# Patient Record
Sex: FEMALE | Race: WHITE | NOT HISPANIC OR LATINO | Employment: OTHER | URBAN - METROPOLITAN AREA
[De-identification: names, ages, dates, MRNs, and addresses within clinical notes are randomized per-mention and may not be internally consistent; named-entity substitution may affect disease eponyms.]

---

## 2017-01-06 ENCOUNTER — HOSPITAL ENCOUNTER (OUTPATIENT)
Dept: INFUSION CENTER | Facility: HOSPITAL | Age: 72
Discharge: HOME | End: 2017-01-06
Payer: COMMERCIAL

## 2017-01-06 PROCEDURE — 96401 CHEMO ANTI-NEOPL SQ/IM: CPT

## 2017-01-06 RX ADMIN — DENOSUMAB 60 MG: 60 INJECTION SUBCUTANEOUS at 08:39

## 2017-02-16 ENCOUNTER — HOSPITAL ENCOUNTER (OUTPATIENT)
Dept: INFUSION CENTER | Facility: HOSPITAL | Age: 72
Discharge: HOME | End: 2017-02-16
Payer: COMMERCIAL

## 2017-02-16 ENCOUNTER — LAB REQUISITION (OUTPATIENT)
Dept: LAB | Facility: HOSPITAL | Age: 72
End: 2017-02-16
Payer: COMMERCIAL

## 2017-02-16 ENCOUNTER — TRANSCRIBE ORDERS (OUTPATIENT)
Dept: LAB | Facility: HOSPITAL | Age: 72
End: 2017-02-16

## 2017-02-16 DIAGNOSIS — E11.9 TYPE 2 DIABETES MELLITUS WITHOUT COMPLICATIONS (HCC): ICD-10-CM

## 2017-02-16 DIAGNOSIS — E03.9 HYPOTHYROIDISM: ICD-10-CM

## 2017-02-16 LAB
ALBUMIN SERPL BCP-MCNC: 4 G/DL (ref 3.5–5)
ALP SERPL-CCNC: 82 U/L (ref 46–116)
ALT SERPL W P-5'-P-CCNC: 23 U/L (ref 12–78)
ANION GAP SERPL CALCULATED.3IONS-SCNC: 9 MMOL/L (ref 4–13)
AST SERPL W P-5'-P-CCNC: 18 U/L (ref 5–45)
BASOPHILS # BLD AUTO: 0.02 THOUSANDS/ΜL (ref 0–0.1)
BASOPHILS NFR BLD AUTO: 0 % (ref 0–1)
BILIRUB SERPL-MCNC: 0.3 MG/DL (ref 0.2–1)
BUN SERPL-MCNC: 11 MG/DL (ref 5–25)
CALCIUM SERPL-MCNC: 9.6 MG/DL (ref 8.3–10.1)
CANCER AG27-29 SERPL-ACNC: 18.1 U/ML (ref 0–42.3)
CEA SERPL-MCNC: 3.3 NG/ML (ref 0–3)
CHLORIDE SERPL-SCNC: 102 MMOL/L (ref 100–108)
CO2 SERPL-SCNC: 28 MMOL/L (ref 21–32)
CREAT SERPL-MCNC: 0.62 MG/DL (ref 0.6–1.3)
DEPRECATED D DIMER PPP: 799 NG/ML (FEU) (ref 0–424)
EOSINOPHIL # BLD AUTO: 0.26 THOUSAND/ΜL (ref 0–0.61)
EOSINOPHIL NFR BLD AUTO: 4 % (ref 0–6)
ERYTHROCYTE [DISTWIDTH] IN BLOOD BY AUTOMATED COUNT: 13.5 % (ref 11.6–15.1)
EST. AVERAGE GLUCOSE BLD GHB EST-MCNC: 163 MG/DL
GFR SERPL CREATININE-BSD FRML MDRD: >60 ML/MIN/1.73SQ M
GLUCOSE SERPL-MCNC: 116 MG/DL (ref 65–140)
HBA1C MFR BLD: 7.3 % (ref 4.2–6.3)
HCT VFR BLD AUTO: 36.9 % (ref 34.8–46.1)
HGB BLD-MCNC: 12.4 G/DL (ref 11.5–15.4)
LYMPHOCYTES # BLD AUTO: 1.14 THOUSANDS/ΜL (ref 0.6–4.47)
LYMPHOCYTES NFR BLD AUTO: 18 % (ref 14–44)
MCH RBC QN AUTO: 33 PG (ref 26.8–34.3)
MCHC RBC AUTO-ENTMCNC: 33.6 G/DL (ref 31.4–37.4)
MCV RBC AUTO: 98 FL (ref 82–98)
MONOCYTES # BLD AUTO: 0.68 THOUSAND/ΜL (ref 0.17–1.22)
MONOCYTES NFR BLD AUTO: 11 % (ref 4–12)
NEUTROPHILS # BLD AUTO: 4.18 THOUSANDS/ΜL (ref 1.85–7.62)
NEUTS SEG NFR BLD AUTO: 67 % (ref 43–75)
NRBC BLD AUTO-RTO: 1 /100 WBCS
PLATELET # BLD AUTO: 212 THOUSANDS/UL (ref 149–390)
PMV BLD AUTO: 9.3 FL (ref 8.9–12.7)
POTASSIUM SERPL-SCNC: 4.1 MMOL/L (ref 3.5–5.3)
PROT SERPL-MCNC: 7.9 G/DL (ref 6.4–8.2)
RBC # BLD AUTO: 3.76 MILLION/UL (ref 3.81–5.12)
SODIUM SERPL-SCNC: 139 MMOL/L (ref 136–145)
T3 SERPL-MCNC: 1.1 NG/ML (ref 0.6–1.8)
T4 FREE SERPL-MCNC: 0.69 NG/DL (ref 0.76–1.46)
TSH SERPL DL<=0.05 MIU/L-ACNC: 4.74 UIU/ML (ref 0.36–3.74)
WBC # BLD AUTO: 6.31 THOUSAND/UL (ref 4.31–10.16)

## 2017-02-16 PROCEDURE — 84439 ASSAY OF FREE THYROXINE: CPT | Performed by: FAMILY MEDICINE

## 2017-02-16 PROCEDURE — 86300 IMMUNOASSAY TUMOR CA 15-3: CPT | Performed by: INTERNAL MEDICINE

## 2017-02-16 PROCEDURE — 83036 HEMOGLOBIN GLYCOSYLATED A1C: CPT | Performed by: FAMILY MEDICINE

## 2017-02-16 PROCEDURE — 84443 ASSAY THYROID STIM HORMONE: CPT | Performed by: FAMILY MEDICINE

## 2017-02-16 PROCEDURE — 84480 ASSAY TRIIODOTHYRONINE (T3): CPT | Performed by: FAMILY MEDICINE

## 2017-02-16 PROCEDURE — 80053 COMPREHEN METABOLIC PANEL: CPT | Performed by: INTERNAL MEDICINE

## 2017-02-16 PROCEDURE — 85379 FIBRIN DEGRADATION QUANT: CPT | Performed by: INTERNAL MEDICINE

## 2017-02-16 PROCEDURE — 82378 CARCINOEMBRYONIC ANTIGEN: CPT | Performed by: INTERNAL MEDICINE

## 2017-02-16 PROCEDURE — 85025 COMPLETE CBC W/AUTO DIFF WBC: CPT | Performed by: INTERNAL MEDICINE

## 2017-02-16 RX ADMIN — Medication 300 UNITS: at 10:49

## 2017-02-16 NOTE — PLAN OF CARE
Problem: Potential for Falls  Goal: Patient will remain free of falls  INTERVENTIONS:  - Assess patient frequently for physical needs  - Identify cognitive and physical deficits and behaviors that affect risk of falls    - [place] fall precautions as indicated by assessment   - Educate patient/family on patient safety including physical limitations  - Instruct patient to call for assistance with activity based on assessment  - Modify environment to reduce risk of injury  - Consider OT/PT consult to assist with strengthening/mobility   Outcome: Progressing

## 2017-02-17 ENCOUNTER — GENERIC CONVERSION - ENCOUNTER (OUTPATIENT)
Dept: OTHER | Facility: OTHER | Age: 72
End: 2017-02-17

## 2017-03-06 ENCOUNTER — ALLSCRIPTS OFFICE VISIT (OUTPATIENT)
Dept: OTHER | Facility: OTHER | Age: 72
End: 2017-03-06

## 2017-03-31 ENCOUNTER — HOSPITAL ENCOUNTER (OUTPATIENT)
Dept: INFUSION CENTER | Facility: HOSPITAL | Age: 72
Discharge: HOME | End: 2017-03-31
Payer: COMMERCIAL

## 2017-03-31 LAB
ALBUMIN SERPL BCP-MCNC: 3.9 G/DL (ref 3.5–5)
ALP SERPL-CCNC: 65 U/L (ref 46–116)
ALT SERPL W P-5'-P-CCNC: 25 U/L (ref 12–78)
ANION GAP SERPL CALCULATED.3IONS-SCNC: 8 MMOL/L (ref 4–13)
AST SERPL W P-5'-P-CCNC: 18 U/L (ref 5–45)
BASOPHILS # BLD AUTO: 0.02 THOUSANDS/ΜL (ref 0–0.1)
BASOPHILS NFR BLD AUTO: 0 % (ref 0–1)
BILIRUB SERPL-MCNC: 0.25 MG/DL (ref 0.2–1)
BUN SERPL-MCNC: 16 MG/DL (ref 5–25)
CALCIUM SERPL-MCNC: 9.4 MG/DL (ref 8.3–10.1)
CANCER AG27-29 SERPL-ACNC: 15.8 U/ML (ref 0–42.3)
CEA SERPL-MCNC: 3.6 NG/ML (ref 0–3)
CHLORIDE SERPL-SCNC: 97 MMOL/L (ref 100–108)
CO2 SERPL-SCNC: 29 MMOL/L (ref 21–32)
CREAT SERPL-MCNC: 0.78 MG/DL (ref 0.6–1.3)
DEPRECATED D DIMER PPP: 689 NG/ML (FEU) (ref 0–424)
EOSINOPHIL # BLD AUTO: 0.33 THOUSAND/ΜL (ref 0–0.61)
EOSINOPHIL NFR BLD AUTO: 5 % (ref 0–6)
ERYTHROCYTE [DISTWIDTH] IN BLOOD BY AUTOMATED COUNT: 13.3 % (ref 11.6–15.1)
GFR SERPL CREATININE-BSD FRML MDRD: >60 ML/MIN/1.73SQ M
GLUCOSE SERPL-MCNC: 127 MG/DL (ref 65–140)
HCT VFR BLD AUTO: 35.9 % (ref 34.8–46.1)
HGB BLD-MCNC: 12 G/DL (ref 11.5–15.4)
LYMPHOCYTES # BLD AUTO: 1.04 THOUSANDS/ΜL (ref 0.6–4.47)
LYMPHOCYTES NFR BLD AUTO: 16 % (ref 14–44)
MCH RBC QN AUTO: 32.3 PG (ref 26.8–34.3)
MCHC RBC AUTO-ENTMCNC: 33.4 G/DL (ref 31.4–37.4)
MCV RBC AUTO: 97 FL (ref 82–98)
MONOCYTES # BLD AUTO: 0.73 THOUSAND/ΜL (ref 0.17–1.22)
MONOCYTES NFR BLD AUTO: 12 % (ref 4–12)
NEUTROPHILS # BLD AUTO: 4.22 THOUSANDS/ΜL (ref 1.85–7.62)
NEUTS SEG NFR BLD AUTO: 67 % (ref 43–75)
NRBC BLD AUTO-RTO: 0 /100 WBCS
PLATELET # BLD AUTO: 205 THOUSANDS/UL (ref 149–390)
PMV BLD AUTO: 9.3 FL (ref 8.9–12.7)
POTASSIUM SERPL-SCNC: 4 MMOL/L (ref 3.5–5.3)
PROT SERPL-MCNC: 7.5 G/DL (ref 6.4–8.2)
RBC # BLD AUTO: 3.72 MILLION/UL (ref 3.81–5.12)
SODIUM SERPL-SCNC: 134 MMOL/L (ref 136–145)
WBC # BLD AUTO: 6.36 THOUSAND/UL (ref 4.31–10.16)

## 2017-03-31 PROCEDURE — 85025 COMPLETE CBC W/AUTO DIFF WBC: CPT | Performed by: INTERNAL MEDICINE

## 2017-03-31 PROCEDURE — 80053 COMPREHEN METABOLIC PANEL: CPT | Performed by: INTERNAL MEDICINE

## 2017-03-31 PROCEDURE — 86300 IMMUNOASSAY TUMOR CA 15-3: CPT | Performed by: INTERNAL MEDICINE

## 2017-03-31 PROCEDURE — 82378 CARCINOEMBRYONIC ANTIGEN: CPT | Performed by: INTERNAL MEDICINE

## 2017-03-31 PROCEDURE — 85379 FIBRIN DEGRADATION QUANT: CPT | Performed by: INTERNAL MEDICINE

## 2017-03-31 RX ADMIN — Medication 300 UNITS: at 09:07

## 2017-03-31 NOTE — PLAN OF CARE
Problem: Potential for Falls  Goal: Patient will remain free of falls  INTERVENTIONS:  - Assess patient frequently for physical needs  - Identify cognitive and physical deficits and behaviors that affect risk of falls    - [place] fall precautions as indicated by assessment   - Educate patient/family on patient safety including physical limitations  - Instruct patient to call for assistance with activity based on assessment  - Modify environment to reduce risk of injury  - Consider OT/PT consult to assist with strengthening/mobility   Outcome: Progressing

## 2017-04-07 ENCOUNTER — GENERIC CONVERSION - ENCOUNTER (OUTPATIENT)
Dept: OTHER | Facility: OTHER | Age: 72
End: 2017-04-07

## 2017-04-24 ENCOUNTER — HOSPITAL ENCOUNTER (OUTPATIENT)
Dept: INFUSION CENTER | Facility: HOSPITAL | Age: 72
Discharge: HOME | End: 2017-04-24
Payer: COMMERCIAL

## 2017-04-24 LAB
ALBUMIN SERPL BCP-MCNC: 3.7 G/DL (ref 3.5–5)
ALP SERPL-CCNC: 60 U/L (ref 46–116)
ALT SERPL W P-5'-P-CCNC: 23 U/L (ref 12–78)
ANION GAP SERPL CALCULATED.3IONS-SCNC: 9 MMOL/L (ref 4–13)
AST SERPL W P-5'-P-CCNC: 16 U/L (ref 5–45)
BASOPHILS # BLD AUTO: 0.03 THOUSANDS/ΜL (ref 0–0.1)
BASOPHILS NFR BLD AUTO: 1 % (ref 0–1)
BILIRUB SERPL-MCNC: 0.32 MG/DL (ref 0.2–1)
BUN SERPL-MCNC: 16 MG/DL (ref 5–25)
CALCIUM SERPL-MCNC: 9.4 MG/DL (ref 8.3–10.1)
CANCER AG27-29 SERPL-ACNC: 18 U/ML (ref 0–42.3)
CEA SERPL-MCNC: 3.2 NG/ML (ref 0–3)
CHLORIDE SERPL-SCNC: 101 MMOL/L (ref 100–108)
CO2 SERPL-SCNC: 27 MMOL/L (ref 21–32)
CREAT SERPL-MCNC: 0.68 MG/DL (ref 0.6–1.3)
DEPRECATED D DIMER PPP: 1414 NG/ML (FEU) (ref 0–424)
EOSINOPHIL # BLD AUTO: 0.32 THOUSAND/ΜL (ref 0–0.61)
EOSINOPHIL NFR BLD AUTO: 6 % (ref 0–6)
ERYTHROCYTE [DISTWIDTH] IN BLOOD BY AUTOMATED COUNT: 13.4 % (ref 11.6–15.1)
GFR SERPL CREATININE-BSD FRML MDRD: >60 ML/MIN/1.73SQ M
GLUCOSE SERPL-MCNC: 120 MG/DL (ref 65–140)
HCT VFR BLD AUTO: 35.6 % (ref 34.8–46.1)
HGB BLD-MCNC: 11.9 G/DL (ref 11.5–15.4)
LYMPHOCYTES # BLD AUTO: 1.1 THOUSANDS/ΜL (ref 0.6–4.47)
LYMPHOCYTES NFR BLD AUTO: 19 % (ref 14–44)
MCH RBC QN AUTO: 32.7 PG (ref 26.8–34.3)
MCHC RBC AUTO-ENTMCNC: 33.4 G/DL (ref 31.4–37.4)
MCV RBC AUTO: 98 FL (ref 82–98)
MONOCYTES # BLD AUTO: 0.53 THOUSAND/ΜL (ref 0.17–1.22)
MONOCYTES NFR BLD AUTO: 9 % (ref 4–12)
NEUTROPHILS # BLD AUTO: 3.67 THOUSANDS/ΜL (ref 1.85–7.62)
NEUTS SEG NFR BLD AUTO: 65 % (ref 43–75)
NRBC BLD AUTO-RTO: 0 /100 WBCS
PLATELET # BLD AUTO: 201 THOUSANDS/UL (ref 149–390)
PMV BLD AUTO: 9.5 FL (ref 8.9–12.7)
POTASSIUM SERPL-SCNC: 4 MMOL/L (ref 3.5–5.3)
PROT SERPL-MCNC: 7.3 G/DL (ref 6.4–8.2)
RBC # BLD AUTO: 3.64 MILLION/UL (ref 3.81–5.12)
SODIUM SERPL-SCNC: 137 MMOL/L (ref 136–145)
WBC # BLD AUTO: 5.66 THOUSAND/UL (ref 4.31–10.16)

## 2017-04-24 PROCEDURE — 86300 IMMUNOASSAY TUMOR CA 15-3: CPT | Performed by: INTERNAL MEDICINE

## 2017-04-24 PROCEDURE — 85379 FIBRIN DEGRADATION QUANT: CPT | Performed by: INTERNAL MEDICINE

## 2017-04-24 PROCEDURE — 85025 COMPLETE CBC W/AUTO DIFF WBC: CPT | Performed by: INTERNAL MEDICINE

## 2017-04-24 PROCEDURE — 82378 CARCINOEMBRYONIC ANTIGEN: CPT | Performed by: INTERNAL MEDICINE

## 2017-04-24 PROCEDURE — 80053 COMPREHEN METABOLIC PANEL: CPT | Performed by: INTERNAL MEDICINE

## 2017-04-24 RX ADMIN — Medication 300 UNITS: at 08:21

## 2017-04-24 NOTE — PLAN OF CARE
Problem: Potential for Falls  Goal: Patient will remain free of falls  INTERVENTIONS:  - Assess patient frequently for physical needs  - Identify cognitive and physical deficits and behaviors that affect risk of falls    - [place] fall precautions as indicated by assessment   - Educate patient/family on patient safety including physical limitations  - Instruct patient to call for assistance with activity based on assessment  - Modify environment to reduce risk of injury  - Consider OT/PT consult to assist with strengthening/mobility   Outcome: Progressing

## 2017-05-08 ENCOUNTER — ALLSCRIPTS OFFICE VISIT (OUTPATIENT)
Dept: OTHER | Facility: OTHER | Age: 72
End: 2017-05-08

## 2017-05-08 ENCOUNTER — TRANSCRIBE ORDERS (OUTPATIENT)
Dept: ADMINISTRATIVE | Facility: HOSPITAL | Age: 72
End: 2017-05-08

## 2017-05-08 DIAGNOSIS — E04.1 NONTOXIC UNINODULAR GOITER: Primary | ICD-10-CM

## 2017-05-08 DIAGNOSIS — M81.0 SENILE OSTEOPOROSIS: ICD-10-CM

## 2017-05-30 ENCOUNTER — HOSPITAL ENCOUNTER (OUTPATIENT)
Dept: RADIOLOGY | Facility: HOSPITAL | Age: 72
Discharge: HOME | End: 2017-05-30
Attending: INTERNAL MEDICINE
Payer: COMMERCIAL

## 2017-05-30 DIAGNOSIS — M81.0 AGE-RELATED OSTEOPOROSIS WITHOUT CURRENT PATHOLOGICAL FRACTURE: ICD-10-CM

## 2017-05-30 DIAGNOSIS — E04.1 NONTOXIC SINGLE THYROID NODULE: ICD-10-CM

## 2017-05-30 PROCEDURE — 76536 US EXAM OF HEAD AND NECK: CPT

## 2017-06-05 ENCOUNTER — HOSPITAL ENCOUNTER (OUTPATIENT)
Dept: INFUSION CENTER | Facility: HOSPITAL | Age: 72
Discharge: HOME | End: 2017-06-05
Payer: COMMERCIAL

## 2017-06-05 LAB
ALBUMIN SERPL BCP-MCNC: 4 G/DL (ref 3.5–5)
ALP SERPL-CCNC: 62 U/L (ref 46–116)
ALT SERPL W P-5'-P-CCNC: 17 U/L (ref 12–78)
ANION GAP SERPL CALCULATED.3IONS-SCNC: 7 MMOL/L (ref 4–13)
AST SERPL W P-5'-P-CCNC: 15 U/L (ref 5–45)
BASOPHILS # BLD AUTO: 0.02 THOUSANDS/ΜL (ref 0–0.1)
BASOPHILS NFR BLD AUTO: 0 % (ref 0–1)
BILIRUB SERPL-MCNC: 0.34 MG/DL (ref 0.2–1)
BUN SERPL-MCNC: 11 MG/DL (ref 5–25)
CALCIUM SERPL-MCNC: 9.2 MG/DL (ref 8.3–10.1)
CANCER AG27-29 SERPL-ACNC: 16.3 U/ML (ref 0–42.3)
CEA SERPL-MCNC: 3 NG/ML (ref 0–3)
CHLORIDE SERPL-SCNC: 100 MMOL/L (ref 100–108)
CO2 SERPL-SCNC: 30 MMOL/L (ref 21–32)
CREAT SERPL-MCNC: 0.62 MG/DL (ref 0.6–1.3)
DEPRECATED D DIMER PPP: 937 NG/ML (FEU) (ref 0–424)
EOSINOPHIL # BLD AUTO: 0.28 THOUSAND/ΜL (ref 0–0.61)
EOSINOPHIL NFR BLD AUTO: 4 % (ref 0–6)
ERYTHROCYTE [DISTWIDTH] IN BLOOD BY AUTOMATED COUNT: 13.5 % (ref 11.6–15.1)
GFR SERPL CREATININE-BSD FRML MDRD: >60 ML/MIN/1.73SQ M
GLUCOSE SERPL-MCNC: 102 MG/DL (ref 65–140)
HCT VFR BLD AUTO: 35.9 % (ref 34.8–46.1)
HGB BLD-MCNC: 11.9 G/DL (ref 11.5–15.4)
LYMPHOCYTES # BLD AUTO: 1.12 THOUSANDS/ΜL (ref 0.6–4.47)
LYMPHOCYTES NFR BLD AUTO: 16 % (ref 14–44)
MCH RBC QN AUTO: 32.2 PG (ref 26.8–34.3)
MCHC RBC AUTO-ENTMCNC: 33.1 G/DL (ref 31.4–37.4)
MCV RBC AUTO: 97 FL (ref 82–98)
MONOCYTES # BLD AUTO: 0.74 THOUSAND/ΜL (ref 0.17–1.22)
MONOCYTES NFR BLD AUTO: 11 % (ref 4–12)
NEUTROPHILS # BLD AUTO: 4.64 THOUSANDS/ΜL (ref 1.85–7.62)
NEUTS SEG NFR BLD AUTO: 69 % (ref 43–75)
NRBC BLD AUTO-RTO: 0 /100 WBCS
PLATELET # BLD AUTO: 209 THOUSANDS/UL (ref 149–390)
PMV BLD AUTO: 9.2 FL (ref 8.9–12.7)
POTASSIUM SERPL-SCNC: 4 MMOL/L (ref 3.5–5.3)
PROT SERPL-MCNC: 7.6 G/DL (ref 6.4–8.2)
RBC # BLD AUTO: 3.69 MILLION/UL (ref 3.81–5.12)
SODIUM SERPL-SCNC: 137 MMOL/L (ref 136–145)
WBC # BLD AUTO: 6.82 THOUSAND/UL (ref 4.31–10.16)

## 2017-06-05 PROCEDURE — 82378 CARCINOEMBRYONIC ANTIGEN: CPT | Performed by: INTERNAL MEDICINE

## 2017-06-05 PROCEDURE — 85025 COMPLETE CBC W/AUTO DIFF WBC: CPT | Performed by: INTERNAL MEDICINE

## 2017-06-05 PROCEDURE — 80053 COMPREHEN METABOLIC PANEL: CPT | Performed by: INTERNAL MEDICINE

## 2017-06-05 PROCEDURE — 86300 IMMUNOASSAY TUMOR CA 15-3: CPT | Performed by: INTERNAL MEDICINE

## 2017-06-05 PROCEDURE — 85379 FIBRIN DEGRADATION QUANT: CPT | Performed by: INTERNAL MEDICINE

## 2017-06-05 RX ADMIN — Medication 300 UNITS: at 08:19

## 2017-06-05 NOTE — PROGRESS NOTES
Port flushed per protocol  Labs drawn as ordered by Dr Francisco Jacobs  Will return next month for port flush and labs for family doc

## 2017-06-06 ENCOUNTER — HOSPITAL ENCOUNTER (OUTPATIENT)
Dept: MAMMOGRAPHY | Facility: CLINIC | Age: 72
Discharge: HOME | End: 2017-06-06
Payer: COMMERCIAL

## 2017-06-06 DIAGNOSIS — C50.411 MALIGNANT NEOPLASM OF UPPER-OUTER QUADRANT OF RIGHT FEMALE BREAST (HCC): ICD-10-CM

## 2017-06-06 PROCEDURE — G0206 DX MAMMO INCL CAD UNI: HCPCS

## 2017-06-07 ENCOUNTER — HOSPITAL ENCOUNTER (OUTPATIENT)
Dept: RADIOLOGY | Age: 72
Discharge: HOME | End: 2017-06-07
Payer: COMMERCIAL

## 2017-06-07 DIAGNOSIS — M81.0 SENILE OSTEOPOROSIS: ICD-10-CM

## 2017-06-07 PROCEDURE — 77080 DXA BONE DENSITY AXIAL: CPT

## 2017-06-13 ENCOUNTER — GENERIC CONVERSION - ENCOUNTER (OUTPATIENT)
Dept: OTHER | Facility: OTHER | Age: 72
End: 2017-06-13

## 2017-07-06 ENCOUNTER — LAB REQUISITION (OUTPATIENT)
Dept: LAB | Facility: HOSPITAL | Age: 72
End: 2017-07-06
Payer: COMMERCIAL

## 2017-07-06 ENCOUNTER — HOSPITAL ENCOUNTER (OUTPATIENT)
Dept: INFUSION CENTER | Facility: HOSPITAL | Age: 72
Discharge: HOME | End: 2017-07-06
Payer: COMMERCIAL

## 2017-07-06 DIAGNOSIS — E11.9 TYPE 2 DIABETES MELLITUS WITHOUT COMPLICATIONS (HCC): ICD-10-CM

## 2017-07-06 DIAGNOSIS — E78.5 HYPERLIPIDEMIA: ICD-10-CM

## 2017-07-06 DIAGNOSIS — E55.9 VITAMIN D DEFICIENCY: ICD-10-CM

## 2017-07-06 DIAGNOSIS — R73.9 HYPERGLYCEMIA: ICD-10-CM

## 2017-07-06 DIAGNOSIS — I10 ESSENTIAL (PRIMARY) HYPERTENSION: ICD-10-CM

## 2017-07-06 DIAGNOSIS — E03.9 HYPOTHYROIDISM: ICD-10-CM

## 2017-07-06 LAB
25(OH)D3 SERPL-MCNC: 33.4 NG/ML (ref 30–100)
CHOLEST SERPL-MCNC: 263 MG/DL (ref 50–200)
CREAT UR-MCNC: 19.1 MG/DL
EST. AVERAGE GLUCOSE BLD GHB EST-MCNC: 143 MG/DL
HBA1C MFR BLD: 6.6 % (ref 4.2–6.3)
HDLC SERPL-MCNC: 59 MG/DL (ref 40–60)
LDLC SERPL CALC-MCNC: 188 MG/DL (ref 0–100)
MICROALBUMIN UR-MCNC: 9.7 MG/L (ref 0–20)
MICROALBUMIN/CREAT 24H UR: 51 MG/G CREATININE (ref 0–30)
TRIGL SERPL-MCNC: 78 MG/DL

## 2017-07-06 PROCEDURE — 82043 UR ALBUMIN QUANTITATIVE: CPT | Performed by: FAMILY MEDICINE

## 2017-07-06 PROCEDURE — 82570 ASSAY OF URINE CREATININE: CPT | Performed by: FAMILY MEDICINE

## 2017-07-06 PROCEDURE — 82306 VITAMIN D 25 HYDROXY: CPT | Performed by: FAMILY MEDICINE

## 2017-07-06 PROCEDURE — 83036 HEMOGLOBIN GLYCOSYLATED A1C: CPT | Performed by: FAMILY MEDICINE

## 2017-07-06 PROCEDURE — 80061 LIPID PANEL: CPT | Performed by: FAMILY MEDICINE

## 2017-07-06 RX ADMIN — Medication 300 UNITS: at 07:50

## 2017-07-06 NOTE — PLAN OF CARE
Problem: Potential for Falls  Goal: Patient will remain free of falls  INTERVENTIONS:  - Assess patient frequently for physical needs  -  Identify cognitive and physical deficits and behaviors that affect risk of falls    -  [place] fall precautions as indicated by assessment   - Educate patient/family on patient safety including physical limitations  - Instruct patient to call for assistance with activity based on assessment  - Modify environment to reduce risk of injury  - Consider OT/PT consult to assist with strengthening/mobility   Outcome: Progressing

## 2017-07-10 ENCOUNTER — ALLSCRIPTS OFFICE VISIT (OUTPATIENT)
Dept: OTHER | Facility: OTHER | Age: 72
End: 2017-07-10

## 2017-07-17 ENCOUNTER — HOSPITAL ENCOUNTER (OUTPATIENT)
Dept: INFUSION CENTER | Facility: HOSPITAL | Age: 72
Discharge: HOME | End: 2017-07-17
Payer: COMMERCIAL

## 2017-07-17 LAB
ALBUMIN SERPL BCP-MCNC: 3.8 G/DL (ref 3.5–5)
ALP SERPL-CCNC: 67 U/L (ref 46–116)
ALT SERPL W P-5'-P-CCNC: 16 U/L (ref 12–78)
ANION GAP SERPL CALCULATED.3IONS-SCNC: 9 MMOL/L (ref 4–13)
AST SERPL W P-5'-P-CCNC: 14 U/L (ref 5–45)
BASOPHILS # BLD AUTO: 0.03 THOUSANDS/ΜL (ref 0–0.1)
BASOPHILS NFR BLD AUTO: 1 % (ref 0–1)
BILIRUB SERPL-MCNC: 0.34 MG/DL (ref 0.2–1)
BUN SERPL-MCNC: 11 MG/DL (ref 5–25)
CALCIUM SERPL-MCNC: 9.5 MG/DL (ref 8.3–10.1)
CANCER AG27-29 SERPL-ACNC: 11.8 U/ML (ref 0–42.3)
CEA SERPL-MCNC: 2.8 NG/ML (ref 0–3)
CHLORIDE SERPL-SCNC: 99 MMOL/L (ref 100–108)
CO2 SERPL-SCNC: 27 MMOL/L (ref 21–32)
CREAT SERPL-MCNC: 0.71 MG/DL (ref 0.6–1.3)
DEPRECATED D DIMER PPP: 1210 NG/ML (FEU) (ref 0–424)
EOSINOPHIL # BLD AUTO: 0.22 THOUSAND/ΜL (ref 0–0.61)
EOSINOPHIL NFR BLD AUTO: 4 % (ref 0–6)
ERYTHROCYTE [DISTWIDTH] IN BLOOD BY AUTOMATED COUNT: 13.5 % (ref 11.6–15.1)
GFR SERPL CREATININE-BSD FRML MDRD: >60 ML/MIN/1.73SQ M
GLUCOSE SERPL-MCNC: 134 MG/DL (ref 65–140)
HCT VFR BLD AUTO: 33.3 % (ref 34.8–46.1)
HGB BLD-MCNC: 11.3 G/DL (ref 11.5–15.4)
LYMPHOCYTES # BLD AUTO: 0.81 THOUSANDS/ΜL (ref 0.6–4.47)
LYMPHOCYTES NFR BLD AUTO: 15 % (ref 14–44)
MCH RBC QN AUTO: 32.7 PG (ref 26.8–34.3)
MCHC RBC AUTO-ENTMCNC: 33.9 G/DL (ref 31.4–37.4)
MCV RBC AUTO: 96 FL (ref 82–98)
MONOCYTES # BLD AUTO: 0.68 THOUSAND/ΜL (ref 0.17–1.22)
MONOCYTES NFR BLD AUTO: 13 % (ref 4–12)
NEUTROPHILS # BLD AUTO: 3.69 THOUSANDS/ΜL (ref 1.85–7.62)
NEUTS SEG NFR BLD AUTO: 67 % (ref 43–75)
NRBC BLD AUTO-RTO: 0 /100 WBCS
PLATELET # BLD AUTO: 210 THOUSANDS/UL (ref 149–390)
PMV BLD AUTO: 10 FL (ref 8.9–12.7)
POTASSIUM SERPL-SCNC: 3.7 MMOL/L (ref 3.5–5.3)
PROT SERPL-MCNC: 7.3 G/DL (ref 6.4–8.2)
RBC # BLD AUTO: 3.46 MILLION/UL (ref 3.81–5.12)
SODIUM SERPL-SCNC: 135 MMOL/L (ref 136–145)
WBC # BLD AUTO: 5.45 THOUSAND/UL (ref 4.31–10.16)

## 2017-07-17 PROCEDURE — 85379 FIBRIN DEGRADATION QUANT: CPT | Performed by: INTERNAL MEDICINE

## 2017-07-17 PROCEDURE — 86300 IMMUNOASSAY TUMOR CA 15-3: CPT | Performed by: INTERNAL MEDICINE

## 2017-07-17 PROCEDURE — 96402 CHEMO HORMON ANTINEOPL SQ/IM: CPT

## 2017-07-17 PROCEDURE — 82378 CARCINOEMBRYONIC ANTIGEN: CPT | Performed by: INTERNAL MEDICINE

## 2017-07-17 PROCEDURE — 85025 COMPLETE CBC W/AUTO DIFF WBC: CPT | Performed by: INTERNAL MEDICINE

## 2017-07-17 PROCEDURE — 80053 COMPREHEN METABOLIC PANEL: CPT | Performed by: INTERNAL MEDICINE

## 2017-07-17 RX ADMIN — Medication 300 UNITS: at 08:35

## 2017-07-17 RX ADMIN — DENOSUMAB 60 MG: 60 INJECTION SUBCUTANEOUS at 08:35

## 2017-07-18 ENCOUNTER — GENERIC CONVERSION - ENCOUNTER (OUTPATIENT)
Dept: OTHER | Facility: OTHER | Age: 72
End: 2017-07-18

## 2017-07-31 ENCOUNTER — GENERIC CONVERSION - ENCOUNTER (OUTPATIENT)
Dept: OTHER | Facility: OTHER | Age: 72
End: 2017-07-31

## 2017-08-10 ENCOUNTER — ALLSCRIPTS OFFICE VISIT (OUTPATIENT)
Dept: OTHER | Facility: OTHER | Age: 72
End: 2017-08-10

## 2017-08-16 ENCOUNTER — HOSPITAL ENCOUNTER (OUTPATIENT)
Dept: INFUSION CENTER | Facility: HOSPITAL | Age: 72
Discharge: HOME | End: 2017-08-16
Payer: COMMERCIAL

## 2017-08-16 LAB
BASOPHILS # BLD AUTO: 0.02 THOUSANDS/ΜL (ref 0–0.1)
BASOPHILS NFR BLD AUTO: 0 % (ref 0–1)
EOSINOPHIL # BLD AUTO: 0.41 THOUSAND/ΜL (ref 0–0.61)
EOSINOPHIL NFR BLD AUTO: 6 % (ref 0–6)
ERYTHROCYTE [DISTWIDTH] IN BLOOD BY AUTOMATED COUNT: 13.4 % (ref 11.6–15.1)
HCT VFR BLD AUTO: 34.8 % (ref 34.8–46.1)
HEMOCCULT STL QL IA: POSITIVE
HGB BLD-MCNC: 11.8 G/DL (ref 11.5–15.4)
LYMPHOCYTES # BLD AUTO: 1.23 THOUSANDS/ΜL (ref 0.6–4.47)
LYMPHOCYTES NFR BLD AUTO: 18 % (ref 14–44)
MCH RBC QN AUTO: 33.1 PG (ref 26.8–34.3)
MCHC RBC AUTO-ENTMCNC: 33.9 G/DL (ref 31.4–37.4)
MCV RBC AUTO: 98 FL (ref 82–98)
MONOCYTES # BLD AUTO: 0.75 THOUSAND/ΜL (ref 0.17–1.22)
MONOCYTES NFR BLD AUTO: 11 % (ref 4–12)
NEUTROPHILS # BLD AUTO: 4.56 THOUSANDS/ΜL (ref 1.85–7.62)
NEUTS SEG NFR BLD AUTO: 65 % (ref 43–75)
NRBC BLD AUTO-RTO: 0 /100 WBCS
PLATELET # BLD AUTO: 207 THOUSANDS/UL (ref 149–390)
PMV BLD AUTO: 9.6 FL (ref 8.9–12.7)
RBC # BLD AUTO: 3.56 MILLION/UL (ref 3.81–5.12)
WBC # BLD AUTO: 6.99 THOUSAND/UL (ref 4.31–10.16)

## 2017-08-16 PROCEDURE — 85025 COMPLETE CBC W/AUTO DIFF WBC: CPT | Performed by: INTERNAL MEDICINE

## 2017-08-16 PROCEDURE — G0328 FECAL BLOOD SCRN IMMUNOASSAY: HCPCS | Performed by: INTERNAL MEDICINE

## 2017-08-16 RX ADMIN — Medication 300 UNITS: at 09:15

## 2017-08-16 NOTE — PLAN OF CARE
Problem: Potential for Falls  Goal: Patient will remain free of falls  INTERVENTIONS:  - Assess patient frequently for physical needs  -  Identify cognitive and physical deficits and behaviors that affect risk of falls    -  [place] fall precautions as indicated by assessment   - Educate patient/family on patient safety including physical limitations  - Instruct patient to call for assistance with activity based on assessment  - Modify environment to reduce risk of injury  - Consider OT/PT consult to assist with strengthening/mobility   Outcome: Progressing

## 2017-08-18 ENCOUNTER — GENERIC CONVERSION - ENCOUNTER (OUTPATIENT)
Dept: OTHER | Facility: OTHER | Age: 72
End: 2017-08-18

## 2017-08-24 ENCOUNTER — GENERIC CONVERSION - ENCOUNTER (OUTPATIENT)
Dept: OTHER | Facility: OTHER | Age: 72
End: 2017-08-24

## 2017-08-28 ENCOUNTER — HOSPITAL ENCOUNTER (OUTPATIENT)
Dept: INFUSION CENTER | Facility: HOSPITAL | Age: 72
Discharge: HOME | End: 2017-08-28
Payer: COMMERCIAL

## 2017-08-28 LAB
ALBUMIN SERPL BCP-MCNC: 4 G/DL (ref 3.5–5)
ALP SERPL-CCNC: 75 U/L (ref 46–116)
ALT SERPL W P-5'-P-CCNC: 13 U/L (ref 12–78)
ANION GAP SERPL CALCULATED.3IONS-SCNC: 7 MMOL/L (ref 4–13)
AST SERPL W P-5'-P-CCNC: 15 U/L (ref 5–45)
BASOPHILS # BLD AUTO: 0.02 THOUSANDS/ΜL (ref 0–0.1)
BASOPHILS NFR BLD AUTO: 0 % (ref 0–1)
BILIRUB SERPL-MCNC: 0.3 MG/DL (ref 0.2–1)
BUN SERPL-MCNC: 9 MG/DL (ref 5–25)
CALCIUM SERPL-MCNC: 9.2 MG/DL (ref 8.3–10.1)
CANCER AG27-29 SERPL-ACNC: 12.4 U/ML (ref 0–42.3)
CEA SERPL-MCNC: 3.2 NG/ML (ref 0–3)
CHLORIDE SERPL-SCNC: 99 MMOL/L (ref 100–108)
CO2 SERPL-SCNC: 28 MMOL/L (ref 21–32)
CREAT SERPL-MCNC: 0.7 MG/DL (ref 0.6–1.3)
DEPRECATED D DIMER PPP: 2329 NG/ML (FEU) (ref 0–424)
EOSINOPHIL # BLD AUTO: 0.7 THOUSAND/ΜL (ref 0–0.61)
EOSINOPHIL NFR BLD AUTO: 10 % (ref 0–6)
ERYTHROCYTE [DISTWIDTH] IN BLOOD BY AUTOMATED COUNT: 13.5 % (ref 11.6–15.1)
GFR SERPL CREATININE-BSD FRML MDRD: 87 ML/MIN/1.73SQ M
GLUCOSE SERPL-MCNC: 128 MG/DL (ref 65–140)
HCT VFR BLD AUTO: 34.9 % (ref 34.8–46.1)
HGB BLD-MCNC: 11.8 G/DL (ref 11.5–15.4)
LYMPHOCYTES # BLD AUTO: 1.06 THOUSANDS/ΜL (ref 0.6–4.47)
LYMPHOCYTES NFR BLD AUTO: 15 % (ref 14–44)
MCH RBC QN AUTO: 32.9 PG (ref 26.8–34.3)
MCHC RBC AUTO-ENTMCNC: 33.8 G/DL (ref 31.4–37.4)
MCV RBC AUTO: 97 FL (ref 82–98)
MONOCYTES # BLD AUTO: 0.67 THOUSAND/ΜL (ref 0.17–1.22)
MONOCYTES NFR BLD AUTO: 10 % (ref 4–12)
NEUTROPHILS # BLD AUTO: 4.5 THOUSANDS/ΜL (ref 1.85–7.62)
NEUTS SEG NFR BLD AUTO: 65 % (ref 43–75)
NRBC BLD AUTO-RTO: 0 /100 WBCS
PLATELET # BLD AUTO: 213 THOUSANDS/UL (ref 149–390)
PMV BLD AUTO: 9.7 FL (ref 8.9–12.7)
POTASSIUM SERPL-SCNC: 3.9 MMOL/L (ref 3.5–5.3)
PROT SERPL-MCNC: 7.5 G/DL (ref 6.4–8.2)
RBC # BLD AUTO: 3.59 MILLION/UL (ref 3.81–5.12)
SODIUM SERPL-SCNC: 134 MMOL/L (ref 136–145)
WBC # BLD AUTO: 6.99 THOUSAND/UL (ref 4.31–10.16)

## 2017-08-28 PROCEDURE — 85025 COMPLETE CBC W/AUTO DIFF WBC: CPT | Performed by: INTERNAL MEDICINE

## 2017-08-28 PROCEDURE — 82378 CARCINOEMBRYONIC ANTIGEN: CPT | Performed by: INTERNAL MEDICINE

## 2017-08-28 PROCEDURE — 85379 FIBRIN DEGRADATION QUANT: CPT | Performed by: INTERNAL MEDICINE

## 2017-08-28 PROCEDURE — 80053 COMPREHEN METABOLIC PANEL: CPT | Performed by: INTERNAL MEDICINE

## 2017-08-28 PROCEDURE — 86300 IMMUNOASSAY TUMOR CA 15-3: CPT | Performed by: INTERNAL MEDICINE

## 2017-08-28 RX ADMIN — Medication 300 UNITS: at 09:21

## 2017-08-28 NOTE — PLAN OF CARE
Problem: Potential for Falls  Goal: Patient will remain free of falls  INTERVENTIONS:  - Assess patient frequently for physical needs  -  Identify cognitive and physical deficits and behaviors that affect risk of falls    -  [place] fall precautions as indicated by assessment   - Educate patient/family on patient safety including physical limitations  - Instruct patient to call for assistance with activity based on assessment  - Modify environment to reduce risk of injury  - Consider OT/PT consult to assist with strengthening/mobility   Outcome: Progressing

## 2017-09-05 ENCOUNTER — ANESTHESIA (OUTPATIENT)
Dept: GASTROENTEROLOGY | Facility: HOSPITAL | Age: 72
End: 2017-09-05
Payer: COMMERCIAL

## 2017-09-05 ENCOUNTER — GENERIC CONVERSION - ENCOUNTER (OUTPATIENT)
Dept: OTHER | Facility: OTHER | Age: 72
End: 2017-09-05

## 2017-09-05 ENCOUNTER — HOSPITAL ENCOUNTER (OUTPATIENT)
Facility: HOSPITAL | Age: 72
Setting detail: OUTPATIENT SURGERY
Discharge: HOME | End: 2017-09-05
Attending: COLON & RECTAL SURGERY | Admitting: COLON & RECTAL SURGERY
Payer: COMMERCIAL

## 2017-09-05 ENCOUNTER — ANESTHESIA EVENT (OUTPATIENT)
Dept: GASTROENTEROLOGY | Facility: HOSPITAL | Age: 72
End: 2017-09-05
Payer: COMMERCIAL

## 2017-09-05 VITALS
DIASTOLIC BLOOD PRESSURE: 66 MMHG | HEIGHT: 66 IN | BODY MASS INDEX: 24.43 KG/M2 | OXYGEN SATURATION: 98 % | HEART RATE: 87 BPM | RESPIRATION RATE: 17 BRPM | TEMPERATURE: 98.2 F | WEIGHT: 152 LBS | SYSTOLIC BLOOD PRESSURE: 129 MMHG

## 2017-09-05 DIAGNOSIS — R19.5 POSITIVE FECAL OCCULT BLOOD TEST: ICD-10-CM

## 2017-09-05 DIAGNOSIS — Z85.3 PERSONAL HISTORY OF MALIGNANT NEOPLASM OF BREAST: ICD-10-CM

## 2017-09-05 DIAGNOSIS — Z86.010 HISTORY OF COLONIC POLYPS: ICD-10-CM

## 2017-09-05 DIAGNOSIS — Z85.038 PERSONAL HISTORY OF OTHER MALIGNANT NEOPLASM OF LARGE INTESTINE: ICD-10-CM

## 2017-09-05 DIAGNOSIS — K57.90 DIVERTICULOSIS OF INTESTINE WITHOUT PERFORATION OR ABSCESS WITHOUT BLEEDING: ICD-10-CM

## 2017-09-05 PROCEDURE — 88305 TISSUE EXAM BY PATHOLOGIST: CPT | Performed by: INTERNAL MEDICINE

## 2017-09-05 RX ORDER — SODIUM CHLORIDE 9 MG/ML
125 INJECTION, SOLUTION INTRAVENOUS CONTINUOUS
Status: DISCONTINUED | OUTPATIENT
Start: 2017-09-05 | End: 2017-09-05 | Stop reason: HOSPADM

## 2017-09-05 RX ORDER — PROPOFOL 10 MG/ML
INJECTION, EMULSION INTRAVENOUS AS NEEDED
Status: DISCONTINUED | OUTPATIENT
Start: 2017-09-05 | End: 2017-09-05 | Stop reason: SURG

## 2017-09-05 RX ADMIN — PROPOFOL 30 MG: 10 INJECTION, EMULSION INTRAVENOUS at 09:57

## 2017-09-05 RX ADMIN — PROPOFOL 100 MG: 10 INJECTION, EMULSION INTRAVENOUS at 09:46

## 2017-09-05 RX ADMIN — PROPOFOL 20 MG: 10 INJECTION, EMULSION INTRAVENOUS at 09:56

## 2017-09-05 RX ADMIN — PROPOFOL 30 MG: 10 INJECTION, EMULSION INTRAVENOUS at 09:51

## 2017-09-05 RX ADMIN — PROPOFOL 100 MG: 10 INJECTION, EMULSION INTRAVENOUS at 10:10

## 2017-09-05 RX ADMIN — PROPOFOL 20 MG: 10 INJECTION, EMULSION INTRAVENOUS at 10:21

## 2017-09-05 RX ADMIN — SODIUM CHLORIDE 125 ML/HR: 0.9 INJECTION, SOLUTION INTRAVENOUS at 09:22

## 2017-09-05 RX ADMIN — SODIUM CHLORIDE: 0.9 INJECTION, SOLUTION INTRAVENOUS at 09:36

## 2017-09-05 RX ADMIN — LIDOCAINE HYDROCHLORIDE 20 MG: 20 INJECTION, SOLUTION INTRAVENOUS at 09:46

## 2017-09-05 RX ADMIN — PROPOFOL 20 MG: 10 INJECTION, EMULSION INTRAVENOUS at 10:25

## 2017-09-05 RX ADMIN — PROPOFOL 30 MG: 10 INJECTION, EMULSION INTRAVENOUS at 10:15

## 2017-09-05 RX ADMIN — PROPOFOL 50 MG: 10 INJECTION, EMULSION INTRAVENOUS at 09:47

## 2017-09-05 RX ADMIN — PROPOFOL 20 MG: 10 INJECTION, EMULSION INTRAVENOUS at 09:52

## 2017-09-05 RX ADMIN — LIDOCAINE HYDROCHLORIDE 80 MG: 20 INJECTION, SOLUTION INTRAVENOUS at 10:09

## 2017-09-13 ENCOUNTER — GENERIC CONVERSION - ENCOUNTER (OUTPATIENT)
Dept: OTHER | Facility: OTHER | Age: 72
End: 2017-09-13

## 2017-10-09 ENCOUNTER — HOSPITAL ENCOUNTER (OUTPATIENT)
Dept: INFUSION CENTER | Facility: HOSPITAL | Age: 72
Discharge: HOME | End: 2017-10-09
Payer: COMMERCIAL

## 2017-10-09 LAB
ALBUMIN SERPL BCP-MCNC: 3.7 G/DL (ref 3.5–5)
ALP SERPL-CCNC: 65 U/L (ref 46–116)
ALT SERPL W P-5'-P-CCNC: 17 U/L (ref 12–78)
ANION GAP SERPL CALCULATED.3IONS-SCNC: 9 MMOL/L (ref 4–13)
AST SERPL W P-5'-P-CCNC: 16 U/L (ref 5–45)
BASOPHILS # BLD AUTO: 0.03 THOUSANDS/ΜL (ref 0–0.1)
BASOPHILS NFR BLD AUTO: 1 % (ref 0–1)
BILIRUB SERPL-MCNC: 0.24 MG/DL (ref 0.2–1)
BUN SERPL-MCNC: 12 MG/DL (ref 5–25)
CALCIUM SERPL-MCNC: 9.3 MG/DL (ref 8.3–10.1)
CANCER AG27-29 SERPL-ACNC: 14.8 U/ML (ref 0–42.3)
CEA SERPL-MCNC: 3.2 NG/ML (ref 0–3)
CHLORIDE SERPL-SCNC: 101 MMOL/L (ref 100–108)
CO2 SERPL-SCNC: 26 MMOL/L (ref 21–32)
CREAT SERPL-MCNC: 0.65 MG/DL (ref 0.6–1.3)
DEPRECATED D DIMER PPP: 1134 NG/ML (FEU) (ref 0–424)
EOSINOPHIL # BLD AUTO: 0.33 THOUSAND/ΜL (ref 0–0.61)
EOSINOPHIL NFR BLD AUTO: 5 % (ref 0–6)
ERYTHROCYTE [DISTWIDTH] IN BLOOD BY AUTOMATED COUNT: 13.4 % (ref 11.6–15.1)
GFR SERPL CREATININE-BSD FRML MDRD: 90 ML/MIN/1.73SQ M
GLUCOSE SERPL-MCNC: 85 MG/DL (ref 65–140)
HCT VFR BLD AUTO: 33.8 % (ref 34.8–46.1)
HGB BLD-MCNC: 11.6 G/DL (ref 11.5–15.4)
LYMPHOCYTES # BLD AUTO: 1.31 THOUSANDS/ΜL (ref 0.6–4.47)
LYMPHOCYTES NFR BLD AUTO: 20 % (ref 14–44)
MCH RBC QN AUTO: 33 PG (ref 26.8–34.3)
MCHC RBC AUTO-ENTMCNC: 34.3 G/DL (ref 31.4–37.4)
MCV RBC AUTO: 96 FL (ref 82–98)
MONOCYTES # BLD AUTO: 0.69 THOUSAND/ΜL (ref 0.17–1.22)
MONOCYTES NFR BLD AUTO: 11 % (ref 4–12)
NEUTROPHILS # BLD AUTO: 4.11 THOUSANDS/ΜL (ref 1.85–7.62)
NEUTS SEG NFR BLD AUTO: 63 % (ref 43–75)
NRBC BLD AUTO-RTO: 0 /100 WBCS
PLATELET # BLD AUTO: 211 THOUSANDS/UL (ref 149–390)
PMV BLD AUTO: 9.4 FL (ref 8.9–12.7)
POTASSIUM SERPL-SCNC: 4.1 MMOL/L (ref 3.5–5.3)
PROT SERPL-MCNC: 7.6 G/DL (ref 6.4–8.2)
RBC # BLD AUTO: 3.51 MILLION/UL (ref 3.81–5.12)
SODIUM SERPL-SCNC: 136 MMOL/L (ref 136–145)
WBC # BLD AUTO: 6.49 THOUSAND/UL (ref 4.31–10.16)

## 2017-10-09 PROCEDURE — 82378 CARCINOEMBRYONIC ANTIGEN: CPT | Performed by: INTERNAL MEDICINE

## 2017-10-09 PROCEDURE — 85025 COMPLETE CBC W/AUTO DIFF WBC: CPT | Performed by: INTERNAL MEDICINE

## 2017-10-09 PROCEDURE — 86300 IMMUNOASSAY TUMOR CA 15-3: CPT | Performed by: INTERNAL MEDICINE

## 2017-10-09 PROCEDURE — 85379 FIBRIN DEGRADATION QUANT: CPT | Performed by: INTERNAL MEDICINE

## 2017-10-09 PROCEDURE — 80053 COMPREHEN METABOLIC PANEL: CPT | Performed by: INTERNAL MEDICINE

## 2017-10-09 RX ADMIN — Medication 300 UNITS: at 08:25

## 2017-11-13 ENCOUNTER — ALLSCRIPTS OFFICE VISIT (OUTPATIENT)
Dept: OTHER | Facility: OTHER | Age: 72
End: 2017-11-13

## 2017-11-14 NOTE — PROGRESS NOTES
Assessment    1  Benign essential hypertension (401 1) (I10)  2  Esophagitis, reflux (530 11) (K21 0)  3  Type 2 diabetes mellitus (250 00) (E11 9)  4  Vitamin D deficiency (268 9) (E55 9)    Plan  Benign essential hypertension    · Renew: Benazepril HCl - 20 MG Oral Tablet; TAKE 1 TABLET DAILY AS DIRECTED  Esophagitis, reflux    · Renew: Pantoprazole Sodium 40 MG Oral Tablet Delayed Release; take 1 tab daily  Type 2 diabetes mellitus    · Renew: Januvia 100 MG Oral Tablet; TAKE 1 TABLET DAILY   · (1) HEMOGLOBIN A1C; Status:Active; Requested for:13Nov2017;    · Follow-up visit in 5 months Evaluation and Treatment  Follow-up  Status: Hold For -Scheduling  Requested for: 24WWE3518    Discussion/Summary    Reviewed labs, A1c before next visit  The patient was counseled regarding diagnostic results  Educational resources provided:   Possible side effects of new medications were reviewed with the patient/guardian today  The treatment plan was reviewed with the patient/guardian  The patient/guardian understands and agrees with the treatment plan     Self Referrals: No      Chief Complaint  pt here for 4 month follow up for diabeteswould like flu vaccines      History of Present Illness  Follow up  No complaints  Feels great  Walks to most places  reflux symptoms, continues with Vitamins  Review of Systems   Constitutional: No fever, no chills, feels well, no tiredness, no recent weight gain or weight loss  Eyes: No complaints of eye pain, no red eyes, no eyesight problems, no discharge, no dry eyes, no itching of eyes  ENT: no complaints of earache, no loss of hearing, no nose bleeds, no nasal discharge, no sore throat, no hoarseness  Cardiovascular: No complaints of slow heart rate, no fast heart rate, no chest pain, no palpitations, no leg claudication, no lower extremity edema  Respiratory: No complaints of shortness of breath, no wheezing, no cough, no SOB on exertion, no orthopnea, no PND  Gastrointestinal: No complaints of abdominal pain, no constipation, no nausea or vomiting, no diarrhea, no bloody stools  Genitourinary: No complaints of dysuria, no incontinence, no pelvic pain, no dysmenorrhea, no vaginal discharge or bleeding  Musculoskeletal: No complaints of arthralgias, no myalgias, no joint swelling or stiffness, no limb pain or swelling  Integumentary: No complaints of skin rash or lesions, no itching, no skin wounds, no breast pain or lump  Neurological: No complaints of headache, no confusion, no convulsions, no numbness, no dizziness or fainting, no tingling, no limb weakness, no difficulty walking  Psychiatric: Not suicidal, no sleep disturbance, no anxiety or depression, no change in personality, no emotional problems  Endocrine: No complaints of proptosis, no hot flashes, no muscle weakness, no deepening of the voice, no feelings of weakness  Hematologic/Lymphatic: No complaints of swollen glands, no swollen glands in the neck, does not bleed easily, does not bruise easily  Active Problems  1  Adenocarcinoma of colon, Duke's A (153 9) (C18 9)  2  Anemia (285 9) (D64 9)  3  Anemia of chronic disease (285 29) (D63 8)  4  Benign essential hypertension (401 1) (I10)  5  Breast cancer (174 9) (C50 919)  6  Breast cancer screening, high risk patient (V76 11) (Z12 31)  7  Breast cancer, right breast (174 9) (C50 911)  8  Colon cancer screening (V76 51) (Z12 11)  9  D-dimer, elevated (790 92) (R79 89)  10  Depression screening (V79 0) (Z13 89)  11  Dizziness (780 4) (R42)  12  DVT of leg (deep venous thrombosis) (453 40) (I82 409)  13  Dyslipidemia (272 4) (E78 5)  14  Edema (782 3) (R60 9)  15  Encounter for screening colonoscopy (V76 51) (Z12 11)  16  Esophagitis, reflux (530 11) (K21 0)  17  Fecal occult blood test positive (792 1) (R19 5)  18  Flu vaccine need (V04 81) (Z23)  19  Glaucoma, normal tension (365 12,365 70) (H40 1290)  20  Hyperglycemia (790 29) (R73 9)  21  Hypokalemia (276 8) (E87 6)  22  Hypothyroidism (244 9) (E03 9)  23  Lightheadedness (780 4) (R42)  24  Lymphadenopathy (785 6) (R59 1)  25  Malignant neoplasm of breast (174 9) (C50 919)  26  Malignant neoplasm of upper-outer quadrant of right female breast (174 4) (C50 411)  27  Medicare annual wellness visit, subsequent (V70 0) (Z00 00)  28  Need for prophylactic vaccination and inoculation against influenza (V04 81) (Z23)  29  Nontoxic single thyroid nodule (241 0) (E04 1)  30  Osteopenia (733 90) (M85 80)  31  Osteoporosis (733 00) (M81 0)  32  Phlebitis of deep vein of right lower extremity (451 19) (I80 201)  33  Rectal ulcer (569 41) (K62 6)  34  Screening for genitourinary condition (V81 6) (Z13 89)  35  Screening for glaucoma (V80 1) (Z13 5)  36  Screening for neurological condition (V80 09) (Z13 89)  37  Seborrheic keratosis (702 19) (L82 1)  38  Shortness of breath (786 05) (R06 02)  39  Type 2 diabetes mellitus (250 00) (E11 9)  40  Vitamin D deficiency (268 9) (E55 9)    Past Medical History    1  History of Acute embolism and thrombosis of deep vein of lower extremity (453 40) (I82 409)  2  History of constipation (V12 79) (Z87 19)  3  History of deep venous thrombosis (V12 51) (Z86 718)  4  History of viral warts (V12 09) (Z86 19)  5  History of Need for vaccination with 13-polyvalent pneumococcal conjugate vaccine (V03 82) (Z23)  6  History of Pes planus (734) (M21 40)  7  History of Rectal bleeding (569 3) (K62 5)  8  History of SOB (shortness of breath) on exertion (786 05) (R06 02)    Surgical History  1  History of Breast Surgery Mastectomy    Family History  Mother   1  Family history of chronic bronchitis (V17 6) (Z83 6)  2  Denied: Family history of mental disorder  3  Denied: Family history of substance abuse  Father   4  Family history of Brain Cancer (V16 8)  5  Denied: Family history of mental disorder  6   Denied: Family history of substance abuse    Social History     · Never A Smoker · Never Drank Alcohol   · No advance directives (V49 89) (Z78 9)   · Retired from employment   · Single    Current Meds  1  B-12 50 MCG Oral Tablet; TAKE 1 TABLET DAILY; Therapy: 62Tdq8812 to Recorded  2  Zack Aspirin EC Low Dose 81 MG Oral Tablet Delayed Release; TAKE 1 TABLET DAILY; Therapy: 61Kia5160 to Recorded  3  Benazepril HCl - 20 MG Oral Tablet; TAKE 1 TABLET DAILY AS DIRECTED; Therapy: 89Hww4539 to (Evaluate:56Ntf2375)  Requested for: 41MRW4759; Last Rx:02Oct2017 Ordered  4  Ferrous Sulfate 325 (65 Fe) MG Oral Tablet; TAKE 1 TABLET TWICE DAILY; Therapy: 01Qjc5901 to Recorded  5  Januvia 100 MG Oral Tablet; TAKE 1 TABLET DAILY; Therapy: 31MWF0626 to (Evaluate:06Jan2018)  Requested for: 69HCV5732; Last Rx:84Syo1727 Ordered  6  Letrozole 2 5 MG Oral Tablet; TAKE 1 TABLET ONCE DAILY; Therapy: 15Apr2014 to (Last Rx:62Bre5029)  Requested for: 43TBC1498 Ordered  7  Lumigan 0 01 % Ophthalmic Solution; INSTILL 1 DROP INTO BOTH EYES ONCE DAILY IN THE EVENING; Therapy: 82Xah9699 to Recorded  8  MetFORMIN HCl - 1000 MG Oral Tablet; TAKE 1 TABLET TWICE DAILY AS DIRECTED; Therapy: 41GDS8667 to (Evaluate:06Jan2018)  Requested for: 36THK6169; Last Rx:41Hku0167 Ordered  9  Pantoprazole Sodium 40 MG Oral Tablet Delayed Release; TAKE 1 TABLET DAILY; Therapy: 06SOX0063 to (Evaluate:23Jan2018)  Requested for: 27Oct2017; Last EV:47XOL6067 Ordered  10  Vitamin A 09150 UNIT Oral Capsule; take 1 capsule daily; Therapy: 99AXF4172 to Recorded  11  Vitamin C 500 MG Oral Tablet; Take 1 tablet twice daily; Therapy: (Recorded:47Dae9936) to Recorded  12  Vitamin D3 400 UNIT Oral Capsule; take 3 capsule daily; Therapy: 35KXC8859 to Recorded  13  Vitamin E 100 UNIT Oral Capsule; TAKE 1 CAPSULE DAILY; Therapy: (Recorded:21Cff5397) to Recorded    The medication list was reviewed and updated today  Allergies  1  No Known Drug Allergies  2  No Known Environmental Allergies  3   No Known Food Allergies    Vitals  Vital Signs Recorded: 41DSE3860 08:04AM   Temperature 97 6 F, Oral   Heart Rate 71   Systolic 107, LUE   Diastolic 74, LUE   Height 5 ft 5 in   Weight 151 lb    BMI Calculated 25 13   BSA Calculated 1 76   O2 Saturation 98       Physical Exam   Constitutional  General appearance: No acute distress, well appearing and well nourished  Eyes  Conjunctiva and lids: No swelling, erythema or discharge  Pupils and irises: Equal, round and reactive to light  Ears, Nose, Mouth, and Throat  External inspection of ears and nose: Normal    Otoscopic examination: Tympanic membranes translucent with normal light reflex  Canals patent without erythema  Nasal mucosa, septum, and turbinates: Normal without edema or erythema  Oropharynx: Normal with no erythema, edema, exudate or lesions  Pulmonary  Respiratory effort: No increased work of breathing or signs of respiratory distress  Auscultation of lungs: Clear to auscultation  Cardiovascular  Auscultation of heart: Normal rate and rhythm, normal S1 and S2, without murmurs  Examination of extremities for edema and/or varicosities: Normal    Musculoskeletal  Gait and station: Normal    Digits and nails: Normal without clubbing or cyanosis  Neurologic  Sensation: No sensory loss  Psychiatric  Orientation to person, place, and time: Normal    Mood and affect: Normal          Health Management  Benign essential hypertension   (1) COMPREHENSIVE METABOLIC PANEL; every 1 year; Last 44GKU6690; Next Due: 05OLW5073; Active  (1) LIPID PANEL, FASTING; every 1 year; Last 16NJC6876; Next Due: 99QTJ8040; Active  EKG/ECG- POC; every 1 year; Last 90HFU6552; Next Due: 56YFT8680; Overdue  Colon cancer screening   COLONOSCOPY; every 1 year; Last 06QCX0257; Next Permanently Deferred; Permanent Deferral  Depression screening   *VB - Fall Risk Assessment  (Dx Z13 89 Screen for Neurologic Disorder); every 1 year; KYIV14FES9321; Next Due: 67ISJ2781;  Overdue  *VB-Depression Screening; every 1 year; Last 67Pzs1781; Next Due: Z5804705; Active  Diabetes mellitus   (1) HEMOGLOBIN A1C; every 3 months; Last 38FER0705; Next Due: 39WSM0771; Overdue  Hemoglobin A1c- POC; every 6 months; Last 82OWY7663; Next Due: 66Cuw5068; Overdue  History of constipation   COLONOSCOPY; every 1 year; Last 73XFK9772; Next Permanently Deferred; History of Rectal bleeding   COLONOSCOPY; every 1 year; Last 01ZOO3983; Next Permanently Deferred;   Malignant neoplasm of breast   * Dexa Scan Axial Skel (Hip, Pelvis, Spine); every 2 years; Last 02Jun2015; Next Due: 02Jun2017; Overdue  Osteoporosis   * Dexa Scan Axial Skel (Hip, Pelvis, Spine); every 2 years; Last 02Jun2015; Next Due: 02Jun2017; Overdue  Rectal ulcer   COLONOSCOPY; every 1 year; Last 44OCF0281; Next Permanently Deferred;   Screening for genitourinary condition   *VB - Fall Risk Assessment  (Dx Z13 89 Screen for Neurologic Disorder); every 1 year; IZWY00NZE3214; Next Due: 80FQT2304; Overdue  *VB - Urinary Incontinence Screen (Dx Z13 89 Screen for UI); every 1 year; Last 64WDU5996; NextDue: 15VKQ8230; Overdue  Screening for glaucoma   *VB - Glaucoma Screen; every 1 year; Last 62EYN0548; Next Due: 23YCQ5894; Overdue  Screening for neurological condition   *VB - Fall Risk Assessment  (Dx Z13 89 Screen for Neurologic Disorder); every 1 year; WVCO64VDP3351; Next Due: 01QQM9338; Overdue  *VB - Urinary Incontinence Screen (Dx Z13 89 Screen for UI); every 1 year; Last 43VYF0607; NextDue: 91NPY3370; Overdue  Health Maintenance   Medicare Annual Wellness Visit; every 1 year; Last 06WTT7197; Next Due: 67Yxr2052;  Overdue    Future Appointments    Date/Time Provider Specialty Site   11/17/2017 02:15 PM Courtney Jordan MD Hematology Oncology [facility] MEDICAL ONCOLOGY       Signatures   Electronically signed by : Lorie Cisneros DO; Nov 13 2017  8:28AM EST                       (Author)    Electronically signed by : Lorie Cisneros DO; Nov 13 2017 12:11PM EST (Author)

## 2017-11-17 ENCOUNTER — GENERIC CONVERSION - ENCOUNTER (OUTPATIENT)
Dept: OTHER | Facility: OTHER | Age: 72
End: 2017-11-17

## 2017-11-20 ENCOUNTER — HOSPITAL ENCOUNTER (OUTPATIENT)
Dept: INFUSION CENTER | Facility: HOSPITAL | Age: 72
Discharge: HOME | End: 2017-11-20
Payer: COMMERCIAL

## 2017-11-20 LAB
ALBUMIN SERPL BCP-MCNC: 4.3 G/DL (ref 3.5–5)
ALP SERPL-CCNC: 69 U/L (ref 46–116)
ALT SERPL W P-5'-P-CCNC: 21 U/L (ref 12–78)
ANION GAP SERPL CALCULATED.3IONS-SCNC: 11 MMOL/L (ref 4–13)
AST SERPL W P-5'-P-CCNC: 25 U/L (ref 5–45)
BASOPHILS # BLD AUTO: 0.02 THOUSANDS/ΜL (ref 0–0.1)
BASOPHILS NFR BLD AUTO: 0 % (ref 0–1)
BILIRUB SERPL-MCNC: 0.48 MG/DL (ref 0.2–1)
BUN SERPL-MCNC: 13 MG/DL (ref 5–25)
CALCIUM SERPL-MCNC: 9.7 MG/DL (ref 8.3–10.1)
CANCER AG27-29 SERPL-ACNC: 16.4 U/ML (ref 0–42.3)
CEA SERPL-MCNC: 3.4 NG/ML (ref 0–3)
CHLORIDE SERPL-SCNC: 101 MMOL/L (ref 100–108)
CO2 SERPL-SCNC: 20 MMOL/L (ref 21–32)
CREAT SERPL-MCNC: 0.66 MG/DL (ref 0.6–1.3)
EOSINOPHIL # BLD AUTO: 0.29 THOUSAND/ΜL (ref 0–0.61)
EOSINOPHIL NFR BLD AUTO: 4 % (ref 0–6)
ERYTHROCYTE [DISTWIDTH] IN BLOOD BY AUTOMATED COUNT: 13.5 % (ref 11.6–15.1)
GFR SERPL CREATININE-BSD FRML MDRD: 89 ML/MIN/1.73SQ M
GLUCOSE SERPL-MCNC: 88 MG/DL (ref 65–140)
HCT VFR BLD AUTO: 40.8 % (ref 34.8–46.1)
HGB BLD-MCNC: 13.7 G/DL (ref 11.5–15.4)
LYMPHOCYTES # BLD AUTO: 1.45 THOUSANDS/ΜL (ref 0.6–4.47)
LYMPHOCYTES NFR BLD AUTO: 19 % (ref 14–44)
MCH RBC QN AUTO: 32.9 PG (ref 26.8–34.3)
MCHC RBC AUTO-ENTMCNC: 33.6 G/DL (ref 31.4–37.4)
MCV RBC AUTO: 98 FL (ref 82–98)
MONOCYTES # BLD AUTO: 0.73 THOUSAND/ΜL (ref 0.17–1.22)
MONOCYTES NFR BLD AUTO: 10 % (ref 4–12)
NEUTROPHILS # BLD AUTO: 5.07 THOUSANDS/ΜL (ref 1.85–7.62)
NEUTS SEG NFR BLD AUTO: 67 % (ref 43–75)
NRBC BLD AUTO-RTO: 0 /100 WBCS
PLATELET # BLD AUTO: 248 THOUSANDS/UL (ref 149–390)
PMV BLD AUTO: 9.2 FL (ref 8.9–12.7)
POTASSIUM SERPL-SCNC: 3.9 MMOL/L (ref 3.5–5.3)
PROT SERPL-MCNC: 9 G/DL (ref 6.4–8.2)
RBC # BLD AUTO: 4.16 MILLION/UL (ref 3.81–5.12)
SODIUM SERPL-SCNC: 132 MMOL/L (ref 136–145)
WBC # BLD AUTO: 7.58 THOUSAND/UL (ref 4.31–10.16)

## 2017-11-20 PROCEDURE — 82378 CARCINOEMBRYONIC ANTIGEN: CPT | Performed by: INTERNAL MEDICINE

## 2017-11-20 PROCEDURE — 36593 DECLOT VASCULAR DEVICE: CPT

## 2017-11-20 PROCEDURE — 80053 COMPREHEN METABOLIC PANEL: CPT | Performed by: INTERNAL MEDICINE

## 2017-11-20 PROCEDURE — 85025 COMPLETE CBC W/AUTO DIFF WBC: CPT | Performed by: INTERNAL MEDICINE

## 2017-11-20 PROCEDURE — 86300 IMMUNOASSAY TUMOR CA 15-3: CPT | Performed by: INTERNAL MEDICINE

## 2017-11-20 RX ADMIN — Medication 300 UNITS: at 13:30

## 2017-11-20 RX ADMIN — ALTEPLASE 2 MG: 2.2 INJECTION, POWDER, LYOPHILIZED, FOR SOLUTION INTRAVENOUS at 09:01

## 2017-11-20 RX ADMIN — ALTEPLASE 2 MG: 2.2 INJECTION, POWDER, LYOPHILIZED, FOR SOLUTION INTRAVENOUS at 11:02

## 2017-11-20 NOTE — PLAN OF CARE
Problem: Potential for Falls  Goal: Patient will remain free of falls  INTERVENTIONS:  - Assess patient frequently for physical needs  -  Identify cognitive and physical deficits and behaviors that affect risk of falls    -  [place] fall precautions as indicated by assessment   - Educate patient/family on patient safety including physical limitations  - Instruct patient to call for assistance with activity based on assessment  - Modify environment to reduce risk of injury  - Consider OT/PT consult to assist with strengthening/mobility   Outcome: Progressing

## 2017-11-20 NOTE — PROGRESS NOTES
Unable to obtain blood from port after 2 doses of TPA  Unable to obtain blood from arm after 2 sticks pt has no more veins  Marty Da Silva in Dr Wharton office notified  She will schedule pt for a dye study    Pt informed of plan before discharged

## 2017-12-15 ENCOUNTER — GENERIC CONVERSION - ENCOUNTER (OUTPATIENT)
Dept: OTHER | Facility: OTHER | Age: 72
End: 2017-12-15

## 2017-12-15 ENCOUNTER — HOSPITAL ENCOUNTER (OUTPATIENT)
Dept: RADIOLOGY | Facility: HOSPITAL | Age: 72
Discharge: HOME | End: 2017-12-15
Attending: INTERNAL MEDICINE | Admitting: RADIOLOGY
Payer: COMMERCIAL

## 2017-12-15 DIAGNOSIS — C50.919 MALIGNANT NEOPLASM OF BREAST (FEMALE) (HCC): ICD-10-CM

## 2017-12-15 PROCEDURE — 36598 INJ W/FLUOR EVAL CV DEVICE: CPT

## 2017-12-15 RX ORDER — HEPARIN SODIUM (PORCINE) LOCK FLUSH IV SOLN 100 UNIT/ML 100 UNIT/ML
SOLUTION INTRAVENOUS CODE/TRAUMA/SEDATION MEDICATION
Status: COMPLETED | OUTPATIENT
Start: 2017-12-15 | End: 2017-12-15

## 2017-12-15 RX ADMIN — HEPARIN SODIUM (PORCINE) LOCK FLUSH IV SOLN 100 UNIT/ML 300 UNITS: 100 SOLUTION at 10:46

## 2017-12-15 RX ADMIN — IOHEXOL 6 ML: 300 INJECTION, SOLUTION INTRAVENOUS at 12:41

## 2017-12-21 ENCOUNTER — HOSPITAL ENCOUNTER (OUTPATIENT)
Dept: INFUSION CENTER | Facility: HOSPITAL | Age: 72
Discharge: HOME | End: 2017-12-23
Payer: COMMERCIAL

## 2018-01-02 ENCOUNTER — HOSPITAL ENCOUNTER (OUTPATIENT)
Dept: INFUSION CENTER | Facility: HOSPITAL | Age: 73
Discharge: HOME | End: 2018-01-04
Payer: COMMERCIAL

## 2018-01-02 LAB
ALBUMIN SERPL BCP-MCNC: 4.2 G/DL (ref 3.5–5)
ALP SERPL-CCNC: 56 U/L (ref 46–116)
ALT SERPL W P-5'-P-CCNC: 17 U/L (ref 12–78)
ANION GAP SERPL CALCULATED.3IONS-SCNC: 7 MMOL/L (ref 4–13)
AST SERPL W P-5'-P-CCNC: 14 U/L (ref 5–45)
BASOPHILS # BLD AUTO: 0.02 THOUSANDS/ΜL (ref 0–0.1)
BASOPHILS NFR BLD AUTO: 0 % (ref 0–1)
BILIRUB SERPL-MCNC: 0.39 MG/DL (ref 0.2–1)
BUN SERPL-MCNC: 13 MG/DL (ref 5–25)
CALCIUM SERPL-MCNC: 10.1 MG/DL (ref 8.3–10.1)
CANCER AG27-29 SERPL-ACNC: 18 U/ML (ref 0–42.3)
CEA SERPL-MCNC: 3.2 NG/ML (ref 0–3)
CHLORIDE SERPL-SCNC: 98 MMOL/L (ref 100–108)
CO2 SERPL-SCNC: 30 MMOL/L (ref 21–32)
CREAT SERPL-MCNC: 0.61 MG/DL (ref 0.6–1.3)
DEPRECATED D DIMER PPP: 1420 NG/ML (FEU) (ref 0–424)
EOSINOPHIL # BLD AUTO: 0.12 THOUSAND/ΜL (ref 0–0.61)
EOSINOPHIL NFR BLD AUTO: 2 % (ref 0–6)
ERYTHROCYTE [DISTWIDTH] IN BLOOD BY AUTOMATED COUNT: 13.1 % (ref 11.6–15.1)
GFR SERPL CREATININE-BSD FRML MDRD: 92 ML/MIN/1.73SQ M
GLUCOSE SERPL-MCNC: 90 MG/DL (ref 65–140)
HCT VFR BLD AUTO: 34.8 % (ref 34.8–46.1)
HGB BLD-MCNC: 11.7 G/DL (ref 11.5–15.4)
LYMPHOCYTES # BLD AUTO: 0.89 THOUSANDS/ΜL (ref 0.6–4.47)
LYMPHOCYTES NFR BLD AUTO: 15 % (ref 14–44)
MCH RBC QN AUTO: 32.2 PG (ref 26.8–34.3)
MCHC RBC AUTO-ENTMCNC: 33.6 G/DL (ref 31.4–37.4)
MCV RBC AUTO: 96 FL (ref 82–98)
MONOCYTES # BLD AUTO: 0.91 THOUSAND/ΜL (ref 0.17–1.22)
MONOCYTES NFR BLD AUTO: 15 % (ref 4–12)
NEUTROPHILS # BLD AUTO: 3.98 THOUSANDS/ΜL (ref 1.85–7.62)
NEUTS SEG NFR BLD AUTO: 68 % (ref 43–75)
NRBC BLD AUTO-RTO: 0 /100 WBCS
PLATELET # BLD AUTO: 219 THOUSANDS/UL (ref 149–390)
PMV BLD AUTO: 9 FL (ref 8.9–12.7)
POTASSIUM SERPL-SCNC: 3.9 MMOL/L (ref 3.5–5.3)
PROT SERPL-MCNC: 7.9 G/DL (ref 6.4–8.2)
RBC # BLD AUTO: 3.63 MILLION/UL (ref 3.81–5.12)
SODIUM SERPL-SCNC: 135 MMOL/L (ref 136–145)
WBC # BLD AUTO: 5.94 THOUSAND/UL (ref 4.31–10.16)

## 2018-01-02 PROCEDURE — 85379 FIBRIN DEGRADATION QUANT: CPT | Performed by: INTERNAL MEDICINE

## 2018-01-02 PROCEDURE — 85025 COMPLETE CBC W/AUTO DIFF WBC: CPT | Performed by: INTERNAL MEDICINE

## 2018-01-02 PROCEDURE — 86300 IMMUNOASSAY TUMOR CA 15-3: CPT | Performed by: INTERNAL MEDICINE

## 2018-01-02 PROCEDURE — 80053 COMPREHEN METABOLIC PANEL: CPT | Performed by: INTERNAL MEDICINE

## 2018-01-02 PROCEDURE — 82378 CARCINOEMBRYONIC ANTIGEN: CPT | Performed by: INTERNAL MEDICINE

## 2018-01-02 RX ADMIN — Medication 300 UNITS: at 09:02

## 2018-01-09 NOTE — MISCELLANEOUS
Message   Recorded as Task   Date: 02/09/2016 09:12 AM, Created By: Skyler Espitia   Task Name: Call Back   Assigned To: Jayne Barthel   Regarding Patient: Taylor Bush, Status: Active   Comment:    ArgentinaCaroline - 09 Feb 2016 9:12 AM     TASK CREATED  please call [facility] regarding pt's labs and Prolia   Spoke with Eyad ZUÑIGA to clarify lab draw and schedule and Prolia schedule  Active Problems    1  Adenocarcinoma of colon, Duke's A (153 9) (C18 9)   2  Anemia (285 9) (D64 9)   3  Anemia of chronic disease (285 29) (D63 8)   4  Benign essential hypertension (401 1) (I10)   5  Breast cancer (174 9) (C50 919)   6  Breast cancer screening, high risk patient (V76 11) (Z12 31)   7  Breast cancer, right breast (174 9) (C50 911)   8  Constipation (564 00) (K59 00)   9  D-dimer, elevated (790 92) (R79 1)   10  Depression screening (V79 0) (Z13 89)   11  Diabetes mellitus (250 00) (E11 9)   12  DVT (deep venous thrombosis) (453 40) (I82 409)   13  Dyslipidemia (272 4) (E78 5)   14  Edema (782 3) (R60 9)   15  Encounter for screening colonoscopy (V76 51) (Z12 11)   16  Glaucoma, normal tension (365 12,365 70) (H40 1290)   17  Hyperglycemia (790 29) (R73 9)   18  Hypokalemia (276 8) (E87 6)   19  Lightheadedness (780 4) (R42)   20  Lymphadenopathy (785 6) (R59 1)   21  Malignant neoplasm of breast (174 9) (C50 919)   22  Malignant neoplasm of upper-outer quadrant of right female breast (174 4) (C50 411)   23  Need for prophylactic vaccination and inoculation against influenza (V04 81) (Z23)   24  Nontoxic single thyroid nodule (241 0) (E04 1)   25  Osteopenia (733 90) (M85 80)   26  Osteoporosis (733 00) (M81 0)   27  Pes planus (734) (M21 40)   28  Phlebitis of deep vein of right lower extremity (451 19) (I80 201)   29  Rectal ulcer (569 41) (K62 6)   30  Screening for genitourinary condition (V81 6) (Z13 89)   31  Screening for glaucoma (V80 1) (Z13 5)   32   Screening for neurological condition (V80 09) (Z13 89)   33  Seborrheic keratosis (702 19) (L82 1)   34  Shortness of breath (786 05) (R06 02)   35  SOB (shortness of breath) on exertion (786 05) (R06 02)   36  Vitamin D deficiency (268 9) (E55 9)   37  Wart (078 10) (B07 9)    Current Meds   1  B-12 50 MCG Oral Tablet; TAKE 1 TABLET DAILY; Therapy: 45Ikw1814 to Recorded   2  Zack Aspirin EC Low Dose 81 MG Oral Tablet Delayed Release; TAKE 1 TABLET DAILY; Therapy: 04Aug2015 to Recorded   3  Benazepril HCl - 20 MG Oral Tablet; TAKE 1 TABLET DAILY AS DIRECTED; Therapy: 74Mup6201 to (Candida Angie)  Requested for: 98HUW8850; Last   Rx:02Kmx5075 Ordered   4  Ferrous Sulfate 325 (65 Fe) MG Oral Tablet; TAKE 1 TABLET TWICE DAILY; Therapy: 44Bgm2477 to Recorded   5  Januvia 100 MG Oral Tablet; Take 1 tablet daily; Therapy: 78KGN1823 to (Evaluate:18Gff4157)  Requested for: 34JUL9297; Last   Rx:20Nov2015 Ordered   6  Letrozole 2 5 MG Oral Tablet; TAKE 1 TABLET ONCE DAILY; Therapy: 46Kco2744 to (Last Rx:30Nov2015)  Requested for: 43PPZ1286 Ordered   7  Lumigan 0 01 % Ophthalmic Solution; INSTILL 1 DROP INTO BOTH EYES ONCE DAILY   IN THE EVENING; Therapy: 05Aug2014 to Recorded   8  MetFORMIN HCl - 500 MG Oral Tablet; TAKE 1 TABLET TWICE DAILY; Therapy: 91EHG6269 to (Evaluate:18May2016)  Requested for: 16XNA0680; Last   Rx:20Nov2015 Ordered   9  Pantoprazole Sodium 40 MG Oral Tablet Delayed Release; TAKE 1 TABLET DAILY; Therapy: 23FZN6959 to (Evaluate:18May2016)  Requested for: 09AQL5303; Last   Rx:20Nov2015 Ordered   10  Vitamin A 79227 UNIT Oral Capsule; take 1 capsule daily; Therapy: 58UUS9029 to Recorded   11  Vitamin C 500 MG Oral Tablet; TAKE 1 TABLET DAILY; Therapy: (Recorded:05Jan2015) to Recorded   12  Vitamin D3 400 UNIT Oral Capsule; take 3 capsule daily; Therapy: 99OCU2625 to Recorded    Allergies    1  No Known Drug Allergies    2  No Known Environmental Allergies   3   No Known Food Allergies    Signatures   Electronically signed by : Joel Hare RN; Feb 9 2016  9:20AM EST                       (Author)

## 2018-01-10 NOTE — MISCELLANEOUS
Message  I spoke with patient earlier this week about positive Hemoccult stool test  She said Dr Mortimer Burlington will do colonoscopy and he would have somebody to do the endoscopy  She has already seen Dr Mortimer Burlington        Signatures   Electronically signed by : Flaquito Carter MD; Aug 24 2017 10:11AM EST                       (Author)

## 2018-01-13 VITALS
WEIGHT: 159 LBS | BODY MASS INDEX: 26.49 KG/M2 | OXYGEN SATURATION: 96 % | DIASTOLIC BLOOD PRESSURE: 80 MMHG | TEMPERATURE: 97.6 F | HEART RATE: 78 BPM | RESPIRATION RATE: 14 BRPM | SYSTOLIC BLOOD PRESSURE: 138 MMHG | HEIGHT: 65 IN

## 2018-01-13 NOTE — RESULT NOTES
Discussion/Summary   polyp biopsies were fine, nothing worrisome      Verified Results  (1) TISSUE EXAM [id number] 10:17AM Lola Vidales     Test Name Result Flag Reference   LAB AP CASE REPORT (Report)     Surgical Pathology Report             Case: [id number]                   Authorizing Provider: Rogelia Prader, DO    Collected:      09/05/2017 1017        Ordering Location:   [address] [place] [address]   Received:      09/05/2017 [address] [place] [address] Endoscopy                               Pathologist:      Tad Tolbert MD                             Specimens:  A) - Stomach, hot snare, stomach body, polyp                             B) - Stomach, cold bx, stomach body, polyp   LAB AP FINAL DIAGNOSIS (Report)     A  Gastric polyp:    - Ulcerated and inflamed hyperplastic polyp     - Negative for dysplasia and malignancy  B  Gastric polyp:    - Focally ulcerated and inflamed polypoid gastric mucosa with foveolar   hyperplasia and mild reactive changes  - Negative for dysplasia and malignancy  Electronically signed by Tad Tolbert MD on 9/8/2017 at 10:55 AM   LAB AP NOTE      Interpretation performed at [facility]   LAB AP SURGICAL ADDITIONAL INFORMATION (Report)     All controls performed with the immunohistochemical stains reported above   reacted appropriately  These tests were developed and their performance   characteristics determined by [facility] or   [facility]  They may not be cleared or approved by the [place]  Food and Drug Administration  The FDA has determined that such clearance   or approval is not necessary  These tests are used for clinical purposes  They should not be regarded as investigational or for research  This   laboratory has been approved by CLIA 88, designated as a high-complexity   laboratory and is qualified to perform these tests  LAB AP GROSS DESCRIPTION (Report)     A  The specimen is received in formalin, labeled with the patient's name   and hospital number, and is designated stomach body polyp  The specimen   consists of one irregular shaped, pink-red to focally white tan, polypoid   soft tissue mass which measures 1 8 x 1 4 x 1 0 cm  The apparent margin of   resection is inked blue  The specimen is bisected revealing pink-red dense   cut surfaces  Entirely submitted  Two cassettes  Note: The estimated total formalin fixation time based upon information   provided by the submitting clinician and the standard processing schedule   is 34 25 hours  B  The specimen is received in formalin, labeled with the patient's name   and hospital number, and is designated Stomach body polyp  The specimen   consists of 2 tan-pink soft tissue fragments measuring 0 2 cm and 0 3 cm   in greatest dimension  Entirely submitted  One cassette  Note: The estimated total formalin fixation time based upon information   provided by the submitting clinician and the standard processing schedule   is 27 75 hours     MAS

## 2018-01-14 VITALS
DIASTOLIC BLOOD PRESSURE: 70 MMHG | SYSTOLIC BLOOD PRESSURE: 118 MMHG | HEIGHT: 65 IN | BODY MASS INDEX: 25.37 KG/M2 | OXYGEN SATURATION: 99 % | WEIGHT: 152.25 LBS | TEMPERATURE: 96.8 F | RESPIRATION RATE: 15 BRPM | HEART RATE: 92 BPM

## 2018-01-14 VITALS
HEIGHT: 65 IN | BODY MASS INDEX: 25.83 KG/M2 | OXYGEN SATURATION: 98 % | SYSTOLIC BLOOD PRESSURE: 112 MMHG | HEART RATE: 84 BPM | TEMPERATURE: 98.3 F | RESPIRATION RATE: 16 BRPM | DIASTOLIC BLOOD PRESSURE: 72 MMHG | WEIGHT: 155 LBS

## 2018-01-14 VITALS
BODY MASS INDEX: 25.89 KG/M2 | OXYGEN SATURATION: 96 % | HEART RATE: 89 BPM | HEIGHT: 65 IN | SYSTOLIC BLOOD PRESSURE: 120 MMHG | RESPIRATION RATE: 16 BRPM | WEIGHT: 155.38 LBS | TEMPERATURE: 97.1 F | DIASTOLIC BLOOD PRESSURE: 76 MMHG

## 2018-01-15 VITALS
OXYGEN SATURATION: 98 % | SYSTOLIC BLOOD PRESSURE: 132 MMHG | BODY MASS INDEX: 25.16 KG/M2 | HEIGHT: 65 IN | TEMPERATURE: 97.6 F | DIASTOLIC BLOOD PRESSURE: 74 MMHG | HEART RATE: 71 BPM | WEIGHT: 151 LBS

## 2018-01-16 NOTE — MISCELLANEOUS
Message  called patient trying to reschedule her appointment for 4/28/17 but phone doesn't have answering machine a letter was sent out to patient for her to call the office to reschedule her appointment  Active Problems    1  Adenocarcinoma of colon, [facility] (153 9) (C18 9)   2  Anemia (285 9) (D64 9)   3  Anemia of chronic disease (285 29) (D63 8)   4  Benign essential hypertension (401 1) (I10)   5  Breast cancer (174 9) (C50 919)   6  Breast cancer screening, high risk patient (V76 11) (Z12 39)   7  Breast cancer, right breast (174 9) (C50 911)   8  Colon cancer screening (V76 51) (Z12 11)   9  D-dimer, elevated (790 92) (R79 89)   10  Depression screening (V79 0) (Z13 89)   11  Dizziness (780 4) (R42)   12  DVT of leg (deep venous thrombosis) (453 40) (I82 409)   13  Dyslipidemia (272 4) (E78 5)   14  Edema (782 3) (R60 9)   15  Encounter for screening colonoscopy (V76 51) (Z12 11)   16  Esophagitis, reflux (530 11) (K21 0)   17  Flu vaccine need (V04 81) (Z23)   18  Glaucoma, normal tension (365 12,365 70) (H40 1290)   19  Hyperglycemia (790 29) (R73 9)   20  Hypokalemia (276 8) (E87 6)   21  Hypothyroidism (244 9) (E03 9)   22  Lightheadedness (780 4) (R42)   23  Lymphadenopathy (785 6) (R59 1)   24  Malignant neoplasm of breast (174 9) (C50 919)   25  Malignant neoplasm of upper-outer quadrant of right female breast (174 4) (C50 411)   26  Need for prophylactic vaccination and inoculation against influenza (V04 81) (Z23)   27  Nontoxic single thyroid nodule (241 0) (E04 1)   28  Osteopenia (733 90) (M85 80)   29  Osteoporosis (733 00) (M81 0)   30  Phlebitis of deep vein of right lower extremity (451 19) (I80 201)   31  Rectal ulcer (569 41) (K62 6)   32  Screening for genitourinary condition (V81 6) (Z13 89)   33  Screening for glaucoma (V80 1) (Z13 5)   34  Screening for neurological condition (V80 09) (Z13 89)   35  Seborrheic keratosis (702 19) (L82 1)   36   Shortness of breath (786 05) (R06 02)   37  Type 2 diabetes mellitus (250 00) (E11 9)   38  Vitamin D deficiency (268 9) (E55 9)    Current Meds   1  B-12 50 MCG Oral Tablet; TAKE 1 TABLET DAILY; Therapy: 90Rpk8785 to Recorded   2  Zack Aspirin EC Low Dose 81 MG Oral Tablet Delayed Release; TAKE 1 TABLET DAILY; Therapy: 62Oky9907 to Recorded   3  Benazepril HCl - 20 MG Oral Tablet; TAKE 1 TABLET DAILY AS DIRECTED; Therapy: 23Kui3587 to ([facility])  Requested for: 88CLQ5141; Last   Rx:06Mar2017 Ordered   4  Ferrous Sulfate 325 (65 Fe) MG Oral Tablet; TAKE 1 TABLET TWICE DAILY; Therapy: 43Ebz5421 to Recorded   5  Januvia 100 MG Oral Tablet; TAKE 1 TABLET DAILY; Therapy: 90CKT3942 to ([facility])  Requested for: 08CTQ3698; Last   Rx:06Mar2017 Ordered   6  Letrozole 2 5 MG Oral Tablet; TAKE 1 TABLET ONCE DAILY; Therapy: 15Apr2014 to (Last Rx:28Nov2016)  Requested for: 48QBF6976 Ordered   7  Lumigan 0 01 % Ophthalmic Solution; INSTILL 1 DROP INTO BOTH EYES ONCE DAILY   IN THE EVENING; Therapy: 01Wqc8878 to Recorded   8  MetFORMIN HCl - 1000 MG Oral Tablet; TAKE 1 TABLET TWICE DAILY AS DIRECTED; Therapy: 54FHP8363 to ([facility])  Requested for: 15VUP5162; Last   Rx:06Mar2017 Ordered   9  Pantoprazole Sodium 40 MG Oral Tablet Delayed Release; TAKE 1 TABLET DAILY; Therapy: 07FMQ6875 to ([facility])  Requested for: 15JDR6191; Last   Rx:06Mar2017 Ordered   10  Vitamin A 60574 UNIT Oral Capsule; take 1 capsule daily; Therapy: 63VZB2232 to Recorded   11  Vitamin C 500 MG Oral Tablet; Take 1 tablet twice daily; Therapy: (Recorded:88Mjg6394) to Recorded   12  Vitamin D3 400 UNIT Oral Capsule; take 3 capsule daily; Therapy: 02JDS4822 to Recorded   13  Vitamin E 100 UNIT Oral Capsule; TAKE 1 CAPSULE DAILY; Therapy: (Recorded:87Apz5707) to Recorded    Allergies    1  No Known Drug Allergies    2  No Known Environmental Allergies   3   No Known Food Allergies    Signatures   Electronically signed by Carlos Villarreal MA;  Apr 7 2017  3:43PM EST                       (Author)

## 2018-01-17 ENCOUNTER — HOSPITAL ENCOUNTER (OUTPATIENT)
Dept: INFUSION CENTER | Facility: HOSPITAL | Age: 73
Discharge: HOME | End: 2018-01-17

## 2018-01-17 ENCOUNTER — HOSPITAL ENCOUNTER (OUTPATIENT)
Dept: INFUSION CENTER | Facility: HOSPITAL | Age: 73
Discharge: HOME | End: 2018-01-17
Payer: COMMERCIAL

## 2018-01-17 VITALS
OXYGEN SATURATION: 98 % | HEART RATE: 85 BPM | SYSTOLIC BLOOD PRESSURE: 140 MMHG | DIASTOLIC BLOOD PRESSURE: 70 MMHG | TEMPERATURE: 97.4 F | RESPIRATION RATE: 20 BRPM

## 2018-01-17 PROCEDURE — 96401 CHEMO ANTI-NEOPL SQ/IM: CPT

## 2018-01-17 RX ADMIN — DENOSUMAB 60 MG: 60 INJECTION SUBCUTANEOUS at 08:52

## 2018-01-18 NOTE — MISCELLANEOUS
Message  High d-dimer and patient was sent for venous Doppler study last week        Signatures   Electronically signed by : Jaspreet Donovan MD; Jan 17 2016  3:00PM EST                       (Author)

## 2018-01-22 VITALS
OXYGEN SATURATION: 99 % | TEMPERATURE: 97.7 F | HEART RATE: 91 BPM | HEIGHT: 65 IN | WEIGHT: 151.38 LBS | DIASTOLIC BLOOD PRESSURE: 62 MMHG | BODY MASS INDEX: 25.22 KG/M2 | RESPIRATION RATE: 16 BRPM | SYSTOLIC BLOOD PRESSURE: 108 MMHG

## 2018-01-29 LAB
LEFT EYE DIABETIC RETINOPATHY: NORMAL
RIGHT EYE DIABETIC RETINOPATHY: NORMAL

## 2018-02-13 ENCOUNTER — TELEPHONE (OUTPATIENT)
Dept: HEMATOLOGY ONCOLOGY | Facility: CLINIC | Age: 73
End: 2018-02-13

## 2018-02-13 ENCOUNTER — HOSPITAL ENCOUNTER (OUTPATIENT)
Dept: INFUSION CENTER | Facility: HOSPITAL | Age: 73
Discharge: HOME | End: 2018-02-13
Payer: COMMERCIAL

## 2018-02-13 DIAGNOSIS — Z17.0 MALIGNANT NEOPLASM OF RIGHT BREAST IN FEMALE, ESTROGEN RECEPTOR POSITIVE, UNSPECIFIED SITE OF BREAST (HCC): Primary | ICD-10-CM

## 2018-02-13 DIAGNOSIS — C50.911 MALIGNANT NEOPLASM OF RIGHT BREAST IN FEMALE, ESTROGEN RECEPTOR POSITIVE, UNSPECIFIED SITE OF BREAST (HCC): Primary | ICD-10-CM

## 2018-02-13 LAB
ALBUMIN SERPL BCP-MCNC: 4.4 G/DL (ref 3.5–5)
ALP SERPL-CCNC: 77 U/L (ref 46–116)
ALT SERPL W P-5'-P-CCNC: 16 U/L (ref 12–78)
ANION GAP SERPL CALCULATED.3IONS-SCNC: 11 MMOL/L (ref 4–13)
AST SERPL W P-5'-P-CCNC: 15 U/L (ref 5–45)
BASOPHILS # BLD AUTO: 0.03 THOUSANDS/ΜL (ref 0–0.1)
BASOPHILS NFR BLD AUTO: 0 % (ref 0–1)
BILIRUB SERPL-MCNC: 0.27 MG/DL (ref 0.2–1)
BUN SERPL-MCNC: 12 MG/DL (ref 5–25)
CALCIUM SERPL-MCNC: 9.3 MG/DL (ref 8.3–10.1)
CEA SERPL-MCNC: 3 NG/ML (ref 0–3)
CHLORIDE SERPL-SCNC: 101 MMOL/L (ref 100–108)
CO2 SERPL-SCNC: 23 MMOL/L (ref 21–32)
CREAT SERPL-MCNC: 0.63 MG/DL (ref 0.6–1.3)
EOSINOPHIL # BLD AUTO: 0.22 THOUSAND/ΜL (ref 0–0.61)
EOSINOPHIL NFR BLD AUTO: 3 % (ref 0–6)
ERYTHROCYTE [DISTWIDTH] IN BLOOD BY AUTOMATED COUNT: 13.4 % (ref 11.6–15.1)
GFR SERPL CREATININE-BSD FRML MDRD: 90 ML/MIN/1.73SQ M
GLUCOSE SERPL-MCNC: 111 MG/DL (ref 65–140)
HCT VFR BLD AUTO: 40 % (ref 34.8–46.1)
HGB BLD-MCNC: 13.5 G/DL (ref 11.5–15.4)
LYMPHOCYTES # BLD AUTO: 1.33 THOUSANDS/ΜL (ref 0.6–4.47)
LYMPHOCYTES NFR BLD AUTO: 19 % (ref 14–44)
MCH RBC QN AUTO: 32.9 PG (ref 26.8–34.3)
MCHC RBC AUTO-ENTMCNC: 33.8 G/DL (ref 31.4–37.4)
MCV RBC AUTO: 98 FL (ref 82–98)
MONOCYTES # BLD AUTO: 0.87 THOUSAND/ΜL (ref 0.17–1.22)
MONOCYTES NFR BLD AUTO: 12 % (ref 4–12)
NEUTROPHILS # BLD AUTO: 4.64 THOUSANDS/ΜL (ref 1.85–7.62)
NEUTS SEG NFR BLD AUTO: 66 % (ref 43–75)
NRBC BLD AUTO-RTO: 0 /100 WBCS
PLATELET # BLD AUTO: 233 THOUSANDS/UL (ref 149–390)
PMV BLD AUTO: 9.3 FL (ref 8.9–12.7)
POTASSIUM SERPL-SCNC: 3.7 MMOL/L (ref 3.5–5.3)
PROT SERPL-MCNC: 8.8 G/DL (ref 6.4–8.2)
RBC # BLD AUTO: 4.1 MILLION/UL (ref 3.81–5.12)
SODIUM SERPL-SCNC: 135 MMOL/L (ref 136–145)
WBC # BLD AUTO: 7.11 THOUSAND/UL (ref 4.31–10.16)

## 2018-02-13 PROCEDURE — 80053 COMPREHEN METABOLIC PANEL: CPT | Performed by: INTERNAL MEDICINE

## 2018-02-13 PROCEDURE — 86300 IMMUNOASSAY TUMOR CA 15-3: CPT | Performed by: INTERNAL MEDICINE

## 2018-02-13 PROCEDURE — 85025 COMPLETE CBC W/AUTO DIFF WBC: CPT | Performed by: INTERNAL MEDICINE

## 2018-02-13 PROCEDURE — 82378 CARCINOEMBRYONIC ANTIGEN: CPT | Performed by: INTERNAL MEDICINE

## 2018-02-13 PROCEDURE — 36593 DECLOT VASCULAR DEVICE: CPT

## 2018-02-13 RX ADMIN — Medication 300 UNITS: at 14:45

## 2018-02-13 RX ADMIN — ALTEPLASE 2 MG: 2.2 INJECTION, POWDER, LYOPHILIZED, FOR SOLUTION INTRAVENOUS at 10:10

## 2018-02-13 RX ADMIN — ALTEPLASE 2 MG: 2.2 INJECTION, POWDER, LYOPHILIZED, FOR SOLUTION INTRAVENOUS at 12:10

## 2018-02-13 NOTE — PROGRESS NOTES
Noted accessed port without incident, unable to get a blood return  TPA instilled per protocol  After 2 hours, unable to get a blood return  Second dose of TPA instilled, however after another 2 hours I was unable to obtain blood from port  Attempted to peripherally stick patient, after 4 attempts we were only able to obtain a partial specimen  Javier Cox RN called and notified, per Javier Cox she will contact IR and contact patient with time and date for IR appointment

## 2018-02-13 NOTE — PLAN OF CARE
Problem: Potential for Falls  Goal: Patient will remain free of falls  INTERVENTIONS:  - Assess patient frequently for physical needs  -  Identify cognitive and physical deficits and behaviors that affect risk of falls    -  [place] fall precautions as indicated by assessment   - Educate patient/family on patient safety including physical limitations  - Instruct patient to call for assistance with activity based on assessment  - Modify environment to reduce risk of injury  - Consider OT/PT consult to assist with strengthening/mobility   Outcome: Progressing

## 2018-02-14 ENCOUNTER — TELEPHONE (OUTPATIENT)
Dept: HEMATOLOGY ONCOLOGY | Facility: CLINIC | Age: 73
End: 2018-02-14

## 2018-02-14 LAB — CANCER AG27-29 SERPL-ACNC: 17.2 U/ML (ref 0–42.3)

## 2018-02-14 NOTE — TELEPHONE ENCOUNTER
Pt is scheduled for 2 15 18 with IR  I've been calling the patient all day trying to give the appt info but the phone just rings    There is no vm

## 2018-02-15 ENCOUNTER — HOSPITAL ENCOUNTER (OUTPATIENT)
Dept: RADIOLOGY | Facility: HOSPITAL | Age: 73
Discharge: HOME | End: 2018-02-15
Payer: COMMERCIAL

## 2018-02-15 DIAGNOSIS — C50.911 MALIGNANT NEOPLASM OF RIGHT BREAST IN FEMALE, ESTROGEN RECEPTOR POSITIVE, UNSPECIFIED SITE OF BREAST (HCC): ICD-10-CM

## 2018-02-15 DIAGNOSIS — Z17.0 MALIGNANT NEOPLASM OF RIGHT BREAST IN FEMALE, ESTROGEN RECEPTOR POSITIVE, UNSPECIFIED SITE OF BREAST (HCC): ICD-10-CM

## 2018-02-15 PROCEDURE — 36598 INJ W/FLUOR EVAL CV DEVICE: CPT

## 2018-02-15 PROCEDURE — 36598 INJ W/FLUOR EVAL CV DEVICE: CPT | Performed by: RADIOLOGY

## 2018-02-15 RX ADMIN — IOHEXOL 5 ML: 300 INJECTION, SOLUTION INTRAVENOUS at 13:11

## 2018-02-15 NOTE — SEDATION DOCUMENTATION
Left portacathetergram completed in IR by Dr Royal Mederos  Patient to be scheduled for stripping with Dr Lydia Garvey next week  Patient also needs arrangements made for transport with [facility] and to call patient with appt time  Patient aware

## 2018-02-20 ENCOUNTER — TELEPHONE (OUTPATIENT)
Dept: RADIOLOGY | Facility: HOSPITAL | Age: 73
End: 2018-02-20

## 2018-02-20 RX ORDER — SODIUM CHLORIDE 9 MG/ML
75 INJECTION, SOLUTION INTRAVENOUS CONTINUOUS
Status: CANCELLED | OUTPATIENT
Start: 2018-02-20

## 2018-02-21 ENCOUNTER — TELEPHONE (OUTPATIENT)
Dept: ICU | Facility: HOSPITAL | Age: 73
End: 2018-02-21

## 2018-02-22 ENCOUNTER — HOSPITAL ENCOUNTER (OUTPATIENT)
Dept: RADIOLOGY | Facility: HOSPITAL | Age: 73
Discharge: HOME | End: 2018-02-22
Attending: RADIOLOGY | Admitting: RADIOLOGY
Payer: COMMERCIAL

## 2018-02-22 VITALS
WEIGHT: 148 LBS | RESPIRATION RATE: 18 BRPM | HEIGHT: 66 IN | BODY MASS INDEX: 23.78 KG/M2 | TEMPERATURE: 98.3 F | HEART RATE: 94 BPM | OXYGEN SATURATION: 98 % | DIASTOLIC BLOOD PRESSURE: 70 MMHG | SYSTOLIC BLOOD PRESSURE: 148 MMHG

## 2018-02-22 DIAGNOSIS — Z17.1 MALIGNANT NEOPLASM OF RIGHT BREAST IN FEMALE, ESTROGEN RECEPTOR NEGATIVE, UNSPECIFIED SITE OF BREAST (HCC): ICD-10-CM

## 2018-02-22 DIAGNOSIS — C50.911 MALIGNANT NEOPLASM OF RIGHT BREAST IN FEMALE, ESTROGEN RECEPTOR NEGATIVE, UNSPECIFIED SITE OF BREAST (HCC): ICD-10-CM

## 2018-02-22 LAB
EST. AVERAGE GLUCOSE BLD GHB EST-MCNC: 140 MG/DL
HBA1C MFR BLD: 6.5 % (ref 4.2–6.3)

## 2018-02-22 PROCEDURE — 83036 HEMOGLOBIN GLYCOSYLATED A1C: CPT | Performed by: FAMILY MEDICINE

## 2018-02-22 RX ORDER — HEPARIN SODIUM (PORCINE) LOCK FLUSH IV SOLN 100 UNIT/ML 100 UNIT/ML
SOLUTION INTRAVENOUS CODE/TRAUMA/SEDATION MEDICATION
Status: COMPLETED | OUTPATIENT
Start: 2018-02-22 | End: 2018-02-22

## 2018-02-22 RX ORDER — SODIUM CHLORIDE 9 MG/ML
75 INJECTION, SOLUTION INTRAVENOUS CONTINUOUS
Status: DISCONTINUED | OUTPATIENT
Start: 2018-02-22 | End: 2018-02-22 | Stop reason: HOSPADM

## 2018-02-22 RX ADMIN — ALTEPLASE 2 MG: 2.2 INJECTION, POWDER, LYOPHILIZED, FOR SOLUTION INTRAVENOUS at 12:38

## 2018-02-22 RX ADMIN — SODIUM CHLORIDE 75 ML/HR: 0.9 INJECTION, SOLUTION INTRAVENOUS at 10:20

## 2018-02-22 RX ADMIN — HEPARIN SODIUM (PORCINE) LOCK FLUSH IV SOLN 100 UNIT/ML 300 UNITS: 100 SOLUTION at 13:06

## 2018-02-22 NOTE — PROGRESS NOTES
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  Presents today for fibrin sheath stripping via a separate access  Past Medical History:   Diagnosis Date    Aden[facility] of [facility], [facility] ([facility] [address] [facility]     Breast cancer ([facility] [address] )     Right Breast     Cancer ([facility] [address] [facility]     [facility]     DVT (deep venous thrombosis) (HCC)     GERD (gastroesophageal reflux disease)     Hypertension      Past Surgical History:   Procedure Laterality Date    MASTECTOMY Right 2012    NC COLONOSCOPY FLX DX W/COLLJ SPEC WHEN PFRMD N/A 8/23/2016    Procedure: COLONOSCOPY;  Surgeon: Justin De León MD;  Location: [facility]; Service: Colorectal    NC COLONOSCOPY FLX DX W/COLLJ SPEC WHEN PFRMD N/A 9/5/2017    Procedure: COLONOSCOPY;  Surgeon: Justin De León MD;  Location: [facility]; Service: Colorectal    NC ESOPHAGOGASTRODUODENOSCOPY TRANSORAL DIAGNOSTIC N/A 9/5/2017    Procedure: ESOPHAGOGASTRODUODENOSCOPY (EGD); Surgeon: Gino Conrad DO;  Location: [facility]; Service: Gastroenterology         No current facility-administered medications on file prior to encounter  Current Outpatient Prescriptions on File Prior to Encounter   Medication Sig    ascorbic acid (VITAMIN C) 500 MG tablet Take 500 mg by mouth daily   aspirin 81 MG tablet Take 81 mg by mouth daily   benazepril (LOTENSIN) 20 mg tablet Take 20 mg by mouth daily   bimatoprost (LUMIGAN) 0 01 % ophthalmic drops 1 drop daily at bedtime   Cholecalciferol (VITAMIN D-3 PO) Take 1 capsule by mouth 3 (three) times a day   Cyanocobalamin (VITAMIN B 12 PO) Take 1 tablet by mouth daily   ferrous sulfate 325 (65 Fe) mg tablet Take 325 mg by mouth daily with breakfast     letrozole (FEMARA) 2 5 mg tablet Take 2 5 mg by mouth daily      metFORMIN (GLUCOPHAGE) 500 mg tablet Take 500 mg by mouth daily with breakfast     pantoprazole (PROTONIX) 40 mg tablet Take 40 mg by mouth daily   sitaGLIPtin (JANUVIA) 100 mg tablet Take 100 mg by mouth daily   vitamin A 10,000 units capsule Take 10,000 Units by mouth daily   vitamin E, tocopherol, 400 units capsule Take 400 Units by mouth daily  Vitals:    02/22/18 1000   BP: 149/67   BP Location: Left arm   Pulse: 94   Resp: 16   Temp: 97 7 °F (36 5 °C)   TempSrc: Oral   SpO2: 98%   Weight: 67 1 kg (148 lb)   Height: 5' 6" (1 676 m)       Heart:  Regular  Lungs:  Clear      Prior imaging reviewed    A/P:  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

## 2018-02-22 NOTE — PROCEDURES
Procedures     Left chest port was accessed using sterile technique prior to procedure and there was brisk blood return  No resistance with flushing or aspiration  Fibrin port stripping therefore is not indicated today  Since she did have a fibrin sheath on her prior cathetergram,  will infuse 2 mg tPA today the a port to dissolve any residual sheath that may be present      Kirstie Lawler MD

## 2018-02-22 NOTE — SEDATION DOCUMENTATION
Port accessed and blood return noted  Stripping not indicated per Dr Hammad Thurman  Cathlucrecia 2mg alteplase given via BARD

## 2018-03-09 DIAGNOSIS — E11.9 DIABETES TYPE 2, NO OCULAR INVOLVEMENT (HCC): Primary | ICD-10-CM

## 2018-03-09 RX ORDER — ASCORBIC ACID 500 MG
1 TABLET ORAL 2 TIMES DAILY
COMMUNITY
End: 2018-03-13 | Stop reason: ALTCHOICE

## 2018-03-13 ENCOUNTER — OFFICE VISIT (OUTPATIENT)
Dept: FAMILY MEDICINE CLINIC | Facility: CLINIC | Age: 73
End: 2018-03-13
Payer: COMMERCIAL

## 2018-03-13 VITALS
WEIGHT: 150 LBS | OXYGEN SATURATION: 97 % | HEART RATE: 90 BPM | BODY MASS INDEX: 24.11 KG/M2 | HEIGHT: 66 IN | SYSTOLIC BLOOD PRESSURE: 126 MMHG | TEMPERATURE: 97.6 F | DIASTOLIC BLOOD PRESSURE: 78 MMHG

## 2018-03-13 DIAGNOSIS — E11.9 DIABETES TYPE 2, NO OCULAR INVOLVEMENT (HCC): ICD-10-CM

## 2018-03-13 DIAGNOSIS — E55.9 VITAMIN D DEFICIENCY: ICD-10-CM

## 2018-03-13 DIAGNOSIS — C18.9 ADENOCARCINOMA OF COLON (HCC): ICD-10-CM

## 2018-03-13 DIAGNOSIS — M85.80 OSTEOPENIA, UNSPECIFIED LOCATION: ICD-10-CM

## 2018-03-13 DIAGNOSIS — I10 ESSENTIAL HYPERTENSION: Primary | ICD-10-CM

## 2018-03-13 DIAGNOSIS — R12 HEART BURN: ICD-10-CM

## 2018-03-13 PROCEDURE — 99214 OFFICE O/P EST MOD 30 MIN: CPT | Performed by: FAMILY MEDICINE

## 2018-03-13 NOTE — PROGRESS NOTES
Assessment/Plan: patient here today for 4 month follow up to review A1C  Patient c/o discoloration on ankles and is concerned with A1c level     No problem-specific Assessment & Plan notes found for this encounter  1  Essential hypertension     2  Heart burn     3  Diabetes type 2, no ocular involvement (HCC)  HEMOGLOBIN A1C W/ EAG ESTIMATION    Microalbumin / creatinine urine ratio   4  Adenocarcinoma of colon ([address] )     5  Vitamin D deficiency  Vitamin D 25 hydroxy   6  Osteopenia, unspecified location            There are no diagnoses linked to this encounter  Subjective:      Patient ID: Myra Ybarra is a 68 y o  female  Follow up  Review labs, A1c 6 5 %  Patient walks daily  Denies bloody stools, continues with Vitamin D  The following portions of the patient's history were reviewed and updated as appropriate: allergies, current medications, past family history, past medical history, past social history, past surgical history and problem list     Review of Systems   HENT: Negative  Respiratory: Negative  Cardiovascular: Negative  Gastrointestinal: Negative  Negative for blood in stool  Genitourinary: Negative  Musculoskeletal: Negative  Skin: Negative  Neurological: Negative  Psychiatric/Behavioral: Negative  Objective:      /78 (BP Location: Left arm, Patient Position: Sitting, Cuff Size: Standard)   Pulse 90   Temp 97 6 °F (36 4 °C) (Oral)   Ht 5' 6" (1 676 m)   Wt 68 kg (150 lb)   LMP  (LMP Unknown)   SpO2 97%   BMI 24 21 kg/m²          Physical Exam   Constitutional: She is oriented to person, place, and time  She appears well-developed and well-nourished  HENT:   Head: Normocephalic and atraumatic  Cardiovascular: Normal rate, regular rhythm and normal heart sounds  Pulmonary/Chest: Effort normal and breath sounds normal    Neurological: She is alert and oriented to person, place, and time  Skin: Skin is warm and dry  Psychiatric: She has a normal mood and affect   Her behavior is normal  Judgment and thought content normal

## 2018-03-19 ENCOUNTER — HOSPITAL ENCOUNTER (OUTPATIENT)
Dept: INFUSION CENTER | Facility: HOSPITAL | Age: 73
Discharge: HOME | End: 2018-03-19
Payer: COMMERCIAL

## 2018-03-19 ENCOUNTER — OFFICE VISIT (OUTPATIENT)
Dept: HEMATOLOGY ONCOLOGY | Facility: CLINIC | Age: 73
End: 2018-03-19
Payer: COMMERCIAL

## 2018-03-19 VITALS
TEMPERATURE: 99.5 F | BODY MASS INDEX: 24.26 KG/M2 | WEIGHT: 145.6 LBS | SYSTOLIC BLOOD PRESSURE: 124 MMHG | HEIGHT: 65 IN | DIASTOLIC BLOOD PRESSURE: 84 MMHG | OXYGEN SATURATION: 100 % | RESPIRATION RATE: 16 BRPM | HEART RATE: 80 BPM

## 2018-03-19 DIAGNOSIS — C50.919 MALIGNANT NEOPLASM OF FEMALE BREAST, UNSPECIFIED ESTROGEN RECEPTOR STATUS, UNSPECIFIED LATERALITY, UNSPECIFIED SITE OF BREAST (HCC): Primary | ICD-10-CM

## 2018-03-19 DIAGNOSIS — C18.2 MALIGNANT NEOPLASM OF ASCENDING COLON (HCC): ICD-10-CM

## 2018-03-19 DIAGNOSIS — E04.1 THYROID NODULE: ICD-10-CM

## 2018-03-19 DIAGNOSIS — Z17.0 MALIGNANT NEOPLASM OF UPPER-OUTER QUADRANT OF RIGHT BREAST IN FEMALE, ESTROGEN RECEPTOR POSITIVE (HCC): Primary | ICD-10-CM

## 2018-03-19 DIAGNOSIS — C50.411 MALIGNANT NEOPLASM OF UPPER-OUTER QUADRANT OF RIGHT BREAST IN FEMALE, ESTROGEN RECEPTOR POSITIVE (HCC): Primary | ICD-10-CM

## 2018-03-19 DIAGNOSIS — C50.411 MALIGNANT NEOPLASM OF UPPER-OUTER QUADRANT OF RIGHT BREAST IN FEMALE, ESTROGEN RECEPTOR POSITIVE (HCC): ICD-10-CM

## 2018-03-19 DIAGNOSIS — I82.5Z2 CHRONIC DEEP VEIN THROMBOSIS (DVT) OF DISTAL VEIN OF LEFT LOWER EXTREMITY (HCC): ICD-10-CM

## 2018-03-19 DIAGNOSIS — Z17.0 MALIGNANT NEOPLASM OF UPPER-OUTER QUADRANT OF RIGHT BREAST IN FEMALE, ESTROGEN RECEPTOR POSITIVE (HCC): ICD-10-CM

## 2018-03-19 LAB
BASOPHILS # BLD AUTO: 0.02 THOUSANDS/ΜL (ref 0–0.1)
BASOPHILS NFR BLD AUTO: 0 % (ref 0–1)
CANCER AG27-29 SERPL-ACNC: 14.5 U/ML (ref 0–42.3)
CEA SERPL-MCNC: 2.9 NG/ML (ref 0–3)
DEPRECATED D DIMER PPP: 2658 NG/ML (FEU) (ref 0–424)
EOSINOPHIL # BLD AUTO: 0.14 THOUSAND/ΜL (ref 0–0.61)
EOSINOPHIL NFR BLD AUTO: 2 % (ref 0–6)
ERYTHROCYTE [DISTWIDTH] IN BLOOD BY AUTOMATED COUNT: 13.4 % (ref 11.6–15.1)
HCT VFR BLD AUTO: 35.2 % (ref 34.8–46.1)
HGB BLD-MCNC: 11.7 G/DL (ref 11.5–15.4)
LYMPHOCYTES # BLD AUTO: 1.03 THOUSANDS/ΜL (ref 0.6–4.47)
LYMPHOCYTES NFR BLD AUTO: 15 % (ref 14–44)
MCH RBC QN AUTO: 32.1 PG (ref 26.8–34.3)
MCHC RBC AUTO-ENTMCNC: 33.2 G/DL (ref 31.4–37.4)
MCV RBC AUTO: 97 FL (ref 82–98)
MONOCYTES # BLD AUTO: 0.85 THOUSAND/ΜL (ref 0.17–1.22)
MONOCYTES NFR BLD AUTO: 12 % (ref 4–12)
NEUTROPHILS # BLD AUTO: 4.98 THOUSANDS/ΜL (ref 1.85–7.62)
NEUTS SEG NFR BLD AUTO: 71 % (ref 43–75)
NRBC BLD AUTO-RTO: 0 /100 WBCS
PLATELET # BLD AUTO: 219 THOUSANDS/UL (ref 149–390)
PMV BLD AUTO: 9.3 FL (ref 8.9–12.7)
RBC # BLD AUTO: 3.64 MILLION/UL (ref 3.81–5.12)
WBC # BLD AUTO: 7.04 THOUSAND/UL (ref 4.31–10.16)

## 2018-03-19 PROCEDURE — 99214 OFFICE O/P EST MOD 30 MIN: CPT | Performed by: INTERNAL MEDICINE

## 2018-03-19 PROCEDURE — 82378 CARCINOEMBRYONIC ANTIGEN: CPT

## 2018-03-19 PROCEDURE — 85379 FIBRIN DEGRADATION QUANT: CPT

## 2018-03-19 PROCEDURE — 86300 IMMUNOASSAY TUMOR CA 15-3: CPT

## 2018-03-19 PROCEDURE — 85025 COMPLETE CBC W/AUTO DIFF WBC: CPT

## 2018-03-19 RX ADMIN — Medication 300 UNITS: at 11:19

## 2018-03-19 NOTE — PROGRESS NOTES
HPI:   Maddie Carlos is a 68 y o  female with history of  breast cancer, colon cancer and DVT  Patient states she is having problem with Port-A-Cath and she has been making trips to IR to keep it open  She states IR was going to strip the sheath but then Port-A-Cath started to work and procedure was not done  She wants Port-A-Cath to be flushed every 2 weeks so that it stays open  She wants to keep Port-A-Cath because she has poor venous access  In 2012 patient was diagnosed to have Hormone receptor positive, HER-2 negative stage IIIB cancer in right breast  She had right mastectomy and lymph node dissection   9 lymph nodes showed metastatic disease  Patient was given Adriamycin + Cytoxan chemotherapy followed by Taxotere and after that radiation therapy  Since November/December 2012 she has been on Femara  She has tolerable hot flashes and minor musculoskeletal symptoms  She takes vitamin D and liquid calcium and is on prolia for osteopenia  In December 2012 patient developed DVT right leg and she was started on Xarelto  She had GI bleeding  Xarelto was stopped  On colonoscopy she was found to have right colon cancer  On 7/22/15 she underwent right hemicolectomy  Pathological diagnosis right colon cancer, stage I, T2 N0 MX, grade 2,no lymphovascular invasion and no perineural invasion  She did not require adjuvant therapy for colon cancer  She has been running slightly high CEA and that is being monitored  She has been on baby aspirin  She is not on anticoagulation anymore  Follow-up Venous Doppler study was negative for DVT  D-dimer has been staying high  Patient has been aware of that  tiny cysts in the liver and thyroid nodule  She had biopsy of thyroid nodule in June 2014 and she states that was negative for cancer  She gets thyroid ultrasound yearly for surveillance  2016  She  was found to have benign clustered calcifications in left breast and had a biopsy and that was benign   Dr Teodoro Plummer takes care of her mammography  Follow-up mammography was negative for malignancy  She has facial hair growth  She gets Port-A-Cath flushed   Current Outpatient Prescriptions:     ascorbic acid (VITAMIN C) 500 MG tablet, Take 500 mg by mouth daily  , Disp: , Rfl:     aspirin 81 MG tablet, Take 81 mg by mouth daily  , Disp: , Rfl:     benazepril (LOTENSIN) 20 mg tablet, Take 20 mg by mouth daily  , Disp: , Rfl:     bimatoprost (LUMIGAN) 0 01 % ophthalmic drops, 1 drop daily at bedtime  , Disp: , Rfl:     Cholecalciferol (VITAMIN D-3 PO), Take 1 capsule by mouth 3 (three) times a day , Disp: , Rfl:     Cyanocobalamin (VITAMIN B 12 PO), Take 1 tablet by mouth daily  , Disp: , Rfl:     ferrous sulfate 325 (65 Fe) mg tablet, Take 325 mg by mouth daily with breakfast , Disp: , Rfl:     letrozole (FEMARA) 2 5 mg tablet, Take 2 5 mg by mouth daily  , Disp: , Rfl:     metFORMIN (GLUCOPHAGE) 1000 MG tablet, , Disp: , Rfl: 0    pantoprazole (PROTONIX) 40 mg tablet, Take 40 mg by mouth daily  , Disp: , Rfl:     sitaGLIPtin (JANUVIA) 100 mg tablet, Take 1 tablet (100 mg total) by mouth daily for 30 days, Disp: 30 tablet, Rfl: 5    vitamin A 10,000 units capsule, Take 10,000 Units by mouth daily  , Disp: , Rfl:     vitamin E, tocopherol, 400 units capsule, Take 100 Units by mouth daily  , Disp: , Rfl:     No Known Allergies    ROS:  03/19/18 Reviewed 13 systems:  Presently no headaches, seizures, dizziness, diplopia, dysphagia, hoarseness, chest pain, palpitations, shortness of breath, cough, hemoptysis, abdominal pain, nausea, vomiting, change in bowel habits, melena, hematuria, fever, chills, bleeding, bone pains, skin rash, weight loss,  numbness,  weakness, claudication and gait problem  No frequent infections  No swelling of the ankles  No swollen glands  Patient is anxious     No GYN symptoms    Other symptoms are in HPI    /84 (BP Location: Left arm, Cuff Size: Adult)   Pulse 80   Temp 99 5 °F (37 5 °C) (Tympanic)   Resp 16   Ht 5' 5" (1 651 m)   Wt 66 kg (145 lb 9 6 oz)   LMP  (LMP Unknown)   SpO2 100%   BMI 24 23 kg/m²     Physical Exam:  Alert, oriented, not in distress, no icterus, no oral thrush, no palpable neck mass, clear lung fields, regular heart rate, abdomen  soft and non tender, no palpable abdominal mass, no ascites, no edema of ankles, no calf tenderness, no focal neurological deficit, no skin rash, no palpable lymphadenopathy, good arterial pulses, no clubbing  Patient is anxious     Right mastectomy  No lymphedema  Performance status 1  IMAGING:  No orders to display       LABS:  Results for orders placed or performed during the hospital encounter of 02/22/18   Hemoglobin A1c   Result Value Ref Range    Hemoglobin A1C 6 5 (H) 4 2 - 6 3 %     mg/dl     Results for orders placed or performed during the hospital encounter of 02/22/18   Hemoglobin A1c   Result Value Ref Range    Hemoglobin A1C 6 5 (H) 4 2 - 6 3 %     mg/dl     Normal blood counts and blood chemistry except total protein 8 8 with albumin 4 4  Normal CA 27-29 and CEA  D-dimer 1420    Reviewed and discussed with patient  Assessment and plan:  Patient states she is having problem with Port-A-Cath and she has been making trips to IR to keep it open  She states IR was going to strip the sheath but then Port-A-Cath started to work and procedure was not done  She wants Port-A-Cath to be flushed every 2 weeks so that it stays open  She wants to keep Port-A-Cath because she has poor venous access  In 2012 patient was diagnosed to have Hormone receptor positive, HER-2 negative stage IIIB cancer in right breast  She had right mastectomy and lymph node dissection   9 lymph nodes showed metastatic disease  Patient was given Adriamycin + Cytoxan chemotherapy followed by Taxotere and after that radiation therapy  Since November/December 2012 she has been on Femara   She has tolerable hot flashes and minor musculoskeletal symptoms  She takes vitamin D and liquid calcium and is on prolia for osteopenia  In December 2012 patient developed DVT right leg and she was started on Xarelto  She had GI bleeding  Xarelto was stopped  On colonoscopy she was found to have right colon cancer  On 7/22/15 she underwent right hemicolectomy  Pathological diagnosis right colon cancer, stage I, T2 N0 MX, grade 2,no lymphovascular invasion and no perineural invasion  She did not require adjuvant therapy for colon cancer  She has been running slightly high CEA and that is being monitored  She has been on baby aspirin  She is not on anticoagulation anymore  Follow-up Venous Doppler study was negative for DVT  D-dimer has been staying high  Patient has been aware of that  tiny cysts in the liver and thyroid nodule  She had biopsy of thyroid nodule in June 2014 and she states that was negative for cancer  She gets thyroid ultrasound yearly for surveillance  2016  She  was found to have benign clustered calcifications in left breast and had a biopsy and that was benign  Dr Yumiko Garcia takes care of her mammography  Follow-up mammography was negative for malignancy  She has facial hair growth  She gets Port-A-Cath [facility]   Monica Lopez Physical examination and test results are as recorded and discussed  Patient appears to be free of disease from breast cancer  She stays on letrozole, vitamin-D, calcium and Prolia  She appears to be free of disease from colon cancer  D-dimer is still high but no blood clot in the legs on last Doppler study  She is being continued on baby aspirin  Port-A-Cath problem and solution as mentioned above  I think eventually she will need stripping of fibrin sheath and I have discussed that with her  All discussed in detail  Questions answered  Also discussed the importance of self-breast examination, eating healthy foods and exercises as tolerated and health screening tests          Patient voiced understanding and agreement in the discussion  Counseling / Coordination of Care   Greater than 50% of total time was spent with the patient and / or family counseling and / or coordination of care

## 2018-03-19 NOTE — LETTER
March 19, 2018     Henrry Hopper [address] [place] [address]    Patient: Sandra Ayala   YOB: 1945   Date of Visit: 3/19/2018       Dear Dr Jose Dasilva: Thank you for referring Orlando Sanchez to me for evaluation  Below are my notes for this consultation  If you have questions, please do not hesitate to call me  I look forward to following your patient along with you  Sincerely,        Sheila Garcia MD        CC: MD Sheila Branham MD  3/19/2018  8:05 PM  Sign at close encounter    HPI:   Sandra Ayala is a 68 y o  female with history of  breast cancer, colon cancer and DVT  Patient states she is having problem with Port-A-Cath and she has been making trips to IR to keep it open  She states IR was going to strip the sheath but then Port-A-Cath started to work and procedure was not done  She wants Port-A-Cath to be flushed every 2 weeks so that it stays open  She wants to keep Port-A-Cath because she has poor venous access  In 2012 patient was diagnosed to have Hormone receptor positive, 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 negative stage IIIB cancer in right breast  She had right mastectomy and lymph node dissection   9 lymph nodes showed metastatic disease  Patient was given Adriamycin + Cytoxan chemotherapy followed by Taxotere and after that radiation therapy  Since November/December 2012 she has been on Femara  She has tolerable hot flashes and minor musculoskeletal symptoms  She takes vitamin D and liquid calcium and is on prolia for osteopenia  In December 2012 patient developed DVT right leg and she was started on Xarelto  She had GI bleeding  Xarelto was stopped  On colonoscopy she was found to have right colon cancer  On 7/22/15 she underwent right hemicolectomy  Pathological diagnosis right colon cancer, stage I, T2 N0 MX, grade 2,no lymphovascular invasion and no perineural invasion  She did not require adjuvant therapy for colon cancer   She has been running slightly high CEA and that is being monitored  She has been on baby aspirin  She is not on anticoagulation anymore  Follow-up Venous Doppler study was negative for DVT  D-dimer has been staying high  Patient has been aware of that  tiny cysts in the liver and thyroid nodule  She had biopsy of thyroid nodule in June 2014 and she states that was negative for cancer  She gets thyroid ultrasound yearly for surveillance  2016  She  was found to have benign clustered calcifications in left breast and had a biopsy and that was benign  Dr Shirley Mendoza takes care of her mammography  Follow-up mammography was negative for malignancy  She has facial hair growth  She gets Port-A-Cath flushed   Current Outpatient Prescriptions:     ascorbic acid (VITAMIN C) 500 MG tablet, Take 500 mg by mouth daily  , Disp: , Rfl:     aspirin 81 MG tablet, Take 81 mg by mouth daily  , Disp: , Rfl:     benazepril (LOTENSIN) 20 mg tablet, Take 20 mg by mouth daily  , Disp: , Rfl:     bimatoprost (LUMIGAN) 0 01 % ophthalmic drops, 1 drop daily at bedtime  , Disp: , Rfl:     Cholecalciferol (VITAMIN D-3 PO), Take 1 capsule by mouth 3 (three) times a day , Disp: , Rfl:     Cyanocobalamin (VITAMIN B 12 PO), Take 1 tablet by mouth daily  , Disp: , Rfl:     ferrous sulfate 325 (65 Fe) mg tablet, Take 325 mg by mouth daily with breakfast , Disp: , Rfl:     letrozole (FEMARA) 2 5 mg tablet, Take 2 5 mg by mouth daily  , Disp: , Rfl:     metFORMIN (GLUCOPHAGE) 1000 MG tablet, , Disp: , Rfl: 0    pantoprazole (PROTONIX) 40 mg tablet, Take 40 mg by mouth daily  , Disp: , Rfl:     sitaGLIPtin (JANUVIA) 100 mg tablet, Take 1 tablet (100 mg total) by mouth daily for 30 days, Disp: 30 tablet, Rfl: 5    vitamin A 10,000 units capsule, Take 10,000 Units by mouth daily  , Disp: , Rfl:     vitamin E, tocopherol, 400 units capsule, Take 100 Units by mouth daily  , Disp: , Rfl:     No Known Allergies    ROS:  03/19/18 Reviewed 13 systems:  Presently no headaches, seizures, dizziness, diplopia, dysphagia, hoarseness, chest pain, palpitations, shortness of breath, cough, hemoptysis, abdominal pain, nausea, vomiting, change in bowel habits, melena, hematuria, fever, chills, bleeding, bone pains, skin rash, weight loss,  numbness,  weakness, claudication and gait problem  No frequent infections  No swelling of the ankles  No swollen glands  Patient is anxious     No GYN symptoms  Other symptoms are in HPI    /84 (BP Location: Left arm, Cuff Size: Adult)   Pulse 80   Temp 99 5 °F (37 5 °C) (Tympanic)   Resp 16   Ht 5' 5" (1 651 m)   Wt 66 kg (145 lb 9 6 oz)   LMP  (LMP Unknown)   SpO2 100%   BMI 24 23 kg/m²      Physical Exam:  Alert, oriented, not in distress, no icterus, no oral thrush, no palpable neck mass, clear lung fields, regular heart rate, abdomen  soft and non tender, no palpable abdominal mass, no ascites, no edema of ankles, no calf tenderness, no focal neurological deficit, no skin rash, no palpable lymphadenopathy, good arterial pulses, no clubbing  Patient is anxious     Right mastectomy  No lymphedema  Performance status 1  IMAGING:  No orders to display       LABS:  Results for orders placed or performed during the hospital encounter of 02/22/18   Hemoglobin A1c   Result Value Ref Range    Hemoglobin A1C 6 5 (H) 4 2 - 6 3 %     mg/dl     Results for orders placed or performed during the hospital encounter of 02/22/18   Hemoglobin A1c   Result Value Ref Range    Hemoglobin A1C 6 5 (H) 4 2 - 6 3 %     mg/dl     Normal blood counts and blood chemistry except total protein 8 8 with albumin 4 4  Normal CA 27-29 and CEA  D-dimer 1420    Reviewed and discussed with patient  Assessment and plan:  Patient states she is having problem with Port-A-Cath and she has been making trips to IR to keep it open  She states IR was going to strip the sheath but then Port-A-Cath started to work and procedure was not done    She wants Port-A-Cath to be flushed every 2 weeks so that it stays open  She wants to keep Port-A-Cath because she has poor venous access  In 2012 patient was diagnosed to have Hormone receptor positive, HER-2 negative stage IIIB cancer in right breast  She had right mastectomy and lymph node dissection   9 lymph nodes showed metastatic disease  Patient was given Adriamycin + Cytoxan chemotherapy followed by Taxotere and after that radiation therapy  Since November/December 2012 she has been on Femara  She has tolerable hot flashes and minor musculoskeletal symptoms  She takes vitamin D and liquid calcium and is on prolia for osteopenia  In December 2012 patient developed DVT right leg and she was started on Xarelto  She had GI bleeding  Xarelto was stopped  On colonoscopy she was found to have right colon cancer  On 7/22/15 she underwent right hemicolectomy  Pathological diagnosis right colon cancer, stage I, T2 N0 MX, grade 2,no lymphovascular invasion and no perineural invasion  She did not require adjuvant therapy for colon cancer  She has been running slightly high CEA and that is being monitored  She has been on baby aspirin  She is not on anticoagulation anymore  Follow-up Venous Doppler study was negative for DVT  D-dimer has been staying high  Patient has been aware of that  tiny cysts in the liver and thyroid nodule  She had biopsy of thyroid nodule in June 2014 and she states that was negative for cancer  She gets thyroid ultrasound yearly for surveillance  2016  She  was found to have benign clustered calcifications in left breast and had a biopsy and that was benign  Dr Ced Terry takes care of her mammography  Follow-up mammography was negative for malignancy  She has facial hair growth  She gets Port-A-Cath flushed   Vitaliy Devries Physical examination and test results are as recorded and discussed  Patient appears to be free of disease from breast cancer    She stays on letrozole, vitamin-D, calcium and Prolia  She appears to be free of disease from colon cancer  D-dimer is still high but no blood clot in the legs on last Doppler study  She is being continued on baby aspirin  Port-A-Cath problem and solution as mentioned above  I think eventually she will need stripping of fibrin sheath and I have discussed that with her  All discussed in detail  Questions answered  Also discussed the importance of self-breast examination, eating healthy foods and exercises as tolerated and health screening tests  Patient voiced understanding and agreement in the discussion  Counseling / Coordination of Care   Greater than 50% of total time was spent with the patient and / or family counseling and / or coordination of care

## 2018-03-19 NOTE — PLAN OF CARE
Problem: Potential for Falls  Goal: Patient will remain free of falls  INTERVENTIONS:  - Assess patient frequently for physical needs  -  Identify cognitive and physical deficits and behaviors that affect risk of falls    -  [place] fall precautions as indicated by assessment   - Educate patient/family on patient safety including physical limitations  - Instruct patient to call for assistance with activity based on assessment  - Modify environment to reduce risk of injury  - Consider OT/PT consult to assist with strengthening/mobility   Outcome: Progressing

## 2018-03-19 NOTE — PATIENT INSTRUCTIONS
Blood tests every 6 weeks  Port-A-Cath every 2 weeks  Thyroid ultrasound in June  Visit in 3 months    Please continue with Femara  and baby aspirin

## 2018-03-20 RX ORDER — LETROZOLE 2.5 MG/1
2.5 TABLET, FILM COATED ORAL DAILY
Qty: 30 TABLET | Refills: 2 | Status: SHIPPED | OUTPATIENT
Start: 2018-03-20 | End: 2018-06-21 | Stop reason: SDUPTHER

## 2018-04-02 ENCOUNTER — HOSPITAL ENCOUNTER (OUTPATIENT)
Dept: INFUSION CENTER | Facility: HOSPITAL | Age: 73
Discharge: HOME | End: 2018-04-02
Payer: COMMERCIAL

## 2018-04-02 RX ADMIN — Medication 300 UNITS: at 09:00

## 2018-04-02 NOTE — PLAN OF CARE
Problem: Potential for Falls  Goal: Patient will remain free of falls  INTERVENTIONS:  - Assess patient frequently for physical needs  -  Identify cognitive and physical deficits and behaviors that affect risk of falls    -  [place] fall precautions as indicated by assessment   - Educate patient/family on patient safety including physical limitations  - Instruct patient to call for assistance with activity based on assessment  - Modify environment to reduce risk of injury  - Consider OT/PT consult to assist with strengthening/mobility   Outcome: Progressing

## 2018-04-16 ENCOUNTER — HOSPITAL ENCOUNTER (OUTPATIENT)
Dept: INFUSION CENTER | Facility: HOSPITAL | Age: 73
Discharge: HOME | End: 2018-04-16
Payer: COMMERCIAL

## 2018-04-16 PROCEDURE — 96523 IRRIG DRUG DELIVERY DEVICE: CPT

## 2018-04-16 RX ADMIN — Medication 300 UNITS: at 08:33

## 2018-04-27 DIAGNOSIS — R12 HEART BURN: Primary | ICD-10-CM

## 2018-04-27 NOTE — TELEPHONE ENCOUNTER
Patient called and stated that she needs a refill for pantoprazole  40 mg tablets she takes 1 tablet daily and would like a 30 day supply,she uses [facility] on [address]

## 2018-04-30 ENCOUNTER — HOSPITAL ENCOUNTER (OUTPATIENT)
Dept: INFUSION CENTER | Facility: HOSPITAL | Age: 73
Discharge: HOME | End: 2018-04-30
Payer: COMMERCIAL

## 2018-04-30 DIAGNOSIS — C18.2 MALIGNANT NEOPLASM OF ASCENDING COLON (HCC): ICD-10-CM

## 2018-04-30 DIAGNOSIS — Z17.0 MALIGNANT NEOPLASM OF UPPER-OUTER QUADRANT OF RIGHT BREAST IN FEMALE, ESTROGEN RECEPTOR POSITIVE (HCC): ICD-10-CM

## 2018-04-30 DIAGNOSIS — C50.411 MALIGNANT NEOPLASM OF UPPER-OUTER QUADRANT OF RIGHT BREAST IN FEMALE, ESTROGEN RECEPTOR POSITIVE (HCC): ICD-10-CM

## 2018-04-30 DIAGNOSIS — I82.5Z2 CHRONIC DEEP VEIN THROMBOSIS (DVT) OF DISTAL VEIN OF LEFT LOWER EXTREMITY (HCC): ICD-10-CM

## 2018-04-30 RX ORDER — PANTOPRAZOLE SODIUM 40 MG/1
40 TABLET, DELAYED RELEASE ORAL DAILY
Qty: 30 TABLET | Refills: 5 | Status: SHIPPED | OUTPATIENT
Start: 2018-04-30 | End: 2018-06-26 | Stop reason: SDUPTHER

## 2018-04-30 RX ADMIN — Medication 300 UNITS: at 09:19

## 2018-04-30 NOTE — PLAN OF CARE
Problem: Potential for Falls  Goal: Patient will remain free of falls  INTERVENTIONS:  - Assess patient frequently for physical needs  -  Identify cognitive and physical deficits and behaviors that affect risk of falls    -  [place] fall precautions as indicated by assessment   - Educate patient/family on patient safety including physical limitations  - Instruct patient to call for assistance with activity based on assessment  - Modify environment to reduce risk of injury  - Consider OT/PT consult to assist with strengthening/mobility   Outcome: Progressing

## 2018-05-03 DIAGNOSIS — E11.8 TYPE 2 DIABETES MELLITUS WITH COMPLICATION, WITHOUT LONG-TERM CURRENT USE OF INSULIN (HCC): Primary | ICD-10-CM

## 2018-05-08 ENCOUNTER — HOSPITAL ENCOUNTER (OUTPATIENT)
Dept: INFUSION CENTER | Facility: HOSPITAL | Age: 73
Discharge: HOME | End: 2018-05-08
Payer: COMMERCIAL

## 2018-05-08 DIAGNOSIS — Z17.0 MALIGNANT NEOPLASM OF UPPER-OUTER QUADRANT OF RIGHT BREAST IN FEMALE, ESTROGEN RECEPTOR POSITIVE (HCC): ICD-10-CM

## 2018-05-08 DIAGNOSIS — C50.411 MALIGNANT NEOPLASM OF UPPER-OUTER QUADRANT OF RIGHT BREAST IN FEMALE, ESTROGEN RECEPTOR POSITIVE (HCC): ICD-10-CM

## 2018-05-08 DIAGNOSIS — I82.5Z2 CHRONIC DEEP VEIN THROMBOSIS (DVT) OF DISTAL VEIN OF LEFT LOWER EXTREMITY (HCC): ICD-10-CM

## 2018-05-08 DIAGNOSIS — C18.2 MALIGNANT NEOPLASM OF ASCENDING COLON (HCC): ICD-10-CM

## 2018-05-08 LAB
ALBUMIN SERPL BCP-MCNC: 4 G/DL (ref 3.5–5)
ALP SERPL-CCNC: 58 U/L (ref 46–116)
ALT SERPL W P-5'-P-CCNC: 18 U/L (ref 12–78)
ANION GAP SERPL CALCULATED.3IONS-SCNC: 8 MMOL/L (ref 4–13)
AST SERPL W P-5'-P-CCNC: 13 U/L (ref 5–45)
BASOPHILS # BLD AUTO: 0.02 THOUSANDS/ΜL (ref 0–0.1)
BASOPHILS NFR BLD AUTO: 0 % (ref 0–1)
BILIRUB SERPL-MCNC: 0.26 MG/DL (ref 0.2–1)
BUN SERPL-MCNC: 13 MG/DL (ref 5–25)
CALCIUM SERPL-MCNC: 9.3 MG/DL (ref 8.3–10.1)
CANCER AG27-29 SERPL-ACNC: 15.8 U/ML (ref 0–42.3)
CEA SERPL-MCNC: 3.5 NG/ML (ref 0–3)
CHLORIDE SERPL-SCNC: 99 MMOL/L (ref 100–108)
CO2 SERPL-SCNC: 27 MMOL/L (ref 21–32)
CREAT SERPL-MCNC: 0.66 MG/DL (ref 0.6–1.3)
DEPRECATED D DIMER PPP: 919 NG/ML (FEU) (ref 0–424)
EOSINOPHIL # BLD AUTO: 0.24 THOUSAND/ΜL (ref 0–0.61)
EOSINOPHIL NFR BLD AUTO: 4 % (ref 0–6)
ERYTHROCYTE [DISTWIDTH] IN BLOOD BY AUTOMATED COUNT: 13.4 % (ref 11.6–15.1)
GFR SERPL CREATININE-BSD FRML MDRD: 89 ML/MIN/1.73SQ M
GLUCOSE SERPL-MCNC: 105 MG/DL (ref 65–140)
HCT VFR BLD AUTO: 36.4 % (ref 34.8–46.1)
HGB BLD-MCNC: 11.9 G/DL (ref 11.5–15.4)
LYMPHOCYTES # BLD AUTO: 0.89 THOUSANDS/ΜL (ref 0.6–4.47)
LYMPHOCYTES NFR BLD AUTO: 14 % (ref 14–44)
MCH RBC QN AUTO: 32.9 PG (ref 26.8–34.3)
MCHC RBC AUTO-ENTMCNC: 32.7 G/DL (ref 31.4–37.4)
MCV RBC AUTO: 101 FL (ref 82–98)
MONOCYTES # BLD AUTO: 0.81 THOUSAND/ΜL (ref 0.17–1.22)
MONOCYTES NFR BLD AUTO: 13 % (ref 4–12)
NEUTROPHILS # BLD AUTO: 4.28 THOUSANDS/ΜL (ref 1.85–7.62)
NEUTS SEG NFR BLD AUTO: 69 % (ref 43–75)
NRBC BLD AUTO-RTO: 0 /100 WBCS
PLATELET # BLD AUTO: 220 THOUSANDS/UL (ref 149–390)
PMV BLD AUTO: 9.5 FL (ref 8.9–12.7)
POTASSIUM SERPL-SCNC: 4.2 MMOL/L (ref 3.5–5.3)
PROT SERPL-MCNC: 7.4 G/DL (ref 6.4–8.2)
RBC # BLD AUTO: 3.62 MILLION/UL (ref 3.81–5.12)
SODIUM SERPL-SCNC: 134 MMOL/L (ref 136–145)
WBC # BLD AUTO: 6.27 THOUSAND/UL (ref 4.31–10.16)

## 2018-05-08 PROCEDURE — 85025 COMPLETE CBC W/AUTO DIFF WBC: CPT

## 2018-05-08 PROCEDURE — 82378 CARCINOEMBRYONIC ANTIGEN: CPT

## 2018-05-08 PROCEDURE — 85379 FIBRIN DEGRADATION QUANT: CPT

## 2018-05-08 PROCEDURE — 80053 COMPREHEN METABOLIC PANEL: CPT | Performed by: INTERNAL MEDICINE

## 2018-05-08 PROCEDURE — 86300 IMMUNOASSAY TUMOR CA 15-3: CPT

## 2018-05-08 RX ADMIN — HEPARIN SODIUM (PORCINE) LOCK FLUSH IV SOLN 100 UNIT/ML 300 UNITS: 100 SOLUTION at 09:06

## 2018-05-08 NOTE — PLAN OF CARE
Problem: Potential for Falls  Goal: Patient will remain free of falls  INTERVENTIONS:  - Assess patient frequently for physical needs  -  Identify cognitive and physical deficits and behaviors that affect risk of falls    -  [place] fall precautions as indicated by assessment   - Educate patient/family on patient safety including physical limitations  - Instruct patient to call for assistance with activity based on assessment  - Modify environment to reduce risk of injury  - Consider OT/PT consult to assist with strengthening/mobility   Outcome: Progressing

## 2018-05-21 ENCOUNTER — HOSPITAL ENCOUNTER (OUTPATIENT)
Dept: INFUSION CENTER | Facility: HOSPITAL | Age: 73
Discharge: HOME | End: 2018-05-21
Payer: COMMERCIAL

## 2018-05-21 RX ADMIN — HEPARIN SODIUM (PORCINE) LOCK FLUSH IV SOLN 100 UNIT/ML 300 UNITS: 100 SOLUTION at 08:51

## 2018-06-04 ENCOUNTER — HOSPITAL ENCOUNTER (OUTPATIENT)
Dept: RADIOLOGY | Facility: HOSPITAL | Age: 73
Discharge: HOME | End: 2018-06-04
Attending: INTERNAL MEDICINE
Payer: COMMERCIAL

## 2018-06-04 DIAGNOSIS — E04.1 THYROID NODULE: ICD-10-CM

## 2018-06-04 PROCEDURE — 76536 US EXAM OF HEAD AND NECK: CPT

## 2018-06-05 ENCOUNTER — HOSPITAL ENCOUNTER (OUTPATIENT)
Dept: INFUSION CENTER | Facility: HOSPITAL | Age: 73
Discharge: HOME | End: 2018-06-05
Payer: COMMERCIAL

## 2018-06-05 PROCEDURE — 96523 IRRIG DRUG DELIVERY DEVICE: CPT

## 2018-06-05 RX ADMIN — Medication 300 UNITS: at 08:08

## 2018-06-07 ENCOUNTER — HOSPITAL ENCOUNTER (OUTPATIENT)
Dept: RADIOLOGY | Facility: HOSPITAL | Age: 73
Discharge: HOME | End: 2018-06-07
Attending: SURGERY
Payer: COMMERCIAL

## 2018-06-07 DIAGNOSIS — Z12.39 BREAST CANCER SCREENING: ICD-10-CM

## 2018-06-07 PROCEDURE — 77067 SCR MAMMO BI INCL CAD: CPT

## 2018-06-19 ENCOUNTER — HOSPITAL ENCOUNTER (OUTPATIENT)
Dept: INFUSION CENTER | Facility: HOSPITAL | Age: 73
Discharge: HOME | End: 2018-06-19
Payer: COMMERCIAL

## 2018-06-19 ENCOUNTER — APPOINTMENT (OUTPATIENT)
Dept: LAB | Facility: HOSPITAL | Age: 73
End: 2018-06-19
Payer: COMMERCIAL

## 2018-06-19 DIAGNOSIS — E55.9 VITAMIN D DEFICIENCY: ICD-10-CM

## 2018-06-19 DIAGNOSIS — C50.411 MALIGNANT NEOPLASM OF UPPER-OUTER QUADRANT OF RIGHT BREAST IN FEMALE, ESTROGEN RECEPTOR POSITIVE (HCC): ICD-10-CM

## 2018-06-19 DIAGNOSIS — Z17.0 MALIGNANT NEOPLASM OF UPPER-OUTER QUADRANT OF RIGHT BREAST IN FEMALE, ESTROGEN RECEPTOR POSITIVE (HCC): ICD-10-CM

## 2018-06-19 DIAGNOSIS — E11.9 DIABETES TYPE 2, NO OCULAR INVOLVEMENT (HCC): ICD-10-CM

## 2018-06-19 DIAGNOSIS — I82.5Z2 CHRONIC DEEP VEIN THROMBOSIS (DVT) OF DISTAL VEIN OF LEFT LOWER EXTREMITY (HCC): ICD-10-CM

## 2018-06-19 DIAGNOSIS — C18.2 MALIGNANT NEOPLASM OF ASCENDING COLON (HCC): ICD-10-CM

## 2018-06-19 LAB
25(OH)D3 SERPL-MCNC: 33.6 NG/ML (ref 30–100)
ALBUMIN SERPL BCP-MCNC: 4.2 G/DL (ref 3.5–5)
ALP SERPL-CCNC: 54 U/L (ref 46–116)
ALT SERPL W P-5'-P-CCNC: 19 U/L (ref 12–78)
ANION GAP SERPL CALCULATED.3IONS-SCNC: 8 MMOL/L (ref 4–13)
AST SERPL W P-5'-P-CCNC: 16 U/L (ref 5–45)
BASOPHILS # BLD AUTO: 0.03 THOUSANDS/ΜL (ref 0–0.1)
BASOPHILS NFR BLD AUTO: 1 % (ref 0–1)
BILIRUB SERPL-MCNC: 0.36 MG/DL (ref 0.2–1)
BUN SERPL-MCNC: 14 MG/DL (ref 5–25)
CALCIUM SERPL-MCNC: 9.1 MG/DL (ref 8.3–10.1)
CANCER AG27-29 SERPL-ACNC: 12.9 U/ML (ref 0–42.3)
CEA SERPL-MCNC: 3.1 NG/ML (ref 0–3)
CHLORIDE SERPL-SCNC: 102 MMOL/L (ref 100–108)
CO2 SERPL-SCNC: 27 MMOL/L (ref 21–32)
CREAT SERPL-MCNC: 0.7 MG/DL (ref 0.6–1.3)
DEPRECATED D DIMER PPP: 830 NG/ML (FEU) (ref 0–424)
EOSINOPHIL # BLD AUTO: 0.19 THOUSAND/ΜL (ref 0–0.61)
EOSINOPHIL NFR BLD AUTO: 4 % (ref 0–6)
ERYTHROCYTE [DISTWIDTH] IN BLOOD BY AUTOMATED COUNT: 13.3 % (ref 11.6–15.1)
EST. AVERAGE GLUCOSE BLD GHB EST-MCNC: 143 MG/DL
GFR SERPL CREATININE-BSD FRML MDRD: 87 ML/MIN/1.73SQ M
GLUCOSE P FAST SERPL-MCNC: 83 MG/DL (ref 65–99)
GLUCOSE SERPL-MCNC: 83 MG/DL (ref 65–140)
HBA1C MFR BLD: 6.6 % (ref 4.2–6.3)
HCT VFR BLD AUTO: 35.7 % (ref 34.8–46.1)
HGB BLD-MCNC: 11.9 G/DL (ref 11.5–15.4)
IMM GRANULOCYTES # BLD AUTO: 0.01 THOUSAND/UL (ref 0–0.2)
IMM GRANULOCYTES NFR BLD AUTO: 0 % (ref 0–2)
LYMPHOCYTES # BLD AUTO: 0.91 THOUSANDS/ΜL (ref 0.6–4.47)
LYMPHOCYTES NFR BLD AUTO: 17 % (ref 14–44)
MCH RBC QN AUTO: 33.2 PG (ref 26.8–34.3)
MCHC RBC AUTO-ENTMCNC: 33.3 G/DL (ref 31.4–37.4)
MCV RBC AUTO: 100 FL (ref 82–98)
MONOCYTES # BLD AUTO: 0.72 THOUSAND/ΜL (ref 0.17–1.22)
MONOCYTES NFR BLD AUTO: 13 % (ref 4–12)
NEUTROPHILS # BLD AUTO: 3.51 THOUSANDS/ΜL (ref 1.85–7.62)
NEUTS SEG NFR BLD AUTO: 65 % (ref 43–75)
NRBC BLD AUTO-RTO: 0 /100 WBCS
PLATELET # BLD AUTO: 208 THOUSANDS/UL (ref 149–390)
PMV BLD AUTO: 9.3 FL (ref 8.9–12.7)
POTASSIUM SERPL-SCNC: 4 MMOL/L (ref 3.5–5.3)
PROT SERPL-MCNC: 7.6 G/DL (ref 6.4–8.2)
RBC # BLD AUTO: 3.58 MILLION/UL (ref 3.81–5.12)
SODIUM SERPL-SCNC: 137 MMOL/L (ref 136–145)
WBC # BLD AUTO: 5.37 THOUSAND/UL (ref 4.31–10.16)

## 2018-06-19 PROCEDURE — 86300 IMMUNOASSAY TUMOR CA 15-3: CPT

## 2018-06-19 PROCEDURE — 85025 COMPLETE CBC W/AUTO DIFF WBC: CPT

## 2018-06-19 PROCEDURE — 82378 CARCINOEMBRYONIC ANTIGEN: CPT

## 2018-06-19 PROCEDURE — 85379 FIBRIN DEGRADATION QUANT: CPT

## 2018-06-19 PROCEDURE — 82306 VITAMIN D 25 HYDROXY: CPT

## 2018-06-19 PROCEDURE — 36415 COLL VENOUS BLD VENIPUNCTURE: CPT

## 2018-06-19 PROCEDURE — 80053 COMPREHEN METABOLIC PANEL: CPT | Performed by: INTERNAL MEDICINE

## 2018-06-19 PROCEDURE — 83036 HEMOGLOBIN GLYCOSYLATED A1C: CPT

## 2018-06-19 RX ADMIN — Medication 300 UNITS: at 09:18

## 2018-06-19 NOTE — PROGRESS NOTES
Pt to clinic for lab draw and port flush, pt offers no complaints at this time, aware of next appointment, declines avs

## 2018-06-21 ENCOUNTER — OFFICE VISIT (OUTPATIENT)
Dept: HEMATOLOGY ONCOLOGY | Facility: CLINIC | Age: 73
End: 2018-06-21
Payer: COMMERCIAL

## 2018-06-21 VITALS
TEMPERATURE: 97.9 F | DIASTOLIC BLOOD PRESSURE: 74 MMHG | RESPIRATION RATE: 17 BRPM | OXYGEN SATURATION: 98 % | BODY MASS INDEX: 25.26 KG/M2 | WEIGHT: 151.6 LBS | HEIGHT: 65 IN | SYSTOLIC BLOOD PRESSURE: 136 MMHG | HEART RATE: 83 BPM

## 2018-06-21 DIAGNOSIS — C18.2 MALIGNANT NEOPLASM OF ASCENDING COLON (HCC): ICD-10-CM

## 2018-06-21 DIAGNOSIS — C50.919 MALIGNANT NEOPLASM OF FEMALE BREAST, UNSPECIFIED ESTROGEN RECEPTOR STATUS, UNSPECIFIED LATERALITY, UNSPECIFIED SITE OF BREAST (HCC): ICD-10-CM

## 2018-06-21 DIAGNOSIS — C50.911 MALIGNANT NEOPLASM OF RIGHT BREAST IN FEMALE, ESTROGEN RECEPTOR POSITIVE, UNSPECIFIED SITE OF BREAST (HCC): Primary | ICD-10-CM

## 2018-06-21 DIAGNOSIS — E04.1 THYROID NODULE: ICD-10-CM

## 2018-06-21 DIAGNOSIS — I82.5Z2 CHRONIC DEEP VEIN THROMBOSIS (DVT) OF DISTAL VEIN OF LEFT LOWER EXTREMITY (HCC): ICD-10-CM

## 2018-06-21 DIAGNOSIS — Z17.0 MALIGNANT NEOPLASM OF RIGHT BREAST IN FEMALE, ESTROGEN RECEPTOR POSITIVE, UNSPECIFIED SITE OF BREAST (HCC): Primary | ICD-10-CM

## 2018-06-21 PROCEDURE — 99214 OFFICE O/P EST MOD 30 MIN: CPT | Performed by: INTERNAL MEDICINE

## 2018-06-21 RX ORDER — LETROZOLE 2.5 MG/1
2.5 TABLET, FILM COATED ORAL DAILY
Qty: 30 TABLET | Refills: 11 | Status: SHIPPED | OUTPATIENT
Start: 2018-06-21 | End: 2019-06-17 | Stop reason: SDUPTHER

## 2018-06-21 NOTE — PROGRESS NOTES
HPI:      Follow-up visit for  breast cancer, colon cancer and DVT  In 2012 patient was diagnosed to have Hormone receptor positive, HER-2 negative stage IIIB cancer in right breast  She had right mastectomy and lymph node dissection   9 lymph nodes showed metastatic disease  Patient was given Adriamycin + Cytoxan chemotherapy followed by Taxotere and after that radiation therapy  Since November/December 2012 she has been on Femara     She will be on Femara for a total of 10 years  She has tolerable hot flashes and minor musculoskeletal symptoms  She takes vitamin D and liquid calcium and is on prolia for osteopenia  She has no problem with her teeth and jaw  In December 2012 patient developed DVT right leg and she was started on Xarelto  She had GI bleeding  Xarelto was stopped  On colonoscopy she was found to have right colon cancer  On 7/22/15 she underwent right hemicolectomy  Pathological diagnosis right colon cancer, stage I, T2 N0 MX, grade 2,no lymphovascular invasion and no perineural invasion  She did not require adjuvant therapy for colon cancer  She has been running slightly high CEA and that is being monitored  She has been on baby aspirin  She is not on anticoagulation anymore  Follow-up Venous Doppler study was negative for DVT  D-dimer has been staying high  Patient states she was having problem with Port-A-Cath but not anymore  by having Port-A-Cath flushed every 2 weeks   Patient has been aware of that  tiny cysts in the liver and thyroid nodule  She had biopsy of thyroid nodule in June 2014 and she states that was negative for cancer  She gets thyroid ultrasound yearly for surveillance  Patient states that Dr Demarco King has recommended no more ultrasound of the thyroid needed  Mya Jacobs She  was found to have benign clustered calcifications in left breast and had a biopsy and that was benign  Dr Demarco King takes care of her mammography  Follow-up mammography was negative for malignancy      She has facial hair growth          Current Outpatient Prescriptions:     ascorbic acid (VITAMIN C) 500 MG tablet, Take 500 mg by mouth daily  , Disp: , Rfl:     aspirin 81 MG tablet, Take 81 mg by mouth daily  , Disp: , Rfl:     benazepril (LOTENSIN) 20 mg tablet, Take 20 mg by mouth daily  , Disp: , Rfl:     bimatoprost (LUMIGAN) 0 01 % ophthalmic drops, 1 drop daily at bedtime  , Disp: , Rfl:     Cholecalciferol (VITAMIN D-3 PO), Take 1 capsule by mouth 3 (three) times a day , Disp: , Rfl:     Cyanocobalamin (VITAMIN B 12 PO), Take 1 tablet by mouth daily  , Disp: , Rfl:     ferrous sulfate 325 (65 Fe) mg tablet, Take 325 mg by mouth daily with breakfast , Disp: , Rfl:     letrozole (FEMARA) 2 5 mg tablet, Take 1 tablet (2 5 mg total) by mouth daily, Disp: 30 tablet, Rfl: 2    metFORMIN (GLUCOPHAGE) 1000 MG tablet, Take 1 tablet (1,000 mg total) by mouth 2 (two) times a day with meals for 180 days, Disp: 180 tablet, Rfl: 1    pantoprazole (PROTONIX) 40 mg tablet, Take 1 tablet (40 mg total) by mouth daily for 30 days, Disp: 30 tablet, Rfl: 5    sitaGLIPtin (JANUVIA) 100 mg tablet, Take 1 tablet (100 mg total) by mouth daily for 30 days, Disp: 30 tablet, Rfl: 5    vitamin A 10,000 units capsule, Take 10,000 Units by mouth daily  , Disp: , Rfl:     vitamin E, tocopherol, 400 units capsule, Take 100 Units by mouth daily  , Disp: , Rfl:   No current facility-administered medications for this visit  Facility-Administered Medications Ordered in Other Visits:     alteplase (CATHFLO) injection 2 mg, 2 mg, Intracatheter, PRN, Juani Suarez MD    heparin lock flush 100 units/mL injection 300 Units, 300 Units, Intracatheter, Q1H PRN, Juani Suarez MD, 300 Units at 06/19/18 0918    No Known Allergies     No history exists         ROS:  06/21/18 Reviewed 13 systems:  Presently no headaches, seizures, dizziness, diplopia, dysphagia, hoarseness, chest pain, palpitations, shortness of breath, cough, hemoptysis, abdominal pain, nausea, vomiting, change in bowel habits, melena, hematuria, fever, chills, bleeding, bone pains, skin rash, weight loss,  tiredness ,  weakness, numbness,  claudication and gait problem  No frequent infections  Not unusually sensitive to  cold  No swelling of the ankles  No swollen glands  Patient is anxious  No GYN symptoms Other symptoms are in HPI        /74 (BP Location: Left arm, Cuff Size: Adult)   Pulse 83   Temp 97 9 °F (36 6 °C) (Tympanic)   Resp 17   Ht 5' 5" (1 651 m)   Wt 68 8 kg (151 lb 9 6 oz)   LMP  (LMP Unknown)   SpO2 98%   BMI 25 23 kg/m²     Physical Exam:  Alert, oriented, not in distress, no icterus, no oral thrush, no palpable neck mass, clear lung fields, regular heart rate, abdomen  soft and non tender, no palpable abdominal mass, no ascites, no edema of ankles, no calf tenderness, no focal neurological deficit, no skin rash, no palpable lymphadenopathy, good arterial pulses, no clubbing  Patient is anxious  Performance status 1  Right mastectomy scar  No lymphedema    IMAGING:   Negative mammography for malignancy    Stable thyroid nodules    LABS:  Results for orders placed or performed in visit on 06/19/18   HEMOGLOBIN A1C W/ EAG ESTIMATION   Result Value Ref Range    Hemoglobin A1C 6 6 (H) 4 2 - 6 3 %     mg/dl   Vitamin D 25 hydroxy   Result Value Ref Range    Vit D, 25-Hydroxy 33 6 30 0 - 100 0 ng/mL     Labs, Imaging, & Other studies:   All pertinent labs and imaging studies were personally reviewed    Lab Results   Component Value Date     06/19/2018    K 4 0 06/19/2018     06/19/2018    CO2 27 06/19/2018    ANIONGAP 8 06/19/2018    BUN 14 06/19/2018    CREATININE 0 70 06/19/2018    GLUCOSE 83 06/19/2018    GLUF 83 06/19/2018    CALCIUM 9 1 06/19/2018    AST 16 06/19/2018    ALT 19 06/19/2018    ALKPHOS 54 06/19/2018    PROT 7 6 06/19/2018    BILITOT 0 36 06/19/2018    EGFR 87 06/19/2018     Lab Results   Component Value Date WBC 5 37 06/19/2018    HGB 11 9 06/19/2018    HCT 35 7 06/19/2018     (H) 06/19/2018     06/19/2018     Normal differential count  CEA 3 1  D-dimer 830    Reviewed and discussed with patient  Assessment and plan: Follow-up visit for  breast cancer, colon cancer and DVT  In 2012 patient was diagnosed to have Hormone receptor positive, HER-2 negative stage IIIB cancer in right breast  She had right mastectomy and lymph node dissection   9 lymph nodes showed metastatic disease  Patient was given Adriamycin + Cytoxan chemotherapy followed by Taxotere and after that radiation therapy  Since November/December 2012 she has been on Femara     She will be on Femara for a total of 10 years  She has tolerable hot flashes and minor musculoskeletal symptoms  She takes vitamin D and liquid calcium and is on prolia for osteopenia  She has no problem with her teeth and jaw  In December 2012 patient developed DVT right leg and she was started on Xarelto  She had GI bleeding  Xarelto was stopped  On colonoscopy she was found to have right colon cancer  On 7/22/15 she underwent right hemicolectomy  Pathological diagnosis right colon cancer, stage I, T2 N0 MX, grade 2,no lymphovascular invasion and no perineural invasion  She did not require adjuvant therapy for colon cancer  She has been running slightly high CEA and that is being monitored  She has been on baby aspirin  She is not on anticoagulation anymore  Follow-up Venous Doppler study was negative for DVT  D-dimer has been staying high  Patient states she was having problem with Port-A-Cath but not anymore  by having Port-A-Cath flushed every 2 weeks   Patient has been aware of that  tiny cysts in the liver and thyroid nodule  She had biopsy of thyroid nodule in June 2014 and she states that was negative for cancer  She gets thyroid ultrasound yearly for surveillance   Patient states that Dr Todd Wood has recommended no more ultrasound of the thyroid needed  Miah Glass She  was found to have benign clustered calcifications in left breast and had a biopsy and that was benign  Dr Leia Del Valle takes care of her mammography  Follow-up mammography was negative for malignancy  She has facial hair growth  Physical examination and test results are as recorded and discussed in detail  She appears to be in remission from breast cancer and colon cancer  Goal is cure from breast cancer and colon cancer  She is being continued on Femara, Prolia, vitamin-D and calcium and baby aspirin in addition to her other medications  Condition and plan discussed  Questions answered  Renewed Femara prescription  Also discussed the importance of self-breast examination, eating healthy foods, staying active and health screening tests  1  Malignant neoplasm of right breast in female, estrogen receptor positive, unspecified site of breast ([facility] [address] )      2  Malignant neoplasm of ascending colon ([address] )      3  Chronic deep vein thrombosis (DVT) of distal vein of left lower extremity ([facility]CC)      4  Thyroid nodule      5  Malignant neoplasm of female breast, unspecified estrogen receptor status, unspecified laterality, unspecified site of breast ([address] )    - letrozole (FEMARA) 2 5 mg tablet; Take 1 tablet (2 5 mg total) by mouth daily  Dispense: 30 tablet; Refill: 11          Patient voiced understanding and agreement in the discussion  Counseling / Coordination of Care   Greater than 50% of total time was spent with the patient and / or family counseling and / or coordination of care

## 2018-06-21 NOTE — LETTER
June 21, 2018     Henrry De La [address] [facility] [place] [address]    Patient: Chip Sheldon   YOB: 1945   Date of Visit: 6/21/2018       Dear Dr Chaim Manzano: Thank you for referring Freddie Lagos to me for evaluation  Below are my notes for this consultation  If you have questions, please do not hesitate to call me  I look forward to following your patient along with you  Sincerely,        Pop Dao MD        CC: MD Pop Richards MD  6/21/2018  9:57 AM  Sign at close encounter    HPI:     Follow-up visit for  breast cancer, colon cancer and DVT  In 2012 patient was diagnosed to have Hormone receptor positive, HER-2 negative stage IIIB cancer in right breast  She had right mastectomy and lymph node dissection   9 lymph nodes showed metastatic disease  Patient was given Adriamycin + Cytoxan chemotherapy followed by Taxotere and after that radiation therapy  Since November/December 2012 she has been on Femara     She will be on Femara for a total of 10 years  She has tolerable hot flashes and minor musculoskeletal symptoms  She takes vitamin D and liquid calcium and is on prolia for osteopenia  She has no problem with her teeth and jaw  In December 2012 patient developed DVT right leg and she was started on Xarelto  She had GI bleeding  Xarelto was stopped  On colonoscopy she was found to have right colon cancer  On 7/22/15 she underwent right hemicolectomy  Pathological diagnosis right colon cancer, stage I, T2 N0 MX, grade 2,no lymphovascular invasion and no perineural invasion  She did not require adjuvant therapy for colon cancer  She has been running slightly high CEA and that is being monitored  She has been on baby aspirin  She is not on anticoagulation anymore  Follow-up Venous Doppler study was negative for DVT  D-dimer has been staying high      Patient states she was having problem with Port-A-Cath but not anymore  by having Port-A-Cath flushed every 2 weeks   Patient has been aware of that  tiny cysts in the liver and thyroid nodule  She had biopsy of thyroid nodule in June 2014 and she states that was negative for cancer  She gets thyroid ultrasound yearly for surveillance  Patient states that Dr Indio Dove has recommended no more ultrasound of the thyroid needed  Erickson Tran She  was found to have benign clustered calcifications in left breast and had a biopsy and that was benign  Dr Indio Dove takes care of her mammography  Follow-up mammography was negative for malignancy  She has facial hair growth          Current Outpatient Prescriptions:     ascorbic acid (VITAMIN C) 500 MG tablet, Take 500 mg by mouth daily  , Disp: , Rfl:     aspirin 81 MG tablet, Take 81 mg by mouth daily  , Disp: , Rfl:     benazepril (LOTENSIN) 20 mg tablet, Take 20 mg by mouth daily  , Disp: , Rfl:     bimatoprost (LUMIGAN) 0 01 % ophthalmic drops, 1 drop daily at bedtime  , Disp: , Rfl:     Cholecalciferol (VITAMIN D-3 PO), Take 1 capsule by mouth 3 (three) times a day , Disp: , Rfl:     Cyanocobalamin (VITAMIN B 12 PO), Take 1 tablet by mouth daily  , Disp: , Rfl:     ferrous sulfate 325 (65 Fe) mg tablet, Take 325 mg by mouth daily with breakfast , Disp: , Rfl:     letrozole (FEMARA) 2 5 mg tablet, Take 1 tablet (2 5 mg total) by mouth daily, Disp: 30 tablet, Rfl: 2    metFORMIN (GLUCOPHAGE) 1000 MG tablet, Take 1 tablet (1,000 mg total) by mouth 2 (two) times a day with meals for 180 days, Disp: 180 tablet, Rfl: 1    pantoprazole (PROTONIX) 40 mg tablet, Take 1 tablet (40 mg total) by mouth daily for 30 days, Disp: 30 tablet, Rfl: 5    sitaGLIPtin (JANUVIA) 100 mg tablet, Take 1 tablet (100 mg total) by mouth daily for 30 days, Disp: 30 tablet, Rfl: 5    vitamin A 10,000 units capsule, Take 10,000 Units by mouth daily  , Disp: , Rfl:     vitamin E, tocopherol, 400 units capsule, Take 100 Units by mouth daily  , Disp: , Rfl:   No current facility-administered medications for this visit  Facility-Administered Medications Ordered in Other Visits:     alteplase (CATHFLO) injection 2 mg, 2 mg, Intracatheter, PRN, Ashutosh Hanley MD    heparin lock flush 100 units/mL injection 300 Units, 300 Units, Intracatheter, Q1H PRN, Ashutosh Hanley MD, 300 Units at 06/19/18 0918    No Known Allergies     No history exists  ROS:  06/21/18 Reviewed 13 systems:  Presently no headaches, seizures, dizziness, diplopia, dysphagia, hoarseness, chest pain, palpitations, shortness of breath, cough, hemoptysis, abdominal pain, nausea, vomiting, change in bowel habits, melena, hematuria, fever, chills, bleeding, bone pains, skin rash, weight loss,  tiredness ,  weakness, numbness,  claudication and gait problem  No frequent infections  Not unusually sensitive to  cold  No swelling of the ankles  No swollen glands  Patient is anxious  No GYN symptoms Other symptoms are in HPI        /74 (BP Location: Left arm, Cuff Size: Adult)   Pulse 83   Temp 97 9 °F (36 6 °C) (Tympanic)   Resp 17   Ht 5' 5" (1 651 m)   Wt 68 8 kg (151 lb 9 6 oz)   LMP  (LMP Unknown)   SpO2 98%   BMI 25 23 kg/m²      Physical Exam:  Alert, oriented, not in distress, no icterus, no oral thrush, no palpable neck mass, clear lung fields, regular heart rate, abdomen  soft and non tender, no palpable abdominal mass, no ascites, no edema of ankles, no calf tenderness, no focal neurological deficit, no skin rash, no palpable lymphadenopathy, good arterial pulses, no clubbing  Patient is anxious  Performance status 1  Right mastectomy scar  No lymphedema    IMAGING:   Negative mammography for malignancy    Stable thyroid nodules    LABS:  Results for orders placed or performed in visit on 06/19/18   HEMOGLOBIN A1C W/ EAG ESTIMATION   Result Value Ref Range    Hemoglobin A1C 6 6 (H) 4 2 - 6 3 %     mg/dl   Vitamin D 25 hydroxy   Result Value Ref Range    Vit D, 25-Hydroxy 33 6 30 0 - 100 0 ng/mL Labs, Imaging, & Other studies:   All pertinent labs and imaging studies were personally reviewed    Lab Results   Component Value Date     06/19/2018    K 4 0 06/19/2018     06/19/2018    CO2 27 06/19/2018    ANIONGAP 8 06/19/2018    BUN 14 06/19/2018    CREATININE 0 70 06/19/2018    GLUCOSE 83 06/19/2018    GLUF 83 06/19/2018    CALCIUM 9 1 06/19/2018    AST 16 06/19/2018    ALT 19 06/19/2018    ALKPHOS 54 06/19/2018    PROT 7 6 06/19/2018    BILITOT 0 36 06/19/2018    EGFR 87 06/19/2018     Lab Results   Component Value Date    WBC 5 37 06/19/2018    HGB 11 9 06/19/2018    HCT 35 7 06/19/2018     (H) 06/19/2018     06/19/2018     Normal differential count  CEA 3 1  D-dimer 830    Reviewed and discussed with patient  Assessment and plan: Follow-up visit for  breast cancer, colon cancer and DVT  In 2012 patient was diagnosed to have Hormone receptor positive, HER-2 negative stage IIIB cancer in right breast  She had right mastectomy and lymph node dissection   9 lymph nodes showed metastatic disease  Patient was given Adriamycin + Cytoxan chemotherapy followed by Taxotere and after that radiation therapy  Since November/December 2012 she has been on Femara     She will be on Femara for a total of 10 years  She has tolerable hot flashes and minor musculoskeletal symptoms  She takes vitamin D and liquid calcium and is on prolia for osteopenia  She has no problem with her teeth and jaw  In December 2012 patient developed DVT right leg and she was started on Xarelto  She had GI bleeding  Xarelto was stopped  On colonoscopy she was found to have right colon cancer  On 7/22/15 she underwent right hemicolectomy  Pathological diagnosis right colon cancer, stage I, T2 N0 MX, grade 2,no lymphovascular invasion and no perineural invasion  She did not require adjuvant therapy for colon cancer  She has been running slightly high CEA and that is being monitored    She has been on baby aspirin  She is not on anticoagulation anymore  Follow-up Venous Doppler study was negative for DVT  D-dimer has been staying high  Patient states she was having problem with Port-A-Cath but not anymore  by having Port-A-Cath flushed every 2 weeks   Patient has been aware of that  tiny cysts in the liver and thyroid nodule  She had biopsy of thyroid nodule in June 2014 and she states that was negative for cancer  She gets thyroid ultrasound yearly for surveillance  Patient states that Dr Jazz Dumont has recommended no more ultrasound of the thyroid needed  Josep Crystal She  was found to have benign clustered calcifications in left breast and had a biopsy and that was benign  Dr Jazz Dumont takes care of her mammography  Follow-up mammography was negative for malignancy  She has facial hair growth  Physical examination and test results are as recorded and discussed in detail  She appears to be in remission from breast cancer and colon cancer  Goal is cure from breast cancer and colon cancer  She is being continued on Femara, Prolia, vitamin-D and calcium and baby aspirin in addition to her other medications  Condition and plan discussed  Questions answered  Renewed Femara prescription  Also discussed the importance of self-breast examination, eating healthy foods, staying active and health screening tests  1  Malignant neoplasm of right breast in female, estrogen receptor positive, unspecified site of breast ([address] )      2  Malignant neoplasm of ascending colon ([address] )      3  Chronic deep vein thrombosis (DVT) of distal vein of left lower extremity (HCC)      4  Thyroid nodule      5  Malignant neoplasm of female breast, unspecified estrogen receptor status, unspecified laterality, unspecified site of breast ([address] )    - letrozole (FEMARA) 2 5 mg tablet; Take 1 tablet (2 5 mg total) by mouth daily  Dispense: 30 tablet; Refill: 11          Patient voiced understanding and agreement in the discussion         Counseling / Coordination of Care   Greater than 50% of total time was spent with the patient and / or family counseling and / or coordination of care

## 2018-06-26 ENCOUNTER — OFFICE VISIT (OUTPATIENT)
Dept: FAMILY MEDICINE CLINIC | Facility: CLINIC | Age: 73
End: 2018-06-26
Payer: COMMERCIAL

## 2018-06-26 VITALS
WEIGHT: 153.6 LBS | SYSTOLIC BLOOD PRESSURE: 128 MMHG | HEART RATE: 85 BPM | DIASTOLIC BLOOD PRESSURE: 76 MMHG | HEIGHT: 65 IN | OXYGEN SATURATION: 97 % | BODY MASS INDEX: 25.59 KG/M2 | TEMPERATURE: 98.1 F

## 2018-06-26 DIAGNOSIS — R12 HEART BURN: ICD-10-CM

## 2018-06-26 DIAGNOSIS — E11.9 DIABETES TYPE 2, NO OCULAR INVOLVEMENT (HCC): ICD-10-CM

## 2018-06-26 DIAGNOSIS — I10 ESSENTIAL HYPERTENSION: Primary | ICD-10-CM

## 2018-06-26 DIAGNOSIS — E11.8 TYPE 2 DIABETES MELLITUS WITH COMPLICATION, WITHOUT LONG-TERM CURRENT USE OF INSULIN (HCC): ICD-10-CM

## 2018-06-26 PROCEDURE — 99213 OFFICE O/P EST LOW 20 MIN: CPT | Performed by: FAMILY MEDICINE

## 2018-06-26 PROCEDURE — 3078F DIAST BP <80 MM HG: CPT | Performed by: FAMILY MEDICINE

## 2018-06-26 PROCEDURE — 1101F PT FALLS ASSESS-DOCD LE1/YR: CPT | Performed by: FAMILY MEDICINE

## 2018-06-26 PROCEDURE — 3725F SCREEN DEPRESSION PERFORMED: CPT | Performed by: FAMILY MEDICINE

## 2018-06-26 PROCEDURE — 3074F SYST BP LT 130 MM HG: CPT | Performed by: FAMILY MEDICINE

## 2018-06-26 RX ORDER — BENAZEPRIL HYDROCHLORIDE 20 MG/1
20 TABLET ORAL DAILY
Qty: 30 TABLET | Refills: 5 | Status: SHIPPED | OUTPATIENT
Start: 2018-06-26 | End: 2018-10-31 | Stop reason: SDUPTHER

## 2018-06-26 RX ORDER — PANTOPRAZOLE SODIUM 40 MG/1
40 TABLET, DELAYED RELEASE ORAL DAILY
Qty: 30 TABLET | Refills: 5 | Status: ON HOLD | OUTPATIENT
Start: 2018-06-26 | End: 2018-09-20 | Stop reason: CLARIF

## 2018-06-26 NOTE — PROGRESS NOTES
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)  -     sitaGLIPtin (JANUVIA) 100 mg tablet; Take 1 tablet (100 mg total) by mouth daily for 30 days    Type 2 diabetes mellitus with complication, without long-term current use of insulin (HCC)  -     metFORMIN (GLUCOPHAGE) 1000 MG tablet; Take 1 tablet (1,000 mg total) by mouth 2 (two) times a day with meals for 180 days    Heart burn  -     pantoprazole (PROTONIX) 40 mg tablet; Take 1 tablet (40 mg total) by mouth daily for 30 days          Subjective:      Patient ID: Aspen Sanchez is a 68 y o  female  Follow up, review labs  The following portions of the patient's history were reviewed and updated as appropriate: allergies, current medications, past family history, past medical history, past social history, past surgical history and problem list     Review of Systems   Constitutional: Negative  HENT: Negative  Eyes: Negative  Respiratory: Negative  Cardiovascular: Negative  Gastrointestinal: Negative  Genitourinary: Negative  Musculoskeletal: Negative  Skin: Negative  Neurological: Negative  Psychiatric/Behavioral: Negative  Objective:      /76 (BP Location: Left arm, Patient Position: Sitting, Cuff Size: Standard)   Pulse 85   Temp 98 1 °F (36 7 °C) (Oral)   Ht 5' 5" (1 651 m)   Wt 69 7 kg (153 lb 9 6 oz)   LMP  (LMP Unknown)   SpO2 97%   BMI 25 56 kg/m²          Physical Exam   Constitutional: She is oriented to person, place, and time  She appears well-developed and well-nourished  HENT:   Head: Normocephalic and atraumatic     Right Ear: External ear normal    Left Ear: External ear normal    Nose: Nose normal    Mouth/Throat: Oropharynx is clear and moist    Eyes: Conjunctivae and EOM are normal  Pupils are equal, round, and reactive to light  Cardiovascular: Normal rate, regular rhythm, normal heart sounds and intact distal pulses  Pulmonary/Chest: Effort normal and breath sounds normal    Neurological: She is alert and oriented to person, place, and time  Skin: Skin is warm and dry  Psychiatric: She has a normal mood and affect   Her behavior is normal  Judgment and thought content normal

## 2018-07-02 ENCOUNTER — HOSPITAL ENCOUNTER (OUTPATIENT)
Dept: INFUSION CENTER | Facility: HOSPITAL | Age: 73
Discharge: HOME | End: 2018-07-02
Payer: COMMERCIAL

## 2018-07-02 ENCOUNTER — TRANSCRIBE ORDERS (OUTPATIENT)
Dept: ADMINISTRATIVE | Facility: HOSPITAL | Age: 73
End: 2018-07-02

## 2018-07-02 DIAGNOSIS — Z12.39 SCREENING BREAST EXAMINATION: Primary | ICD-10-CM

## 2018-07-02 PROCEDURE — 96523 IRRIG DRUG DELIVERY DEVICE: CPT

## 2018-07-02 RX ADMIN — HEPARIN SODIUM (PORCINE) LOCK FLUSH IV SOLN 100 UNIT/ML 300 UNITS: 100 SOLUTION at 09:08

## 2018-07-02 NOTE — PROGRESS NOTES
Port accessed and flushed per protocol, brisk blood return noted  Pt tolerated well  Port hep locked   Pt aware of next appt, declined avs

## 2018-07-02 NOTE — PLAN OF CARE
Problem: Potential for Falls  Goal: Patient will remain free of falls  INTERVENTIONS:  - Assess patient frequently for physical needs  -  Identify cognitive and physical deficits and behaviors that affect risk of falls    -  [place] fall precautions as indicated by assessment   - Educate patient/family on patient safety including physical limitations  - Instruct patient to call for assistance with activity based on assessment  - Modify environment to reduce risk of injury  - Consider OT/PT consult to assist with strengthening/mobility   Outcome: Progressing

## 2018-07-16 ENCOUNTER — HOSPITAL ENCOUNTER (OUTPATIENT)
Dept: INFUSION CENTER | Facility: HOSPITAL | Age: 73
Discharge: HOME | End: 2018-07-16
Payer: COMMERCIAL

## 2018-07-16 RX ADMIN — Medication 300 UNITS: at 08:22

## 2018-07-16 NOTE — PROGRESS NOTES
Port accessed and flushed per protocol, brisk blood return noted  Pt tolerated well, port flushed and hep locked

## 2018-07-16 NOTE — PLAN OF CARE
Problem: Potential for Falls  Goal: Patient will remain free of falls  INTERVENTIONS:  - Assess patient frequently for physical needs  -  Identify cognitive and physical deficits and behaviors that affect risk of falls    -  [place] fall precautions as indicated by assessment   - Educate patient/family on patient safety including physical limitations  - Instruct patient to call for assistance with activity based on assessment  - Modify environment to reduce risk of injury  - Consider OT/PT consult to assist with strengthening/mobility   Outcome: Progressing

## 2018-07-30 RX ORDER — SODIUM CHLORIDE 9 MG/ML
20 INJECTION, SOLUTION INTRAVENOUS ONCE
Status: DISCONTINUED | OUTPATIENT
Start: 2018-07-31 | End: 2018-08-03 | Stop reason: HOSPADM

## 2018-07-31 ENCOUNTER — HOSPITAL ENCOUNTER (OUTPATIENT)
Dept: INFUSION CENTER | Facility: HOSPITAL | Age: 73
Discharge: HOME | End: 2018-07-31
Payer: COMMERCIAL

## 2018-07-31 VITALS
HEART RATE: 80 BPM | TEMPERATURE: 97.8 F | RESPIRATION RATE: 20 BRPM | DIASTOLIC BLOOD PRESSURE: 87 MMHG | SYSTOLIC BLOOD PRESSURE: 134 MMHG

## 2018-07-31 LAB
ALBUMIN SERPL BCP-MCNC: 3.9 G/DL (ref 3.5–5)
ALP SERPL-CCNC: 60 U/L (ref 46–116)
ALT SERPL W P-5'-P-CCNC: 17 U/L (ref 12–78)
ANION GAP SERPL CALCULATED.3IONS-SCNC: 6 MMOL/L (ref 4–13)
AST SERPL W P-5'-P-CCNC: 16 U/L (ref 5–45)
BASOPHILS # BLD AUTO: 0.02 THOUSANDS/ΜL (ref 0–0.1)
BASOPHILS NFR BLD AUTO: 0 % (ref 0–1)
BILIRUB SERPL-MCNC: 0.45 MG/DL (ref 0.2–1)
BUN SERPL-MCNC: 12 MG/DL (ref 5–25)
CALCIUM SERPL-MCNC: 9.9 MG/DL (ref 8.3–10.1)
CANCER AG27-29 SERPL-ACNC: 15 U/ML (ref 0–42.3)
CEA SERPL-MCNC: 3.5 NG/ML (ref 0–3)
CHLORIDE SERPL-SCNC: 100 MMOL/L (ref 100–108)
CO2 SERPL-SCNC: 29 MMOL/L (ref 21–32)
CREAT SERPL-MCNC: 0.68 MG/DL (ref 0.6–1.3)
DEPRECATED D DIMER PPP: 1135 NG/ML (FEU) (ref 0–424)
EOSINOPHIL # BLD AUTO: 0.21 THOUSAND/ΜL (ref 0–0.61)
EOSINOPHIL NFR BLD AUTO: 4 % (ref 0–6)
ERYTHROCYTE [DISTWIDTH] IN BLOOD BY AUTOMATED COUNT: 13.2 % (ref 11.6–15.1)
GFR SERPL CREATININE-BSD FRML MDRD: 88 ML/MIN/1.73SQ M
GLUCOSE SERPL-MCNC: 98 MG/DL (ref 65–140)
HCT VFR BLD AUTO: 34.3 % (ref 34.8–46.1)
HGB BLD-MCNC: 11.6 G/DL (ref 11.5–15.4)
IMM GRANULOCYTES # BLD AUTO: 0.03 THOUSAND/UL (ref 0–0.2)
IMM GRANULOCYTES NFR BLD AUTO: 1 % (ref 0–2)
LYMPHOCYTES # BLD AUTO: 0.98 THOUSANDS/ΜL (ref 0.6–4.47)
LYMPHOCYTES NFR BLD AUTO: 18 % (ref 14–44)
MCH RBC QN AUTO: 33.1 PG (ref 26.8–34.3)
MCHC RBC AUTO-ENTMCNC: 33.8 G/DL (ref 31.4–37.4)
MCV RBC AUTO: 98 FL (ref 82–98)
MONOCYTES # BLD AUTO: 0.8 THOUSAND/ΜL (ref 0.17–1.22)
MONOCYTES NFR BLD AUTO: 14 % (ref 4–12)
NEUTROPHILS # BLD AUTO: 3.54 THOUSANDS/ΜL (ref 1.85–7.62)
NEUTS SEG NFR BLD AUTO: 63 % (ref 43–75)
NRBC BLD AUTO-RTO: 0 /100 WBCS
PLATELET # BLD AUTO: 215 THOUSANDS/UL (ref 149–390)
PMV BLD AUTO: 9.4 FL (ref 8.9–12.7)
POTASSIUM SERPL-SCNC: 3.5 MMOL/L (ref 3.5–5.3)
PROT SERPL-MCNC: 7.4 G/DL (ref 6.4–8.2)
RBC # BLD AUTO: 3.5 MILLION/UL (ref 3.81–5.12)
SODIUM SERPL-SCNC: 135 MMOL/L (ref 136–145)
WBC # BLD AUTO: 5.58 THOUSAND/UL (ref 4.31–10.16)

## 2018-07-31 PROCEDURE — 82378 CARCINOEMBRYONIC ANTIGEN: CPT | Performed by: INTERNAL MEDICINE

## 2018-07-31 PROCEDURE — 80053 COMPREHEN METABOLIC PANEL: CPT | Performed by: INTERNAL MEDICINE

## 2018-07-31 PROCEDURE — 85025 COMPLETE CBC W/AUTO DIFF WBC: CPT | Performed by: INTERNAL MEDICINE

## 2018-07-31 PROCEDURE — 85379 FIBRIN DEGRADATION QUANT: CPT | Performed by: INTERNAL MEDICINE

## 2018-07-31 PROCEDURE — 86300 IMMUNOASSAY TUMOR CA 15-3: CPT | Performed by: INTERNAL MEDICINE

## 2018-07-31 PROCEDURE — 96401 CHEMO ANTI-NEOPL SQ/IM: CPT

## 2018-07-31 RX ADMIN — Medication 300 UNITS: at 09:49

## 2018-07-31 RX ADMIN — DENOSUMAB 60 MG: 60 INJECTION SUBCUTANEOUS at 09:41

## 2018-07-31 NOTE — PLAN OF CARE
Problem: Potential for Falls  Goal: Patient will remain free of falls  INTERVENTIONS:  - Assess patient frequently for physical needs  -  Identify cognitive and physical deficits and behaviors that affect risk of falls    -  [place] fall precautions as indicated by assessment   - Educate patient/family on patient safety including physical limitations  - Instruct patient to call for assistance with activity based on assessment  - Modify environment to reduce risk of injury  - Consider OT/PT consult to assist with strengthening/mobility   Outcome: Progressing

## 2018-08-13 ENCOUNTER — HOSPITAL ENCOUNTER (OUTPATIENT)
Dept: INFUSION CENTER | Facility: HOSPITAL | Age: 73
Discharge: HOME | End: 2018-08-13
Payer: COMMERCIAL

## 2018-08-13 PROCEDURE — 96523 IRRIG DRUG DELIVERY DEVICE: CPT

## 2018-08-13 RX ADMIN — HEPARIN SODIUM (PORCINE) LOCK FLUSH IV SOLN 100 UNIT/ML 300 UNITS: 100 SOLUTION at 09:59

## 2018-08-13 NOTE — PLAN OF CARE
Problem: Potential for Falls  Goal: Patient will remain free of falls  INTERVENTIONS:  - Assess patient frequently for physical needs  -  Identify cognitive and physical deficits and behaviors that affect risk of falls    -  [place] fall precautions as indicated by assessment   - Educate patient/family on patient safety including physical limitations  - Instruct patient to call for assistance with activity based on assessment  - Modify environment to reduce risk of injury  - Consider OT/PT consult to assist with strengthening/mobility   Outcome: Progressing

## 2018-08-27 ENCOUNTER — HOSPITAL ENCOUNTER (OUTPATIENT)
Dept: INFUSION CENTER | Facility: HOSPITAL | Age: 73
Discharge: HOME | End: 2018-08-27
Payer: COMMERCIAL

## 2018-08-27 ENCOUNTER — TELEPHONE (OUTPATIENT)
Dept: HEMATOLOGY ONCOLOGY | Facility: CLINIC | Age: 73
End: 2018-08-27

## 2018-08-27 DIAGNOSIS — C50.411 MALIGNANT NEOPLASM OF UPPER-OUTER QUADRANT OF RIGHT BREAST IN FEMALE, ESTROGEN RECEPTOR POSITIVE (HCC): ICD-10-CM

## 2018-08-27 DIAGNOSIS — C50.911 MALIGNANT NEOPLASM OF RIGHT BREAST IN FEMALE, ESTROGEN RECEPTOR POSITIVE, UNSPECIFIED SITE OF BREAST (HCC): Primary | ICD-10-CM

## 2018-08-27 DIAGNOSIS — Z17.0 MALIGNANT NEOPLASM OF RIGHT BREAST IN FEMALE, ESTROGEN RECEPTOR POSITIVE, UNSPECIFIED SITE OF BREAST (HCC): Primary | ICD-10-CM

## 2018-08-27 DIAGNOSIS — Z17.0 MALIGNANT NEOPLASM OF UPPER-OUTER QUADRANT OF RIGHT BREAST IN FEMALE, ESTROGEN RECEPTOR POSITIVE (HCC): ICD-10-CM

## 2018-08-27 PROCEDURE — 36593 DECLOT VASCULAR DEVICE: CPT

## 2018-08-27 RX ADMIN — ALTEPLASE 2 MG: 2.2 INJECTION, POWDER, LYOPHILIZED, FOR SOLUTION INTRAVENOUS at 09:21

## 2018-08-27 NOTE — PROGRESS NOTES
Spoke to Kamilla Burns, about not getting a blood return from port    Pt has gone to IR x 2 in the last 9 months and tpa  she will speak to Dr Sushma Jurado and call pt back by the end of the day with arrangements

## 2018-08-27 NOTE — TELEPHONE ENCOUNTER
Dr Stephanie Abrams made aware that patient's port a cath no longer flushes, even with TPA  Dr Stephanie Abrams is agreeable to having the patient's port removed  Patient made aware that a  will be calling her tomorrow with the date and time of the procedure

## 2018-08-27 NOTE — PLAN OF CARE
Problem: Potential for Falls  Goal: Patient will remain free of falls  INTERVENTIONS:  - Assess patient frequently for physical needs  -  Identify cognitive and physical deficits and behaviors that affect risk of falls    -  [place] fall precautions as indicated by assessment   - Educate patient/family on patient safety including physical limitations  - Instruct patient to call for assistance with activity based on assessment  - Modify environment to reduce risk of injury  - Consider OT/PT consult to assist with strengthening/mobility   Outcome: Progressing

## 2018-08-28 NOTE — TELEPHONE ENCOUNTER
Pt called back asking when will be notified of port removal appt  Informed  will call when have the information

## 2018-08-28 NOTE — TELEPHONE ENCOUNTER
Patient was scheduled for a port removal appointment  However, patient does not have any transportation to get to the procedure  In the process of seeing if our  can arrange for transportation for the patient

## 2018-08-31 DIAGNOSIS — E11.9 DIABETES TYPE 2, NO OCULAR INVOLVEMENT (HCC): ICD-10-CM

## 2018-09-04 ENCOUNTER — TELEPHONE (OUTPATIENT)
Dept: HEMATOLOGY ONCOLOGY | Facility: CLINIC | Age: 73
End: 2018-09-04

## 2018-09-04 ENCOUNTER — DOCUMENTATION (OUTPATIENT)
Dept: INFUSION CENTER | Facility: HOSPITAL | Age: 73
End: 2018-09-04

## 2018-09-04 NOTE — SOCIAL WORK
Pt is in need of transportation for her port removal on 9/12/18 and volunteers from [facility] will be able to provide this transportation  MSW spoke with pt's RN, Shivani Vazquez, and learned that pt needs to be at the hospital by 9:00 and the  should be available around noon  MSW called the , Briantheresa Fierro @ [phone], to verify that she can take the pt and pick her up post procedure  Michelle Fierro reports that she is able to do so and had spoken with the pt earlier this day to introduce herself and notify her of the plan  Pt is in agreement  MSW attempted to call pt as well bu t was unable to reach her  Will try again

## 2018-09-04 NOTE — TELEPHONE ENCOUNTER
Bassam Soares has arranged for transportation for the patient for her port removal on 9/12/18 at 9 am with [facility] Tish will call the patient to discuss details

## 2018-09-04 NOTE — TELEPHONE ENCOUNTER
Pt indicated that you were going to have an outside transportation company providing transport to her to get her port out on 9/12 and she's worried because she hasn't heard from anyone  Would like to speak with you

## 2018-09-05 ENCOUNTER — TELEPHONE (OUTPATIENT)
Dept: GASTROENTEROLOGY | Facility: CLINIC | Age: 73
End: 2018-09-05

## 2018-09-05 NOTE — TELEPHONE ENCOUNTER
chaput patient      Dr Ирина Hernandez office called to schedule her repeat egd  Last one done 9-5-17  There is no order in the system   Call patient at [phone]

## 2018-09-07 ENCOUNTER — PREP FOR PROCEDURE (OUTPATIENT)
Dept: GASTROENTEROLOGY | Facility: MEDICAL CENTER | Age: 73
End: 2018-09-07

## 2018-09-07 ENCOUNTER — TELEPHONE (OUTPATIENT)
Dept: HEMATOLOGY ONCOLOGY | Facility: CLINIC | Age: 73
End: 2018-09-07

## 2018-09-07 DIAGNOSIS — Z85.038 PERSONAL HISTORY OF OTHER MALIGNANT NEOPLASM OF LARGE INTESTINE: Primary | ICD-10-CM

## 2018-09-07 NOTE — PATIENT INSTRUCTIONS
Pt is scheduled for recall egd with dr Wang Marshall at [facility], pt is aware she will get a call the day before with exact time but is requesting 10 am procedure time because of transportation issues  Pt is a diabetic I mailed out instructions diabetic/procedure

## 2018-09-07 NOTE — TELEPHONE ENCOUNTER
Clarified with the patient that she will still need to have her standing blood work done every 6 weeks  Patient was made aware that she will now need to go to an outpatient lab to have her blood work done since she will no longer have a port  Explained to the patient that she can go to any lab that accepts her insurance for the blood work  Patient has a paper script of her blood orders

## 2018-09-07 NOTE — TELEPHONE ENCOUNTER
Pt called to report discrepancy in infusion schedule for labs draws  However pt is having port removed 9/12/18  Informed once port is removed will need to go to lab for draws  Infusion only will do from a port  Called [facility] and called appts  Pt would appreciated having her lab draw frequency clarified  States Dr Stephanie Abrams said Q 6 weeks but asking if should be more often   Please update

## 2018-09-07 NOTE — TELEPHONE ENCOUNTER
On 9/20/18 at [facility]

## 2018-09-07 NOTE — TELEPHONE ENCOUNTER
Pt is scheduled for recall egd with dr Harman Reyes at [facility], pt is aware she will get a call the day before with exact time but is requesting 10 am procedure time because of transportation issues  Pt is a diabetic I mailed out instructions diabetic/procedure

## 2018-09-10 ENCOUNTER — TELEPHONE (OUTPATIENT)
Dept: RADIOLOGY | Facility: HOSPITAL | Age: 73
End: 2018-09-10

## 2018-09-11 ENCOUNTER — TELEPHONE (OUTPATIENT)
Dept: RADIOLOGY | Facility: HOSPITAL | Age: 73
End: 2018-09-11

## 2018-09-11 RX ORDER — SODIUM CHLORIDE 9 MG/ML
75 INJECTION, SOLUTION INTRAVENOUS CONTINUOUS
Status: CANCELLED | OUTPATIENT
Start: 2018-09-11

## 2018-09-12 ENCOUNTER — HOSPITAL ENCOUNTER (OUTPATIENT)
Dept: RADIOLOGY | Facility: HOSPITAL | Age: 73
Discharge: HOME | End: 2018-09-12
Attending: INTERNAL MEDICINE | Admitting: RADIOLOGY
Payer: COMMERCIAL

## 2018-09-12 ENCOUNTER — TELEPHONE (OUTPATIENT)
Dept: HEMATOLOGY ONCOLOGY | Facility: CLINIC | Age: 73
End: 2018-09-12

## 2018-09-12 VITALS
RESPIRATION RATE: 16 BRPM | WEIGHT: 150 LBS | HEART RATE: 95 BPM | SYSTOLIC BLOOD PRESSURE: 154 MMHG | DIASTOLIC BLOOD PRESSURE: 74 MMHG | BODY MASS INDEX: 24.11 KG/M2 | HEIGHT: 66 IN | TEMPERATURE: 97.4 F | OXYGEN SATURATION: 93 %

## 2018-09-12 DIAGNOSIS — C50.911 MALIGNANT NEOPLASM OF RIGHT BREAST IN FEMALE, ESTROGEN RECEPTOR POSITIVE, UNSPECIFIED SITE OF BREAST (HCC): ICD-10-CM

## 2018-09-12 DIAGNOSIS — Z17.0 MALIGNANT NEOPLASM OF RIGHT BREAST IN FEMALE, ESTROGEN RECEPTOR POSITIVE, UNSPECIFIED SITE OF BREAST (HCC): ICD-10-CM

## 2018-09-12 LAB
ALBUMIN SERPL BCP-MCNC: 4.7 G/DL (ref 3.5–5)
ALP SERPL-CCNC: 87 U/L (ref 46–116)
ALT SERPL W P-5'-P-CCNC: 20 U/L (ref 12–78)
ANION GAP SERPL CALCULATED.3IONS-SCNC: 8 MMOL/L (ref 4–13)
AST SERPL W P-5'-P-CCNC: 16 U/L (ref 5–45)
BASOPHILS # BLD AUTO: 0.03 THOUSANDS/ΜL (ref 0–0.1)
BASOPHILS NFR BLD AUTO: 1 % (ref 0–1)
BILIRUB SERPL-MCNC: 0.44 MG/DL (ref 0.2–1)
BUN SERPL-MCNC: 12 MG/DL (ref 5–25)
CALCIUM SERPL-MCNC: 9.5 MG/DL (ref 8.3–10.1)
CANCER AG27-29 SERPL-ACNC: 22 U/ML (ref 0–42.3)
CEA SERPL-MCNC: 4 NG/ML (ref 0–3)
CHLORIDE SERPL-SCNC: 96 MMOL/L (ref 100–108)
CO2 SERPL-SCNC: 28 MMOL/L (ref 21–32)
CREAT SERPL-MCNC: 0.75 MG/DL (ref 0.6–1.3)
DEPRECATED D DIMER PPP: 2109 NG/ML (FEU) (ref 0–424)
EOSINOPHIL # BLD AUTO: 0.25 THOUSAND/ΜL (ref 0–0.61)
EOSINOPHIL NFR BLD AUTO: 4 % (ref 0–6)
ERYTHROCYTE [DISTWIDTH] IN BLOOD BY AUTOMATED COUNT: 13 % (ref 11.6–15.1)
GFR SERPL CREATININE-BSD FRML MDRD: 80 ML/MIN/1.73SQ M
GLUCOSE P FAST SERPL-MCNC: 115 MG/DL (ref 65–99)
GLUCOSE SERPL-MCNC: 115 MG/DL (ref 65–140)
GLUCOSE SERPL-MCNC: 171 MG/DL (ref 65–140)
HCT VFR BLD AUTO: 41.4 % (ref 34.8–46.1)
HGB BLD-MCNC: 13.6 G/DL (ref 11.5–15.4)
IMM GRANULOCYTES # BLD AUTO: 0.03 THOUSAND/UL (ref 0–0.2)
IMM GRANULOCYTES NFR BLD AUTO: 1 % (ref 0–2)
LYMPHOCYTES # BLD AUTO: 0.87 THOUSANDS/ΜL (ref 0.6–4.47)
LYMPHOCYTES NFR BLD AUTO: 13 % (ref 14–44)
MCH RBC QN AUTO: 32.5 PG (ref 26.8–34.3)
MCHC RBC AUTO-ENTMCNC: 32.9 G/DL (ref 31.4–37.4)
MCV RBC AUTO: 99 FL (ref 82–98)
MONOCYTES # BLD AUTO: 0.65 THOUSAND/ΜL (ref 0.17–1.22)
MONOCYTES NFR BLD AUTO: 10 % (ref 4–12)
NEUTROPHILS # BLD AUTO: 4.65 THOUSANDS/ΜL (ref 1.85–7.62)
NEUTS SEG NFR BLD AUTO: 71 % (ref 43–75)
NRBC BLD AUTO-RTO: 0 /100 WBCS
PLATELET # BLD AUTO: 251 THOUSANDS/UL (ref 149–390)
PMV BLD AUTO: 9.7 FL (ref 8.9–12.7)
POTASSIUM SERPL-SCNC: 3.6 MMOL/L (ref 3.5–5.3)
PROT SERPL-MCNC: 9.2 G/DL (ref 6.4–8.2)
RBC # BLD AUTO: 4.18 MILLION/UL (ref 3.81–5.12)
SODIUM SERPL-SCNC: 132 MMOL/L (ref 136–145)
WBC # BLD AUTO: 6.48 THOUSAND/UL (ref 4.31–10.16)

## 2018-09-12 PROCEDURE — 99153 MOD SED SAME PHYS/QHP EA: CPT

## 2018-09-12 PROCEDURE — 85025 COMPLETE CBC W/AUTO DIFF WBC: CPT | Performed by: INTERNAL MEDICINE

## 2018-09-12 PROCEDURE — 99152 MOD SED SAME PHYS/QHP 5/>YRS: CPT

## 2018-09-12 PROCEDURE — 85379 FIBRIN DEGRADATION QUANT: CPT | Performed by: INTERNAL MEDICINE

## 2018-09-12 PROCEDURE — 36590 REMOVAL TUNNELED CV CATH: CPT

## 2018-09-12 PROCEDURE — 86300 IMMUNOASSAY TUMOR CA 15-3: CPT | Performed by: INTERNAL MEDICINE

## 2018-09-12 PROCEDURE — 82378 CARCINOEMBRYONIC ANTIGEN: CPT | Performed by: INTERNAL MEDICINE

## 2018-09-12 PROCEDURE — 80053 COMPREHEN METABOLIC PANEL: CPT | Performed by: INTERNAL MEDICINE

## 2018-09-12 PROCEDURE — 82948 REAGENT STRIP/BLOOD GLUCOSE: CPT

## 2018-09-12 RX ORDER — FENTANYL CITRATE 50 UG/ML
INJECTION, SOLUTION INTRAMUSCULAR; INTRAVENOUS CODE/TRAUMA/SEDATION MEDICATION
Status: DISCONTINUED | OUTPATIENT
Start: 2018-09-12 | End: 2018-09-12 | Stop reason: HOSPADM

## 2018-09-12 RX ORDER — MIDAZOLAM HYDROCHLORIDE 1 MG/ML
INJECTION INTRAMUSCULAR; INTRAVENOUS CODE/TRAUMA/SEDATION MEDICATION
Status: DISCONTINUED | OUTPATIENT
Start: 2018-09-12 | End: 2018-09-12 | Stop reason: HOSPADM

## 2018-09-12 RX ORDER — SODIUM CHLORIDE 9 MG/ML
75 INJECTION, SOLUTION INTRAVENOUS CONTINUOUS
Status: DISCONTINUED | OUTPATIENT
Start: 2018-09-12 | End: 2018-09-12 | Stop reason: HOSPADM

## 2018-09-12 RX ADMIN — MIDAZOLAM 1 MG: 1 INJECTION INTRAMUSCULAR; INTRAVENOUS at 10:25

## 2018-09-12 RX ADMIN — FENTANYL CITRATE 50 MCG: 50 INJECTION, SOLUTION INTRAMUSCULAR; INTRAVENOUS at 10:25

## 2018-09-12 RX ADMIN — FENTANYL CITRATE 50 MCG: 50 INJECTION, SOLUTION INTRAMUSCULAR; INTRAVENOUS at 10:30

## 2018-09-12 RX ADMIN — MIDAZOLAM 1 MG: 1 INJECTION INTRAMUSCULAR; INTRAVENOUS at 10:30

## 2018-09-12 NOTE — SEDATION DOCUMENTATION
Pt had a port removal in IR with Dr Margareth Carroll without complication  Pt tolerated well, sutured closed and CDI dressing applied   Report called to RN in Short Stay

## 2018-09-12 NOTE — DISCHARGE INSTRUCTIONS
Implanted Venous Access Port Removal    WHAT YOU NEED TO KNOW:   An implanted venous access port is a device used to give treatments and take blood  It may also be called a central venous access device (CVAD)  The port is a small container that is placed under your skin, usually in your upper chest  A port can also be placed in your arm or abdomen  The port is attached to a catheter that enters a large vein  DISCHARGE INSTRUCTIONS:   Resume your normal diet  Small sips of flat soda will help with mild nausea  Prevent an infection:     Wash your hands often  Use soap and water  Clean your hands before and  after you care for your incision  Check your skin for infection every day  Look for redness, swelling, or fluid oozing from the incision site  Dressing may come off in 24 hours  Medical glue will peel off on its own in 5 to 10 days  You may shower 24 hours after procedure  Follow up with your healthcare provider as directed  Write down your questions so you remember to ask them during your visits  Activity:  You may return to your daily activities when the area heals  Contact Interventional Radiology at [phone] Shantal PATIENTS: Contact Interventional Radiology at [phone]) Abraham Moreno PATIENTS: Contact Interventional Radiology at [phone]) if:     You have a fever  You have persistent nausea  Your inciscion site is red, swollen, or draining pus  You have questions or concerns about your condition or care  Seek care immediately or call 911 if:  Blood soaks through your bandage  The skin over or around your incision breaks open  Your heart is jumping or fluttering  You have a headache, blurred vision, and feel confused  You have pain in your arm, neck, shoulder, or chest     You have trouble breathing that is getting worse over time

## 2018-09-12 NOTE — H&P
H&P Exam - Gonvick Query 68 y o  female MRN: [id number]    [place] [facility] [place] [facility] Encounter: [id number]    Assessment:  Colon and breast cancer with port no longer aspirating and used now for blood draws only    Plan:  Port removal    History of Present Illness   Port used for q6 week blood draws and no longer aspirating since it has been in since 2012  Here for port removal   The patient may need a new port next month depending on how difficult blood draws are  Review of Systems    Historical Information   Past Medical History:   Diagnosis Date    Acute embolism and thrombosis of deep vein of lower extremity ([facility] )     Adenocarcinoma of colon, Duke's A ([facility] )     Breast cancer ([facility] )     Right Breast     Cancer ([facility]     Diabetes mellitus ([facility]     DVT (deep venous thrombosis) (HCC)     GERD (gastroesophageal reflux disease)     Hypertension     Pes planus      Past Surgical History:   Procedure Laterality Date    MASTECTOMY Right 2012    NE COLONOSCOPY FLX DX W/COLLJ SPEC WHEN PFRMD N/A 8/23/2016    Procedure: COLONOSCOPY;  Surgeon: Buddy Isidro MD;  [facility]: [facility]; Service: Colorectal    NE COLONOSCOPY FLX DX W/COLLJ SPEC WHEN PFRMD N/A 9/5/2017    Procedure: COLONOSCOPY;  Surgeon: Buddy Isidro MD;  [facility]; Service: Colorectal    NE ESOPHAGOGASTRODUODENOSCOPY TRANSORAL DIAGNOSTIC N/A 9/5/2017    Procedure: ESOPHAGOGASTRODUODENOSCOPY (EGD); Surgeon: Trev Riley DO;  Location: [facility];   Service: Gastroenterology     Social History   History   Alcohol Use No     History   Drug Use No     History   Smoking Status    Never Smoker   Smokeless Tobacco    Never Used     Family History: non-contributory    Meds/Allergies   all medications and allergies reviewed  No Known Allergies    Objective   First Vitals:   Blood Pressure: 137/69 (09/12/18 0747)  Pulse: 95 (09/12/18 0747)  Temperature: (!) 97 3 °F (36 3 °C) (09/12/18 0747)  Temp Source: Oral (09/12/18 0747)  Respirations: 20 (09/12/18 0747)  Height: 5' 6" (167 6 cm) (09/12/18 0747)  Weight - Scale: 68 kg (150 lb) (09/12/18 0747)  SpO2: 100 % (09/12/18 0747)    Current Vitals:   Blood Pressure: 137/69 (09/12/18 0747)  Pulse: 95 (09/12/18 0747)  Temperature: (!) 97 3 °F (36 3 °C) (09/12/18 0747)  Temp Source: Oral (09/12/18 0747)  Respirations: 20 (09/12/18 0747)  Height: 5' 6" (167 6 cm) (09/12/18 0747)  Weight - Scale: 68 kg (150 lb) (09/12/18 0747)  SpO2: 100 % (09/12/18 0747)    No intake or output data in the 24 hours ending 09/12/18 0943    Invasive Devices     Peripheral Intravenous Line            Peripheral IV 02/22/18 Left Arm 201 days                Physical Exam    Lab Results: none recent  Imaging: none recent  EKG, Pathology, and Other Studies: none recent    Code Status: No Order  Advance Directive and Living Will:      Power of :    POLST:      Counseling / Coordination of Care: I spent approximately 20 minutes counseling the patient with >50% of the time face to face with the patient

## 2018-09-12 NOTE — TELEPHONE ENCOUNTER
Branden Longoria RN from [employer] called  Pt there for port removal  Pt requesting labs be drawn and Branden Longoria asking what is needed  Provided list  IR will attempt to get

## 2018-09-12 NOTE — BRIEF OP NOTE (RAD/CATH)
IR PORT REMOVAL  Procedure Note    PATIENT NAME: Doreen Sesay  : 1945  MRN: [id number]     Pre-op Diagnosis:   1  Malignant neoplasm of right breast in female, estrogen receptor positive, unspecified site of breast ([facility] [address] )      Post-op Diagnosis:   1   Malignant neoplasm of right breast in female, estrogen receptor positive, unspecified site of breast [facility])        Surgeon:   Marianne Pollock MD  Assistants:     No qualified resident was available, Resident is only observing    Estimated Blood Loss: minimal  Findings: Successful port removal    Specimens: none    Complications:  none    Anesthesia: Conscious sedation    Marianne Pollock MD     Date: 2018  Time: 10:46 AM

## 2018-09-19 ENCOUNTER — ANESTHESIA EVENT (OUTPATIENT)
Dept: GASTROENTEROLOGY | Facility: HOSPITAL | Age: 73
End: 2018-09-19

## 2018-09-20 ENCOUNTER — ANESTHESIA (OUTPATIENT)
Dept: GASTROENTEROLOGY | Facility: HOSPITAL | Age: 73
End: 2018-09-20

## 2018-09-20 NOTE — ANESTHESIA PREPROCEDURE EVALUATION
Review of Systems/Medical History  Patient summary reviewed  Chart reviewed      Cardiovascular  Hypertension controlled,    Pulmonary  Negative pulmonary ROS        GI/Hepatic    GERD ,        Negative  ROS        Endo/Other  Diabetes well controlled type 2 Oral agent,      GYN    Hysterectomy,        Hematology  Negative hematology ROS      Musculoskeletal  Negative musculoskeletal ROS        Neurology  Negative neurology ROS      Psychology   Negative psychology ROS              Physical Exam    Airway    Mallampati score: II  TM Distance: >3 FB  Neck ROM: limited     Dental   No notable dental hx     Cardiovascular  Rhythm: regular, Rate: normal, Cardiovascular exam normal    Pulmonary  Pulmonary exam normal Breath sounds clear to auscultation,     Other Findings        Anesthesia Plan  ASA Score- 2     Anesthesia Type- IV sedation with anesthesia and general with ASA Monitors  Additional Monitors:   Airway Plan:         Plan Factors- Patient instructed to abstain from smoking on day of procedure  Patient did not smoke on day of surgery  Induction- intravenous  Postoperative Plan-     Informed Consent- Anesthetic plan and risks discussed with patient

## 2018-09-24 ENCOUNTER — OFFICE VISIT (OUTPATIENT)
Dept: HEMATOLOGY ONCOLOGY | Facility: CLINIC | Age: 73
End: 2018-09-24
Payer: COMMERCIAL

## 2018-09-24 VITALS
HEIGHT: 63 IN | TEMPERATURE: 97.9 F | SYSTOLIC BLOOD PRESSURE: 138 MMHG | DIASTOLIC BLOOD PRESSURE: 82 MMHG | HEART RATE: 77 BPM | OXYGEN SATURATION: 97 % | BODY MASS INDEX: 27.18 KG/M2 | WEIGHT: 153.4 LBS | RESPIRATION RATE: 18 BRPM

## 2018-09-24 DIAGNOSIS — Z17.0 MALIGNANT NEOPLASM OF RIGHT BREAST IN FEMALE, ESTROGEN RECEPTOR POSITIVE, UNSPECIFIED SITE OF BREAST (HCC): ICD-10-CM

## 2018-09-24 DIAGNOSIS — E04.1 THYROID NODULE: ICD-10-CM

## 2018-09-24 DIAGNOSIS — M85.89 OSTEOPENIA OF MULTIPLE SITES: ICD-10-CM

## 2018-09-24 DIAGNOSIS — I82.5Z2 CHRONIC DEEP VEIN THROMBOSIS (DVT) OF DISTAL VEIN OF LEFT LOWER EXTREMITY (HCC): ICD-10-CM

## 2018-09-24 DIAGNOSIS — C50.911 MALIGNANT NEOPLASM OF RIGHT BREAST IN FEMALE, ESTROGEN RECEPTOR POSITIVE, UNSPECIFIED SITE OF BREAST (HCC): ICD-10-CM

## 2018-09-24 DIAGNOSIS — C18.2 MALIGNANT NEOPLASM OF ASCENDING COLON (HCC): Primary | ICD-10-CM

## 2018-09-24 PROCEDURE — 99214 OFFICE O/P EST MOD 30 MIN: CPT | Performed by: INTERNAL MEDICINE

## 2018-09-24 NOTE — LETTER
September 24, 2018     Chey Griffith DO  [address] [facility]  [place] [address] [address]    Patient: Markus Pillai   YOB: 1945   Date of Visit: 9/24/2018       Dear Dr Rock Lin: Thank you for referring Tevin Charles to me for evaluation  Below are my notes for this consultation  If you have questions, please do not hesitate to call me  I look forward to following your patient along with you  Sincerely,        Yogesh Castro MD        CC: MD Yogesh Ervin MD  9/24/2018 11:00 AM  Sign at close encounter    HPI:   Patient is being followed for 2 different cancers, breast cancer and colon cancer and also she has history of DVT with persistently high D-dimer  For breast cancer she is on adjuvant Femara  For osteopenia and she has been on Prolia with calcium and vitamin-D  For history of DVT and high D-dimer she is taking 1 baby aspirin daily with food  History of thyroid nodule and she gets ultrasound of thyroid every year  She follows with her breast surgeon for examination and mammography    In 2012 patient was diagnosed to have Hormone receptor positive, HER-2 negative stage IIIB cancer in right breast  She had right mastectomy and lymph node dissection   9 lymph nodes showed metastatic disease  Patient was given Adriamycin + Cytoxan chemotherapy followed by Taxotere and after that radiation therapy  Since November/December 2012 she has been on Femara     She will be on Femara for a total of 10 years  She has tolerable hot flashes and minor musculoskeletal symptoms   She takes vitamin D and liquid calcium and is on prolia for osteopenia  She has no problem with her teeth and jaw     In December 2012 patient developed DVT right leg and she was started on Xarelto  She had GI bleeding  Xarelto was stopped  On colonoscopy she was found to have right colon cancer  On 7/22/15 she underwent right hemicolectomy   Pathological diagnosis right colon cancer, stage I, T2 N0 MX, grade 2,no lymphovascular invasion and no perineural invasion  She did not require adjuvant therapy for colon cancer  She has been running slightly high CEA and that is being monitored  She has been on baby aspirin  She is not on anticoagulation anymore  Follow-up Venous Doppler study was negative for DVT  D-dimer has been staying high        She was having problem with Port-A-Cath and that has been removed   Patient has been aware of that  tiny cysts in the liver and thyroid nodule  She had biopsy of thyroid nodule in June 2014 and she states that was negative for cancer  She gets thyroid ultrasound yearly for surveillance      She  was found to have benign clustered calcifications in left breast and had a biopsy and that was benign  Dr Nasrin Cleveland takes care of her mammography  Follow-up mammography was negative for malignancy     She has facial hair growth  She has some tiredness and minor arthritic symptoms          Current Outpatient Prescriptions:     ascorbic acid (VITAMIN C) 500 MG tablet, Take 500 mg by mouth daily  , Disp: , Rfl:     aspirin 81 MG tablet, Take 81 mg by mouth daily  , Disp: , Rfl:     benazepril (LOTENSIN) 20 mg tablet, Take 1 tablet (20 mg total) by mouth daily, Disp: 30 tablet, Rfl: 5    bimatoprost (LUMIGAN) 0 01 % ophthalmic drops, 1 drop daily at bedtime  , Disp: , Rfl:     Cholecalciferol (VITAMIN D-3 PO), Take 1 capsule by mouth 3 (three) times a day , Disp: , Rfl:     Cyanocobalamin (VITAMIN B 12 PO), Take 1 tablet by mouth daily  , Disp: , Rfl:     ferrous sulfate 325 (65 Fe) mg tablet, Take 325 mg by mouth daily with breakfast , Disp: , Rfl:     letrozole (FEMARA) 2 5 mg tablet, Take 1 tablet (2 5 mg total) by mouth daily, Disp: 30 tablet, Rfl: 11    metFORMIN (GLUCOPHAGE) 1000 MG tablet, Take 1 tablet (1,000 mg total) by mouth 2 (two) times a day with meals for 180 days, Disp: 60 tablet, Rfl: 5    pantoprazole (PROTONIX) 40 mg tablet, Take 40 mg by mouth daily, Disp: , Rfl:    sitaGLIPtin (JANUVIA) 100 mg tablet, Take 1 tablet (100 mg total) by mouth daily for 30 days, Disp: 30 tablet, Rfl: 0    vitamin A 10,000 units capsule, Take 10,000 Units by mouth daily  , Disp: , Rfl:     vitamin E, tocopherol, 400 units capsule, Take 100 Units by mouth daily  , Disp: , Rfl:     No Known Allergies    Oncology History     In 2012 patient was diagnosed to have Hormone receptor positive, HER-2 negative stage IIIB cancer in right breast  She had right mastectomy and lymph node dissection   9 lymph nodes showed metastatic disease  Patient was given Adriamycin + Cytoxan chemotherapy followed by Taxotere and after that radiation therapy  Since November/December 2012 she has been on Femara     She will be on Femara for a total of 10 years  On 7/22/15 she underwent right hemicolectomy  Pathological diagnosis right colon cancer, stage I, T2 N0 MX, grade 2,no lymphovascular invasion and no perineural invasion  She did not require adjuvant therapy for colon cancer  She has been running slightly high CEA and that is being monitored  Malignant neoplasm of right breast in female, estrogen receptor positive ([facility] [address] )    6/21/2018 Initial Diagnosis     Malignant neoplasm of right breast in female, estrogen receptor positive [facility])        Surgery      2012- right mastectomy   2015 - right hemicolectomy          Radiation      [id number]: Radiation to right chest wall and lymph nodes          Chemotherapy      2012 -Adriamycin plus Cytoxan followed by Taxotere Followed by Femara  She will have Femara for a total of 10 years  ROS:  09/24/18 Reviewed 13 systems:  Presently no headaches, seizures, dizziness, diplopia, dysphagia, hoarseness, chest pain, palpitations, shortness of breath, cough, hemoptysis, abdominal pain, nausea, vomiting, change in bowel habits, melena, hematuria, fever, chills, bleeding, bone pains, skin rash, weight loss,  weakness, numbness,  claudication and gait problem   No frequent infections  Not unusually sensitive to heat or cold  No swelling of the ankles  No swollen glands  Patient is anxious  No GYN symptoms Other symptoms are in HPI        /82 (BP Location: Left arm, Patient Position: Sitting, Cuff Size: Adult)   Pulse 77   Temp 97 9 °F (36 6 °C)   Resp 18   Ht 5' 3" (1 6 m)   Wt 69 6 kg (153 lb 6 4 oz)   LMP  (LMP Unknown)   SpO2 97%   BMI 27 17 kg/m²      Physical Exam:  Alert, oriented, not in distress, no icterus, no oral thrush, no palpable neck mass, clear lung fields, regular heart rate, abdomen  soft and non tender, no palpable abdominal mass, no ascites, no edema of ankles, no calf tenderness, no focal neurological deficit, no skin rash, no palpable lymphadenopathy, good arterial pulses, no clubbing  Patient is anxious  Performance status 1  Right mastectomy scar  No lymphedema      IMAGING:      LABS:  Results for orders placed or performed during the hospital encounter of 09/12/18   CBC and differential   Result Value Ref Range    WBC 6 48 4 31 - 10 16 Thousand/uL    RBC 4 18 3 81 - 5 12 Million/uL    Hemoglobin 13 6 11 5 - 15 4 g/dL    Hematocrit 41 4 34 8 - 46 1 %    MCV 99 (H) 82 - 98 fL    MCH 32 5 26 8 - 34 3 pg    MCHC 32 9 31 4 - 37 4 g/dL    RDW 13 0 11 6 - 15 1 %    MPV 9 7 8 9 - 12 7 fL    Platelets 994 156 - 977 Thousands/uL    nRBC 0 /100 WBCs    Neutrophils Relative 71 43 - 75 %    Immat GRANS % 1 0 - 2 %    Lymphocytes Relative 13 (L) 14 - 44 %    Monocytes Relative 10 4 - 12 %    Eosinophils Relative 4 0 - 6 %    Basophils Relative 1 0 - 1 %    Neutrophils Absolute 4 65 1 85 - 7 62 Thousands/µL    Immature Grans Absolute 0 03 0 00 - 0 20 Thousand/uL    Lymphocytes Absolute 0 87 0 60 - 4 47 Thousands/µL    Monocytes Absolute 0 65 0 17 - 1 22 Thousand/µL    Eosinophils Absolute 0 25 0 00 - 0 61 Thousand/µL    Basophils Absolute 0 03 0 00 - 0 10 Thousands/µL   Comprehensive metabolic panel   Result Value Ref Range    Sodium 132 (L) 136 - 145 mmol/L    Potassium 3 6 3 5 - 5 3 mmol/L    Chloride 96 (L) 100 - 108 mmol/L    CO2 28 21 - 32 mmol/L    ANION GAP 8 4 - 13 mmol/L    BUN 12 5 - 25 mg/dL    Creatinine 0 75 0 60 - 1 30 mg/dL    Glucose 115 65 - 140 mg/dL    Glucose, Fasting 115 (H) 65 - 99 mg/dL    Calcium 9 5 8 3 - 10 1 mg/dL    AST 16 5 - 45 U/L    ALT 20 12 - 78 U/L    Alkaline Phosphatase 87 46 - 116 U/L    Total Protein 9 2 (H) 6 4 - 8 2 g/dL    Albumin 4 7 3 5 - 5 0 g/dL    Total Bilirubin 0 44 0 20 - 1 00 mg/dL    eGFR 80 ml/min/1 73sq m   CEA   Result Value Ref Range    CEA 4 0 (H) 0 0 - 3 0 ng/mL   Cancer antigen 27 29   Result Value Ref Range    CA 27 29 22 0 0 0 - 42 3 U/mL   D-dimer, quantitative   Result Value Ref Range    D-Dimer, Quant 2,109 (H) 0 - 424 ng/ml (FEU)   Fingerstick Glucose (POCT)   Result Value Ref Range    POC Glucose 171 (H) 65 - 140 mg/dl     Labs, Imaging, & Other studies:   All pertinent labs and imaging studies were personally reviewed    Lab Results   Component Value Date     (L) 09/12/2018    K 3 6 09/12/2018    CL 96 (L) 09/12/2018    CO2 28 09/12/2018    ANIONGAP 10 12/29/2015    BUN 12 09/12/2018    CREATININE 0 75 09/12/2018    GLUCOSE 121 12/29/2015    GLUF 115 (H) 09/12/2018    CALCIUM 9 5 09/12/2018    AST 16 09/12/2018    ALT 20 09/12/2018    ALKPHOS 87 09/12/2018    PROT 7 7 12/29/2015    BILITOT 0 35 12/29/2015    EGFR 80 09/12/2018     Lab Results   Component Value Date    WBC 6 48 09/12/2018    HGB 13 6 09/12/2018    HCT 41 4 09/12/2018    MCV 99 (H) 09/12/2018     09/12/2018   No results found for: SEDRATE    Reviewed and discussed with patient  Assessment and plan:    Patient is being followed for 2 different cancers, breast cancer and colon cancer and also she has history of DVT with persistently high D-dimer  For breast cancer she is on adjuvant Femara  For osteopenia and she has been on Prolia with calcium and vitamin-D    For history of DVT and high D-dimer she is taking 1 baby aspirin daily with food  History of thyroid nodule and she gets ultrasound of thyroid every year  She follows with her breast surgeon for examination and mammography    In 2012 patient was diagnosed to have Hormone receptor positive, HER-2 negative stage IIIB cancer in right breast  She had right mastectomy and lymph node dissection   9 lymph nodes showed metastatic disease  Patient was given Adriamycin + Cytoxan chemotherapy followed by Taxotere and after that radiation therapy  Since November/December 2012 she has been on Femara     She will be on Femara for a total of 10 years  She has tolerable hot flashes and minor musculoskeletal symptoms   She takes vitamin D and liquid calcium and is on prolia for osteopenia  She has no problem with her teeth and jaw     In December 2012 patient developed DVT right leg and she was started on Xarelto  She had GI bleeding  Xarelto was stopped  On colonoscopy she was found to have right colon cancer  On 7/22/15 she underwent right hemicolectomy  Pathological diagnosis right colon cancer, stage I, T2 N0 MX, grade 2,no lymphovascular invasion and no perineural invasion  She did not require adjuvant therapy for colon cancer  She has been running slightly high CEA and that is being monitored  She has been on baby aspirin  She is not on anticoagulation anymore  Follow-up Venous Doppler study was negative for DVT  D-dimer has been staying high        She was having problem with Port-A-Cath and that has been removed   Patient has been aware of that  tiny cysts in the liver and thyroid nodule  She had biopsy of thyroid nodule in June 2014 and she states that was negative for cancer  She gets thyroid ultrasound yearly for surveillance      She  was found to have benign clustered calcifications in left breast and had a biopsy and that was benign  Dr Gem Hernandes takes care of her mammography   Follow-up mammography was negative for malignancy     She has facial hair growth  She has some tiredness and minor arthritic symptoms    Physical examination and test results are as recorded and discussed in detail  She appears to be in remission from breast cancer and colon cancer  Goal is cure from breast cancer and colon cancer  She is being continued on Femara, Prolia, vitamin-D and calcium and baby aspirin in addition to her other medications  Condition and plan discussed  Questions answered  Also discussed the importance of self-breast examination, eating healthy foods, staying active and health screening tests  Patient wants to come back every 3 months  1  [facility]align[facility] ([facility] )      2  Malignant neoplasm of right breast in female, estrogen receptor positive, unspecified site of breast ([facility] )      3  Chronic deep vein thrombosis (DVT) of distal vein of left lower extremity (HCC)      4  Thyroid nodule      5  Osteopenia of multiple sites            Patient voiced understanding and agreement in the discussion  Counseling / Coordination of Care   Greater than 50% of total time was spent with the patient and / or family counseling and / or coordination of care

## 2018-09-24 NOTE — PATIENT INSTRUCTIONS
No change in therapy  Blood tests and visit every 3 months    Please continue with Femara and Prolia with calcium and vitamin D and baby aspirin with food

## 2018-09-24 NOTE — PROGRESS NOTES
HPI:   Patient is being followed for 2 different cancers, breast cancer and colon cancer and also she has history of DVT with persistently high D-dimer  For breast cancer she is on adjuvant Femara  For osteopenia and she has been on Prolia with calcium and vitamin-D  For history of DVT and high D-dimer she is taking 1 baby aspirin daily with food  History of thyroid nodule and she gets ultrasound of thyroid every year  She follows with her breast surgeon for examination and mammography    In 2012 patient was diagnosed to have Hormone receptor positive, HER-2 negative stage IIIB cancer in right breast  She had right mastectomy and lymph node dissection   9 lymph nodes showed metastatic disease  Patient was given Adriamycin + Cytoxan chemotherapy followed by Taxotere and after that radiation therapy  Since November/December 2012 she has been on Femara     She will be on Femara for a total of 10 years  She has tolerable hot flashes and minor musculoskeletal symptoms   She takes vitamin D and liquid calcium and is on prolia for osteopenia  She has no problem with her teeth and jaw     In December 2012 patient developed DVT right leg and she was started on Xarelto  She had GI bleeding  Xarelto was stopped  On colonoscopy she was found to have right colon cancer  On 7/22/15 she underwent right hemicolectomy  Pathological diagnosis right colon cancer, stage I, T2 N0 MX, grade 2,no lymphovascular invasion and no perineural invasion  She did not require adjuvant therapy for colon cancer  She has been running slightly high CEA and that is being monitored  She has been on baby aspirin  She is not on anticoagulation anymore  Follow-up Venous Doppler study was negative for DVT  D-dimer has been staying high        She was having problem with Port-A-Cath and that has been removed   Patient has been aware of that  tiny cysts in the liver and thyroid nodule   She had biopsy of thyroid nodule in June 2014 and she states that was negative for cancer  She gets thyroid ultrasound yearly for surveillance      She  was found to have benign clustered calcifications in left breast and had a biopsy and that was benign  Dr Lesa Bowen takes care of her mammography  Follow-up mammography was negative for malignancy     She has facial hair growth  She has some tiredness and minor arthritic symptoms          Current Outpatient Prescriptions:     ascorbic acid (VITAMIN C) 500 MG tablet, Take 500 mg by mouth daily  , Disp: , Rfl:     aspirin 81 MG tablet, Take 81 mg by mouth daily  , Disp: , Rfl:     benazepril (LOTENSIN) 20 mg tablet, Take 1 tablet (20 mg total) by mouth daily, Disp: 30 tablet, Rfl: 5    bimatoprost (LUMIGAN) 0 01 % ophthalmic drops, 1 drop daily at bedtime  , Disp: , Rfl:     Cholecalciferol (VITAMIN D-3 PO), Take 1 capsule by mouth 3 (three) times a day , Disp: , Rfl:     Cyanocobalamin (VITAMIN B 12 PO), Take 1 tablet by mouth daily  , Disp: , Rfl:     ferrous sulfate 325 (65 Fe) mg tablet, Take 325 mg by mouth daily with breakfast , Disp: , Rfl:     letrozole (FEMARA) 2 5 mg tablet, Take 1 tablet (2 5 mg total) by mouth daily, Disp: 30 tablet, Rfl: 11    metFORMIN (GLUCOPHAGE) 1000 MG tablet, Take 1 tablet (1,000 mg total) by mouth 2 (two) times a day with meals for 180 days, Disp: 60 tablet, Rfl: 5    pantoprazole (PROTONIX) 40 mg tablet, Take 40 mg by mouth daily, Disp: , Rfl:     sitaGLIPtin (JANUVIA) 100 mg tablet, Take 1 tablet (100 mg total) by mouth daily for 30 days, Disp: 30 tablet, Rfl: 0    vitamin A 10,000 units capsule, Take 10,000 Units by mouth daily  , Disp: , Rfl:     vitamin E, tocopherol, 400 units capsule, Take 100 Units by mouth daily  , Disp: , Rfl:     No Known Allergies    Oncology History     In 2012 patient was diagnosed to have Hormone receptor positive, HER-2 negative stage IIIB cancer in right breast  She had right mastectomy and lymph node dissection   9 lymph nodes showed metastatic disease  Patient was given Adriamycin + Cytoxan chemotherapy followed by Taxotere and after that radiation therapy  Since November/December 2012 she has been on Femara     She will be on Femara for a total of 10 years  On 7/22/15 she underwent right hemicolectomy  Pathological diagnosis right colon cancer, stage I, T2 N0 MX, grade 2,no lymphovascular invasion and no perineural invasion  She did not require adjuvant therapy for colon cancer  She has been running slightly high CEA and that is being monitored  Malignant neoplasm of right breast in female, estrogen receptor positive ([facility] [address] )    6/21/2018 Initial Diagnosis     Malignant neoplasm of right breast in female, estrogen receptor positive [facility])        Surgery      2012- right mastectomy   2015 - right hemicolectomy          Radiation      [id number]: Radiation to right chest wall and lymph nodes          Chemotherapy      2012 -Adriamycin plus Cytoxan followed by Taxotere Followed by Femara  She will have Femara for a total of 10 years  ROS:  09/24/18 Reviewed 13 systems:  Presently no headaches, seizures, dizziness, diplopia, dysphagia, hoarseness, chest pain, palpitations, shortness of breath, cough, hemoptysis, abdominal pain, nausea, vomiting, change in bowel habits, melena, hematuria, fever, chills, bleeding, bone pains, skin rash, weight loss,  weakness, numbness,  claudication and gait problem  No frequent infections  Not unusually sensitive to heat or cold  No swelling of the ankles  No swollen glands  Patient is anxious     No GYN symptoms Other symptoms are in HPI        /82 (BP Location: Left arm, Patient Position: Sitting, Cuff Size: Adult)   Pulse 77   Temp 97 9 °F (36 6 °C)   Resp 18   Ht 5' 3" (1 6 m)   Wt 69 6 kg (153 lb 6 4 oz)   LMP  (LMP Unknown)   SpO2 97%   BMI 27 17 kg/m²     Physical Exam:  Alert, oriented, not in distress, no icterus, no oral thrush, no palpable neck mass, clear lung fields, regular heart rate, abdomen  soft and non tender, no palpable abdominal mass, no ascites, no edema of ankles, no calf tenderness, no focal neurological deficit, no skin rash, no palpable lymphadenopathy, good arterial pulses, no clubbing  Patient is anxious  Performance status 1  Right mastectomy scar  No lymphedema      IMAGING:      LABS:  Results for orders placed or performed during the hospital encounter of 09/12/18   CBC and differential   Result Value Ref Range    WBC 6 48 4 31 - 10 16 Thousand/uL    RBC 4 18 3 81 - 5 12 Million/uL    Hemoglobin 13 6 11 5 - 15 4 g/dL    Hematocrit 41 4 34 8 - 46 1 %    MCV 99 (H) 82 - 98 fL    MCH 32 5 26 8 - 34 3 pg    MCHC 32 9 31 4 - 37 4 g/dL    RDW 13 0 11 6 - 15 1 %    MPV 9 7 8 9 - 12 7 fL    Platelets 129 270 - 211 Thousands/uL    nRBC 0 /100 WBCs    Neutrophils Relative 71 43 - 75 %    Immat GRANS % 1 0 - 2 %    Lymphocytes Relative 13 (L) 14 - 44 %    Monocytes Relative 10 4 - 12 %    Eosinophils Relative 4 0 - 6 %    Basophils Relative 1 0 - 1 %    Neutrophils Absolute 4 65 1 85 - 7 62 Thousands/µL    Immature Grans Absolute 0 03 0 00 - 0 20 Thousand/uL    Lymphocytes Absolute 0 87 0 60 - 4 47 Thousands/µL    Monocytes Absolute 0 65 0 17 - 1 22 Thousand/µL    Eosinophils Absolute 0 25 0 00 - 0 61 Thousand/µL    Basophils Absolute 0 03 0 00 - 0 10 Thousands/µL   Comprehensive metabolic panel   Result Value Ref Range    Sodium 132 (L) 136 - 145 mmol/L    Potassium 3 6 3 5 - 5 3 mmol/L    Chloride 96 (L) 100 - 108 mmol/L    CO2 28 21 - 32 mmol/L    ANION GAP 8 4 - 13 mmol/L    BUN 12 5 - 25 mg/dL    Creatinine 0 75 0 60 - 1 30 mg/dL    Glucose 115 65 - 140 mg/dL    Glucose, Fasting 115 (H) 65 - 99 mg/dL    Calcium 9 5 8 3 - 10 1 mg/dL    AST 16 5 - 45 U/L    ALT 20 12 - 78 U/L    Alkaline Phosphatase 87 46 - 116 U/L    Total Protein 9 2 (H) 6 4 - 8 2 g/dL    Albumin 4 7 3 5 - 5 0 g/dL    Total Bilirubin 0 44 0 20 - 1 00 mg/dL    eGFR 80 ml/min/1 73sq m   CEA   Result Value Ref Range CEA 4 0 (H) 0 0 - 3 0 ng/mL   Cancer antigen 27 29   Result Value Ref Range    CA 27 29 22 0 0 0 - 42 3 U/mL   D-dimer, quantitative   Result Value Ref Range    D-Dimer, Quant 2,109 (H) 0 - 424 ng/ml (FEU)   Fingerstick Glucose (POCT)   Result Value Ref Range    POC Glucose 171 (H) 65 - 140 mg/dl     Labs, Imaging, & Other studies:   All pertinent labs and imaging studies were personally reviewed    Lab Results   Component Value Date     (L) 09/12/2018    K 3 6 09/12/2018    CL 96 (L) 09/12/2018    CO2 28 09/12/2018    ANIONGAP 10 12/29/2015    BUN 12 09/12/2018    CREATININE 0 75 09/12/2018    GLUCOSE 121 12/29/2015    GLUF 115 (H) 09/12/2018    CALCIUM 9 5 09/12/2018    AST 16 09/12/2018    ALT 20 09/12/2018    ALKPHOS 87 09/12/2018    PROT 7 7 12/29/2015    BILITOT 0 35 12/29/2015    EGFR 80 09/12/2018     Lab Results   Component Value Date    WBC 6 48 09/12/2018    HGB 13 6 09/12/2018    HCT 41 4 09/12/2018    MCV 99 (H) 09/12/2018     09/12/2018   No results found for: [address] [place] [address] and discussed with patient  Assessment and plan:    Patient is being followed for 2 different cancers, breast cancer and colon cancer and also she has history of DVT with persistently high D-dimer  For breast cancer she is on adjuvant Femara  For osteopenia and she has been on Prolia with calcium and vitamin-D  For history of DVT and high D-dimer she is taking 1 baby aspirin daily with food  History of thyroid nodule and she gets ultrasound of thyroid every year  She follows with her breast surgeon for examination and mammography    In 2012 patient was diagnosed to have Hormone receptor positive, HER-2 negative stage IIIB cancer in right breast  She had right mastectomy and lymph node dissection   9 lymph nodes showed metastatic disease  Patient was given Adriamycin + Cytoxan chemotherapy followed by Taxotere and after that radiation therapy  Since November/December 2012 she has been on Femara     She will be on Femara for a total of 10 years  She has tolerable hot flashes and minor musculoskeletal symptoms   She takes vitamin D and liquid calcium and is on prolia for osteopenia  She has no problem with her teeth and jaw     In December 2012 patient developed DVT right leg and she was started on Xarelto  She had GI bleeding  Xarelto was stopped  On colonoscopy she was found to have right colon cancer  On 7/22/15 she underwent right hemicolectomy  Pathological diagnosis right colon cancer, stage I, T2 N0 MX, grade 2,no lymphovascular invasion and no perineural invasion  She did not require adjuvant therapy for colon cancer  She has been running slightly high CEA and that is being monitored  She has been on baby aspirin  She is not on anticoagulation anymore  Follow-up Venous Doppler study was negative for DVT  D-dimer has been staying high        She was having problem with Port-A-Cath and that has been removed   Patient has been aware of that  tiny cysts in the liver and thyroid nodule  She had biopsy of thyroid nodule in June 2014 and she states that was negative for cancer  She gets thyroid ultrasound yearly for surveillance      She  was found to have benign clustered calcifications in left breast and had a biopsy and that was benign  Dr Jessica Deleon takes care of her mammography  Follow-up mammography was negative for malignancy     She has facial hair growth  She has some tiredness and minor arthritic symptoms    Physical examination and test results are as recorded and discussed in detail  She appears to be in remission from breast cancer and colon cancer  Goal is cure from breast cancer and colon cancer  She is being continued on Femara, Prolia, vitamin-D and calcium and baby aspirin in addition to her other medications  Condition and plan discussed  Questions answered  Also discussed the importance of self-breast examination, eating healthy foods, staying active and health screening tests    Patient wants to come back every 3 months  1  Malignant neoplasm of ascending colon ([facility] [address] )      2  Malignant neoplasm of right breast in female, estrogen receptor positive, unspecified site of breast ([facility] )      3  Chronic deep vein thrombosis (DVT) of distal vein of left lower extremity (HCC)      4  Thyroid nodule      5  Osteopenia of multiple sites            Patient voiced understanding and agreement in the discussion  Counseling / Coordination of Care   Greater than 50% of total time was spent with the patient and / or family counseling and / or coordination of care

## 2018-10-04 DIAGNOSIS — E11.9 DIABETES TYPE 2, NO OCULAR INVOLVEMENT (HCC): ICD-10-CM

## 2018-10-24 DIAGNOSIS — R12 HEART BURN: Primary | ICD-10-CM

## 2018-10-24 RX ORDER — PANTOPRAZOLE SODIUM 40 MG/1
40 TABLET, DELAYED RELEASE ORAL DAILY
Qty: 30 TABLET | Refills: 5 | Status: SHIPPED | OUTPATIENT
Start: 2018-10-24 | End: 2019-03-13 | Stop reason: SDUPTHER

## 2018-10-31 ENCOUNTER — OFFICE VISIT (OUTPATIENT)
Dept: FAMILY MEDICINE CLINIC | Facility: CLINIC | Age: 73
End: 2018-10-31
Payer: COMMERCIAL

## 2018-10-31 VITALS
OXYGEN SATURATION: 96 % | WEIGHT: 155 LBS | SYSTOLIC BLOOD PRESSURE: 132 MMHG | BODY MASS INDEX: 27.46 KG/M2 | TEMPERATURE: 97.6 F | HEART RATE: 74 BPM | DIASTOLIC BLOOD PRESSURE: 68 MMHG | HEIGHT: 63 IN

## 2018-10-31 DIAGNOSIS — I10 ESSENTIAL HYPERTENSION: ICD-10-CM

## 2018-10-31 DIAGNOSIS — Z23 NEED FOR INFLUENZA VACCINATION: ICD-10-CM

## 2018-10-31 DIAGNOSIS — E11.9 DIABETES TYPE 2, NO OCULAR INVOLVEMENT (HCC): Primary | ICD-10-CM

## 2018-10-31 DIAGNOSIS — E11.8 TYPE 2 DIABETES MELLITUS WITH COMPLICATION, WITHOUT LONG-TERM CURRENT USE OF INSULIN (HCC): ICD-10-CM

## 2018-10-31 PROCEDURE — 90662 IIV NO PRSV INCREASED AG IM: CPT

## 2018-10-31 PROCEDURE — 3078F DIAST BP <80 MM HG: CPT | Performed by: FAMILY MEDICINE

## 2018-10-31 PROCEDURE — 3008F BODY MASS INDEX DOCD: CPT | Performed by: FAMILY MEDICINE

## 2018-10-31 PROCEDURE — 99214 OFFICE O/P EST MOD 30 MIN: CPT | Performed by: FAMILY MEDICINE

## 2018-10-31 PROCEDURE — 3075F SYST BP GE 130 - 139MM HG: CPT | Performed by: FAMILY MEDICINE

## 2018-10-31 PROCEDURE — 1036F TOBACCO NON-USER: CPT | Performed by: FAMILY MEDICINE

## 2018-10-31 PROCEDURE — G0008 ADMIN INFLUENZA VIRUS VAC: HCPCS

## 2018-10-31 RX ORDER — BENAZEPRIL HYDROCHLORIDE 20 MG/1
20 TABLET ORAL DAILY
Qty: 30 TABLET | Refills: 5 | Status: SHIPPED | OUTPATIENT
Start: 2018-10-31 | End: 2019-05-15 | Stop reason: SDUPTHER

## 2018-10-31 NOTE — PROGRESS NOTES
Assessment/Plan: patient here today for follow up for diabetes and flu vaccine and pneumonia vaccine   Pt will like to talk about protein and talk about oncologist reducing BW orders  No problem-specific Assessment & Plan notes found for this encounter  Diagnoses and all orders for this visit:    Diabetes type 2, no ocular involvement ([facility] [address] )  -     HEMOGLOBIN A1C W/ EAG ESTIMATION; Future  -     sitaGLIPtin (JANUVIA) 100 mg tablet; Take 1 tablet (100 mg total) by mouth daily for 90 days    Type 2 diabetes mellitus with complication, without long-term current use of insulin ([facility])  -     Microalbumin / creatinine urine ratio; Future  -     metFORMIN (GLUCOPHAGE) 1000 MG tablet; Take 1 tablet (1,000 mg total) by mouth 2 (two) times a day with meals for 180 days    Essential hypertension  -     benazepril (LOTENSIN) 20 mg tablet; Take 1 tablet (20 mg total) by mouth daily    Need for influenza vaccination  -     influenza vaccine, [id number], high-dose, PF 0 5 mL, for patients 65 yr+ (FLUZONE HIGH-DOSE)          Subjective:      Patient ID: Annamaria Shah is a 68 y o  female  Follow up  The following portions of the patient's history were reviewed and updated as appropriate: allergies, current medications, past family history, past medical history, past social history, past surgical history and problem list     Current Outpatient Prescriptions:     ascorbic acid (VITAMIN C) 500 MG tablet, Take 500 mg by mouth daily  , Disp: , Rfl:     aspirin 81 MG tablet, Take 81 mg by mouth daily  , Disp: , Rfl:     benazepril (LOTENSIN) 20 mg tablet, Take 1 tablet (20 mg total) by mouth daily, Disp: 30 tablet, Rfl: 5    bimatoprost (LUMIGAN) 0 01 % ophthalmic drops, 1 drop daily at bedtime  , Disp: , Rfl:     Cholecalciferol (VITAMIN D-3 PO), Take 1 capsule by mouth 3 (three) times a day , Disp: , Rfl:     Cyanocobalamin (VITAMIN B 12 PO), Take 1 tablet by mouth daily  , Disp: , Rfl:     ferrous sulfate 325 (65 Fe) mg tablet, Take 325 mg by mouth daily with breakfast , Disp: , Rfl:     letrozole (FEMARA) 2 5 mg tablet, Take 1 tablet (2 5 mg total) by mouth daily, Disp: 30 tablet, Rfl: 11    metFORMIN (GLUCOPHAGE) 1000 MG tablet, Take 1 tablet (1,000 mg total) by mouth 2 (two) times a day with meals for 180 days, Disp: 60 tablet, Rfl: 5    pantoprazole (PROTONIX) 40 mg tablet, Take 1 tablet (40 mg total) by mouth daily, Disp: 30 tablet, Rfl: 5    sitaGLIPtin (JANUVIA) 100 mg tablet, Take 1 tablet (100 mg total) by mouth daily for 90 days, Disp: 30 tablet, Rfl: 5    vitamin A 10,000 units capsule, Take 10,000 Units by mouth daily  , Disp: , Rfl:     vitamin E, tocopherol, 400 units capsule, Take 100 Units by mouth daily  , Disp: , Rfl:     Review of Systems   Constitutional: Negative  HENT: Negative  Eyes: Negative  Respiratory: Negative  Cardiovascular: Negative  Gastrointestinal: Negative  Genitourinary: Negative  Musculoskeletal: Negative  Skin: Negative  Neurological: Negative  Psychiatric/Behavioral: Negative  Objective:      /68 (BP Location: Left arm, Patient Position: Sitting, Cuff Size: Standard)   Pulse 74   Temp 97 6 °F (36 4 °C) (Oral)   Ht 5' 3" (1 6 m)   Wt 70 3 kg (155 lb)   LMP  (LMP Unknown)   SpO2 96%   BMI 27 46 kg/m²          Physical Exam   Constitutional: She is oriented to person, place, and time  She appears well-developed and well-nourished  HENT:   Head: Normocephalic and atraumatic  Right Ear: External ear normal    Left Ear: External ear normal    Nose: Nose normal    Mouth/Throat: Oropharynx is clear and moist    Eyes: Pupils are equal, round, and reactive to light  Conjunctivae and EOM are normal    Neck: Normal range of motion  Neck supple  Cardiovascular: Normal rate, regular rhythm, normal heart sounds and intact distal pulses      Pulmonary/Chest: Effort normal and breath sounds normal    Neurological: She is alert and oriented to person, place, and time  Skin: Skin is warm and dry  Psychiatric: She has a normal mood and affect   Her behavior is normal  Judgment and thought content normal

## 2018-11-21 ENCOUNTER — TELEPHONE (OUTPATIENT)
Dept: HEMATOLOGY ONCOLOGY | Facility: CLINIC | Age: 73
End: 2018-11-21

## 2018-11-21 NOTE — TELEPHONE ENCOUNTER
Tried to contact patient to let her know that her appt is going to be with SAJAN Reynolds on 12/31/18 at 8:30 am due to Dr Gary Wolf being out of office that day but there was no answer  If patient called please provide her with this information

## 2018-12-11 ENCOUNTER — OFFICE VISIT (OUTPATIENT)
Dept: FAMILY MEDICINE CLINIC | Facility: CLINIC | Age: 73
End: 2018-12-11
Payer: COMMERCIAL

## 2018-12-11 VITALS
DIASTOLIC BLOOD PRESSURE: 76 MMHG | HEART RATE: 96 BPM | SYSTOLIC BLOOD PRESSURE: 142 MMHG | WEIGHT: 150.4 LBS | BODY MASS INDEX: 26.65 KG/M2 | HEIGHT: 63 IN | TEMPERATURE: 97.8 F | OXYGEN SATURATION: 96 %

## 2018-12-11 DIAGNOSIS — M99.04 SACRAL REGION SOMATIC DYSFUNCTION: ICD-10-CM

## 2018-12-11 DIAGNOSIS — M54.50 ACUTE RIGHT-SIDED LOW BACK PAIN WITHOUT SCIATICA: Primary | ICD-10-CM

## 2018-12-11 DIAGNOSIS — S39.012A LUMBAR STRAIN, INITIAL ENCOUNTER: ICD-10-CM

## 2018-12-11 DIAGNOSIS — M99.05 PELVIC SOMATIC DYSFUNCTION: ICD-10-CM

## 2018-12-11 DIAGNOSIS — S39.013A STRAIN OF MUSCLE, FASCIA AND TENDON OF PELVIS, INITIAL ENCOUNTER: ICD-10-CM

## 2018-12-11 DIAGNOSIS — M99.03 SEGMENTAL AND SOMATIC DYSFUNCTION OF LUMBAR REGION: ICD-10-CM

## 2018-12-11 DIAGNOSIS — S39.012A SACROILIAC STRAIN, INITIAL ENCOUNTER: ICD-10-CM

## 2018-12-11 PROCEDURE — 3008F BODY MASS INDEX DOCD: CPT | Performed by: FAMILY MEDICINE

## 2018-12-11 PROCEDURE — 99214 OFFICE O/P EST MOD 30 MIN: CPT | Performed by: FAMILY MEDICINE

## 2018-12-11 PROCEDURE — 98926 OSTEOPATH MANJ 3-4 REGIONS: CPT | Performed by: FAMILY MEDICINE

## 2018-12-11 PROCEDURE — 4040F PNEUMOC VAC/ADMIN/RCVD: CPT | Performed by: FAMILY MEDICINE

## 2018-12-11 PROCEDURE — 1036F TOBACCO NON-USER: CPT | Performed by: FAMILY MEDICINE

## 2018-12-11 PROCEDURE — 1160F RVW MEDS BY RX/DR IN RCRD: CPT | Performed by: FAMILY MEDICINE

## 2018-12-11 NOTE — PROGRESS NOTES
Assessment/Plan: patient here today for right lower back pain     No problem-specific Assessment & Plan notes found for this encounter  Diagnoses and all orders for this visit:    Acute right-sided low back pain without sciatica    Pelvic somatic dysfunction    Strain of muscle, fascia and tendon of pelvis, initial encounter    Lumbar strain, initial encounter    Segmental and somatic dysfunction of lumbar region    Sacral region somatic dysfunction    Sacroiliac strain, initial encounter          Subjective:      Patient ID: Lynne Sadler is a 68 y o  female  Right posterior lateral thorax and right low back pain  Was carrying too heavy of a grocery bag  Patient questions how to stop this from happening  Don't carry heavy bags  The following portions of the patient's history were reviewed and updated as appropriate: allergies, current medications, past family history, past medical history, past social history, past surgical history and problem list     Current Outpatient Prescriptions:     ascorbic acid (VITAMIN C) 500 MG tablet, Take 500 mg by mouth daily  , Disp: , Rfl:     aspirin 81 MG tablet, Take 81 mg by mouth daily  , Disp: , Rfl:     benazepril (LOTENSIN) 20 mg tablet, Take 1 tablet (20 mg total) by mouth daily, Disp: 30 tablet, Rfl: 5    bimatoprost (LUMIGAN) 0 01 % ophthalmic drops, 1 drop daily at bedtime  , Disp: , Rfl:     Cholecalciferol (VITAMIN D-3 PO), Take 1 capsule by mouth 3 (three) times a day , Disp: , Rfl:     Cyanocobalamin (VITAMIN B 12 PO), Take 1 tablet by mouth daily  , Disp: , Rfl:     ferrous sulfate 325 (65 Fe) mg tablet, Take 325 mg by mouth daily with breakfast , Disp: , Rfl:     letrozole (FEMARA) 2 5 mg tablet, Take 1 tablet (2 5 mg total) by mouth daily, Disp: 30 tablet, Rfl: 11    metFORMIN (GLUCOPHAGE) 1000 MG tablet, Take 1 tablet (1,000 mg total) by mouth 2 (two) times a day with meals for 180 days, Disp: 60 tablet, Rfl: 5    pantoprazole (PROTONIX) 40 mg tablet, Take 1 tablet (40 mg total) by mouth daily, Disp: 30 tablet, Rfl: 5    sitaGLIPtin (JANUVIA) 100 mg tablet, Take 1 tablet (100 mg total) by mouth daily for 90 days, Disp: 30 tablet, Rfl: 5    vitamin A 10,000 units capsule, Take 10,000 Units by mouth daily  , Disp: , Rfl:     vitamin E, tocopherol, 400 units capsule, Take 100 Units by mouth daily  , Disp: , Rfl:     Review of Systems   Constitutional: Negative  Musculoskeletal: Positive for back pain  Skin: Negative  Neurological: Negative  Objective:      /76 (BP Location: Left arm, Patient Position: Sitting, Cuff Size: Standard)   Pulse 96   Temp 97 8 °F (36 6 °C) (Oral)   Ht 5' 3" (1 6 m)   Wt 68 2 kg (150 lb 6 4 oz)   LMP  (LMP Unknown)   SpO2 96%   BMI 26 64 kg/m²          Physical Exam   Constitutional: She is oriented to person, place, and time  She appears well-developed and well-nourished  Cardiovascular: Normal rate and regular rhythm  Pulmonary/Chest: Effort normal    Musculoskeletal:   Stand: Right superior innominate  Prone: Superior right PSIS, Sacrum: R on L torsion, low lumbar rotated right  TRender right low lumbar and right SI  Neurological: She is alert and oriented to person, place, and time  Skin: Skin is warm and dry  Psychiatric: She has a normal mood and affect   Her behavior is normal  Judgment and thought content normal

## 2018-12-11 NOTE — PROGRESS NOTES
OMT  Performed by: Brenad Álvarez  Authorized by: Brenda Álvarez     Verbal consent obtained?: Yes    Risks and benefits: Risks, benefits and alternatives were discussed    Consent given by:  Patient  Patient states understanding of procedure being performed: Yes    Patient's understanding of procedure matches consent: Yes    Procedure Details:     Region evaluated and treated:  Sacrum/Pelvis, Lumbar and Pelvis Innominate    Lumbar details:     Examination Method:  Asymmetry, Misalignment, Crepitation, Defects, Masses and Tenderness, Pain    Severity:  Mild    Treatment Method:  High Velocity, Low Amplitude Treatment    Response:  Resolved - The somatic dysfunction is completely resolved without evidence of it ever having been present  Sacrum/Pelvis details:     Examination Method:  Asymmetry, Misalignment, Crepitation, Defects, Masses and Tenderness, Pain    Severity:  Mild    Treatment Method:  High Velocity, Low Amplitude Treatment and Soft Tissue Treatment    Response:  Resolved - The somatic dysfunction is completely resolved without evidence of it ever having been present  Pelvis Innominate details:     Examination Method:  Asymmetry, Misalignment, Crepitation, Defects, Masses    Severity:  Mild    Treatment Method:  High Velocity, Low Amplitude Treatment    Response:  Resolved - The somatic dysfunction is completely resolved without evidence of it ever having been present      Total Regions Treated:  3

## 2018-12-11 NOTE — PATIENT INSTRUCTIONS
Unfold above, no heat  Recheck, no pain and pelvis is level  Discussed carrying and or lifting, caution not to overload structure  What weight carried year ago may not be same this year

## 2018-12-31 ENCOUNTER — OFFICE VISIT (OUTPATIENT)
Dept: HEMATOLOGY ONCOLOGY | Facility: CLINIC | Age: 73
End: 2018-12-31
Payer: COMMERCIAL

## 2018-12-31 ENCOUNTER — APPOINTMENT (OUTPATIENT)
Dept: LAB | Facility: HOSPITAL | Age: 73
End: 2018-12-31
Attending: INTERNAL MEDICINE
Payer: COMMERCIAL

## 2018-12-31 VITALS
WEIGHT: 149.2 LBS | BODY MASS INDEX: 26.44 KG/M2 | HEIGHT: 63 IN | OXYGEN SATURATION: 96 % | DIASTOLIC BLOOD PRESSURE: 90 MMHG | SYSTOLIC BLOOD PRESSURE: 138 MMHG | HEART RATE: 89 BPM | TEMPERATURE: 95.8 F | RESPIRATION RATE: 16 BRPM

## 2018-12-31 DIAGNOSIS — C18.2 MALIGNANT NEOPLASM OF ASCENDING COLON (HCC): Primary | ICD-10-CM

## 2018-12-31 DIAGNOSIS — M81.8 OTHER OSTEOPOROSIS WITHOUT CURRENT PATHOLOGICAL FRACTURE: ICD-10-CM

## 2018-12-31 DIAGNOSIS — C50.911 MALIGNANT NEOPLASM OF RIGHT BREAST IN FEMALE, ESTROGEN RECEPTOR POSITIVE, UNSPECIFIED SITE OF BREAST (HCC): ICD-10-CM

## 2018-12-31 DIAGNOSIS — Z17.0 MALIGNANT NEOPLASM OF RIGHT BREAST IN FEMALE, ESTROGEN RECEPTOR POSITIVE, UNSPECIFIED SITE OF BREAST (HCC): ICD-10-CM

## 2018-12-31 DIAGNOSIS — I82.5Z2 CHRONIC DEEP VEIN THROMBOSIS (DVT) OF DISTAL VEIN OF LEFT LOWER EXTREMITY (HCC): ICD-10-CM

## 2018-12-31 DIAGNOSIS — E04.1 THYROID NODULE: ICD-10-CM

## 2018-12-31 LAB
ALBUMIN SERPL BCP-MCNC: 4.5 G/DL (ref 3.5–5)
ALP SERPL-CCNC: 64 U/L (ref 46–116)
ALT SERPL W P-5'-P-CCNC: 16 U/L (ref 12–78)
ANION GAP SERPL CALCULATED.3IONS-SCNC: 10 MMOL/L (ref 4–13)
AST SERPL W P-5'-P-CCNC: 14 U/L (ref 5–45)
BASOPHILS # BLD AUTO: 0.03 THOUSANDS/ΜL (ref 0–0.1)
BASOPHILS NFR BLD AUTO: 1 % (ref 0–1)
BILIRUB SERPL-MCNC: 0.37 MG/DL (ref 0.2–1)
BUN SERPL-MCNC: 13 MG/DL (ref 5–25)
CALCIUM SERPL-MCNC: 10 MG/DL (ref 8.3–10.1)
CANCER AG27-29 SERPL-ACNC: 19.2 U/ML (ref 0–42.3)
CEA SERPL-MCNC: 3.4 NG/ML (ref 0–3)
CHLORIDE SERPL-SCNC: 96 MMOL/L (ref 100–108)
CO2 SERPL-SCNC: 28 MMOL/L (ref 21–32)
CREAT SERPL-MCNC: 0.7 MG/DL (ref 0.6–1.3)
DEPRECATED D DIMER PPP: 919 NG/ML (FEU)
EOSINOPHIL # BLD AUTO: 0.29 THOUSAND/ΜL (ref 0–0.61)
EOSINOPHIL NFR BLD AUTO: 5 % (ref 0–6)
ERYTHROCYTE [DISTWIDTH] IN BLOOD BY AUTOMATED COUNT: 12.8 % (ref 11.6–15.1)
GFR SERPL CREATININE-BSD FRML MDRD: 87 ML/MIN/1.73SQ M
GLUCOSE P FAST SERPL-MCNC: 88 MG/DL (ref 65–99)
HCT VFR BLD AUTO: 39.8 % (ref 34.8–46.1)
HGB BLD-MCNC: 13.1 G/DL (ref 11.5–15.4)
IMM GRANULOCYTES # BLD AUTO: 0.02 THOUSAND/UL (ref 0–0.2)
IMM GRANULOCYTES NFR BLD AUTO: 0 % (ref 0–2)
LYMPHOCYTES # BLD AUTO: 0.97 THOUSANDS/ΜL (ref 0.6–4.47)
LYMPHOCYTES NFR BLD AUTO: 17 % (ref 14–44)
MCH RBC QN AUTO: 32.8 PG (ref 26.8–34.3)
MCHC RBC AUTO-ENTMCNC: 32.9 G/DL (ref 31.4–37.4)
MCV RBC AUTO: 100 FL (ref 82–98)
MONOCYTES # BLD AUTO: 0.63 THOUSAND/ΜL (ref 0.17–1.22)
MONOCYTES NFR BLD AUTO: 11 % (ref 4–12)
NEUTROPHILS # BLD AUTO: 3.78 THOUSANDS/ΜL (ref 1.85–7.62)
NEUTS SEG NFR BLD AUTO: 66 % (ref 43–75)
NRBC BLD AUTO-RTO: 0 /100 WBCS
PLATELET # BLD AUTO: 245 THOUSANDS/UL (ref 149–390)
PMV BLD AUTO: 9.9 FL (ref 8.9–12.7)
POTASSIUM SERPL-SCNC: 4 MMOL/L (ref 3.5–5.3)
PROT SERPL-MCNC: 8.4 G/DL (ref 6.4–8.2)
RBC # BLD AUTO: 3.99 MILLION/UL (ref 3.81–5.12)
SODIUM SERPL-SCNC: 134 MMOL/L (ref 136–145)
WBC # BLD AUTO: 5.72 THOUSAND/UL (ref 4.31–10.16)

## 2018-12-31 PROCEDURE — 80053 COMPREHEN METABOLIC PANEL: CPT | Performed by: INTERNAL MEDICINE

## 2018-12-31 PROCEDURE — 99214 OFFICE O/P EST MOD 30 MIN: CPT | Performed by: PHYSICIAN ASSISTANT

## 2018-12-31 PROCEDURE — 82378 CARCINOEMBRYONIC ANTIGEN: CPT | Performed by: INTERNAL MEDICINE

## 2018-12-31 PROCEDURE — 85025 COMPLETE CBC W/AUTO DIFF WBC: CPT | Performed by: INTERNAL MEDICINE

## 2018-12-31 PROCEDURE — 85379 FIBRIN DEGRADATION QUANT: CPT | Performed by: INTERNAL MEDICINE

## 2018-12-31 PROCEDURE — 36415 COLL VENOUS BLD VENIPUNCTURE: CPT | Performed by: INTERNAL MEDICINE

## 2018-12-31 PROCEDURE — 86300 IMMUNOASSAY TUMOR CA 15-3: CPT | Performed by: INTERNAL MEDICINE

## 2018-12-31 NOTE — PROGRESS NOTES
Hematology/Oncology Outpatient Follow-up  Everardo Coles 68 y o  female [id number]    Date:  12/31/2018      Assessment and Plan:    1  Malignant neoplasm of ascending colon ([facility] [address] )  History of colon cancer in 2015  She remains in surveillance  No signs or symptoms of recurrent disease  She follows with Dr Sari Leyden  - CBC and differential; Future  - Comprehensive metabolic panel; Future  - CEA; Future    2  Chronic deep vein thrombosis (DVT) of distal vein of left lower extremity (HCC)  Continues on aspirin daily    - D-dimer, quantitative; Future    3  Malignant neoplasm of right breast in female, estrogen receptor positive, unspecified site of breast ([facility] [address] )  She continues on Femara 2 5 mg p o  Daily  She has been on this since 2012  She will remain on this for minimum of 7 and half years  She takes calcium and vitamin-D and also eats dairy products  She follows with Dr Luisito Degroot who orders for mammograms  She is up-to-date with this  - CBC and differential; Future  - Comprehensive metabolic panel; Future  - Cancer antigen 27 29; Future    4  Thyroid nodule  History of thyroid nodule  Repeat ultrasound for 1 year intervals to in June 2019  This has been arranged  - US thyroid; Future    5  Other osteoporosis without current pathological fracture  Patient receives treatment with Prolia 60 mg every 6 months  She is due for her next injection in January 2019  This is arranged for today  Repeat DEXA scan in June 2019       - DXA bone density spine hip and pelvis; Future    Patient wanted to come back in 3 months  Discussed that she could come back every 6 months with labs  She preferred to come back in 3  She was advised that complete labs prior to her appointment  She is going for routine labs after visit today  HPI:  70 y/o female presents for f/u regarding history of breast cancer , colon cancer and DVT       In 2012 patient had hormone receptor positive, HER-2 negative stage IIIB right breast cancer   Patient had right mastectomy, and lymph node dissection and had 9 positive lymph nodes  She had Adriamycin + Cytoxan chemotherapy followed by Taxotere and after that radiation therapy  Since November/December 2012 she has been on Femara  Early 2016 she was found to have benign clustered calcifications in left breast and had a biopsy and that was benign  Dr Huber Girard takes care of her mammography    She takes vitamin D and liquid calcium and is on prolia for osteopenia  She is also on Prolia 60 mg every 6 months  She most recent DEXA scan was in June 2015  In December 2012 patient developed DVT right leg and she was started on Xarelto  She had GI bleeding  Xarelto was stopped  On colonoscopy she was found to have right colon cancer  On 7/22/15 she underwent right hemicolectomy  Pathological diagnosis right colon cancer, stage I, T2 N0 MX, grade 2, lymphovascular invasion and no perineural invasion  She did not require adjuvant therapy for colon cancer  She has been on baby aspirin  She is not on anticoagulation anymore  Follow-up Venous Doppler study was negative for DVT  History of thyroid nodule  She had biopsy of thyroid nodule in June 2014 and she states that was negative for cancer  She gets thyroid ultrasound yearly for surveillance  This is due in June 2019  She has minimal musculoskeletal symptoms secondary to Femara  She has facial hair growth  She gets Port-A-Cath flushed     Interval history: She had port a cath removed in Sept 2018  ROS: Review of Systems   Constitutional: Negative for activity change, appetite change, fatigue and unexpected weight change  Respiratory: Negative for cough and shortness of breath  Gastrointestinal: Negative for abdominal pain, constipation, diarrhea, nausea and vomiting  Genitourinary: Negative for difficulty urinating  Musculoskeletal: Positive for arthralgias  Skin: Negative      Neurological: Negative for dizziness, weakness, light-headedness and headaches  Hematological: Negative for adenopathy  Does not bruise/bleed easily  Psychiatric/Behavioral: Negative  Past Medical History:   Diagnosis Date    Acute embolism and thrombosis of deep vein of lower extremity (HCC)     Adenocarcinoma of colon, [facility] ([facility] [address] )     Breast cancer (HCC)     Right Breast     Cancer ([facility] [address] )     Diabetes mellitus ([facility] )     DVT (deep venous thrombosis) (HCC)     GERD (gastroesophageal reflux disease)     Hypertension     Pes planus        Past Surgical History:   Procedure Laterality Date    COLONOSCOPY      IR PORT REMOVAL  9/12/2018    MASTECTOMY Right 2012    IL COLONOSCOPY FLX DX W/COLLJ SPEC WHEN PFRMD N/A 8/23/2016    Procedure: COLONOSCOPY;  Surgeon: Cipriano Valdez MD;  Location: [facility]; Service: Colorectal    IL COLONOSCOPY FLX DX W/COLLJ SPEC WHEN PFRMD N/A 9/5/2017    Procedure: COLONOSCOPY;  Surgeon: Cipriano Valdez MD;  Location: [facility]; Service: Colorectal    IL ESOPHAGOGASTRODUODENOSCOPY TRANSORAL DIAGNOSTIC N/A 9/5/2017    Procedure: ESOPHAGOGASTRODUODENOSCOPY (EGD); Surgeon: Tony Quesada DO;  Location: [facility]; Service: Gastroenterology       Social History     Social History    Marital status: Single     Spouse name: N/A    Number of children: N/A    Years of education: N/A     Occupational History    retired      Social History Main Topics    Smoking status: Never Smoker    Smokeless tobacco: Never Used    Alcohol use No    Drug use: No    Sexual activity: Not Asked     Other Topics Concern    None     Social History Narrative    No advance directives       Family History   Problem Relation Age of Onset    Other Mother         chronic bronchitis    Brain cancer Father        No Known Allergies      Current Outpatient Prescriptions:     ascorbic acid (VITAMIN C) 500 MG tablet, Take 500 mg by mouth daily  , Disp: , Rfl:     aspirin 81 MG tablet, Take 81 mg by mouth daily  , Disp: , Rfl:    benazepril (LOTENSIN) 20 mg tablet, Take 1 tablet (20 mg total) by mouth daily, Disp: 30 tablet, Rfl: 5    bimatoprost (LUMIGAN) 0 01 % ophthalmic drops, 1 drop daily at bedtime  , Disp: , Rfl:     Cholecalciferol (VITAMIN D-3 PO), Take 1 capsule by mouth 3 (three) times a day , Disp: , Rfl:     Cyanocobalamin (VITAMIN B 12 PO), Take 1 tablet by mouth daily  , Disp: , Rfl:     ferrous sulfate 325 (65 Fe) mg tablet, Take 325 mg by mouth daily with breakfast , Disp: , Rfl:     letrozole (FEMARA) 2 5 mg tablet, Take 1 tablet (2 5 mg total) by mouth daily, Disp: 30 tablet, Rfl: 11    metFORMIN (GLUCOPHAGE) 1000 MG tablet, Take 1 tablet (1,000 mg total) by mouth 2 (two) times a day with meals for 180 days, Disp: 60 tablet, Rfl: 5    pantoprazole (PROTONIX) 40 mg tablet, Take 1 tablet (40 mg total) by mouth daily, Disp: 30 tablet, Rfl: 5    sitaGLIPtin (JANUVIA) 100 mg tablet, Take 1 tablet (100 mg total) by mouth daily for 90 days, Disp: 30 tablet, Rfl: 5    vitamin A 10,000 units capsule, Take 10,000 Units by mouth daily  , Disp: , Rfl:     vitamin E, tocopherol, 400 units capsule, Take 100 Units by mouth daily  , Disp: , Rfl:       Physical Exam:  /90 (BP Location: Left arm, Cuff Size: Standard)   Pulse 89   Temp (!) 95 8 °F (35 4 °C) (Tympanic)   Resp 16   Ht 5' 3" (1 6 m)   Wt 67 7 kg (149 lb 3 2 oz)   LMP  (LMP Unknown)   SpO2 96%   BMI 26 43 kg/m²     Physical Exam   Constitutional: She is oriented to person, place, and time  She appears well-developed and well-nourished  No distress  HENT:   Head: Normocephalic and atraumatic  Eyes: Conjunctivae are normal  No scleral icterus  Neck: Normal range of motion  Neck supple  Cardiovascular: Normal rate, regular rhythm and normal heart sounds  No murmur heard  Pulmonary/Chest: Effort normal and breath sounds normal  No respiratory distress  Abdominal: Soft  There is no tenderness  Musculoskeletal: Normal range of motion   She exhibits no edema or tenderness  Lymphadenopathy:        Head (right side): No submandibular adenopathy present  Head (left side): No submandibular adenopathy present  She has no cervical adenopathy  She has no axillary adenopathy  Right: No supraclavicular adenopathy present  Left: No supraclavicular adenopathy present  Neurological: She is alert and oriented to person, place, and time  No cranial nerve deficit  Skin: Skin is warm and dry  Psychiatric: She has a normal mood and affect  Labs:  Lab Results   Component Value Date    WBC 6 48 09/12/2018    HGB 13 6 09/12/2018    HCT 41 4 09/12/2018    MCV 99 (H) 09/12/2018     09/12/2018     Lab Results   Component Value Date     12/29/2015    K 3 6 09/12/2018    CL 96 (L) 09/12/2018    CO2 28 09/12/2018    ANIONGAP 10 12/29/2015    BUN 12 09/12/2018    CREATININE 0 75 09/12/2018    GLUCOSE 121 12/29/2015    GLUF 115 (H) 09/12/2018    CALCIUM 9 5 09/12/2018    AST 16 09/12/2018    ALT 20 09/12/2018    ALKPHOS 87 09/12/2018    PROT 7 7 12/29/2015    BILITOT 0 35 12/29/2015    EGFR 80 09/12/2018       Patient voiced understanding and agreement in the above discussion  Aware to contact our office with questions/symptoms in the interim

## 2019-01-28 ENCOUNTER — HOSPITAL ENCOUNTER (OUTPATIENT)
Dept: INFUSION CENTER | Facility: HOSPITAL | Age: 74
Discharge: HOME | End: 2019-01-28
Payer: COMMERCIAL

## 2019-01-28 PROCEDURE — 96401 CHEMO ANTI-NEOPL SQ/IM: CPT

## 2019-01-28 RX ADMIN — DENOSUMAB 60 MG: 60 INJECTION SUBCUTANEOUS at 08:11

## 2019-01-30 PROBLEM — M85.80 OSTEOPENIA DUE TO CANCER THERAPY: Status: ACTIVE | Noted: 2018-09-24

## 2019-03-05 ENCOUNTER — APPOINTMENT (OUTPATIENT)
Dept: LAB | Facility: HOSPITAL | Age: 74
End: 2019-03-05
Payer: COMMERCIAL

## 2019-03-05 DIAGNOSIS — E11.8 TYPE 2 DIABETES MELLITUS WITH COMPLICATION, WITHOUT LONG-TERM CURRENT USE OF INSULIN (HCC): ICD-10-CM

## 2019-03-05 DIAGNOSIS — E11.9 DIABETES TYPE 2, NO OCULAR INVOLVEMENT (HCC): ICD-10-CM

## 2019-03-05 LAB
CREAT UR-MCNC: 23.9 MG/DL
EST. AVERAGE GLUCOSE BLD GHB EST-MCNC: 128 MG/DL
HBA1C MFR BLD: 6.1 % (ref 4.2–6.3)
MICROALBUMIN UR-MCNC: 14.4 MG/L (ref 0–20)
MICROALBUMIN/CREAT 24H UR: 60 MG/G CREATININE (ref 0–30)

## 2019-03-05 PROCEDURE — 36415 COLL VENOUS BLD VENIPUNCTURE: CPT

## 2019-03-05 PROCEDURE — 82043 UR ALBUMIN QUANTITATIVE: CPT

## 2019-03-05 PROCEDURE — 82570 ASSAY OF URINE CREATININE: CPT

## 2019-03-05 PROCEDURE — 3060F POS MICROALBUMINURIA REV: CPT | Performed by: FAMILY MEDICINE

## 2019-03-05 PROCEDURE — 83036 HEMOGLOBIN GLYCOSYLATED A1C: CPT

## 2019-03-07 LAB
LEFT EYE DIABETIC RETINOPATHY: NORMAL
RIGHT EYE DIABETIC RETINOPATHY: NORMAL

## 2019-03-07 PROCEDURE — 3072F LOW RISK FOR RETINOPATHY: CPT | Performed by: FAMILY MEDICINE

## 2019-03-11 PROBLEM — E11.9 TYPE 2 DIABETES MELLITUS WITHOUT COMPLICATIONS (HCC): Status: ACTIVE | Noted: 2019-03-11

## 2019-03-12 ENCOUNTER — TRANSCRIBE ORDERS (OUTPATIENT)
Dept: ADMINISTRATIVE | Facility: HOSPITAL | Age: 74
End: 2019-03-12

## 2019-03-12 DIAGNOSIS — C18.2 MALIGNANT NEOPLASM OF ASCENDING COLON (HCC): Primary | ICD-10-CM

## 2019-03-13 ENCOUNTER — OFFICE VISIT (OUTPATIENT)
Dept: FAMILY MEDICINE CLINIC | Facility: CLINIC | Age: 74
End: 2019-03-13
Payer: COMMERCIAL

## 2019-03-13 VITALS
OXYGEN SATURATION: 97 % | SYSTOLIC BLOOD PRESSURE: 134 MMHG | TEMPERATURE: 98.2 F | BODY MASS INDEX: 26.08 KG/M2 | DIASTOLIC BLOOD PRESSURE: 82 MMHG | WEIGHT: 147.2 LBS | HEIGHT: 63 IN | HEART RATE: 79 BPM

## 2019-03-13 DIAGNOSIS — Z17.0 MALIGNANT NEOPLASM OF RIGHT BREAST IN FEMALE, ESTROGEN RECEPTOR POSITIVE, UNSPECIFIED SITE OF BREAST (HCC): ICD-10-CM

## 2019-03-13 DIAGNOSIS — C18.2 MALIGNANT NEOPLASM OF ASCENDING COLON (HCC): ICD-10-CM

## 2019-03-13 DIAGNOSIS — C50.911 MALIGNANT NEOPLASM OF RIGHT BREAST IN FEMALE, ESTROGEN RECEPTOR POSITIVE, UNSPECIFIED SITE OF BREAST (HCC): ICD-10-CM

## 2019-03-13 DIAGNOSIS — E11.9 DIABETES TYPE 2, NO OCULAR INVOLVEMENT (HCC): ICD-10-CM

## 2019-03-13 DIAGNOSIS — Z00.00 MEDICARE ANNUAL WELLNESS VISIT, SUBSEQUENT: Primary | ICD-10-CM

## 2019-03-13 DIAGNOSIS — E11.8 TYPE 2 DIABETES MELLITUS WITH COMPLICATION, WITHOUT LONG-TERM CURRENT USE OF INSULIN (HCC): ICD-10-CM

## 2019-03-13 DIAGNOSIS — R12 HEART BURN: ICD-10-CM

## 2019-03-13 PROCEDURE — 1125F AMNT PAIN NOTED PAIN PRSNT: CPT | Performed by: FAMILY MEDICINE

## 2019-03-13 PROCEDURE — 99214 OFFICE O/P EST MOD 30 MIN: CPT | Performed by: FAMILY MEDICINE

## 2019-03-13 PROCEDURE — G0439 PPPS, SUBSEQ VISIT: HCPCS | Performed by: FAMILY MEDICINE

## 2019-03-13 PROCEDURE — 1160F RVW MEDS BY RX/DR IN RCRD: CPT | Performed by: FAMILY MEDICINE

## 2019-03-13 PROCEDURE — 3008F BODY MASS INDEX DOCD: CPT | Performed by: FAMILY MEDICINE

## 2019-03-13 PROCEDURE — 1036F TOBACCO NON-USER: CPT | Performed by: FAMILY MEDICINE

## 2019-03-13 PROCEDURE — 4040F PNEUMOC VAC/ADMIN/RCVD: CPT | Performed by: FAMILY MEDICINE

## 2019-03-13 PROCEDURE — 1170F FXNL STATUS ASSESSED: CPT | Performed by: FAMILY MEDICINE

## 2019-03-13 RX ORDER — PANTOPRAZOLE SODIUM 40 MG/1
40 TABLET, DELAYED RELEASE ORAL DAILY
Qty: 30 TABLET | Refills: 5 | Status: SHIPPED | OUTPATIENT
Start: 2019-03-13 | End: 2019-08-13 | Stop reason: SDUPTHER

## 2019-03-13 NOTE — PROGRESS NOTES
Assessment/Plan: pt here today for follow up for diabetes     No problem-specific Assessment & Plan notes found for this encounter  Diagnoses and all orders for this visit:    Medicare annual wellness visit, subsequent    Type 2 diabetes mellitus with complication, without long-term current use of insulin ([facility] [address] )  -     metFORMIN (GLUCOPHAGE) 1000 MG tablet; Take 1 tablet (1,000 mg total) by mouth 2 (two) times a day with meals for 180 days    Heart burn  -     pantoprazole (PROTONIX) 40 mg tablet; Take 1 tablet (40 mg total) by mouth daily    Diabetes type 2, no ocular involvement (HCC)  -     sitaGLIPtin (JANUVIA) 100 mg tablet; Take 1 tablet (100 mg total) by mouth daily for 90 days    Malignant neoplasm of right breast in female, estrogen receptor positive, [facility] site [facility]    Malignant neoplasm of ascending colon (HCC)          Subjective:      Patient ID: Vonnie Flynn is a 69 y o  female  Follow up, review labs  Sees Podiatry q 6 weeks  Has feeling in feet  S/P colon resection, CA  Following for breast CA  The following portions of the patient's history were reviewed and updated as appropriate: allergies, current medications, past family history, past medical history, past social history, past surgical history and problem list     Current Outpatient Medications:     ascorbic acid (VITAMIN C) 500 MG tablet, Take 500 mg by mouth daily  , Disp: , Rfl:     aspirin 81 MG tablet, Take 81 mg by mouth daily  , Disp: , Rfl:     benazepril (LOTENSIN) 20 mg tablet, Take 1 tablet (20 mg total) by mouth daily, Disp: 30 tablet, Rfl: 5    bimatoprost (LUMIGAN) 0 01 % ophthalmic drops, 1 drop daily at bedtime  , Disp: , Rfl:     Cholecalciferol (VITAMIN D-3 PO), Take 1 capsule by mouth 3 (three) times a day , Disp: , Rfl:     Cyanocobalamin (VITAMIN B 12 PO), Take 1 tablet by mouth daily  , Disp: , Rfl:     ferrous sulfate 325 (65 Fe) mg tablet, Take 325 mg by mouth daily with breakfast , Disp: , Rfl:     letrozole (FEMARA) 2 5 mg tablet, Take 1 tablet (2 5 mg total) by mouth daily, Disp: 30 tablet, Rfl: 11    metFORMIN (GLUCOPHAGE) 1000 MG tablet, Take 1 tablet (1,000 mg total) by mouth 2 (two) times a day with meals for 180 days, Disp: 60 tablet, Rfl: 5    pantoprazole (PROTONIX) 40 mg tablet, Take 1 tablet (40 mg total) by mouth daily, Disp: 30 tablet, Rfl: 5    sitaGLIPtin (JANUVIA) 100 mg tablet, Take 1 tablet (100 mg total) by mouth daily for 90 days, Disp: 30 tablet, Rfl: 5    vitamin A 10,000 units capsule, Take 10,000 Units by mouth daily  , Disp: , Rfl:     vitamin E, tocopherol, 400 units capsule, Take 100 Units by mouth daily  , Disp: , Rfl:      No Known Allergies  Review of Systems   Constitutional: Negative  HENT: Negative  Eyes: Negative  Respiratory: Negative  Cardiovascular: Negative  Gastrointestinal: Negative  Genitourinary: Negative  Musculoskeletal: Negative  Skin: Negative  Neurological: Negative  Psychiatric/Behavioral: Negative  Objective:      /82 (BP Location: Left arm, Patient Position: Sitting, Cuff Size: Standard)   Pulse 79   Temp 98 2 °F (36 8 °C) (Oral)   Ht 5' 3" (1 6 m)   Wt 66 8 kg (147 lb 3 2 oz)   LMP  (LMP Unknown)   SpO2 97%   BMI 26 08 kg/m²          Physical Exam   Constitutional: She is oriented to person, place, and time  She appears well-developed and well-nourished  HENT:   Head: Normocephalic and atraumatic  Right Ear: External ear normal    Left Ear: External ear normal    Nose: Nose normal    Mouth/Throat: Oropharynx is clear and moist    Eyes: Pupils are equal, round, and reactive to light  Conjunctivae and EOM are normal    Neck: Normal range of motion  Neck supple  Cardiovascular: Normal rate, regular rhythm and normal heart sounds  Dorsal pedis pule present on right  Posterior tibial difficult to feel, ankles swollen     Pulmonary/Chest: Effort normal and breath sounds normal  Abdominal: Soft  Bowel sounds are normal    Neurological: She is alert and oriented to person, place, and time  She has normal reflexes  Skin: Skin is warm and dry  Psychiatric: She has a normal mood and affect   Her behavior is normal  Judgment and thought content normal

## 2019-03-13 NOTE — PROGRESS NOTES
Diabetic Foot Exam    Patient's shoes and socks removed  Right Foot/Ankle   Right Foot Inspection  Skin Exam: skin normal, skin intact and dry skin no warmth, no callus, no erythema, no maceration, no abnormal color, no pre-ulcer, no ulcer and no callus                            Sensory       Monofilament testing: absent  Vascular    The right DP pulse is 1+  The right PT pulse is 0  Left Foot/Ankle  Left Foot Inspection  Skin Exam: skin normal, skin intact and dry skinno warmth, no erythema, no maceration, normal color, no pre-ulcer, no ulcer and no callus                                         Sensory       Monofilament: absent  Vascular    The left DP pulse is 0  The left PT pulse is 0  Assign Risk Category:  Deformity present;  Loss of protective sensation; Weak pulses       Risk: 2

## 2019-03-13 NOTE — PROGRESS NOTES
Assessment and Plan:  Problem List Items Addressed This Visit        Digestive    Malignant neoplasm of ascending colon ([facility] )       Other    Malignant neoplasm of right breast in female, estrogen receptor positive ([facility] [address] )      Other Visit Diagnoses     Medicare annual wellness visit, subsequent    -  Primary    Type 2 diabetes mellitus with complication, without long-term current use of insulin (HCC)        Relevant Medications    metFORMIN (GLUCOPHAGE) 1000 MG tablet    sitaGLIPtin (JANUVIA) 100 mg tablet    Heart burn        Relevant Medications    pantoprazole (PROTONIX) 40 mg tablet    Diabetes type 2, no ocular involvement (HCC)        Relevant Medications    metFORMIN (GLUCOPHAGE) 1000 MG tablet    sitaGLIPtin (JANUVIA) 100 mg tablet        Health Maintenance Due   Topic Date Due    Hepatitis C Screening  1945    Medicare Annual Wellness Visit (AWV)  1945    SLP PLAN OF CARE  1945    Diabetic Foot Exam  02/03/1956    BMI: Followup Plan  02/03/1964    HEPATITIS B VACCINES (1 of 3 - Risk 3-dose series) 02/03/1965    Pneumococcal PPSV23/PCV13 65+ Years / High and Highest Risk (2 of 2 - PPSV23) 05/17/2016    CRC Screening: Colonoscopy  09/05/2018         HPI:  Patient Active Problem List   Diagnosis    Chronic deep vein thrombosis (DVT) of distal vein of left lower extremity (HCC)    Thyroid nodule    Malignant neoplasm of right breast in female, estrogen receptor positive ([facility] [address] )    Malignant neoplasm of ascending colon ([facility] [address] )    Personal history of other malignant neoplasm of large intestine    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    Other osteoporosis without current pathological fracture    Type 2 diabetes mellitus without complications ([facility] [address] )     Past Medical History:   Diagnosis Date    Acute embolism and thrombosis of deep vein of lower extremity ([facility] [address] )     Adenocarcinoma of colon, Duke's A ([facility] [address] )     Breast cancer ([facility] [address] )     Right Breast     Cancer ([facility] [address] )     [facility]     DVT (deep venous thrombosis) (HCC)     GERD (gastroesophageal reflux disease)     Hypertension     Pes planus      Past Surgical History:   Procedure Laterality Date    COLONOSCOPY      IR PORT REMOVAL  9/12/2018    MASTECTOMY Right 2012    MT COLONOSCOPY FLX DX W/COLLJ SPEC WHEN PFRMD N/A 8/23/2016    Procedure: COLONOSCOPY;  Surgeon: Roxana Fish MD;  Location: [facility]; Service: Colorectal    MT COLONOSCOPY FLX DX W/COLLJ SPEC WHEN PFRMD N/A 9/5/2017    Procedure: COLONOSCOPY;  Surgeon: Roxana Fish MD;  Location: [facility]; Service: Colorectal    MT ESOPHAGOGASTRODUODENOSCOPY TRANSORAL DIAGNOSTIC N/A 9/5/2017    Procedure: ESOPHAGOGASTRODUODENOSCOPY (EGD); Surgeon: Harry Mercedes DO;  Location: [facility]; Service: Gastroenterology     Family History   Problem Relation Age of Onset    Other Mother         chronic bronchitis    Brain cancer Father      Social History     Tobacco Use   Smoking Status Never Smoker   Smokeless Tobacco Never Used     Social History     Substance and Sexual Activity   Alcohol Use No      Social History     Substance and Sexual Activity   Drug Use No         Current Outpatient Medications   Medication Sig Dispense Refill    ascorbic acid (VITAMIN C) 500 MG tablet Take 500 mg by mouth daily   aspirin 81 MG tablet Take 81 mg by mouth daily   benazepril (LOTENSIN) 20 mg tablet Take 1 tablet (20 mg total) by mouth daily 30 tablet 5    bimatoprost (LUMIGAN) 0 01 % ophthalmic drops 1 drop daily at bedtime   Cholecalciferol (VITAMIN D-3 PO) Take 1 capsule by mouth 3 (three) times a day   Cyanocobalamin (VITAMIN B 12 PO) Take 1 tablet by mouth daily        ferrous sulfate 325 (65 Fe) mg tablet Take 325 mg by mouth daily with breakfast       letrozole (FEMARA) 2 5 mg tablet Take 1 tablet (2 5 mg total) by mouth daily 30 tablet 11    metFORMIN (GLUCOPHAGE) 1000 MG tablet Take 1 tablet (1,000 mg total) by mouth 2 (two) times a day with meals for 180 days 60 tablet 5    pantoprazole (PROTONIX) 40 mg tablet Take 1 tablet (40 mg total) by mouth daily 30 tablet 5    sitaGLIPtin (JANUVIA) 100 mg tablet Take 1 tablet (100 mg total) by mouth daily for 90 days 30 tablet 5    vitamin A 10,000 units capsule Take 10,000 Units by mouth daily   vitamin E, tocopherol, 400 units capsule Take 100 Units by mouth daily         No current facility-administered medications for this visit  No Known Allergies  Immunization History   Administered Date(s) Administered    INFLUENZA 11/05/2014    Influenza Quadrivalent Preservative Free 3 years and older IM 10/31/2016, 11/13/2017    Influenza Quadrivalent, 6-35 Months IM 11/20/2015    Influenza TIV (IM) 11/04/2013, 11/05/2014    Influenza, high dose seasonal 0 5 mL 10/31/2018    Pneumococcal Conjugate 13-Valent 03/22/2016    Tdap 04/18/2011    Zoster 08/15/2013       Patient Care Team:  Ramón Maldonado DO as PCP - General  Laurent Lamy, MD Fred Rower, DO Armando Hunter, MD Loren Hashimoto, DPM Darylene Canon, MD (Colon and Rectal Surgery)    Medicare Screening Tests and Risk Assessments:  Waymond Gaucher is here for her Subsequent Wellness visit  Health Risk Assessment:  Patient rates overall health as excellent  Patient feels that their physical health rating is Same  Eyesight was rated as Same  Hearing was rated as Same  Patient feels that their emotional and mental health rating is Same  Pain experienced by patient in the last 7 days has been None  Patient states that she has experienced no weight loss or gain in last 6 months  Emotional/Mental Health:  Patient has been feeling nervous/anxious  PHQ-9 Depression Screening:    Frequency of the following problems over the past two weeks:      1  Little interest or pleasure in doing things: 0 - not at all      2  Feeling down, depressed, or hopeless: 0 - not at all  PHQ-2 Score: 0          Broken Bones/Falls: Fall Risk Assessment:     In the past year, patient has experienced: No history of falling in past year          Bladder/Bowel:  Patient has not leaked urine accidently in the last six months  Patient reports no loss of bowel control  Immunizations:  Patient has had a flu vaccination within the last year  Patient has received a pneumonia shot  Patient has received a shingles shot  Patient has received tetanus/diphtheria shot  Home Safety:  Patient does not have trouble with stairs inside or outside of their home  Patient currently reports that there are no safety hazards present in home, working smoke alarms, working carbon monoxide detectors  Preventative Screenings:   Breast cancer screening performed, colon cancer screen completed, no cholesterol screen completed, glaucoma eye exam completed,     Nutrition:  Current diet: Regular with servings of the following:    Medications:  Patient is currently taking over-the-counter supplements  List of OTC medications includes: vitamins   Patient is able to manage medications  Lifestyle Choices:  Patient reports no tobacco use  Patient has not smoked or used tobacco in the past   Patient reports no alcohol use  Patient does not drive a vehicle  Patient wears seat belt  Current level of exercise of physical activity described by patient as: daily walking   Activities of Daily Living:  Can get out of bed by his or her self, able to dress self, able to make own meals, able to do own shopping, able to bathe self, can do own laundry/housekeeping, can manage own money, pay bills and track expenses    Previous Hospitalizations:  No hospitalization or ED visit in past 12 months        Advanced Directives:  Patient has not decided on power of   Patient has not completed advanced directive          Preventative Screening/Counseling:      Cardiovascular:      General: Screening Current          Diabetes:      General: Screening Current          Colorectal Cancer: General: Screening Current          Breast Cancer:      General: Screening Current          Cervical Cancer:      General: Screening Not Indicated          Osteoporosis:      General: Screening Current          AAA:      General: Screening Not Indicated          Glaucoma:      General: Screening Current          HIV:      General: Screening Not Indicated          Hepatitis C:      General: Screening Not Indicated        Advanced Directives:   Patient has no living will for healthcare, does not have durable POA for healthcare, patient does not have an advanced directive  No exam data present    Physical Exam:  Review of Systems   Constitutional: Negative  HENT: Negative  Eyes: Negative  Respiratory: Negative  Cardiovascular: Negative  Gastrointestinal: Negative  Negative for bowel incontinence  Genitourinary: Negative  Musculoskeletal: Negative  Skin: Negative  Neurological: Negative  Psychiatric/Behavioral: Negative  The patient is not nervous/anxious  Vitals:    03/13/19 0809   BP: 134/82   BP Location: Left arm   Patient Position: Sitting   Cuff Size: Standard   Pulse: 79   Temp: 98 2 °F (36 8 °C)   TempSrc: Oral   SpO2: 97%   Weight: 66 8 kg (147 lb 3 2 oz)   Height: 5' 3" (1 6 m)   Body mass index is 26 08 kg/m²  Physical Exam   Constitutional: She is oriented to person, place, and time  She appears well-developed and well-nourished  HENT:   Head: Normocephalic  Right Ear: External ear normal    Left Ear: External ear normal    Mouth/Throat: Oropharynx is clear and moist    Eyes: Pupils are equal, round, and reactive to light  EOM are normal    Neck: Normal range of motion  Cardiovascular: Normal rate and regular rhythm  Pulmonary/Chest: Effort normal and breath sounds normal    Abdominal: Soft  Bowel sounds are normal    Neurological: She is alert and oriented to person, place, and time  Skin: Skin is warm and dry     Psychiatric: She has a normal mood and affect   Her behavior is normal  Judgment and thought content normal

## 2019-03-13 NOTE — PROGRESS NOTES
Diabetic Foot Exam    Patient's shoes and socks removed  Right Foot/Ankle   Right Foot Inspection  Skin Exam: skin normal, skin intact and dry skin no warmth, no callus, no erythema, no maceration, no abnormal color, no pre-ulcer, no ulcer and no callus                          Toe Exam: ROM and strength within normal limits and right toe deformity  Sensory       Monofilament testing: diminished  Vascular    The right DP pulse is 1+  The right PT pulse is 0  Left Foot/Ankle  Left Foot Inspection  Skin Exam: skin normal, skin intact and dry skinno warmth, no erythema, no maceration, normal color, no pre-ulcer, no ulcer and no callus                         Toe Exam: ROM and strength within normal limits and left toe deformity                   Sensory       Monofilament: diminished  Vascular    The left DP pulse is 0  The left PT pulse is 0  Assign Risk Category:  Deformity present;  Loss of protective sensation; Weak pulses       Risk: 2

## 2019-03-18 ENCOUNTER — CLINICAL SUPPORT (OUTPATIENT)
Dept: FAMILY MEDICINE CLINIC | Facility: CLINIC | Age: 74
End: 2019-03-18
Payer: COMMERCIAL

## 2019-03-18 DIAGNOSIS — Z23 ENCOUNTER FOR IMMUNIZATION: Primary | ICD-10-CM

## 2019-03-18 PROCEDURE — 90732 PPSV23 VACC 2 YRS+ SUBQ/IM: CPT

## 2019-03-18 PROCEDURE — G0009 ADMIN PNEUMOCOCCAL VACCINE: HCPCS

## 2019-03-18 NOTE — PROGRESS NOTES
Patient is here for pneumovax vaccine  She received injection on left deltoid, she tolerated injection well with no complaints  Patient will schedule prn

## 2019-03-29 ENCOUNTER — TELEPHONE (OUTPATIENT)
Dept: HEMATOLOGY ONCOLOGY | Facility: CLINIC | Age: 74
End: 2019-03-29

## 2019-03-29 NOTE — TELEPHONE ENCOUNTER
Tried to call patient to remind her to have her labs completed prior to her appt on 04/01/19 with Dr Opal Melendez  No Answer telephone just rang

## 2019-04-01 ENCOUNTER — OFFICE VISIT (OUTPATIENT)
Dept: HEMATOLOGY ONCOLOGY | Facility: CLINIC | Age: 74
End: 2019-04-01
Payer: COMMERCIAL

## 2019-04-01 ENCOUNTER — APPOINTMENT (OUTPATIENT)
Dept: LAB | Facility: HOSPITAL | Age: 74
End: 2019-04-01
Payer: COMMERCIAL

## 2019-04-01 VITALS
DIASTOLIC BLOOD PRESSURE: 74 MMHG | SYSTOLIC BLOOD PRESSURE: 118 MMHG | HEART RATE: 98 BPM | TEMPERATURE: 97.9 F | BODY MASS INDEX: 26.97 KG/M2 | OXYGEN SATURATION: 92 % | HEIGHT: 63 IN | WEIGHT: 152.2 LBS

## 2019-04-01 DIAGNOSIS — I82.5Z2 CHRONIC DEEP VEIN THROMBOSIS (DVT) OF DISTAL VEIN OF LEFT LOWER EXTREMITY (HCC): ICD-10-CM

## 2019-04-01 DIAGNOSIS — C18.2 MALIGNANT NEOPLASM OF ASCENDING COLON (HCC): ICD-10-CM

## 2019-04-01 DIAGNOSIS — C50.911 MALIGNANT NEOPLASM OF RIGHT BREAST IN FEMALE, ESTROGEN RECEPTOR POSITIVE, UNSPECIFIED SITE OF BREAST (HCC): ICD-10-CM

## 2019-04-01 DIAGNOSIS — C18.2 MALIGNANT NEOPLASM OF ASCENDING COLON (HCC): Primary | ICD-10-CM

## 2019-04-01 DIAGNOSIS — M85.80 OSTEOPENIA DUE TO CANCER THERAPY: ICD-10-CM

## 2019-04-01 DIAGNOSIS — Z17.0 MALIGNANT NEOPLASM OF RIGHT BREAST IN FEMALE, ESTROGEN RECEPTOR POSITIVE, UNSPECIFIED SITE OF BREAST (HCC): ICD-10-CM

## 2019-04-01 DIAGNOSIS — M81.8 OTHER OSTEOPOROSIS WITHOUT CURRENT PATHOLOGICAL FRACTURE: ICD-10-CM

## 2019-04-01 LAB
ALBUMIN SERPL BCP-MCNC: 4.3 G/DL (ref 3.5–5)
ALP SERPL-CCNC: 69 U/L (ref 46–116)
ALT SERPL W P-5'-P-CCNC: 15 U/L (ref 12–78)
ANION GAP SERPL CALCULATED.3IONS-SCNC: 3 MMOL/L (ref 4–13)
AST SERPL W P-5'-P-CCNC: 14 U/L (ref 5–45)
BASOPHILS # BLD AUTO: 0.04 THOUSANDS/ΜL (ref 0–0.1)
BASOPHILS NFR BLD AUTO: 1 % (ref 0–1)
BILIRUB SERPL-MCNC: 0.32 MG/DL (ref 0.2–1)
BUN SERPL-MCNC: 14 MG/DL (ref 5–25)
CALCIUM SERPL-MCNC: 9.7 MG/DL (ref 8.3–10.1)
CANCER AG27-29 SERPL-ACNC: 20.2 U/ML (ref 0–42.3)
CEA SERPL-MCNC: 3.6 NG/ML (ref 0–3)
CHLORIDE SERPL-SCNC: 101 MMOL/L (ref 100–108)
CO2 SERPL-SCNC: 28 MMOL/L (ref 21–32)
CREAT SERPL-MCNC: 0.78 MG/DL (ref 0.6–1.3)
DEPRECATED D DIMER PPP: 1837 NG/ML (FEU)
EOSINOPHIL # BLD AUTO: 0.24 THOUSAND/ΜL (ref 0–0.61)
EOSINOPHIL NFR BLD AUTO: 4 % (ref 0–6)
ERYTHROCYTE [DISTWIDTH] IN BLOOD BY AUTOMATED COUNT: 13.2 % (ref 11.6–15.1)
GFR SERPL CREATININE-BSD FRML MDRD: 76 ML/MIN/1.73SQ M
GLUCOSE P FAST SERPL-MCNC: 85 MG/DL (ref 65–99)
HCT VFR BLD AUTO: 38.9 % (ref 34.8–46.1)
HGB BLD-MCNC: 12.4 G/DL (ref 11.5–15.4)
IMM GRANULOCYTES # BLD AUTO: 0.02 THOUSAND/UL (ref 0–0.2)
IMM GRANULOCYTES NFR BLD AUTO: 0 % (ref 0–2)
LYMPHOCYTES # BLD AUTO: 0.79 THOUSANDS/ΜL (ref 0.6–4.47)
LYMPHOCYTES NFR BLD AUTO: 14 % (ref 14–44)
MCH RBC QN AUTO: 32.3 PG (ref 26.8–34.3)
MCHC RBC AUTO-ENTMCNC: 31.9 G/DL (ref 31.4–37.4)
MCV RBC AUTO: 101 FL (ref 82–98)
MONOCYTES # BLD AUTO: 0.65 THOUSAND/ΜL (ref 0.17–1.22)
MONOCYTES NFR BLD AUTO: 12 % (ref 4–12)
NEUTROPHILS # BLD AUTO: 3.9 THOUSANDS/ΜL (ref 1.85–7.62)
NEUTS SEG NFR BLD AUTO: 69 % (ref 43–75)
NRBC BLD AUTO-RTO: 0 /100 WBCS
PLATELET # BLD AUTO: 250 THOUSANDS/UL (ref 149–390)
PMV BLD AUTO: 9.5 FL (ref 8.9–12.7)
POTASSIUM SERPL-SCNC: 3.8 MMOL/L (ref 3.5–5.3)
PROT SERPL-MCNC: 8.4 G/DL (ref 6.4–8.2)
RBC # BLD AUTO: 3.84 MILLION/UL (ref 3.81–5.12)
SODIUM SERPL-SCNC: 132 MMOL/L (ref 136–145)
WBC # BLD AUTO: 5.64 THOUSAND/UL (ref 4.31–10.16)

## 2019-04-01 PROCEDURE — 86300 IMMUNOASSAY TUMOR CA 15-3: CPT

## 2019-04-01 PROCEDURE — 99214 OFFICE O/P EST MOD 30 MIN: CPT | Performed by: INTERNAL MEDICINE

## 2019-04-01 PROCEDURE — 80053 COMPREHEN METABOLIC PANEL: CPT

## 2019-04-01 PROCEDURE — 82378 CARCINOEMBRYONIC ANTIGEN: CPT

## 2019-04-01 PROCEDURE — 36415 COLL VENOUS BLD VENIPUNCTURE: CPT

## 2019-04-01 PROCEDURE — 4040F PNEUMOC VAC/ADMIN/RCVD: CPT | Performed by: INTERNAL MEDICINE

## 2019-04-01 PROCEDURE — 85379 FIBRIN DEGRADATION QUANT: CPT

## 2019-04-01 PROCEDURE — 85025 COMPLETE CBC W/AUTO DIFF WBC: CPT

## 2019-04-30 ENCOUNTER — APPOINTMENT (OUTPATIENT)
Dept: LAB | Facility: HOSPITAL | Age: 74
End: 2019-04-30
Attending: INTERNAL MEDICINE
Payer: COMMERCIAL

## 2019-04-30 DIAGNOSIS — C18.2 MALIGNANT NEOPLASM OF ASCENDING COLON (HCC): ICD-10-CM

## 2019-04-30 DIAGNOSIS — Z17.0 MALIGNANT NEOPLASM OF RIGHT BREAST IN FEMALE, ESTROGEN RECEPTOR POSITIVE, UNSPECIFIED SITE OF BREAST (HCC): ICD-10-CM

## 2019-04-30 DIAGNOSIS — C50.911 MALIGNANT NEOPLASM OF RIGHT BREAST IN FEMALE, ESTROGEN RECEPTOR POSITIVE, UNSPECIFIED SITE OF BREAST (HCC): ICD-10-CM

## 2019-04-30 LAB
BASOPHILS # BLD AUTO: 0.04 THOUSANDS/ΜL (ref 0–0.1)
BASOPHILS NFR BLD AUTO: 1 % (ref 0–1)
EOSINOPHIL # BLD AUTO: 0.29 THOUSAND/ΜL (ref 0–0.61)
EOSINOPHIL NFR BLD AUTO: 4 % (ref 0–6)
ERYTHROCYTE [DISTWIDTH] IN BLOOD BY AUTOMATED COUNT: 13.1 % (ref 11.6–15.1)
HCT VFR BLD AUTO: 38.8 % (ref 34.8–46.1)
HGB BLD-MCNC: 12.5 G/DL (ref 11.5–15.4)
IMM GRANULOCYTES # BLD AUTO: 0.03 THOUSAND/UL (ref 0–0.2)
IMM GRANULOCYTES NFR BLD AUTO: 0 % (ref 0–2)
LYMPHOCYTES # BLD AUTO: 0.75 THOUSANDS/ΜL (ref 0.6–4.47)
LYMPHOCYTES NFR BLD AUTO: 11 % (ref 14–44)
MCH RBC QN AUTO: 32.4 PG (ref 26.8–34.3)
MCHC RBC AUTO-ENTMCNC: 32.2 G/DL (ref 31.4–37.4)
MCV RBC AUTO: 101 FL (ref 82–98)
MONOCYTES # BLD AUTO: 0.87 THOUSAND/ΜL (ref 0.17–1.22)
MONOCYTES NFR BLD AUTO: 13 % (ref 4–12)
NEUTROPHILS # BLD AUTO: 4.71 THOUSANDS/ΜL (ref 1.85–7.62)
NEUTS SEG NFR BLD AUTO: 71 % (ref 43–75)
NRBC BLD AUTO-RTO: 0 /100 WBCS
PLATELET # BLD AUTO: 224 THOUSANDS/UL (ref 149–390)
PMV BLD AUTO: 9.6 FL (ref 8.9–12.7)
RBC # BLD AUTO: 3.86 MILLION/UL (ref 3.81–5.12)
WBC # BLD AUTO: 6.69 THOUSAND/UL (ref 4.31–10.16)

## 2019-04-30 PROCEDURE — 36415 COLL VENOUS BLD VENIPUNCTURE: CPT

## 2019-04-30 PROCEDURE — 85025 COMPLETE CBC W/AUTO DIFF WBC: CPT

## 2019-05-02 ENCOUNTER — HOSPITAL ENCOUNTER (OUTPATIENT)
Dept: RADIOLOGY | Facility: HOSPITAL | Age: 74
Discharge: HOME | End: 2019-05-02
Attending: COLON & RECTAL SURGERY
Payer: COMMERCIAL

## 2019-05-02 DIAGNOSIS — C18.2 MALIGNANT NEOPLASM OF ASCENDING COLON (HCC): ICD-10-CM

## 2019-05-02 PROCEDURE — 74177 CT ABD & PELVIS W/CONTRAST: CPT

## 2019-05-02 PROCEDURE — 71260 CT THORAX DX C+: CPT

## 2019-05-02 RX ADMIN — IOHEXOL 100 ML: 350 INJECTION, SOLUTION INTRAVENOUS at 11:51

## 2019-05-15 DIAGNOSIS — E11.8 TYPE 2 DIABETES MELLITUS WITH COMPLICATION, WITHOUT LONG-TERM CURRENT USE OF INSULIN (HCC): ICD-10-CM

## 2019-05-15 DIAGNOSIS — I10 ESSENTIAL HYPERTENSION: ICD-10-CM

## 2019-05-15 RX ORDER — BENAZEPRIL HYDROCHLORIDE 20 MG/1
20 TABLET ORAL DAILY
Qty: 90 TABLET | Refills: 0 | Status: SHIPPED | OUTPATIENT
Start: 2019-05-15 | End: 2019-08-13 | Stop reason: SDUPTHER

## 2019-05-22 ENCOUNTER — OFFICE VISIT (OUTPATIENT)
Dept: PODIATRY | Facility: CLINIC | Age: 74
End: 2019-05-22
Payer: COMMERCIAL

## 2019-05-22 VITALS
BODY MASS INDEX: 24.16 KG/M2 | HEIGHT: 65 IN | DIASTOLIC BLOOD PRESSURE: 73 MMHG | WEIGHT: 145 LBS | HEART RATE: 91 BPM | SYSTOLIC BLOOD PRESSURE: 105 MMHG

## 2019-05-22 DIAGNOSIS — B35.1 ONYCHOMYCOSIS: Primary | ICD-10-CM

## 2019-05-22 DIAGNOSIS — E11.9 CONTROLLED TYPE 2 DIABETES MELLITUS WITHOUT COMPLICATION, WITHOUT LONG-TERM CURRENT USE OF INSULIN (HCC): ICD-10-CM

## 2019-05-22 PROCEDURE — NCFTCARE PR NON-COVERED FOOT CARE: Performed by: PODIATRIST

## 2019-06-10 ENCOUNTER — HOSPITAL ENCOUNTER (OUTPATIENT)
Dept: RADIOLOGY | Facility: HOSPITAL | Age: 74
Discharge: HOME | End: 2019-06-10
Attending: SURGERY
Payer: COMMERCIAL

## 2019-06-10 VITALS — WEIGHT: 145 LBS | HEIGHT: 65 IN | BODY MASS INDEX: 24.16 KG/M2

## 2019-06-10 DIAGNOSIS — Z12.39 SCREENING BREAST EXAMINATION: ICD-10-CM

## 2019-06-10 PROCEDURE — 77067 SCR MAMMO BI INCL CAD: CPT

## 2019-06-14 ENCOUNTER — HOSPITAL ENCOUNTER (OUTPATIENT)
Dept: RADIOLOGY | Age: 74
Discharge: HOME | End: 2019-06-14
Payer: COMMERCIAL

## 2019-06-14 DIAGNOSIS — M81.8 OTHER OSTEOPOROSIS WITHOUT CURRENT PATHOLOGICAL FRACTURE: ICD-10-CM

## 2019-06-14 DIAGNOSIS — E04.1 THYROID NODULE: ICD-10-CM

## 2019-06-14 PROCEDURE — 76536 US EXAM OF HEAD AND NECK: CPT

## 2019-06-14 PROCEDURE — 77080 DXA BONE DENSITY AXIAL: CPT

## 2019-06-17 ENCOUNTER — TRANSCRIBE ORDERS (OUTPATIENT)
Dept: ADMINISTRATIVE | Facility: HOSPITAL | Age: 74
End: 2019-06-17

## 2019-06-17 DIAGNOSIS — C50.919 MALIGNANT NEOPLASM OF FEMALE BREAST, UNSPECIFIED ESTROGEN RECEPTOR STATUS, UNSPECIFIED LATERALITY, UNSPECIFIED SITE OF BREAST (HCC): ICD-10-CM

## 2019-06-17 DIAGNOSIS — Z12.31 ENCOUNTER FOR SCREENING MAMMOGRAM FOR MALIGNANT NEOPLASM OF BREAST: Primary | ICD-10-CM

## 2019-06-17 RX ORDER — LETROZOLE 2.5 MG/1
2.5 TABLET, FILM COATED ORAL DAILY
Qty: 30 TABLET | Refills: 11 | Status: SHIPPED | OUTPATIENT
Start: 2019-06-17 | End: 2020-05-07 | Stop reason: SDUPTHER

## 2019-06-26 ENCOUNTER — OFFICE VISIT (OUTPATIENT)
Dept: PODIATRY | Facility: CLINIC | Age: 74
End: 2019-06-26
Payer: COMMERCIAL

## 2019-06-26 VITALS
DIASTOLIC BLOOD PRESSURE: 60 MMHG | SYSTOLIC BLOOD PRESSURE: 112 MMHG | HEART RATE: 77 BPM | WEIGHT: 145 LBS | BODY MASS INDEX: 23.3 KG/M2 | HEIGHT: 66 IN

## 2019-06-26 DIAGNOSIS — E11.9 CONTROLLED TYPE 2 DIABETES MELLITUS WITHOUT COMPLICATION, WITHOUT LONG-TERM CURRENT USE OF INSULIN (HCC): Primary | ICD-10-CM

## 2019-06-26 PROCEDURE — NCFTCARE PR NON-COVERED FOOT CARE: Performed by: PODIATRIST

## 2019-07-08 ENCOUNTER — APPOINTMENT (OUTPATIENT)
Dept: LAB | Facility: HOSPITAL | Age: 74
End: 2019-07-08
Attending: INTERNAL MEDICINE
Payer: COMMERCIAL

## 2019-07-08 DIAGNOSIS — C18.2 MALIGNANT NEOPLASM OF ASCENDING COLON (HCC): ICD-10-CM

## 2019-07-08 DIAGNOSIS — C50.919 MALIGNANT NEOPLASM OF FEMALE BREAST, UNSPECIFIED ESTROGEN RECEPTOR STATUS, UNSPECIFIED LATERALITY, UNSPECIFIED SITE OF BREAST (HCC): Primary | ICD-10-CM

## 2019-07-08 DIAGNOSIS — I82.5Z2 CHRONIC DEEP VEIN THROMBOSIS (DVT) OF DISTAL VEIN OF LEFT LOWER EXTREMITY (HCC): ICD-10-CM

## 2019-07-08 DIAGNOSIS — C50.911 MALIGNANT NEOPLASM OF RIGHT BREAST IN FEMALE, ESTROGEN RECEPTOR POSITIVE, UNSPECIFIED SITE OF BREAST (HCC): ICD-10-CM

## 2019-07-08 DIAGNOSIS — Z17.0 MALIGNANT NEOPLASM OF RIGHT BREAST IN FEMALE, ESTROGEN RECEPTOR POSITIVE, UNSPECIFIED SITE OF BREAST (HCC): ICD-10-CM

## 2019-07-08 LAB
ALBUMIN SERPL BCP-MCNC: 4 G/DL (ref 3.5–5)
ALP SERPL-CCNC: 68 U/L (ref 46–116)
ALT SERPL W P-5'-P-CCNC: 13 U/L (ref 12–78)
ANION GAP SERPL CALCULATED.3IONS-SCNC: 8 MMOL/L (ref 4–13)
AST SERPL W P-5'-P-CCNC: 12 U/L (ref 5–45)
BASOPHILS # BLD AUTO: 0.03 THOUSANDS/ΜL (ref 0–0.1)
BASOPHILS NFR BLD AUTO: 1 % (ref 0–1)
BILIRUB SERPL-MCNC: 0.29 MG/DL (ref 0.2–1)
BUN SERPL-MCNC: 10 MG/DL (ref 5–25)
CALCIUM SERPL-MCNC: 9.3 MG/DL (ref 8.3–10.1)
CANCER AG27-29 SERPL-ACNC: 18.4 U/ML (ref 0–42.3)
CEA SERPL-MCNC: 3.8 NG/ML (ref 0–3)
CHLORIDE SERPL-SCNC: 98 MMOL/L (ref 100–108)
CO2 SERPL-SCNC: 26 MMOL/L (ref 21–32)
CREAT SERPL-MCNC: 0.66 MG/DL (ref 0.6–1.3)
DEPRECATED D DIMER PPP: 875 NG/ML (FEU)
EOSINOPHIL # BLD AUTO: 0.16 THOUSAND/ΜL (ref 0–0.61)
EOSINOPHIL NFR BLD AUTO: 3 % (ref 0–6)
ERYTHROCYTE [DISTWIDTH] IN BLOOD BY AUTOMATED COUNT: 13.4 % (ref 11.6–15.1)
GFR SERPL CREATININE-BSD FRML MDRD: 88 ML/MIN/1.73SQ M
GLUCOSE P FAST SERPL-MCNC: 95 MG/DL (ref 65–99)
HCT VFR BLD AUTO: 34.8 % (ref 34.8–46.1)
HGB BLD-MCNC: 11.4 G/DL (ref 11.5–15.4)
IMM GRANULOCYTES # BLD AUTO: 0.02 THOUSAND/UL (ref 0–0.2)
IMM GRANULOCYTES NFR BLD AUTO: 0 % (ref 0–2)
LYMPHOCYTES # BLD AUTO: 0.75 THOUSANDS/ΜL (ref 0.6–4.47)
LYMPHOCYTES NFR BLD AUTO: 14 % (ref 14–44)
MCH RBC QN AUTO: 32.8 PG (ref 26.8–34.3)
MCHC RBC AUTO-ENTMCNC: 32.8 G/DL (ref 31.4–37.4)
MCV RBC AUTO: 100 FL (ref 82–98)
MONOCYTES # BLD AUTO: 0.72 THOUSAND/ΜL (ref 0.17–1.22)
MONOCYTES NFR BLD AUTO: 13 % (ref 4–12)
NEUTROPHILS # BLD AUTO: 3.86 THOUSANDS/ΜL (ref 1.85–7.62)
NEUTS SEG NFR BLD AUTO: 69 % (ref 43–75)
NRBC BLD AUTO-RTO: 0 /100 WBCS
PLATELET # BLD AUTO: 220 THOUSANDS/UL (ref 149–390)
PMV BLD AUTO: 9.9 FL (ref 8.9–12.7)
POTASSIUM SERPL-SCNC: 4.4 MMOL/L (ref 3.5–5.3)
PROT SERPL-MCNC: 7.6 G/DL (ref 6.4–8.2)
RBC # BLD AUTO: 3.48 MILLION/UL (ref 3.81–5.12)
SODIUM SERPL-SCNC: 132 MMOL/L (ref 136–145)
WBC # BLD AUTO: 5.54 THOUSAND/UL (ref 4.31–10.16)

## 2019-07-08 PROCEDURE — 85379 FIBRIN DEGRADATION QUANT: CPT

## 2019-07-08 PROCEDURE — 36415 COLL VENOUS BLD VENIPUNCTURE: CPT

## 2019-07-08 PROCEDURE — 82378 CARCINOEMBRYONIC ANTIGEN: CPT

## 2019-07-08 PROCEDURE — 86300 IMMUNOASSAY TUMOR CA 15-3: CPT

## 2019-07-08 PROCEDURE — 80053 COMPREHEN METABOLIC PANEL: CPT

## 2019-07-08 PROCEDURE — 85025 COMPLETE CBC W/AUTO DIFF WBC: CPT

## 2019-07-12 PROBLEM — Z95.828 PORT-A-CATH IN PLACE: Status: ACTIVE | Noted: 2019-07-12

## 2019-07-15 ENCOUNTER — HOSPITAL ENCOUNTER (OUTPATIENT)
Dept: INFUSION CENTER | Facility: HOSPITAL | Age: 74
Discharge: HOME | End: 2019-07-15
Payer: COMMERCIAL

## 2019-07-15 DIAGNOSIS — M81.8 OTHER OSTEOPOROSIS WITHOUT CURRENT PATHOLOGICAL FRACTURE: ICD-10-CM

## 2019-07-15 DIAGNOSIS — Z85.038 PERSONAL HISTORY OF OTHER MALIGNANT NEOPLASM OF LARGE INTESTINE: ICD-10-CM

## 2019-07-15 DIAGNOSIS — C50.911 MALIGNANT NEOPLASM OF RIGHT BREAST IN FEMALE, ESTROGEN RECEPTOR POSITIVE, UNSPECIFIED SITE OF BREAST (HCC): ICD-10-CM

## 2019-07-15 DIAGNOSIS — C18.2 MALIGNANT NEOPLASM OF ASCENDING COLON (HCC): Primary | ICD-10-CM

## 2019-07-15 DIAGNOSIS — Z17.0 MALIGNANT NEOPLASM OF RIGHT BREAST IN FEMALE, ESTROGEN RECEPTOR POSITIVE, UNSPECIFIED SITE OF BREAST (HCC): ICD-10-CM

## 2019-07-15 DIAGNOSIS — M85.80 OSTEOPENIA DUE TO CANCER THERAPY: ICD-10-CM

## 2019-07-15 PROCEDURE — 96401 CHEMO ANTI-NEOPL SQ/IM: CPT

## 2019-07-15 RX ADMIN — DENOSUMAB 60 MG: 60 INJECTION SUBCUTANEOUS at 09:26

## 2019-07-15 NOTE — PLAN OF CARE
Problem: SAFETY ADULT  Goal: Patient will remain free of falls  Description  INTERVENTIONS:  - Assess patient frequently for physical needs  -  Identify cognitive and physical deficits and behaviors that affect risk of falls    -  [place] fall precautions as indicated by assessment   - Educate patient/family on patient safety including physical limitations  - Instruct patient to call for assistance with activity based on assessment  - Modify environment to reduce risk of injury  - Consider OT/PT consult to assist with strengthening/mobility  Outcome: Progressing

## 2019-07-31 ENCOUNTER — OFFICE VISIT (OUTPATIENT)
Dept: PODIATRY | Facility: CLINIC | Age: 74
End: 2019-07-31
Payer: COMMERCIAL

## 2019-07-31 VITALS
SYSTOLIC BLOOD PRESSURE: 122 MMHG | BODY MASS INDEX: 23.32 KG/M2 | WEIGHT: 140 LBS | HEART RATE: 76 BPM | DIASTOLIC BLOOD PRESSURE: 76 MMHG | HEIGHT: 65 IN

## 2019-07-31 DIAGNOSIS — M20.41 HAMMER TOES OF BOTH FEET: ICD-10-CM

## 2019-07-31 DIAGNOSIS — L84 CORNS: ICD-10-CM

## 2019-07-31 DIAGNOSIS — M20.42 HAMMER TOES OF BOTH FEET: ICD-10-CM

## 2019-07-31 DIAGNOSIS — E11.42 DIABETIC POLYNEUROPATHY ASSOCIATED WITH TYPE 2 DIABETES MELLITUS (HCC): Primary | ICD-10-CM

## 2019-07-31 DIAGNOSIS — M20.11 VALGUS DEFORMITY OF BOTH GREAT TOES: ICD-10-CM

## 2019-07-31 DIAGNOSIS — M20.12 VALGUS DEFORMITY OF BOTH GREAT TOES: ICD-10-CM

## 2019-07-31 PROCEDURE — 99213 OFFICE O/P EST LOW 20 MIN: CPT | Performed by: PODIATRIST

## 2019-07-31 RX ORDER — SITAGLIPTIN 100 MG/1
TABLET, FILM COATED ORAL
Refills: 0 | COMMUNITY
Start: 2019-07-02 | End: 2019-12-17 | Stop reason: SDUPTHER

## 2019-07-31 NOTE — PROGRESS NOTES
Assessment/Plan:    Discussed tendons of diabetic foot care  Patient has diabetic neuropathy and poor circulation  Patient has a severe hallux valgus deformity bilateral and hammertoes each 2nd toe  A pinch callus is present on the left great toe and a corn is present on the left 2nd toe  These lesions were trimmed  All toenails are thickened yellow secondary to onychomycosis  Treatment consisted of nail and lesion trimming  No problem-specific Assessment & Plan notes found for this encounter  Diagnoses and all orders for this visit:    Diabetic polyneuropathy associated with type 2 diabetes mellitus ([address] )    Other orders  -     JANUVIA 100 MG tablet          Subjective:      Patient ID: Missy Padron is a 69 y o  female  HPI     Patient, a 32-year-old type 2 diabetic with no neuropathy, presents for assessment and care  Patient has a numb sensation in both feet  She has multiple foot deformities but they are asymptomatic  She has a pinch callus on the left great toe in a corn on the left 2nd toe  All toenails are thickened yellow with subungual debris  The following portions of the patient's history were reviewed and updated as appropriate: allergies, current medications, past family history, past medical history, past social history, past surgical history and problem list     Review of Systems   Cardiovascular: Negative  Gastrointestinal: Negative  Musculoskeletal: Positive for gait problem  Neurological: Positive for numbness  Psychiatric/Behavioral: Negative  Objective:      /76   Pulse 76   Ht 5' 5" (1 651 m) Comment: verbal  Wt 63 5 kg (140 lb) Comment: verbal  LMP  (LMP Unknown)   BMI 23 30 kg/m²          Physical Exam   Cardiovascular: Pulses are weak pulses  Pulses:       Dorsalis pedis pulses are 0 on the right side, and 0 on the left side  Posterior tibial pulses are 0 on the right side, and 0 on the left side     Feet:   Right Foot:   Skin Integrity: Negative for ulcer, skin breakdown, erythema, warmth, callus or dry skin  Left Foot:   Skin Integrity: Positive for callus  Diabetic Foot Exam    Patient's shoes and socks removed  Right Foot/Ankle   Right Foot Inspection  Skin Exam: skin normal and skin intact no dry skin, no warmth, no callus, no erythema, no maceration, no abnormal color, no pre-ulcer, no ulcer and no callus                          Toe Exam: right toe deformity  Sensory   Vibration: absent  Proprioception: intact   Monofilament testing: absent  Vascular    The right DP pulse is 0  The right PT pulse is 0  Right Toe  - Comprehensive Exam  Ecchymosis: none  Arch: pes planus  Hammertoes: second toe  Claw Toes: absent  Swelling: none   Tenderness: none         Left Foot/Ankle  Left Foot Inspection  Skin Exam: callus                         Toe Exam: left toe deformity                   Sensory   Vibration: absent  Proprioception: intact  Monofilament: absent  Vascular    The left DP pulse is 0  The left PT pulse is 0  Left Toe  - Comprehensive Exam  Ecchymosis: none  Arch: pes planus  Hammertoes: second toe  Claw toes: absent  Swelling: none   Tenderness: none       Assign Risk Category:  Deformity present;  Loss of protective sensation; Weak pulses       Risk: 2

## 2019-08-05 ENCOUNTER — OFFICE VISIT (OUTPATIENT)
Dept: HEMATOLOGY ONCOLOGY | Facility: CLINIC | Age: 74
End: 2019-08-05
Payer: COMMERCIAL

## 2019-08-05 VITALS
HEIGHT: 65 IN | BODY MASS INDEX: 23.82 KG/M2 | DIASTOLIC BLOOD PRESSURE: 64 MMHG | OXYGEN SATURATION: 98 % | HEART RATE: 84 BPM | WEIGHT: 143 LBS | RESPIRATION RATE: 20 BRPM | SYSTOLIC BLOOD PRESSURE: 122 MMHG | TEMPERATURE: 97.7 F

## 2019-08-05 DIAGNOSIS — Z17.0 MALIGNANT NEOPLASM OF RIGHT BREAST IN FEMALE, ESTROGEN RECEPTOR POSITIVE, UNSPECIFIED SITE OF BREAST (HCC): ICD-10-CM

## 2019-08-05 DIAGNOSIS — C18.2 MALIGNANT NEOPLASM OF ASCENDING COLON (HCC): Primary | ICD-10-CM

## 2019-08-05 DIAGNOSIS — M85.80 OSTEOPENIA DUE TO CANCER THERAPY: ICD-10-CM

## 2019-08-05 DIAGNOSIS — I82.5Z2 CHRONIC DEEP VEIN THROMBOSIS (DVT) OF DISTAL VEIN OF LEFT LOWER EXTREMITY (HCC): ICD-10-CM

## 2019-08-05 DIAGNOSIS — C50.911 MALIGNANT NEOPLASM OF RIGHT BREAST IN FEMALE, ESTROGEN RECEPTOR POSITIVE, UNSPECIFIED SITE OF BREAST (HCC): ICD-10-CM

## 2019-08-05 PROCEDURE — 99214 OFFICE O/P EST MOD 30 MIN: CPT | Performed by: PHYSICIAN ASSISTANT

## 2019-08-05 NOTE — PROGRESS NOTES
Hematology/Oncology Outpatient Follow-up  Francisco J Mckeon 69 y o  female 1945 [id number]    Date:  8/5/2019      Assessment and Plan:  1  Malignant neoplasm of ascending colon ([facility] [address] )  Continues follow-up colorectal surgery  She had repeat imaging studies of the chest abdomen and pelvis in May 2019 which was negative for disease recurrence  CEA is stable  - CBC and differential  - Comprehensive metabolic panel  - Cancer antigen 27 29    2  Chronic deep vein thrombosis (DVT) of distal vein of left lower extremity (HCC)  Continues on aspirin    3  Malignant neoplasm of right breast in female, estrogen receptor positive, unspecified site of breast ([facility] [address] )  History of breast cancer  Status post mastectomy  She continues on Femara  She has been on this since 2012  She tolerates this well  - Comprehensive metabolic panel  - CEA    4  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  Patient has osteopenia  She is on Prolia  In comparison to 2 years ago patient's DEXA scan 1 area is slightly worse  The other is stable  Recommended evaluation of vitamin-D level to make sure patient has enough vitamin-D as this is required for Prolia to work effectively  She does take 1200 mg daily  Calcium level is normal   For now will continue Prolia every 6 months   - Vitamin D 25 hydroxy    Follow-up in 3 months with labs  HPI:  72 y/o female presents for f/u regarding history of breast cancer, colon cancer, and DVT      In 2012 patient had hormone receptor positive, HER-2 negative stage IIIB right breast cancer   Patient had right mastectomy, and lymph node dissection and had 9 positive lymph nodes  She had Adriamycin + Cytoxan chemotherapy followed by Taxotere and after that radiation therapy  Since November/December 2012 she has been on Femara        Early 2016 she was found to have benign clustered calcifications in left breast and had a biopsy and that was benign   Dr Indio Dove takes care of her mammography     She takes vitamin D and liquid calcium and is on prolia for osteopenia  She is also on Prolia 60 mg every 6 months       She most recent DEXA scan was in June 2019  This showed osteopenia  There is noted to be 4% decrease in total bone density of the lumbar spine but no change in total bone density in the left hip  She continues on vitamin-D 1200 mg daily as well as calcium        In December 2012 patient developed DVT right leg and she was started on Xarelto  She had GI bleeding  Xarelto was stopped  On colonoscopy she was found to have right colon cancer  On 7/22/15 she underwent right hemicolectomy  Pathological diagnosis right colon cancer, stage I, T2 N0 MX, grade 2, lymphovascular invasion and no perineural invasion  She did not require adjuvant therapy for colon cancer   She has been on baby aspirin  She is not on anticoagulation anymore  Follow-up Venous Doppler study was negative for DVT     History of thyroid nodule  She had biopsy of thyroid nodule in June 2014 and she states that was negative for cancer  She gets thyroid ultrasound yearly for surveillance  This is due in June 2020       She has minimal musculoskeletal symptoms secondary to Femara  She has facial hair growth  Interval history:    ROS: Review of Systems   Constitutional: Negative for appetite change, chills, fatigue, fever and unexpected weight change  Respiratory: Negative for cough and shortness of breath  Cardiovascular: Negative for chest pain, palpitations and leg swelling  Gastrointestinal: Negative for abdominal pain, blood in stool, constipation, diarrhea, nausea and vomiting  Denies black stools   Genitourinary: Negative for difficulty urinating and dysuria  Musculoskeletal: Negative for arthralgias  Skin: Negative  Neurological: Negative for dizziness, weakness, light-headedness, numbness and headaches  Hematological: Negative  Psychiatric/Behavioral: Negative          Past Medical History:   Diagnosis Date    Acute embolism and thrombosis of deep vein of lower extremity (HCC)     Adenocarcinoma of colon, [facility] A ([facility] [address] )     Breast cancer ([facility] [address] )     Right Breast     Cancer ([facility] [address] )     Diabetes mellitus ([facility] [address] )     DVT (deep venous thrombosis) (HCC)     GERD (gastroesophageal reflux disease)     Hypertension     Pes planus        Past Surgical History:   Procedure Laterality Date    COLONOSCOPY      HYSTERECTOMY      complete @ age 29    IR PORT REMOVAL  9/12/2018    MASTECTOMY Right 2012    IA COLONOSCOPY FLX DX W/COLLJ SPEC WHEN PFRMD N/A 8/23/2016    Procedure: COLONOSCOPY;  Surgeon: Linda Hooker MD;  Location: [facility]; Service: Colorectal    IA COLONOSCOPY FLX DX W/COLLJ SPEC WHEN PFRMD N/A 9/5/2017    Procedure: COLONOSCOPY;  Surgeon: Linda Hooker MD;  [facility]: [facility]; Service: Colorectal    IA ESOPHAGOGASTRODUODENOSCOPY TRANSORAL DIAGNOSTIC N/A 9/5/2017    Procedure: ESOPHAGOGASTRODUODENOSCOPY (EGD); Surgeon: Leann Shah DO;  Location: [facility];   Service: Gastroenterology       Social History     Socioeconomic History    Marital status: Single     Spouse name: Not on file    Number of children: Not on file    Years of education: Not on file    Highest education level: Not on file   Occupational History    Occupation: retired   Social Needs    Financial resource strain: Not on file    Food insecurity:     Worry: Not on file     Inability: Not on file   DNAdigest needs:     Medical: Not on file     Non-medical: Not on file   Tobacco Use    Smoking status: Never Smoker    Smokeless tobacco: Never Used   Substance and Sexual Activity    Alcohol use: No    Drug use: No    Sexual activity: Not on file   Lifestyle    Physical activity:     Days per week: Not on file     Minutes per session: Not on file    Stress: Not on file   Relationships    Social connections:     Talks on phone: Not on file     Gets together: Not on file     Attends [employer] service: Not on file     Active member of club or organization: Not on file     Attends meetings of clubs or organizations: Not on file     Relationship status: Not on file    Intimate partner violence:     Fear of current or ex partner: Not on file     Emotionally abused: Not on file     Physically abused: Not on file     Forced sexual activity: Not on file   Other Topics Concern    Not on file   Social History Narrative    No advance directives       Family History   Problem Relation Age of Onset    Other Mother         chronic bronchitis    Brain cancer Father     Prostate cancer Paternal Grandfather 79    Pancreatic cancer Maternal Aunt 45       No Known Allergies      Current Outpatient Medications:     ascorbic acid (VITAMIN C) 500 MG tablet, Take 500 mg by mouth daily  , Disp: , Rfl:     aspirin 81 MG tablet, Take 81 mg by mouth daily  , Disp: , Rfl:     benazepril (LOTENSIN) 20 mg tablet, Take 1 tablet (20 mg total) by mouth daily for 90 days, Disp: 90 tablet, Rfl: 0    bimatoprost (LUMIGAN) 0 01 % ophthalmic drops, 1 drop daily at bedtime  , Disp: , Rfl:     Cholecalciferol (VITAMIN D-3 PO), Take 1 capsule by mouth 3 (three) times a day , Disp: , Rfl:     Cyanocobalamin (VITAMIN B 12 PO), Take 1 tablet by mouth daily  , Disp: , Rfl:     ferrous sulfate 325 (65 Fe) mg tablet, Take 325 mg by mouth daily with breakfast , Disp: , Rfl:     JANUVIA 100 MG tablet, , Disp: , Rfl: 0    letrozole (FEMARA) 2 5 mg tablet, Take 1 tablet (2 5 mg total) by mouth daily, Disp: 30 tablet, Rfl: 11    metFORMIN (GLUCOPHAGE) 1000 MG tablet, Take 1 tablet (1,000 mg total) by mouth 2 (two) times a day with meals for 90 days, Disp: 180 tablet, Rfl: 0    pantoprazole (PROTONIX) 40 mg tablet, Take 1 tablet (40 mg total) by mouth daily, Disp: 30 tablet, Rfl: 5    sitaGLIPtin (JANUVIA) 100 mg tablet, Take 1 tablet (100 mg total) by mouth daily for 90 days (Patient not taking: Reported on 6/26/2019), Disp: 30 tablet, Rfl: 5    vitamin A 10,000 units capsule, Take 10,000 Units by mouth daily  , Disp: , Rfl:     vitamin E, tocopherol, 400 units capsule, Take 100 Units by mouth daily  , Disp: , Rfl:       Physical Exam:  LMP  (LMP Unknown)     Physical Exam   Constitutional: She is oriented to person, place, and time  She appears well-developed and well-nourished  No distress  HENT:   Head: Normocephalic and atraumatic  Eyes: Conjunctivae are normal  No scleral icterus  Neck: Normal range of motion  Neck supple  Cardiovascular: Normal rate, regular rhythm and normal heart sounds  No murmur heard  Pulmonary/Chest: Effort normal and breath sounds normal  No respiratory distress  Abdominal: Soft  There is no tenderness  Musculoskeletal: Normal range of motion  She exhibits no edema or tenderness  Lymphadenopathy:     She has no cervical adenopathy  Neurological: She is alert and oriented to person, place, and time  No cranial nerve deficit  Skin: Skin is warm and dry  Psychiatric: She has a normal mood and affect  Labs:  Lab Results   Component Value Date    WBC 5 54 07/08/2019    HGB 11 4 (L) 07/08/2019    HCT 34 8 07/08/2019     (H) 07/08/2019     07/08/2019     Lab Results   Component Value Date     12/29/2015    K 4 4 07/08/2019    CL 98 (L) 07/08/2019    CO2 26 07/08/2019    ANIONGAP 10 12/29/2015    BUN 10 07/08/2019    CREATININE 0 66 07/08/2019    GLUCOSE 121 12/29/2015    GLUF 95 07/08/2019    CALCIUM 9 3 07/08/2019    AST 12 07/08/2019    ALT 13 07/08/2019    ALKPHOS 68 07/08/2019    PROT 7 7 12/29/2015    BILITOT 0 35 12/29/2015    EGFR 88 07/08/2019       Patient voiced understanding and agreement in the above discussion  Aware to contact our office with questions/symptoms in the interim

## 2019-08-13 ENCOUNTER — OFFICE VISIT (OUTPATIENT)
Dept: FAMILY MEDICINE CLINIC | Facility: CLINIC | Age: 74
End: 2019-08-13
Payer: COMMERCIAL

## 2019-08-13 VITALS
BODY MASS INDEX: 24.29 KG/M2 | DIASTOLIC BLOOD PRESSURE: 76 MMHG | HEART RATE: 85 BPM | SYSTOLIC BLOOD PRESSURE: 124 MMHG | OXYGEN SATURATION: 96 % | HEIGHT: 65 IN | TEMPERATURE: 98.3 F | WEIGHT: 145.8 LBS

## 2019-08-13 DIAGNOSIS — E11.9 DIABETES TYPE 2, NO OCULAR INVOLVEMENT (HCC): ICD-10-CM

## 2019-08-13 DIAGNOSIS — E11.9 TYPE 2 DIABETES MELLITUS WITHOUT COMPLICATION, UNSPECIFIED WHETHER LONG TERM INSULIN USE (HCC): Primary | ICD-10-CM

## 2019-08-13 DIAGNOSIS — R12 HEART BURN: ICD-10-CM

## 2019-08-13 DIAGNOSIS — E11.8 TYPE 2 DIABETES MELLITUS WITH COMPLICATION, WITHOUT LONG-TERM CURRENT USE OF INSULIN (HCC): ICD-10-CM

## 2019-08-13 DIAGNOSIS — I10 ESSENTIAL HYPERTENSION: ICD-10-CM

## 2019-08-13 DIAGNOSIS — M85.80 OSTEOPENIA, UNSPECIFIED LOCATION: ICD-10-CM

## 2019-08-13 LAB — SL AMB POCT HEMOGLOBIN AIC: 5.8 (ref ?–6.5)

## 2019-08-13 PROCEDURE — 4010F ACE/ARB THERAPY RXD/TAKEN: CPT | Performed by: FAMILY MEDICINE

## 2019-08-13 PROCEDURE — 99214 OFFICE O/P EST MOD 30 MIN: CPT | Performed by: FAMILY MEDICINE

## 2019-08-13 PROCEDURE — 3044F HG A1C LEVEL LT 7.0%: CPT | Performed by: FAMILY MEDICINE

## 2019-08-13 PROCEDURE — 1036F TOBACCO NON-USER: CPT | Performed by: FAMILY MEDICINE

## 2019-08-13 PROCEDURE — 83036 HEMOGLOBIN GLYCOSYLATED A1C: CPT | Performed by: FAMILY MEDICINE

## 2019-08-13 PROCEDURE — 3074F SYST BP LT 130 MM HG: CPT | Performed by: FAMILY MEDICINE

## 2019-08-13 RX ORDER — PANTOPRAZOLE SODIUM 40 MG/1
40 TABLET, DELAYED RELEASE ORAL DAILY
Qty: 30 TABLET | Refills: 5 | Status: SHIPPED | OUTPATIENT
Start: 2019-08-13 | End: 2019-12-17 | Stop reason: SDUPTHER

## 2019-08-13 RX ORDER — BENAZEPRIL HYDROCHLORIDE 20 MG/1
20 TABLET ORAL DAILY
Qty: 30 TABLET | Refills: 5 | Status: SHIPPED | OUTPATIENT
Start: 2019-08-13 | End: 2019-12-17 | Stop reason: SDUPTHER

## 2019-08-13 NOTE — PROGRESS NOTES
Chief Complaint   Patient presents with    Follow-up    Hypertension     Assessment/Plan:  Add Calciuma dn Magnesium, 500 and 250 mg          Diagnoses and all orders for this visit:    Type 2 diabetes mellitus without complication, unspecified whether long term insulin use (HCC)  -     POCT hemoglobin A1c    Essential hypertension  -     benazepril (LOTENSIN) 20 mg tablet; Take 1 tablet (20 mg total) by mouth daily for 90 days    Diabetes type 2, no ocular involvement (HCC)  -     sitaGLIPtin (JANUVIA) 100 mg tablet; Take 1 tablet (100 mg total) by mouth daily for 153 days    Heart burn  -     pantoprazole (PROTONIX) 40 mg tablet; Take 1 tablet (40 mg total) by mouth daily    Type 2 diabetes mellitus with complication, without long-term current use of insulin ([facility])  -     metFORMIN (GLUCOPHAGE) 1000 MG tablet; Take 1 tablet (1,000 mg total) by mouth 2 (two) times a day with meals for 90 days    Osteopenia, unspecified location          Subjective:      Patient ID: Vincenzo Carter is a 69 y o  female  Follow up refills  Needs calcium and magnesium supplement  A1c: 5 8%  Reflux, KRISTINA and sugars controlled  SH: neg tob or OH  DEXA: osteopenia  The following portions of the patient's history were reviewed and updated as appropriate: past family history, past medical history and past surgical history  Review of Systems   Constitutional: Negative  HENT: Negative  Eyes: Negative  Respiratory: Negative  Cardiovascular: Negative  Gastrointestinal: Negative  Genitourinary: Negative  Musculoskeletal: Negative  Skin: Negative  Neurological: Negative  Psychiatric/Behavioral: Negative            Objective:      /76 (BP Location: Left arm, Patient Position: Sitting, Cuff Size: Standard)   Pulse 85   Temp 98 3 °F (36 8 °C) (Oral)   Ht 5' 5" (1 651 m)   Wt 66 1 kg (145 lb 12 8 oz)   LMP  (LMP Unknown)   SpO2 96%   BMI 24 26 kg/m²     Current Outpatient Medications:    ascorbic acid (VITAMIN C) 500 MG tablet, Take 500 mg by mouth daily  , Disp: , Rfl:     aspirin 81 MG tablet, Take 81 mg by mouth daily  , Disp: , Rfl:     benazepril (LOTENSIN) 20 mg tablet, Take 1 tablet (20 mg total) by mouth daily for 90 days, Disp: 90 tablet, Rfl: 0    bimatoprost (LUMIGAN) 0 01 % ophthalmic drops, 1 drop daily at bedtime  , Disp: , Rfl:     Cholecalciferol (VITAMIN D-3 PO), Take 1 capsule by mouth 3 (three) times a day , Disp: , Rfl:     Cyanocobalamin (VITAMIN B 12 PO), Take 1 tablet by mouth daily  , Disp: , Rfl:     ferrous sulfate 325 (65 Fe) mg tablet, Take 325 mg by mouth daily with breakfast , Disp: , Rfl:     JANUVIA 100 MG tablet, , Disp: , Rfl: 0    letrozole (FEMARA) 2 5 mg tablet, Take 1 tablet (2 5 mg total) by mouth daily, Disp: 30 tablet, Rfl: 11    metFORMIN (GLUCOPHAGE) 1000 MG tablet, Take 1 tablet (1,000 mg total) by mouth 2 (two) times a day with meals for 90 days, Disp: 180 tablet, Rfl: 0    pantoprazole (PROTONIX) 40 mg tablet, Take 1 tablet (40 mg total) by mouth daily, Disp: 30 tablet, Rfl: 5    sitaGLIPtin (JANUVIA) 100 mg tablet, Take 1 tablet (100 mg total) by mouth daily for 90 days, Disp: 30 tablet, Rfl: 5    vitamin A 10,000 units capsule, Take 10,000 Units by mouth daily  , Disp: , Rfl:     vitamin E, tocopherol, 400 units capsule, Take 100 Units by mouth daily  , Disp: , Rfl:      No Known Allergies       Physical Exam   Constitutional: She is oriented to person, place, and time  She appears well-developed and well-nourished  HENT:   Head: Normocephalic and atraumatic  Right Ear: External ear normal    Left Ear: External ear normal    Nose: Nose normal    Mouth/Throat: Oropharynx is clear and moist    Eyes: Pupils are equal, round, and reactive to light  Conjunctivae and EOM are normal    Neck: Normal range of motion  Neck supple  Cardiovascular: Normal rate, regular rhythm and normal heart sounds     Pulmonary/Chest: Effort normal and breath sounds normal    Abdominal: Soft  Neurological: She is alert and oriented to person, place, and time  She has normal reflexes  Skin: Skin is warm and dry  Psychiatric: She has a normal mood and affect   Her behavior is normal  Judgment and thought content normal

## 2019-09-04 ENCOUNTER — OFFICE VISIT (OUTPATIENT)
Dept: PODIATRY | Facility: CLINIC | Age: 74
End: 2019-09-04
Payer: COMMERCIAL

## 2019-09-04 VITALS
SYSTOLIC BLOOD PRESSURE: 116 MMHG | HEIGHT: 65 IN | HEART RATE: 87 BPM | WEIGHT: 144.2 LBS | BODY MASS INDEX: 24.03 KG/M2 | DIASTOLIC BLOOD PRESSURE: 74 MMHG

## 2019-09-04 DIAGNOSIS — B35.1 ONYCHOMYCOSIS: ICD-10-CM

## 2019-09-04 DIAGNOSIS — L84 CORNS: Primary | ICD-10-CM

## 2019-09-04 PROCEDURE — NCFTCARE PR NON-COVERED FOOT CARE: Performed by: PODIATRIST

## 2019-09-04 NOTE — PROGRESS NOTES
Patient presents for palliative diabetic foot care  Treatment consisted of trimming of multiple mycotic toenails and pinch callus each great toe  Patient desires treatment monthly and was rescheduled for 4 weeks

## 2019-10-02 ENCOUNTER — OFFICE VISIT (OUTPATIENT)
Dept: PODIATRY | Facility: CLINIC | Age: 74
End: 2019-10-02
Payer: COMMERCIAL

## 2019-10-02 VITALS
HEIGHT: 65 IN | WEIGHT: 144.9 LBS | DIASTOLIC BLOOD PRESSURE: 71 MMHG | HEART RATE: 80 BPM | SYSTOLIC BLOOD PRESSURE: 113 MMHG | BODY MASS INDEX: 24.14 KG/M2

## 2019-10-02 DIAGNOSIS — L84 CORNS: Primary | ICD-10-CM

## 2019-10-02 PROCEDURE — NCFTCARE PR NON-COVERED FOOT CARE: Performed by: PODIATRIST

## 2019-10-02 NOTE — PROGRESS NOTES
Patient presents for palliative diabetic foot care  No acute disorder noted  Pedal pulses are within normal limits  Treatment consisted of nail and lesion trimming    Patient is rescheduled in 4 weeks at her request

## 2019-10-28 ENCOUNTER — APPOINTMENT (OUTPATIENT)
Dept: LAB | Facility: HOSPITAL | Age: 74
End: 2019-10-28
Payer: COMMERCIAL

## 2019-10-28 LAB
25(OH)D3 SERPL-MCNC: 33 NG/ML (ref 30–100)
ALBUMIN SERPL BCP-MCNC: 4.1 G/DL (ref 3.5–5)
ALP SERPL-CCNC: 58 U/L (ref 46–116)
ALT SERPL W P-5'-P-CCNC: 15 U/L (ref 12–78)
ANION GAP SERPL CALCULATED.3IONS-SCNC: 7 MMOL/L (ref 4–13)
AST SERPL W P-5'-P-CCNC: 10 U/L (ref 5–45)
BASOPHILS # BLD AUTO: 0.03 THOUSANDS/ΜL (ref 0–0.1)
BASOPHILS NFR BLD AUTO: 0 % (ref 0–1)
BILIRUB SERPL-MCNC: 0.3 MG/DL (ref 0.2–1)
BUN SERPL-MCNC: 20 MG/DL (ref 5–25)
CALCIUM SERPL-MCNC: 9.7 MG/DL (ref 8.3–10.1)
CANCER AG27-29 SERPL-ACNC: 14.5 U/ML (ref 0–42.3)
CEA SERPL-MCNC: 2.9 NG/ML (ref 0–3)
CHLORIDE SERPL-SCNC: 98 MMOL/L (ref 100–108)
CO2 SERPL-SCNC: 27 MMOL/L (ref 21–32)
CREAT SERPL-MCNC: 0.65 MG/DL (ref 0.6–1.3)
EOSINOPHIL # BLD AUTO: 0.48 THOUSAND/ΜL (ref 0–0.61)
EOSINOPHIL NFR BLD AUTO: 7 % (ref 0–6)
ERYTHROCYTE [DISTWIDTH] IN BLOOD BY AUTOMATED COUNT: 13.2 % (ref 11.6–15.1)
GFR SERPL CREATININE-BSD FRML MDRD: 88 ML/MIN/1.73SQ M
GLUCOSE P FAST SERPL-MCNC: 103 MG/DL (ref 65–99)
HCT VFR BLD AUTO: 36.5 % (ref 34.8–46.1)
HGB BLD-MCNC: 11.7 G/DL (ref 11.5–15.4)
IMM GRANULOCYTES # BLD AUTO: 0.04 THOUSAND/UL (ref 0–0.2)
IMM GRANULOCYTES NFR BLD AUTO: 1 % (ref 0–2)
LYMPHOCYTES # BLD AUTO: 0.78 THOUSANDS/ΜL (ref 0.6–4.47)
LYMPHOCYTES NFR BLD AUTO: 11 % (ref 14–44)
MCH RBC QN AUTO: 31.9 PG (ref 26.8–34.3)
MCHC RBC AUTO-ENTMCNC: 32.1 G/DL (ref 31.4–37.4)
MCV RBC AUTO: 100 FL (ref 82–98)
MONOCYTES # BLD AUTO: 0.93 THOUSAND/ΜL (ref 0.17–1.22)
MONOCYTES NFR BLD AUTO: 13 % (ref 4–12)
NEUTROPHILS # BLD AUTO: 4.88 THOUSANDS/ΜL (ref 1.85–7.62)
NEUTS SEG NFR BLD AUTO: 68 % (ref 43–75)
NRBC BLD AUTO-RTO: 0 /100 WBCS
PLATELET # BLD AUTO: 239 THOUSANDS/UL (ref 149–390)
PMV BLD AUTO: 9.6 FL (ref 8.9–12.7)
POTASSIUM SERPL-SCNC: 4 MMOL/L (ref 3.5–5.3)
PROT SERPL-MCNC: 7.7 G/DL (ref 6.4–8.2)
RBC # BLD AUTO: 3.67 MILLION/UL (ref 3.81–5.12)
SODIUM SERPL-SCNC: 132 MMOL/L (ref 136–145)
WBC # BLD AUTO: 7.14 THOUSAND/UL (ref 4.31–10.16)

## 2019-10-28 PROCEDURE — 82306 VITAMIN D 25 HYDROXY: CPT | Performed by: PHYSICIAN ASSISTANT

## 2019-10-28 PROCEDURE — 85025 COMPLETE CBC W/AUTO DIFF WBC: CPT | Performed by: PHYSICIAN ASSISTANT

## 2019-10-28 PROCEDURE — 82378 CARCINOEMBRYONIC ANTIGEN: CPT | Performed by: PHYSICIAN ASSISTANT

## 2019-10-28 PROCEDURE — 36415 COLL VENOUS BLD VENIPUNCTURE: CPT | Performed by: PHYSICIAN ASSISTANT

## 2019-10-28 PROCEDURE — 80053 COMPREHEN METABOLIC PANEL: CPT | Performed by: PHYSICIAN ASSISTANT

## 2019-10-28 PROCEDURE — 86300 IMMUNOASSAY TUMOR CA 15-3: CPT | Performed by: PHYSICIAN ASSISTANT

## 2019-11-04 ENCOUNTER — OFFICE VISIT (OUTPATIENT)
Dept: HEMATOLOGY ONCOLOGY | Facility: CLINIC | Age: 74
End: 2019-11-04
Payer: COMMERCIAL

## 2019-11-04 VITALS
SYSTOLIC BLOOD PRESSURE: 118 MMHG | TEMPERATURE: 98.2 F | RESPIRATION RATE: 16 BRPM | HEART RATE: 87 BPM | BODY MASS INDEX: 26.87 KG/M2 | DIASTOLIC BLOOD PRESSURE: 70 MMHG | WEIGHT: 146 LBS | OXYGEN SATURATION: 97 % | HEIGHT: 62 IN

## 2019-11-04 DIAGNOSIS — Z17.0 MALIGNANT NEOPLASM OF RIGHT BREAST IN FEMALE, ESTROGEN RECEPTOR POSITIVE, UNSPECIFIED SITE OF BREAST (HCC): ICD-10-CM

## 2019-11-04 DIAGNOSIS — C50.911 MALIGNANT NEOPLASM OF RIGHT BREAST IN FEMALE, ESTROGEN RECEPTOR POSITIVE, UNSPECIFIED SITE OF BREAST (HCC): ICD-10-CM

## 2019-11-04 DIAGNOSIS — R79.89 D-DIMER, ELEVATED: ICD-10-CM

## 2019-11-04 DIAGNOSIS — I82.5Z2 CHRONIC DEEP VEIN THROMBOSIS (DVT) OF DISTAL VEIN OF LEFT LOWER EXTREMITY (HCC): ICD-10-CM

## 2019-11-04 DIAGNOSIS — E04.1 THYROID NODULE: ICD-10-CM

## 2019-11-04 DIAGNOSIS — M85.80 OSTEOPENIA DUE TO CANCER THERAPY: ICD-10-CM

## 2019-11-04 DIAGNOSIS — C18.2 MALIGNANT NEOPLASM OF ASCENDING COLON (HCC): Primary | ICD-10-CM

## 2019-11-04 DIAGNOSIS — E11.9 TYPE 2 DIABETES MELLITUS WITHOUT COMPLICATION, UNSPECIFIED WHETHER LONG TERM INSULIN USE (HCC): ICD-10-CM

## 2019-11-04 PROCEDURE — 99214 OFFICE O/P EST MOD 30 MIN: CPT | Performed by: INTERNAL MEDICINE

## 2019-11-04 NOTE — PROGRESS NOTES
HPI:  Follow-up visit for right colon cancer, breast cancer, DVT and osteopenia      In 2012 patient had hormone receptor positive, HER-2 negative stage IIIB right breast cancer   Patient had right mastectomy, and lymph node dissection and had 9 positive lymph nodes  She had Adriamycin + Cytoxan chemotherapy followed by Taxotere and after that radiation therapy  Since November/December 2012 she has been on Femara        Early 2016 she was found to have benign clustered calcifications in left breast and had a biopsy and that was benign  Dr Rodriguez Dawn takes care of her mammography     She takes vitamin D and liquid calcium and is on prolia for osteopenia   Prolia 60 mg every 6 months subcutaneously  She does not have problem with her teeth         She most recent DEXA scan was in June 2019  That showed osteopenia      In December 2012 patient developed DVT right leg and she was started on Xarelto  She had GI bleeding  Xarelto was stopped  On colonoscopy she was found to have right colon cancer  On 7/22/15 she underwent right hemicolectomy  Pathological diagnosis right colon cancer, stage I, T2 N0 MX, grade 2, No lymphovascular invasion and no perineural invasion  She did not require adjuvant therapy for colon cancer   She has been on baby aspirin  She is not on anticoagulation anymore  Follow-up Venous Doppler study was negative for DVT   She runs high D-dimer     History of thyroid nodule  She had biopsy of thyroid nodule in June 2014 and she states that was negative for cancer  She gets thyroid ultrasound yearly for surveillance  This is due in June 2020       She has minimal musculoskeletal symptoms secondary to Femara  She has facial hair growth  Current Outpatient Medications:     ascorbic acid (VITAMIN C) 500 MG tablet, Take 500 mg by mouth daily  , Disp: , Rfl:     aspirin 81 MG tablet, Take 81 mg by mouth daily  , Disp: , Rfl:     benazepril (LOTENSIN) 20 mg tablet, Take 1 tablet (20 mg total) by mouth daily for 90 days, Disp: 30 tablet, Rfl: 5    bimatoprost (LUMIGAN) 0 01 % ophthalmic drops, 1 drop daily at bedtime  , Disp: , Rfl:     Cholecalciferol (VITAMIN D-3 PO), Take 1 capsule by mouth 3 (three) times a day , Disp: , Rfl:     Cyanocobalamin (VITAMIN B 12 PO), Take 1 tablet by mouth daily  , Disp: , Rfl:     ferrous sulfate 325 (65 Fe) mg tablet, Take 325 mg by mouth daily with breakfast , Disp: , Rfl:     JANUVIA 100 MG tablet, , Disp: , Rfl: 0    letrozole (FEMARA) 2 5 mg tablet, Take 1 tablet (2 5 mg total) by mouth daily, Disp: 30 tablet, Rfl: 11    metFORMIN (GLUCOPHAGE) 1000 MG tablet, Take 1 tablet (1,000 mg total) by mouth 2 (two) times a day with meals for 90 days, Disp: 60 tablet, Rfl: 5    pantoprazole (PROTONIX) 40 mg tablet, Take 1 tablet (40 mg total) by mouth daily, Disp: 30 tablet, Rfl: 5    sitaGLIPtin (JANUVIA) 100 mg tablet, Take 1 tablet (100 mg total) by mouth daily for 153 days, Disp: 30 tablet, Rfl: 5    vitamin A 10,000 units capsule, Take 10,000 Units by mouth daily  , Disp: , Rfl:     vitamin E, tocopherol, 400 units capsule, Take 100 Units by mouth daily  , Disp: , Rfl:     No Known Allergies    Oncology History     In 2012 patient was diagnosed to have Hormone receptor positive, HER-2 negative stage IIIB cancer in right breast  She had right mastectomy and lymph node dissection   9 lymph nodes showed metastatic disease  Patient was given Adriamycin + Cytoxan chemotherapy followed by Taxotere and after that radiation therapy  Since November/December 2012 she has been on Femara     She will be on Femara for a total of 10 years  On 7/22/15 she underwent right hemicolectomy  Pathological diagnosis right colon cancer, stage I, T2 N0 MX, grade 2,no lymphovascular invasion and no perineural invasion  She did not require adjuvant therapy for colon cancer  She has been running slightly high CEA and that is being monitored          Malignant neoplasm of right breast in female, estrogen receptor positive ([facility] [address] )    6/21/2018 Initial Diagnosis     Malignant neoplasm of right breast in female, estrogen receptor positive [facility])       Surgery      2012- right mastectomy   2015 - right hemicolectomy       Radiation      [id number]: Radiation to right chest wall and lymph nodes       Chemotherapy      2012 -Adriamycin plus Cytoxan followed by Taxotere Followed by Femara  She will have Femara for a total of 10 years  ROS:  11/04/19 Reviewed 13 systems:  Presently no headaches, seizures, dizziness, diplopia, dysphagia, hoarseness, chest pain, palpitations, shortness of breath, cough, hemoptysis, abdominal pain, nausea, vomiting, change in bowel habits, melena, hematuria, fever, chills, bleeding, bone pains, skin rash, weight loss,  tiredness ,  weakness, numbness,  claudication and gait problem  No frequent infections  Not unusually sensitive to heat or cold  No swelling of the ankles  No swollen glands     No GYN symptoms  Patient is anxious  Other symptoms are in HPI        /70 (BP Location: Left arm, Patient Position: Sitting, Cuff Size: Adult)   Pulse 87   Temp 98 2 °F (36 8 °C)   Resp 16   Ht 5' 2" (1 575 m)   Wt 66 2 kg (146 lb)   LMP  (LMP Unknown)   SpO2 97%   BMI 26 70 kg/m²     Physical Exam:  Alert, oriented, not in distress, no icterus, no oral thrush, no palpable neck mass, clear lung fields, regular heart rate, abdomen  soft and non tender, no palpable abdominal mass, no ascites, no edema of ankles, no calf tenderness, no focal neurological deficit, no skin rash, no palpable lymphadenopathy in the neck and axillary areas, good arterial pulses, no clubbing  Patient is anxious  Performance status 1  Right mastectomy scar  No lymphedema    Facial hair growth    IMAGING:  No orders to display       LABS:  Results for orders placed or performed in visit on 08/13/19   POCT hemoglobin A1c   Result Value Ref Range    Hemoglobin A1C 5 8 6 5     Labs, Imaging, & Other studies:   All pertinent labs and imaging studies were personally reviewed    Lab Results   Component Value Date     12/29/2015    K 4 0 10/28/2019    CL 98 (L) 10/28/2019    CO2 27 10/28/2019    ANIONGAP 10 12/29/2015    BUN 20 10/28/2019    CREATININE 0 65 10/28/2019    GLUCOSE 121 12/29/2015    GLUF 103 (H) 10/28/2019    CALCIUM 9 7 10/28/2019    AST 10 10/28/2019    ALT 15 10/28/2019    ALKPHOS 58 10/28/2019    PROT 7 7 12/29/2015    BILITOT 0 35 12/29/2015    EGFR 88 10/28/2019     Lab Results   Component Value Date    WBC 7 14 10/28/2019    HGB 11 7 10/28/2019    HCT 36 5 10/28/2019     (H) 10/28/2019     10/28/2019    Granulocytes 4880  No immature cells  Normal CEA and CA 27-29  Normal vitamin-D    Reviewed and discussed with patient  Assessment and plan: Follow-up visit for right colon cancer, breast cancer, DVT and osteopenia      In 2012 patient had hormone receptor positive, HER-2 negative stage IIIB right breast cancer   Patient had right mastectomy, and lymph node dissection and had 9 positive lymph nodes  She had Adriamycin + Cytoxan chemotherapy followed by Taxotere and after that radiation therapy  Since November/December 2012 she has been on Femara        Early 2016 she was found to have benign clustered calcifications in left breast and had a biopsy and that was benign  Dr Yasmani Dumont takes care of her mammography     She takes vitamin D and liquid calcium and is on prolia for osteopenia   Prolia 60 mg every 6 months subcutaneously  She does not have problem with her teeth         She most recent DEXA scan was in June 2019  That showed osteopenia      In December 2012 patient developed DVT right leg and she was started on Xarelto  She had GI bleeding  Xarelto was stopped  On colonoscopy she was found to have right colon cancer  On 7/22/15 she underwent right hemicolectomy   Pathological diagnosis right colon cancer, stage I, T2 N0 MX, grade 2, No lymphovascular invasion and no perineural invasion  She did not require adjuvant therapy for colon cancer   She has been on baby aspirin  She is not on anticoagulation anymore  Follow-up Venous Doppler study was negative for DVT   She runs high D-dimer     History of thyroid nodule  She had biopsy of thyroid nodule in June 2014 and she states that was negative for cancer  She gets thyroid ultrasound yearly for surveillance  This is due in June 2020       She has minimal musculoskeletal symptoms secondary to Femara  She has facial hair growth     Physical examination and test results are as recorded and discussed  Breast cancer and colon cancers are in remission  Goal is cure from both the cancers  No active DVT at present  D-dimer test is being monitored  Thyroid nodules are being monitored     Facial hair growth could be secondary to Femara  All discussed in detail  Questions answered  Discussed the importance of self-breast examination, eating healthy foods, staying active and health screening tests  Patient is capable of self-care  1  Malignant neoplasm of ascending colon (HCC)    - Cancer antigen 27 29; Future  - CBC and differential; Future  - CEA; Future    2  Malignant neoplasm of right breast in female, estrogen receptor positive, unspecified site of breast ([facility] [address] )    - Cancer antigen 27 29; Future  - CBC and differential; Future  - CEA; Future  - Comprehensive metabolic panel; Future    3  Chronic deep vein thrombosis (DVT) of distal vein of left lower extremity (HCC)    - D-dimer, quantitative; Future  - Basic metabolic panel; Future    4  Thyroid nodule      5  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

## 2019-11-04 NOTE — PATIENT INSTRUCTIONS
Continuing with Prolia  Blood tests in January 2024 BMP and D-dimer  Other blood tests prior to next visit in 6 months

## 2019-11-06 ENCOUNTER — OFFICE VISIT (OUTPATIENT)
Dept: PODIATRY | Facility: CLINIC | Age: 74
End: 2019-11-06

## 2019-11-06 VITALS
WEIGHT: 146 LBS | BODY MASS INDEX: 26.87 KG/M2 | HEIGHT: 62 IN | SYSTOLIC BLOOD PRESSURE: 106 MMHG | DIASTOLIC BLOOD PRESSURE: 71 MMHG | HEART RATE: 88 BPM

## 2019-11-06 DIAGNOSIS — L84 CORNS: ICD-10-CM

## 2019-11-06 DIAGNOSIS — E11.42 DIABETIC POLYNEUROPATHY ASSOCIATED WITH TYPE 2 DIABETES MELLITUS (HCC): Primary | ICD-10-CM

## 2019-11-06 DIAGNOSIS — B35.1 ONYCHOMYCOSIS: ICD-10-CM

## 2019-11-06 PROCEDURE — NCFTCARE PR NON-COVERED FOOT CARE: Performed by: PODIATRIST

## 2019-11-06 NOTE — PROGRESS NOTES
Patient presents for palliative care  Treatment consisted of nail and lesion trimming  Pedal pulses are trace but palpable    Patient is rescheduled at 4 weeks at her request

## 2019-11-13 ENCOUNTER — IMMUNIZATIONS (OUTPATIENT)
Dept: FAMILY MEDICINE CLINIC | Facility: CLINIC | Age: 74
End: 2019-11-13
Payer: COMMERCIAL

## 2019-11-13 DIAGNOSIS — Z23 NEED FOR INFLUENZA VACCINATION: Primary | ICD-10-CM

## 2019-11-13 PROCEDURE — G0008 ADMIN INFLUENZA VIRUS VAC: HCPCS

## 2019-11-13 PROCEDURE — 90662 IIV NO PRSV INCREASED AG IM: CPT

## 2019-11-15 NOTE — PROGRESS NOTES
Pt here today 11/13/2019 for influenza vaccine per Dr Jacques pt received vaccine in left deltoid pt tolerated well with no complaints and will return as needed

## 2019-12-02 ENCOUNTER — OFFICE VISIT (OUTPATIENT)
Dept: PODIATRY | Facility: CLINIC | Age: 74
End: 2019-12-02

## 2019-12-02 VITALS
HEIGHT: 62 IN | WEIGHT: 145.5 LBS | DIASTOLIC BLOOD PRESSURE: 84 MMHG | HEART RATE: 92 BPM | SYSTOLIC BLOOD PRESSURE: 128 MMHG | BODY MASS INDEX: 26.78 KG/M2

## 2019-12-02 DIAGNOSIS — B35.1 ONYCHOMYCOSIS: Primary | ICD-10-CM

## 2019-12-02 DIAGNOSIS — E11.42 DIABETIC POLYNEUROPATHY ASSOCIATED WITH TYPE 2 DIABETES MELLITUS (HCC): ICD-10-CM

## 2019-12-02 DIAGNOSIS — L84 CORNS: ICD-10-CM

## 2019-12-02 PROCEDURE — NCFTCARE PR NON-COVERED FOOT CARE: Performed by: PODIATRIST

## 2019-12-02 RX ORDER — BENAZEPRIL HYDROCHLORIDE 20 MG/1
TABLET ORAL
Refills: 0 | COMMUNITY
Start: 2019-11-21 | End: 2019-12-17 | Stop reason: SDUPTHER

## 2019-12-02 RX ORDER — IBUPROFEN 200 MG
1 CAPSULE ORAL DAILY
COMMUNITY

## 2019-12-02 NOTE — PROGRESS NOTES
Patient presents for palliative foot care  No acute disorder noted  Treatment consisted of nail and lesion trimming  Patient is rescheduled in 5 weeks

## 2019-12-17 ENCOUNTER — OFFICE VISIT (OUTPATIENT)
Dept: FAMILY MEDICINE CLINIC | Facility: CLINIC | Age: 74
End: 2019-12-17
Payer: COMMERCIAL

## 2019-12-17 VITALS
HEART RATE: 64 BPM | SYSTOLIC BLOOD PRESSURE: 140 MMHG | BODY MASS INDEX: 27.31 KG/M2 | DIASTOLIC BLOOD PRESSURE: 82 MMHG | RESPIRATION RATE: 16 BRPM | TEMPERATURE: 98 F | OXYGEN SATURATION: 94 % | HEIGHT: 62 IN | WEIGHT: 148.4 LBS

## 2019-12-17 DIAGNOSIS — E11.8 TYPE 2 DIABETES MELLITUS WITH COMPLICATION, WITHOUT LONG-TERM CURRENT USE OF INSULIN (HCC): ICD-10-CM

## 2019-12-17 DIAGNOSIS — I10 ESSENTIAL HYPERTENSION: ICD-10-CM

## 2019-12-17 DIAGNOSIS — R12 HEART BURN: ICD-10-CM

## 2019-12-17 DIAGNOSIS — Z11.59 NEED FOR HEPATITIS C SCREENING TEST: ICD-10-CM

## 2019-12-17 DIAGNOSIS — C18.2 MALIGNANT NEOPLASM OF ASCENDING COLON (HCC): ICD-10-CM

## 2019-12-17 DIAGNOSIS — M85.80 OSTEOPENIA DUE TO CANCER THERAPY: ICD-10-CM

## 2019-12-17 DIAGNOSIS — Z17.0 MALIGNANT NEOPLASM OF RIGHT BREAST IN FEMALE, ESTROGEN RECEPTOR POSITIVE, UNSPECIFIED SITE OF BREAST (HCC): ICD-10-CM

## 2019-12-17 DIAGNOSIS — E11.9 TYPE 2 DIABETES MELLITUS WITHOUT COMPLICATION, UNSPECIFIED WHETHER LONG TERM INSULIN USE (HCC): Primary | ICD-10-CM

## 2019-12-17 DIAGNOSIS — E11.9 DIABETES TYPE 2, NO OCULAR INVOLVEMENT (HCC): ICD-10-CM

## 2019-12-17 DIAGNOSIS — C50.911 MALIGNANT NEOPLASM OF RIGHT BREAST IN FEMALE, ESTROGEN RECEPTOR POSITIVE, UNSPECIFIED SITE OF BREAST (HCC): ICD-10-CM

## 2019-12-17 LAB — SL AMB POCT HEMOGLOBIN AIC: 5.8 (ref ?–6.5)

## 2019-12-17 PROCEDURE — 83036 HEMOGLOBIN GLYCOSYLATED A1C: CPT | Performed by: FAMILY MEDICINE

## 2019-12-17 PROCEDURE — 4010F ACE/ARB THERAPY RXD/TAKEN: CPT | Performed by: FAMILY MEDICINE

## 2019-12-17 PROCEDURE — 99215 OFFICE O/P EST HI 40 MIN: CPT | Performed by: FAMILY MEDICINE

## 2019-12-17 PROCEDURE — 3044F HG A1C LEVEL LT 7.0%: CPT | Performed by: FAMILY MEDICINE

## 2019-12-17 PROCEDURE — 1036F TOBACCO NON-USER: CPT | Performed by: FAMILY MEDICINE

## 2019-12-17 RX ORDER — BENAZEPRIL HYDROCHLORIDE 20 MG/1
20 TABLET ORAL DAILY
Qty: 30 TABLET | Refills: 5 | Status: SHIPPED | OUTPATIENT
Start: 2019-12-17 | End: 2020-04-20 | Stop reason: SDUPTHER

## 2019-12-17 RX ORDER — PANTOPRAZOLE SODIUM 40 MG/1
40 TABLET, DELAYED RELEASE ORAL DAILY
Qty: 30 TABLET | Refills: 5 | Status: SHIPPED | OUTPATIENT
Start: 2019-12-17 | End: 2020-04-20 | Stop reason: SDUPTHER

## 2019-12-17 RX ORDER — MULTIVITAMIN WITH IRON
1 TABLET ORAL DAILY
COMMUNITY

## 2019-12-17 NOTE — PROGRESS NOTES
Chief Complaint   Patient presents with    Follow-up     4 month f/u       Assessment/Plan:  Discussed nutrition, physical activity, labs, hydration, PMH  BMI Counseling: Body mass index is 27 14 kg/m²  The BMI is above normal  Nutrition recommendations include moderation in carbohydrate intake and increasing intake of lean protein  Diagnoses and all orders for this visit:    Type 2 diabetes mellitus without complication, unspecified whether long term insulin use (HCC)  -     POCT hemoglobin A1c    Need for hepatitis C screening test  -     Hepatitis C antibody; Future    Essential hypertension  -     benazepril (LOTENSIN) 20 mg tablet; Take 1 tablet (20 mg total) by mouth daily    Type 2 diabetes mellitus with complication, without long-term current use of insulin (HCC)  -     metFORMIN (GLUCOPHAGE) 1000 MG tablet; Take 1 tablet (1,000 mg total) by mouth 2 (two) times a day with meals    Heart burn  -     pantoprazole (PROTONIX) 40 mg tablet; Take 1 tablet (40 mg total) by mouth daily    Diabetes type 2, no ocular involvement (HCC)  -     sitaGLIPtin (JANUVIA) 100 mg tablet; Take 1 tablet (100 mg total) by mouth daily    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    Malignant neoplasm of right breast in female, estrogen receptor positive, unspecified site of breast ([facility] [address] )    Malignant neoplasm of ascending colon ([facility] [address] )    Other orders  -     Magnesium 250 MG TABS; Take 1 tablet by mouth daily          Subjective:      Patient ID: Dionne Elaine is a 69 y o  female  Follow up  Walks everywhere and enjoys it  Reviewed Med's  Sugars and BP with good control  A1c: 5 8%  Follows with Onc for breast and colon CA        The following portions of the patient's history were reviewed and updated as appropriate: allergies, current medications, past medical history, past social history, past surgical history and problem list   I have spent 40 minutes with Patient  today in which greater than 50% of this time was spent in counseling/coordination of care regarding Diagnostic results, Prognosis, Intructions for management, Risk factor reductions and Impressions  Review of Systems   Constitutional: Negative  HENT: Negative  Eyes: Negative  Respiratory: Negative  Negative for chest tightness and shortness of breath  Cardiovascular: Negative  Negative for chest pain, palpitations and leg swelling  Gastrointestinal: Negative  Negative for blood in stool, constipation, diarrhea and nausea  Genitourinary: Negative  Musculoskeletal: Positive for arthralgias  Skin: Negative  Neurological: Negative  Psychiatric/Behavioral: Negative  Objective:      /82 (BP Location: Left arm, Patient Position: Sitting, Cuff Size: Standard)   Pulse 64   Temp 98 °F (36 7 °C) (Oral)   Resp 16   Ht 5' 2" (1 575 m)   Wt 67 3 kg (148 lb 6 4 oz)   LMP  (LMP Unknown)   SpO2 94%   BMI 27 14 kg/m²          Physical Exam   Constitutional: She is oriented to person, place, and time  She appears well-developed and well-nourished  HENT:   Head: Normocephalic and atraumatic  Right Ear: External ear normal    Left Ear: External ear normal    Nose: Nose normal    Mouth/Throat: Oropharynx is clear and moist    Eyes: Pupils are equal, round, and reactive to light  Conjunctivae and EOM are normal    Neck: Normal range of motion  Neck supple  Cardiovascular: Normal rate, regular rhythm and normal heart sounds  Pulmonary/Chest: Effort normal and breath sounds normal    Abdominal: Soft  Neurological: She is alert and oriented to person, place, and time  She has normal reflexes  Skin: Skin is warm and dry  Psychiatric: She has a normal mood and affect   Her behavior is normal  Judgment and thought content normal

## 2020-01-03 ENCOUNTER — APPOINTMENT (OUTPATIENT)
Dept: LAB | Facility: HOSPITAL | Age: 75
End: 2020-01-03
Attending: INTERNAL MEDICINE
Payer: COMMERCIAL

## 2020-01-03 DIAGNOSIS — R79.89 D-DIMER, ELEVATED: ICD-10-CM

## 2020-01-03 DIAGNOSIS — I82.5Z2 CHRONIC DEEP VEIN THROMBOSIS (DVT) OF DISTAL VEIN OF LEFT LOWER EXTREMITY (HCC): ICD-10-CM

## 2020-01-03 DIAGNOSIS — E11.9 TYPE 2 DIABETES MELLITUS WITHOUT COMPLICATION, UNSPECIFIED WHETHER LONG TERM INSULIN USE (HCC): ICD-10-CM

## 2020-01-03 LAB
ANION GAP SERPL CALCULATED.3IONS-SCNC: 2 MMOL/L (ref 4–13)
BUN SERPL-MCNC: 11 MG/DL (ref 5–25)
CALCIUM SERPL-MCNC: 10.1 MG/DL (ref 8.3–10.1)
CHLORIDE SERPL-SCNC: 101 MMOL/L (ref 100–108)
CO2 SERPL-SCNC: 32 MMOL/L (ref 21–32)
CREAT SERPL-MCNC: 0.75 MG/DL (ref 0.6–1.3)
D DIMER PPP FEU-MCNC: 1.25 UG/ML FEU
GFR SERPL CREATININE-BSD FRML MDRD: 79 ML/MIN/1.73SQ M
GLUCOSE P FAST SERPL-MCNC: 100 MG/DL (ref 65–99)
POTASSIUM SERPL-SCNC: 3.6 MMOL/L (ref 3.5–5.3)
SODIUM SERPL-SCNC: 135 MMOL/L (ref 136–145)

## 2020-01-03 PROCEDURE — 85379 FIBRIN DEGRADATION QUANT: CPT

## 2020-01-03 PROCEDURE — 80048 BASIC METABOLIC PNL TOTAL CA: CPT

## 2020-01-03 PROCEDURE — 36415 COLL VENOUS BLD VENIPUNCTURE: CPT

## 2020-01-06 ENCOUNTER — OFFICE VISIT (OUTPATIENT)
Dept: PODIATRY | Facility: CLINIC | Age: 75
End: 2020-01-06

## 2020-01-06 VITALS
HEART RATE: 102 BPM | HEIGHT: 62 IN | DIASTOLIC BLOOD PRESSURE: 81 MMHG | WEIGHT: 148.8 LBS | BODY MASS INDEX: 27.38 KG/M2 | SYSTOLIC BLOOD PRESSURE: 129 MMHG

## 2020-01-06 DIAGNOSIS — E11.42 DIABETIC POLYNEUROPATHY ASSOCIATED WITH TYPE 2 DIABETES MELLITUS (HCC): Primary | ICD-10-CM

## 2020-01-06 PROCEDURE — NCFTCARE PR NON-COVERED FOOT CARE: Performed by: PODIATRIST

## 2020-01-06 NOTE — PROGRESS NOTES
Patient presents for palliative diabetic foot care  No acute disorder noted  Treatment consisted of nail and lesion trimming  Pedal pulses are within normal limits  Last A1c 5 8

## 2020-01-10 ENCOUNTER — HOSPITAL ENCOUNTER (OUTPATIENT)
Dept: INFUSION CENTER | Facility: HOSPITAL | Age: 75
Discharge: HOME | End: 2020-01-10
Attending: INTERNAL MEDICINE
Payer: COMMERCIAL

## 2020-01-10 VITALS — WEIGHT: 148.15 LBS | BODY MASS INDEX: 27.26 KG/M2 | HEIGHT: 62 IN

## 2020-01-10 DIAGNOSIS — Z85.038 PERSONAL HISTORY OF OTHER MALIGNANT NEOPLASM OF LARGE INTESTINE: ICD-10-CM

## 2020-01-10 DIAGNOSIS — M85.80 OSTEOPENIA DUE TO CANCER THERAPY: ICD-10-CM

## 2020-01-10 DIAGNOSIS — C50.911 MALIGNANT NEOPLASM OF RIGHT BREAST IN FEMALE, ESTROGEN RECEPTOR POSITIVE, UNSPECIFIED SITE OF BREAST (HCC): ICD-10-CM

## 2020-01-10 DIAGNOSIS — Z17.0 MALIGNANT NEOPLASM OF RIGHT BREAST IN FEMALE, ESTROGEN RECEPTOR POSITIVE, UNSPECIFIED SITE OF BREAST (HCC): ICD-10-CM

## 2020-01-10 DIAGNOSIS — M81.8 OTHER OSTEOPOROSIS WITHOUT CURRENT PATHOLOGICAL FRACTURE: ICD-10-CM

## 2020-01-10 DIAGNOSIS — C18.2 MALIGNANT NEOPLASM OF ASCENDING COLON (HCC): Primary | ICD-10-CM

## 2020-01-10 PROCEDURE — 96401 CHEMO ANTI-NEOPL SQ/IM: CPT

## 2020-01-10 RX ADMIN — DENOSUMAB 60 MG: 60 INJECTION SUBCUTANEOUS at 13:53

## 2020-01-10 NOTE — PLAN OF CARE
Problem: Potential for Falls  Goal: Patient will remain free of falls  Description  INTERVENTIONS:  - Assess patient frequently for physical needs  -  Identify cognitive and physical deficits and behaviors that affect risk of falls    -  [place] fall precautions as indicated by assessment   - Educate patient/family on patient safety including physical limitations  - Instruct patient to call for assistance with activity based on assessment  - Modify environment to reduce risk of injury  - Consider OT/PT consult to assist with strengthening/mobility  Outcome: Progressing

## 2020-02-10 ENCOUNTER — OFFICE VISIT (OUTPATIENT)
Dept: PODIATRY | Facility: CLINIC | Age: 75
End: 2020-02-10

## 2020-02-10 VITALS
DIASTOLIC BLOOD PRESSURE: 78 MMHG | HEIGHT: 62 IN | HEART RATE: 95 BPM | BODY MASS INDEX: 27.42 KG/M2 | SYSTOLIC BLOOD PRESSURE: 128 MMHG | WEIGHT: 149 LBS

## 2020-02-10 DIAGNOSIS — M20.41 HAMMER TOES OF BOTH FEET: ICD-10-CM

## 2020-02-10 DIAGNOSIS — M20.42 HAMMER TOES OF BOTH FEET: ICD-10-CM

## 2020-02-10 DIAGNOSIS — L84 CORNS: Primary | ICD-10-CM

## 2020-02-10 PROCEDURE — NCFTCARE PR NON-COVERED FOOT CARE: Performed by: PODIATRIST

## 2020-02-10 NOTE — PROGRESS NOTES
Patient presents for palliative diabetic foot care  Pedal pulses remain palpable  Trimmed hyperkeratotic lesions on 2nd and 4th toe left foot and 4th toe right foot due to hammertoe deformities  Patient is rescheduled in 5 weeks

## 2020-03-06 ENCOUNTER — TELEPHONE (OUTPATIENT)
Dept: HEMATOLOGY ONCOLOGY | Facility: CLINIC | Age: 75
End: 2020-03-06

## 2020-03-06 NOTE — TELEPHONE ENCOUNTER
Spoke to patient and informed her that her appt on 5/4/20 needed to be R/S  Her new appt is 5/7/20 at 9:20 am at the [place] location with Dr Romana Herter  Patient stated she will contact [facility] and inform them of the appt  Patient was fine with the change

## 2020-03-11 LAB
LEFT EYE DIABETIC RETINOPATHY: NORMAL
RIGHT EYE DIABETIC RETINOPATHY: NORMAL

## 2020-03-16 ENCOUNTER — OFFICE VISIT (OUTPATIENT)
Dept: PODIATRY | Facility: CLINIC | Age: 75
End: 2020-03-16

## 2020-03-16 VITALS
SYSTOLIC BLOOD PRESSURE: 138 MMHG | HEIGHT: 62 IN | BODY MASS INDEX: 27.05 KG/M2 | DIASTOLIC BLOOD PRESSURE: 83 MMHG | WEIGHT: 147 LBS | HEART RATE: 81 BPM

## 2020-03-16 DIAGNOSIS — E11.42 DIABETIC POLYNEUROPATHY ASSOCIATED WITH TYPE 2 DIABETES MELLITUS (HCC): ICD-10-CM

## 2020-03-16 DIAGNOSIS — L84 CORNS: Primary | ICD-10-CM

## 2020-03-16 PROCEDURE — NCFTCARE PR NON-COVERED FOOT CARE: Performed by: PODIATRIST

## 2020-03-16 NOTE — PROGRESS NOTES
Patient presents for palliative diabetic foot care  No acute disorder noted  Treatment consisted of nail and lesion trimming

## 2020-04-13 ENCOUNTER — APPOINTMENT (OUTPATIENT)
Dept: LAB | Facility: HOSPITAL | Age: 75
End: 2020-04-13
Payer: COMMERCIAL

## 2020-04-13 DIAGNOSIS — Z11.59 NEED FOR HEPATITIS C SCREENING TEST: ICD-10-CM

## 2020-04-13 LAB — HCV AB SER QL: NORMAL

## 2020-04-13 PROCEDURE — 86803 HEPATITIS C AB TEST: CPT

## 2020-04-13 PROCEDURE — 36415 COLL VENOUS BLD VENIPUNCTURE: CPT

## 2020-04-20 ENCOUNTER — OFFICE VISIT (OUTPATIENT)
Dept: FAMILY MEDICINE CLINIC | Facility: CLINIC | Age: 75
End: 2020-04-20
Payer: COMMERCIAL

## 2020-04-20 VITALS
TEMPERATURE: 97.9 F | BODY MASS INDEX: 26.39 KG/M2 | HEART RATE: 76 BPM | OXYGEN SATURATION: 97 % | SYSTOLIC BLOOD PRESSURE: 124 MMHG | HEIGHT: 62 IN | WEIGHT: 143.4 LBS | DIASTOLIC BLOOD PRESSURE: 76 MMHG

## 2020-04-20 DIAGNOSIS — E11.9 DIABETES TYPE 2, NO OCULAR INVOLVEMENT (HCC): Primary | ICD-10-CM

## 2020-04-20 DIAGNOSIS — E11.8 TYPE 2 DIABETES MELLITUS WITH COMPLICATION, WITHOUT LONG-TERM CURRENT USE OF INSULIN (HCC): ICD-10-CM

## 2020-04-20 DIAGNOSIS — C50.911 MALIGNANT NEOPLASM OF RIGHT BREAST IN FEMALE, ESTROGEN RECEPTOR POSITIVE, UNSPECIFIED SITE OF BREAST (HCC): ICD-10-CM

## 2020-04-20 DIAGNOSIS — R12 HEART BURN: ICD-10-CM

## 2020-04-20 DIAGNOSIS — M85.80 OSTEOPENIA DUE TO CANCER THERAPY: ICD-10-CM

## 2020-04-20 DIAGNOSIS — Z17.0 MALIGNANT NEOPLASM OF RIGHT BREAST IN FEMALE, ESTROGEN RECEPTOR POSITIVE, UNSPECIFIED SITE OF BREAST (HCC): ICD-10-CM

## 2020-04-20 DIAGNOSIS — I10 ESSENTIAL HYPERTENSION: ICD-10-CM

## 2020-04-20 PROCEDURE — 1125F AMNT PAIN NOTED PAIN PRSNT: CPT | Performed by: FAMILY MEDICINE

## 2020-04-20 PROCEDURE — 4040F PNEUMOC VAC/ADMIN/RCVD: CPT | Performed by: FAMILY MEDICINE

## 2020-04-20 PROCEDURE — 3074F SYST BP LT 130 MM HG: CPT | Performed by: FAMILY MEDICINE

## 2020-04-20 PROCEDURE — 1160F RVW MEDS BY RX/DR IN RCRD: CPT | Performed by: FAMILY MEDICINE

## 2020-04-20 PROCEDURE — 1170F FXNL STATUS ASSESSED: CPT | Performed by: FAMILY MEDICINE

## 2020-04-20 PROCEDURE — 3008F BODY MASS INDEX DOCD: CPT | Performed by: FAMILY MEDICINE

## 2020-04-20 PROCEDURE — 4010F ACE/ARB THERAPY RXD/TAKEN: CPT | Performed by: FAMILY MEDICINE

## 2020-04-20 PROCEDURE — 1101F PT FALLS ASSESS-DOCD LE1/YR: CPT | Performed by: FAMILY MEDICINE

## 2020-04-20 PROCEDURE — 3078F DIAST BP <80 MM HG: CPT | Performed by: FAMILY MEDICINE

## 2020-04-20 PROCEDURE — G0439 PPPS, SUBSEQ VISIT: HCPCS | Performed by: FAMILY MEDICINE

## 2020-04-20 PROCEDURE — 1036F TOBACCO NON-USER: CPT | Performed by: FAMILY MEDICINE

## 2020-04-20 PROCEDURE — 99214 OFFICE O/P EST MOD 30 MIN: CPT | Performed by: FAMILY MEDICINE

## 2020-04-20 PROCEDURE — 3288F FALL RISK ASSESSMENT DOCD: CPT | Performed by: FAMILY MEDICINE

## 2020-04-20 PROCEDURE — 2022F DILAT RTA XM EVC RTNOPTHY: CPT | Performed by: FAMILY MEDICINE

## 2020-04-20 RX ORDER — BENAZEPRIL HYDROCHLORIDE 20 MG/1
20 TABLET ORAL DAILY
Qty: 30 TABLET | Refills: 5 | Status: SHIPPED | OUTPATIENT
Start: 2020-04-20 | End: 2020-08-19 | Stop reason: SDUPTHER

## 2020-04-20 RX ORDER — PANTOPRAZOLE SODIUM 40 MG/1
40 TABLET, DELAYED RELEASE ORAL DAILY
Qty: 30 TABLET | Refills: 5 | Status: SHIPPED | OUTPATIENT
Start: 2020-04-20 | End: 2020-08-19 | Stop reason: SDUPTHER

## 2020-04-22 ENCOUNTER — OFFICE VISIT (OUTPATIENT)
Dept: PODIATRY | Facility: CLINIC | Age: 75
End: 2020-04-22

## 2020-04-22 VITALS — WEIGHT: 145.2 LBS | BODY MASS INDEX: 26.72 KG/M2 | HEIGHT: 62 IN

## 2020-04-22 DIAGNOSIS — L84 CORNS: Primary | ICD-10-CM

## 2020-04-22 PROCEDURE — NCFTCARE PR NON-COVERED FOOT CARE: Performed by: PODIATRIST

## 2020-04-30 ENCOUNTER — APPOINTMENT (OUTPATIENT)
Dept: LAB | Facility: HOSPITAL | Age: 75
End: 2020-04-30
Attending: INTERNAL MEDICINE
Payer: COMMERCIAL

## 2020-04-30 DIAGNOSIS — C50.911 MALIGNANT NEOPLASM OF RIGHT BREAST IN FEMALE, ESTROGEN RECEPTOR POSITIVE, UNSPECIFIED SITE OF BREAST (HCC): ICD-10-CM

## 2020-04-30 DIAGNOSIS — Z17.0 MALIGNANT NEOPLASM OF RIGHT BREAST IN FEMALE, ESTROGEN RECEPTOR POSITIVE, UNSPECIFIED SITE OF BREAST (HCC): ICD-10-CM

## 2020-04-30 DIAGNOSIS — C18.2 MALIGNANT NEOPLASM OF ASCENDING COLON (HCC): ICD-10-CM

## 2020-04-30 LAB
ALBUMIN SERPL BCP-MCNC: 4.3 G/DL (ref 3.5–5)
ALP SERPL-CCNC: 59 U/L (ref 46–116)
ALT SERPL W P-5'-P-CCNC: 16 U/L (ref 12–78)
ANION GAP SERPL CALCULATED.3IONS-SCNC: 7 MMOL/L (ref 4–13)
AST SERPL W P-5'-P-CCNC: 12 U/L (ref 5–45)
BASOPHILS # BLD AUTO: 0.03 THOUSANDS/ΜL (ref 0–0.1)
BASOPHILS NFR BLD AUTO: 1 % (ref 0–1)
BILIRUB SERPL-MCNC: 0.39 MG/DL (ref 0.2–1)
BUN SERPL-MCNC: 14 MG/DL (ref 5–25)
CALCIUM SERPL-MCNC: 9.5 MG/DL (ref 8.3–10.1)
CANCER AG27-29 SERPL-ACNC: 18.6 U/ML (ref 0–42.3)
CEA SERPL-MCNC: 3.3 NG/ML (ref 0–3)
CHLORIDE SERPL-SCNC: 100 MMOL/L (ref 100–108)
CO2 SERPL-SCNC: 28 MMOL/L (ref 21–32)
CREAT SERPL-MCNC: 0.7 MG/DL (ref 0.6–1.3)
EOSINOPHIL # BLD AUTO: 0.23 THOUSAND/ΜL (ref 0–0.61)
EOSINOPHIL NFR BLD AUTO: 4 % (ref 0–6)
ERYTHROCYTE [DISTWIDTH] IN BLOOD BY AUTOMATED COUNT: 13.6 % (ref 11.6–15.1)
GFR SERPL CREATININE-BSD FRML MDRD: 86 ML/MIN/1.73SQ M
GLUCOSE P FAST SERPL-MCNC: 111 MG/DL (ref 65–99)
HCT VFR BLD AUTO: 38.2 % (ref 34.8–46.1)
HGB BLD-MCNC: 12.3 G/DL (ref 11.5–15.4)
IMM GRANULOCYTES # BLD AUTO: 0.02 THOUSAND/UL (ref 0–0.2)
IMM GRANULOCYTES NFR BLD AUTO: 0 % (ref 0–2)
LYMPHOCYTES # BLD AUTO: 0.77 THOUSANDS/ΜL (ref 0.6–4.47)
LYMPHOCYTES NFR BLD AUTO: 13 % (ref 14–44)
MCH RBC QN AUTO: 32.3 PG (ref 26.8–34.3)
MCHC RBC AUTO-ENTMCNC: 32.2 G/DL (ref 31.4–37.4)
MCV RBC AUTO: 100 FL (ref 82–98)
MONOCYTES # BLD AUTO: 0.76 THOUSAND/ΜL (ref 0.17–1.22)
MONOCYTES NFR BLD AUTO: 13 % (ref 4–12)
NEUTROPHILS # BLD AUTO: 4.12 THOUSANDS/ΜL (ref 1.85–7.62)
NEUTS SEG NFR BLD AUTO: 69 % (ref 43–75)
NRBC BLD AUTO-RTO: 0 /100 WBCS
PLATELET # BLD AUTO: 224 THOUSANDS/UL (ref 149–390)
PMV BLD AUTO: 9.5 FL (ref 8.9–12.7)
POTASSIUM SERPL-SCNC: 3.8 MMOL/L (ref 3.5–5.3)
PROT SERPL-MCNC: 7.9 G/DL (ref 6.4–8.2)
RBC # BLD AUTO: 3.81 MILLION/UL (ref 3.81–5.12)
SODIUM SERPL-SCNC: 135 MMOL/L (ref 136–145)
WBC # BLD AUTO: 5.93 THOUSAND/UL (ref 4.31–10.16)

## 2020-04-30 PROCEDURE — 80053 COMPREHEN METABOLIC PANEL: CPT

## 2020-04-30 PROCEDURE — 82378 CARCINOEMBRYONIC ANTIGEN: CPT

## 2020-04-30 PROCEDURE — 85025 COMPLETE CBC W/AUTO DIFF WBC: CPT

## 2020-04-30 PROCEDURE — 36415 COLL VENOUS BLD VENIPUNCTURE: CPT

## 2020-04-30 PROCEDURE — 86300 IMMUNOASSAY TUMOR CA 15-3: CPT

## 2020-05-04 ENCOUNTER — TELEPHONE (OUTPATIENT)
Dept: HEMATOLOGY ONCOLOGY | Facility: CLINIC | Age: 75
End: 2020-05-04

## 2020-05-07 ENCOUNTER — OFFICE VISIT (OUTPATIENT)
Dept: HEMATOLOGY ONCOLOGY | Facility: CLINIC | Age: 75
End: 2020-05-07
Payer: COMMERCIAL

## 2020-05-07 VITALS
DIASTOLIC BLOOD PRESSURE: 72 MMHG | BODY MASS INDEX: 26.31 KG/M2 | WEIGHT: 143 LBS | HEART RATE: 77 BPM | TEMPERATURE: 97.3 F | HEIGHT: 62 IN | OXYGEN SATURATION: 99 % | SYSTOLIC BLOOD PRESSURE: 122 MMHG | RESPIRATION RATE: 16 BRPM

## 2020-05-07 DIAGNOSIS — C50.911 MALIGNANT NEOPLASM OF RIGHT BREAST IN FEMALE, ESTROGEN RECEPTOR POSITIVE, UNSPECIFIED SITE OF BREAST (HCC): ICD-10-CM

## 2020-05-07 DIAGNOSIS — E11.9 TYPE 2 DIABETES MELLITUS WITHOUT COMPLICATION, UNSPECIFIED WHETHER LONG TERM INSULIN USE (HCC): ICD-10-CM

## 2020-05-07 DIAGNOSIS — I82.5Z2 CHRONIC DEEP VEIN THROMBOSIS (DVT) OF DISTAL VEIN OF LEFT LOWER EXTREMITY (HCC): ICD-10-CM

## 2020-05-07 DIAGNOSIS — R79.89 D-DIMER, ELEVATED: ICD-10-CM

## 2020-05-07 DIAGNOSIS — Z17.0 MALIGNANT NEOPLASM OF RIGHT BREAST IN FEMALE, ESTROGEN RECEPTOR POSITIVE, UNSPECIFIED SITE OF BREAST (HCC): ICD-10-CM

## 2020-05-07 DIAGNOSIS — C50.919 MALIGNANT NEOPLASM OF FEMALE BREAST, UNSPECIFIED ESTROGEN RECEPTOR STATUS, UNSPECIFIED LATERALITY, UNSPECIFIED SITE OF BREAST (HCC): ICD-10-CM

## 2020-05-07 DIAGNOSIS — E04.1 THYROID NODULE: ICD-10-CM

## 2020-05-07 DIAGNOSIS — C18.2 MALIGNANT NEOPLASM OF ASCENDING COLON (HCC): Primary | ICD-10-CM

## 2020-05-07 DIAGNOSIS — M85.80 OSTEOPENIA DUE TO CANCER THERAPY: ICD-10-CM

## 2020-05-07 PROCEDURE — 2022F DILAT RTA XM EVC RTNOPTHY: CPT | Performed by: INTERNAL MEDICINE

## 2020-05-07 PROCEDURE — 3078F DIAST BP <80 MM HG: CPT | Performed by: INTERNAL MEDICINE

## 2020-05-07 PROCEDURE — 3074F SYST BP LT 130 MM HG: CPT | Performed by: INTERNAL MEDICINE

## 2020-05-07 PROCEDURE — 1160F RVW MEDS BY RX/DR IN RCRD: CPT | Performed by: INTERNAL MEDICINE

## 2020-05-07 PROCEDURE — 3008F BODY MASS INDEX DOCD: CPT | Performed by: INTERNAL MEDICINE

## 2020-05-07 PROCEDURE — 4040F PNEUMOC VAC/ADMIN/RCVD: CPT | Performed by: INTERNAL MEDICINE

## 2020-05-07 PROCEDURE — 1170F FXNL STATUS ASSESSED: CPT | Performed by: INTERNAL MEDICINE

## 2020-05-07 PROCEDURE — 99214 OFFICE O/P EST MOD 30 MIN: CPT | Performed by: INTERNAL MEDICINE

## 2020-05-07 PROCEDURE — 1036F TOBACCO NON-USER: CPT | Performed by: INTERNAL MEDICINE

## 2020-05-07 RX ORDER — LETROZOLE 2.5 MG/1
2.5 TABLET, FILM COATED ORAL DAILY
Qty: 30 TABLET | Refills: 11 | Status: SHIPPED | OUTPATIENT
Start: 2020-05-07 | End: 2021-05-17 | Stop reason: SDUPTHER

## 2020-05-08 ENCOUNTER — TELEPHONE (OUTPATIENT)
Dept: SURGICAL ONCOLOGY | Facility: CLINIC | Age: 75
End: 2020-05-08

## 2020-05-08 DIAGNOSIS — C18.2 MALIGNANT NEOPLASM OF ASCENDING COLON (HCC): Primary | ICD-10-CM

## 2020-05-20 ENCOUNTER — OFFICE VISIT (OUTPATIENT)
Dept: PODIATRY | Facility: CLINIC | Age: 75
End: 2020-05-20
Payer: COMMERCIAL

## 2020-05-20 VITALS — HEIGHT: 62 IN | WEIGHT: 145.4 LBS | BODY MASS INDEX: 26.76 KG/M2

## 2020-05-20 DIAGNOSIS — E11.42 DIABETIC POLYNEUROPATHY ASSOCIATED WITH TYPE 2 DIABETES MELLITUS (HCC): ICD-10-CM

## 2020-05-20 DIAGNOSIS — L03.116 CELLULITIS OF LEFT ANKLE: Primary | ICD-10-CM

## 2020-05-20 PROCEDURE — 3074F SYST BP LT 130 MM HG: CPT | Performed by: PODIATRIST

## 2020-05-20 PROCEDURE — 3008F BODY MASS INDEX DOCD: CPT | Performed by: PODIATRIST

## 2020-05-20 PROCEDURE — 1036F TOBACCO NON-USER: CPT | Performed by: PODIATRIST

## 2020-05-20 PROCEDURE — 4040F PNEUMOC VAC/ADMIN/RCVD: CPT | Performed by: PODIATRIST

## 2020-05-20 PROCEDURE — 1160F RVW MEDS BY RX/DR IN RCRD: CPT | Performed by: PODIATRIST

## 2020-05-20 PROCEDURE — 3078F DIAST BP <80 MM HG: CPT | Performed by: PODIATRIST

## 2020-05-20 PROCEDURE — 99213 OFFICE O/P EST LOW 20 MIN: CPT | Performed by: PODIATRIST

## 2020-05-20 PROCEDURE — 2022F DILAT RTA XM EVC RTNOPTHY: CPT | Performed by: PODIATRIST

## 2020-05-20 RX ORDER — CEPHALEXIN 500 MG/1
500 CAPSULE ORAL 4 TIMES DAILY
Qty: 40 CAPSULE | Refills: 0 | Status: SHIPPED | OUTPATIENT
Start: 2020-05-20 | End: 2020-05-30

## 2020-06-04 ENCOUNTER — OFFICE VISIT (OUTPATIENT)
Dept: PODIATRY | Facility: CLINIC | Age: 75
End: 2020-06-04
Payer: COMMERCIAL

## 2020-06-04 VITALS — WEIGHT: 141 LBS | BODY MASS INDEX: 25.95 KG/M2 | HEIGHT: 62 IN

## 2020-06-04 DIAGNOSIS — L03.116 CELLULITIS OF LEFT ANKLE: Primary | ICD-10-CM

## 2020-06-04 DIAGNOSIS — E11.42 DIABETIC POLYNEUROPATHY ASSOCIATED WITH TYPE 2 DIABETES MELLITUS (HCC): ICD-10-CM

## 2020-06-04 PROCEDURE — 1036F TOBACCO NON-USER: CPT | Performed by: PODIATRIST

## 2020-06-04 PROCEDURE — 4040F PNEUMOC VAC/ADMIN/RCVD: CPT | Performed by: PODIATRIST

## 2020-06-04 PROCEDURE — 3074F SYST BP LT 130 MM HG: CPT | Performed by: PODIATRIST

## 2020-06-04 PROCEDURE — 1160F RVW MEDS BY RX/DR IN RCRD: CPT | Performed by: PODIATRIST

## 2020-06-04 PROCEDURE — 3078F DIAST BP <80 MM HG: CPT | Performed by: PODIATRIST

## 2020-06-04 PROCEDURE — 3008F BODY MASS INDEX DOCD: CPT | Performed by: PODIATRIST

## 2020-06-04 PROCEDURE — 99212 OFFICE O/P EST SF 10 MIN: CPT | Performed by: PODIATRIST

## 2020-06-04 PROCEDURE — 2022F DILAT RTA XM EVC RTNOPTHY: CPT | Performed by: PODIATRIST

## 2020-06-04 RX ORDER — CEPHALEXIN 250 MG/1
CAPSULE ORAL
COMMUNITY
Start: 2020-05-20 | End: 2021-09-07 | Stop reason: ALTCHOICE

## 2020-06-17 ENCOUNTER — HOSPITAL ENCOUNTER (OUTPATIENT)
Dept: RADIOLOGY | Facility: HOSPITAL | Age: 75
Discharge: HOME | End: 2020-06-17
Attending: INTERNAL MEDICINE
Payer: COMMERCIAL

## 2020-06-17 DIAGNOSIS — E04.1 THYROID NODULE: ICD-10-CM

## 2020-06-17 PROCEDURE — 76536 US EXAM OF HEAD AND NECK: CPT

## 2020-06-25 ENCOUNTER — OFFICE VISIT (OUTPATIENT)
Dept: PODIATRY | Facility: CLINIC | Age: 75
End: 2020-06-25

## 2020-06-25 VITALS
WEIGHT: 139.2 LBS | HEART RATE: 93 BPM | DIASTOLIC BLOOD PRESSURE: 86 MMHG | SYSTOLIC BLOOD PRESSURE: 130 MMHG | HEIGHT: 62 IN | BODY MASS INDEX: 25.62 KG/M2

## 2020-06-25 DIAGNOSIS — M20.41 HAMMER TOES OF BOTH FEET: ICD-10-CM

## 2020-06-25 DIAGNOSIS — M20.42 HAMMER TOES OF BOTH FEET: ICD-10-CM

## 2020-06-25 DIAGNOSIS — L84 CORNS: Primary | ICD-10-CM

## 2020-06-25 PROCEDURE — NCFTCARE PR NON-COVERED FOOT CARE: Performed by: PODIATRIST

## 2020-07-02 ENCOUNTER — APPOINTMENT (OUTPATIENT)
Dept: LAB | Facility: HOSPITAL | Age: 75
End: 2020-07-02
Payer: COMMERCIAL

## 2020-07-02 DIAGNOSIS — C50.911 MALIGNANT NEOPLASM OF RIGHT BREAST IN FEMALE, ESTROGEN RECEPTOR POSITIVE, UNSPECIFIED SITE OF BREAST (HCC): ICD-10-CM

## 2020-07-02 DIAGNOSIS — E11.8 TYPE 2 DIABETES MELLITUS WITH COMPLICATION, WITHOUT LONG-TERM CURRENT USE OF INSULIN (HCC): ICD-10-CM

## 2020-07-02 DIAGNOSIS — M85.80 OSTEOPENIA DUE TO CANCER THERAPY: ICD-10-CM

## 2020-07-02 DIAGNOSIS — C18.2 MALIGNANT NEOPLASM OF ASCENDING COLON (HCC): ICD-10-CM

## 2020-07-02 DIAGNOSIS — M81.8 OTHER OSTEOPOROSIS WITHOUT CURRENT PATHOLOGICAL FRACTURE: ICD-10-CM

## 2020-07-02 DIAGNOSIS — Z17.0 MALIGNANT NEOPLASM OF RIGHT BREAST IN FEMALE, ESTROGEN RECEPTOR POSITIVE, UNSPECIFIED SITE OF BREAST (HCC): ICD-10-CM

## 2020-07-02 DIAGNOSIS — Z85.038 PERSONAL HISTORY OF OTHER MALIGNANT NEOPLASM OF LARGE INTESTINE: ICD-10-CM

## 2020-07-02 LAB
ALBUMIN SERPL BCP-MCNC: 3.8 G/DL (ref 3.5–5)
ALP SERPL-CCNC: 63 U/L (ref 46–116)
ALT SERPL W P-5'-P-CCNC: 14 U/L (ref 12–78)
ANION GAP SERPL CALCULATED.3IONS-SCNC: 6 MMOL/L (ref 4–13)
AST SERPL W P-5'-P-CCNC: 13 U/L (ref 5–45)
BILIRUB SERPL-MCNC: 0.27 MG/DL (ref 0.2–1)
BUN SERPL-MCNC: 9 MG/DL (ref 5–25)
CALCIUM SERPL-MCNC: 9.7 MG/DL (ref 8.3–10.1)
CHLORIDE SERPL-SCNC: 100 MMOL/L (ref 100–108)
CO2 SERPL-SCNC: 28 MMOL/L (ref 21–32)
CREAT SERPL-MCNC: 0.74 MG/DL (ref 0.6–1.3)
GFR SERPL CREATININE-BSD FRML MDRD: 80 ML/MIN/1.73SQ M
GLUCOSE P FAST SERPL-MCNC: 94 MG/DL (ref 65–99)
POTASSIUM SERPL-SCNC: 4 MMOL/L (ref 3.5–5.3)
PROT SERPL-MCNC: 7.5 G/DL (ref 6.4–8.2)
SODIUM SERPL-SCNC: 134 MMOL/L (ref 136–145)

## 2020-07-02 PROCEDURE — 36415 COLL VENOUS BLD VENIPUNCTURE: CPT

## 2020-07-02 PROCEDURE — 80053 COMPREHEN METABOLIC PANEL: CPT

## 2020-07-09 ENCOUNTER — HOSPITAL ENCOUNTER (OUTPATIENT)
Dept: INFUSION CENTER | Facility: HOSPITAL | Age: 75
Discharge: HOME | End: 2020-07-09
Attending: INTERNAL MEDICINE
Payer: COMMERCIAL

## 2020-07-09 VITALS — TEMPERATURE: 97.8 F

## 2020-07-09 DIAGNOSIS — C50.911 MALIGNANT NEOPLASM OF RIGHT BREAST IN FEMALE, ESTROGEN RECEPTOR POSITIVE, UNSPECIFIED SITE OF BREAST (HCC): ICD-10-CM

## 2020-07-09 DIAGNOSIS — M85.80 OSTEOPENIA DUE TO CANCER THERAPY: ICD-10-CM

## 2020-07-09 DIAGNOSIS — M81.8 OTHER OSTEOPOROSIS WITHOUT CURRENT PATHOLOGICAL FRACTURE: ICD-10-CM

## 2020-07-09 DIAGNOSIS — Z17.0 MALIGNANT NEOPLASM OF RIGHT BREAST IN FEMALE, ESTROGEN RECEPTOR POSITIVE, UNSPECIFIED SITE OF BREAST (HCC): ICD-10-CM

## 2020-07-09 DIAGNOSIS — Z85.038 PERSONAL HISTORY OF OTHER MALIGNANT NEOPLASM OF LARGE INTESTINE: ICD-10-CM

## 2020-07-09 DIAGNOSIS — C18.2 MALIGNANT NEOPLASM OF ASCENDING COLON (HCC): Primary | ICD-10-CM

## 2020-07-09 PROCEDURE — 96401 CHEMO ANTI-NEOPL SQ/IM: CPT

## 2020-07-09 RX ADMIN — DENOSUMAB 60 MG: 60 INJECTION SUBCUTANEOUS at 14:24

## 2020-07-09 NOTE — PLAN OF CARE
Problem: Potential for Falls  Goal: Patient will remain free of falls  Description  INTERVENTIONS:  - Assess patient frequently for physical needs  -  Identify cognitive and physical deficits and behaviors that affect risk of falls    -  [place] fall precautions as indicated by assessment   - Educate patient/family on patient safety including physical limitations  - Instruct patient to call for assistance with activity based on assessment  - Modify environment to reduce risk of injury  - Consider OT/PT consult to assist with strengthening/mobility  Outcome: Progressing

## 2020-07-16 ENCOUNTER — OFFICE VISIT (OUTPATIENT)
Dept: PODIATRY | Facility: CLINIC | Age: 75
End: 2020-07-16

## 2020-07-16 VITALS
BODY MASS INDEX: 25.95 KG/M2 | WEIGHT: 141 LBS | HEIGHT: 62 IN | SYSTOLIC BLOOD PRESSURE: 116 MMHG | HEART RATE: 83 BPM | TEMPERATURE: 97.3 F | DIASTOLIC BLOOD PRESSURE: 75 MMHG

## 2020-07-16 DIAGNOSIS — B35.1 ONYCHOMYCOSIS: ICD-10-CM

## 2020-07-16 DIAGNOSIS — L84 CORNS: Primary | ICD-10-CM

## 2020-07-16 PROCEDURE — NCFTCARE PR NON-COVERED FOOT CARE: Performed by: PODIATRIST

## 2020-07-16 NOTE — PROGRESS NOTES
Patient presents for palliative care  No acute disorder noted  Treatment consisted of trimming of mycotic toenails and hyperkeratotic lesions  Patient is rescheduled at her request in 3 weeks

## 2020-08-06 ENCOUNTER — OFFICE VISIT (OUTPATIENT)
Dept: PODIATRY | Facility: CLINIC | Age: 75
End: 2020-08-06

## 2020-08-06 VITALS
HEIGHT: 62 IN | HEART RATE: 77 BPM | TEMPERATURE: 97.1 F | WEIGHT: 140 LBS | BODY MASS INDEX: 25.76 KG/M2 | DIASTOLIC BLOOD PRESSURE: 78 MMHG | SYSTOLIC BLOOD PRESSURE: 129 MMHG

## 2020-08-06 DIAGNOSIS — M20.42 HAMMER TOES OF BOTH FEET: Primary | ICD-10-CM

## 2020-08-06 DIAGNOSIS — L84 CORNS: ICD-10-CM

## 2020-08-06 DIAGNOSIS — B35.1 ONYCHOMYCOSIS: ICD-10-CM

## 2020-08-06 DIAGNOSIS — M20.41 HAMMER TOES OF BOTH FEET: Primary | ICD-10-CM

## 2020-08-06 PROCEDURE — NCFTCARE PR NON-COVERED FOOT CARE: Performed by: PODIATRIST

## 2020-08-06 NOTE — PROGRESS NOTES
Patient presents for palliative care  Pedal pulses remain palpable  Patient has multiple severe did hammertoe deformities along with a severe hallux valgus deformity  Corns are present on the 2nd and 4th toes of the left foot and 4th toe right foot  All toenails are dystrophic secondary to onychomycosis  Treatment consisted of nail and lesion to trimming    Patient is rescheduled in 3 weeks at her request

## 2020-08-12 ENCOUNTER — APPOINTMENT (OUTPATIENT)
Dept: LAB | Facility: HOSPITAL | Age: 75
End: 2020-08-12
Payer: COMMERCIAL

## 2020-08-12 LAB
EST. AVERAGE GLUCOSE BLD GHB EST-MCNC: 117 MG/DL
HBA1C MFR BLD: 5.7 %
VIT B12 SERPL-MCNC: 350 PG/ML (ref 100–900)

## 2020-08-12 PROCEDURE — 83036 HEMOGLOBIN GLYCOSYLATED A1C: CPT

## 2020-08-12 PROCEDURE — 3044F HG A1C LEVEL LT 7.0%: CPT | Performed by: FAMILY MEDICINE

## 2020-08-12 PROCEDURE — 82607 VITAMIN B-12: CPT

## 2020-08-12 PROCEDURE — 36415 COLL VENOUS BLD VENIPUNCTURE: CPT

## 2020-08-19 ENCOUNTER — OFFICE VISIT (OUTPATIENT)
Dept: FAMILY MEDICINE CLINIC | Facility: CLINIC | Age: 75
End: 2020-08-19
Payer: COMMERCIAL

## 2020-08-19 VITALS
OXYGEN SATURATION: 98 % | SYSTOLIC BLOOD PRESSURE: 128 MMHG | TEMPERATURE: 96.6 F | BODY MASS INDEX: 26.39 KG/M2 | RESPIRATION RATE: 16 BRPM | HEART RATE: 87 BPM | HEIGHT: 62 IN | DIASTOLIC BLOOD PRESSURE: 78 MMHG | WEIGHT: 143.4 LBS

## 2020-08-19 DIAGNOSIS — R12 HEART BURN: ICD-10-CM

## 2020-08-19 DIAGNOSIS — I10 ESSENTIAL HYPERTENSION: ICD-10-CM

## 2020-08-19 DIAGNOSIS — E11.9 TYPE 2 DIABETES MELLITUS WITHOUT COMPLICATION, UNSPECIFIED WHETHER LONG TERM INSULIN USE (HCC): Primary | ICD-10-CM

## 2020-08-19 DIAGNOSIS — E11.8 TYPE 2 DIABETES MELLITUS WITH COMPLICATION, WITHOUT LONG-TERM CURRENT USE OF INSULIN (HCC): ICD-10-CM

## 2020-08-19 DIAGNOSIS — E11.9 DIABETES TYPE 2, NO OCULAR INVOLVEMENT (HCC): ICD-10-CM

## 2020-08-19 PROCEDURE — 3078F DIAST BP <80 MM HG: CPT | Performed by: FAMILY MEDICINE

## 2020-08-19 PROCEDURE — 4040F PNEUMOC VAC/ADMIN/RCVD: CPT | Performed by: FAMILY MEDICINE

## 2020-08-19 PROCEDURE — 3044F HG A1C LEVEL LT 7.0%: CPT | Performed by: FAMILY MEDICINE

## 2020-08-19 PROCEDURE — 1160F RVW MEDS BY RX/DR IN RCRD: CPT | Performed by: FAMILY MEDICINE

## 2020-08-19 PROCEDURE — 3008F BODY MASS INDEX DOCD: CPT | Performed by: FAMILY MEDICINE

## 2020-08-19 PROCEDURE — 99214 OFFICE O/P EST MOD 30 MIN: CPT | Performed by: FAMILY MEDICINE

## 2020-08-19 PROCEDURE — 4010F ACE/ARB THERAPY RXD/TAKEN: CPT | Performed by: FAMILY MEDICINE

## 2020-08-19 PROCEDURE — 3074F SYST BP LT 130 MM HG: CPT | Performed by: FAMILY MEDICINE

## 2020-08-19 PROCEDURE — 1036F TOBACCO NON-USER: CPT | Performed by: FAMILY MEDICINE

## 2020-08-19 PROCEDURE — 2022F DILAT RTA XM EVC RTNOPTHY: CPT | Performed by: FAMILY MEDICINE

## 2020-08-19 RX ORDER — PANTOPRAZOLE SODIUM 40 MG/1
40 TABLET, DELAYED RELEASE ORAL DAILY
Qty: 30 TABLET | Refills: 5 | Status: SHIPPED | OUTPATIENT
Start: 2020-08-19 | End: 2021-05-10 | Stop reason: SDUPTHER

## 2020-08-19 RX ORDER — BENAZEPRIL HYDROCHLORIDE 20 MG/1
20 TABLET ORAL DAILY
Qty: 30 TABLET | Refills: 5 | Status: SHIPPED | OUTPATIENT
Start: 2020-08-19 | End: 2020-10-06 | Stop reason: DRUGHIGH

## 2020-08-19 NOTE — PROGRESS NOTES
Chief Complaint   Patient presents with    Follow-up     4 month f/u       Assessment/Plan:  Sugars have been well controlled, will Decrease Metformin to 500 mg BID from 1000 mg BID  Recheck A1c next visit  Continue the B-12 dily Vitamin  Discussed nutrition, lots of fruits and veg , activity level, hydrationwith water  Diagnoses and all orders for this visit:    Type 2 diabetes mellitus without complication, unspecified whether long term insulin use (HCC)  -     Microalbumin / creatinine urine ratio    Essential hypertension  -     benazepril (LOTENSIN) 20 mg tablet; Take 1 tablet (20 mg total) by mouth daily    Type 2 diabetes mellitus with complication, without long-term current use of insulin (HCC)  -     metFORMIN (GLUCOPHAGE) 500 mg tablet; Take 1 tablet (500 mg total) by mouth 2 (two) times a day with meals    Heart burn  -     pantoprazole (PROTONIX) 40 mg tablet; Take 1 tablet (40 mg total) by mouth daily    Diabetes type 2, no ocular involvement (HCC)  -     sitaGLIPtin (JANUVIA) 100 mg tablet; Take 1 tablet (100 mg total) by mouth daily          Subjective:      Patient ID: Vonnie Flynn is a 77 y o  female  Refill Med's  Doing OK  Continues with same Med's  The following portions of the patient's history were reviewed and updated as appropriate: allergies, current medications, past medical history, past social history, past surgical history and problem list   I have spent 25 minutes with Patient  today in which greater than 50% of this time was spent in counseling/coordination of care regarding Diagnostic results, Risks and benefits of tx options, Intructions for management, Patient and family education, Importance of tx compliance, Risk factor reductions and Impressions  Review of Systems   Constitutional: Negative  HENT: Negative  Eyes: Negative  Respiratory: Negative  Cardiovascular: Negative  Gastrointestinal: Negative  Genitourinary: Negative  Musculoskeletal: Negative  Skin: Negative  Neurological: Negative  Psychiatric/Behavioral: Negative  Objective:      /78 (BP Location: Left arm, Patient Position: Sitting, Cuff Size: Standard)   Pulse 87   Temp (!) 96 6 °F (35 9 °C) (Tympanic)   Resp 16   Ht 5' 2" (1 575 m)   Wt 65 kg (143 lb 6 4 oz)   LMP  (LMP Unknown)   SpO2 98%   BMI 26 23 kg/m²       Current Outpatient Medications:     ascorbic acid (VITAMIN C) 500 MG tablet, Take 1,000 mg by mouth daily , Disp: , Rfl:     bimatoprost (LUMIGAN) 0 01 % ophthalmic drops, Administer 1 drop to both eyes daily at bedtime , Disp: , Rfl:     calcium citrate (CALCITRATE) 950 MG tablet, Take 1 tablet by mouth daily, Disp: , Rfl:     Cholecalciferol (VITAMIN D-3 PO), Take 1 capsule by mouth 3 (three) times a day , Disp: , Rfl:     Cyanocobalamin (VITAMIN B 12 PO), Take 1 tablet by mouth daily  , Disp: , Rfl:     ferrous sulfate 325 (65 Fe) mg tablet, Take 325 mg by mouth daily with breakfast , Disp: , Rfl:     letrozole (FEMARA) 2 5 mg tablet, Take 1 tablet (2 5 mg total) by mouth daily, Disp: 30 tablet, Rfl: 11    Magnesium 250 MG TABS, Take 1 tablet by mouth daily, Disp: , Rfl:     pantoprazole (PROTONIX) 40 mg tablet, Take 1 tablet (40 mg total) by mouth daily, Disp: 30 tablet, Rfl: 5    sitaGLIPtin (JANUVIA) 100 mg tablet, Take 1 tablet (100 mg total) by mouth daily, Disp: 30 tablet, Rfl: 5    vitamin A 10,000 units capsule, Take 10,000 Units by mouth daily  , Disp: , Rfl:     vitamin E 100 UNIT capsule, Take 100 Units by mouth daily , Disp: , Rfl:     aspirin 81 MG tablet, Take 81 mg by mouth daily  , Disp: , Rfl:     benazepril (LOTENSIN) 20 mg tablet, Take 1 tablet (20 mg total) by mouth daily, Disp: 30 tablet, Rfl: 5    cephalexin (KEFLEX) 250 mg capsule, take 2 capsules by mouth four times a day for 10 days, Disp: , Rfl:     metFORMIN (GLUCOPHAGE) 1000 MG tablet, Take 1 tablet (1,000 mg total) by mouth 2 (two) times a day with meals, Disp: 60 tablet, Rfl: 5     Physical Exam  Constitutional:       Appearance: She is well-developed  HENT:      Head: Normocephalic and atraumatic  Right Ear: External ear normal       Left Ear: External ear normal       Nose: Nose normal    Eyes:      Conjunctiva/sclera: Conjunctivae normal       Pupils: Pupils are equal, round, and reactive to light  Neck:      Musculoskeletal: Normal range of motion and neck supple  Cardiovascular:      Rate and Rhythm: Normal rate and regular rhythm  Heart sounds: Normal heart sounds  Pulmonary:      Effort: Pulmonary effort is normal       Breath sounds: Normal breath sounds  Skin:     General: Skin is warm and dry  Neurological:      Mental Status: She is alert and oriented to person, place, and time  Deep Tendon Reflexes: Reflexes are normal and symmetric  Psychiatric:         Behavior: Behavior normal          Thought Content:  Thought content normal          Judgment: Judgment normal

## 2020-08-21 ENCOUNTER — TELEPHONE (OUTPATIENT)
Dept: HEMATOLOGY ONCOLOGY | Facility: CLINIC | Age: 75
End: 2020-08-21

## 2020-08-21 NOTE — TELEPHONE ENCOUNTER
Attempted to call patient to r/s her appt from 11/9/20 at 9:20 AM to 11/16/20 at 1:40 PM  Unfortuneately she did not answer and there was no way to leave a VM  I emailed [facility] with appointment r/s and sent letter to address on file to notify patient of her new appointment details

## 2020-08-27 ENCOUNTER — OFFICE VISIT (OUTPATIENT)
Dept: PODIATRY | Facility: CLINIC | Age: 75
End: 2020-08-27

## 2020-08-27 VITALS
DIASTOLIC BLOOD PRESSURE: 80 MMHG | SYSTOLIC BLOOD PRESSURE: 144 MMHG | TEMPERATURE: 97.6 F | HEART RATE: 99 BPM | HEIGHT: 62 IN | BODY MASS INDEX: 26.23 KG/M2

## 2020-08-27 DIAGNOSIS — L84 CORNS: ICD-10-CM

## 2020-08-27 DIAGNOSIS — B35.1 ONYCHOMYCOSIS: Primary | ICD-10-CM

## 2020-08-27 PROCEDURE — NCFTCARE PR NON-COVERED FOOT CARE: Performed by: PODIATRIST

## 2020-08-27 NOTE — PROGRESS NOTES
Patient presents for palliative diabetic foot care  Pedal pulses remain within normal limits  Hyperkeratotic lesions were trimmed from the 4th toe bilateral and 2nd toe left  Mycotic nails were trimmed  Bacitracin dressing to the left 4th toenail due to minor bleeding  Patient is rescheduled in 3 weeks

## 2020-09-15 ENCOUNTER — TELEPHONE (OUTPATIENT)
Dept: HEMATOLOGY ONCOLOGY | Facility: CLINIC | Age: 75
End: 2020-09-15

## 2020-09-15 LAB
LEFT EYE DIABETIC RETINOPATHY: NORMAL
RIGHT EYE DIABETIC RETINOPATHY: NORMAL

## 2020-09-15 NOTE — TELEPHONE ENCOUNTER
Patient called to confirm [facility] is set up for appt's  I did confirm with Shera Meigs at [phone] [facility] patient is setup for appt's on 10-15-20 & 11-16-20 as well as 11-9-20 to be taken to the lab

## 2020-09-17 ENCOUNTER — OFFICE VISIT (OUTPATIENT)
Dept: PODIATRY | Facility: CLINIC | Age: 75
End: 2020-09-17

## 2020-09-17 VITALS — HEIGHT: 62 IN | BODY MASS INDEX: 26.23 KG/M2

## 2020-09-17 DIAGNOSIS — L97.521 DIABETIC ULCER OF TOE OF LEFT FOOT ASSOCIATED WITH TYPE 2 DIABETES MELLITUS, LIMITED TO BREAKDOWN OF SKIN (HCC): Primary | ICD-10-CM

## 2020-09-17 DIAGNOSIS — E11.621 DIABETIC ULCER OF TOE OF LEFT FOOT ASSOCIATED WITH TYPE 2 DIABETES MELLITUS, LIMITED TO BREAKDOWN OF SKIN (HCC): Primary | ICD-10-CM

## 2020-09-17 PROCEDURE — 97597 DBRDMT OPN WND 1ST 20 CM/<: CPT | Performed by: PODIATRIST

## 2020-09-17 RX ORDER — THIAMINE HCL 50 MG
50 TABLET ORAL DAILY
COMMUNITY
End: 2022-04-04 | Stop reason: ALTCHOICE

## 2020-09-17 NOTE — PROGRESS NOTES
Patient presents for diabetic foot care  On exam, blood stain hyperkeratotic lesion left 4th toe at the PIPJ  Toe is not painful nor is there drainage  Partial-thickness debridement performed reveal serosanguineous drainage  The ulceration measures 0 5 cm in diameter  There is no evidence of cellulitis  A bacitracin dressing was applied  Prescribed Bactroban ointment for b i d  Application  Patient will be reassessed in 3 weeks

## 2020-10-06 ENCOUNTER — OFFICE VISIT (OUTPATIENT)
Dept: FAMILY MEDICINE CLINIC | Facility: CLINIC | Age: 75
End: 2020-10-06
Payer: COMMERCIAL

## 2020-10-06 VITALS
SYSTOLIC BLOOD PRESSURE: 150 MMHG | TEMPERATURE: 97.3 F | WEIGHT: 148.6 LBS | OXYGEN SATURATION: 98 % | DIASTOLIC BLOOD PRESSURE: 100 MMHG | BODY MASS INDEX: 27.34 KG/M2 | HEART RATE: 87 BPM | HEIGHT: 62 IN

## 2020-10-06 DIAGNOSIS — F41.8 ANXIETY ABOUT HEALTH: ICD-10-CM

## 2020-10-06 DIAGNOSIS — R35.0 FREQUENT URINATION: Primary | ICD-10-CM

## 2020-10-06 DIAGNOSIS — Z17.0 MALIGNANT NEOPLASM OF RIGHT BREAST IN FEMALE, ESTROGEN RECEPTOR POSITIVE, UNSPECIFIED SITE OF BREAST (HCC): ICD-10-CM

## 2020-10-06 DIAGNOSIS — E11.9 TYPE 2 DIABETES MELLITUS WITHOUT COMPLICATION, UNSPECIFIED WHETHER LONG TERM INSULIN USE (HCC): ICD-10-CM

## 2020-10-06 DIAGNOSIS — C50.911 MALIGNANT NEOPLASM OF RIGHT BREAST IN FEMALE, ESTROGEN RECEPTOR POSITIVE, UNSPECIFIED SITE OF BREAST (HCC): ICD-10-CM

## 2020-10-06 DIAGNOSIS — I10 ESSENTIAL HYPERTENSION: ICD-10-CM

## 2020-10-06 LAB
SL AMB  POCT GLUCOSE, UA: NEGATIVE
SL AMB LEUKOCYTE ESTERASE,UA: NEGATIVE
SL AMB POCT BILIRUBIN,UA: NEGATIVE
SL AMB POCT BLOOD,UA: NEGATIVE
SL AMB POCT CLARITY,UA: CLEAR
SL AMB POCT COLOR,UA: CLEAR
SL AMB POCT HEMOGLOBIN AIC: 6 (ref ?–6.5)
SL AMB POCT KETONES,UA: NEGATIVE
SL AMB POCT NITRITE,UA: NEGATIVE
SL AMB POCT PH,UA: 6
SL AMB POCT SPECIFIC GRAVITY,UA: 1.01
SL AMB POCT URINE PROTEIN: NEGATIVE
SL AMB POCT UROBILINOGEN: NEGATIVE

## 2020-10-06 PROCEDURE — 83036 HEMOGLOBIN GLYCOSYLATED A1C: CPT | Performed by: FAMILY MEDICINE

## 2020-10-06 PROCEDURE — 81003 URINALYSIS AUTO W/O SCOPE: CPT | Performed by: FAMILY MEDICINE

## 2020-10-06 PROCEDURE — 99214 OFFICE O/P EST MOD 30 MIN: CPT | Performed by: FAMILY MEDICINE

## 2020-10-06 PROCEDURE — 3061F NEG MICROALBUMINURIA REV: CPT | Performed by: FAMILY MEDICINE

## 2020-10-06 PROCEDURE — 3044F HG A1C LEVEL LT 7.0%: CPT | Performed by: FAMILY MEDICINE

## 2020-10-06 RX ORDER — BENAZEPRIL HYDROCHLORIDE AND HYDROCHLOROTHIAZIDE 20; 12.5 MG/1; MG/1
1 TABLET ORAL DAILY
Qty: 30 TABLET | Refills: 5 | Status: SHIPPED | OUTPATIENT
Start: 2020-10-06 | End: 2021-03-16 | Stop reason: SDUPTHER

## 2020-10-08 ENCOUNTER — OFFICE VISIT (OUTPATIENT)
Dept: PODIATRY | Facility: CLINIC | Age: 75
End: 2020-10-08

## 2020-10-08 VITALS
DIASTOLIC BLOOD PRESSURE: 82 MMHG | SYSTOLIC BLOOD PRESSURE: 141 MMHG | HEIGHT: 62 IN | BODY MASS INDEX: 27.23 KG/M2 | HEART RATE: 78 BPM | WEIGHT: 148 LBS | TEMPERATURE: 97.8 F

## 2020-10-08 DIAGNOSIS — M20.42 HAMMER TOES OF BOTH FEET: ICD-10-CM

## 2020-10-08 DIAGNOSIS — M20.41 HAMMER TOES OF BOTH FEET: ICD-10-CM

## 2020-10-08 DIAGNOSIS — E11.621 DIABETIC ULCER OF TOE OF LEFT FOOT ASSOCIATED WITH TYPE 2 DIABETES MELLITUS, LIMITED TO BREAKDOWN OF SKIN (HCC): Primary | ICD-10-CM

## 2020-10-08 DIAGNOSIS — L97.521 DIABETIC ULCER OF TOE OF LEFT FOOT ASSOCIATED WITH TYPE 2 DIABETES MELLITUS, LIMITED TO BREAKDOWN OF SKIN (HCC): Primary | ICD-10-CM

## 2020-10-08 PROCEDURE — NCFTCARE PR NON-COVERED FOOT CARE: Performed by: PODIATRIST

## 2020-10-15 ENCOUNTER — TRANSCRIBE ORDERS (OUTPATIENT)
Dept: ADMINISTRATIVE | Age: 75
End: 2020-10-15

## 2020-10-15 ENCOUNTER — TRANSCRIBE ORDERS (OUTPATIENT)
Dept: SURGERY | Facility: CLINIC | Age: 75
End: 2020-10-15

## 2020-10-15 ENCOUNTER — HOSPITAL ENCOUNTER (OUTPATIENT)
Dept: RADIOLOGY | Age: 75
Discharge: HOME | End: 2020-10-15
Attending: SURGERY
Payer: COMMERCIAL

## 2020-10-15 VITALS — HEIGHT: 64 IN | BODY MASS INDEX: 24.75 KG/M2 | WEIGHT: 145 LBS

## 2020-10-15 DIAGNOSIS — C50.911 MALIGNANT NEOPLASM OF RIGHT BREAST IN FEMALE, ESTROGEN RECEPTOR POSITIVE (HCC): ICD-10-CM

## 2020-10-15 DIAGNOSIS — C50.911 MALIGNANT NEOPLASM OF RIGHT BREAST IN FEMALE, ESTROGEN RECEPTOR POSITIVE (HCC): Primary | ICD-10-CM

## 2020-10-15 DIAGNOSIS — Z12.31 ENCOUNTER FOR SCREENING MAMMOGRAM FOR MALIGNANT NEOPLASM OF BREAST: ICD-10-CM

## 2020-10-15 DIAGNOSIS — Z17.0 MALIGNANT NEOPLASM OF RIGHT BREAST IN FEMALE, ESTROGEN RECEPTOR POSITIVE (HCC): ICD-10-CM

## 2020-10-15 DIAGNOSIS — Z17.0 MALIGNANT NEOPLASM OF RIGHT BREAST IN FEMALE, ESTROGEN RECEPTOR POSITIVE (HCC): Primary | ICD-10-CM

## 2020-10-15 PROCEDURE — 77063 BREAST TOMOSYNTHESIS BI: CPT

## 2020-10-15 PROCEDURE — 77067 SCR MAMMO BI INCL CAD: CPT

## 2020-10-20 ENCOUNTER — OFFICE VISIT (OUTPATIENT)
Dept: FAMILY MEDICINE CLINIC | Facility: CLINIC | Age: 75
End: 2020-10-20
Payer: COMMERCIAL

## 2020-10-20 VITALS
DIASTOLIC BLOOD PRESSURE: 86 MMHG | BODY MASS INDEX: 25.13 KG/M2 | HEIGHT: 64 IN | HEART RATE: 90 BPM | TEMPERATURE: 97.6 F | SYSTOLIC BLOOD PRESSURE: 142 MMHG | OXYGEN SATURATION: 97 % | WEIGHT: 147.2 LBS

## 2020-10-20 DIAGNOSIS — C18.2 MALIGNANT NEOPLASM OF ASCENDING COLON (HCC): ICD-10-CM

## 2020-10-20 DIAGNOSIS — F41.8 ANXIETY ABOUT HEALTH: ICD-10-CM

## 2020-10-20 DIAGNOSIS — Z23 ENCOUNTER FOR IMMUNIZATION: ICD-10-CM

## 2020-10-20 DIAGNOSIS — I10 ESSENTIAL HYPERTENSION: ICD-10-CM

## 2020-10-20 DIAGNOSIS — E11.8 CONTROLLED TYPE 2 DIABETES MELLITUS WITH COMPLICATION, WITHOUT LONG-TERM CURRENT USE OF INSULIN (HCC): ICD-10-CM

## 2020-10-20 DIAGNOSIS — R42 DIZZINESS: Primary | ICD-10-CM

## 2020-10-20 PROCEDURE — G0008 ADMIN INFLUENZA VIRUS VAC: HCPCS

## 2020-10-20 PROCEDURE — 3077F SYST BP >= 140 MM HG: CPT | Performed by: FAMILY MEDICINE

## 2020-10-20 PROCEDURE — 99214 OFFICE O/P EST MOD 30 MIN: CPT | Performed by: FAMILY MEDICINE

## 2020-10-20 PROCEDURE — 1160F RVW MEDS BY RX/DR IN RCRD: CPT | Performed by: FAMILY MEDICINE

## 2020-10-20 PROCEDURE — 1036F TOBACCO NON-USER: CPT | Performed by: FAMILY MEDICINE

## 2020-10-20 PROCEDURE — 90662 IIV NO PRSV INCREASED AG IM: CPT

## 2020-10-20 PROCEDURE — 3079F DIAST BP 80-89 MM HG: CPT | Performed by: FAMILY MEDICINE

## 2020-10-21 ENCOUNTER — HOSPITAL ENCOUNTER (OUTPATIENT)
Dept: MAMMOGRAPHY | Facility: CLINIC | Age: 75
Discharge: HOME | End: 2020-10-21
Payer: COMMERCIAL

## 2020-10-21 ENCOUNTER — HOSPITAL ENCOUNTER (OUTPATIENT)
Dept: ULTRASOUND IMAGING | Facility: CLINIC | Age: 75
Discharge: HOME | End: 2020-10-21
Payer: COMMERCIAL

## 2020-10-21 VITALS — BODY MASS INDEX: 25.1 KG/M2 | WEIGHT: 147 LBS | HEIGHT: 64 IN | TEMPERATURE: 96 F

## 2020-10-21 DIAGNOSIS — R92.8 ABNORMAL MAMMOGRAM: ICD-10-CM

## 2020-10-21 PROCEDURE — 76642 ULTRASOUND BREAST LIMITED: CPT

## 2020-10-21 PROCEDURE — 77065 DX MAMMO INCL CAD UNI: CPT

## 2020-10-21 PROCEDURE — G0279 TOMOSYNTHESIS, MAMMO: HCPCS

## 2020-10-29 ENCOUNTER — OFFICE VISIT (OUTPATIENT)
Dept: PODIATRY | Facility: CLINIC | Age: 75
End: 2020-10-29

## 2020-10-29 VITALS
SYSTOLIC BLOOD PRESSURE: 146 MMHG | WEIGHT: 151 LBS | HEIGHT: 64 IN | HEART RATE: 88 BPM | BODY MASS INDEX: 25.78 KG/M2 | DIASTOLIC BLOOD PRESSURE: 85 MMHG

## 2020-10-29 DIAGNOSIS — M20.41 HAMMER TOES OF BOTH FEET: Primary | ICD-10-CM

## 2020-10-29 DIAGNOSIS — M20.42 HAMMER TOES OF BOTH FEET: Primary | ICD-10-CM

## 2020-10-29 DIAGNOSIS — L84 CORNS: ICD-10-CM

## 2020-10-29 PROCEDURE — NCFTCARE PR NON-COVERED FOOT CARE: Performed by: PODIATRIST

## 2020-11-09 ENCOUNTER — APPOINTMENT (OUTPATIENT)
Dept: LAB | Facility: HOSPITAL | Age: 75
End: 2020-11-09
Attending: INTERNAL MEDICINE
Payer: COMMERCIAL

## 2020-11-09 DIAGNOSIS — R79.89 D-DIMER, ELEVATED: ICD-10-CM

## 2020-11-09 DIAGNOSIS — I82.5Z2 CHRONIC DEEP VEIN THROMBOSIS (DVT) OF DISTAL VEIN OF LEFT LOWER EXTREMITY (HCC): ICD-10-CM

## 2020-11-09 DIAGNOSIS — C18.2 MALIGNANT NEOPLASM OF ASCENDING COLON (HCC): ICD-10-CM

## 2020-11-09 DIAGNOSIS — C50.911 MALIGNANT NEOPLASM OF RIGHT BREAST IN FEMALE, ESTROGEN RECEPTOR POSITIVE, UNSPECIFIED SITE OF BREAST (HCC): ICD-10-CM

## 2020-11-09 DIAGNOSIS — Z17.0 MALIGNANT NEOPLASM OF RIGHT BREAST IN FEMALE, ESTROGEN RECEPTOR POSITIVE, UNSPECIFIED SITE OF BREAST (HCC): ICD-10-CM

## 2020-11-09 LAB
ALBUMIN SERPL BCP-MCNC: 4.3 G/DL (ref 3.5–5)
ALP SERPL-CCNC: 64 U/L (ref 46–116)
ALT SERPL W P-5'-P-CCNC: 15 U/L (ref 12–78)
ANION GAP SERPL CALCULATED.3IONS-SCNC: 3 MMOL/L (ref 4–13)
AST SERPL W P-5'-P-CCNC: 13 U/L (ref 5–45)
BASOPHILS # BLD AUTO: 0.04 THOUSANDS/ΜL (ref 0–0.1)
BASOPHILS NFR BLD AUTO: 1 % (ref 0–1)
BILIRUB SERPL-MCNC: 0.4 MG/DL (ref 0.2–1)
BUN SERPL-MCNC: 15 MG/DL (ref 5–25)
CALCIUM SERPL-MCNC: 9.9 MG/DL (ref 8.3–10.1)
CANCER AG27-29 SERPL-ACNC: 24.7 U/ML (ref 0–42.3)
CEA SERPL-MCNC: 5.3 NG/ML (ref 0–3)
CHLORIDE SERPL-SCNC: 97 MMOL/L (ref 100–108)
CO2 SERPL-SCNC: 32 MMOL/L (ref 21–32)
CREAT SERPL-MCNC: 0.76 MG/DL (ref 0.6–1.3)
D DIMER PPP FEU-MCNC: 1.31 UG/ML FEU
EOSINOPHIL # BLD AUTO: 0.12 THOUSAND/ΜL (ref 0–0.61)
EOSINOPHIL NFR BLD AUTO: 2 % (ref 0–6)
ERYTHROCYTE [DISTWIDTH] IN BLOOD BY AUTOMATED COUNT: 13.4 % (ref 11.6–15.1)
GFR SERPL CREATININE-BSD FRML MDRD: 78 ML/MIN/1.73SQ M
GLUCOSE P FAST SERPL-MCNC: 92 MG/DL (ref 65–99)
HCT VFR BLD AUTO: 37.8 % (ref 34.8–46.1)
HGB BLD-MCNC: 12.5 G/DL (ref 11.5–15.4)
IMM GRANULOCYTES # BLD AUTO: 0.03 THOUSAND/UL (ref 0–0.2)
IMM GRANULOCYTES NFR BLD AUTO: 1 % (ref 0–2)
LYMPHOCYTES # BLD AUTO: 0.89 THOUSANDS/ΜL (ref 0.6–4.47)
LYMPHOCYTES NFR BLD AUTO: 16 % (ref 14–44)
MCH RBC QN AUTO: 32.2 PG (ref 26.8–34.3)
MCHC RBC AUTO-ENTMCNC: 33.1 G/DL (ref 31.4–37.4)
MCV RBC AUTO: 97 FL (ref 82–98)
MONOCYTES # BLD AUTO: 0.79 THOUSAND/ΜL (ref 0.17–1.22)
MONOCYTES NFR BLD AUTO: 14 % (ref 4–12)
NEUTROPHILS # BLD AUTO: 3.62 THOUSANDS/ΜL (ref 1.85–7.62)
NEUTS SEG NFR BLD AUTO: 66 % (ref 43–75)
NRBC BLD AUTO-RTO: 0 /100 WBCS
PLATELET # BLD AUTO: 224 THOUSANDS/UL (ref 149–390)
PMV BLD AUTO: 8.8 FL (ref 8.9–12.7)
POTASSIUM SERPL-SCNC: 3.8 MMOL/L (ref 3.5–5.3)
PROT SERPL-MCNC: 8.3 G/DL (ref 6.4–8.2)
RBC # BLD AUTO: 3.88 MILLION/UL (ref 3.81–5.12)
SODIUM SERPL-SCNC: 132 MMOL/L (ref 136–145)
WBC # BLD AUTO: 5.49 THOUSAND/UL (ref 4.31–10.16)

## 2020-11-09 PROCEDURE — 85025 COMPLETE CBC W/AUTO DIFF WBC: CPT

## 2020-11-09 PROCEDURE — 82378 CARCINOEMBRYONIC ANTIGEN: CPT

## 2020-11-09 PROCEDURE — 36415 COLL VENOUS BLD VENIPUNCTURE: CPT

## 2020-11-09 PROCEDURE — 85379 FIBRIN DEGRADATION QUANT: CPT

## 2020-11-09 PROCEDURE — 80053 COMPREHEN METABOLIC PANEL: CPT

## 2020-11-09 PROCEDURE — 86300 IMMUNOASSAY TUMOR CA 15-3: CPT

## 2020-11-13 ENCOUNTER — TELEPHONE (OUTPATIENT)
Dept: HEMATOLOGY ONCOLOGY | Facility: CLINIC | Age: 75
End: 2020-11-13

## 2020-11-16 ENCOUNTER — OFFICE VISIT (OUTPATIENT)
Dept: HEMATOLOGY ONCOLOGY | Facility: CLINIC | Age: 75
End: 2020-11-16
Payer: COMMERCIAL

## 2020-11-16 VITALS
TEMPERATURE: 97.5 F | OXYGEN SATURATION: 98 % | DIASTOLIC BLOOD PRESSURE: 76 MMHG | HEIGHT: 64 IN | WEIGHT: 150.8 LBS | RESPIRATION RATE: 16 BRPM | BODY MASS INDEX: 25.74 KG/M2 | SYSTOLIC BLOOD PRESSURE: 128 MMHG | HEART RATE: 88 BPM

## 2020-11-16 DIAGNOSIS — I82.5Z2 CHRONIC DEEP VEIN THROMBOSIS (DVT) OF DISTAL VEIN OF LEFT LOWER EXTREMITY (HCC): ICD-10-CM

## 2020-11-16 DIAGNOSIS — C50.911 MALIGNANT NEOPLASM OF RIGHT BREAST IN FEMALE, ESTROGEN RECEPTOR POSITIVE, UNSPECIFIED SITE OF BREAST (HCC): ICD-10-CM

## 2020-11-16 DIAGNOSIS — M81.8 OTHER OSTEOPOROSIS WITHOUT CURRENT PATHOLOGICAL FRACTURE: ICD-10-CM

## 2020-11-16 DIAGNOSIS — M85.80 OSTEOPENIA DUE TO CANCER THERAPY: ICD-10-CM

## 2020-11-16 DIAGNOSIS — C18.2 MALIGNANT NEOPLASM OF ASCENDING COLON (HCC): Primary | ICD-10-CM

## 2020-11-16 DIAGNOSIS — Z85.038 PERSONAL HISTORY OF OTHER MALIGNANT NEOPLASM OF LARGE INTESTINE: ICD-10-CM

## 2020-11-16 DIAGNOSIS — E04.1 THYROID NODULE: ICD-10-CM

## 2020-11-16 DIAGNOSIS — R79.89 D-DIMER, ELEVATED: ICD-10-CM

## 2020-11-16 DIAGNOSIS — Z17.0 MALIGNANT NEOPLASM OF RIGHT BREAST IN FEMALE, ESTROGEN RECEPTOR POSITIVE, UNSPECIFIED SITE OF BREAST (HCC): ICD-10-CM

## 2020-11-16 DIAGNOSIS — R97.8 ABNORMAL TUMOR MARKERS: ICD-10-CM

## 2020-11-16 PROCEDURE — 99214 OFFICE O/P EST MOD 30 MIN: CPT | Performed by: INTERNAL MEDICINE

## 2020-11-16 PROCEDURE — 1036F TOBACCO NON-USER: CPT | Performed by: INTERNAL MEDICINE

## 2020-11-16 PROCEDURE — 1160F RVW MEDS BY RX/DR IN RCRD: CPT | Performed by: INTERNAL MEDICINE

## 2020-11-18 ENCOUNTER — OFFICE VISIT (OUTPATIENT)
Dept: FAMILY MEDICINE CLINIC | Facility: CLINIC | Age: 75
End: 2020-11-18
Payer: COMMERCIAL

## 2020-11-18 VITALS
HEART RATE: 81 BPM | SYSTOLIC BLOOD PRESSURE: 132 MMHG | BODY MASS INDEX: 25.92 KG/M2 | DIASTOLIC BLOOD PRESSURE: 84 MMHG | WEIGHT: 151.8 LBS | HEIGHT: 64 IN | TEMPERATURE: 97.6 F | OXYGEN SATURATION: 95 %

## 2020-11-18 DIAGNOSIS — M85.88 OSTEOPENIA OF LUMBAR SPINE: ICD-10-CM

## 2020-11-18 DIAGNOSIS — I82.5Z2 CHRONIC DEEP VEIN THROMBOSIS (DVT) OF DISTAL VEIN OF LEFT LOWER EXTREMITY (HCC): ICD-10-CM

## 2020-11-18 DIAGNOSIS — I10 ESSENTIAL HYPERTENSION: Primary | ICD-10-CM

## 2020-11-18 DIAGNOSIS — Z17.0 MALIGNANT NEOPLASM OF RIGHT BREAST IN FEMALE, ESTROGEN RECEPTOR POSITIVE, UNSPECIFIED SITE OF BREAST (HCC): ICD-10-CM

## 2020-11-18 DIAGNOSIS — C50.911 MALIGNANT NEOPLASM OF RIGHT BREAST IN FEMALE, ESTROGEN RECEPTOR POSITIVE, UNSPECIFIED SITE OF BREAST (HCC): ICD-10-CM

## 2020-11-18 PROCEDURE — 99214 OFFICE O/P EST MOD 30 MIN: CPT | Performed by: FAMILY MEDICINE

## 2020-11-18 PROCEDURE — 3075F SYST BP GE 130 - 139MM HG: CPT | Performed by: FAMILY MEDICINE

## 2020-11-18 PROCEDURE — 1160F RVW MEDS BY RX/DR IN RCRD: CPT | Performed by: FAMILY MEDICINE

## 2020-11-18 PROCEDURE — 1036F TOBACCO NON-USER: CPT | Performed by: FAMILY MEDICINE

## 2020-11-18 PROCEDURE — 3079F DIAST BP 80-89 MM HG: CPT | Performed by: FAMILY MEDICINE

## 2020-11-19 ENCOUNTER — OFFICE VISIT (OUTPATIENT)
Dept: PODIATRY | Facility: CLINIC | Age: 75
End: 2020-11-19
Payer: COMMERCIAL

## 2020-11-19 VITALS — HEIGHT: 64 IN | WEIGHT: 150 LBS | BODY MASS INDEX: 25.61 KG/M2 | TEMPERATURE: 98.1 F

## 2020-11-19 DIAGNOSIS — E11.621 DIABETIC ULCER OF TOE OF LEFT FOOT ASSOCIATED WITH TYPE 2 DIABETES MELLITUS, LIMITED TO BREAKDOWN OF SKIN (HCC): Primary | ICD-10-CM

## 2020-11-19 DIAGNOSIS — L97.521 DIABETIC ULCER OF TOE OF LEFT FOOT ASSOCIATED WITH TYPE 2 DIABETES MELLITUS, LIMITED TO BREAKDOWN OF SKIN (HCC): Primary | ICD-10-CM

## 2020-11-19 PROCEDURE — 97597 DBRDMT OPN WND 1ST 20 CM/<: CPT | Performed by: PODIATRIST

## 2020-11-19 PROCEDURE — 3008F BODY MASS INDEX DOCD: CPT | Performed by: FAMILY MEDICINE

## 2020-12-02 ENCOUNTER — OFFICE VISIT (OUTPATIENT)
Dept: PODIATRY | Facility: CLINIC | Age: 75
End: 2020-12-02

## 2020-12-02 VITALS
DIASTOLIC BLOOD PRESSURE: 87 MMHG | BODY MASS INDEX: 25.88 KG/M2 | HEART RATE: 88 BPM | HEIGHT: 64 IN | SYSTOLIC BLOOD PRESSURE: 147 MMHG | WEIGHT: 151.6 LBS

## 2020-12-02 DIAGNOSIS — E11.621 DIABETIC ULCER OF TOE OF LEFT FOOT ASSOCIATED WITH TYPE 2 DIABETES MELLITUS, LIMITED TO BREAKDOWN OF SKIN (HCC): Primary | ICD-10-CM

## 2020-12-02 DIAGNOSIS — L97.521 DIABETIC ULCER OF TOE OF LEFT FOOT ASSOCIATED WITH TYPE 2 DIABETES MELLITUS, LIMITED TO BREAKDOWN OF SKIN (HCC): Primary | ICD-10-CM

## 2020-12-02 PROCEDURE — 99212 OFFICE O/P EST SF 10 MIN: CPT | Performed by: PODIATRIST

## 2020-12-02 PROCEDURE — 3008F BODY MASS INDEX DOCD: CPT | Performed by: FAMILY MEDICINE

## 2020-12-29 ENCOUNTER — LAB (OUTPATIENT)
Dept: LAB | Facility: HOSPITAL | Age: 75
End: 2020-12-29
Attending: INTERNAL MEDICINE
Payer: COMMERCIAL

## 2020-12-29 DIAGNOSIS — M85.80 OSTEOPENIA DUE TO CANCER THERAPY: ICD-10-CM

## 2020-12-29 DIAGNOSIS — C18.2 MALIGNANT NEOPLASM OF ASCENDING COLON (HCC): ICD-10-CM

## 2020-12-29 DIAGNOSIS — Z17.0 MALIGNANT NEOPLASM OF RIGHT BREAST IN FEMALE, ESTROGEN RECEPTOR POSITIVE, UNSPECIFIED SITE OF BREAST (HCC): ICD-10-CM

## 2020-12-29 DIAGNOSIS — Z85.038 PERSONAL HISTORY OF OTHER MALIGNANT NEOPLASM OF LARGE INTESTINE: ICD-10-CM

## 2020-12-29 DIAGNOSIS — M81.8 OTHER OSTEOPOROSIS WITHOUT CURRENT PATHOLOGICAL FRACTURE: ICD-10-CM

## 2020-12-29 DIAGNOSIS — R97.8 ABNORMAL TUMOR MARKERS: ICD-10-CM

## 2020-12-29 DIAGNOSIS — C50.911 MALIGNANT NEOPLASM OF RIGHT BREAST IN FEMALE, ESTROGEN RECEPTOR POSITIVE, UNSPECIFIED SITE OF BREAST (HCC): ICD-10-CM

## 2020-12-29 LAB
ALBUMIN SERPL BCP-MCNC: 4.2 G/DL (ref 3.5–5)
ALP SERPL-CCNC: 73 U/L (ref 46–116)
ALT SERPL W P-5'-P-CCNC: 16 U/L (ref 12–78)
ANION GAP SERPL CALCULATED.3IONS-SCNC: 8 MMOL/L (ref 4–13)
AST SERPL W P-5'-P-CCNC: 14 U/L (ref 5–45)
BILIRUB SERPL-MCNC: 0.5 MG/DL (ref 0.2–1)
BUN SERPL-MCNC: 11 MG/DL (ref 5–25)
CALCIUM SERPL-MCNC: 9.8 MG/DL (ref 8.3–10.1)
CEA SERPL-MCNC: 4.6 NG/ML (ref 0–3)
CHLORIDE SERPL-SCNC: 98 MMOL/L (ref 100–108)
CO2 SERPL-SCNC: 27 MMOL/L (ref 21–32)
CREAT SERPL-MCNC: 0.82 MG/DL (ref 0.6–1.3)
GFR SERPL CREATININE-BSD FRML MDRD: 71 ML/MIN/1.73SQ M
GLUCOSE P FAST SERPL-MCNC: 124 MG/DL (ref 65–99)
POTASSIUM SERPL-SCNC: 3.6 MMOL/L (ref 3.5–5.3)
PROT SERPL-MCNC: 8 G/DL (ref 6.4–8.2)
SODIUM SERPL-SCNC: 133 MMOL/L (ref 136–145)

## 2020-12-29 PROCEDURE — 82378 CARCINOEMBRYONIC ANTIGEN: CPT

## 2020-12-29 PROCEDURE — 36415 COLL VENOUS BLD VENIPUNCTURE: CPT

## 2020-12-29 PROCEDURE — 80053 COMPREHEN METABOLIC PANEL: CPT

## 2020-12-30 ENCOUNTER — OFFICE VISIT (OUTPATIENT)
Dept: PODIATRY | Facility: CLINIC | Age: 75
End: 2020-12-30

## 2020-12-30 VITALS
SYSTOLIC BLOOD PRESSURE: 147 MMHG | HEART RATE: 81 BPM | WEIGHT: 150.2 LBS | HEIGHT: 64 IN | DIASTOLIC BLOOD PRESSURE: 84 MMHG | BODY MASS INDEX: 25.64 KG/M2

## 2020-12-30 DIAGNOSIS — L84 CORNS: Primary | ICD-10-CM

## 2020-12-30 PROCEDURE — 3008F BODY MASS INDEX DOCD: CPT | Performed by: FAMILY MEDICINE

## 2020-12-30 PROCEDURE — NCFTCARE PR NON-COVERED FOOT CARE: Performed by: PODIATRIST

## 2021-01-05 ENCOUNTER — HOSPITAL ENCOUNTER (OUTPATIENT)
Dept: INFUSION CENTER | Facility: HOSPITAL | Age: 76
Discharge: HOME | End: 2021-01-05
Attending: INTERNAL MEDICINE
Payer: COMMERCIAL

## 2021-01-05 VITALS
RESPIRATION RATE: 18 BRPM | BODY MASS INDEX: 29.09 KG/M2 | SYSTOLIC BLOOD PRESSURE: 120 MMHG | HEIGHT: 61 IN | OXYGEN SATURATION: 97 % | WEIGHT: 154.1 LBS | DIASTOLIC BLOOD PRESSURE: 70 MMHG | HEART RATE: 85 BPM

## 2021-01-05 DIAGNOSIS — M81.8 OTHER OSTEOPOROSIS WITHOUT CURRENT PATHOLOGICAL FRACTURE: ICD-10-CM

## 2021-01-05 DIAGNOSIS — C18.2 MALIGNANT NEOPLASM OF ASCENDING COLON (HCC): Primary | ICD-10-CM

## 2021-01-05 DIAGNOSIS — C50.911 MALIGNANT NEOPLASM OF RIGHT BREAST IN FEMALE, ESTROGEN RECEPTOR POSITIVE, UNSPECIFIED SITE OF BREAST (HCC): ICD-10-CM

## 2021-01-05 DIAGNOSIS — Z17.0 MALIGNANT NEOPLASM OF RIGHT BREAST IN FEMALE, ESTROGEN RECEPTOR POSITIVE, UNSPECIFIED SITE OF BREAST (HCC): ICD-10-CM

## 2021-01-05 DIAGNOSIS — Z85.038 PERSONAL HISTORY OF OTHER MALIGNANT NEOPLASM OF LARGE INTESTINE: ICD-10-CM

## 2021-01-05 DIAGNOSIS — M85.80 OSTEOPENIA DUE TO CANCER THERAPY: ICD-10-CM

## 2021-01-05 PROCEDURE — 96401 CHEMO ANTI-NEOPL SQ/IM: CPT

## 2021-01-05 RX ADMIN — DENOSUMAB 60 MG: 60 INJECTION SUBCUTANEOUS at 09:26

## 2021-01-05 NOTE — PLAN OF CARE
Problem: Potential for Falls  Goal: Patient will remain free of falls  Description: INTERVENTIONS:  - Assess patient frequently for physical needs  -  Identify cognitive and physical deficits and behaviors that affect risk of falls    -  Tarrs fall precautions as indicated by assessment   - Educate patient/family on patient safety including physical limitations  - Instruct patient to call for assistance with activity based on assessment  - Modify environment to reduce risk of injury  - Consider OT/PT consult to assist with strengthening/mobility  Outcome: Progressing

## 2021-03-03 DIAGNOSIS — E11.8 TYPE 2 DIABETES MELLITUS WITH COMPLICATION, WITHOUT LONG-TERM CURRENT USE OF INSULIN (HCC): ICD-10-CM

## 2021-03-04 ENCOUNTER — OFFICE VISIT (OUTPATIENT)
Dept: PODIATRY | Facility: CLINIC | Age: 76
End: 2021-03-04

## 2021-03-04 VITALS
HEIGHT: 61 IN | DIASTOLIC BLOOD PRESSURE: 89 MMHG | BODY MASS INDEX: 28.85 KG/M2 | SYSTOLIC BLOOD PRESSURE: 154 MMHG | WEIGHT: 152.8 LBS

## 2021-03-04 DIAGNOSIS — M20.42 HAMMER TOES OF BOTH FEET: ICD-10-CM

## 2021-03-04 DIAGNOSIS — M20.41 HAMMER TOES OF BOTH FEET: ICD-10-CM

## 2021-03-04 DIAGNOSIS — B35.1 ONYCHOMYCOSIS: ICD-10-CM

## 2021-03-04 DIAGNOSIS — L84 CORNS: Primary | ICD-10-CM

## 2021-03-04 PROCEDURE — NCFTCARE PR NON-COVERED FOOT CARE: Performed by: PODIATRIST

## 2021-03-09 ENCOUNTER — RA CDI HCC (OUTPATIENT)
Dept: OTHER | Facility: HOSPITAL | Age: 76
End: 2021-03-09

## 2021-03-09 NOTE — PROGRESS NOTES
[place] [id number]  coding oppertunities             Chart reviewed, (number of) suggestions sent to provider: 2      Provider did not respond to IB message           C50 911, Z17 0 Malignant neoplasm of right breast in female, estrogen receptor positive ([facility] )    E11 9 Z9HP without complication ([facility] )     If this is correct, please document and assess at your next visit 3/16/21

## 2021-03-16 ENCOUNTER — OFFICE VISIT (OUTPATIENT)
Dept: FAMILY MEDICINE CLINIC | Facility: CLINIC | Age: 76
End: 2021-03-16
Payer: COMMERCIAL

## 2021-03-16 VITALS
HEIGHT: 61 IN | HEART RATE: 94 BPM | DIASTOLIC BLOOD PRESSURE: 84 MMHG | OXYGEN SATURATION: 98 % | SYSTOLIC BLOOD PRESSURE: 148 MMHG | TEMPERATURE: 96.4 F | RESPIRATION RATE: 20 BRPM | WEIGHT: 153.8 LBS | BODY MASS INDEX: 29.04 KG/M2

## 2021-03-16 DIAGNOSIS — I10 ESSENTIAL HYPERTENSION: ICD-10-CM

## 2021-03-16 DIAGNOSIS — M85.80 OSTEOPENIA DUE TO CANCER THERAPY: ICD-10-CM

## 2021-03-16 DIAGNOSIS — E87.8 HYPOCHLOREMIA: ICD-10-CM

## 2021-03-16 DIAGNOSIS — E55.9 VITAMIN D DEFICIENCY: ICD-10-CM

## 2021-03-16 DIAGNOSIS — E11.8 TYPE 2 DIABETES MELLITUS WITH COMPLICATION, WITHOUT LONG-TERM CURRENT USE OF INSULIN (HCC): Primary | ICD-10-CM

## 2021-03-16 DIAGNOSIS — C18.2 MALIGNANT NEOPLASM OF ASCENDING COLON (HCC): ICD-10-CM

## 2021-03-16 DIAGNOSIS — Z23 ENCOUNTER FOR IMMUNIZATION: ICD-10-CM

## 2021-03-16 DIAGNOSIS — E87.1 HYPONATREMIA: ICD-10-CM

## 2021-03-16 DIAGNOSIS — E11.9 DIABETES TYPE 2, NO OCULAR INVOLVEMENT (HCC): ICD-10-CM

## 2021-03-16 LAB — SL AMB POCT HEMOGLOBIN AIC: 6.8 % (ref ?–6.5)

## 2021-03-16 PROCEDURE — 3044F HG A1C LEVEL LT 7.0%: CPT | Performed by: FAMILY MEDICINE

## 2021-03-16 PROCEDURE — 1160F RVW MEDS BY RX/DR IN RCRD: CPT | Performed by: FAMILY MEDICINE

## 2021-03-16 PROCEDURE — 1036F TOBACCO NON-USER: CPT | Performed by: FAMILY MEDICINE

## 2021-03-16 PROCEDURE — 90471 IMMUNIZATION ADMIN: CPT

## 2021-03-16 PROCEDURE — 90715 TDAP VACCINE 7 YRS/> IM: CPT

## 2021-03-16 PROCEDURE — 83036 HEMOGLOBIN GLYCOSYLATED A1C: CPT | Performed by: FAMILY MEDICINE

## 2021-03-16 PROCEDURE — 3725F SCREEN DEPRESSION PERFORMED: CPT | Performed by: FAMILY MEDICINE

## 2021-03-16 PROCEDURE — 99215 OFFICE O/P EST HI 40 MIN: CPT | Performed by: FAMILY MEDICINE

## 2021-03-16 RX ORDER — BENAZEPRIL HYDROCHLORIDE AND HYDROCHLOROTHIAZIDE 20; 12.5 MG/1; MG/1
1 TABLET ORAL DAILY
Qty: 30 TABLET | Refills: 5 | Status: SHIPPED | OUTPATIENT
Start: 2021-03-16 | End: 2021-06-22 | Stop reason: SDUPTHER

## 2021-03-16 NOTE — PROGRESS NOTES
Chief Complaint   Patient presents with    Follow-up     4 month f/u       Assessment/Plan:  Add a little salt to foods  Not with every meal but couple times a week  Continue the Ca++, Mg++ and Vitamin D  Td updated  BMI Counseling: Body mass index is 28 66 kg/m²  The BMI is above normal  Nutrition recommendations include reducing portion sizes, moderation in carbohydrate intake and increasing intake of lean protein  PHQ-9 Depression Screening    PHQ-9:   Frequency of the following problems over the past two weeks:      Little interest or pleasure in doing things: 0 - not at all  Feeling down, depressed, or hopeless: 0 - not at all  PHQ-2 Score: 0          Diagnoses and all orders for this visit:    Type 2 diabetes mellitus with complication, without long-term current use of insulin ([facility] )  -     POCT hemoglobin A1c    Diabetes type 2, no ocular involvement (HCC)  -     sitaGLIPtin (JANUVIA) 100 mg tablet; Take 1 tablet (100 mg total) by mouth daily    Essential hypertension  -     benazepril-hydrochlorthiazide (LOTENSIN HCT) 20-12 5 MG per tablet; Take 1 tablet by mouth daily    Hypochloremia  -     Basic metabolic panel; Future    Hyponatremia  -     Basic metabolic panel; Future    Vitamin D deficiency  -     Vitamin D 25 hydroxy; Future    Malignant neoplasm of ascending colon ([facility] )    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          Subjective:      Patient ID: Lauren Salazar is a 77 y o  female  Refill's, review labs and prior DEXA scan  A1c: 6 8 %  Patient not interested in getting the vaccine for covid        The following portions of the patient's history were reviewed and updated as appropriate: allergies, current medications, past medical history, past social history, past surgical history and problem list   I have spent 40 minutes with Patient  today in which greater than 50% of this time was spent in counseling/coordination of care regarding Diagnostic results, Risks and benefits of tx options, Intructions for management, Patient and family education, Risk factor reductions and Impressions  Review of Systems   Constitutional: Negative  HENT: Negative  Eyes: Negative  Respiratory: Negative  Cardiovascular: Negative  Gastrointestinal: Negative  Genitourinary: Negative  Musculoskeletal: Negative  Skin: Negative  Neurological: Negative  Psychiatric/Behavioral: Negative  Objective:      /84 (BP Location: Left arm, Patient Position: Sitting, Cuff Size: Standard)   Pulse 94   Temp (!) 96 4 °F (35 8 °C) (Tympanic)   Resp 20   Ht 5' 1 42" (1 56 m)   Wt 69 8 kg (153 lb 12 8 oz)   LMP  (LMP Unknown)   SpO2 98%   BMI 28 66 kg/m²       Current Outpatient Medications:     ascorbic acid (VITAMIN C) 500 MG tablet, Take 1,000 mg by mouth daily , Disp: , Rfl:     aspirin 81 MG tablet, Take 81 mg by mouth daily  , Disp: , Rfl:     benazepril-hydrochlorthiazide (LOTENSIN HCT) 20-12 5 MG per tablet, Take 1 tablet by mouth daily, Disp: 30 tablet, Rfl: 5    bimatoprost (LUMIGAN) 0 01 % ophthalmic drops, Administer 1 drop to both eyes daily at bedtime , Disp: , Rfl:     calcium citrate (CALCITRATE) 950 MG tablet, Take 1 tablet by mouth daily, Disp: , Rfl:     cephalexin (KEFLEX) 250 mg capsule, take 2 capsules by mouth four times a day for 10 days, Disp: , Rfl:     Cholecalciferol (VITAMIN D-3 PO), Take 1 capsule by mouth 3 (three) times a day , Disp: , Rfl:     Cyanocobalamin (VITAMIN B 12 PO), Take 1 tablet by mouth daily  , Disp: , Rfl:     ferrous sulfate 325 (65 Fe) mg tablet, Take 325 mg by mouth daily with breakfast , Disp: , Rfl:     letrozole (FEMARA) 2 5 mg tablet, Take 1 tablet (2 5 mg total) by mouth daily, Disp: 30 tablet, Rfl: 11    Magnesium 250 MG TABS, Take 1 tablet by mouth daily, Disp: , Rfl:     metFORMIN (GLUCOPHAGE) 500 mg tablet, TAKE 1 TABLET (500 MG TOTAL) BY MOUTH 2 (TWO) TIMES A DAY WITH MEALS, Disp: 60 tablet, Rfl: 5    mupirocin (BACTROBAN) 2 % ointment, Apply topically 2 (two) times a day, Disp: 22 g, Rfl: 0    pantoprazole (PROTONIX) 40 mg tablet, Take 1 tablet (40 mg total) by mouth daily, Disp: 30 tablet, Rfl: 5    thiamine (VITAMIN B1) 50 mg tablet, Take 50 mg by mouth daily, Disp: , Rfl:     vitamin A 10,000 units capsule, Take 10,000 Units by mouth daily  , Disp: , Rfl:     vitamin E 100 UNIT capsule, Take 100 Units by mouth daily , Disp: , Rfl:     sitaGLIPtin (JANUVIA) 100 mg tablet, Take 1 tablet (100 mg total) by mouth daily, Disp: 30 tablet, Rfl: 5     No Known Allergies        Physical Exam  Constitutional:       Appearance: She is well-developed  HENT:      Head: Normocephalic and atraumatic  Right Ear: External ear normal       Left Ear: External ear normal       Nose: Nose normal       Mouth/Throat:      Mouth: Mucous membranes are moist       Pharynx: Oropharynx is clear  Eyes:      Extraocular Movements: Extraocular movements intact  Conjunctiva/sclera: Conjunctivae normal       Pupils: Pupils are equal, round, and reactive to light  Neck:      Musculoskeletal: Normal range of motion and neck supple  Cardiovascular:      Rate and Rhythm: Normal rate and regular rhythm  Heart sounds: Normal heart sounds  Pulmonary:      Effort: Pulmonary effort is normal       Breath sounds: Normal breath sounds  Abdominal:      General: Bowel sounds are normal       Palpations: Abdomen is soft  Skin:     General: Skin is warm and dry  Neurological:      Mental Status: She is alert and oriented to person, place, and time  Deep Tendon Reflexes: Reflexes are normal and symmetric  Psychiatric:         Behavior: Behavior normal          Thought Content:  Thought content normal          Judgment: Judgment normal

## 2021-03-17 LAB
LEFT EYE DIABETIC RETINOPATHY: NORMAL
RIGHT EYE DIABETIC RETINOPATHY: NORMAL

## 2021-04-01 ENCOUNTER — OFFICE VISIT (OUTPATIENT)
Dept: PODIATRY | Facility: CLINIC | Age: 76
End: 2021-04-01

## 2021-04-01 VITALS
WEIGHT: 154.2 LBS | DIASTOLIC BLOOD PRESSURE: 76 MMHG | BODY MASS INDEX: 29.11 KG/M2 | HEIGHT: 61 IN | SYSTOLIC BLOOD PRESSURE: 117 MMHG | HEART RATE: 90 BPM

## 2021-04-01 DIAGNOSIS — L84 CORNS: Primary | ICD-10-CM

## 2021-04-01 DIAGNOSIS — M20.42 HAMMER TOES OF BOTH FEET: ICD-10-CM

## 2021-04-01 DIAGNOSIS — M20.41 HAMMER TOES OF BOTH FEET: ICD-10-CM

## 2021-04-01 PROCEDURE — 3008F BODY MASS INDEX DOCD: CPT | Performed by: FAMILY MEDICINE

## 2021-04-01 PROCEDURE — NCFTCARE PR NON-COVERED FOOT CARE: Performed by: PODIATRIST

## 2021-04-01 NOTE — PROGRESS NOTES
Patient presents for palliative foot care  No acute disorder noted  Treatment consisted of nail and lesion trimming

## 2021-04-22 ENCOUNTER — OFFICE VISIT (OUTPATIENT)
Dept: PODIATRY | Facility: CLINIC | Age: 76
End: 2021-04-22

## 2021-04-22 VITALS
HEART RATE: 84 BPM | BODY MASS INDEX: 28.55 KG/M2 | HEIGHT: 61 IN | SYSTOLIC BLOOD PRESSURE: 134 MMHG | DIASTOLIC BLOOD PRESSURE: 81 MMHG | WEIGHT: 151.2 LBS

## 2021-04-22 DIAGNOSIS — L84 CORNS: Primary | ICD-10-CM

## 2021-04-22 DIAGNOSIS — B35.1 ONYCHOMYCOSIS: ICD-10-CM

## 2021-04-22 PROCEDURE — 3008F BODY MASS INDEX DOCD: CPT | Performed by: FAMILY MEDICINE

## 2021-04-22 PROCEDURE — NCFTCARE PR NON-COVERED FOOT CARE: Performed by: PODIATRIST

## 2021-04-22 NOTE — PROGRESS NOTES
Patient presents for palliative foot care  No acute disorder noted  Treatment consisted of nail and lesion trimming    Patient is rescheduled in 3 weeks at her request

## 2021-05-03 DIAGNOSIS — C18.2 MALIGNANT NEOPLASM OF ASCENDING COLON (HCC): ICD-10-CM

## 2021-05-03 DIAGNOSIS — M85.80 OSTEOPENIA DUE TO CANCER THERAPY: ICD-10-CM

## 2021-05-03 DIAGNOSIS — Z85.038 PERSONAL HISTORY OF OTHER MALIGNANT NEOPLASM OF LARGE INTESTINE: ICD-10-CM

## 2021-05-03 DIAGNOSIS — C50.911 MALIGNANT NEOPLASM OF RIGHT BREAST IN FEMALE, ESTROGEN RECEPTOR POSITIVE, UNSPECIFIED SITE OF BREAST (HCC): ICD-10-CM

## 2021-05-03 DIAGNOSIS — M81.8 OTHER OSTEOPOROSIS WITHOUT CURRENT PATHOLOGICAL FRACTURE: Primary | ICD-10-CM

## 2021-05-03 DIAGNOSIS — Z17.0 MALIGNANT NEOPLASM OF RIGHT BREAST IN FEMALE, ESTROGEN RECEPTOR POSITIVE, UNSPECIFIED SITE OF BREAST (HCC): ICD-10-CM

## 2021-05-10 ENCOUNTER — APPOINTMENT (OUTPATIENT)
Dept: LAB | Facility: HOSPITAL | Age: 76
End: 2021-05-10
Attending: INTERNAL MEDICINE
Payer: COMMERCIAL

## 2021-05-10 ENCOUNTER — TRANSCRIBE ORDERS (OUTPATIENT)
Dept: LAB | Facility: HOSPITAL | Age: 76
End: 2021-05-10

## 2021-05-10 DIAGNOSIS — C18.2 MALIGNANT NEOPLASM OF ASCENDING COLON (HCC): ICD-10-CM

## 2021-05-10 DIAGNOSIS — R12 HEART BURN: ICD-10-CM

## 2021-05-10 DIAGNOSIS — R97.8 ABNORMAL TUMOR MARKERS: ICD-10-CM

## 2021-05-10 DIAGNOSIS — R79.89 D-DIMER, ELEVATED: ICD-10-CM

## 2021-05-10 LAB
ALBUMIN SERPL BCP-MCNC: 4.4 G/DL (ref 3.5–5)
ALP SERPL-CCNC: 71 U/L (ref 46–116)
ALT SERPL W P-5'-P-CCNC: 16 U/L (ref 12–78)
ANION GAP SERPL CALCULATED.3IONS-SCNC: 9 MMOL/L (ref 4–13)
AST SERPL W P-5'-P-CCNC: 10 U/L (ref 5–45)
BASOPHILS # BLD AUTO: 0.04 THOUSANDS/ΜL (ref 0–0.1)
BASOPHILS NFR BLD AUTO: 1 % (ref 0–1)
BILIRUB SERPL-MCNC: 0.28 MG/DL (ref 0.2–1)
BUN SERPL-MCNC: 15 MG/DL (ref 5–25)
CALCIUM SERPL-MCNC: 10.2 MG/DL (ref 8.3–10.1)
CEA SERPL-MCNC: 5.3 NG/ML (ref 0–3)
CHLORIDE SERPL-SCNC: 96 MMOL/L (ref 100–108)
CO2 SERPL-SCNC: 26 MMOL/L (ref 21–32)
CREAT SERPL-MCNC: 0.72 MG/DL (ref 0.6–1.3)
D DIMER PPP FEU-MCNC: 1.2 UG/ML FEU
EOSINOPHIL # BLD AUTO: 0.21 THOUSAND/ΜL (ref 0–0.61)
EOSINOPHIL NFR BLD AUTO: 4 % (ref 0–6)
ERYTHROCYTE [DISTWIDTH] IN BLOOD BY AUTOMATED COUNT: 12.9 % (ref 11.6–15.1)
GFR SERPL CREATININE-BSD FRML MDRD: 82 ML/MIN/1.73SQ M
GLUCOSE P FAST SERPL-MCNC: 120 MG/DL (ref 65–99)
HCT VFR BLD AUTO: 40 % (ref 34.8–46.1)
HGB BLD-MCNC: 13.2 G/DL (ref 11.5–15.4)
IMM GRANULOCYTES # BLD AUTO: 0.02 THOUSAND/UL (ref 0–0.2)
IMM GRANULOCYTES NFR BLD AUTO: 0 % (ref 0–2)
LYMPHOCYTES # BLD AUTO: 0.76 THOUSANDS/ΜL (ref 0.6–4.47)
LYMPHOCYTES NFR BLD AUTO: 14 % (ref 14–44)
MCH RBC QN AUTO: 32.4 PG (ref 26.8–34.3)
MCHC RBC AUTO-ENTMCNC: 33 G/DL (ref 31.4–37.4)
MCV RBC AUTO: 98 FL (ref 82–98)
MONOCYTES # BLD AUTO: 0.68 THOUSAND/ΜL (ref 0.17–1.22)
MONOCYTES NFR BLD AUTO: 12 % (ref 4–12)
NEUTROPHILS # BLD AUTO: 3.86 THOUSANDS/ΜL (ref 1.85–7.62)
NEUTS SEG NFR BLD AUTO: 69 % (ref 43–75)
NRBC BLD AUTO-RTO: 0 /100 WBCS
PLATELET # BLD AUTO: 251 THOUSANDS/UL (ref 149–390)
PMV BLD AUTO: 9.4 FL (ref 8.9–12.7)
POTASSIUM SERPL-SCNC: 3.8 MMOL/L (ref 3.5–5.3)
PROT SERPL-MCNC: 8.6 G/DL (ref 6.4–8.2)
RBC # BLD AUTO: 4.07 MILLION/UL (ref 3.81–5.12)
SODIUM SERPL-SCNC: 131 MMOL/L (ref 136–145)
WBC # BLD AUTO: 5.57 THOUSAND/UL (ref 4.31–10.16)

## 2021-05-10 PROCEDURE — 36415 COLL VENOUS BLD VENIPUNCTURE: CPT

## 2021-05-10 PROCEDURE — 82378 CARCINOEMBRYONIC ANTIGEN: CPT

## 2021-05-10 PROCEDURE — 85379 FIBRIN DEGRADATION QUANT: CPT

## 2021-05-10 PROCEDURE — 80053 COMPREHEN METABOLIC PANEL: CPT

## 2021-05-10 PROCEDURE — 85025 COMPLETE CBC W/AUTO DIFF WBC: CPT

## 2021-05-10 RX ORDER — PANTOPRAZOLE SODIUM 40 MG/1
40 TABLET, DELAYED RELEASE ORAL DAILY
Qty: 30 TABLET | Refills: 5 | Status: SHIPPED | OUTPATIENT
Start: 2021-05-10 | End: 2021-12-06 | Stop reason: SDUPTHER

## 2021-05-13 ENCOUNTER — OFFICE VISIT (OUTPATIENT)
Dept: PODIATRY | Facility: CLINIC | Age: 76
End: 2021-05-13

## 2021-05-13 VITALS
BODY MASS INDEX: 29.11 KG/M2 | DIASTOLIC BLOOD PRESSURE: 83 MMHG | HEART RATE: 91 BPM | WEIGHT: 154.2 LBS | HEIGHT: 61 IN | SYSTOLIC BLOOD PRESSURE: 128 MMHG

## 2021-05-13 DIAGNOSIS — M20.41 HAMMER TOES OF BOTH FEET: ICD-10-CM

## 2021-05-13 DIAGNOSIS — M20.42 HAMMER TOES OF BOTH FEET: ICD-10-CM

## 2021-05-13 DIAGNOSIS — L84 CORNS: Primary | ICD-10-CM

## 2021-05-13 PROCEDURE — NCFTCARE PR NON-COVERED FOOT CARE: Performed by: PODIATRIST

## 2021-05-13 NOTE — PROGRESS NOTES
Patient presents for palliative foot care  No acute disorder noted  Trimmed hyperkeratotic lesions left 2nd and 4th toes due to hammertoe deformities  No evidence of ulceration    Patient rescheduled at 3 weeks at her request

## 2021-05-17 ENCOUNTER — OFFICE VISIT (OUTPATIENT)
Dept: HEMATOLOGY ONCOLOGY | Facility: CLINIC | Age: 76
End: 2021-05-17
Payer: COMMERCIAL

## 2021-05-17 VITALS
HEIGHT: 61 IN | OXYGEN SATURATION: 98 % | TEMPERATURE: 97.4 F | HEART RATE: 78 BPM | WEIGHT: 153 LBS | BODY MASS INDEX: 28.89 KG/M2 | RESPIRATION RATE: 20 BRPM | SYSTOLIC BLOOD PRESSURE: 138 MMHG | DIASTOLIC BLOOD PRESSURE: 80 MMHG

## 2021-05-17 DIAGNOSIS — C50.911 MALIGNANT NEOPLASM OF RIGHT BREAST IN FEMALE, ESTROGEN RECEPTOR POSITIVE, UNSPECIFIED SITE OF BREAST (HCC): ICD-10-CM

## 2021-05-17 DIAGNOSIS — M81.8 OSTEOPOROSIS DUE TO AROMATASE INHIBITOR: ICD-10-CM

## 2021-05-17 DIAGNOSIS — Z17.0 MALIGNANT NEOPLASM OF RIGHT BREAST IN FEMALE, ESTROGEN RECEPTOR POSITIVE, UNSPECIFIED SITE OF BREAST (HCC): ICD-10-CM

## 2021-05-17 DIAGNOSIS — M85.80 OSTEOPENIA DUE TO CANCER THERAPY: ICD-10-CM

## 2021-05-17 DIAGNOSIS — I82.5Z2 CHRONIC DEEP VEIN THROMBOSIS (DVT) OF DISTAL VEIN OF LEFT LOWER EXTREMITY (HCC): ICD-10-CM

## 2021-05-17 DIAGNOSIS — C18.2 MALIGNANT NEOPLASM OF ASCENDING COLON (HCC): Primary | ICD-10-CM

## 2021-05-17 DIAGNOSIS — E04.1 THYROID NODULE: ICD-10-CM

## 2021-05-17 DIAGNOSIS — C50.919 MALIGNANT NEOPLASM OF FEMALE BREAST, UNSPECIFIED ESTROGEN RECEPTOR STATUS, UNSPECIFIED LATERALITY, UNSPECIFIED SITE OF BREAST (HCC): ICD-10-CM

## 2021-05-17 DIAGNOSIS — E11.9 TYPE 2 DIABETES MELLITUS WITHOUT COMPLICATION, UNSPECIFIED WHETHER LONG TERM INSULIN USE (HCC): ICD-10-CM

## 2021-05-17 DIAGNOSIS — T38.6X5A OSTEOPOROSIS DUE TO AROMATASE INHIBITOR: ICD-10-CM

## 2021-05-17 PROCEDURE — 99214 OFFICE O/P EST MOD 30 MIN: CPT | Performed by: INTERNAL MEDICINE

## 2021-05-17 RX ORDER — LETROZOLE 2.5 MG/1
2.5 TABLET, FILM COATED ORAL DAILY
Qty: 30 TABLET | Refills: 11 | Status: SHIPPED | OUTPATIENT
Start: 2021-05-17 | End: 2022-05-23 | Stop reason: SDUPTHER

## 2021-05-17 NOTE — PROGRESS NOTES
HPI: On 7/22/15 she underwent right hemicolectomy  Pathological diagnosis right colon cancer, stage I, T2 N0 MX, grade 2, No lymphovascular invasion and no perineural invasion  She did not require adjuvant therapy for colon cancer  CEA remains high and is being monitored     Stage IIIB hormone receptor positive, HER-2 negative  right breast cancer was diagnosed in 2012  Karrie Nissen Patient had right mastectomy, and lymph node dissection and had 9 positive lymph nodes  She had Adriamycin + Cytoxan chemotherapy followed by Taxotere and after that radiation therapy  Since November/December 2012 she has been on Femara    Lorenzo Manju 2016 she was found to have benign clustered calcifications in left breast and had a biopsy and that was benign  Dr Nehemias Bryan takes care of her mammography     In December 2012 patient developed DVT right leg and she was started on Xarelto  She had GI bleeding  Xarelto was stopped  On colonoscopy she was found to have stage I right colon cancer    Olga Fofana has been on baby aspirin  She is not on anticoagulation anymore  Follow-up Venous Doppler study was negative for DVT   She runs high D-dimer  History of aromatase inhibitor induced osteopenia and she has been on Prolia in addition to calcium and vitamin-D  Karrie Nissen No problem with her teeth for Prolia        History of thyroid nodule  She had biopsy of thyroid nodule in June 2014 and she states that was negative for cancer  She gets thyroid ultrasound yearly for surveillance        Has some tiredness, low back discomfort and alopecia                           Current Outpatient Medications:     ascorbic acid (VITAMIN C) 500 MG tablet, Take 1,000 mg by mouth daily , Disp: , Rfl:     aspirin 81 MG tablet, Take 81 mg by mouth daily  , Disp: , Rfl:     benazepril-hydrochlorthiazide (LOTENSIN HCT) 20-12 5 MG per tablet, Take 1 tablet by mouth daily, Disp: 30 tablet, Rfl: 5    bimatoprost (LUMIGAN) 0 01 % ophthalmic drops, Administer 1 drop to both eyes daily at bedtime , Disp: , Rfl:     calcium citrate (CALCITRATE) 950 MG tablet, Take 1 tablet by mouth daily, Disp: , Rfl:     cephalexin (KEFLEX) 250 mg capsule, take 2 capsules by mouth four times a day for 10 days, Disp: , Rfl:     Cholecalciferol (VITAMIN D-3 PO), Take 1 capsule by mouth 3 (three) times a day , Disp: , Rfl:     Cyanocobalamin (VITAMIN B 12 PO), Take 1 tablet by mouth daily  , Disp: , Rfl:     ferrous sulfate 325 (65 Fe) mg tablet, Take 325 mg by mouth daily with breakfast , Disp: , Rfl:     letrozole (FEMARA) 2 5 mg tablet, Take 1 tablet (2 5 mg total) by mouth daily, Disp: 30 tablet, Rfl: 11    Magnesium 250 MG TABS, Take 1 tablet by mouth daily, Disp: , Rfl:     metFORMIN (GLUCOPHAGE) 500 mg tablet, TAKE 1 TABLET (500 MG TOTAL) BY MOUTH 2 (TWO) TIMES A DAY WITH MEALS, Disp: 60 tablet, Rfl: 5    mupirocin (BACTROBAN) 2 % ointment, Apply topically 2 (two) times a day, Disp: 22 g, Rfl: 0    pantoprazole (PROTONIX) 40 mg tablet, Take 1 tablet (40 mg total) by mouth daily, Disp: 30 tablet, Rfl: 5    sitaGLIPtin (JANUVIA) 100 mg tablet, Take 1 tablet (100 mg total) by mouth daily, Disp: 30 tablet, Rfl: 5    thiamine (VITAMIN B1) 50 mg tablet, Take 50 mg by mouth daily, Disp: , Rfl:     vitamin A 10,000 units capsule, Take 10,000 Units by mouth daily  , Disp: , Rfl:     vitamin E 100 UNIT capsule, Take 100 Units by mouth daily , Disp: , Rfl:     No Known Allergies    Oncology History Overview Note    In 2012 patient was diagnosed to have Hormone receptor positive, HER-2 negative stage IIIB cancer in right breast  She had right mastectomy and lymph node dissection   9 lymph nodes showed metastatic disease  Patient was given Adriamycin + Cytoxan chemotherapy followed by Taxotere and after that radiation therapy  Since November/December 2012 she has been on Femara     She will be on Femara for a total of 10 years  On 7/22/15 she underwent right hemicolectomy   Pathological diagnosis right colon cancer, stage I, T2 N0 MX, grade 2,no lymphovascular invasion and no perineural invasion  She did not require adjuvant therapy for colon cancer  She has been running slightly high CEA and that is being monitored  Malignant neoplasm of right breast in female, estrogen receptor positive ([facility] )   6/21/2018 Initial Diagnosis    Malignant neoplasm of right breast in female, estrogen receptor positive [facility])      Surgery     2012- right mastectomy   2015 - right hemicolectomy      Radiation     [id number]: Radiation to right chest wall and lymph nodes      Chemotherapy     2012 -Adriamycin plus Cytoxan followed by Taxotere Followed by Femara  She will have Femara for a total of 10 years  ROS:  05/17/21 Reviewed 13 systems: See symptoms in HPI:    Presently no other neurological , cardiac, pulmonary, GI and  symptoms other than listed in HPI  No fevers, chills, bleeding, bone pains, skin rash, weight loss, night sweats, numbness, claudication and gait problem  Has some anxiety  No swelling of the ankles and no swollen glands  Not unusually sensitive to heat or cold  /80 (BP Location: Left arm, Patient Position: Sitting, Cuff Size: Adult)   Pulse 78   Temp (!) 97 4 °F (36 3 °C) (Tympanic)   Resp 20   Ht 5' 1" (1 549 m)   Wt 69 4 kg (153 lb)   LMP  (LMP Unknown)   SpO2 98%   BMI 28 91 kg/m²     Physical Exam:  Alert, oriented, not in distress, has alopecia, no icterus, no oral thrush, no palpable neck mass, clear lung fields, regular heart rate, abdomen  soft and non tender, no palpable abdominal mass, no ascites, no edema of ankles, no calf tenderness, no focal neurological deficit, no skin rash, no palpable lymphadenopathy in the neck and axillary areas,  no clubbing  Patient is anxious  Performance status 1  Right mastectomy scar  No lymphedema      IMAGING:  IMPRESSION:     No nodule meets current ACR criteria for requiring biopsy but followup ultrasound is recommended in 1 year             Reference: ACR Thyroid Imaging, Reporting and Data System (TI-RADS): White Paper of the [employer]  J AM Carlo Radiol 4958;64:866-147  (additional recommendations based on American Thyroid Association 2015 guidelines )        Workstation performed: [id number]      Imaging    US thyroid (Order: [id number]) - 6/17/2020    IMPRESSION:  1  Based on the [facility] classification, the T-score of -2 0 in the lumbar spine is consistent with low bone mineral density (OSTEOPENIA)  2  When compared to the prior examination, there has been a 4  % DECREASE in the total bone mineral density of the lumbar spine  There has been NO SIGNIFICANT CHANGE in the total bone mineral density of the left hip  3   Any secondary causes of low bone mineral density should be excluded prior to treatment, if clinically indicated  4   A daily intake of at least 1200 mg calcium and 800 to 1000 IU of Vitamin D, as well as weight bearing and muscle strengthening exercise, fall prevention and avoidance of tobacco and excessive alcohol intake as basic preventive measures are suggested  5   Repeat DXA  in 18 - 24 months, on the same machine, as clinically indicated            The 10 year risk of hip fracture is 2%, with the 10 year risk of major osteoporotic fracture being 10%, as calculated by the WHO fracture risk assessment tool (FRAX)    The current NOF guidelines recommend treating patients with FRAX 10 year risk score of   >3% for hip fracture and >20% for major osteoporotic fracture             WHO CLASSIFICATION:  Normal (a T-score of -1 0 or higher)  Low bone mineral density (a T-score of less than -1 0 but higher than -2 5)  Osteoporosis (a T-score of -2 5 or less)  Severe osteoporosis (a T-score of -2 5 or less with a fragility fracture)                     Workstation performed: [id number]      Imaging    DXA bone density spine hip and pelvis (Order: [id number]) - 6/14/2019    LABS:    Results for orders placed or performed in visit on 05/10/21   CBC and differential   Result Value Ref Range    WBC 5 57 4 31 - 10 16 Thousand/uL    RBC 4 07 3 81 - 5 12 Million/uL    Hemoglobin 13 2 11 5 - 15 4 g/dL    Hematocrit 40 0 34 8 - 46 1 %    MCV 98 82 - 98 fL    MCH 32 4 26 8 - 34 3 pg    MCHC 33 0 31 4 - 37 4 g/dL    RDW 12 9 11 6 - 15 1 %    MPV 9 4 8 9 - 12 7 fL    Platelets 344 286 - 782 Thousands/uL    nRBC 0 /100 WBCs    Neutrophils Relative 69 43 - 75 %    Immat GRANS % 0 0 - 2 %    Lymphocytes Relative 14 14 - 44 %    Monocytes Relative 12 4 - 12 %    Eosinophils Relative 4 0 - 6 %    Basophils Relative 1 0 - 1 %    Neutrophils Absolute 3 86 1 85 - 7 62 Thousands/µL    Immature Grans Absolute 0 02 0 00 - 0 20 Thousand/uL    Lymphocytes Absolute 0 76 0 60 - 4 47 Thousands/µL    Monocytes Absolute 0 68 0 17 - 1 22 Thousand/µL    Eosinophils Absolute 0 21 0 00 - 0 61 Thousand/µL    Basophils Absolute 0 04 0 00 - 0 10 Thousands/µL   Comprehensive metabolic panel   Result Value Ref Range    Sodium 131 (L) 136 - 145 mmol/L    Potassium 3 8 3 5 - 5 3 mmol/L    Chloride 96 (L) 100 - 108 mmol/L    CO2 26 21 - 32 mmol/L    ANION GAP 9 4 - 13 mmol/L    BUN 15 5 - 25 mg/dL    Creatinine 0 72 0 60 - 1 30 mg/dL    Glucose, Fasting 120 (H) 65 - 99 mg/dL    Calcium 10 2 (H) 8 3 - 10 1 mg/dL    AST 10 5 - 45 U/L    ALT 16 12 - 78 U/L    Alkaline Phosphatase 71 46 - 116 U/L    Total Protein 8 6 (H) 6 4 - 8 2 g/dL    Albumin 4 4 3 5 - 5 0 g/dL    Total Bilirubin 0 28 0 20 - 1 00 mg/dL    eGFR 82 ml/min/1 73sq m   CEA   Result Value Ref Range    CEA 5 3 (H) 0 0 - 3 0 ng/mL   D-dimer, quantitative   Result Value Ref Range    D-Dimer, Quant 1 20 (H) <0 50 ug/ml FEU     Labs, Imaging, & Other studies:   All pertinent labs and imaging studies were personally reviewed      D-dimer 1 20  CEA 5 3  Reviewed CBC D, CMP, CEA and D-dimer and discussed with patient  Assessment and plan:   On 7/22/15 she underwent right hemicolectomy   Pathological diagnosis right colon cancer, stage I, T2 N0 MX, grade 2, No lymphovascular invasion and no perineural invasion  She did not require adjuvant therapy for colon cancer  CEA remains high and is being monitored     Stage IIIB hormone receptor positive, HER-2 negative  right breast cancer was diagnosed in 2012  Dara Soulier Patient had right mastectomy, and lymph node dissection and had 9 positive lymph nodes  She had Adriamycin + Cytoxan chemotherapy followed by Taxotere and after that radiation therapy  Since November/December 2012 she has been on Femara    Franny Merl 2016 she was found to have benign clustered calcifications in left breast and had a biopsy and that was benign  Dr Nam Miles takes care of her mammography     In December 2012 patient developed DVT right leg and she was started on Xarelto  She had GI bleeding  Xarelto was stopped  On colonoscopy she was found to have stage I right colon cancer    Theresacameron Reddy has been on baby aspirin  She is not on anticoagulation anymore  Follow-up Venous Doppler study was negative for DVT   She runs high D-dimer  History of aromatase inhibitor induced osteopenia and she has been on Prolia in addition to calcium and vitamin-D  Dara Soulier No problem with her teeth for Prolia        History of thyroid nodule  She had biopsy of thyroid nodule in June 2014 and she states that was negative for cancer  She gets thyroid ultrasound yearly for surveillance        Has some tiredness, low back discomfort and alopecia     Physical examination and test results are as recorded and discussed   Breast cancer and colon cancers are in remission   Goal is cure from both the cancers   No active DVT at present   D-dimer test is being monitored and remains high   Thyroid nodules are being monitored     CEA is higher again and is being monitored      All discussed in detail   Questions answered  Discussed the importance of self-breast examination, eating healthy foods, staying active and health screening tests   Patient is capable of self-care     Discussed precautions against coronavirus  She will continue to follow with her primary physician and other consultants  See diagnoses, orders instructions below  1  Malignant neoplasm of ascending colon ([facility] )    - Basic metabolic panel; Future  - CEA; Future  - CBC and differential; Future  - Comprehensive metabolic panel; Future  - CEA; Future    2  Malignant neoplasm of right breast in female, estrogen receptor positive, unspecified site of breast ([place] [address] )    - Cancer antigen 27 29; Future    3  Chronic deep vein thrombosis (DVT) of distal vein of left lower extremity (HCC)    - D-dimer, quantitative; Future    4  Thyroid nodule    - US thyroid; Future    5  Type 2 diabetes mellitus without complication, unspecified whether long term insulin use ([place] [address] )      6  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      7  Malignant neoplasm of female breast, unspecified estrogen receptor status, unspecified laterality, unspecified site of breast ([place] [address] )    - letrozole (FEMARA) 2 5 mg tablet; Take 1 tablet (2 5 mg total) by mouth daily  Dispense: 30 tablet; Refill: 11    8  Osteoporosis due to aromatase inhibitor    - DXA bone density spine hip and pelvis; Future                              Ordered blood tests prior to Prolia  Ordered DEXA scan and ultrasound of thyroid  Ordered blood work prior to next visit in 6 months  Renewed letrozole  Patient voiced understanding and agrees      Counseling / Coordination of Care      Provided counseling and support

## 2021-05-17 NOTE — PATIENT INSTRUCTIONS
Ordered blood tests prior to Prolia  Ordered DEXA scan and ultrasound of thyroid  Ordered blood work prior to next visit in 6 months  Renewed letrozole

## 2021-06-03 ENCOUNTER — OFFICE VISIT (OUTPATIENT)
Dept: PODIATRY | Facility: CLINIC | Age: 76
End: 2021-06-03

## 2021-06-03 VITALS
HEIGHT: 61 IN | DIASTOLIC BLOOD PRESSURE: 86 MMHG | SYSTOLIC BLOOD PRESSURE: 132 MMHG | BODY MASS INDEX: 29.19 KG/M2 | WEIGHT: 154.6 LBS | HEART RATE: 80 BPM

## 2021-06-03 DIAGNOSIS — M20.41 HAMMER TOES OF BOTH FEET: ICD-10-CM

## 2021-06-03 DIAGNOSIS — L84 CORNS: Primary | ICD-10-CM

## 2021-06-03 DIAGNOSIS — M20.42 HAMMER TOES OF BOTH FEET: ICD-10-CM

## 2021-06-03 PROCEDURE — NCFTCARE PR NON-COVERED FOOT CARE: Performed by: PODIATRIST

## 2021-06-03 NOTE — PROGRESS NOTES
Patient presents for palliative foot care  No acute disorder noted  Corns are present on the 2nd and 4th toes of the left foot and 4th toe right foot  Treatment consisted of lesion trimming  No evidence of ulceration  Patient will be rescheduled in 3 weeks

## 2021-06-14 ENCOUNTER — APPOINTMENT (OUTPATIENT)
Dept: LAB | Facility: HOSPITAL | Age: 76
End: 2021-06-14
Payer: COMMERCIAL

## 2021-06-14 DIAGNOSIS — E55.9 VITAMIN D DEFICIENCY: ICD-10-CM

## 2021-06-14 DIAGNOSIS — E87.8 HYPOCHLOREMIA: ICD-10-CM

## 2021-06-14 DIAGNOSIS — E87.1 HYPONATREMIA: ICD-10-CM

## 2021-06-14 LAB
25(OH)D3 SERPL-MCNC: 124.2 NG/ML (ref 30–100)
ANION GAP SERPL CALCULATED.3IONS-SCNC: 7 MMOL/L (ref 4–13)
BUN SERPL-MCNC: 14 MG/DL (ref 5–25)
CALCIUM SERPL-MCNC: 10.1 MG/DL (ref 8.3–10.1)
CHLORIDE SERPL-SCNC: 99 MMOL/L (ref 100–108)
CO2 SERPL-SCNC: 28 MMOL/L (ref 21–32)
CREAT SERPL-MCNC: 0.72 MG/DL (ref 0.6–1.3)
GFR SERPL CREATININE-BSD FRML MDRD: 82 ML/MIN/1.73SQ M
GLUCOSE SERPL-MCNC: 120 MG/DL (ref 65–140)
POTASSIUM SERPL-SCNC: 3.9 MMOL/L (ref 3.5–5.3)
SODIUM SERPL-SCNC: 134 MMOL/L (ref 136–145)

## 2021-06-14 PROCEDURE — 80048 BASIC METABOLIC PNL TOTAL CA: CPT

## 2021-06-14 PROCEDURE — 36415 COLL VENOUS BLD VENIPUNCTURE: CPT

## 2021-06-14 PROCEDURE — 82306 VITAMIN D 25 HYDROXY: CPT

## 2021-06-15 ENCOUNTER — OFFICE VISIT (OUTPATIENT)
Dept: SURGERY | Facility: CLINIC | Age: 76
End: 2021-06-15
Payer: COMMERCIAL

## 2021-06-15 VITALS — HEIGHT: 62 IN | WEIGHT: 154 LBS | BODY MASS INDEX: 28.34 KG/M2

## 2021-06-15 DIAGNOSIS — Z12.31 ENCOUNTER FOR SCREENING MAMMOGRAM FOR BREAST CANCER: ICD-10-CM

## 2021-06-15 DIAGNOSIS — Z17.0 MALIGNANT NEOPLASM OF RIGHT BREAST IN FEMALE, ESTROGEN RECEPTOR POSITIVE (HCC): Primary | ICD-10-CM

## 2021-06-15 DIAGNOSIS — C50.911 MALIGNANT NEOPLASM OF RIGHT BREAST IN FEMALE, ESTROGEN RECEPTOR POSITIVE (HCC): Primary | ICD-10-CM

## 2021-06-15 PROCEDURE — 3008F BODY MASS INDEX DOCD: CPT | Performed by: SURGERY

## 2021-06-15 PROCEDURE — 1160F RVW MEDS BY RX/DR IN RCRD: CPT | Performed by: SURGERY

## 2021-06-15 PROCEDURE — 1036F TOBACCO NON-USER: CPT | Performed by: SURGERY

## 2021-06-15 PROCEDURE — 99213 OFFICE O/P EST LOW 20 MIN: CPT | Performed by: SURGERY

## 2021-06-15 NOTE — PROGRESS NOTES
Assessment/Plan: In 2012, [facility] diagnosed to have Hormone receptor positive, HER-2 negative stage IIIB cancer in right breast  She had right mastectomy and lymph node dissection   9 lymph nodes showed metastatic disease  Patient was given Adriamycin + Cytoxan chemotherapy followed by Taxotere and after that radiation therapy  Since November/December 2012 she has been on Femara     She has completed Femara for a total of 10 years  She returns today for follow-up visit  She has no complaints  2020 Mammogram and left breast ultrasound are benign  She complains of arthritis, neck discomfort and difficulty with walking  Breast exam is normal   Reassurance was provided  A follow-up visit is requested in 1 year  Diagnoses and all orders for this visit:    Malignant neoplasm of right breast in female, estrogen receptor positive ([address] )        Subjective:      Patient ID: Inna Montgomery is a 77 y o  female  Patient presents for yearly breast exam   Mammogram 10/15/2021 and mammogram and left breast ultrasound 10/21/2021  The following portions of the patient's history were reviewed and updated as appropriate:     She  has a past medical history of Acute embolism and thrombosis of deep vein of lower extremity ([address] ), Adenocarcinoma of colon, [facility] ([address] ), Breast cancer ([address] ) (2012), Cancer ([address] ), Diabetes mellitus ([address] ), DVT (deep venous thrombosis) ([address] ), GERD (gastroesophageal reflux disease), Hypertension, and Pes planus  She  has a past surgical history that includes Mastectomy (Right, 2012); pr colonoscopy flx dx w/collj spec when pfrmd (N/A, 8/23/2016); pr colonoscopy flx dx w/collj spec when pfrmd (N/A, 9/5/2017); pr esophagogastroduodenoscopy transoral diagnostic (N/A, 9/5/2017); IR port removal (9/12/2018); Colonoscopy; and Hysterectomy  Her family history includes Brain cancer in her father;  Other in her mother; Pancreatic cancer (age of onset: 39) in her maternal aunt; Prostate cancer (age of onset: 79) in her paternal grandfather  She  reports that she has never smoked  She has never used smokeless tobacco  She reports that she does not drink alcohol and does not use drugs  On 7/22/15 she underwent right hemicolectomy  Pathological diagnosis right colon cancer, stage I, T2 N0 MX, grade 2,no lymphovascular invasion and no perineural invasion  She did not require adjuvant therapy for colon cancer  She has been running slightly high CEA and that is being monitored  Current Outpatient Medications   Medication Sig Dispense Refill    ascorbic acid (VITAMIN C) 500 MG tablet Take 1,000 mg by mouth daily       aspirin 81 MG tablet Take 81 mg by mouth daily   benazepril-hydrochlorthiazide (LOTENSIN HCT) 20-12 5 MG per tablet Take 1 tablet by mouth daily 30 tablet 5    bimatoprost (LUMIGAN) 0 01 % ophthalmic drops Administer 1 drop to both eyes daily at bedtime       calcium citrate (CALCITRATE) 950 MG tablet Take 1 tablet by mouth daily      cephalexin (KEFLEX) 250 mg capsule take 2 capsules by mouth four times a day for 10 days      Cholecalciferol (VITAMIN D-3 PO) Take 1 capsule by mouth 3 (three) times a day   Cyanocobalamin (VITAMIN B 12 PO) Take 1 tablet by mouth daily        ferrous sulfate 325 (65 Fe) mg tablet Take 325 mg by mouth daily with breakfast       letrozole (FEMARA) 2 5 mg tablet Take 1 tablet (2 5 mg total) by mouth daily 30 tablet 11    Magnesium 250 MG TABS Take 1 tablet by mouth daily      metFORMIN (GLUCOPHAGE) 500 mg tablet TAKE 1 TABLET (500 MG TOTAL) BY MOUTH 2 (TWO) TIMES A DAY WITH MEALS 60 tablet 5    mupirocin (BACTROBAN) 2 % ointment Apply topically 2 (two) times a day 22 g 0    pantoprazole (PROTONIX) 40 mg tablet Take 1 tablet (40 mg total) by mouth daily 30 tablet 5    sitaGLIPtin (JANUVIA) 100 mg tablet Take 1 tablet (100 mg total) by mouth daily 30 tablet 5    thiamine (VITAMIN B1) 50 mg tablet Take 50 mg by mouth daily      vitamin A 10,000 units capsule Take 10,000 Units by mouth daily   vitamin E 100 UNIT capsule Take 100 Units by mouth daily        No current facility-administered medications for this visit  She has No Known Allergies       Review of Systems   Constitutional: Negative  Negative for activity change  HENT: Negative  Eyes: Negative  Respiratory: Negative  Cardiovascular: Negative  Gastrointestinal: Negative  Endocrine: Negative  Genitourinary: Negative  Musculoskeletal: Positive for arthralgias and back pain  Skin: Negative  Allergic/Immunologic: Negative  Neurological: Negative  Psychiatric/Behavioral: Negative for agitation, behavioral problems and confusion  The patient is not nervous/anxious  Objective:      Ht 5' 2" (1 575 m)   Wt 69 9 kg (154 lb)   LMP  (LMP Unknown)   BMI 28 17 kg/m²          Physical Exam  Constitutional:       Appearance: Normal appearance  She is well-developed  HENT:      Head: Normocephalic and atraumatic  Nose: Nose normal    Eyes:      Extraocular Movements: Extraocular movements intact  Conjunctiva/sclera: Conjunctivae normal    Cardiovascular:      Rate and Rhythm: Normal rate and regular rhythm  Heart sounds: Normal heart sounds  Pulmonary:      Effort: Pulmonary effort is normal       Breath sounds: Normal breath sounds  Chest:       Abdominal:      General: Abdomen is flat  Musculoskeletal:         General: Normal range of motion  Cervical back: Normal range of motion  Skin:     General: Skin is warm and dry  Neurological:      Mental Status: She is alert and oriented to person, place, and time     Psychiatric:         Mood and Affect: Mood normal

## 2021-06-16 ENCOUNTER — RA CDI HCC (OUTPATIENT)
Dept: OTHER | Facility: HOSPITAL | Age: 76
End: 2021-06-16

## 2021-06-16 NOTE — PROGRESS NOTES
[facility]  coding opportunities          Chart reviewed, no opportunity found: CHART REVIEWED, NO OPPORTUNITY FOUND                     Patients insurance company: [facility] [address]  (Medicare Advantage and Commercial)

## 2021-06-22 ENCOUNTER — OFFICE VISIT (OUTPATIENT)
Dept: FAMILY MEDICINE CLINIC | Facility: CLINIC | Age: 76
End: 2021-06-22
Payer: COMMERCIAL

## 2021-06-22 ENCOUNTER — IMMUNIZATIONS (OUTPATIENT)
Dept: FAMILY MEDICINE CLINIC | Facility: HOSPITAL | Age: 76
End: 2021-06-22

## 2021-06-22 VITALS
OXYGEN SATURATION: 95 % | WEIGHT: 153.6 LBS | BODY MASS INDEX: 28.26 KG/M2 | HEART RATE: 92 BPM | TEMPERATURE: 96.6 F | RESPIRATION RATE: 20 BRPM | HEIGHT: 62 IN

## 2021-06-22 DIAGNOSIS — M54.2 NECK PAIN: ICD-10-CM

## 2021-06-22 DIAGNOSIS — E11.8 TYPE 2 DIABETES MELLITUS WITH COMPLICATION, WITHOUT LONG-TERM CURRENT USE OF INSULIN (HCC): Primary | ICD-10-CM

## 2021-06-22 DIAGNOSIS — C18.2 MALIGNANT NEOPLASM OF ASCENDING COLON (HCC): ICD-10-CM

## 2021-06-22 DIAGNOSIS — M19.90 ARTHRITIS: ICD-10-CM

## 2021-06-22 DIAGNOSIS — E11.621 DIABETIC ULCER OF TOE OF LEFT FOOT ASSOCIATED WITH TYPE 2 DIABETES MELLITUS, LIMITED TO BREAKDOWN OF SKIN (HCC): ICD-10-CM

## 2021-06-22 DIAGNOSIS — L97.521 DIABETIC ULCER OF TOE OF LEFT FOOT ASSOCIATED WITH TYPE 2 DIABETES MELLITUS, LIMITED TO BREAKDOWN OF SKIN (HCC): ICD-10-CM

## 2021-06-22 DIAGNOSIS — Z23 ENCOUNTER FOR IMMUNIZATION: Primary | ICD-10-CM

## 2021-06-22 DIAGNOSIS — E11.9 DIABETES TYPE 2, NO OCULAR INVOLVEMENT (HCC): ICD-10-CM

## 2021-06-22 DIAGNOSIS — Z00.00 MEDICARE ANNUAL WELLNESS VISIT, SUBSEQUENT: ICD-10-CM

## 2021-06-22 DIAGNOSIS — I10 ESSENTIAL HYPERTENSION: ICD-10-CM

## 2021-06-22 LAB — SL AMB POCT HEMOGLOBIN AIC: 6.6 % (ref ?–6.5)

## 2021-06-22 PROCEDURE — 1101F PT FALLS ASSESS-DOCD LE1/YR: CPT | Performed by: FAMILY MEDICINE

## 2021-06-22 PROCEDURE — 83036 HEMOGLOBIN GLYCOSYLATED A1C: CPT | Performed by: FAMILY MEDICINE

## 2021-06-22 PROCEDURE — 1170F FXNL STATUS ASSESSED: CPT | Performed by: FAMILY MEDICINE

## 2021-06-22 PROCEDURE — 1125F AMNT PAIN NOTED PAIN PRSNT: CPT | Performed by: FAMILY MEDICINE

## 2021-06-22 PROCEDURE — 3725F SCREEN DEPRESSION PERFORMED: CPT | Performed by: FAMILY MEDICINE

## 2021-06-22 PROCEDURE — 3288F FALL RISK ASSESSMENT DOCD: CPT | Performed by: FAMILY MEDICINE

## 2021-06-22 PROCEDURE — 1036F TOBACCO NON-USER: CPT | Performed by: FAMILY MEDICINE

## 2021-06-22 PROCEDURE — 91300 SARS-COV-2 / COVID-19 MRNA VACCINE (PFIZER-BIONTECH) 30 MCG: CPT

## 2021-06-22 PROCEDURE — G0439 PPPS, SUBSEQ VISIT: HCPCS | Performed by: FAMILY MEDICINE

## 2021-06-22 PROCEDURE — 0001A SARS-COV-2 / COVID-19 MRNA VACCINE (PFIZER-BIONTECH) 30 MCG: CPT

## 2021-06-22 PROCEDURE — 99214 OFFICE O/P EST MOD 30 MIN: CPT | Performed by: FAMILY MEDICINE

## 2021-06-22 PROCEDURE — 3044F HG A1C LEVEL LT 7.0%: CPT | Performed by: FAMILY MEDICINE

## 2021-06-22 PROCEDURE — 1160F RVW MEDS BY RX/DR IN RCRD: CPT | Performed by: FAMILY MEDICINE

## 2021-06-22 RX ORDER — BENAZEPRIL HYDROCHLORIDE AND HYDROCHLOROTHIAZIDE 20; 12.5 MG/1; MG/1
1 TABLET ORAL DAILY
Qty: 30 TABLET | Refills: 5 | Status: SHIPPED | OUTPATIENT
Start: 2021-06-22 | End: 2021-12-06 | Stop reason: SDUPTHER

## 2021-06-22 NOTE — PROGRESS NOTES
Diabetic Foot Exam    Patient's shoes and socks removed  Right Foot/Ankle   Right Foot Inspection  Skin Exam: skin normal and skin intact no dry skin, no warmth, no callus, no erythema, no maceration, no abnormal color, no pre-ulcer, no ulcer and no callus                          Toe Exam: ROM and strength within normal limitsno swelling, no tenderness, erythema and  no right toe deformity  Sensory   Vibration: intact  Proprioception: intact   Monofilament testing: diminished  Vascular  Capillary refills: < 3 seconds  The right DP pulse is 0  The right PT pulse is 0  Left Foot/Ankle  Left Foot Inspection  Skin Exam: skin normal and skin intactno dry skin, no warmth, no erythema, no maceration, normal color, no pre-ulcer, no ulcer and no callus                         Toe Exam: ROM and strength within normal limitsno swelling, no tenderness, no erythema and no left toe deformity                   Sensory   Vibration: intact  Proprioception: intact  Monofilament: diminished  Vascular  Capillary refills: < 3 seconds  The left DP pulse is 0  The left PT pulse is 0  Assign Risk Category:  Deformity present;  No loss of protective sensation; Weak pulses       Risk: 1

## 2021-06-22 NOTE — PROGRESS NOTES
Assessment and Plan:     Problem List Items Addressed This Visit     Essential hypertension    Relevant Medications    benazepril-hydrochlorthiazide (LOTENSIN HCT) 20-12 5 MG per tablet    Diabetic ulcer of toe of left foot associated with type 2 diabetes mellitus, limited to breakdown of skin (HCC)    Relevant Medications    sitaGLIPtin (JANUVIA) 100 mg tablet    metFORMIN (GLUCOPHAGE) 500 mg tablet      Other Visit Diagnoses     Type 2 diabetes mellitus with complication, without long-term current use of insulin (HCC)    -  Primary    Relevant Medications    sitaGLIPtin (JANUVIA) 100 mg tablet    metFORMIN (GLUCOPHAGE) 500 mg tablet    Other Relevant Orders    POCT hemoglobin A1c    Medicare annual wellness visit, subsequent        Diabetes type 2, no ocular involvement (HCC)        Relevant Medications    sitaGLIPtin (JANUVIA) 100 mg tablet    metFORMIN (GLUCOPHAGE) 500 mg tablet           Preventive health issues were discussed with patient, and age appropriate screening tests were ordered as noted in patient's After Visit Summary  Personalized health advice and appropriate referrals for health education or preventive services given if needed, as noted in patient's After Visit Summary  History of Present Illness:     Patient presents for Welcome to Medicare visit  Patient Care Team:  Vin Freeman DO as PCP - MD Vin Maxwell DO Arlie Meissner, MD Marena Solders, DPM  Alesia Fountain MD (Colon and Rectal Surgery)  Alesia Fountain MD as Endoscopist     Review of Systems:     Review of Systems   Constitutional: Negative  HENT: Negative  Eyes: Negative  Respiratory: Negative  Cardiovascular: Negative  Gastrointestinal: Negative  Genitourinary: Negative  Musculoskeletal: Positive for neck stiffness  Skin: Negative  Neurological: Negative  Psychiatric/Behavioral: Negative         Problem List:     Patient Active Problem List   Diagnosis  Chronic deep vein thrombosis (DVT) of distal vein of left lower extremity (HCC)    Thyroid nodule    Malignant neoplasm of right breast in female, estrogen receptor positive ([facility] )    Malignant neoplasm of ascending colon ([facility] )    Personal history of other malignant neoplasm of large intestine    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    Other osteoporosis without current pathological fracture    Type 2 diabetes mellitus without complications (HCC)    D-dimer, elevated    Essential hypertension    Abnormal tumor markers    Diabetic ulcer of toe of left foot associated with type 2 diabetes mellitus, limited to breakdown of skin ([facility] )      Past Medical and Surgical History:     Past Medical History:   Diagnosis Date    Acute embolism and thrombosis of deep vein of lower extremity ([facility] )     Adenocarcinoma of colon, [facility] ([facility] )     Breast cancer ([place] [id number] ) 2012    Right Breast     Cancer ([facility] )     Diabetes mellitus ([facility] )     DVT (deep venous thrombosis) (HCC)     GERD (gastroesophageal reflux disease)     Hypertension     Pes planus      Past Surgical History:   Procedure Laterality Date    COLONOSCOPY      HYSTERECTOMY      complete @ age 29    IR PORT REMOVAL  9/12/2018    MASTECTOMY Right 2012    MN COLONOSCOPY FLX DX W/COLLJ SPEC WHEN PFRMD N/A 8/23/2016    Procedure: COLONOSCOPY;  Surgeon: Lashanda Darnell MD;  Location: [facility]; Service: Colorectal    MN COLONOSCOPY FLX DX W/COLLJ SPEC WHEN PFRMD N/A 9/5/2017    Procedure: COLONOSCOPY;  Surgeon: Lashanda Darnell MD;  [facility]; Service: Colorectal    MN ESOPHAGOGASTRODUODENOSCOPY TRANSORAL DIAGNOSTIC N/A 9/5/2017    Procedure: ESOPHAGOGASTRODUODENOSCOPY (EGD); Surgeon: Kyle Myers DO;  Location: [facility];   Service: Gastroenterology      Family History:     Family History   Problem Relation Age of Onset    Other Mother         chronic bronchitis    Brain cancer Father     Prostate cancer Paternal Grandfather 76    Pancreatic cancer Maternal Aunt 39      Social History:     Social History     Socioeconomic History    Marital status: Single     Spouse name: None    Number of children: None    Years of education: None    Highest education level: None   Occupational History    Occupation: retired   Tobacco Use    Smoking status: Never Smoker    Smokeless tobacco: Never Used   Vaping Use    Vaping Use: Never used   Substance and Sexual Activity    Alcohol use: No    Drug use: No    Sexual activity: Not Currently   Other Topics Concern    None   Social History Narrative    No advance directives     Social Determinants of Health     Financial Resource Strain:     Difficulty of Paying Living Expenses:    Food Insecurity:     Worried About Running Out of Food in the Last Year:     Ran Out of Food in the Last Year:    Transportation Needs:     Lack of Transportation (Medical):  Lack of Transportation (Non-Medical):    Physical Activity:     Days of Exercise per Week:     Minutes of Exercise per Session:    Stress:     Feeling of Stress :    Social Connections:     Frequency of Communication with Friends and Family:     Frequency of Social Gatherings with Friends and Family:     Attends [employer] Services:     Active Member of Clubs or Organizations:     Attends Club or Organization Meetings:     Marital Status:    Intimate Partner Violence:     Fear of Current or Ex-Partner:     Emotionally Abused:     Physically Abused:     Sexually Abused:       Medications and Allergies:     Current Outpatient Medications   Medication Sig Dispense Refill    ascorbic acid (VITAMIN C) 500 MG tablet Take 1,000 mg by mouth daily       aspirin 81 MG tablet Take 81 mg by mouth daily        benazepril-hydrochlorthiazide (LOTENSIN HCT) 20-12 5 MG per tablet Take 1 tablet by mouth daily 30 tablet 5    bimatoprost (LUMIGAN) 0 01 % ophthalmic drops Administer 1 drop to both eyes daily at bedtime       calcium citrate (CALCITRATE) 950 MG tablet Take 1 tablet by mouth daily      cephalexin (KEFLEX) 250 mg capsule take 2 capsules by mouth four times a day for 10 days      Cholecalciferol (VITAMIN D-3 PO) Take 1 capsule by mouth 3 (three) times a day   Cyanocobalamin (VITAMIN B 12 PO) Take 1 tablet by mouth daily   ferrous sulfate 325 (65 Fe) mg tablet Take 325 mg by mouth daily with breakfast       letrozole (FEMARA) 2 5 mg tablet Take 1 tablet (2 5 mg total) by mouth daily 30 tablet 11    Magnesium 250 MG TABS Take 1 tablet by mouth daily      metFORMIN (GLUCOPHAGE) 500 mg tablet Take 1 tablet (500 mg total) by mouth 2 (two) times a day with meals 60 tablet 5    mupirocin (BACTROBAN) 2 % ointment Apply topically 2 (two) times a day 22 g 0    pantoprazole (PROTONIX) 40 mg tablet Take 1 tablet (40 mg total) by mouth daily 30 tablet 5    sitaGLIPtin (JANUVIA) 100 mg tablet Take 1 tablet (100 mg total) by mouth daily 30 tablet 5    thiamine (VITAMIN B1) 50 mg tablet Take 50 mg by mouth daily      vitamin A 10,000 units capsule Take 10,000 Units by mouth daily   vitamin E 100 UNIT capsule Take 100 Units by mouth daily        No current facility-administered medications for this visit       No Known Allergies   Immunizations:     Immunization History   Administered Date(s) Administered    INFLUENZA 11/05/2014    Influenza Quadrivalent Preservative Free 3 years and older IM 10/31/2016, 11/13/2017    Influenza Quadrivalent, 6-35 Months IM 11/20/2015    Influenza, high dose seasonal 0 7 mL 10/31/2018, 11/13/2019, 10/20/2020    Influenza, seasonal, injectable 11/04/2013, 11/05/2014    Pneumococcal Conjugate 13-Valent 03/22/2016    Pneumococcal Polysaccharide PPV23 03/18/2019    Tdap 04/18/2011, 03/16/2021    Zoster 08/15/2013      Health Maintenance:         Topic Date Due    DXA SCAN  06/14/2021    Hepatitis C Screening  Completed         Topic Date Due    COVID-19 Vaccine (1) Never done      Medicare Screening Tests and Risk Assessments:     Waymond Gaucher is here for her Subsequent Wellness visit  Last Medicare Wellness visit information reviewed, patient interviewed and updates made to the record today  Health Risk Assessment:   Patient rates overall health as excellent  Patient feels that their physical health rating is same  Patient is very satisfied with their life  Eyesight was rated as same  Hearing was rated as same  Patient feels that their emotional and mental health rating is same  Patients states they are never, rarely angry  Patient states they are never, rarely unusually tired/fatigued  Pain experienced in the last 7 days has been some  Patient's pain rating has been 3/10  Patient states that she has experienced no weight loss or gain in last 6 months  Depression Screening:   PHQ-2 Score: 0      Fall Risk Screening: In the past year, patient has experienced: no history of falling in past year      Urinary Incontinence Screening:   Patient has not leaked urine accidently in the last six months  Home Safety:  Patient does not have trouble with stairs inside or outside of their home  Patient has working smoke alarms and has working carbon monoxide detector  Home safety hazards include: none  Nutrition:   Current diet is Regular and No Added Salt  Medications:   Patient is currently taking over-the-counter supplements  OTC medications include: see medication list  Patient is able to manage medications  Activities of Daily Living (ADLs)/Instrumental Activities of Daily Living (IADLs):   Walk and transfer into and out of bed and chair?: Yes  Dress and groom yourself?: Yes    Bathe or shower yourself?: Yes    Feed yourself?  Yes  Do your laundry/housekeeping?: Yes  Manage your money, pay your bills and track your expenses?: Yes  Make your own meals?: Yes    Do your own shopping?: Yes    Previous Hospitalizations:   Any hospitalizations or ED visits within the last 12 months?: No Advance Care Planning:   Living will: Yes    Durable POA for healthcare: Yes    Advanced directive: Yes      Cognitive Screening:   Provider or family/friend/caregiver concerned regarding cognition?: No    PREVENTIVE SCREENINGS      Cardiovascular Screening:    General: Screening Current      Diabetes Screening:     General: Screening Not Indicated and History Diabetes      Colorectal Cancer Screening:     General: History Colorectal Cancer and Risks and Benefits Discussed      Breast Cancer Screening:     General: History Breast Cancer and Screening Current      Cervical Cancer Screening:    General: Screening Not Indicated      Osteoporosis Screening:    General: Screening Not Indicated and History Osteoporosis      Abdominal Aortic Aneurysm (AAA) Screening:        General: Screening Not Indicated      Lung Cancer Screening:     General: Screening Not Indicated      Hepatitis C Screening:    General: Screening Current    Screening, Brief Intervention, and Referral to Treatment (SBIRT)    Screening  Typical number of drinks in a day: 0  Typical number of drinks in a week: 0  Interpretation: Low risk drinking behavior  Single Item Drug Screening:  How often have you used an illegal drug (including marijuana) or a prescription medication for non-medical reasons in the past year? never    Single Item Drug Screen Score: 0  Interpretation: Negative screen for possible drug use disorder    No exam data present     Physical Exam:     Pulse 92   Temp (!) 96 6 °F (35 9 °C) (Temporal)   Resp 20   Ht 5' 2" (1 575 m)   Wt 69 7 kg (153 lb 9 6 oz)   LMP  (LMP Unknown)   SpO2 95%   BMI 28 09 kg/m²     Physical Exam  Vitals and nursing note reviewed  Constitutional:       General: She is not in acute distress  Appearance: She is well-developed  HENT:      Head: Normocephalic and atraumatic        Right Ear: External ear normal       Left Ear: External ear normal    Eyes:      Extraocular Movements: Extraocular movements intact  Conjunctiva/sclera: Conjunctivae normal       Pupils: Pupils are equal, round, and reactive to light  Cardiovascular:      Rate and Rhythm: Normal rate and regular rhythm  Heart sounds: No murmur heard  Pulmonary:      Effort: Pulmonary effort is normal  No respiratory distress  Breath sounds: Normal breath sounds  Abdominal:      General: Abdomen is flat  Palpations: Abdomen is soft  Tenderness: There is no abdominal tenderness  Musculoskeletal:      Cervical back: Neck supple  Skin:     General: Skin is warm and dry  Neurological:      General: No focal deficit present  Mental Status: She is alert and oriented to person, place, and time  Psychiatric:         Mood and Affect: Mood normal          Behavior: Behavior normal          Thought Content:  Thought content normal          Judgment: Judgment normal           Leonides Dodd DO

## 2021-06-22 NOTE — PROGRESS NOTES
Chief Complaint   Patient presents with   [facility] Wellness Visit     subsequent medicare pe    Follow-up     3 month f/u- review blood work    Generalized Body Aches     has aches and pains in neck       Assessment/Plan:  Decrease vitamin D to #3 gel caps from #9  Fall Risk Assesment      Most Recent Value   Fall Risk   One or more falls in the last year? No   How many times? --   Feels unsteady when standing or walking? No   Worried about falling? No           Diagnoses and all orders for this visit:    Type 2 diabetes mellitus with complication, without long-term current use of insulin (HCC)  -     POCT hemoglobin A1c  -     metFORMIN (GLUCOPHAGE) 500 mg tablet; Take 1 tablet (500 mg total) by mouth 2 (two) times a day with meals    Medicare annual wellness visit, subsequent    Essential hypertension  -     benazepril-hydrochlorthiazide (LOTENSIN HCT) 20-12 5 MG per tablet; Take 1 tablet by mouth daily    Diabetes type 2, no ocular involvement (HCC)  -     sitaGLIPtin (JANUVIA) 100 mg tablet; Take 1 tablet (100 mg total) by mouth daily    Diabetic ulcer of toe of left foot associated with type 2 diabetes mellitus, limited to breakdown of skin (HCC)    Neck pain    Arthritis  -     Diclofenac Sodium (VOLTAREN) 1 %; Apply 2 g topically 2 (two) times a day as needed (as needed)    Malignant neoplasm of ascending colon ([facility] [address] )  -     Ambulatory referral to Colorectal Surgery; Future          Subjective:      Patient ID: Cami Hwang is a 77 y o  female  Refill Med's, foot exam and Medicare PE  Arthritis - neck and lower back  Review labs - vitamin D 124  The following portions of the patient's history were reviewed and updated as appropriate: allergies, current medications, past medical history, past social history, past surgical history and problem list        Review of Systems   Constitutional: Negative  HENT: Negative  Eyes: Negative  Respiratory: Negative  Cardiovascular: Negative  Gastrointestinal: Negative  Genitourinary: Negative  Musculoskeletal: Positive for neck pain and neck stiffness  Skin: Negative  Neurological: Negative  Psychiatric/Behavioral: Negative  Objective:      Pulse 92   Temp (!) 96 6 °F (35 9 °C) (Temporal)   Resp 20   Ht 5' 2" (1 575 m)   Wt 69 7 kg (153 lb 9 6 oz)   LMP  (LMP Unknown)   SpO2 95%   BMI 28 09 kg/m²       Current Outpatient Medications:     ascorbic acid (VITAMIN C) 500 MG tablet, Take 1,000 mg by mouth daily , Disp: , Rfl:     aspirin 81 MG tablet, Take 81 mg by mouth daily  , Disp: , Rfl:     benazepril-hydrochlorthiazide (LOTENSIN HCT) 20-12 5 MG per tablet, Take 1 tablet by mouth daily, Disp: 30 tablet, Rfl: 5    bimatoprost (LUMIGAN) 0 01 % ophthalmic drops, Administer 1 drop to both eyes daily at bedtime , Disp: , Rfl:     calcium citrate (CALCITRATE) 950 MG tablet, Take 1 tablet by mouth daily, Disp: , Rfl:     cephalexin (KEFLEX) 250 mg capsule, take 2 capsules by mouth four times a day for 10 days, Disp: , Rfl:     Cholecalciferol (VITAMIN D-3 PO), Take 1 capsule by mouth 3 (three) times a day , Disp: , Rfl:     Cyanocobalamin (VITAMIN B 12 PO), Take 1 tablet by mouth daily  , Disp: , Rfl:     ferrous sulfate 325 (65 Fe) mg tablet, Take 325 mg by mouth daily with breakfast , Disp: , Rfl:     letrozole (FEMARA) 2 5 mg tablet, Take 1 tablet (2 5 mg total) by mouth daily, Disp: 30 tablet, Rfl: 11    Magnesium 250 MG TABS, Take 1 tablet by mouth daily, Disp: , Rfl:     metFORMIN (GLUCOPHAGE) 500 mg tablet, TAKE 1 TABLET (500 MG TOTAL) BY MOUTH 2 (TWO) TIMES A DAY WITH MEALS, Disp: 60 tablet, Rfl: 5    mupirocin (BACTROBAN) 2 % ointment, Apply topically 2 (two) times a day, Disp: 22 g, Rfl: 0    pantoprazole (PROTONIX) 40 mg tablet, Take 1 tablet (40 mg total) by mouth daily, Disp: 30 tablet, Rfl: 5    sitaGLIPtin (JANUVIA) 100 mg tablet, Take 1 tablet (100 mg total) by mouth daily, Disp: 30 tablet, Rfl: 5    thiamine (VITAMIN B1) 50 mg tablet, Take 50 mg by mouth daily, Disp: , Rfl:     vitamin A 10,000 units capsule, Take 10,000 Units by mouth daily  , Disp: , Rfl:     vitamin E 100 UNIT capsule, Take 100 Units by mouth daily , Disp: , Rfl:      No Known Allergies     Physical Exam  Constitutional:       Appearance: She is well-developed  HENT:      Head: Normocephalic and atraumatic  Right Ear: External ear normal       Left Ear: External ear normal       Nose: Nose normal    Eyes:      Conjunctiva/sclera: Conjunctivae normal       Pupils: Pupils are equal, round, and reactive to light  Cardiovascular:      Rate and Rhythm: Normal rate and regular rhythm  Heart sounds: Normal heart sounds  Pulmonary:      Effort: Pulmonary effort is normal       Breath sounds: Normal breath sounds  Abdominal:      General: Bowel sounds are normal       Palpations: Abdomen is soft  Musculoskeletal:      Cervical back: Normal range of motion and neck supple  Skin:     General: Skin is warm and dry  Neurological:      Mental Status: She is alert and oriented to person, place, and time  Deep Tendon Reflexes: Reflexes are normal and symmetric  Psychiatric:         Behavior: Behavior normal          Thought Content:  Thought content normal          Judgment: Judgment normal

## 2021-06-23 ENCOUNTER — OFFICE VISIT (OUTPATIENT)
Dept: PODIATRY | Facility: CLINIC | Age: 76
End: 2021-06-23

## 2021-06-23 VITALS
SYSTOLIC BLOOD PRESSURE: 129 MMHG | DIASTOLIC BLOOD PRESSURE: 85 MMHG | WEIGHT: 160 LBS | BODY MASS INDEX: 29.44 KG/M2 | HEIGHT: 62 IN | HEART RATE: 94 BPM

## 2021-06-23 DIAGNOSIS — M20.41 HAMMER TOES OF BOTH FEET: Primary | ICD-10-CM

## 2021-06-23 DIAGNOSIS — M20.42 HAMMER TOES OF BOTH FEET: Primary | ICD-10-CM

## 2021-06-23 DIAGNOSIS — L84 CORNS: ICD-10-CM

## 2021-06-23 PROCEDURE — NCFTCARE PR NON-COVERED FOOT CARE: Performed by: PODIATRIST

## 2021-06-23 NOTE — PROGRESS NOTES
Patient presents for palliative care  Pedal pulses remain palpable  Patient has multiple hammertoes  Corns present on the 2nd and 4th toe left foot and 4th toe right foot  Treatment consisted of lesion trimming  No evidence of ulceration  Patient is rescheduled in 3 weeks

## 2021-06-29 ENCOUNTER — TELEPHONE (OUTPATIENT)
Dept: HEMATOLOGY ONCOLOGY | Facility: CLINIC | Age: 76
End: 2021-06-29

## 2021-06-29 ENCOUNTER — APPOINTMENT (OUTPATIENT)
Dept: LAB | Facility: HOSPITAL | Age: 76
End: 2021-06-29
Attending: INTERNAL MEDICINE
Payer: COMMERCIAL

## 2021-06-29 DIAGNOSIS — C18.2 MALIGNANT NEOPLASM OF ASCENDING COLON (HCC): ICD-10-CM

## 2021-06-29 DIAGNOSIS — C50.911 MALIGNANT NEOPLASM OF RIGHT BREAST IN FEMALE, ESTROGEN RECEPTOR POSITIVE, UNSPECIFIED SITE OF BREAST (HCC): ICD-10-CM

## 2021-06-29 DIAGNOSIS — M85.80 OSTEOPENIA DUE TO CANCER THERAPY: ICD-10-CM

## 2021-06-29 DIAGNOSIS — M81.8 OTHER OSTEOPOROSIS WITHOUT CURRENT PATHOLOGICAL FRACTURE: ICD-10-CM

## 2021-06-29 DIAGNOSIS — C50.911 MALIGNANT NEOPLASM OF RIGHT BREAST IN FEMALE, ESTROGEN RECEPTOR POSITIVE, UNSPECIFIED SITE OF BREAST (HCC): Primary | ICD-10-CM

## 2021-06-29 DIAGNOSIS — Z17.0 MALIGNANT NEOPLASM OF RIGHT BREAST IN FEMALE, ESTROGEN RECEPTOR POSITIVE, UNSPECIFIED SITE OF BREAST (HCC): Primary | ICD-10-CM

## 2021-06-29 DIAGNOSIS — Z85.038 PERSONAL HISTORY OF OTHER MALIGNANT NEOPLASM OF LARGE INTESTINE: ICD-10-CM

## 2021-06-29 DIAGNOSIS — Z17.0 MALIGNANT NEOPLASM OF RIGHT BREAST IN FEMALE, ESTROGEN RECEPTOR POSITIVE, UNSPECIFIED SITE OF BREAST (HCC): ICD-10-CM

## 2021-06-29 DIAGNOSIS — R97.8 ABNORMAL TUMOR MARKERS: ICD-10-CM

## 2021-06-29 LAB
ALBUMIN SERPL BCP-MCNC: 4.2 G/DL (ref 3.5–5)
ALP SERPL-CCNC: 68 U/L (ref 46–116)
ALT SERPL W P-5'-P-CCNC: 19 U/L (ref 12–78)
ANION GAP SERPL CALCULATED.3IONS-SCNC: 7 MMOL/L (ref 4–13)
AST SERPL W P-5'-P-CCNC: 14 U/L (ref 5–45)
BILIRUB SERPL-MCNC: 0.47 MG/DL (ref 0.2–1)
BUN SERPL-MCNC: 15 MG/DL (ref 5–25)
CALCIUM SERPL-MCNC: 10 MG/DL (ref 8.3–10.1)
CEA SERPL-MCNC: 5.6 NG/ML (ref 0–3)
CHLORIDE SERPL-SCNC: 100 MMOL/L (ref 100–108)
CO2 SERPL-SCNC: 28 MMOL/L (ref 21–32)
CREAT SERPL-MCNC: 0.84 MG/DL (ref 0.6–1.3)
GFR SERPL CREATININE-BSD FRML MDRD: 68 ML/MIN/1.73SQ M
GLUCOSE P FAST SERPL-MCNC: 130 MG/DL (ref 65–99)
POTASSIUM SERPL-SCNC: 3.8 MMOL/L (ref 3.5–5.3)
PROT SERPL-MCNC: 8.6 G/DL (ref 6.4–8.2)
SODIUM SERPL-SCNC: 135 MMOL/L (ref 136–145)

## 2021-06-29 PROCEDURE — 80053 COMPREHEN METABOLIC PANEL: CPT

## 2021-06-29 PROCEDURE — 82378 CARCINOEMBRYONIC ANTIGEN: CPT

## 2021-06-29 PROCEDURE — 36415 COLL VENOUS BLD VENIPUNCTURE: CPT

## 2021-06-29 NOTE — TELEPHONE ENCOUNTER
Higher CEA  Contacted patient  Ordering PET scan and if not approved by insurance carrier will switch to CT scans

## 2021-06-30 ENCOUNTER — TELEPHONE (OUTPATIENT)
Dept: HEMATOLOGY ONCOLOGY | Facility: CLINIC | Age: 76
End: 2021-06-30

## 2021-06-30 NOTE — TELEPHONE ENCOUNTER
Received call from from patient  Patient will postpone covid vaccine until after PET scan    FYI to Dr Jordan's RN

## 2021-06-30 NOTE — TELEPHONE ENCOUNTER
Patient to reschedule covid booster to day AFTER pet scan per Dr Jordan's direction  Attempted to call patient , no ability to leave message   Will try again

## 2021-06-30 NOTE — TELEPHONE ENCOUNTER
Pt is scheduled for a PET scan on 7/14  She should not get the 2nd covid vaccine until after the PET scan  She should reschedule the vaccine, ok to r/s the vaccine for day after the PET

## 2021-07-01 ENCOUNTER — TELEPHONE (OUTPATIENT)
Dept: HEMATOLOGY ONCOLOGY | Facility: CLINIC | Age: 76
End: 2021-07-01

## 2021-07-01 NOTE — TELEPHONE ENCOUNTER
Telephone call unable to leave a message  Pt needs to have the Prolia infusion appt r/s until after she gets the dxa scan  Per finance group email, the insurance is requiring the scan prior to authorizing additional Prolia doses

## 2021-07-07 ENCOUNTER — TELEPHONE (OUTPATIENT)
Dept: HEMATOLOGY ONCOLOGY | Facility: CLINIC | Age: 76
End: 2021-07-07

## 2021-07-07 NOTE — TELEPHONE ENCOUNTER
Patient calling to see if Dr oJ Ann Contreras will prescribe liquid calcium and magnesium  Patient tried taking OTC PO medication and is having trouble swallowing the pills    Patient spoke with the pharmacy about liquid form - she was told that this would need to be a prescription sent by MD    Per patient it was recommended by Dr Jo Ann Contreras that she take these supplements while on Femara  Patient is currently on Calcium + D3 once daily and magnesium 250mg PO once daily    Patient would like Rx sent to [facility]  On file     Will send to RN to discuss with Dr Jo Ann Contreras  Call back number for patient

## 2021-07-08 NOTE — TELEPHONE ENCOUNTER
Reviewed with Dr Alma Rosa Presley  Per Dr Alma Rosa Presley, patient does not need to take the calcium and magnesium at this time   Per Dr Alma Rosa Presley, patient should take 2,000 IU Vitamin D3 in the small gel capsule form

## 2021-07-14 ENCOUNTER — HOSPITAL ENCOUNTER (OUTPATIENT)
Dept: RADIOLOGY | Age: 76
Discharge: HOME | End: 2021-07-14
Payer: COMMERCIAL

## 2021-07-14 DIAGNOSIS — E04.1 THYROID NODULE: ICD-10-CM

## 2021-07-14 DIAGNOSIS — R97.8 ABNORMAL TUMOR MARKERS: ICD-10-CM

## 2021-07-14 DIAGNOSIS — C18.2 MALIGNANT NEOPLASM OF ASCENDING COLON (HCC): ICD-10-CM

## 2021-07-14 DIAGNOSIS — C50.911 MALIGNANT NEOPLASM OF RIGHT BREAST IN FEMALE, ESTROGEN RECEPTOR POSITIVE, UNSPECIFIED SITE OF BREAST (HCC): ICD-10-CM

## 2021-07-14 DIAGNOSIS — Z17.0 MALIGNANT NEOPLASM OF RIGHT BREAST IN FEMALE, ESTROGEN RECEPTOR POSITIVE, UNSPECIFIED SITE OF BREAST (HCC): ICD-10-CM

## 2021-07-14 LAB — GLUCOSE SERPL-MCNC: 138 MG/DL (ref 65–140)

## 2021-07-14 PROCEDURE — 78815 PET IMAGE W/CT SKULL-THIGH: CPT

## 2021-07-14 PROCEDURE — A9552 F18 FDG: HCPCS

## 2021-07-14 PROCEDURE — G1004 CDSM NDSC: HCPCS

## 2021-07-14 PROCEDURE — 76536 US EXAM OF HEAD AND NECK: CPT

## 2021-07-14 PROCEDURE — 82948 REAGENT STRIP/BLOOD GLUCOSE: CPT

## 2021-07-15 ENCOUNTER — TELEPHONE (OUTPATIENT)
Dept: HEMATOLOGY ONCOLOGY | Facility: CLINIC | Age: 76
End: 2021-07-15

## 2021-07-15 ENCOUNTER — OFFICE VISIT (OUTPATIENT)
Dept: PODIATRY | Facility: CLINIC | Age: 76
End: 2021-07-15

## 2021-07-15 VITALS
BODY MASS INDEX: 29.63 KG/M2 | HEIGHT: 62 IN | DIASTOLIC BLOOD PRESSURE: 81 MMHG | SYSTOLIC BLOOD PRESSURE: 127 MMHG | WEIGHT: 161 LBS | HEART RATE: 93 BPM

## 2021-07-15 DIAGNOSIS — C18.2 MALIGNANT NEOPLASM OF ASCENDING COLON (HCC): Primary | ICD-10-CM

## 2021-07-15 DIAGNOSIS — R97.8 ABNORMAL TUMOR MARKERS: ICD-10-CM

## 2021-07-15 DIAGNOSIS — M20.42 HAMMER TOES OF BOTH FEET: ICD-10-CM

## 2021-07-15 DIAGNOSIS — L84 CORNS: Primary | ICD-10-CM

## 2021-07-15 DIAGNOSIS — M20.41 HAMMER TOES OF BOTH FEET: ICD-10-CM

## 2021-07-15 PROCEDURE — 3008F BODY MASS INDEX DOCD: CPT | Performed by: FAMILY MEDICINE

## 2021-07-15 PROCEDURE — NCFTCARE PR NON-COVERED FOOT CARE: Performed by: PODIATRIST

## 2021-07-15 NOTE — PROGRESS NOTES
Patient presents for palliative foot care  No acute disorder noted  Treatment consisted of nail and lesion trimming  Pedal pulses are palpable

## 2021-07-15 NOTE — TELEPHONE ENCOUNTER
I called patient to give a report of PET-CT scan and to orders serial CEAs  There was no answer and no answering machine  Ordered CEAs    Will try to get in touch with patient again

## 2021-07-17 ENCOUNTER — IMMUNIZATIONS (OUTPATIENT)
Dept: FAMILY MEDICINE CLINIC | Facility: HOSPITAL | Age: 76
End: 2021-07-17

## 2021-07-17 DIAGNOSIS — Z23 ENCOUNTER FOR IMMUNIZATION: Primary | ICD-10-CM

## 2021-07-17 PROCEDURE — 0002A SARS-COV-2 / COVID-19 MRNA VACCINE (PFIZER-BIONTECH) 30 MCG: CPT

## 2021-07-17 PROCEDURE — 91300 SARS-COV-2 / COVID-19 MRNA VACCINE (PFIZER-BIONTECH) 30 MCG: CPT

## 2021-07-22 ENCOUNTER — HOSPITAL ENCOUNTER (OUTPATIENT)
Dept: INFUSION CENTER | Facility: HOSPITAL | Age: 76
End: 2021-07-22

## 2021-07-26 ENCOUNTER — TELEPHONE (OUTPATIENT)
Dept: HEMATOLOGY ONCOLOGY | Facility: CLINIC | Age: 76
End: 2021-07-26

## 2021-07-26 DIAGNOSIS — C18.2 MALIGNANT NEOPLASM OF ASCENDING COLON (HCC): Primary | ICD-10-CM

## 2021-07-26 NOTE — TELEPHONE ENCOUNTER
Patient called to report that her DEXA scan is scheduled 9/24/21 and Prolia is scheduled for 9/30/21  Patient advised that an order for a CMP prior to Prolia injection was entered into [facility]  Patient verbalized understanding of above  RN-FYI

## 2021-08-04 ENCOUNTER — TELEPHONE (OUTPATIENT)
Dept: HEMATOLOGY ONCOLOGY | Facility: CLINIC | Age: 76
End: 2021-08-04

## 2021-08-12 ENCOUNTER — OFFICE VISIT (OUTPATIENT)
Dept: PODIATRY | Facility: CLINIC | Age: 76
End: 2021-08-12

## 2021-08-12 VITALS
SYSTOLIC BLOOD PRESSURE: 164 MMHG | DIASTOLIC BLOOD PRESSURE: 83 MMHG | HEART RATE: 94 BPM | HEIGHT: 62 IN | BODY MASS INDEX: 29.45 KG/M2

## 2021-08-12 DIAGNOSIS — M20.41 HAMMER TOES OF BOTH FEET: ICD-10-CM

## 2021-08-12 DIAGNOSIS — M20.42 HAMMER TOES OF BOTH FEET: ICD-10-CM

## 2021-08-12 DIAGNOSIS — L84 CORNS: Primary | ICD-10-CM

## 2021-08-12 PROCEDURE — NCFTCARE PR NON-COVERED FOOT CARE: Performed by: PODIATRIST

## 2021-08-12 NOTE — PROGRESS NOTES
Patient presents for palliative foot care  No acute disorder noted  Treatment consisted of nail and lesion trimming  Pedal pulses remain palpable

## 2021-08-16 ENCOUNTER — TELEPHONE (OUTPATIENT)
Dept: FAMILY MEDICINE CLINIC | Facility: CLINIC | Age: 76
End: 2021-08-16

## 2021-08-16 NOTE — TELEPHONE ENCOUNTER
Called pt gave instructions to medication that was sent in per Dr Jacques,pt verbalized understanding and will call back if symptoms worsen or do not improve

## 2021-08-16 NOTE — TELEPHONE ENCOUNTER
pt called stated that since yesterday 8/15/2021 she is having urinary frequency and loss of appetite she thinks sh may be starting with a UTI

## 2021-08-17 ENCOUNTER — RA CDI HCC (OUTPATIENT)
Dept: OTHER | Facility: HOSPITAL | Age: 76
End: 2021-08-17

## 2021-08-17 NOTE — PROGRESS NOTES
[facility]  coding opportunities       Chart reviewed, no opportunity found: CHART REVIEWED, NO OPPORTUNITY FOUND                        Patients insurance company: [facility] [address]  (Medicare Advantage and Commercial)

## 2021-09-02 ENCOUNTER — OFFICE VISIT (OUTPATIENT)
Dept: PODIATRY | Facility: CLINIC | Age: 76
End: 2021-09-02

## 2021-09-02 VITALS
HEIGHT: 62 IN | WEIGHT: 159 LBS | BODY MASS INDEX: 29.26 KG/M2 | DIASTOLIC BLOOD PRESSURE: 118 MMHG | SYSTOLIC BLOOD PRESSURE: 169 MMHG | HEART RATE: 89 BPM

## 2021-09-02 DIAGNOSIS — M20.42 HAMMER TOES OF BOTH FEET: Primary | ICD-10-CM

## 2021-09-02 DIAGNOSIS — M20.41 HAMMER TOES OF BOTH FEET: Primary | ICD-10-CM

## 2021-09-02 DIAGNOSIS — L84 CORNS: ICD-10-CM

## 2021-09-02 PROCEDURE — NCFTCARE PR NON-COVERED FOOT CARE: Performed by: PODIATRIST

## 2021-09-02 NOTE — PROGRESS NOTES
Patient presents for palliative foot care  Treatment consisted of trimming multiple corns due to hammertoes  Also callus beneath right 1st metatarsal head  No evidence of ulceration or infection  Trimmed dystrophic mycotic toenails  Note:  Patient's blood pressure has been elevated at her last 2 visits here  She does take medication for blood pressure but does not seem to be responding  She was advised to contact Dr Neil Alvarado

## 2021-09-07 ENCOUNTER — OFFICE VISIT (OUTPATIENT)
Dept: FAMILY MEDICINE CLINIC | Facility: CLINIC | Age: 76
End: 2021-09-07
Payer: COMMERCIAL

## 2021-09-07 VITALS
DIASTOLIC BLOOD PRESSURE: 90 MMHG | WEIGHT: 148.2 LBS | SYSTOLIC BLOOD PRESSURE: 150 MMHG | HEART RATE: 94 BPM | OXYGEN SATURATION: 98 % | BODY MASS INDEX: 27.27 KG/M2 | TEMPERATURE: 97.8 F | HEIGHT: 62 IN

## 2021-09-07 DIAGNOSIS — I10 ESSENTIAL HYPERTENSION: Primary | ICD-10-CM

## 2021-09-07 DIAGNOSIS — F41.8 ANXIETY ABOUT HEALTH: ICD-10-CM

## 2021-09-07 PROCEDURE — 3725F SCREEN DEPRESSION PERFORMED: CPT | Performed by: FAMILY MEDICINE

## 2021-09-07 PROCEDURE — 99214 OFFICE O/P EST MOD 30 MIN: CPT | Performed by: FAMILY MEDICINE

## 2021-09-07 PROCEDURE — 3077F SYST BP >= 140 MM HG: CPT | Performed by: FAMILY MEDICINE

## 2021-09-07 PROCEDURE — 1160F RVW MEDS BY RX/DR IN RCRD: CPT | Performed by: FAMILY MEDICINE

## 2021-09-07 PROCEDURE — 1101F PT FALLS ASSESS-DOCD LE1/YR: CPT | Performed by: FAMILY MEDICINE

## 2021-09-07 PROCEDURE — 1036F TOBACCO NON-USER: CPT | Performed by: FAMILY MEDICINE

## 2021-09-07 PROCEDURE — 3080F DIAST BP >= 90 MM HG: CPT | Performed by: FAMILY MEDICINE

## 2021-09-07 PROCEDURE — 3288F FALL RISK ASSESSMENT DOCD: CPT | Performed by: FAMILY MEDICINE

## 2021-09-07 RX ORDER — AMLODIPINE BESYLATE 2.5 MG/1
2.5 TABLET ORAL DAILY
Qty: 30 TABLET | Refills: 5 | Status: SHIPPED | OUTPATIENT
Start: 2021-09-07 | End: 2021-12-06 | Stop reason: SDUPTHER

## 2021-09-07 NOTE — PROGRESS NOTES
Chief Complaint   Patient presents with    Hypertension     eleveated BP      Assessment/Plan:  Reassurance given to patient  PHQ-9 Follow-up    Little interest or pleasure in doing things: 0 - not at all  Feeling down, depressed, or hopeless: 0 - not at all  PHQ-2 Score: 0       BMI Counseling: Body mass index is 27 11 kg/m²  The BMI is above normal  Nutrition recommendations include consuming healthier snacks, moderation in carbohydrate intake and increasing intake of lean protein  Diagnoses and all orders for this visit:    Essential hypertension    Anxiety about health          Subjective:      Patient ID: Jesús Liang is a 77 y o  female  BP been up at Podiatry past 2 visits  Has been worrying more about her health  The following portions of the patient's history were reviewed and updated as appropriate: allergies, current medications, past medical history, past social history, past surgical history and problem list   I have spent 25 minutes with Patient  today in which greater than 50% of this time was spent in counseling/coordination of care regarding Diagnostic results, Risks and benefits of tx options, Intructions for management, Patient and family education, Importance of tx compliance, Risk factor reductions and Impressions  Review of Systems   Constitutional: Negative  HENT: Negative  Eyes: Negative  Respiratory: Negative  Cardiovascular: Negative  Gastrointestinal: Negative  Genitourinary: Negative  Musculoskeletal: Negative  Skin: Negative  Neurological: Negative  Psychiatric/Behavioral: The patient is nervous/anxious            Objective:      /90 (BP Location: Left arm, Patient Position: Sitting, Cuff Size: Adult)   Pulse 94   Temp 97 8 °F (36 6 °C) (Temporal)   Ht 5' 2" (1 575 m)   Wt 67 2 kg (148 lb 3 2 oz)   LMP  (LMP Unknown)   SpO2 98%   BMI 27 11 kg/m²       Current Outpatient Medications:     ascorbic acid (VITAMIN C) 500 MG tablet, Take 1,000 mg by mouth daily , Disp: , Rfl:     aspirin 81 MG tablet, Take 81 mg by mouth daily  , Disp: , Rfl:     benazepril-hydrochlorthiazide (LOTENSIN HCT) 20-12 5 MG per tablet, Take 1 tablet by mouth daily, Disp: 30 tablet, Rfl: 5    bimatoprost (LUMIGAN) 0 01 % ophthalmic drops, Administer 1 drop to both eyes daily at bedtime , Disp: , Rfl:     Cholecalciferol (VITAMIN D-3 PO), Take 1 capsule by mouth 3 (three) times a day , Disp: , Rfl:     Cyanocobalamin (VITAMIN B 12 PO), Take 1 tablet by mouth daily  , Disp: , Rfl:     ferrous sulfate 325 (65 Fe) mg tablet, Take 325 mg by mouth daily with breakfast , Disp: , Rfl:     letrozole (FEMARA) 2 5 mg tablet, Take 1 tablet (2 5 mg total) by mouth daily, Disp: 30 tablet, Rfl: 11    metFORMIN (GLUCOPHAGE) 500 mg tablet, Take 1 tablet (500 mg total) by mouth 2 (two) times a day with meals, Disp: 60 tablet, Rfl: 5    pantoprazole (PROTONIX) 40 mg tablet, Take 1 tablet (40 mg total) by mouth daily, Disp: 30 tablet, Rfl: 5    sitaGLIPtin (JANUVIA) 100 mg tablet, Take 1 tablet (100 mg total) by mouth daily, Disp: 30 tablet, Rfl: 5    thiamine (VITAMIN B1) 50 mg tablet, Take 50 mg by mouth daily, Disp: , Rfl:     vitamin A 10,000 units capsule, Take 10,000 Units by mouth daily  , Disp: , Rfl:     vitamin E 100 UNIT capsule, Take 100 Units by mouth daily , Disp: , Rfl:     calcium citrate (CALCITRATE) 950 MG tablet, Take 1 tablet by mouth daily (Patient not taking: Reported on 8/12/2021), Disp: , Rfl:     cephalexin (KEFLEX) 250 mg capsule, take 2 capsules by mouth four times a day for 10 days (Patient not taking: Reported on 7/15/2021), Disp: , Rfl:     Diclofenac Sodium (VOLTAREN) 1 %, Apply 2 g topically 2 (two) times a day as needed (as needed) (Patient not taking: Reported on 9/7/2021), Disp: 50 g, Rfl: 2    Magnesium 250 MG TABS, Take 1 tablet by mouth daily (Patient not taking: Reported on 8/12/2021), Disp: , Rfl:     mupirocin (Ramiro Lopez) 2 % ointment, Apply topically 2 (two) times a day (Patient not taking: Reported on 9/7/2021), Disp: 22 g, Rfl: 0     No Known Allergies     Past Surgical History:   Procedure Laterality Date    COLONOSCOPY      HYSTERECTOMY      complete @ age 29    IR PORT REMOVAL  9/12/2018    MASTECTOMY Right 2012    HI COLONOSCOPY FLX DX W/COLLJ SPEC WHEN PFRMD N/A 8/23/2016    Procedure: COLONOSCOPY;  Surgeon: Valente Lozano MD;  [facility]; Service: Colorectal    HI COLONOSCOPY FLX DX W/COLLJ SPEC WHEN PFRMD N/A 9/5/2017    Procedure: COLONOSCOPY;  Surgeon: Valente Lozano MD;  [facility]; Service: Colorectal    HI ESOPHAGOGASTRODUODENOSCOPY TRANSORAL DIAGNOSTIC N/A 9/5/2017    Procedure: ESOPHAGOGASTRODUODENOSCOPY (EGD); Surgeon: Rocio Hutchinson DO;  [facility]; Service: Gastroenterology          Physical Exam  Constitutional:       Appearance: Normal appearance  HENT:      Head: Normocephalic and atraumatic  Right Ear: External ear normal       Left Ear: External ear normal       Nose: Nose normal    Cardiovascular:      Rate and Rhythm: Normal rate and regular rhythm  Neurological:      General: No focal deficit present  Mental Status: She is alert and oriented to person, place, and time  Psychiatric:         Mood and Affect: Mood normal          Behavior: Behavior normal          Thought Content:  Thought content normal          Judgment: Judgment normal

## 2021-09-16 ENCOUNTER — HOSPITAL ENCOUNTER (OUTPATIENT)
Dept: RADIOLOGY | Age: 76
Discharge: HOME | End: 2021-09-16
Payer: COMMERCIAL

## 2021-09-16 DIAGNOSIS — M81.8 OSTEOPOROSIS DUE TO AROMATASE INHIBITOR: ICD-10-CM

## 2021-09-16 DIAGNOSIS — T38.6X5A OSTEOPOROSIS DUE TO AROMATASE INHIBITOR: ICD-10-CM

## 2021-09-16 PROCEDURE — 77080 DXA BONE DENSITY AXIAL: CPT

## 2021-09-22 ENCOUNTER — APPOINTMENT (OUTPATIENT)
Dept: LAB | Facility: HOSPITAL | Age: 76
End: 2021-09-22
Attending: INTERNAL MEDICINE
Payer: COMMERCIAL

## 2021-09-22 ENCOUNTER — VBI (OUTPATIENT)
Dept: ADMINISTRATIVE | Facility: OTHER | Age: 76
End: 2021-09-22

## 2021-09-22 DIAGNOSIS — C18.2 MALIGNANT NEOPLASM OF ASCENDING COLON (HCC): ICD-10-CM

## 2021-09-22 LAB
ALBUMIN SERPL BCP-MCNC: 4 G/DL (ref 3.5–5)
ALP SERPL-CCNC: 109 U/L (ref 46–116)
ALT SERPL W P-5'-P-CCNC: 19 U/L (ref 12–78)
ANION GAP SERPL CALCULATED.3IONS-SCNC: 7 MMOL/L (ref 4–13)
AST SERPL W P-5'-P-CCNC: 32 U/L (ref 5–45)
BILIRUB SERPL-MCNC: 0.78 MG/DL (ref 0.2–1)
BUN SERPL-MCNC: 13 MG/DL (ref 5–25)
CALCIUM SERPL-MCNC: 9.6 MG/DL (ref 8.3–10.1)
CHLORIDE SERPL-SCNC: 98 MMOL/L (ref 100–108)
CO2 SERPL-SCNC: 28 MMOL/L (ref 21–32)
CREAT SERPL-MCNC: 0.71 MG/DL (ref 0.6–1.3)
GFR SERPL CREATININE-BSD FRML MDRD: 84 ML/MIN/1.73SQ M
GLUCOSE SERPL-MCNC: 145 MG/DL (ref 65–140)
POTASSIUM SERPL-SCNC: 4.1 MMOL/L (ref 3.5–5.3)
PROT SERPL-MCNC: 7.9 G/DL (ref 6.4–8.2)
SODIUM SERPL-SCNC: 133 MMOL/L (ref 136–145)

## 2021-09-22 PROCEDURE — 36415 COLL VENOUS BLD VENIPUNCTURE: CPT

## 2021-09-22 PROCEDURE — 80053 COMPREHEN METABOLIC PANEL: CPT

## 2021-09-28 DIAGNOSIS — C50.911 MALIGNANT NEOPLASM OF RIGHT BREAST IN FEMALE, ESTROGEN RECEPTOR POSITIVE, UNSPECIFIED SITE OF BREAST (HCC): ICD-10-CM

## 2021-09-28 DIAGNOSIS — M85.80 OSTEOPENIA DUE TO CANCER THERAPY: ICD-10-CM

## 2021-09-28 DIAGNOSIS — M81.8 OTHER OSTEOPOROSIS WITHOUT CURRENT PATHOLOGICAL FRACTURE: Primary | ICD-10-CM

## 2021-09-28 DIAGNOSIS — C18.2 MALIGNANT NEOPLASM OF ASCENDING COLON (HCC): ICD-10-CM

## 2021-09-28 DIAGNOSIS — Z17.0 MALIGNANT NEOPLASM OF RIGHT BREAST IN FEMALE, ESTROGEN RECEPTOR POSITIVE, UNSPECIFIED SITE OF BREAST (HCC): ICD-10-CM

## 2021-09-28 DIAGNOSIS — Z85.038 PERSONAL HISTORY OF OTHER MALIGNANT NEOPLASM OF LARGE INTESTINE: ICD-10-CM

## 2021-09-29 ENCOUNTER — OFFICE VISIT (OUTPATIENT)
Dept: PODIATRY | Facility: CLINIC | Age: 76
End: 2021-09-29

## 2021-09-29 VITALS
HEART RATE: 90 BPM | HEIGHT: 62 IN | BODY MASS INDEX: 27.23 KG/M2 | WEIGHT: 148 LBS | SYSTOLIC BLOOD PRESSURE: 148 MMHG | DIASTOLIC BLOOD PRESSURE: 84 MMHG

## 2021-09-29 DIAGNOSIS — L84 CORNS: Primary | ICD-10-CM

## 2021-09-29 DIAGNOSIS — B35.1 ONYCHOMYCOSIS: ICD-10-CM

## 2021-09-29 PROCEDURE — 3008F BODY MASS INDEX DOCD: CPT | Performed by: FAMILY MEDICINE

## 2021-09-29 PROCEDURE — NCFTCARE PR NON-COVERED FOOT CARE: Performed by: PODIATRIST

## 2021-09-29 NOTE — PROGRESS NOTES
Patient presents for palliative foot care  Hyperkeratotic lesions present on the 2nd and 4th toes of left foot  All lesions were trimmed  Left 4th toe lesion is ulceration prone  All toenails are mycotic  Left 2nd toenail was caught in patient sock and is noted to have dried blood  All toenails were debrided  No evidence of infection  Patient will be rescheduled in 3 weeks

## 2021-09-30 ENCOUNTER — HOSPITAL ENCOUNTER (OUTPATIENT)
Dept: INFUSION CENTER | Facility: HOSPITAL | Age: 76
Discharge: HOME | End: 2021-09-30
Payer: COMMERCIAL

## 2021-09-30 VITALS — HEIGHT: 60 IN | WEIGHT: 141.54 LBS | TEMPERATURE: 98 F | BODY MASS INDEX: 27.79 KG/M2

## 2021-09-30 DIAGNOSIS — Z17.0 MALIGNANT NEOPLASM OF RIGHT BREAST IN FEMALE, ESTROGEN RECEPTOR POSITIVE, UNSPECIFIED SITE OF BREAST (HCC): ICD-10-CM

## 2021-09-30 DIAGNOSIS — C50.911 MALIGNANT NEOPLASM OF RIGHT BREAST IN FEMALE, ESTROGEN RECEPTOR POSITIVE, UNSPECIFIED SITE OF BREAST (HCC): ICD-10-CM

## 2021-09-30 DIAGNOSIS — M81.8 OTHER OSTEOPOROSIS WITHOUT CURRENT PATHOLOGICAL FRACTURE: Primary | ICD-10-CM

## 2021-09-30 DIAGNOSIS — M85.80 OSTEOPENIA DUE TO CANCER THERAPY: ICD-10-CM

## 2021-09-30 DIAGNOSIS — C18.2 MALIGNANT NEOPLASM OF ASCENDING COLON (HCC): ICD-10-CM

## 2021-09-30 DIAGNOSIS — Z85.038 PERSONAL HISTORY OF OTHER MALIGNANT NEOPLASM OF LARGE INTESTINE: ICD-10-CM

## 2021-09-30 PROCEDURE — 96401 CHEMO ANTI-NEOPL SQ/IM: CPT

## 2021-09-30 RX ADMIN — DENOSUMAB 60 MG: 60 INJECTION SUBCUTANEOUS at 09:17

## 2021-10-18 ENCOUNTER — OFFICE VISIT (OUTPATIENT)
Dept: FAMILY MEDICINE CLINIC | Facility: CLINIC | Age: 76
End: 2021-10-18
Payer: COMMERCIAL

## 2021-10-18 VITALS
WEIGHT: 142.2 LBS | DIASTOLIC BLOOD PRESSURE: 80 MMHG | OXYGEN SATURATION: 100 % | TEMPERATURE: 98.5 F | HEIGHT: 60 IN | BODY MASS INDEX: 27.92 KG/M2 | SYSTOLIC BLOOD PRESSURE: 128 MMHG | HEART RATE: 70 BPM

## 2021-10-18 DIAGNOSIS — Z17.0 MALIGNANT NEOPLASM OF RIGHT BREAST IN FEMALE, ESTROGEN RECEPTOR POSITIVE, UNSPECIFIED SITE OF BREAST (HCC): ICD-10-CM

## 2021-10-18 DIAGNOSIS — E11.9 TYPE 2 DIABETES MELLITUS WITHOUT COMPLICATION, UNSPECIFIED WHETHER LONG TERM INSULIN USE (HCC): ICD-10-CM

## 2021-10-18 DIAGNOSIS — C50.911 MALIGNANT NEOPLASM OF RIGHT BREAST IN FEMALE, ESTROGEN RECEPTOR POSITIVE, UNSPECIFIED SITE OF BREAST (HCC): ICD-10-CM

## 2021-10-18 DIAGNOSIS — F41.8 ANXIETY ABOUT HEALTH: Primary | ICD-10-CM

## 2021-10-18 DIAGNOSIS — Z85.038 PERSONAL HISTORY OF OTHER MALIGNANT NEOPLASM OF LARGE INTESTINE: ICD-10-CM

## 2021-10-18 DIAGNOSIS — I10 ESSENTIAL HYPERTENSION: ICD-10-CM

## 2021-10-18 DIAGNOSIS — Z23 ENCOUNTER FOR IMMUNIZATION: ICD-10-CM

## 2021-10-18 DIAGNOSIS — M85.80 OSTEOPENIA DUE TO CANCER THERAPY: ICD-10-CM

## 2021-10-18 DIAGNOSIS — C18.2 MALIGNANT NEOPLASM OF ASCENDING COLON (HCC): ICD-10-CM

## 2021-10-18 DIAGNOSIS — M81.8 OTHER OSTEOPOROSIS WITHOUT CURRENT PATHOLOGICAL FRACTURE: Primary | ICD-10-CM

## 2021-10-18 LAB — SL AMB POCT HEMOGLOBIN AIC: 6.2 (ref ?–6.5)

## 2021-10-18 PROCEDURE — 1036F TOBACCO NON-USER: CPT | Performed by: FAMILY MEDICINE

## 2021-10-18 PROCEDURE — 3044F HG A1C LEVEL LT 7.0%: CPT | Performed by: FAMILY MEDICINE

## 2021-10-18 PROCEDURE — 3288F FALL RISK ASSESSMENT DOCD: CPT | Performed by: FAMILY MEDICINE

## 2021-10-18 PROCEDURE — 1160F RVW MEDS BY RX/DR IN RCRD: CPT | Performed by: FAMILY MEDICINE

## 2021-10-18 PROCEDURE — 83036 HEMOGLOBIN GLYCOSYLATED A1C: CPT | Performed by: FAMILY MEDICINE

## 2021-10-18 PROCEDURE — 99214 OFFICE O/P EST MOD 30 MIN: CPT | Performed by: FAMILY MEDICINE

## 2021-10-18 PROCEDURE — 1101F PT FALLS ASSESS-DOCD LE1/YR: CPT | Performed by: FAMILY MEDICINE

## 2021-10-18 PROCEDURE — G0008 ADMIN INFLUENZA VIRUS VAC: HCPCS

## 2021-10-18 PROCEDURE — 3725F SCREEN DEPRESSION PERFORMED: CPT | Performed by: FAMILY MEDICINE

## 2021-10-18 PROCEDURE — 90662 IIV NO PRSV INCREASED AG IM: CPT

## 2021-10-19 ENCOUNTER — HOSPITAL ENCOUNTER (OUTPATIENT)
Dept: RADIOLOGY | Age: 76
Discharge: HOME | End: 2021-10-19
Payer: COMMERCIAL

## 2021-10-19 VITALS — WEIGHT: 142 LBS | BODY MASS INDEX: 27.88 KG/M2 | HEIGHT: 60 IN

## 2021-10-19 DIAGNOSIS — Z12.31 ENCOUNTER FOR SCREENING MAMMOGRAM FOR BREAST CANCER: ICD-10-CM

## 2021-10-19 DIAGNOSIS — Z12.31 ENCOUNTER FOR SCREENING MAMMOGRAM FOR MALIGNANT NEOPLASM OF BREAST: ICD-10-CM

## 2021-10-19 PROCEDURE — 77063 BREAST TOMOSYNTHESIS BI: CPT

## 2021-10-19 PROCEDURE — 77067 SCR MAMMO BI INCL CAD: CPT

## 2021-10-20 ENCOUNTER — OFFICE VISIT (OUTPATIENT)
Dept: PODIATRY | Facility: CLINIC | Age: 76
End: 2021-10-20

## 2021-10-20 VITALS
HEART RATE: 92 BPM | WEIGHT: 141.6 LBS | BODY MASS INDEX: 27.8 KG/M2 | HEIGHT: 60 IN | SYSTOLIC BLOOD PRESSURE: 129 MMHG | DIASTOLIC BLOOD PRESSURE: 78 MMHG

## 2021-10-20 DIAGNOSIS — M20.41 HAMMER TOES OF BOTH FEET: ICD-10-CM

## 2021-10-20 DIAGNOSIS — L84 CORNS: Primary | ICD-10-CM

## 2021-10-20 DIAGNOSIS — M20.42 HAMMER TOES OF BOTH FEET: ICD-10-CM

## 2021-10-20 DIAGNOSIS — B35.1 ONYCHOMYCOSIS: ICD-10-CM

## 2021-10-20 PROCEDURE — 3008F BODY MASS INDEX DOCD: CPT | Performed by: FAMILY MEDICINE

## 2021-10-20 PROCEDURE — NCFTCARE PR NON-COVERED FOOT CARE: Performed by: PODIATRIST

## 2021-11-11 ENCOUNTER — OFFICE VISIT (OUTPATIENT)
Dept: PODIATRY | Facility: CLINIC | Age: 76
End: 2021-11-11

## 2021-11-11 VITALS
SYSTOLIC BLOOD PRESSURE: 132 MMHG | HEART RATE: 72 BPM | DIASTOLIC BLOOD PRESSURE: 81 MMHG | WEIGHT: 140.2 LBS | HEIGHT: 60 IN | BODY MASS INDEX: 27.52 KG/M2

## 2021-11-11 DIAGNOSIS — L84 CORNS: Primary | ICD-10-CM

## 2021-11-11 PROCEDURE — NCFTCARE PR NON-COVERED FOOT CARE: Performed by: PODIATRIST

## 2021-11-15 ENCOUNTER — APPOINTMENT (OUTPATIENT)
Dept: LAB | Facility: HOSPITAL | Age: 76
End: 2021-11-15
Attending: INTERNAL MEDICINE
Payer: COMMERCIAL

## 2021-11-15 DIAGNOSIS — Z17.0 MALIGNANT NEOPLASM OF RIGHT BREAST IN FEMALE, ESTROGEN RECEPTOR POSITIVE, UNSPECIFIED SITE OF BREAST (HCC): ICD-10-CM

## 2021-11-15 DIAGNOSIS — I82.5Z2 CHRONIC DEEP VEIN THROMBOSIS (DVT) OF DISTAL VEIN OF LEFT LOWER EXTREMITY (HCC): ICD-10-CM

## 2021-11-15 DIAGNOSIS — C50.911 MALIGNANT NEOPLASM OF RIGHT BREAST IN FEMALE, ESTROGEN RECEPTOR POSITIVE, UNSPECIFIED SITE OF BREAST (HCC): ICD-10-CM

## 2021-11-15 DIAGNOSIS — C18.2 MALIGNANT NEOPLASM OF ASCENDING COLON (HCC): ICD-10-CM

## 2021-11-15 LAB
ALBUMIN SERPL BCP-MCNC: 4.3 G/DL (ref 3.5–5)
ALP SERPL-CCNC: 103 U/L (ref 46–116)
ALT SERPL W P-5'-P-CCNC: 14 U/L (ref 12–78)
ANION GAP SERPL CALCULATED.3IONS-SCNC: 7 MMOL/L (ref 4–13)
AST SERPL W P-5'-P-CCNC: 13 U/L (ref 5–45)
BASOPHILS # BLD AUTO: 0.03 THOUSANDS/ΜL (ref 0–0.1)
BASOPHILS NFR BLD AUTO: 1 % (ref 0–1)
BILIRUB SERPL-MCNC: 0.45 MG/DL (ref 0.2–1)
BUN SERPL-MCNC: 16 MG/DL (ref 5–25)
CALCIUM SERPL-MCNC: 9.6 MG/DL (ref 8.3–10.1)
CANCER AG27-29 SERPL-ACNC: 16.6 U/ML (ref 0–42.3)
CHLORIDE SERPL-SCNC: 98 MMOL/L (ref 100–108)
CO2 SERPL-SCNC: 27 MMOL/L (ref 21–32)
CREAT SERPL-MCNC: 0.69 MG/DL (ref 0.6–1.3)
D DIMER PPP FEU-MCNC: 1.36 UG/ML FEU
EOSINOPHIL # BLD AUTO: 0.15 THOUSAND/ΜL (ref 0–0.61)
EOSINOPHIL NFR BLD AUTO: 3 % (ref 0–6)
ERYTHROCYTE [DISTWIDTH] IN BLOOD BY AUTOMATED COUNT: 13.6 % (ref 11.6–15.1)
GFR SERPL CREATININE-BSD FRML MDRD: 85 ML/MIN/1.73SQ M
GLUCOSE P FAST SERPL-MCNC: 112 MG/DL (ref 65–99)
HCT VFR BLD AUTO: 37.3 % (ref 34.8–46.1)
HGB BLD-MCNC: 12.2 G/DL (ref 11.5–15.4)
IMM GRANULOCYTES # BLD AUTO: 0.02 THOUSAND/UL (ref 0–0.2)
IMM GRANULOCYTES NFR BLD AUTO: 0 % (ref 0–2)
LYMPHOCYTES # BLD AUTO: 0.69 THOUSANDS/ΜL (ref 0.6–4.47)
LYMPHOCYTES NFR BLD AUTO: 14 % (ref 14–44)
MCH RBC QN AUTO: 31.6 PG (ref 26.8–34.3)
MCHC RBC AUTO-ENTMCNC: 32.7 G/DL (ref 31.4–37.4)
MCV RBC AUTO: 97 FL (ref 82–98)
MONOCYTES # BLD AUTO: 0.66 THOUSAND/ΜL (ref 0.17–1.22)
MONOCYTES NFR BLD AUTO: 13 % (ref 4–12)
NEUTROPHILS # BLD AUTO: 3.44 THOUSANDS/ΜL (ref 1.85–7.62)
NEUTS SEG NFR BLD AUTO: 69 % (ref 43–75)
NRBC BLD AUTO-RTO: 0 /100 WBCS
PLATELET # BLD AUTO: 255 THOUSANDS/UL (ref 149–390)
PMV BLD AUTO: 9.8 FL (ref 8.9–12.7)
POTASSIUM SERPL-SCNC: 3.7 MMOL/L (ref 3.5–5.3)
PROT SERPL-MCNC: 8.6 G/DL (ref 6.4–8.2)
RBC # BLD AUTO: 3.86 MILLION/UL (ref 3.81–5.12)
SODIUM SERPL-SCNC: 132 MMOL/L (ref 136–145)
WBC # BLD AUTO: 4.99 THOUSAND/UL (ref 4.31–10.16)

## 2021-11-15 PROCEDURE — 86300 IMMUNOASSAY TUMOR CA 15-3: CPT

## 2021-11-15 PROCEDURE — 85025 COMPLETE CBC W/AUTO DIFF WBC: CPT

## 2021-11-15 PROCEDURE — 85379 FIBRIN DEGRADATION QUANT: CPT

## 2021-11-15 PROCEDURE — 80053 COMPREHEN METABOLIC PANEL: CPT

## 2021-11-22 ENCOUNTER — OFFICE VISIT (OUTPATIENT)
Dept: HEMATOLOGY ONCOLOGY | Facility: CLINIC | Age: 76
End: 2021-11-22
Payer: COMMERCIAL

## 2021-11-22 VITALS
HEIGHT: 60 IN | RESPIRATION RATE: 18 BRPM | DIASTOLIC BLOOD PRESSURE: 70 MMHG | OXYGEN SATURATION: 97 % | WEIGHT: 139 LBS | HEART RATE: 101 BPM | TEMPERATURE: 97.7 F | SYSTOLIC BLOOD PRESSURE: 126 MMHG | BODY MASS INDEX: 27.29 KG/M2

## 2021-11-22 DIAGNOSIS — Z17.0 MALIGNANT NEOPLASM OF RIGHT BREAST IN FEMALE, ESTROGEN RECEPTOR POSITIVE, UNSPECIFIED SITE OF BREAST (HCC): ICD-10-CM

## 2021-11-22 DIAGNOSIS — Z17.0 MALIGNANT NEOPLASM OF RIGHT BREAST IN FEMALE, ESTROGEN RECEPTOR POSITIVE, UNSPECIFIED SITE OF BREAST (HCC): Primary | ICD-10-CM

## 2021-11-22 DIAGNOSIS — C50.911 MALIGNANT NEOPLASM OF RIGHT BREAST IN FEMALE, ESTROGEN RECEPTOR POSITIVE, UNSPECIFIED SITE OF BREAST (HCC): Primary | ICD-10-CM

## 2021-11-22 DIAGNOSIS — R97.8 ABNORMAL TUMOR MARKERS: ICD-10-CM

## 2021-11-22 DIAGNOSIS — C50.911 MALIGNANT NEOPLASM OF RIGHT BREAST IN FEMALE, ESTROGEN RECEPTOR POSITIVE, UNSPECIFIED SITE OF BREAST (HCC): ICD-10-CM

## 2021-11-22 DIAGNOSIS — I82.5Z2 CHRONIC DEEP VEIN THROMBOSIS (DVT) OF DISTAL VEIN OF LEFT LOWER EXTREMITY (HCC): ICD-10-CM

## 2021-11-22 DIAGNOSIS — C18.2 MALIGNANT NEOPLASM OF ASCENDING COLON (HCC): Primary | ICD-10-CM

## 2021-11-22 DIAGNOSIS — R79.89 D-DIMER, ELEVATED: ICD-10-CM

## 2021-11-22 DIAGNOSIS — M81.8 OTHER OSTEOPOROSIS WITHOUT CURRENT PATHOLOGICAL FRACTURE: ICD-10-CM

## 2021-11-22 DIAGNOSIS — E04.1 THYROID NODULE: ICD-10-CM

## 2021-11-22 DIAGNOSIS — E11.9 TYPE 2 DIABETES MELLITUS WITHOUT COMPLICATION, UNSPECIFIED WHETHER LONG TERM INSULIN USE (HCC): ICD-10-CM

## 2021-11-22 PROCEDURE — 1036F TOBACCO NON-USER: CPT | Performed by: INTERNAL MEDICINE

## 2021-11-22 PROCEDURE — 99214 OFFICE O/P EST MOD 30 MIN: CPT | Performed by: INTERNAL MEDICINE

## 2021-11-22 PROCEDURE — 3008F BODY MASS INDEX DOCD: CPT | Performed by: INTERNAL MEDICINE

## 2021-11-22 PROCEDURE — 1160F RVW MEDS BY RX/DR IN RCRD: CPT | Performed by: INTERNAL MEDICINE

## 2021-11-30 ENCOUNTER — RA CDI HCC (OUTPATIENT)
Dept: OTHER | Facility: HOSPITAL | Age: 76
End: 2021-11-30

## 2021-12-02 ENCOUNTER — OFFICE VISIT (OUTPATIENT)
Dept: PODIATRY | Facility: CLINIC | Age: 76
End: 2021-12-02

## 2021-12-02 VITALS
SYSTOLIC BLOOD PRESSURE: 118 MMHG | BODY MASS INDEX: 27.29 KG/M2 | HEART RATE: 90 BPM | DIASTOLIC BLOOD PRESSURE: 80 MMHG | HEIGHT: 60 IN | WEIGHT: 139 LBS

## 2021-12-02 DIAGNOSIS — M20.42 HAMMER TOES OF BOTH FEET: ICD-10-CM

## 2021-12-02 DIAGNOSIS — L84 CORNS: Primary | ICD-10-CM

## 2021-12-02 DIAGNOSIS — M20.41 HAMMER TOES OF BOTH FEET: ICD-10-CM

## 2021-12-02 PROCEDURE — NCFTCARE PR NON-COVERED FOOT CARE: Performed by: PODIATRIST

## 2021-12-06 ENCOUNTER — OFFICE VISIT (OUTPATIENT)
Dept: FAMILY MEDICINE CLINIC | Facility: CLINIC | Age: 76
End: 2021-12-06
Payer: COMMERCIAL

## 2021-12-06 VITALS
HEIGHT: 60 IN | OXYGEN SATURATION: 96 % | SYSTOLIC BLOOD PRESSURE: 110 MMHG | DIASTOLIC BLOOD PRESSURE: 72 MMHG | BODY MASS INDEX: 27.64 KG/M2 | WEIGHT: 140.8 LBS | TEMPERATURE: 96.1 F | HEART RATE: 79 BPM

## 2021-12-06 DIAGNOSIS — E11.8 TYPE 2 DIABETES MELLITUS WITH COMPLICATION, WITHOUT LONG-TERM CURRENT USE OF INSULIN (HCC): ICD-10-CM

## 2021-12-06 DIAGNOSIS — E11.9 TYPE 2 DIABETES MELLITUS WITHOUT COMPLICATION, UNSPECIFIED WHETHER LONG TERM INSULIN USE (HCC): Primary | ICD-10-CM

## 2021-12-06 DIAGNOSIS — E11.9 DIABETES TYPE 2, NO OCULAR INVOLVEMENT (HCC): ICD-10-CM

## 2021-12-06 DIAGNOSIS — M54.2 NECK PAIN: ICD-10-CM

## 2021-12-06 DIAGNOSIS — R12 HEART BURN: ICD-10-CM

## 2021-12-06 DIAGNOSIS — I10 ESSENTIAL HYPERTENSION: ICD-10-CM

## 2021-12-06 LAB — SL AMB POCT HEMOGLOBIN AIC: 6 % (ref ?–6.5)

## 2021-12-06 PROCEDURE — 83036 HEMOGLOBIN GLYCOSYLATED A1C: CPT | Performed by: FAMILY MEDICINE

## 2021-12-06 PROCEDURE — 1036F TOBACCO NON-USER: CPT | Performed by: FAMILY MEDICINE

## 2021-12-06 PROCEDURE — 3044F HG A1C LEVEL LT 7.0%: CPT | Performed by: FAMILY MEDICINE

## 2021-12-06 PROCEDURE — 99214 OFFICE O/P EST MOD 30 MIN: CPT | Performed by: FAMILY MEDICINE

## 2021-12-06 PROCEDURE — 1160F RVW MEDS BY RX/DR IN RCRD: CPT | Performed by: FAMILY MEDICINE

## 2021-12-06 RX ORDER — PANTOPRAZOLE SODIUM 40 MG/1
40 TABLET, DELAYED RELEASE ORAL DAILY
Qty: 30 TABLET | Refills: 5 | Status: SHIPPED | OUTPATIENT
Start: 2021-12-06 | End: 2022-04-04 | Stop reason: ALTCHOICE

## 2021-12-06 RX ORDER — AMLODIPINE BESYLATE 2.5 MG/1
2.5 TABLET ORAL DAILY
Qty: 30 TABLET | Refills: 5 | Status: SHIPPED | OUTPATIENT
Start: 2021-12-06 | End: 2022-04-04 | Stop reason: SDUPTHER

## 2021-12-06 RX ORDER — BENAZEPRIL HYDROCHLORIDE AND HYDROCHLOROTHIAZIDE 20; 12.5 MG/1; MG/1
1 TABLET ORAL DAILY
Qty: 30 TABLET | Refills: 5 | Status: SHIPPED | OUTPATIENT
Start: 2021-12-06 | End: 2022-04-04 | Stop reason: SDUPTHER

## 2021-12-15 ENCOUNTER — VBI (OUTPATIENT)
Dept: ADMINISTRATIVE | Facility: OTHER | Age: 76
End: 2021-12-15

## 2021-12-23 ENCOUNTER — OFFICE VISIT (OUTPATIENT)
Dept: PODIATRY | Facility: CLINIC | Age: 76
End: 2021-12-23

## 2021-12-23 VITALS
WEIGHT: 144.3 LBS | SYSTOLIC BLOOD PRESSURE: 127 MMHG | HEART RATE: 83 BPM | BODY MASS INDEX: 28.33 KG/M2 | HEIGHT: 60 IN | DIASTOLIC BLOOD PRESSURE: 84 MMHG

## 2021-12-23 DIAGNOSIS — M20.41 HAMMER TOES OF BOTH FEET: ICD-10-CM

## 2021-12-23 DIAGNOSIS — M20.42 HAMMER TOES OF BOTH FEET: ICD-10-CM

## 2021-12-23 DIAGNOSIS — L84 CORNS: Primary | ICD-10-CM

## 2021-12-23 PROCEDURE — NCFTCARE PR NON-COVERED FOOT CARE: Performed by: PODIATRIST

## 2021-12-23 PROCEDURE — 3008F BODY MASS INDEX DOCD: CPT | Performed by: FAMILY MEDICINE

## 2021-12-27 ENCOUNTER — VBI (OUTPATIENT)
Dept: ADMINISTRATIVE | Facility: OTHER | Age: 76
End: 2021-12-27

## 2022-02-10 ENCOUNTER — OFFICE VISIT (OUTPATIENT)
Dept: PODIATRY | Facility: CLINIC | Age: 77
End: 2022-02-10

## 2022-02-10 VITALS
DIASTOLIC BLOOD PRESSURE: 76 MMHG | WEIGHT: 144 LBS | SYSTOLIC BLOOD PRESSURE: 116 MMHG | HEART RATE: 87 BPM | HEIGHT: 60 IN | BODY MASS INDEX: 28.27 KG/M2

## 2022-02-10 DIAGNOSIS — L84 CORNS: Primary | ICD-10-CM

## 2022-02-10 PROCEDURE — NCFTCARE PR NON-COVERED FOOT CARE: Performed by: PODIATRIST

## 2022-03-25 ENCOUNTER — APPOINTMENT (OUTPATIENT)
Dept: LAB | Facility: HOSPITAL | Age: 77
End: 2022-03-25
Attending: INTERNAL MEDICINE
Payer: COMMERCIAL

## 2022-03-25 DIAGNOSIS — C18.2 MALIGNANT NEOPLASM OF ASCENDING COLON (HCC): ICD-10-CM

## 2022-03-25 DIAGNOSIS — M81.8 OTHER OSTEOPOROSIS WITHOUT CURRENT PATHOLOGICAL FRACTURE: ICD-10-CM

## 2022-03-25 DIAGNOSIS — Z85.038 PERSONAL HISTORY OF OTHER MALIGNANT NEOPLASM OF LARGE INTESTINE: ICD-10-CM

## 2022-03-25 DIAGNOSIS — C50.911 MALIGNANT NEOPLASM OF RIGHT BREAST IN FEMALE, ESTROGEN RECEPTOR POSITIVE, UNSPECIFIED SITE OF BREAST (HCC): ICD-10-CM

## 2022-03-25 DIAGNOSIS — M85.80 OSTEOPENIA DUE TO CANCER THERAPY: ICD-10-CM

## 2022-03-25 DIAGNOSIS — Z17.0 MALIGNANT NEOPLASM OF RIGHT BREAST IN FEMALE, ESTROGEN RECEPTOR POSITIVE, UNSPECIFIED SITE OF BREAST (HCC): ICD-10-CM

## 2022-03-25 LAB
ALBUMIN SERPL BCP-MCNC: 4.3 G/DL (ref 3.5–5)
ALP SERPL-CCNC: 78 U/L (ref 46–116)
ALT SERPL W P-5'-P-CCNC: 16 U/L (ref 12–78)
ANION GAP SERPL CALCULATED.3IONS-SCNC: 6 MMOL/L (ref 4–13)
AST SERPL W P-5'-P-CCNC: 17 U/L (ref 5–45)
BILIRUB SERPL-MCNC: 0.69 MG/DL (ref 0.2–1)
BUN SERPL-MCNC: 18 MG/DL (ref 5–25)
CALCIUM SERPL-MCNC: 10.3 MG/DL (ref 8.3–10.1)
CHLORIDE SERPL-SCNC: 98 MMOL/L (ref 100–108)
CO2 SERPL-SCNC: 29 MMOL/L (ref 21–32)
CREAT SERPL-MCNC: 0.77 MG/DL (ref 0.6–1.3)
GFR SERPL CREATININE-BSD FRML MDRD: 75 ML/MIN/1.73SQ M
GLUCOSE P FAST SERPL-MCNC: 131 MG/DL (ref 65–99)
POTASSIUM SERPL-SCNC: 3.6 MMOL/L (ref 3.5–5.3)
PROT SERPL-MCNC: 8.3 G/DL (ref 6.4–8.2)
SODIUM SERPL-SCNC: 133 MMOL/L (ref 136–145)

## 2022-03-25 PROCEDURE — 80053 COMPREHEN METABOLIC PANEL: CPT

## 2022-03-25 PROCEDURE — 36415 COLL VENOUS BLD VENIPUNCTURE: CPT

## 2022-03-29 ENCOUNTER — RA CDI HCC (OUTPATIENT)
Dept: OTHER | Facility: HOSPITAL | Age: 77
End: 2022-03-29

## 2022-03-29 NOTE — PROGRESS NOTES
[facility] [address]  coding opportunities     [address] [id number]     Chart Reviewed number of suggestions sent to Provider: 1     Patients Insurance     Medicare Insurance: [facility]

## 2022-04-01 ENCOUNTER — HOSPITAL ENCOUNTER (OUTPATIENT)
Dept: INFUSION CENTER | Facility: HOSPITAL | Age: 77
Discharge: HOME | End: 2022-04-01
Payer: COMMERCIAL

## 2022-04-01 VITALS — TEMPERATURE: 97.8 F | WEIGHT: 146.16 LBS | HEIGHT: 60 IN | BODY MASS INDEX: 28.7 KG/M2

## 2022-04-01 DIAGNOSIS — M81.8 OTHER OSTEOPOROSIS WITHOUT CURRENT PATHOLOGICAL FRACTURE: Primary | ICD-10-CM

## 2022-04-01 DIAGNOSIS — Z85.038 PERSONAL HISTORY OF OTHER MALIGNANT NEOPLASM OF LARGE INTESTINE: ICD-10-CM

## 2022-04-01 DIAGNOSIS — C50.911 MALIGNANT NEOPLASM OF RIGHT BREAST IN FEMALE, ESTROGEN RECEPTOR POSITIVE, UNSPECIFIED SITE OF BREAST (HCC): ICD-10-CM

## 2022-04-01 DIAGNOSIS — M85.80 OSTEOPENIA DUE TO CANCER THERAPY: ICD-10-CM

## 2022-04-01 DIAGNOSIS — C18.2 MALIGNANT NEOPLASM OF ASCENDING COLON (HCC): ICD-10-CM

## 2022-04-01 DIAGNOSIS — Z17.0 MALIGNANT NEOPLASM OF RIGHT BREAST IN FEMALE, ESTROGEN RECEPTOR POSITIVE, UNSPECIFIED SITE OF BREAST (HCC): ICD-10-CM

## 2022-04-01 PROCEDURE — 96372 THER/PROPH/DIAG INJ SC/IM: CPT

## 2022-04-01 RX ADMIN — DENOSUMAB 60 MG: 60 INJECTION SUBCUTANEOUS at 09:09

## 2022-04-02 ENCOUNTER — IMMUNIZATIONS (OUTPATIENT)
Dept: FAMILY MEDICINE CLINIC | Facility: HOSPITAL | Age: 77
End: 2022-04-02

## 2022-04-02 PROCEDURE — 91305 COVID-19 PFIZER VACC TRIS-SUCROSE GRAY CAP 0.3 ML: CPT

## 2022-04-02 PROCEDURE — 0054A COVID-19 PFIZER VACC TRIS-SUCROSE GRAY CAP 0.3 ML: CPT

## 2022-04-04 ENCOUNTER — OFFICE VISIT (OUTPATIENT)
Dept: FAMILY MEDICINE CLINIC | Facility: CLINIC | Age: 77
End: 2022-04-04
Payer: COMMERCIAL

## 2022-04-04 VITALS
WEIGHT: 145 LBS | SYSTOLIC BLOOD PRESSURE: 118 MMHG | OXYGEN SATURATION: 94 % | TEMPERATURE: 97.5 F | HEIGHT: 60 IN | DIASTOLIC BLOOD PRESSURE: 80 MMHG | BODY MASS INDEX: 28.47 KG/M2 | HEART RATE: 78 BPM

## 2022-04-04 DIAGNOSIS — I10 ESSENTIAL HYPERTENSION: ICD-10-CM

## 2022-04-04 DIAGNOSIS — I82.5Z2 CHRONIC DEEP VEIN THROMBOSIS (DVT) OF DISTAL VEIN OF LEFT LOWER EXTREMITY (HCC): ICD-10-CM

## 2022-04-04 DIAGNOSIS — E11.9 DIABETES TYPE 2, NO OCULAR INVOLVEMENT (HCC): ICD-10-CM

## 2022-04-04 DIAGNOSIS — L82.1 SEBORRHEIC KERATOSIS: Primary | ICD-10-CM

## 2022-04-04 DIAGNOSIS — M85.80 OSTEOPENIA DUE TO CANCER THERAPY: ICD-10-CM

## 2022-04-04 DIAGNOSIS — C50.911 MALIGNANT NEOPLASM OF RIGHT BREAST IN FEMALE, ESTROGEN RECEPTOR POSITIVE, UNSPECIFIED SITE OF BREAST (HCC): ICD-10-CM

## 2022-04-04 DIAGNOSIS — C18.2 MALIGNANT NEOPLASM OF ASCENDING COLON (HCC): ICD-10-CM

## 2022-04-04 DIAGNOSIS — E11.8 TYPE 2 DIABETES MELLITUS WITH COMPLICATION, WITHOUT LONG-TERM CURRENT USE OF INSULIN (HCC): ICD-10-CM

## 2022-04-04 DIAGNOSIS — M81.0 AGE-RELATED OSTEOPOROSIS WITHOUT CURRENT PATHOLOGICAL FRACTURE: ICD-10-CM

## 2022-04-04 DIAGNOSIS — Z17.0 MALIGNANT NEOPLASM OF RIGHT BREAST IN FEMALE, ESTROGEN RECEPTOR POSITIVE, UNSPECIFIED SITE OF BREAST (HCC): ICD-10-CM

## 2022-04-04 DIAGNOSIS — Z85.038 PERSONAL HISTORY OF OTHER MALIGNANT NEOPLASM OF LARGE INTESTINE: ICD-10-CM

## 2022-04-04 DIAGNOSIS — M81.8 OTHER OSTEOPOROSIS WITHOUT CURRENT PATHOLOGICAL FRACTURE: Primary | ICD-10-CM

## 2022-04-04 LAB — SL AMB POCT HEMOGLOBIN AIC: 6.6 % (ref ?–6.5)

## 2022-04-04 PROCEDURE — 1160F RVW MEDS BY RX/DR IN RCRD: CPT | Performed by: FAMILY MEDICINE

## 2022-04-04 PROCEDURE — 3044F HG A1C LEVEL LT 7.0%: CPT | Performed by: FAMILY MEDICINE

## 2022-04-04 PROCEDURE — 99215 OFFICE O/P EST HI 40 MIN: CPT | Performed by: FAMILY MEDICINE

## 2022-04-04 PROCEDURE — 3725F SCREEN DEPRESSION PERFORMED: CPT | Performed by: FAMILY MEDICINE

## 2022-04-04 PROCEDURE — 1036F TOBACCO NON-USER: CPT | Performed by: FAMILY MEDICINE

## 2022-04-04 PROCEDURE — 1101F PT FALLS ASSESS-DOCD LE1/YR: CPT | Performed by: FAMILY MEDICINE

## 2022-04-04 PROCEDURE — 3288F FALL RISK ASSESSMENT DOCD: CPT | Performed by: FAMILY MEDICINE

## 2022-04-04 PROCEDURE — 83036 HEMOGLOBIN GLYCOSYLATED A1C: CPT | Performed by: FAMILY MEDICINE

## 2022-04-04 RX ORDER — BENAZEPRIL HYDROCHLORIDE AND HYDROCHLOROTHIAZIDE 20; 12.5 MG/1; MG/1
1 TABLET ORAL DAILY
Qty: 30 TABLET | Refills: 5 | Status: SHIPPED | OUTPATIENT
Start: 2022-04-04 | End: 2022-08-08 | Stop reason: SDUPTHER

## 2022-04-04 RX ORDER — AMLODIPINE BESYLATE 2.5 MG/1
2.5 TABLET ORAL DAILY
Qty: 30 TABLET | Refills: 5 | Status: SHIPPED | OUTPATIENT
Start: 2022-04-04 | End: 2022-08-08 | Stop reason: SDUPTHER

## 2022-04-04 NOTE — PROGRESS NOTES
Chief Complaint   Patient presents with    Follow-up     Assessment/Plan:  Try skipping the Protonix and see if it is needed  Water for hydration  BMI Counseling: Body mass index is 28 32 kg/m²  The BMI is above normal  Nutrition recommendations include moderation in carbohydrate intake and increasing intake of lean protein  PHQ-2/9 Depression Screening    Little interest or pleasure in doing things: 0 - not at all  Feeling down, depressed, or hopeless: 0 - not at all  PHQ-2 Score: 0  PHQ-2 Interpretation: Negative depression screen          Diagnoses and all orders for this visit:    Seborrheic keratosis    Essential hypertension  -     amLODIPine (NORVASC) 2 5 mg tablet; Take 1 tablet (2 5 mg total) by mouth daily  -     benazepril-hydrochlorthiazide (LOTENSIN HCT) 20-12 5 MG per tablet; Take 1 tablet by mouth daily    Type 2 diabetes mellitus with complication, without long-term current use of insulin (HCC)  -     metFORMIN (GLUCOPHAGE) 500 mg tablet; Take 1 tablet (500 mg total) by mouth 2 (two) times a day with meals    Diabetes type 2, no ocular involvement (HCC)  -     sitaGLIPtin (JANUVIA) 100 mg tablet; Take 1 tablet (100 mg total) by mouth daily    Chronic deep vein thrombosis (DVT) of distal vein of left lower extremity (HCC)          Subjective:      Patient ID: Monique Albarado is a 69 y o  female  [place] with multiple questions concerning Vitamins, skin lesions, posture, osteoporosis        The following portions of the patient's history were reviewed and updated as appropriate: allergies, current medications, past medical history, past social history, past surgical history and problem list   I have spent 40 minutes with Patient  today in which greater than 50% of this time was spent in counseling/coordination of care regarding Diagnostic results, Risks and benefits of tx options, Intructions for management, Patient and family education, Importance of tx compliance, Risk factor reductions and Impressions  Review of Systems   Constitutional: Negative  HENT: Negative  Eyes: Negative  Respiratory: Negative  Cardiovascular: Negative  Gastrointestinal: Negative  Genitourinary: Negative  Musculoskeletal: Negative  Skin: Negative  Neurological: Negative  Psychiatric/Behavioral: Negative  Objective:      /80 (BP Location: Left arm, Patient Position: Sitting, Cuff Size: Standard)   Temp 97 5 °F (36 4 °C) (Temporal)   Ht 5' (1 524 m)   Wt 65 8 kg (145 lb)   LMP  (LMP Unknown)   BMI 28 32 kg/m²       Current Outpatient Medications:     amLODIPine (NORVASC) 2 5 mg tablet, Take 1 tablet (2 5 mg total) by mouth daily, Disp: 30 tablet, Rfl: 5    ascorbic acid (VITAMIN C) 500 MG tablet, Take 1,000 mg by mouth daily , Disp: , Rfl:     aspirin 81 MG tablet, Take 81 mg by mouth daily  , Disp: , Rfl:     benazepril-hydrochlorthiazide (LOTENSIN HCT) 20-12 5 MG per tablet, Take 1 tablet by mouth daily, Disp: 30 tablet, Rfl: 5    bimatoprost (LUMIGAN) 0 01 % ophthalmic drops, Administer 1 drop to both eyes daily at bedtime , Disp: , Rfl:     Cholecalciferol (VITAMIN D-3 PO), Take 1 capsule by mouth 3 (three) times a day , Disp: , Rfl:     Cyanocobalamin (VITAMIN B 12 PO), Take 1 tablet by mouth daily  , Disp: , Rfl:     Diclofenac Sodium (VOLTAREN) 1 %, Apply 2 g topically 4 (four) times a day, Disp: , Rfl:     ferrous sulfate 325 (65 Fe) mg tablet, Take 325 mg by mouth daily with breakfast , Disp: , Rfl:     letrozole (FEMARA) 2 5 mg tablet, Take 1 tablet (2 5 mg total) by mouth daily, Disp: 30 tablet, Rfl: 11    metFORMIN (GLUCOPHAGE) 500 mg tablet, Take 1 tablet (500 mg total) by mouth 2 (two) times a day with meals, Disp: 60 tablet, Rfl: 5    pantoprazole (PROTONIX) 40 mg tablet, Take 1 tablet (40 mg total) by mouth daily, Disp: 30 tablet, Rfl: 5    vitamin A 10,000 units capsule, Take 10,000 Units by mouth daily  , Disp: , Rfl:     vitamin E 100 UNIT capsule, Take 100 Units by mouth daily , Disp: , Rfl:     calcium citrate (CALCITRATE) 950 MG tablet, Take 1 tablet by mouth daily (Patient not taking: Reported on 8/12/2021), Disp: , Rfl:     Magnesium 250 MG TABS, Take 1 tablet by mouth daily (Patient not taking: Reported on 8/12/2021), Disp: , Rfl:     sitaGLIPtin (JANUVIA) 100 mg tablet, Take 1 tablet (100 mg total) by mouth daily, Disp: 30 tablet, Rfl: 5    thiamine (VITAMIN B1) 50 mg tablet, Take 50 mg by mouth daily (Patient not taking: Reported on 11/11/2021 ), Disp: , Rfl:   No Known Allergies  Past Surgical History:   Procedure Laterality Date    COLONOSCOPY      HYSTERECTOMY      complete @ age 29    IR PORT REMOVAL  9/12/2018    MASTECTOMY Right 2012    NE COLONOSCOPY FLX DX W/COLLJ SPEC WHEN PFRMD N/A 8/23/2016    Procedure: COLONOSCOPY;  Surgeon: Graciela Pagan MD;  [facility]; Service: Colorectal    NE COLONOSCOPY FLX DX W/COLLJ SPEC WHEN PFRMD N/A 9/5/2017    Procedure: COLONOSCOPY;  Surgeon: Graciela Pagan MD;  [facility]; Service: Colorectal    NE ESOPHAGOGASTRODUODENOSCOPY TRANSORAL DIAGNOSTIC N/A 9/5/2017    Procedure: ESOPHAGOGASTRODUODENOSCOPY (EGD); Surgeon: Prince Benitez DO;  [facility]; Service: Gastroenterology          Physical Exam  Constitutional:       Appearance: She is well-developed  HENT:      Head: Normocephalic and atraumatic  Right Ear: External ear normal       Left Ear: External ear normal       Nose: Nose normal       Mouth/Throat:      Mouth: Mucous membranes are moist       Pharynx: Oropharynx is clear  Eyes:      Extraocular Movements: Extraocular movements intact  Conjunctiva/sclera: Conjunctivae normal       Pupils: Pupils are equal, round, and reactive to light  Cardiovascular:      Rate and Rhythm: Normal rate and regular rhythm  Heart sounds: Normal heart sounds     Pulmonary:      Effort: Pulmonary effort is normal       Breath sounds: Normal breath sounds  Musculoskeletal:      Cervical back: Normal range of motion and neck supple  Skin:     General: Skin is warm and dry  Comments: Benign skin lesions - SK  Neurological:      General: No focal deficit present  Mental Status: She is alert and oriented to person, place, and time  Deep Tendon Reflexes: Reflexes are normal and symmetric  Psychiatric:         Mood and Affect: Mood normal          Behavior: Behavior normal          Thought Content:  Thought content normal          Judgment: Judgment normal

## 2022-04-11 ENCOUNTER — OFFICE VISIT (OUTPATIENT)
Dept: PODIATRY | Facility: CLINIC | Age: 77
End: 2022-04-11

## 2022-04-11 VITALS
BODY MASS INDEX: 28.47 KG/M2 | DIASTOLIC BLOOD PRESSURE: 77 MMHG | SYSTOLIC BLOOD PRESSURE: 117 MMHG | HEART RATE: 89 BPM | WEIGHT: 145 LBS | HEIGHT: 60 IN

## 2022-04-11 DIAGNOSIS — B35.1 ONYCHOMYCOSIS: ICD-10-CM

## 2022-04-11 DIAGNOSIS — L84 CORNS: Primary | ICD-10-CM

## 2022-04-11 PROCEDURE — NCFTCARE PR NON-COVERED FOOT CARE: Performed by: PODIATRIST

## 2022-04-11 NOTE — PROGRESS NOTES
Patient presents for palliative diabetic foot care  No acute disorder noted  Treatment consisted of nail and lesion trimming  Patient will be reassessed in 3 weeks

## 2022-05-12 ENCOUNTER — OFFICE VISIT (OUTPATIENT)
Dept: PODIATRY | Facility: CLINIC | Age: 77
End: 2022-05-12

## 2022-05-12 VITALS
WEIGHT: 146.8 LBS | SYSTOLIC BLOOD PRESSURE: 115 MMHG | HEART RATE: 96 BPM | DIASTOLIC BLOOD PRESSURE: 75 MMHG | BODY MASS INDEX: 28.82 KG/M2 | HEIGHT: 60 IN

## 2022-05-12 DIAGNOSIS — L84 CORNS: Primary | ICD-10-CM

## 2022-05-12 PROCEDURE — NCFTCARE PR NON-COVERED FOOT CARE: Performed by: PODIATRIST

## 2022-05-16 ENCOUNTER — APPOINTMENT (OUTPATIENT)
Dept: LAB | Facility: HOSPITAL | Age: 77
End: 2022-05-16
Attending: INTERNAL MEDICINE
Payer: COMMERCIAL

## 2022-05-16 DIAGNOSIS — E11.8 TYPE 2 DIABETES MELLITUS WITH COMPLICATION, WITHOUT LONG-TERM CURRENT USE OF INSULIN (HCC): ICD-10-CM

## 2022-05-16 DIAGNOSIS — Z17.0 MALIGNANT NEOPLASM OF RIGHT BREAST IN FEMALE, ESTROGEN RECEPTOR POSITIVE, UNSPECIFIED SITE OF BREAST (HCC): ICD-10-CM

## 2022-05-16 DIAGNOSIS — C50.911 MALIGNANT NEOPLASM OF RIGHT BREAST IN FEMALE, ESTROGEN RECEPTOR POSITIVE, UNSPECIFIED SITE OF BREAST (HCC): ICD-10-CM

## 2022-05-16 LAB
ALBUMIN SERPL BCP-MCNC: 4.3 G/DL (ref 3.5–5)
ALP SERPL-CCNC: 78 U/L (ref 46–116)
ALT SERPL W P-5'-P-CCNC: 16 U/L (ref 12–78)
ANION GAP SERPL CALCULATED.3IONS-SCNC: 2 MMOL/L (ref 4–13)
AST SERPL W P-5'-P-CCNC: 14 U/L (ref 5–45)
BASOPHILS # BLD AUTO: 0.03 THOUSANDS/ΜL (ref 0–0.1)
BASOPHILS NFR BLD AUTO: 1 % (ref 0–1)
BILIRUB SERPL-MCNC: 0.36 MG/DL (ref 0.2–1)
BUN SERPL-MCNC: 14 MG/DL (ref 5–25)
CALCIUM SERPL-MCNC: 9.9 MG/DL (ref 8.3–10.1)
CANCER AG27-29 SERPL-ACNC: 18.1 U/ML (ref 0–42.3)
CEA SERPL-MCNC: 4.1 NG/ML (ref 0–3)
CHLORIDE SERPL-SCNC: 98 MMOL/L (ref 100–108)
CHOLEST SERPL-MCNC: 278 MG/DL
CO2 SERPL-SCNC: 31 MMOL/L (ref 21–32)
CREAT SERPL-MCNC: 0.76 MG/DL (ref 0.6–1.3)
CREAT UR-MCNC: 20.4 MG/DL
D DIMER PPP FEU-MCNC: 2.27 UG/ML FEU
EOSINOPHIL # BLD AUTO: 0.23 THOUSAND/ΜL (ref 0–0.61)
EOSINOPHIL NFR BLD AUTO: 4 % (ref 0–6)
ERYTHROCYTE [DISTWIDTH] IN BLOOD BY AUTOMATED COUNT: 13.2 % (ref 11.6–15.1)
GFR SERPL CREATININE-BSD FRML MDRD: 76 ML/MIN/1.73SQ M
GLUCOSE P FAST SERPL-MCNC: 134 MG/DL (ref 65–99)
HCT VFR BLD AUTO: 35.8 % (ref 34.8–46.1)
HDLC SERPL-MCNC: 64 MG/DL
HGB BLD-MCNC: 11.6 G/DL (ref 11.5–15.4)
IMM GRANULOCYTES # BLD AUTO: 0.04 THOUSAND/UL (ref 0–0.2)
IMM GRANULOCYTES NFR BLD AUTO: 1 % (ref 0–2)
LDLC SERPL CALC-MCNC: 190 MG/DL (ref 0–100)
LYMPHOCYTES # BLD AUTO: 0.76 THOUSANDS/ΜL (ref 0.6–4.47)
LYMPHOCYTES NFR BLD AUTO: 12 % (ref 14–44)
MCH RBC QN AUTO: 31.4 PG (ref 26.8–34.3)
MCHC RBC AUTO-ENTMCNC: 32.4 G/DL (ref 31.4–37.4)
MCV RBC AUTO: 97 FL (ref 82–98)
MICROALBUMIN UR-MCNC: 7.6 MG/L (ref 0–20)
MICROALBUMIN/CREAT 24H UR: 37 MG/G CREATININE (ref 0–30)
MONOCYTES # BLD AUTO: 0.76 THOUSAND/ΜL (ref 0.17–1.22)
MONOCYTES NFR BLD AUTO: 12 % (ref 4–12)
NEUTROPHILS # BLD AUTO: 4.65 THOUSANDS/ΜL (ref 1.85–7.62)
NEUTS SEG NFR BLD AUTO: 70 % (ref 43–75)
NONHDLC SERPL-MCNC: 214 MG/DL
NRBC BLD AUTO-RTO: 0 /100 WBCS
PLATELET # BLD AUTO: 281 THOUSANDS/UL (ref 149–390)
PMV BLD AUTO: 9.2 FL (ref 8.9–12.7)
POTASSIUM SERPL-SCNC: 3.9 MMOL/L (ref 3.5–5.3)
PROT SERPL-MCNC: 8.3 G/DL (ref 6.4–8.2)
RBC # BLD AUTO: 3.69 MILLION/UL (ref 3.81–5.12)
SODIUM SERPL-SCNC: 131 MMOL/L (ref 136–145)
TRIGL SERPL-MCNC: 119 MG/DL
WBC # BLD AUTO: 6.47 THOUSAND/UL (ref 4.31–10.16)

## 2022-05-16 PROCEDURE — 82043 UR ALBUMIN QUANTITATIVE: CPT | Performed by: FAMILY MEDICINE

## 2022-05-16 PROCEDURE — 3061F NEG MICROALBUMINURIA REV: CPT | Performed by: INTERNAL MEDICINE

## 2022-05-16 PROCEDURE — 82378 CARCINOEMBRYONIC ANTIGEN: CPT

## 2022-05-16 PROCEDURE — 80061 LIPID PANEL: CPT

## 2022-05-16 PROCEDURE — 86300 IMMUNOASSAY TUMOR CA 15-3: CPT

## 2022-05-16 PROCEDURE — 85379 FIBRIN DEGRADATION QUANT: CPT

## 2022-05-16 PROCEDURE — 36415 COLL VENOUS BLD VENIPUNCTURE: CPT

## 2022-05-16 PROCEDURE — 80053 COMPREHEN METABOLIC PANEL: CPT

## 2022-05-16 PROCEDURE — 85025 COMPLETE CBC W/AUTO DIFF WBC: CPT

## 2022-05-16 PROCEDURE — 82570 ASSAY OF URINE CREATININE: CPT | Performed by: FAMILY MEDICINE

## 2022-05-23 ENCOUNTER — OFFICE VISIT (OUTPATIENT)
Dept: HEMATOLOGY ONCOLOGY | Facility: CLINIC | Age: 77
End: 2022-05-23
Payer: COMMERCIAL

## 2022-05-23 ENCOUNTER — TELEPHONE (OUTPATIENT)
Dept: HEMATOLOGY ONCOLOGY | Facility: CLINIC | Age: 77
End: 2022-05-23

## 2022-05-23 VITALS
RESPIRATION RATE: 17 BRPM | HEART RATE: 67 BPM | DIASTOLIC BLOOD PRESSURE: 78 MMHG | SYSTOLIC BLOOD PRESSURE: 122 MMHG | BODY MASS INDEX: 28.37 KG/M2 | WEIGHT: 144.5 LBS | OXYGEN SATURATION: 98 % | TEMPERATURE: 97.2 F | HEIGHT: 60 IN

## 2022-05-23 DIAGNOSIS — Z86.718 HISTORY OF DVT (DEEP VEIN THROMBOSIS): ICD-10-CM

## 2022-05-23 DIAGNOSIS — R97.8 ABNORMAL TUMOR MARKERS: ICD-10-CM

## 2022-05-23 DIAGNOSIS — Z17.0 MALIGNANT NEOPLASM OF RIGHT BREAST IN FEMALE, ESTROGEN RECEPTOR POSITIVE, UNSPECIFIED SITE OF BREAST (HCC): ICD-10-CM

## 2022-05-23 DIAGNOSIS — T38.6X5A OSTEOPOROSIS DUE TO AROMATASE INHIBITOR: ICD-10-CM

## 2022-05-23 DIAGNOSIS — Z86.39 HISTORY OF DIABETES MELLITUS: ICD-10-CM

## 2022-05-23 DIAGNOSIS — M81.8 OSTEOPOROSIS DUE TO AROMATASE INHIBITOR: ICD-10-CM

## 2022-05-23 DIAGNOSIS — E07.9 THYROID DISORDER: ICD-10-CM

## 2022-05-23 DIAGNOSIS — C18.2 MALIGNANT NEOPLASM OF ASCENDING COLON (HCC): Primary | ICD-10-CM

## 2022-05-23 DIAGNOSIS — C50.919 MALIGNANT NEOPLASM OF FEMALE BREAST, UNSPECIFIED ESTROGEN RECEPTOR STATUS, UNSPECIFIED LATERALITY, UNSPECIFIED SITE OF BREAST (HCC): ICD-10-CM

## 2022-05-23 DIAGNOSIS — E04.1 THYROID NODULE: ICD-10-CM

## 2022-05-23 DIAGNOSIS — C50.911 MALIGNANT NEOPLASM OF RIGHT BREAST IN FEMALE, ESTROGEN RECEPTOR POSITIVE, UNSPECIFIED SITE OF BREAST (HCC): ICD-10-CM

## 2022-05-23 DIAGNOSIS — R79.89 D-DIMER, ELEVATED: ICD-10-CM

## 2022-05-23 PROCEDURE — 1160F RVW MEDS BY RX/DR IN RCRD: CPT | Performed by: INTERNAL MEDICINE

## 2022-05-23 PROCEDURE — 1036F TOBACCO NON-USER: CPT | Performed by: INTERNAL MEDICINE

## 2022-05-23 PROCEDURE — 99214 OFFICE O/P EST MOD 30 MIN: CPT | Performed by: INTERNAL MEDICINE

## 2022-05-23 RX ORDER — LETROZOLE 2.5 MG/1
2.5 TABLET, FILM COATED ORAL DAILY
Qty: 30 TABLET | Refills: 5 | Status: SHIPPED | OUTPATIENT
Start: 2022-05-23 | End: 2022-06-14

## 2022-05-23 NOTE — PATIENT INSTRUCTIONS
CMP before Prolia in September 2022  Has appointment for Prolia  Other blood tests prior to next visit in 6 months  Renewed letrozole and that will be stopped in December 22  Has appointment for mammography

## 2022-05-23 NOTE — TELEPHONE ENCOUNTER
Patient would like lab orders and her appt for her Ultrasound of Thyroid appt  mailed to her at [address] [place] [address] Tom De Souza   [phone]   Patient will also need [facility] scheduled for Thyroid Ultrasound

## 2022-05-23 NOTE — PROGRESS NOTES
HPI:   Follow-up visit for colon cancer, breast cancer , osteoporosis, history of DVT and thyroid disorder  In 2012 patient was diagnosed to have stage IIIB hormone receptor positive, HER-2 negative  right breast cancer   Nenita Whaley Patient had right mastectomy, and lymph node dissection and had 9 positive lymph nodes  She had Adriamycin + Cytoxan chemotherapy followed by Taxotere and after that radiation therapy  Since November/December 2012 she has been on Femara     She will take Femara until December 2022  Patient has osteoporosis and she has been on vitamin-D  and Prolia from her rheumatologist   Has problem swallowing calcium tablet  No problem with her teeth for Rigoberto Jung 2016 she was found to have benign clustered calcifications in left breast and had a biopsy and that was benign  Dr Nadia Otero takes care of her mammography     On 7/22/15 she underwent right hemicolectomy  Pathological diagnosis right colon cancer, stage I, T2 N0 MX, grade 2, No lymphovascular invasion and no perineural invasion  She did not require adjuvant therapy for colon cancer  CEA remains high and is being monitored  IS CEA was 5 6  This time CEA is 4 1  In December 2012 patient developed DVT right leg and she was started on Xarelto  She had GI bleeding in 2015  Xarelto was stopped  On colonoscopy she was found to have stage I right colon cancer    Debbie Harris has been on baby aspirin  She is not on anticoagulation anymore  Follow-up Venous Doppler study was negative for DVT   She runs high D-dimer           History of thyroid nodule  She had biopsy of thyroid nodule in June 2014 and she states that was negative for cancer  She gets thyroid ultrasound yearly for surveillance        Has some tiredness, low back discomfort and alopecia     She has kyphosis                            Current Outpatient Medications:     amLODIPine (NORVASC) 2 5 mg tablet, Take 1 tablet (2 5 mg total) by mouth daily, Disp: 30 tablet, Rfl: 5    ascorbic acid (VITAMIN C) 500 MG tablet, Take 1,000 mg by mouth daily , Disp: , Rfl:     aspirin 81 MG tablet, Take 81 mg by mouth daily  , Disp: , Rfl:     benazepril-hydrochlorthiazide (LOTENSIN HCT) 20-12 5 MG per tablet, Take 1 tablet by mouth daily, Disp: 30 tablet, Rfl: 5    bimatoprost (LUMIGAN) 0 01 % ophthalmic drops, Administer 1 drop to both eyes daily at bedtime , Disp: , Rfl:     calcium citrate (CALCITRATE) 950 MG tablet, Take 1 tablet by mouth in the morning , Disp: , Rfl:     Cholecalciferol (VITAMIN D-3 PO), Take 1 capsule by mouth 3 (three) times a day , Disp: , Rfl:     Cyanocobalamin (VITAMIN B 12 PO), Take 1 tablet by mouth daily  , Disp: , Rfl:     Diclofenac Sodium (VOLTAREN) 1 %, Apply 2 g topically 4 (four) times a day, Disp: , Rfl:     ferrous sulfate 325 (65 Fe) mg tablet, Take 325 mg by mouth daily with breakfast , Disp: , Rfl:     letrozole (FEMARA) 2 5 mg tablet, Take 1 tablet (2 5 mg total) by mouth daily, Disp: 30 tablet, Rfl: 11    Magnesium 250 MG TABS, Take 1 tablet by mouth daily  , Disp: , Rfl:     metFORMIN (GLUCOPHAGE) 500 mg tablet, Take 1 tablet (500 mg total) by mouth 2 (two) times a day with meals, Disp: 60 tablet, Rfl: 5    sitaGLIPtin (JANUVIA) 100 mg tablet, Take 1 tablet (100 mg total) by mouth daily, Disp: 30 tablet, Rfl: 5    vitamin A 10,000 units capsule, Take 10,000 Units by mouth daily  , Disp: , Rfl:     vitamin E 100 UNIT capsule, Take 100 Units by mouth daily , Disp: , Rfl:     No Known Allergies    Oncology History Overview Note    In 2012 patient was diagnosed to have Hormone receptor positive, HER-2 negative stage IIIB cancer in right breast  She had right mastectomy and lymph node dissection   9 lymph nodes showed metastatic disease  Patient was given Adriamycin + Cytoxan chemotherapy followed by Taxotere and after that radiation therapy  Since November/December 2012 she has been on Femara     She will be on Femara for a total of 10 years     On 7/22/15 she underwent right hemicolectomy  Pathological diagnosis right colon cancer, stage I, T2 N0 MX, grade 2,no lymphovascular invasion and no perineural invasion  She did not require adjuvant therapy for colon cancer  She has been running slightly high CEA and that is being monitored  Malignant neoplasm of right breast in female, estrogen receptor positive ([facility] [address] )   6/21/2018 Initial Diagnosis    Malignant neoplasm of right breast in female, estrogen receptor positive [facility])      Surgery     2012- right mastectomy   2015 - right hemicolectomy      Radiation     [id number]: Radiation to right chest wall and lymph nodes      Chemotherapy     2012 -Adriamycin plus Cytoxan followed by Taxotere Followed by Femara  She will have Femara for a total of 10 years  ROS:  05/23/22 Reviewed 12 systems: See symptoms in HPI:    Presently no other neurological , cardiac, pulmonary, GI and  symptoms other than listed in HPI     Other symptoms are in HPI  No symptoms like fevers, chills, bleeding, bone pains, skin rash, weight loss, night sweats, numbness, claudication and gait problem  Has some anxiety  No swelling of the ankles and no swollen glands  Not unusually sensitive to heat or cold  /78 (BP Location: Left arm, Patient Position: Sitting, Cuff Size: Adult)   Pulse 67   Temp (!) 97 2 °F (36 2 °C)   Resp 17   Ht 5' (1 524 m)   Wt 65 5 kg (144 lb 8 oz)   LMP  (LMP Unknown)   SpO2 98%   BMI 28 22 kg/m²     Physical Exam:  Alert and oriented and not in distress  Stable vitals  She  has alopecia, there is no icterus, no oral thrush, no palpable neck mass, clear lung fields, regular heart rate, abdomen  soft and non tender, no palpable abdominal mass, no ascites, no edema of ankles, no calf tenderness, no focal neurological deficit, no skin rash, no palpable lymphadenopathy in the neck and axillary areas,  no clubbing  Patient is anxious  Performance status 1  Right mastectomy scar    No lymphedema     Has kyphosis    IMAGING:  IMPRESSION:     No nodule meets current ACR criteria for requiring biopsy but followup ultrasound is recommended in 1 year             Reference: ACR Thyroid Imaging, Reporting and Data System (TI-RADS): White Paper of the [employer]  J AM Carlo Radiol 3426;71:523-595  (additional recommendations based on American Thyroid Association 2015 guidelines )        Workstation performed: [id number]      Imaging    US thyroid (Order: [id number]) - 6/17/2020  ASSESSMENT/BI-RADS CATEGORY:  Left: 2 - Benign  Overall: 2 - Benign     RECOMMENDATION:       - Routine screening mammogram in 1 year for the left breast      Workstation ID: [id number]          Imaging    Mammo screening left w 3d & cad (Order: [id number]) - 10/19/2021    RESULTS:      LUMBAR SPINE L2-L4 (L1 vertebra excluded from analysis due to local structural abnormalities or artifact): BMD  0 774  gm/cm2   T-score -2 8    These values are artifactually elevated due to the presence of scoliosis with spondylosis      LEFT  TOTAL HIP:   BMD:  0 688  gm/cm2    T-score:  -2 1     LEFT  FEMORAL NECK:   BMD:  0 628  gm/cm2   T score: -2 0      RIGHT  FOREARM:    33% RADIUS BMD:  0 490  gm/cm2  T-score:  -3 3         IMPRESSION:     1  Osteoporosis  [Based on the lumbar spine and right radius]       IMPRESSION:  Enlarged heterogeneous left thyroid lobe and thyroid isthmus  Grossly stable if not smaller peripherally densely calcified left thyroid isthmus nodule, this has been stable for more than 5 years  Asymmetrically bulky left thyroid lobe with prominent interpolar region which I do not believe is a true nodule  Followup ultrasound is recommended in 1 year  Smaller nodules which do not meet current ACR criteria for requiring biopsy      Reference: ACR Thyroid Imaging, Reporting and Data System (TI-RADS): White Paper of the [employer]   J AM Carlo Radiol 5359;38:451-825  (additional recommendations based on American Thyroid Association 2015 guidelines )        Workstation performed: [id number]          Imaging    US thyroid (Order: [id number]) - 7/14/  IMPRESSION:     1  No findings for hypermetabolic malignancy    2   Diffuse thyroid gland activity suggests thyroiditis      Workstation performed: [id number]          Imaging    NM PET CT skull base to mid thigh (Order: [id number]) - 7/14/2021    LABS:    Results for orders placed or performed in visit on 05/16/22   CBC and differential   Result Value Ref Range    WBC 6 47 4 31 - 10 16 Thousand/uL    RBC 3 69 (L) 3 81 - 5 12 Million/uL    Hemoglobin 11 6 11 5 - 15 4 g/dL    Hematocrit 35 8 34 8 - 46 1 %    MCV 97 82 - 98 fL    MCH 31 4 26 8 - 34 3 pg    MCHC 32 4 31 4 - 37 4 g/dL    RDW 13 2 11 6 - 15 1 %    MPV 9 2 8 9 - 12 7 fL    Platelets 950 365 - 541 Thousands/uL    nRBC 0 /100 WBCs    Neutrophils Relative 70 43 - 75 %    Immat GRANS % 1 0 - 2 %    Lymphocytes Relative 12 (L) 14 - 44 %    Monocytes Relative 12 4 - 12 %    Eosinophils Relative 4 0 - 6 %    Basophils Relative 1 0 - 1 %    Neutrophils Absolute 4 65 1 85 - 7 62 Thousands/µL    Immature Grans Absolute 0 04 0 00 - 0 20 Thousand/uL    Lymphocytes Absolute 0 76 0 60 - 4 47 Thousands/µL    Monocytes Absolute 0 76 0 17 - 1 22 Thousand/µL    Eosinophils Absolute 0 23 0 00 - 0 61 Thousand/µL    Basophils Absolute 0 03 0 00 - 0 10 Thousands/µL   Comprehensive metabolic panel   Result Value Ref Range    Sodium 131 (L) 136 - 145 mmol/L    Potassium 3 9 3 5 - 5 3 mmol/L    Chloride 98 (L) 100 - 108 mmol/L    CO2 31 21 - 32 mmol/L    ANION GAP 2 (L) 4 - 13 mmol/L    BUN 14 5 - 25 mg/dL    Creatinine 0 76 0 60 - 1 30 mg/dL    Glucose, Fasting 134 (H) 65 - 99 mg/dL    Calcium 9 9 8 3 - 10 1 mg/dL    AST 14 5 - 45 U/L    ALT 16 12 - 78 U/L    Alkaline Phosphatase 78 46 - 116 U/L    Total Protein 8 3 (H) 6 4 - 8 2 g/dL    Albumin 4 3 3 5 - 5 0 g/dL    Total Bilirubin 0 36 0 20 - 1 00 mg/dL    eGFR 76 ml/min/1 73sq m   CEA   Result Value Ref Range    CEA 4 1 (H) 0 0 - 3 0 ng/mL   D-dimer, quantitative   Result Value Ref Range    D-Dimer, Quant 2 27 (H) <0 50 ug/ml FEU   Cancer antigen 27 29   Result Value Ref Range    CA 27 29 18 1 0 0 - 42 3 U/mL   Lipid panel   Result Value Ref Range    Cholesterol 278 (H) See Comment mg/dL    Triglycerides 119 See Comment mg/dL    HDL, Direct 64 >=50 mg/dL    LDL Calculated 190 (H) 0 - 100 mg/dL    Non-HDL-Chol (CHOL-HDL) 214 mg/dl         Labs, Imaging, & Other studies:   All pertinent labs and imaging studies were personally reviewed        Reviewed test results and discussed with patient  Assessment and plan:       Follow-up visit for colon cancer, breast cancer , osteoporosis, history of DVT and thyroid disorder  In 2012 patient was diagnosed to have stage IIIB hormone receptor positive, HER-2 negative  right breast cancer   [facility] Patient had right mastectomy, and lymph node dissection and had 9 positive lymph nodes  She had Adriamycin + Cytoxan chemotherapy followed by Taxotere and after that radiation therapy  Since November/December 2012 she has been on Femara     She will take Femara until December 2022  Patient has osteoporosis and she has been on vitamin-D  and Prolia from her rheumatologist   Has problem swallowing calcium tablet  No problem with her teeth for [place] 2016 she was found to have benign clustered calcifications in left breast and had a biopsy and that was benign  Dr Adin Mcduffie takes care of her mammography     On 7/22/15 she underwent right hemicolectomy  Pathological diagnosis right colon cancer, stage I, T2 N0 MX, grade 2, No lymphovascular invasion and no perineural invasion  She did not require adjuvant therapy for colon cancer  CEA remains high and is being monitored  IS CEA was 5 6  This time CEA is 4 1  In December 2012 patient developed DVT right leg and she was started on Xarelto  She had GI bleeding in 2015  Xarelto was stopped  On colonoscopy she was found to have stage I right colon cancer    Godwin Desir has been on baby aspirin  She is not on anticoagulation anymore  Follow-up Venous Doppler study was negative for DVT   She runs high D-dimer           History of thyroid nodule  She had biopsy of thyroid nodule in June 2014 and she states that was negative for cancer  She gets thyroid ultrasound yearly for surveillance        Has some tiredness, low back discomfort and alopecia     She has kyphosis                     Physical examination and test results are as recorded and discussed   Breast cancer and colon cancers are in remission   Goal is cure from both the cancers   No active DVT at present   D-dimer test is being monitored and remains high   Thyroid nodules are being monitored     CEA is high 4 1 but lower than the highest reading of 5 6  and is being monitored      All discussed in detail   Questions answered  Discussed the importance of self-breast examination, eating healthy foods, staying active and health screening tests   Patient is capable of self-care     Discussed precautions against coronavirus  She will continue to follow with her primary physician and other consultants  See diagnoses, orders and instructions below  1  Malignant neoplasm of ascending colon (HCC)    - CBC and differential; Future  - CEA; Future  - Comprehensive metabolic panel; Future    2  Malignant neoplasm of right breast in female, estrogen receptor positive, unspecified site of breast ([facility] [address] )    - CEA; Future  - Cancer antigen 27 29; Future  - Comprehensive metabolic panel; Future    3  Abnormal tumor markers      4  D-dimer, elevated      5  History of DVT (deep vein thrombosis)      6  Thyroid disorder    - US thyroid; Future  - TSH, 3rd generation with Free T4 reflex; Future    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      8   Malignant neoplasm of female breast, unspecified estrogen receptor status, unspecified laterality, unspecified site of breast (HCC)    - letrozole (FEMARA) 2 5 mg tablet; Take 1 tablet (2 5 mg total) by mouth in the morning  Dispense: 30 tablet; Refill: 5    9  Thyroid nodule  - US thyroid; Future  - TSH, 3rd generation with Free T4 reflex; Future    10  History of diabetes mellitus    CMP before Prolia in September 2022  Has appointment for Prolia  Other blood tests prior to next visit in 6 months  Renewed letrozole and that will be stopped in December 22  Has appointment for mammography                                         Patient voiced understanding and agrees      Counseling / Coordination of Care      Provided counseling and support

## 2022-05-31 ENCOUNTER — TELEPHONE (OUTPATIENT)
Dept: HEMATOLOGY ONCOLOGY | Facility: CLINIC | Age: 77
End: 2022-05-31

## 2022-05-31 NOTE — TELEPHONE ENCOUNTER
CALL RETURN FORM   Reason for patient call? Patient has many questions regarding lab tests and changes to future lab tests  Also needs thyroid ultrasound to be scheduled  Also needs transportation by [facility]    Patient's primary oncologist?  Francisco Jacobs   Name of person the patient was calling for? Proothi   Any additional information to add, if applicable? Please call [phone], please let it ring a while, takes a bit to get to phone  Informed patient that the message will be forwarded to the team and someone will get back to them as soon as possible    Did you relay this information to the patient?  Yes

## 2022-05-31 NOTE — TELEPHONE ENCOUNTER
Returned telephone call spoke with Pt  She is requesting copy of the cmp rx be mailed to her home  Also her US thyroid needs to be scheduled and Start transport set up for the [address] [facility] appt when it is made  Please do not schedule the US on 7/14 or 7/22  Explained I will put the rx in the mail today and that someone will be calling her today with the US appt

## 2022-06-13 DIAGNOSIS — C50.919 MALIGNANT NEOPLASM OF FEMALE BREAST, UNSPECIFIED ESTROGEN RECEPTOR STATUS, UNSPECIFIED LATERALITY, UNSPECIFIED SITE OF BREAST (HCC): ICD-10-CM

## 2022-06-14 RX ORDER — LETROZOLE 2.5 MG/1
TABLET, FILM COATED ORAL
Qty: 30 TABLET | Refills: 5 | Status: SHIPPED | OUTPATIENT
Start: 2022-06-14

## 2022-06-30 ENCOUNTER — OFFICE VISIT (OUTPATIENT)
Dept: PODIATRY | Facility: CLINIC | Age: 77
End: 2022-06-30

## 2022-06-30 VITALS
HEART RATE: 93 BPM | BODY MASS INDEX: 28.31 KG/M2 | WEIGHT: 144.2 LBS | HEIGHT: 60 IN | DIASTOLIC BLOOD PRESSURE: 82 MMHG | SYSTOLIC BLOOD PRESSURE: 130 MMHG

## 2022-06-30 DIAGNOSIS — B35.1 ONYCHOMYCOSIS: ICD-10-CM

## 2022-06-30 DIAGNOSIS — L84 CORNS: Primary | ICD-10-CM

## 2022-06-30 PROCEDURE — NCFTCARE PR NON-COVERED FOOT CARE: Performed by: PODIATRIST

## 2022-06-30 NOTE — PROGRESS NOTES
Patient presents for palliative care  Treatment consisted of nail and lesion trimming  Patient has multiple hammertoe deformities and there are lesions on the right 4th toe and left 2nd and 4th toes  Patient notes significant ankle edema left foot  This has developed over the past 2 weeks  No pain related  No open areas present  Patient has significant hyperpronation with a pes planus foot type  She also walks everywhere  Is likely that the edema is secondary to the foot type and the excessive ambulation  She is currently taking diuretic for hypertension

## 2022-07-21 ENCOUNTER — OFFICE VISIT (OUTPATIENT)
Dept: PODIATRY | Facility: CLINIC | Age: 77
End: 2022-07-21

## 2022-07-21 VITALS
BODY MASS INDEX: 28.43 KG/M2 | HEIGHT: 60 IN | DIASTOLIC BLOOD PRESSURE: 78 MMHG | WEIGHT: 144.8 LBS | HEART RATE: 94 BPM | SYSTOLIC BLOOD PRESSURE: 128 MMHG

## 2022-07-21 DIAGNOSIS — B35.1 ONYCHOMYCOSIS: Primary | ICD-10-CM

## 2022-07-21 DIAGNOSIS — L84 CORNS: ICD-10-CM

## 2022-07-21 PROCEDURE — NCFTCARE PR NON-COVERED FOOT CARE: Performed by: PODIATRIST

## 2022-07-21 NOTE — PROGRESS NOTES
Patient presents for palliative foot care  No acute disorder noted  Treatment consisted of toenail and lesion trimming    She is rescheduled in 3 weeks at her request

## 2022-07-26 ENCOUNTER — HOSPITAL ENCOUNTER (OUTPATIENT)
Dept: RADIOLOGY | Age: 77
Discharge: HOME | End: 2022-07-26
Payer: COMMERCIAL

## 2022-07-26 DIAGNOSIS — E07.9 THYROID DISORDER: ICD-10-CM

## 2022-07-26 DIAGNOSIS — E04.1 THYROID NODULE: ICD-10-CM

## 2022-07-26 PROCEDURE — 76536 US EXAM OF HEAD AND NECK: CPT

## 2022-08-01 ENCOUNTER — RA CDI HCC (OUTPATIENT)
Dept: OTHER | Facility: HOSPITAL | Age: 77
End: 2022-08-01

## 2022-08-08 ENCOUNTER — OFFICE VISIT (OUTPATIENT)
Dept: FAMILY MEDICINE CLINIC | Facility: CLINIC | Age: 77
End: 2022-08-08
Payer: COMMERCIAL

## 2022-08-08 VITALS
SYSTOLIC BLOOD PRESSURE: 130 MMHG | OXYGEN SATURATION: 98 % | WEIGHT: 144 LBS | HEIGHT: 60 IN | HEART RATE: 91 BPM | DIASTOLIC BLOOD PRESSURE: 84 MMHG | BODY MASS INDEX: 28.27 KG/M2 | TEMPERATURE: 97.8 F

## 2022-08-08 DIAGNOSIS — M81.0 AGE-RELATED OSTEOPOROSIS WITHOUT CURRENT PATHOLOGICAL FRACTURE: ICD-10-CM

## 2022-08-08 DIAGNOSIS — E11.621 DIABETIC ULCER OF TOE OF LEFT FOOT ASSOCIATED WITH TYPE 2 DIABETES MELLITUS, LIMITED TO BREAKDOWN OF SKIN (HCC): ICD-10-CM

## 2022-08-08 DIAGNOSIS — L97.521 DIABETIC ULCER OF TOE OF LEFT FOOT ASSOCIATED WITH TYPE 2 DIABETES MELLITUS, LIMITED TO BREAKDOWN OF SKIN (HCC): ICD-10-CM

## 2022-08-08 DIAGNOSIS — E11.9 DIABETES TYPE 2, NO OCULAR INVOLVEMENT (HCC): ICD-10-CM

## 2022-08-08 DIAGNOSIS — Z00.00 MEDICARE ANNUAL WELLNESS VISIT, SUBSEQUENT: ICD-10-CM

## 2022-08-08 DIAGNOSIS — E55.9 VITAMIN D DEFICIENCY: ICD-10-CM

## 2022-08-08 DIAGNOSIS — E11.8 TYPE 2 DIABETES MELLITUS WITH COMPLICATION, WITHOUT LONG-TERM CURRENT USE OF INSULIN (HCC): Primary | ICD-10-CM

## 2022-08-08 DIAGNOSIS — I10 ESSENTIAL HYPERTENSION: ICD-10-CM

## 2022-08-08 LAB — SL AMB POCT HEMOGLOBIN AIC: 6.3 % (ref ?–6.5)

## 2022-08-08 PROCEDURE — 1125F AMNT PAIN NOTED PAIN PRSNT: CPT | Performed by: FAMILY MEDICINE

## 2022-08-08 PROCEDURE — G0439 PPPS, SUBSEQ VISIT: HCPCS | Performed by: FAMILY MEDICINE

## 2022-08-08 PROCEDURE — 1090F PRES/ABSN URINE INCON ASSESS: CPT | Performed by: FAMILY MEDICINE

## 2022-08-08 PROCEDURE — 3288F FALL RISK ASSESSMENT DOCD: CPT | Performed by: FAMILY MEDICINE

## 2022-08-08 PROCEDURE — 3725F SCREEN DEPRESSION PERFORMED: CPT | Performed by: FAMILY MEDICINE

## 2022-08-08 PROCEDURE — 1170F FXNL STATUS ASSESSED: CPT | Performed by: FAMILY MEDICINE

## 2022-08-08 PROCEDURE — 1160F RVW MEDS BY RX/DR IN RCRD: CPT | Performed by: FAMILY MEDICINE

## 2022-08-08 PROCEDURE — 99215 OFFICE O/P EST HI 40 MIN: CPT | Performed by: FAMILY MEDICINE

## 2022-08-08 PROCEDURE — 83036 HEMOGLOBIN GLYCOSYLATED A1C: CPT | Performed by: FAMILY MEDICINE

## 2022-08-08 PROCEDURE — 1101F PT FALLS ASSESS-DOCD LE1/YR: CPT | Performed by: FAMILY MEDICINE

## 2022-08-08 PROCEDURE — 3044F HG A1C LEVEL LT 7.0%: CPT | Performed by: FAMILY MEDICINE

## 2022-08-08 RX ORDER — BENAZEPRIL HYDROCHLORIDE AND HYDROCHLOROTHIAZIDE 20; 12.5 MG/1; MG/1
1 TABLET ORAL DAILY
Qty: 30 TABLET | Refills: 5 | Status: SHIPPED | OUTPATIENT
Start: 2022-08-08 | End: 2022-10-04

## 2022-08-08 RX ORDER — AMLODIPINE BESYLATE 2.5 MG/1
2.5 TABLET ORAL DAILY
Qty: 30 TABLET | Refills: 5 | Status: SHIPPED | OUTPATIENT
Start: 2022-08-08

## 2022-08-08 NOTE — PROGRESS NOTES
Diabetic Foot Exam    Patient's shoes and socks removed  Right Foot/Ankle   Right Foot Inspection  Skin Exam: skin normal and skin intact  No dry skin, no warmth, no callus, no erythema, no maceration, no abnormal color, no pre-ulcer, no ulcer and no callus  Toe Exam: ROM and strength within normal limits and right toe deformity  Sensory   Vibration: absent  Monofilament testing: absent    Vascular  Capillary refills: < 3 seconds  The right DP pulse is 0  The right PT pulse is 0  Left Foot/Ankle  Left Foot Inspection  Skin Exam: skin normal and skin intact  No dry skin, no warmth, no erythema, no maceration, normal color, no pre-ulcer, no ulcer and no callus  Toe Exam: ROM and strength within normal limits and left toe deformity  Sensory   Vibration: absent  Monofilament testing: absent    Vascular  Capillary refills: < 3 seconds  The left DP pulse is 0  The left PT pulse is 0       Assign Risk Category  Deformity present  No loss of protective sensation  Weak pulses  Risk: 0

## 2022-08-08 NOTE — PROGRESS NOTES
Assessment and Plan:     Problem List Items Addressed This Visit     Essential hypertension    Relevant Medications    amLODIPine (NORVASC) 2 5 mg tablet    benazepril-hydrochlorthiazide (LOTENSIN HCT) 20-12 5 MG per tablet    Diabetic ulcer of toe of left foot associated with type 2 diabetes mellitus, limited to breakdown of skin (HCC)    Relevant Medications    metFORMIN (GLUCOPHAGE) 500 mg tablet    sitaGLIPtin (JANUVIA) 100 mg tablet      Other Visit Diagnoses     Type 2 diabetes mellitus with complication, without long-term current use of insulin (HCC)    -  Primary    Relevant Medications    metFORMIN (GLUCOPHAGE) 500 mg tablet    sitaGLIPtin (JANUVIA) 100 mg tablet    Other Relevant Orders    POCT hemoglobin A1c    Medicare annual wellness visit, subsequent        Diabetes type 2, no ocular involvement (HCC)        Relevant Medications    metFORMIN (GLUCOPHAGE) 500 mg tablet    sitaGLIPtin (JANUVIA) 100 mg tablet    Vitamin D deficiency        Relevant Orders    Vitamin D 25 hydroxy    Age-related osteoporosis without current pathological fracture              Depression Screening and Follow-up Plan: Patient was screened for depression during today's encounter  They screened negative with a PHQ-2 score of 0  Preventive health issues were discussed with patient, and age appropriate screening tests were ordered as noted in patient's After Visit Summary  Personalized health advice and appropriate referrals for health education or preventive services given if needed, as noted in patient's After Visit Summary  History of Present Illness:     Patient presents for a Medicare Wellness Visit    Medicare PE       Patient Care Team:  Ag Egan DO as PCP - MD Ag Chandra DO Verla Pitter, MD Ellamae Denise, DPM Vicki Patterson, MD (Colon and Rectal Surgery)  Jessica Kuo MD as Endoscopist     Review of Systems:     Review of Systems Constitutional: Negative  HENT: Negative  Eyes: Negative  Respiratory: Negative  Cardiovascular: Negative  Gastrointestinal: Negative  Genitourinary: Negative  Musculoskeletal: Negative  Skin: Negative  Neurological: Negative  Psychiatric/Behavioral: Negative  Problem List:     Patient Active Problem List   Diagnosis    Chronic deep vein thrombosis (DVT) of distal vein of left lower extremity (HCC)    Thyroid nodule    Malignant neoplasm of right breast in female, estrogen receptor positive ([facility] )    Malignant neoplasm of ascending colon ([place] [address] )    Personal history of other malignant neoplasm of large intestine    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    Other osteoporosis without current pathological fracture    Type 2 diabetes mellitus without complications ([place] [address] )    D-dimer, elevated    Essential hypertension    Abnormal tumor markers    Diabetic ulcer of toe of left foot associated with type 2 diabetes mellitus, limited to breakdown of skin ([place] [address] )    Anxiety about health    History of DVT (deep vein thrombosis)    Thyroid disorder    Osteoporosis due to aromatase inhibitor    History of diabetes mellitus      Past Medical and Surgical History:     Past Medical History:   Diagnosis Date    Acute embolism and thrombosis of deep vein of lower extremity ([place] [address] )     Adenocarcinoma of colon, [facility] ([place] [address] )     Breast cancer ([place] [address] ) 2012    Right Breast     Cancer ([place] [address] )     Diabetes mellitus ([place] [address] )     DVT (deep venous thrombosis) (HCC)     GERD (gastroesophageal reflux disease)     Hypertension     Pes planus      Past Surgical History:   Procedure Laterality Date    COLONOSCOPY      HYSTERECTOMY      complete @ age 29    IR PORT REMOVAL  9/12/2018    MASTECTOMY Right 2012    VT COLONOSCOPY FLX DX W/COLLJ SPEC WHEN PFRMD N/A 8/23/2016    Procedure: COLONOSCOPY;  Surgeon: Asim Cohn MD;  [facility];   Service: Colorectal    VT COLONOSCOPY FLX DX W/COLLJ [facility] WHEN PFRMD N/A 9/5/2017    Procedure: COLONOSCOPY;  Surgeon: Ronny Sharp MD;  [facility]; Service: Colorectal    [place] ESOPHAGOGASTRODUODENOSCOPY TRANSORAL DIAGNOSTIC N/A 9/5/2017    Procedure: ESOPHAGOGASTRODUODENOSCOPY (EGD); Surgeon: Trung Turner DO;  [facility]; Service: Gastroenterology      Family History:     Family History   Problem Relation Age of Onset    Other Mother         chronic bronchitis    Brain cancer Father     Prostate cancer Paternal Grandfather 79    Pancreatic cancer Maternal Aunt 39      Social History:     Social History     Socioeconomic History    Marital status: Single     Spouse name: None    Number of children: None    Years of education: None    Highest education level: None   Occupational History    Occupation: retired   Tobacco Use    Smoking status: Never Smoker    Smokeless tobacco: Never Used   Vaping Use    Vaping Use: Never used   Substance and Sexual Activity    Alcohol use: No    Drug use: No    Sexual activity: Not Currently   Other Topics Concern    None   Social History Narrative    No advance directives     Social Determinants of Health     Financial Resource Strain: Not on file   Food Insecurity: Not on file   Transportation Needs: Not on file   Physical Activity: Not on file   Stress: Not on file   Social Connections: Not on file   Intimate Partner Violence: Not on file   Housing Stability: Not on file      Medications and Allergies:     Current Outpatient Medications   Medication Sig Dispense Refill    amLODIPine (NORVASC) 2 5 mg tablet Take 1 tablet (2 5 mg total) by mouth daily 30 tablet 5    ascorbic acid (VITAMIN C) 500 MG tablet Take 1,000 mg by mouth daily       aspirin 81 MG tablet Take 81 mg by mouth daily        benazepril-hydrochlorthiazide (LOTENSIN HCT) 20-12 5 MG per tablet Take 1 tablet by mouth daily 30 tablet 5    bimatoprost (LUMIGAN) 0 01 % ophthalmic drops Administer 1 drop to both eyes daily at bedtime       calcium citrate (CALCITRATE) 950 MG tablet Take 1 tablet by mouth in the morning   Cholecalciferol (VITAMIN D-3 PO) Take 1 capsule by mouth 3 (three) times a day       Cyanocobalamin (VITAMIN B 12 PO) Take 1 tablet by mouth daily   Diclofenac Sodium (VOLTAREN) 1 % Apply 2 g topically 4 (four) times a day      ferrous sulfate 325 (65 Fe) mg tablet Take 325 mg by mouth daily with breakfast       letrozole (FEMARA) 2 5 mg tablet take 1 tablet by mouth once daily 30 tablet 5    Magnesium 250 MG TABS Take 1 tablet by mouth daily        metFORMIN (GLUCOPHAGE) 500 mg tablet Take 1 tablet (500 mg total) by mouth 2 (two) times a day with meals 60 tablet 5    sitaGLIPtin (JANUVIA) 100 mg tablet Take 1 tablet (100 mg total) by mouth daily 30 tablet 5    vitamin A 10,000 units capsule Take 10,000 Units by mouth daily   vitamin E 100 UNIT capsule Take 100 Units by mouth daily        No current facility-administered medications for this visit       No Known Allergies   Immunizations:     Immunization History   Administered Date(s) Administered    COVID-19 PFIZER VACCINE 0 3 ML IM 06/22/2021, 07/17/2021    COVID-19 Pfizer vac (Yasir-sucrose, gray cap) 12 yr+ IM 04/02/2022    INFLUENZA 11/05/2014    Influenza Quadrivalent Preservative Free 3 years and older IM 10/31/2016, 11/13/2017    Influenza Quadrivalent, 6-35 Months IM 11/20/2015    Influenza, high dose seasonal 0 7 mL 10/31/2018, 11/13/2019, 10/20/2020, 10/18/2021    Influenza, seasonal, injectable 11/04/2013, 11/05/2014    Pneumococcal Conjugate 13-Valent 03/22/2016    Pneumococcal Polysaccharide PPV23 03/18/2019    Tdap 04/18/2011, 03/16/2021    Zoster 08/15/2013      Health Maintenance:         Topic Date Due    Breast Cancer Screening: Mammogram  10/19/2022    DXA SCAN  09/16/2023    Hepatitis C Screening  Completed         Topic Date Due    COVID-19 Vaccine (4 - Booster for Pfizer series) 08/02/2022    Influenza Vaccine (1) 09/01/2022      Medicare Screening Tests and Risk Assessments:     Mya Venegas is here for her Subsequent Wellness visit  Health Risk Assessment:   Patient rates overall health as good  Patient feels that their physical health rating is same  Patient is very satisfied with their life  Eyesight was rated as same  Hearing was rated as slightly worse  Patient feels that their emotional and mental health rating is slightly better  Patients states they are never, rarely angry  Patient states they are sometimes unusually tired/fatigued  Pain experienced in the last 7 days has been none  Patient states that she has experienced no weight loss or gain in last 6 months  Depression Screening:   PHQ-2 Score: 0      Fall Risk Screening: In the past year, patient has experienced: no history of falling in past year      Urinary Incontinence Screening:   Patient has not leaked urine accidently in the last six months  Home Safety:  Patient does not have trouble with stairs inside or outside of their home  Patient has working smoke alarms and has working carbon monoxide detector  Home safety hazards include: none  Nutrition:   Current diet is Regular  Medications:   Patient is currently taking over-the-counter supplements  OTC medications include: see medication list  Patient is able to manage medications  Activities of Daily Living (ADLs)/Instrumental Activities of Daily Living (IADLs):   Walk and transfer into and out of bed and chair?: Yes  Dress and groom yourself?: Yes    Bathe or shower yourself?: Yes    Feed yourself? Yes  Do your laundry/housekeeping?: Yes  Manage your money, pay your bills and track your expenses?: Yes  Make your own meals?: Yes    Do your own shopping?: Yes    Previous Hospitalizations:   Any hospitalizations or ED visits within the last 12 months?: No      Advance Care Planning:   Living will: No    Durable POA for healthcare: No    Advanced directive: No    Five wishes given:  Yes Cognitive Screening:   Provider or family/friend/caregiver concerned regarding cognition?: No    PREVENTIVE SCREENINGS      Cardiovascular Screening:    General: Screening Current      Diabetes Screening:     General: Screening Not Indicated, History Diabetes and Screening Current      Colorectal Cancer Screening:     General: History Colorectal Cancer      Breast Cancer Screening:     General: History Breast Cancer and Screening Current      Cervical Cancer Screening:    General: Screening Not Indicated      Osteoporosis Screening:    General: Screening Not Indicated, History Osteoporosis and Screening Current      Abdominal Aortic Aneurysm (AAA) Screening:        General: Screening Not Indicated      Lung Cancer Screening:     General: Screening Not Indicated      Hepatitis C Screening:    General: Screening Current    Screening, Brief Intervention, and Referral to Treatment (SBIRT)    Screening      AUDIT-C Screenin) How often did you have a drink containing alcohol in the past year? never  2) How many drinks did you have on a typical day when you were drinking in the past year? 0  3) How often did you have 6 or more drinks on one occasion in the past year? never    AUDIT-C Score: 0  Interpretation: Score 0-2 (female): Negative screen for alcohol misuse    Single Item Drug Screening:  How often have you used an illegal drug (including marijuana) or a prescription medication for non-medical reasons in the past year? never    Single Item Drug Screen Score: 0  Interpretation: Negative screen for possible drug use disorder    No exam data present     Physical Exam:     /84 (BP Location: Left arm, Patient Position: Sitting, Cuff Size: Standard)   Pulse 91   Temp 97 8 °F (36 6 °C) (Tympanic)   Ht 5' (1 524 m)   Wt 65 3 kg (144 lb)   LMP  (LMP Unknown)   SpO2 98%   BMI 28 12 kg/m²     Physical Exam  Vitals and nursing note reviewed  Constitutional:       General: She is not in acute distress  Appearance: She is well-developed  HENT:      Head: Normocephalic and atraumatic  Right Ear: External ear normal       Left Ear: External ear normal       Mouth/Throat:      Mouth: Mucous membranes are moist       Pharynx: Oropharynx is clear  Eyes:      Conjunctiva/sclera: Conjunctivae normal    Cardiovascular:      Rate and Rhythm: Normal rate and regular rhythm  Heart sounds: No murmur heard  Pulmonary:      Effort: Pulmonary effort is normal  No respiratory distress  Breath sounds: Normal breath sounds  Abdominal:      Tenderness: There is no abdominal tenderness  Musculoskeletal:      Cervical back: Neck supple  Skin:     General: Skin is warm and dry  Neurological:      Mental Status: She is alert            Blanca Copeland DO

## 2022-08-08 NOTE — PROGRESS NOTES
Chief Complaint   Patient presents with   [facility] Wellness Visit     [facility]/Medicare Well  Assessment/Plan:  Posture practicing  BMI Counseling: Body mass index is 28 12 kg/m²  The BMI is above normal  Nutrition recommendations include reducing portion sizes and consuming healthier snacks  PHQ-2/9 Depression Screening    Little interest or pleasure in doing things: 0 - not at all  Feeling down, depressed, or hopeless: 0 - not at all  PHQ-2 Score: 0  PHQ-2 Interpretation: Negative depression screen          Diagnoses and all orders for this visit:    Type 2 diabetes mellitus with complication, without long-term current use of insulin ([facility])  -     POCT hemoglobin A1c  -     metFORMIN (GLUCOPHAGE) 500 mg tablet; Take 1 tablet (500 mg total) by mouth 2 (two) times a day with meals    Diabetic ulcer of toe of left foot associated with type 2 diabetes mellitus, limited to breakdown of skin ([address] )    Medicare annual wellness visit, subsequent    Essential hypertension  -     amLODIPine (NORVASC) 2 5 mg tablet; Take 1 tablet (2 5 mg total) by mouth daily  -     benazepril-hydrochlorthiazide (LOTENSIN HCT) 20-12 5 MG per tablet; Take 1 tablet by mouth daily    Diabetes type 2, no ocular involvement ([facility])  -     sitaGLIPtin (JANUVIA) 100 mg tablet; Take 1 tablet (100 mg total) by mouth daily    Vitamin D deficiency  -     Vitamin D 25 hydroxy; Future          Subjective:      Patient ID: Giovanni Quintero is a 69 y o  female  Review labs, refill Med's  Doing OK    A1c:  6 3 %      The following portions of the patient's history were reviewed and updated as appropriate: allergies, current medications, past medical history, past social history, past surgical history and problem list   I have spent 40 minutes with Patient  today in which greater than 50% of this time was spent in counseling/coordination of care regarding Diagnostic results, Risks and benefits of tx options, Intructions for management, Patient and family education, Importance of tx compliance, Risk factor reductions and Impressions  Review of Systems   Constitutional: Negative  HENT: Negative  Eyes: Negative  Respiratory: Negative  Cardiovascular: Negative  Gastrointestinal: Negative  Genitourinary: Negative  Musculoskeletal: Negative  Skin: Negative  Neurological: Negative  Psychiatric/Behavioral: Negative  Objective:      /84 (BP Location: Left arm, Patient Position: Sitting, Cuff Size: Standard)   Pulse 91   Temp 97 8 °F (36 6 °C) (Tympanic)   Ht 5' (1 524 m)   Wt 65 3 kg (144 lb)   LMP  (LMP Unknown)   SpO2 98%   BMI 28 12 kg/m²     Current Outpatient Medications:     amLODIPine (NORVASC) 2 5 mg tablet, Take 1 tablet (2 5 mg total) by mouth daily, Disp: 30 tablet, Rfl: 5    ascorbic acid (VITAMIN C) 500 MG tablet, Take 1,000 mg by mouth daily , Disp: , Rfl:     aspirin 81 MG tablet, Take 81 mg by mouth daily  , Disp: , Rfl:     benazepril-hydrochlorthiazide (LOTENSIN HCT) 20-12 5 MG per tablet, Take 1 tablet by mouth daily, Disp: 30 tablet, Rfl: 5    bimatoprost (LUMIGAN) 0 01 % ophthalmic drops, Administer 1 drop to both eyes daily at bedtime , Disp: , Rfl:     calcium citrate (CALCITRATE) 950 MG tablet, Take 1 tablet by mouth in the morning , Disp: , Rfl:     Cholecalciferol (VITAMIN D-3 PO), Take 1 capsule by mouth 3 (three) times a day , Disp: , Rfl:     Cyanocobalamin (VITAMIN B 12 PO), Take 1 tablet by mouth daily  , Disp: , Rfl:     Diclofenac Sodium (VOLTAREN) 1 %, Apply 2 g topically 4 (four) times a day, Disp: , Rfl:     ferrous sulfate 325 (65 Fe) mg tablet, Take 325 mg by mouth daily with breakfast , Disp: , Rfl:     letrozole (FEMARA) 2 5 mg tablet, take 1 tablet by mouth once daily, Disp: 30 tablet, Rfl: 5    Magnesium 250 MG TABS, Take 1 tablet by mouth daily  , Disp: , Rfl:     metFORMIN (GLUCOPHAGE) 500 mg tablet, Take 1 tablet (500 mg total) by mouth 2 (two) times a day with meals, Disp: 60 tablet, Rfl: 5    sitaGLIPtin (JANUVIA) 100 mg tablet, Take 1 tablet (100 mg total) by mouth daily, Disp: 30 tablet, Rfl: 5    vitamin A 10,000 units capsule, Take 10,000 Units by mouth daily  , Disp: , Rfl:     vitamin E 100 UNIT capsule, Take 100 Units by mouth daily , Disp: , Rfl:   No Known Allergies  Past Surgical History:   Procedure Laterality Date    COLONOSCOPY      HYSTERECTOMY      complete @ age 29    IR PORT REMOVAL  9/12/2018    MASTECTOMY Right 2012    ND COLONOSCOPY FLX DX W/COLLJ SPEC WHEN PFRMD N/A 8/23/2016    Procedure: COLONOSCOPY;  Surgeon: Shivani Hoover MD;  [facility]; Service: Colorectal    ND COLONOSCOPY FLX DX W/COLLJ SPEC WHEN PFRMD N/A 9/5/2017    Procedure: COLONOSCOPY;  Surgeon: Shivani Hoover MD;  [facility]; Service: Colorectal    ND ESOPHAGOGASTRODUODENOSCOPY TRANSORAL DIAGNOSTIC N/A 9/5/2017    Procedure: ESOPHAGOGASTRODUODENOSCOPY (EGD); Surgeon: Analia Rawls DO;  [facility]; Service: Gastroenterology            Physical Exam  Constitutional:       Appearance: She is well-developed  HENT:      Head: Normocephalic and atraumatic  Right Ear: External ear normal       Left Ear: External ear normal       Nose: Nose normal    Eyes:      Conjunctiva/sclera: Conjunctivae normal       Pupils: Pupils are equal, round, and reactive to light  Cardiovascular:      Rate and Rhythm: Normal rate and regular rhythm  Heart sounds: Normal heart sounds  Pulmonary:      Effort: Pulmonary effort is normal       Breath sounds: Normal breath sounds  Abdominal:      General: Bowel sounds are normal       Palpations: Abdomen is soft  Musculoskeletal:      Cervical back: Normal range of motion and neck supple  Skin:     General: Skin is warm and dry  Neurological:      Mental Status: She is alert and oriented to person, place, and time  Deep Tendon Reflexes: Reflexes are normal and symmetric     Psychiatric: Behavior: Behavior normal          Thought Content:  Thought content normal          Judgment: Judgment normal

## 2022-08-11 ENCOUNTER — OFFICE VISIT (OUTPATIENT)
Dept: PODIATRY | Facility: CLINIC | Age: 77
End: 2022-08-11
Payer: COMMERCIAL

## 2022-08-11 VITALS
HEIGHT: 60 IN | WEIGHT: 144.4 LBS | BODY MASS INDEX: 28.35 KG/M2 | DIASTOLIC BLOOD PRESSURE: 78 MMHG | HEART RATE: 90 BPM | SYSTOLIC BLOOD PRESSURE: 117 MMHG

## 2022-08-11 DIAGNOSIS — L84 CORNS: ICD-10-CM

## 2022-08-11 DIAGNOSIS — B35.1 ONYCHOMYCOSIS: Primary | ICD-10-CM

## 2022-08-11 PROCEDURE — NCFTCARE PR NON-COVERED FOOT CARE: Performed by: PODIATRIST

## 2022-09-01 ENCOUNTER — OFFICE VISIT (OUTPATIENT)
Dept: PODIATRY | Facility: CLINIC | Age: 77
End: 2022-09-01

## 2022-09-01 VITALS
DIASTOLIC BLOOD PRESSURE: 78 MMHG | BODY MASS INDEX: 28.51 KG/M2 | WEIGHT: 145.2 LBS | HEART RATE: 90 BPM | SYSTOLIC BLOOD PRESSURE: 129 MMHG | HEIGHT: 60 IN

## 2022-09-01 DIAGNOSIS — L84 CORNS: Primary | ICD-10-CM

## 2022-09-01 DIAGNOSIS — M20.42 HAMMER TOES OF BOTH FEET: ICD-10-CM

## 2022-09-01 DIAGNOSIS — M20.41 HAMMER TOES OF BOTH FEET: ICD-10-CM

## 2022-09-01 PROCEDURE — NCFTCARE PR NON-COVERED FOOT CARE: Performed by: PODIATRIST

## 2022-09-01 NOTE — PROGRESS NOTES
Patient presents for palliative foot care  No acute disorder noted  Treatment consisted of trimming of hyperkeratotic lesion right 4th toe and nails  Patient will be rescheduled in 4 weeks

## 2022-09-20 ENCOUNTER — APPOINTMENT (OUTPATIENT)
Dept: LAB | Facility: CLINIC | Age: 77
End: 2022-09-20
Payer: COMMERCIAL

## 2022-09-20 DIAGNOSIS — M85.80 OSTEOPENIA DUE TO CANCER THERAPY: ICD-10-CM

## 2022-09-20 DIAGNOSIS — M81.8 OTHER OSTEOPOROSIS WITHOUT CURRENT PATHOLOGICAL FRACTURE: Primary | ICD-10-CM

## 2022-09-20 DIAGNOSIS — Z85.038 PERSONAL HISTORY OF OTHER MALIGNANT NEOPLASM OF LARGE INTESTINE: ICD-10-CM

## 2022-09-20 DIAGNOSIS — C50.911 MALIGNANT NEOPLASM OF RIGHT BREAST IN FEMALE, ESTROGEN RECEPTOR POSITIVE, UNSPECIFIED SITE OF BREAST (HCC): ICD-10-CM

## 2022-09-20 DIAGNOSIS — Z17.0 MALIGNANT NEOPLASM OF RIGHT BREAST IN FEMALE, ESTROGEN RECEPTOR POSITIVE, UNSPECIFIED SITE OF BREAST (HCC): ICD-10-CM

## 2022-09-20 DIAGNOSIS — C18.2 MALIGNANT NEOPLASM OF ASCENDING COLON (HCC): ICD-10-CM

## 2022-09-20 LAB
ALBUMIN SERPL BCP-MCNC: 3.7 G/DL (ref 3.5–5)
ALP SERPL-CCNC: 82 U/L (ref 46–116)
ALT SERPL W P-5'-P-CCNC: 15 U/L (ref 12–78)
ANION GAP SERPL CALCULATED.3IONS-SCNC: 8 MMOL/L (ref 4–13)
AST SERPL W P-5'-P-CCNC: 11 U/L (ref 5–45)
BILIRUB SERPL-MCNC: 0.46 MG/DL (ref 0.2–1)
BUN SERPL-MCNC: 14 MG/DL (ref 5–25)
CALCIUM SERPL-MCNC: 9.4 MG/DL (ref 8.3–10.1)
CHLORIDE SERPL-SCNC: 99 MMOL/L (ref 96–108)
CO2 SERPL-SCNC: 24 MMOL/L (ref 21–32)
CREAT SERPL-MCNC: 0.74 MG/DL (ref 0.6–1.3)
GFR SERPL CREATININE-BSD FRML MDRD: 78 ML/MIN/1.73SQ M
GLUCOSE P FAST SERPL-MCNC: 135 MG/DL (ref 65–99)
POTASSIUM SERPL-SCNC: 4 MMOL/L (ref 3.5–5.3)
PROT SERPL-MCNC: 8.1 G/DL (ref 6.4–8.4)
SODIUM SERPL-SCNC: 131 MMOL/L (ref 135–147)

## 2022-09-20 PROCEDURE — 36415 COLL VENOUS BLD VENIPUNCTURE: CPT

## 2022-09-20 PROCEDURE — 80053 COMPREHEN METABOLIC PANEL: CPT

## 2022-09-26 ENCOUNTER — HOSPITAL ENCOUNTER (OUTPATIENT)
Dept: INFUSION CENTER | Facility: HOSPITAL | Age: 77
Discharge: HOME | End: 2022-09-26
Attending: INTERNAL MEDICINE
Payer: COMMERCIAL

## 2022-09-26 VITALS — HEIGHT: 60 IN | BODY MASS INDEX: 27.7 KG/M2 | WEIGHT: 141.09 LBS

## 2022-09-26 DIAGNOSIS — M85.80 OSTEOPENIA DUE TO CANCER THERAPY: ICD-10-CM

## 2022-09-26 DIAGNOSIS — C50.911 MALIGNANT NEOPLASM OF RIGHT BREAST IN FEMALE, ESTROGEN RECEPTOR POSITIVE, UNSPECIFIED SITE OF BREAST (HCC): ICD-10-CM

## 2022-09-26 DIAGNOSIS — C18.2 MALIGNANT NEOPLASM OF ASCENDING COLON (HCC): Primary | ICD-10-CM

## 2022-09-26 DIAGNOSIS — Z85.038 PERSONAL HISTORY OF OTHER MALIGNANT NEOPLASM OF LARGE INTESTINE: ICD-10-CM

## 2022-09-26 DIAGNOSIS — M81.8 OTHER OSTEOPOROSIS WITHOUT CURRENT PATHOLOGICAL FRACTURE: ICD-10-CM

## 2022-09-26 DIAGNOSIS — Z17.0 MALIGNANT NEOPLASM OF RIGHT BREAST IN FEMALE, ESTROGEN RECEPTOR POSITIVE, UNSPECIFIED SITE OF BREAST (HCC): ICD-10-CM

## 2022-09-26 PROCEDURE — 96372 THER/PROPH/DIAG INJ SC/IM: CPT

## 2022-09-26 RX ADMIN — DENOSUMAB 60 MG: 60 INJECTION SUBCUTANEOUS at 08:38

## 2022-10-04 DIAGNOSIS — I10 ESSENTIAL HYPERTENSION: ICD-10-CM

## 2022-10-04 RX ORDER — BENAZEPRIL HYDROCHLORIDE AND HYDROCHLOROTHIAZIDE 20; 12.5 MG/1; MG/1
TABLET ORAL
Qty: 90 TABLET | Refills: 5 | Status: SHIPPED | OUTPATIENT
Start: 2022-10-04

## 2022-10-10 ENCOUNTER — OFFICE VISIT (OUTPATIENT)
Dept: PODIATRY | Facility: CLINIC | Age: 77
End: 2022-10-10

## 2022-10-10 VITALS
DIASTOLIC BLOOD PRESSURE: 86 MMHG | WEIGHT: 141 LBS | HEART RATE: 91 BPM | HEIGHT: 60 IN | SYSTOLIC BLOOD PRESSURE: 131 MMHG | BODY MASS INDEX: 27.68 KG/M2

## 2022-10-10 DIAGNOSIS — M20.41 HAMMER TOES OF BOTH FEET: ICD-10-CM

## 2022-10-10 DIAGNOSIS — L84 CORNS: Primary | ICD-10-CM

## 2022-10-10 DIAGNOSIS — M20.42 HAMMER TOES OF BOTH FEET: ICD-10-CM

## 2022-10-10 PROCEDURE — NCFTCARE PR NON-COVERED FOOT CARE: Performed by: PODIATRIST

## 2022-10-21 ENCOUNTER — HOSPITAL ENCOUNTER (OUTPATIENT)
Dept: RADIOLOGY | Age: 77
Discharge: HOME | End: 2022-10-21
Payer: COMMERCIAL

## 2022-10-21 VITALS — HEIGHT: 60 IN | BODY MASS INDEX: 27.68 KG/M2 | WEIGHT: 141 LBS

## 2022-10-21 DIAGNOSIS — Z12.31 ENCOUNTER FOR SCREENING MAMMOGRAM FOR MALIGNANT NEOPLASM OF BREAST: ICD-10-CM

## 2022-10-21 PROCEDURE — 77067 SCR MAMMO BI INCL CAD: CPT

## 2022-10-21 PROCEDURE — 77063 BREAST TOMOSYNTHESIS BI: CPT

## 2022-10-31 ENCOUNTER — OFFICE VISIT (OUTPATIENT)
Dept: PODIATRY | Facility: CLINIC | Age: 77
End: 2022-10-31

## 2022-10-31 VITALS
BODY MASS INDEX: 27.21 KG/M2 | WEIGHT: 138.6 LBS | SYSTOLIC BLOOD PRESSURE: 127 MMHG | DIASTOLIC BLOOD PRESSURE: 82 MMHG | HEART RATE: 84 BPM | HEIGHT: 60 IN

## 2022-10-31 DIAGNOSIS — M20.42 HAMMER TOES OF BOTH FEET: ICD-10-CM

## 2022-10-31 DIAGNOSIS — L97.521 DIABETIC ULCER OF TOE OF LEFT FOOT ASSOCIATED WITH TYPE 2 DIABETES MELLITUS, LIMITED TO BREAKDOWN OF SKIN (HCC): ICD-10-CM

## 2022-10-31 DIAGNOSIS — E11.621 DIABETIC ULCER OF TOE OF LEFT FOOT ASSOCIATED WITH TYPE 2 DIABETES MELLITUS, LIMITED TO BREAKDOWN OF SKIN (HCC): ICD-10-CM

## 2022-10-31 DIAGNOSIS — M20.41 HAMMER TOES OF BOTH FEET: ICD-10-CM

## 2022-10-31 DIAGNOSIS — B35.1 ONYCHOMYCOSIS: Primary | ICD-10-CM

## 2022-10-31 NOTE — PROGRESS NOTES
Patient presents for palliative diabetic foot care  Treatment consisted of trimming of mycotic toenails  Trimmed hyperkeratotic lesion right 4th toe  Hammertoe deformity is present    Patient is rescheduled in 3 weeks at her request

## 2022-11-14 ENCOUNTER — OFFICE VISIT (OUTPATIENT)
Dept: SURGERY | Facility: CLINIC | Age: 77
End: 2022-11-14

## 2022-11-14 DIAGNOSIS — C50.911 MALIGNANT NEOPLASM OF RIGHT BREAST IN FEMALE, ESTROGEN RECEPTOR POSITIVE, UNSPECIFIED SITE OF BREAST (HCC): Primary | ICD-10-CM

## 2022-11-14 DIAGNOSIS — Z12.31 ENCOUNTER FOR SCREENING MAMMOGRAM FOR BREAST CANCER: ICD-10-CM

## 2022-11-14 DIAGNOSIS — Z17.0 MALIGNANT NEOPLASM OF RIGHT BREAST IN FEMALE, ESTROGEN RECEPTOR POSITIVE, UNSPECIFIED SITE OF BREAST (HCC): Primary | ICD-10-CM

## 2022-11-14 NOTE — PROGRESS NOTES
Assessment/Plan:n 2012, Mrs [facility] diagnosed to have Hormone receptor positive, HER-2 negative stage IIIB cancer in right breast  She had right mastectomy and lymph node dissection   9 lymph nodes showed metastatic disease  Patient was given Adriamycin + Cytoxan chemotherapy followed by Taxotere and after that radiation therapy  Since November/December 2012 she has been on Femara     She will be on Femara for a total of 10 years  She returns for follow-up visit  She feels well  She denies any change in her self exam   She is scheduled for mammogram and ultrasound later this month for an abnormality on her recent screening mammogram   Some asymmetry was noted over the left chest     Physical exam no dominant masses are noted on either the right chest exam are left breast   No evidence for axillary or supraclavicular adenopathy further recommendations pending this result  On 7/22/15 she underwent right hemicolectomy  Pathological diagnosis right colon cancer, stage I, T2 N0 MX, grade 2,no lymphovascular invasion and no perineural invasion  She did not require adjuvant therapy for colon cancer  She has been running slightly high CEA and that is being monitored    Patient's chart, laboratory studies and images were reviewed this consultation  There are no diagnoses linked to this encounter  Subjective:      Patient ID: Maddie Carlos is a 69 y o  female  Patient presents for yearly breast exam   Mammogram 10/21/2022  She is scheduled for a mammogram and ultrasound on 12/12/2022  The following portions of the patient's history were reviewed and updated as appropriate:     She  has a past medical history of Acute embolism and thrombosis of deep vein of lower extremity ([address] ), Adenocarcinoma of colon, [facility] ([address] ), Breast cancer ([address] ) (2012), Cancer ([address] ), Diabetes mellitus ([address] ), DVT (deep venous thrombosis) ([address] ), GERD (gastroesophageal reflux disease), Hypertension, and Pes planus    She has a past surgical history that includes pr colonoscopy flx dx w/collj spec when pfrmd (N/A, 8/23/2016); pr colonoscopy flx dx w/collj spec when pfrmd (N/A, 9/5/2017); pr esophagogastroduodenoscopy transoral diagnostic (N/A, 9/5/2017); IR port removal (9/12/2018); Colonoscopy; Hysterectomy; and Mastectomy (Right, 2012)  Her family history includes Brain cancer in her father; Other in her mother; Pancreatic cancer (age of onset: 39) in her maternal aunt; Prostate cancer (age of onset: 79) in her paternal grandfather  She  reports that she has never smoked  She has never used smokeless tobacco  She reports that she does not drink alcohol and does not use drugs  Current Outpatient Medications   Medication Sig Dispense Refill   • amLODIPine (NORVASC) 2 5 mg tablet Take 1 tablet (2 5 mg total) by mouth daily 30 tablet 5   • ascorbic acid (VITAMIN C) 500 MG tablet Take 1,000 mg by mouth daily      • aspirin 81 MG tablet Take 81 mg by mouth daily  • benazepril-hydrochlorthiazide (LOTENSIN HCT) 20-12 5 MG per tablet take 1 tablet by mouth once daily 90 tablet 5   • bimatoprost (LUMIGAN) 0 01 % ophthalmic drops Administer 1 drop to both eyes daily at bedtime      • calcium citrate (CALCITRATE) 950 MG tablet Take 1 tablet by mouth in the morning  • Cholecalciferol (VITAMIN D-3 PO) Take 1 capsule by mouth 3 (three) times a day      • Cyanocobalamin (VITAMIN B 12 PO) Take 1 tablet by mouth daily       • Diclofenac Sodium (VOLTAREN) 1 % Apply 2 g topically 4 (four) times a day     • ferrous sulfate 325 (65 Fe) mg tablet Take 325 mg by mouth daily with breakfast      • letrozole (FEMARA) 2 5 mg tablet take 1 tablet by mouth once daily 30 tablet 5   • Magnesium 250 MG TABS Take 1 tablet by mouth daily       • metFORMIN (GLUCOPHAGE) 500 mg tablet Take 1 tablet (500 mg total) by mouth 2 (two) times a day with meals 60 tablet 5   • sitaGLIPtin (JANUVIA) 100 mg tablet Take 1 tablet (100 mg total) by mouth daily 30 tablet 5 • vitamin A 10,000 units capsule Take 10,000 Units by mouth daily  • vitamin E 100 UNIT capsule Take 100 Units by mouth daily        No current facility-administered medications for this visit  She has No Known Allergies       Review of Systems   Constitutional: Negative  Negative for activity change  HENT: Negative  Eyes: Negative  Respiratory: Negative  Cardiovascular: Negative  Gastrointestinal: Negative  Endocrine: Negative  Genitourinary: Negative  Musculoskeletal: Positive for arthralgias and back pain  Skin: Negative  Allergic/Immunologic: Negative  Neurological: Negative  Psychiatric/Behavioral: Negative for agitation, behavioral problems and confusion  The patient is not nervous/anxious  Objective:      LMP  (LMP Unknown)          Physical Exam  Constitutional:       Appearance: Normal appearance  She is well-developed  HENT:      Head: Normocephalic and atraumatic  Nose: Nose normal    Eyes:      Extraocular Movements: Extraocular movements intact  Conjunctiva/sclera: Conjunctivae normal    Cardiovascular:      Rate and Rhythm: Normal rate and regular rhythm  Heart sounds: Normal heart sounds  Pulmonary:      Effort: Pulmonary effort is normal       Breath sounds: Normal breath sounds  Chest:       Skin:     General: Skin is warm and dry  Neurological:      Mental Status: She is alert and oriented to person, place, and time     Psychiatric:         Mood and Affect: Mood normal

## 2022-11-15 ENCOUNTER — APPOINTMENT (OUTPATIENT)
Dept: LAB | Facility: CLINIC | Age: 77
End: 2022-11-15

## 2022-11-15 DIAGNOSIS — E07.9 THYROID DISORDER: ICD-10-CM

## 2022-11-15 DIAGNOSIS — Z17.0 MALIGNANT NEOPLASM OF RIGHT BREAST IN FEMALE, ESTROGEN RECEPTOR POSITIVE, UNSPECIFIED SITE OF BREAST (HCC): ICD-10-CM

## 2022-11-15 DIAGNOSIS — M85.80 OSTEOPENIA DUE TO CANCER THERAPY: ICD-10-CM

## 2022-11-15 DIAGNOSIS — C50.911 MALIGNANT NEOPLASM OF RIGHT BREAST IN FEMALE, ESTROGEN RECEPTOR POSITIVE, UNSPECIFIED SITE OF BREAST (HCC): ICD-10-CM

## 2022-11-15 DIAGNOSIS — C18.2 MALIGNANT NEOPLASM OF ASCENDING COLON (HCC): ICD-10-CM

## 2022-11-15 DIAGNOSIS — Z85.038 PERSONAL HISTORY OF OTHER MALIGNANT NEOPLASM OF LARGE INTESTINE: ICD-10-CM

## 2022-11-15 DIAGNOSIS — E04.1 THYROID NODULE: ICD-10-CM

## 2022-11-15 DIAGNOSIS — M81.8 OTHER OSTEOPOROSIS WITHOUT CURRENT PATHOLOGICAL FRACTURE: ICD-10-CM

## 2022-11-15 DIAGNOSIS — E55.9 VITAMIN D DEFICIENCY: ICD-10-CM

## 2022-11-15 LAB
ALBUMIN SERPL BCP-MCNC: 3.7 G/DL (ref 3.5–5)
ALP SERPL-CCNC: 80 U/L (ref 46–116)
ALT SERPL W P-5'-P-CCNC: 14 U/L (ref 12–78)
ANION GAP SERPL CALCULATED.3IONS-SCNC: 8 MMOL/L (ref 4–13)
AST SERPL W P-5'-P-CCNC: 11 U/L (ref 5–45)
BASOPHILS # BLD AUTO: 0.04 THOUSANDS/ÂΜL (ref 0–0.1)
BASOPHILS NFR BLD AUTO: 1 % (ref 0–1)
BILIRUB SERPL-MCNC: 0.27 MG/DL (ref 0.2–1)
BUN SERPL-MCNC: 18 MG/DL (ref 5–25)
CALCIUM SERPL-MCNC: 9.1 MG/DL (ref 8.3–10.1)
CANCER AG27-29 SERPL-ACNC: 13.5 U/ML (ref 0–42.3)
CEA SERPL-MCNC: 4.8 NG/ML (ref 0–3)
CHLORIDE SERPL-SCNC: 99 MMOL/L (ref 96–108)
CO2 SERPL-SCNC: 24 MMOL/L (ref 21–32)
CREAT SERPL-MCNC: 0.65 MG/DL (ref 0.6–1.3)
EOSINOPHIL # BLD AUTO: 0.26 THOUSAND/ÂΜL (ref 0–0.61)
EOSINOPHIL NFR BLD AUTO: 4 % (ref 0–6)
ERYTHROCYTE [DISTWIDTH] IN BLOOD BY AUTOMATED COUNT: 14.6 % (ref 11.6–15.1)
GFR SERPL CREATININE-BSD FRML MDRD: 86 ML/MIN/1.73SQ M
GLUCOSE P FAST SERPL-MCNC: 136 MG/DL (ref 65–99)
HCT VFR BLD AUTO: 32.2 % (ref 34.8–46.1)
HGB BLD-MCNC: 9.8 G/DL (ref 11.5–15.4)
IMM GRANULOCYTES # BLD AUTO: 0.06 THOUSAND/UL (ref 0–0.2)
IMM GRANULOCYTES NFR BLD AUTO: 1 % (ref 0–2)
LYMPHOCYTES # BLD AUTO: 0.85 THOUSANDS/ÂΜL (ref 0.6–4.47)
LYMPHOCYTES NFR BLD AUTO: 12 % (ref 14–44)
MCH RBC QN AUTO: 28.2 PG (ref 26.8–34.3)
MCHC RBC AUTO-ENTMCNC: 30.4 G/DL (ref 31.4–37.4)
MCV RBC AUTO: 93 FL (ref 82–98)
MONOCYTES # BLD AUTO: 0.7 THOUSAND/ÂΜL (ref 0.17–1.22)
MONOCYTES NFR BLD AUTO: 10 % (ref 4–12)
NEUTROPHILS # BLD AUTO: 4.96 THOUSANDS/ÂΜL (ref 1.85–7.62)
NEUTS SEG NFR BLD AUTO: 72 % (ref 43–75)
NRBC BLD AUTO-RTO: 0 /100 WBCS
PLATELET # BLD AUTO: 314 THOUSANDS/UL (ref 149–390)
PMV BLD AUTO: 9.8 FL (ref 8.9–12.7)
POTASSIUM SERPL-SCNC: 3.9 MMOL/L (ref 3.5–5.3)
PROT SERPL-MCNC: 8.5 G/DL (ref 6.4–8.4)
RBC # BLD AUTO: 3.47 MILLION/UL (ref 3.81–5.12)
SODIUM SERPL-SCNC: 131 MMOL/L (ref 135–147)
TSH SERPL DL<=0.05 MIU/L-ACNC: 4.48 UIU/ML (ref 0.45–4.5)
WBC # BLD AUTO: 6.87 THOUSAND/UL (ref 4.31–10.16)

## 2022-11-21 ENCOUNTER — OFFICE VISIT (OUTPATIENT)
Dept: PODIATRY | Facility: CLINIC | Age: 77
End: 2022-11-21

## 2022-11-21 VITALS
HEIGHT: 60 IN | HEART RATE: 85 BPM | WEIGHT: 137.6 LBS | BODY MASS INDEX: 27.01 KG/M2 | DIASTOLIC BLOOD PRESSURE: 83 MMHG | SYSTOLIC BLOOD PRESSURE: 133 MMHG

## 2022-11-21 DIAGNOSIS — E11.9 CONTROLLED TYPE 2 DIABETES MELLITUS WITHOUT COMPLICATION, WITHOUT LONG-TERM CURRENT USE OF INSULIN (HCC): ICD-10-CM

## 2022-11-21 DIAGNOSIS — L84 CORNS: ICD-10-CM

## 2022-11-21 DIAGNOSIS — B35.1 ONYCHOMYCOSIS: Primary | ICD-10-CM

## 2022-11-21 NOTE — PROGRESS NOTES
Patient presents for palliative diabetic foot care  No acute disorder noted  Pedal pulses are palpable  Trimmed hyperkeratotic lesions right 4th toe and left 2nd toe secondary to hammertoe deformities  Patient is rescheduled at her request in 3 weeks

## 2022-11-25 ENCOUNTER — OFFICE VISIT (OUTPATIENT)
Dept: HEMATOLOGY ONCOLOGY | Facility: CLINIC | Age: 77
End: 2022-11-25

## 2022-11-25 VITALS
WEIGHT: 136 LBS | OXYGEN SATURATION: 97 % | RESPIRATION RATE: 17 BRPM | HEART RATE: 69 BPM | HEIGHT: 60 IN | SYSTOLIC BLOOD PRESSURE: 136 MMHG | BODY MASS INDEX: 26.7 KG/M2 | DIASTOLIC BLOOD PRESSURE: 80 MMHG

## 2022-11-25 DIAGNOSIS — C50.911 MALIGNANT NEOPLASM OF RIGHT BREAST IN FEMALE, ESTROGEN RECEPTOR POSITIVE, UNSPECIFIED SITE OF BREAST (HCC): ICD-10-CM

## 2022-11-25 DIAGNOSIS — E07.9 THYROID DISORDER: ICD-10-CM

## 2022-11-25 DIAGNOSIS — D64.9 ANEMIA, UNSPECIFIED TYPE: ICD-10-CM

## 2022-11-25 DIAGNOSIS — Z86.39 HISTORY OF DIABETES MELLITUS: ICD-10-CM

## 2022-11-25 DIAGNOSIS — E11.9 TYPE 2 DIABETES MELLITUS WITHOUT COMPLICATION, UNSPECIFIED WHETHER LONG TERM INSULIN USE (HCC): ICD-10-CM

## 2022-11-25 DIAGNOSIS — C18.2 MALIGNANT NEOPLASM OF ASCENDING COLON (HCC): Primary | ICD-10-CM

## 2022-11-25 DIAGNOSIS — R97.8 ABNORMAL TUMOR MARKERS: ICD-10-CM

## 2022-11-25 DIAGNOSIS — E63.9 NUTRITIONAL DEFICIENCY DISORDER: ICD-10-CM

## 2022-11-25 DIAGNOSIS — Z17.0 MALIGNANT NEOPLASM OF RIGHT BREAST IN FEMALE, ESTROGEN RECEPTOR POSITIVE, UNSPECIFIED SITE OF BREAST (HCC): ICD-10-CM

## 2022-11-25 DIAGNOSIS — E04.1 THYROID NODULE: ICD-10-CM

## 2022-11-25 DIAGNOSIS — Z86.718 HISTORY OF DVT (DEEP VEIN THROMBOSIS): ICD-10-CM

## 2022-11-25 NOTE — PROGRESS NOTES
HPI:   Follow-up for history of breast and colon cancers  Right Breast cancer was diagnosed in 2012 , stage IIIB hormone receptor positive and HER-2 negative   She had right mastectomy, and lymph node dissection   There were 9 positive lymph nodes  She had Adriamycin + Cytoxan chemotherapy followed by Taxotere and after that radiation therapy  Since November/December 2012 she has been on Femara and she will take Femara until December 2022  Patient has osteoporosis and she has been on vitamin-D  and Prolia   Has problem swallowing calcium tablet  [facility] she was found to have benign clustered calcifications in left breast and had a biopsy and that was benign  Dr Tressa Gonzalez takes care of her mammography  She will be going for additional films of left breast because of dense breast    On 7/22/15 she underwent right hemicolectomy  Pathological diagnosis right colon cancer, stage I, T2 N0 MX, grade 2, No lymphovascular invasion and no perineural invasion  She did not require adjuvant therapy for colon cancer  CEA remains high and is being monitored  IS CEA was 5 6  This time CEA is 4 8  She has developed anemia and that will be worked up  No history of melena  No history of any other bleeding  He wants to go for additional tests  only after getting left breast problem taken care of and she is concerned treating on that and is concerned about that  In December 2012 patient developed DVT right leg and she was started on Xarelto  She had GI bleeding in 2015  Xarelto was stopped  On colonoscopy she was found to have stage I right colon cancer    Gabriel Zamora has been on baby aspirin  She is not on anticoagulation anymore  Follow-up Venous Doppler study was negative for DVT   She runs high D-dimer  History of thyroid nodule  She had biopsy of thyroid nodule in June 2014 and she states that was negative for cancer   She gets thyroid ultrasound yearly for surveillance        Has some tiredness, low back discomfort and alopecia     She has kyphosis                           Current Outpatient Medications:   •  amLODIPine (NORVASC) 2 5 mg tablet, Take 1 tablet (2 5 mg total) by mouth daily, Disp: 30 tablet, Rfl: 5  •  ascorbic acid (VITAMIN C) 500 MG tablet, Take 1,000 mg by mouth daily , Disp: , Rfl:   •  aspirin 81 MG tablet, Take 81 mg by mouth daily  , Disp: , Rfl:   •  benazepril-hydrochlorthiazide (LOTENSIN HCT) 20-12 5 MG per tablet, take 1 tablet by mouth once daily, Disp: 90 tablet, Rfl: 5  •  bimatoprost (LUMIGAN) 0 01 % ophthalmic drops, Administer 1 drop to both eyes daily at bedtime , Disp: , Rfl:   •  calcium citrate (CALCITRATE) 950 MG tablet, Take 1 tablet by mouth in the morning , Disp: , Rfl:   •  Cholecalciferol (VITAMIN D-3 PO), Take 1 capsule by mouth 3 (three) times a day , Disp: , Rfl:   •  Cyanocobalamin (VITAMIN B 12 PO), Take 1 tablet by mouth daily  , Disp: , Rfl:   •  Diclofenac Sodium (VOLTAREN) 1 %, Apply 2 g topically 4 (four) times a day, Disp: , Rfl:   •  ferrous sulfate 325 (65 Fe) mg tablet, Take 325 mg by mouth daily with breakfast , Disp: , Rfl:   •  letrozole (FEMARA) 2 5 mg tablet, take 1 tablet by mouth once daily, Disp: 30 tablet, Rfl: 5  •  Magnesium 250 MG TABS, Take 1 tablet by mouth daily  , Disp: , Rfl:   •  metFORMIN (GLUCOPHAGE) 500 mg tablet, Take 1 tablet (500 mg total) by mouth 2 (two) times a day with meals, Disp: 60 tablet, Rfl: 5  •  sitaGLIPtin (JANUVIA) 100 mg tablet, Take 1 tablet (100 mg total) by mouth daily, Disp: 30 tablet, Rfl: 5  •  vitamin A 10,000 units capsule, Take 10,000 Units by mouth daily  , Disp: , Rfl:   •  vitamin E 100 UNIT capsule, Take 100 Units by mouth daily , Disp: , Rfl:     No Known Allergies    Oncology History Overview Note    In 2012 patient was diagnosed to have Hormone receptor positive, HER-2 negative stage IIIB cancer in right breast  She had right mastectomy and lymph node dissection    9 lymph nodes showed metastatic disease  Patient was given Adriamycin + Cytoxan chemotherapy followed by Taxotere and after that radiation therapy  Since November/December 2012 she has been on Femara     She will be on Femara for a total of 10 years  On 7/22/15 she underwent right hemicolectomy  Pathological diagnosis right colon cancer, stage I, T2 N0 MX, grade 2,no lymphovascular invasion and no perineural invasion  She did not require adjuvant therapy for colon cancer  She has been running slightly high CEA and that is being monitored  Malignant neoplasm of right breast in female, estrogen receptor positive ([facility] )   6/21/2018 Initial Diagnosis    Malignant neoplasm of right breast in female, estrogen receptor positive [facility])      Surgery     2012- right mastectomy   2015 - right hemicolectomy      Radiation     [id number]: Radiation to right chest wall and lymph nodes      Chemotherapy     2012 -Adriamycin plus Cytoxan followed by Taxotere Followed by Femara  She will have Femara for a total of 10 years  ROS:  11/25/22 Reviewed 12 systems: See symptoms in HPI:    Presently no other neurological , cardiac, pulmonary, GI and  symptoms other than listed in HPI     Other symptoms are in HPI  No  fevers, chills, bleeding, bone pains, skin rash, weight loss, night sweats, numbness, claudication and gait problem  Has some anxiety  No swelling of the ankles and no swollen glands  Not unusually sensitive to heat or cold  /80 (BP Location: Left arm, Patient Position: Sitting, Cuff Size: Adult)   Pulse 69   Resp 17   Ht 5' (1 524 m)   Wt 61 7 kg (136 lb)   LMP  (LMP Unknown)   SpO2 97%   BMI 26 56 kg/m²     Physical Exam:  Patient is alert and oriented  Patient is not in distress  Vitals are above    No icterus, no oral thrush, no palpable neck mass, clear lung fields to percussion and auscultation, regular heart rate,  soft and non tender abdomen, no palpable abdominal mass, no ascites, no edema of ankles, no calf tenderness, no focal neurological deficit, no skin rash, no palpable lymphadenopathy in the neck and axillary areas,  no clubbing  Patient is anxious  Performance status 1  Right mastectomy scar  No lymphedema     Has kyphosis    IMAGING:  IMPRESSION:     No nodule meets current ACR criteria for requiring biopsy but followup ultrasound is recommended in 1 year             Reference: ACR Thyroid Imaging, Reporting and Data System (TI-RADS): White Paper of the [employer]  J AM Carlo Radiol 3924;08:569-525  (additional recommendations based on American Thyroid Association 2015 guidelines )        Workstation performed: [id number]      Imaging    US thyroid (Order: [id number]) - 6/17/2020  ASSESSMENT/BI-RADS CATEGORY:  Left: 2 - Benign  Overall: 2 - Benign     RECOMMENDATION:       - Routine screening mammogram in 1 year for the left breast      Workstation ID: [id number]          Imaging    Mammo screening left w 3d & cad (Order: [id number]) - 10/19/2021    RESULTS:      LUMBAR SPINE L2-L4 (L1 vertebra excluded from analysis due to local structural abnormalities or artifact): BMD  0 774  gm/cm2   T-score -2 8    These values are artifactually elevated due to the presence of scoliosis with spondylosis      LEFT  TOTAL HIP:   BMD:  0 688  gm/cm2    T-score:  -2 1     LEFT  FEMORAL NECK:   BMD:  0 628  gm/cm2   T score: -2 0      RIGHT  FOREARM:    33% RADIUS BMD:  0 490  gm/cm2  T-score:  -3 3         IMPRESSION:     1  Osteoporosis  [Based on the lumbar spine and right radius]       IMPRESSION:  Enlarged heterogeneous left thyroid lobe and thyroid isthmus  Grossly stable if not smaller peripherally densely calcified left thyroid isthmus nodule, this has been stable for more than 5 years  Asymmetrically bulky left thyroid lobe with prominent interpolar region which I do not believe is a true nodule  Followup ultrasound is recommended in 1 year     Smaller nodules which do not meet current ACR criteria for requiring biopsy      Reference: ACR Thyroid Imaging, Reporting and Data System (TI-RADS): White Paper of the [employer]  J AM Carlo Radiol 5245;87:607-496  (additional recommendations based on American Thyroid Association 2015 guidelines )        Workstation performed: [id number]          Imaging    US thyroid (Order: [id number]) - 7/14/  IMPRESSION:     1  No findings for hypermetabolic malignancy  2   Diffuse thyroid gland activity suggests thyroiditis      Workstation performed: [id number]          Imaging    NM PET CT skull base to mid thigh (Order: [id number]) - 7/14/2021    IMPRESSION:     Diffusely heterogeneous thyroid gland, and left isthmus nodule, without significant change  There are no findings requiring fine-needle aspiration           Reference: ACR Thyroid Imaging, Reporting and Data System (TI-RADS): White Paper of the [employer]   J AM Carlo Radiol 7615;69:619-421  (additional recommendations based on American Thyroid Association 2015 guidelines )        Workstation performed: [id number]        Imaging    US thyroid (Order: [id number]) - 7/26/2022  ASSESSMENT/BI-RADS CATEGORY:  Left: 0 - Incomplete: Needs Additional Imaging Evaluation  Overall: 0 - Incomplete: Needs Additional Imaging Evaluation     RECOMMENDATION:       - Diagnostic mammogram at the current time for the left breast        - Ultrasound at the current time for the left breast      Workstation ID: [id number]        Imaging    Mammo screening left w 3d & cad (Order: [id number]) - 10/21/2022    LABS:    Results for orders placed or performed in visit on 11/15/22   CBC and differential   Result Value Ref Range    WBC 6 87 4 31 - 10 16 Thousand/uL    RBC 3 47 (L) 3 81 - 5 12 Million/uL    Hemoglobin 9 8 (L) 11 5 - 15 4 g/dL    Hematocrit 32 2 (L) 34 8 - 46 1 %    MCV 93 82 - 98 fL    MCH 28 2 26 8 - 34 3 pg    MCHC 30 4 (L) 31 4 - 37 4 g/dL    RDW 14 6 11 6 - 15 1 %    MPV 9 8 8 9 - 12 7 fL Platelets 162 598 - 207 Thousands/uL    nRBC 0 /100 WBCs    Neutrophils Relative 72 43 - 75 %    Immat GRANS % 1 0 - 2 %    Lymphocytes Relative 12 (L) 14 - 44 %    Monocytes Relative 10 4 - 12 %    Eosinophils Relative 4 0 - 6 %    Basophils Relative 1 0 - 1 %    Neutrophils Absolute 4 96 1 85 - 7 62 Thousands/µL    Immature Grans Absolute 0 06 0 00 - 0 20 Thousand/uL    Lymphocytes Absolute 0 85 0 60 - 4 47 Thousands/µL    Monocytes Absolute 0 70 0 17 - 1 22 Thousand/µL    Eosinophils Absolute 0 26 0 00 - 0 61 Thousand/µL    Basophils Absolute 0 04 0 00 - 0 10 Thousands/µL   TSH, 3rd generation with Free T4 reflex   Result Value Ref Range    TSH 3RD GENERATON 4 480 0 450 - 4 500 uIU/mL   CEA   Result Value Ref Range    CEA 4 8 (H) 0 0 - 3 0 ng/mL   Cancer antigen 27 29   Result Value Ref Range    CA 27 29 13 5 0 0 - 42 3 U/mL   Comprehensive metabolic panel   Result Value Ref Range    Sodium 131 (L) 135 - 147 mmol/L    Potassium 3 9 3 5 - 5 3 mmol/L    Chloride 99 96 - 108 mmol/L    CO2 24 21 - 32 mmol/L    ANION GAP 8 4 - 13 mmol/L    BUN 18 5 - 25 mg/dL    Creatinine 0 65 0 60 - 1 30 mg/dL    Glucose, Fasting 136 (H) 65 - 99 mg/dL    Calcium 9 1 8 3 - 10 1 mg/dL    AST 11 5 - 45 U/L    ALT 14 12 - 78 U/L    Alkaline Phosphatase 80 46 - 116 U/L    Total Protein 8 5 (H) 6 4 - 8 4 g/dL    Albumin 3 7 3 5 - 5 0 g/dL    Total Bilirubin 0 27 0 20 - 1 00 mg/dL    eGFR 86 ml/min/1 73sq m         Labs, Imaging, & Other studies:   All pertinent labs and imaging studies were personally reviewed        Reviewed test results and discussed with patient  Assessment and plan:      Right Breast cancer was diagnosed in 2012 , stage IIIB hormone receptor positive and HER-2 negative   She had right mastectomy, and lymph node dissection   There were 9 positive lymph nodes  She had Adriamycin + Cytoxan chemotherapy followed by Taxotere and after that radiation therapy   Since November/December 2012 she has been on Femara and she will take Femara until December 2022  Patient has osteoporosis and she has been on vitamin-D  and Prolia   Has problem swallowing calcium tablet  No problem with her teeth for [facility] 2016 she was found to have benign clustered calcifications in left breast and had a biopsy and that was benign  Dr Adalberto Roca takes care of her mammography  She will be going for additional films of left breast because of dense breast    On 7/22/15 she underwent right hemicolectomy  Pathological diagnosis right colon cancer, stage I, T2 N0 MX, grade 2, No lymphovascular invasion and no perineural invasion  She did not require adjuvant therapy for colon cancer  CEA remains high and is being monitored  IS CEA was 5 6  This time CEA is 4 8  She has developed anemia and that will be worked up  No history of melena  No history of any other bleeding  He wants to go for additional tests  only after getting left breast problem taken care of and she is concerned treating on that and is concerned about that  In December 2012 patient developed DVT right leg and she was started on Xarelto  She had GI bleeding in 2015  Xarelto was stopped  On colonoscopy she was found to have stage I right colon cancer    Suma Oleary has been on baby aspirin  She is not on anticoagulation anymore  Follow-up Venous Doppler study was negative for DVT   She runs high D-dimer  History of thyroid nodule  She had biopsy of thyroid nodule in June 2014 and she states that was negative for cancer  She gets thyroid ultrasound yearly for surveillance        Has some tiredness, low back discomfort and alopecia     She has kyphosis         Physical examination and test results are as recorded and discussed   Breast cancer and colon cancers are in remission   Goal is cure from both the cancers   No active DVT at present   D-dimer test is being monitored and remains high   Thyroid nodules are being monitored     CEA is high 4 8 and highest reading was 5 6  and is being monitored   Ordered anemia workup    All discussed in detail   Questions answered  Discussed the importance of self-breast examination, eating healthy foods, staying active as tolerated and health screening tests   Patient is capable of self-care     Discussed precautions against coronavirus and flu  She will continue to follow with her primary physician and other consultants  See diagnoses, orders and instructions below  1  Malignant neoplasm of ascending colon ([facility] )      2  Malignant neoplasm of right breast in female, estrogen receptor positive, unspecified site of breast ([place] [address] )      3  Abnormal tumor markers      4  History of DVT     5  Thyroid disorder      6  Thyroid nodule      7  History of diabetes mellitus      8  Type 2 diabetes mellitus without complication, unspecified whether long term insulin use ([place] [address] )      9  Anemia, unspecified type    - CBC and differential; Future  - Reticulocytes; Future  - Sedimentation rate, automated; Future  - LD,Blood; Future  - Folate; Future  - Iron Panel (Includes Ferritin, Iron Sat%, Iron, and TIBC); Future  - Vitamin B12; Future  - Occult Blood, Fecal Immunochemical; Future    10  Nutritional deficiency disorder    - Folate; Future  - Iron Panel (Includes Ferritin, Iron Sat%, Iron, and TIBC); Future  - Vitamin B12; Future      Anemia is new when ( and ) she will have blood work and stool test for that  She will be going to [facility] for additional imaging of left breast   She will be finishing letrozole in December 2022  Follow-up in 4 weeks  Prolia as before                             I used dictation device to dictate this note and there could be mistakes in my note                     Patient voiced understanding and agrees      Counseling / Coordination of Care      Provided counseling and support

## 2022-11-25 NOTE — PATIENT INSTRUCTIONS
Anemia is new when she will have blood work and stool test for that  [facility]   She will be finishing letrozole in December 2022  Follow-up in 4 weeks    Prolia as before

## 2022-11-28 ENCOUNTER — APPOINTMENT (OUTPATIENT)
Dept: LAB | Facility: CLINIC | Age: 77
End: 2022-11-28

## 2022-11-28 LAB — 25(OH)D3 SERPL-MCNC: 61.4 NG/ML (ref 30–100)

## 2022-12-12 ENCOUNTER — HOSPITAL ENCOUNTER (OUTPATIENT)
Dept: ULTRASOUND IMAGING | Facility: CLINIC | Age: 77
Discharge: HOME | End: 2022-12-12

## 2022-12-12 ENCOUNTER — HOSPITAL ENCOUNTER (OUTPATIENT)
Dept: MAMMOGRAPHY | Facility: CLINIC | Age: 77
Discharge: HOME | End: 2022-12-12

## 2022-12-12 VITALS — WEIGHT: 136 LBS | HEIGHT: 60 IN | BODY MASS INDEX: 26.7 KG/M2

## 2022-12-12 DIAGNOSIS — R92.8 ABNORMAL MAMMOGRAM: ICD-10-CM

## 2022-12-14 ENCOUNTER — RA CDI HCC (OUTPATIENT)
Dept: OTHER | Facility: HOSPITAL | Age: 77
End: 2022-12-14

## 2022-12-14 NOTE — PROGRESS NOTES
[facility]  coding opportunities       Chart reviewed, no opportunity found:   [address]        Patients Insurance     Medicare Insurance: [employer]age

## 2022-12-16 ENCOUNTER — APPOINTMENT (OUTPATIENT)
Dept: LAB | Facility: CLINIC | Age: 77
End: 2022-12-16

## 2022-12-16 DIAGNOSIS — D64.9 ANEMIA, UNSPECIFIED TYPE: ICD-10-CM

## 2022-12-16 DIAGNOSIS — E63.9 NUTRITIONAL DEFICIENCY DISORDER: ICD-10-CM

## 2022-12-16 LAB
BASOPHILS # BLD AUTO: 0.03 THOUSANDS/ÂΜL (ref 0–0.1)
BASOPHILS NFR BLD AUTO: 1 % (ref 0–1)
EOSINOPHIL # BLD AUTO: 0.2 THOUSAND/ÂΜL (ref 0–0.61)
EOSINOPHIL NFR BLD AUTO: 3 % (ref 0–6)
ERYTHROCYTE [DISTWIDTH] IN BLOOD BY AUTOMATED COUNT: 15.4 % (ref 11.6–15.1)
ERYTHROCYTE [SEDIMENTATION RATE] IN BLOOD: 42 MM/HOUR (ref 0–29)
FERRITIN SERPL-MCNC: 27 NG/ML (ref 8–388)
FOLATE SERPL-MCNC: 8 NG/ML (ref 3.1–17.5)
HCT VFR BLD AUTO: 33.2 % (ref 34.8–46.1)
HGB BLD-MCNC: 10.1 G/DL (ref 11.5–15.4)
IMM GRANULOCYTES # BLD AUTO: 0.03 THOUSAND/UL (ref 0–0.2)
IMM GRANULOCYTES NFR BLD AUTO: 1 % (ref 0–2)
IRON SATN MFR SERPL: 12 % (ref 15–50)
IRON SERPL-MCNC: 46 UG/DL (ref 50–170)
LDH SERPL-CCNC: 179 U/L (ref 81–234)
LYMPHOCYTES # BLD AUTO: 0.62 THOUSANDS/ÂΜL (ref 0.6–4.47)
LYMPHOCYTES NFR BLD AUTO: 10 % (ref 14–44)
MCH RBC QN AUTO: 27.4 PG (ref 26.8–34.3)
MCHC RBC AUTO-ENTMCNC: 30.4 G/DL (ref 31.4–37.4)
MCV RBC AUTO: 90 FL (ref 82–98)
MONOCYTES # BLD AUTO: 0.77 THOUSAND/ÂΜL (ref 0.17–1.22)
MONOCYTES NFR BLD AUTO: 12 % (ref 4–12)
NEUTROPHILS # BLD AUTO: 4.9 THOUSANDS/ÂΜL (ref 1.85–7.62)
NEUTS SEG NFR BLD AUTO: 73 % (ref 43–75)
NRBC BLD AUTO-RTO: 0 /100 WBCS
PLATELET # BLD AUTO: 355 THOUSANDS/UL (ref 149–390)
PMV BLD AUTO: 9.2 FL (ref 8.9–12.7)
RBC # BLD AUTO: 3.68 MILLION/UL (ref 3.81–5.12)
RETICS # AUTO: ABNORMAL 10*3/UL (ref 14097–95744)
RETICS # CALC: 2.4 % (ref 0.37–1.87)
TIBC SERPL-MCNC: 376 UG/DL (ref 250–450)
VIT B12 SERPL-MCNC: 530 PG/ML (ref 100–900)
WBC # BLD AUTO: 6.55 THOUSAND/UL (ref 4.31–10.16)

## 2022-12-20 ENCOUNTER — OFFICE VISIT (OUTPATIENT)
Dept: FAMILY MEDICINE CLINIC | Facility: CLINIC | Age: 77
End: 2022-12-20

## 2022-12-20 VITALS
DIASTOLIC BLOOD PRESSURE: 84 MMHG | TEMPERATURE: 97.5 F | WEIGHT: 138 LBS | HEART RATE: 85 BPM | SYSTOLIC BLOOD PRESSURE: 132 MMHG | BODY MASS INDEX: 27.09 KG/M2 | HEIGHT: 60 IN | OXYGEN SATURATION: 99 %

## 2022-12-20 DIAGNOSIS — C18.2 MALIGNANT NEOPLASM OF ASCENDING COLON (HCC): ICD-10-CM

## 2022-12-20 DIAGNOSIS — Z23 ENCOUNTER FOR IMMUNIZATION: ICD-10-CM

## 2022-12-20 DIAGNOSIS — Z17.0 MALIGNANT NEOPLASM OF RIGHT BREAST IN FEMALE, ESTROGEN RECEPTOR POSITIVE, UNSPECIFIED SITE OF BREAST (HCC): ICD-10-CM

## 2022-12-20 DIAGNOSIS — E11.8 TYPE 2 DIABETES MELLITUS WITH COMPLICATION, WITHOUT LONG-TERM CURRENT USE OF INSULIN (HCC): Primary | ICD-10-CM

## 2022-12-20 DIAGNOSIS — M81.8 OTHER OSTEOPOROSIS WITHOUT CURRENT PATHOLOGICAL FRACTURE: ICD-10-CM

## 2022-12-20 DIAGNOSIS — I10 ESSENTIAL HYPERTENSION: ICD-10-CM

## 2022-12-20 DIAGNOSIS — E11.9 DIABETES TYPE 2, NO OCULAR INVOLVEMENT (HCC): ICD-10-CM

## 2022-12-20 DIAGNOSIS — C50.911 MALIGNANT NEOPLASM OF RIGHT BREAST IN FEMALE, ESTROGEN RECEPTOR POSITIVE, UNSPECIFIED SITE OF BREAST (HCC): ICD-10-CM

## 2022-12-20 LAB — SL AMB POCT HEMOGLOBIN AIC: 6.3 (ref ?–6.5)

## 2022-12-20 RX ORDER — AMLODIPINE BESYLATE 2.5 MG/1
2.5 TABLET ORAL DAILY
Qty: 30 TABLET | Refills: 5 | Status: SHIPPED | OUTPATIENT
Start: 2022-12-20

## 2022-12-20 NOTE — PROGRESS NOTES
Name: Cade Bradford      : 1945      MRN: [id number]  Encounter Provider: Aysha Turner DO  Encounter Date: 2022   Encounter department: [facility]     Chief Complaint   Patient presents with   • Follow-up   • Diabetes     Review BW              Subjective     Labs and refills  A1c: 6 3 %  I have spent 30 minutes with Patient  today in which greater than 50% of this time was spent in counseling/coordination of care regarding Diagnostic results, Risks and benefits of tx options, Patient and family education and Impressions  Review of Systems   Constitutional: Negative  HENT: Negative  Eyes: Negative  Respiratory: Negative  Cardiovascular: Negative  Gastrointestinal: Negative  Genitourinary: Negative  Musculoskeletal: Negative  Skin: Negative  Neurological: Negative  Psychiatric/Behavioral: Negative  Past Medical History:   Diagnosis Date   • Acute embolism and thrombosis of deep vein of lower extremity (HCC)    • Adenocarcinoma of colon, [facility] ([facility] [address] )    • Breast cancer ([facility] [address] ) 2012    Right Breast    • Cancer ([facility] [address] )    • Diabetes mellitus ([facility] [address] )    • DVT (deep venous thrombosis) (HCC)    • GERD (gastroesophageal reflux disease)    • Hypertension    • Pes planus      Past Surgical History:   Procedure Laterality Date   • COLONOSCOPY     • HYSTERECTOMY      complete @ age 29   • IR PORT REMOVAL  2018   • MASTECTOMY Right 2012   • HI COLONOSCOPY FLX DX W/COLLJ SPEC WHEN PFRMD N/A 2016    Procedure: COLONOSCOPY;  Surgeon: Genia Navarro MD;  [facility]: [facility]; Service: Colorectal   • HI COLONOSCOPY FLX DX W/COLLJ SPEC WHEN PFRMD N/A 2017    Procedure: COLONOSCOPY;  Surgeon: Genia Navarro MD;  [facility]: [facility]; Service: Colorectal   • HI ESOPHAGOGASTRODUODENOSCOPY TRANSORAL DIAGNOSTIC N/A 2017    Procedure: ESOPHAGOGASTRODUODENOSCOPY (EGD); Surgeon: Roxy Dietrich DO;  Location: [facility];   Service: Gastroenterology     Family History   Problem Relation Age of Onset   • Other Mother         chronic bronchitis   • Brain cancer Father    • Prostate cancer Paternal Grandfather 79   • Pancreatic cancer Maternal Aunt 39   • Breast cancer Neg Hx      Social History     Socioeconomic History   • Marital status: Single     Spouse name: None   • Number of children: None   • Years of education: None   • Highest education level: None   Occupational History   • Occupation: retired   Tobacco Use   • Smoking status: Never   • Smokeless tobacco: Never   Vaping Use   • Vaping Use: Never used   Substance and Sexual Activity   • Alcohol use: No   • Drug use: No   • Sexual activity: Not Currently   Other Topics Concern   • None   Social History Narrative    No advance directives     Social Determinants of Health     Financial Resource Strain: Not on file   Food Insecurity: Not on file   Transportation Needs: Not on file   Physical Activity: Not on file   Stress: Not on file   Social Connections: Not on file   Intimate Partner Violence: Not on file   Housing Stability: Not on file     Current Outpatient Medications on File Prior to Visit   Medication Sig   • ascorbic acid (VITAMIN C) 500 MG tablet Take 1,000 mg by mouth daily    • aspirin 81 MG tablet Take 81 mg by mouth daily  • benazepril-hydrochlorthiazide (LOTENSIN HCT) 20-12 5 MG per tablet take 1 tablet by mouth once daily   • bimatoprost (LUMIGAN) 0 01 % ophthalmic drops Administer 1 drop to both eyes daily at bedtime    • calcium citrate (CALCITRATE) 950 MG tablet Take 1 tablet by mouth in the morning  • Cholecalciferol (VITAMIN D-3 PO) Take 1 capsule by mouth 3 (three) times a day    • Cyanocobalamin (VITAMIN B 12 PO) Take 1 tablet by mouth daily     • Diclofenac Sodium (VOLTAREN) 1 % Apply 2 g topically 4 (four) times a day   • ferrous sulfate 325 (65 Fe) mg tablet Take 325 mg by mouth daily with breakfast    • letrozole (FEMARA) 2 5 mg tablet take 1 tablet by mouth once daily   • Magnesium 250 MG TABS Take 1 tablet by mouth daily     • vitamin A 10,000 units capsule Take 10,000 Units by mouth daily  • vitamin E 100 UNIT capsule Take 100 Units by mouth daily    • [DISCONTINUED] amLODIPine (NORVASC) 2 5 mg tablet Take 1 tablet (2 5 mg total) by mouth daily   • [DISCONTINUED] metFORMIN (GLUCOPHAGE) 500 mg tablet Take 1 tablet (500 mg total) by mouth 2 (two) times a day with meals   • [DISCONTINUED] sitaGLIPtin (JANUVIA) 100 mg tablet Take 1 tablet (100 mg total) by mouth daily     No Known Allergies  Immunization History   Administered Date(s) Administered   • COVID-19 PFIZER VACCINE 0 3 ML IM 06/22/2021, 07/17/2021   • COVID-19 Pfizer Vac BIVALENT Yasir-sucrose 12 Yr+ IM (BOOSTER ONLY) 09/17/2022   • COVID-19 Pfizer vac (Yasir-sucrose, gray cap) 12 yr+ IM 04/02/2022   • INFLUENZA 11/05/2014   • Influenza Quadrivalent Preservative Free 3 years and older IM 10/31/2016, 11/13/2017   • Influenza Quadrivalent, 6-35 Months IM 11/20/2015   • Influenza, high dose seasonal 0 7 mL 10/31/2018, 11/13/2019, 10/20/2020, 10/18/2021, 12/20/2022   • Influenza, seasonal, injectable 11/04/2013, 11/05/2014   • Pneumococcal Conjugate 13-Valent 03/22/2016   • Pneumococcal Polysaccharide PPV23 03/18/2019   • Tdap 04/18/2011, 03/16/2021   • Zoster 08/15/2013       Objective     /84 (BP Location: Left arm, Patient Position: Sitting, Cuff Size: Standard)   Pulse 85   Temp 97 5 °F (36 4 °C) (Temporal)   Ht 5' (1 524 m)   Wt 62 6 kg (138 lb)   LMP  (LMP Unknown)   SpO2 99%   BMI 26 95 kg/m²     Physical Exam  Constitutional:       Appearance: She is well-developed  HENT:      Head: Normocephalic and atraumatic  Right Ear: External ear normal       Left Ear: External ear normal       Nose: Nose normal    Eyes:      Conjunctiva/sclera: Conjunctivae normal       Pupils: Pupils are equal, round, and reactive to light  Cardiovascular:      Rate and Rhythm: Normal rate and regular rhythm  Heart sounds: Normal heart sounds  Pulmonary:      Effort: Pulmonary effort is normal       Breath sounds: Normal breath sounds  Musculoskeletal:      Cervical back: Normal range of motion and neck supple  Comments: Thoraic kyphosis  Skin:     General: Skin is warm and dry  Neurological:      Mental Status: She is alert and oriented to person, place, and time  Deep Tendon Reflexes: Reflexes are normal and symmetric  Psychiatric:         Mood and Affect: Mood normal          Behavior: Behavior normal          Thought Content:  Thought content normal          Judgment: Judgment normal        Nick Barney DO

## 2022-12-23 ENCOUNTER — TELEPHONE (OUTPATIENT)
Dept: GYNECOLOGIC ONCOLOGY | Facility: CLINIC | Age: 77
End: 2022-12-23

## 2022-12-23 ENCOUNTER — OFFICE VISIT (OUTPATIENT)
Dept: HEMATOLOGY ONCOLOGY | Facility: CLINIC | Age: 77
End: 2022-12-23

## 2022-12-23 VITALS
SYSTOLIC BLOOD PRESSURE: 134 MMHG | OXYGEN SATURATION: 96 % | DIASTOLIC BLOOD PRESSURE: 82 MMHG | WEIGHT: 135 LBS | TEMPERATURE: 98.6 F | HEART RATE: 97 BPM | HEIGHT: 61 IN | BODY MASS INDEX: 25.49 KG/M2

## 2022-12-23 DIAGNOSIS — C50.911 MALIGNANT NEOPLASM OF RIGHT BREAST IN FEMALE, ESTROGEN RECEPTOR POSITIVE, UNSPECIFIED SITE OF BREAST (HCC): ICD-10-CM

## 2022-12-23 DIAGNOSIS — D50.9 IRON DEFICIENCY ANEMIA, UNSPECIFIED IRON DEFICIENCY ANEMIA TYPE: ICD-10-CM

## 2022-12-23 DIAGNOSIS — R79.89 D-DIMER, ELEVATED: ICD-10-CM

## 2022-12-23 DIAGNOSIS — Z86.718 HISTORY OF DVT (DEEP VEIN THROMBOSIS): ICD-10-CM

## 2022-12-23 DIAGNOSIS — Z17.0 MALIGNANT NEOPLASM OF RIGHT BREAST IN FEMALE, ESTROGEN RECEPTOR POSITIVE, UNSPECIFIED SITE OF BREAST (HCC): ICD-10-CM

## 2022-12-23 DIAGNOSIS — R97.8 ABNORMAL TUMOR MARKERS: ICD-10-CM

## 2022-12-23 DIAGNOSIS — D50.9 IRON DEFICIENCY ANEMIA, UNSPECIFIED IRON DEFICIENCY ANEMIA TYPE: Primary | ICD-10-CM

## 2022-12-23 DIAGNOSIS — Z86.39 HISTORY OF DIABETES MELLITUS: ICD-10-CM

## 2022-12-23 DIAGNOSIS — E04.1 THYROID NODULE: ICD-10-CM

## 2022-12-23 DIAGNOSIS — C18.2 MALIGNANT NEOPLASM OF ASCENDING COLON (HCC): Primary | ICD-10-CM

## 2022-12-23 RX ORDER — SODIUM CHLORIDE 9 MG/ML
20 INJECTION, SOLUTION INTRAVENOUS ONCE
OUTPATIENT
Start: 2023-01-10

## 2022-12-23 NOTE — PATIENT INSTRUCTIONS
Blood work prior to next visit in 2 months  Referral to GI  Starting intravenous Venofer 200 mg once a week x5

## 2022-12-23 NOTE — TELEPHONE ENCOUNTER
While we try to accommodate patient requests, our priority is to schedule treatment according to Doctor's orders and site availability  Does the Provider use the intake sheet or checkout note? What would be a preferred day of the week that would work best for your infusion appointment? ANY  Do you prefer mornings or afternoons for your appointments? MID MORNING  Are there any days or dates that do not work for your schedule, including any upcoming vacations? NO  We are going to try our best to schedule you at the infusion center closest to your home  In the event that we are unable to what would be your next preferred infusion site or sites? 1  [place]  2    3      Do you have transportation to take you to all of your appointments?  [facility]  Would you like the infusion center to draw labs from your port? (disregard if patient doesn't have a port or need labs for infusion appointment) NO

## 2023-01-01 ENCOUNTER — APPOINTMENT (EMERGENCY)
Dept: RADIOLOGY | Facility: HOSPITAL | Age: 78
DRG: 388 | End: 2023-01-01
Payer: COMMERCIAL

## 2023-01-01 ENCOUNTER — APPOINTMENT (INPATIENT)
Dept: RADIOLOGY | Facility: HOSPITAL | Age: 78
DRG: 388 | End: 2023-01-01
Payer: COMMERCIAL

## 2023-01-01 ENCOUNTER — HOSPITAL ENCOUNTER (OUTPATIENT)
Dept: INFUSION CENTER | Facility: HOSPITAL | Age: 78
Discharge: HOME | End: 2023-10-16
Attending: INTERNAL MEDICINE
Payer: COMMERCIAL

## 2023-01-01 ENCOUNTER — HOSPITAL ENCOUNTER (INPATIENT)
Facility: HOSPITAL | Age: 78
LOS: 3 days | DRG: 388 | End: 2023-10-21
Attending: EMERGENCY MEDICINE | Admitting: SURGERY
Payer: COMMERCIAL

## 2023-01-01 VITALS
SYSTOLIC BLOOD PRESSURE: 63 MMHG | HEIGHT: 60 IN | BODY MASS INDEX: 25.52 KG/M2 | OXYGEN SATURATION: 67 % | TEMPERATURE: 97.9 F | HEART RATE: 124 BPM | WEIGHT: 130 LBS | DIASTOLIC BLOOD PRESSURE: 49 MMHG | RESPIRATION RATE: 10 BRPM

## 2023-01-01 DIAGNOSIS — K56.609 SBO (SMALL BOWEL OBSTRUCTION) (HCC): Primary | ICD-10-CM

## 2023-01-01 DIAGNOSIS — R11.0 NAUSEA: ICD-10-CM

## 2023-01-01 DIAGNOSIS — C18.9 COLON CANCER (HCC): ICD-10-CM

## 2023-01-01 DIAGNOSIS — C79.9 METASTATIC CANCER (HCC): ICD-10-CM

## 2023-01-01 DIAGNOSIS — E83.42 HYPOMAGNESEMIA: Primary | ICD-10-CM

## 2023-01-01 DIAGNOSIS — J96.01 ACUTE RESPIRATORY FAILURE WITH HYPOXIA (HCC): ICD-10-CM

## 2023-01-01 LAB
2HR DELTA HS TROPONIN: 17 NG/L
ALBUMIN SERPL BCP-MCNC: 4.2 G/DL (ref 3.5–5)
ALP SERPL-CCNC: 107 U/L (ref 34–104)
ALT SERPL W P-5'-P-CCNC: 10 U/L (ref 7–52)
ANION GAP SERPL CALCULATED.3IONS-SCNC: 10 MMOL/L
ANION GAP SERPL CALCULATED.3IONS-SCNC: 11 MMOL/L
ANION GAP SERPL CALCULATED.3IONS-SCNC: 12 MMOL/L
ANION GAP SERPL CALCULATED.3IONS-SCNC: 12 MMOL/L
ANION GAP SERPL CALCULATED.3IONS-SCNC: 16 MMOL/L
ANION GAP SERPL CALCULATED.3IONS-SCNC: 17 MMOL/L
ANISOCYTOSIS BLD QL SMEAR: PRESENT
APTT PPP: 33 SECONDS (ref 23–37)
AST SERPL W P-5'-P-CCNC: 16 U/L (ref 13–39)
ATRIAL RATE: 108 BPM
ATRIAL RATE: 125 BPM
BACTERIA UR QL AUTO: ABNORMAL /HPF
BASE EXCESS BLDA CALC-SCNC: -1.8 MMOL/L
BASE EXCESS BLDA CALC-SCNC: -11.1 MMOL/L
BASE EXCESS BLDA CALC-SCNC: -12 MMOL/L (ref -2–3)
BASE EXCESS BLDA CALC-SCNC: -12 MMOL/L (ref -2–3)
BASOPHILS # BLD AUTO: 0.03 THOUSANDS/ÂΜL (ref 0–0.1)
BASOPHILS # BLD AUTO: 0.04 THOUSANDS/ÂΜL (ref 0–0.1)
BASOPHILS # BLD AUTO: 0.06 THOUSANDS/ÂΜL (ref 0–0.1)
BASOPHILS # BLD MANUAL: 0 THOUSAND/UL (ref 0–0.1)
BASOPHILS NFR BLD AUTO: 0 % (ref 0–1)
BASOPHILS NFR MAR MANUAL: 0 % (ref 0–1)
BILIRUB SERPL-MCNC: 0.54 MG/DL (ref 0.2–1)
BILIRUB UR QL STRIP: NEGATIVE
BUN SERPL-MCNC: 23 MG/DL (ref 5–25)
BUN SERPL-MCNC: 23 MG/DL (ref 5–25)
BUN SERPL-MCNC: 27 MG/DL (ref 5–25)
BUN SERPL-MCNC: 29 MG/DL (ref 5–25)
BUN SERPL-MCNC: 30 MG/DL (ref 5–25)
BUN SERPL-MCNC: 34 MG/DL (ref 5–25)
CA-I BLD-SCNC: 1.01 MMOL/L (ref 1.12–1.32)
CA-I BLD-SCNC: 1.17 MMOL/L (ref 1.12–1.32)
CALCIUM SERPL-MCNC: 7.7 MG/DL (ref 8.4–10.2)
CALCIUM SERPL-MCNC: 7.8 MG/DL (ref 8.4–10.2)
CALCIUM SERPL-MCNC: 8.3 MG/DL (ref 8.4–10.2)
CALCIUM SERPL-MCNC: 8.9 MG/DL (ref 8.4–10.2)
CALCIUM SERPL-MCNC: 9.2 MG/DL (ref 8.4–10.2)
CALCIUM SERPL-MCNC: 9.3 MG/DL (ref 8.4–10.2)
CARDIAC TROPONIN I PNL SERPL HS: 22 NG/L
CARDIAC TROPONIN I PNL SERPL HS: 39 NG/L
CHLORIDE SERPL-SCNC: 103 MMOL/L (ref 96–108)
CHLORIDE SERPL-SCNC: 111 MMOL/L (ref 96–108)
CHLORIDE SERPL-SCNC: 113 MMOL/L (ref 96–108)
CHLORIDE SERPL-SCNC: 115 MMOL/L (ref 96–108)
CHLORIDE SERPL-SCNC: 94 MMOL/L (ref 96–108)
CHLORIDE SERPL-SCNC: 99 MMOL/L (ref 96–108)
CLARITY UR: ABNORMAL
CO2 SERPL-SCNC: 17 MMOL/L (ref 21–32)
CO2 SERPL-SCNC: 18 MMOL/L (ref 21–32)
CO2 SERPL-SCNC: 18 MMOL/L (ref 21–32)
CO2 SERPL-SCNC: 19 MMOL/L (ref 21–32)
CO2 SERPL-SCNC: 21 MMOL/L (ref 21–32)
CO2 SERPL-SCNC: 21 MMOL/L (ref 21–32)
COLOR UR: YELLOW
CREAT SERPL-MCNC: 0.38 MG/DL (ref 0.6–1.3)
CREAT SERPL-MCNC: 0.5 MG/DL (ref 0.6–1.3)
CREAT SERPL-MCNC: 0.55 MG/DL (ref 0.6–1.3)
CREAT SERPL-MCNC: 0.66 MG/DL (ref 0.6–1.3)
EOSINOPHIL # BLD AUTO: 0 THOUSAND/ÂΜL (ref 0–0.61)
EOSINOPHIL # BLD AUTO: 0.01 THOUSAND/ÂΜL (ref 0–0.61)
EOSINOPHIL # BLD AUTO: 0.02 THOUSAND/ÂΜL (ref 0–0.61)
EOSINOPHIL # BLD MANUAL: 0 THOUSAND/UL (ref 0–0.4)
EOSINOPHIL NFR BLD AUTO: 0 % (ref 0–6)
EOSINOPHIL NFR BLD MANUAL: 0 % (ref 0–6)
ERYTHROCYTE [DISTWIDTH] IN BLOOD BY AUTOMATED COUNT: 15.4 % (ref 11.6–15.1)
ERYTHROCYTE [DISTWIDTH] IN BLOOD BY AUTOMATED COUNT: 15.7 % (ref 11.6–15.1)
ERYTHROCYTE [DISTWIDTH] IN BLOOD BY AUTOMATED COUNT: 15.8 % (ref 11.6–15.1)
ERYTHROCYTE [DISTWIDTH] IN BLOOD BY AUTOMATED COUNT: 15.9 % (ref 11.6–15.1)
ERYTHROCYTE [DISTWIDTH] IN BLOOD BY AUTOMATED COUNT: 16 % (ref 11.6–15.1)
GFR SERPL CREATININE-BSD FRML MDRD: 102 ML/MIN/1.73SQ M
GFR SERPL CREATININE-BSD FRML MDRD: 85 ML/MIN/1.73SQ M
GFR SERPL CREATININE-BSD FRML MDRD: 90 ML/MIN/1.73SQ M
GFR SERPL CREATININE-BSD FRML MDRD: 93 ML/MIN/1.73SQ M
GLUCOSE SERPL-MCNC: 145 MG/DL (ref 65–140)
GLUCOSE SERPL-MCNC: 150 MG/DL (ref 65–140)
GLUCOSE SERPL-MCNC: 156 MG/DL (ref 65–140)
GLUCOSE SERPL-MCNC: 159 MG/DL (ref 65–140)
GLUCOSE SERPL-MCNC: 159 MG/DL (ref 65–140)
GLUCOSE SERPL-MCNC: 162 MG/DL (ref 65–140)
GLUCOSE SERPL-MCNC: 164 MG/DL (ref 65–140)
GLUCOSE SERPL-MCNC: 166 MG/DL (ref 65–140)
GLUCOSE SERPL-MCNC: 167 MG/DL (ref 65–140)
GLUCOSE SERPL-MCNC: 168 MG/DL (ref 65–140)
GLUCOSE SERPL-MCNC: 174 MG/DL (ref 65–140)
GLUCOSE SERPL-MCNC: 177 MG/DL (ref 65–140)
GLUCOSE SERPL-MCNC: 182 MG/DL (ref 65–140)
GLUCOSE SERPL-MCNC: 183 MG/DL (ref 65–140)
GLUCOSE SERPL-MCNC: 185 MG/DL (ref 65–140)
GLUCOSE SERPL-MCNC: 194 MG/DL (ref 65–140)
GLUCOSE SERPL-MCNC: 198 MG/DL (ref 65–140)
GLUCOSE UR STRIP-MCNC: ABNORMAL MG/DL
HCO3 BLDA-SCNC: 12.7 MMOL/L (ref 22–28)
HCO3 BLDA-SCNC: 13.6 MMOL/L (ref 24–30)
HCO3 BLDA-SCNC: 17.4 MMOL/L (ref 22–28)
HCO3 BLDA-SCNC: 24.1 MMOL/L (ref 22–28)
HCT VFR BLD AUTO: 32.4 % (ref 34.8–46.1)
HCT VFR BLD AUTO: 34.1 % (ref 34.8–46.1)
HCT VFR BLD AUTO: 36.6 % (ref 34.8–46.1)
HCT VFR BLD AUTO: 38.6 % (ref 34.8–46.1)
HCT VFR BLD AUTO: 40.8 % (ref 34.8–46.1)
HCT VFR BLD CALC: 28 % (ref 34.8–46.1)
HCT VFR BLD CALC: 33 % (ref 34.8–46.1)
HGB BLD-MCNC: 10.4 G/DL (ref 11.5–15.4)
HGB BLD-MCNC: 11.4 G/DL (ref 11.5–15.4)
HGB BLD-MCNC: 12.3 G/DL (ref 11.5–15.4)
HGB BLD-MCNC: 13 G/DL (ref 11.5–15.4)
HGB BLD-MCNC: 14.1 G/DL (ref 11.5–15.4)
HGB BLDA-MCNC: 11.2 G/DL (ref 11.5–15.4)
HGB BLDA-MCNC: 9.5 G/DL (ref 11.5–15.4)
HGB UR QL STRIP.AUTO: ABNORMAL
IMM GRANULOCYTES # BLD AUTO: 0.06 THOUSAND/UL (ref 0–0.2)
IMM GRANULOCYTES # BLD AUTO: 0.07 THOUSAND/UL (ref 0–0.2)
IMM GRANULOCYTES # BLD AUTO: 0.17 THOUSAND/UL (ref 0–0.2)
IMM GRANULOCYTES NFR BLD AUTO: 0 % (ref 0–2)
IMM GRANULOCYTES NFR BLD AUTO: 1 % (ref 0–2)
IMM GRANULOCYTES NFR BLD AUTO: 1 % (ref 0–2)
INR PPP: 1.95 (ref 0.84–1.19)
INR PPP: 2.6 (ref 0.84–1.19)
KETONES UR STRIP-MCNC: ABNORMAL MG/DL
LACTATE SERPL-SCNC: 0.6 MMOL/L (ref 0.5–2)
LACTATE SERPL-SCNC: 1.1 MMOL/L (ref 0.5–2)
LACTATE SERPL-SCNC: 1.2 MMOL/L (ref 0.5–2)
LACTATE SERPL-SCNC: 4.3 MMOL/L (ref 0.5–2)
LACTATE SERPL-SCNC: 8.6 MMOL/L (ref 0.5–2)
LEUKOCYTE ESTERASE UR QL STRIP: ABNORMAL
LYMPHOCYTES # BLD AUTO: 0.24 THOUSAND/UL (ref 0.6–4.47)
LYMPHOCYTES # BLD AUTO: 0.36 THOUSANDS/ÂΜL (ref 0.6–4.47)
LYMPHOCYTES # BLD AUTO: 0.38 THOUSANDS/ÂΜL (ref 0.6–4.47)
LYMPHOCYTES # BLD AUTO: 0.61 THOUSANDS/ÂΜL (ref 0.6–4.47)
LYMPHOCYTES # BLD AUTO: 7 % (ref 14–44)
LYMPHOCYTES NFR BLD AUTO: 2 % (ref 14–44)
LYMPHOCYTES NFR BLD AUTO: 3 % (ref 14–44)
LYMPHOCYTES NFR BLD AUTO: 5 % (ref 14–44)
MACROCYTES BLD QL AUTO: PRESENT
MAGNESIUM SERPL-MCNC: 1.5 MG/DL (ref 1.9–2.7)
MAGNESIUM SERPL-MCNC: 1.5 MG/DL (ref 1.9–2.7)
MCH RBC QN AUTO: 33 PG (ref 26.8–34.3)
MCH RBC QN AUTO: 33.9 PG (ref 26.8–34.3)
MCH RBC QN AUTO: 33.9 PG (ref 26.8–34.3)
MCH RBC QN AUTO: 34 PG (ref 26.8–34.3)
MCH RBC QN AUTO: 34.3 PG (ref 26.8–34.3)
MCHC RBC AUTO-ENTMCNC: 32.1 G/DL (ref 31.4–37.4)
MCHC RBC AUTO-ENTMCNC: 33.4 G/DL (ref 31.4–37.4)
MCHC RBC AUTO-ENTMCNC: 33.6 G/DL (ref 31.4–37.4)
MCHC RBC AUTO-ENTMCNC: 33.7 G/DL (ref 31.4–37.4)
MCHC RBC AUTO-ENTMCNC: 34.6 G/DL (ref 31.4–37.4)
MCV RBC AUTO: 101 FL (ref 82–98)
MCV RBC AUTO: 101 FL (ref 82–98)
MCV RBC AUTO: 103 FL (ref 82–98)
MCV RBC AUTO: 103 FL (ref 82–98)
MCV RBC AUTO: 98 FL (ref 82–98)
METAMYELOCYTES NFR BLD MANUAL: 1 % (ref 0–1)
MONOCYTES # BLD AUTO: 0.14 THOUSAND/UL (ref 0–1.22)
MONOCYTES # BLD AUTO: 1.54 THOUSAND/ÂΜL (ref 0.17–1.22)
MONOCYTES # BLD AUTO: 2.6 THOUSAND/ÂΜL (ref 0.17–1.22)
MONOCYTES # BLD AUTO: 3.26 THOUSAND/ÂΜL (ref 0.17–1.22)
MONOCYTES NFR BLD AUTO: 14 % (ref 4–12)
MONOCYTES NFR BLD AUTO: 15 % (ref 4–12)
MONOCYTES NFR BLD AUTO: 18 % (ref 4–12)
MONOCYTES NFR BLD: 4 % (ref 4–12)
MUCOUS THREADS UR QL AUTO: ABNORMAL
NEUTROPHILS # BLD AUTO: 13.99 THOUSANDS/ÂΜL (ref 1.85–7.62)
NEUTROPHILS # BLD AUTO: 19.69 THOUSANDS/ÂΜL (ref 1.85–7.62)
NEUTROPHILS # BLD AUTO: 6.45 THOUSANDS/ÂΜL (ref 1.85–7.62)
NEUTROPHILS # BLD MANUAL: 3 THOUSAND/UL (ref 1.85–7.62)
NEUTS BAND NFR BLD MANUAL: 14 % (ref 0–8)
NEUTS SEG NFR BLD AUTO: 74 % (ref 43–75)
NEUTS SEG NFR BLD AUTO: 76 % (ref 43–75)
NEUTS SEG NFR BLD AUTO: 82 % (ref 43–75)
NEUTS SEG NFR BLD AUTO: 83 % (ref 43–75)
NITRITE UR QL STRIP: POSITIVE
NON-SQ EPI CELLS URNS QL MICRO: ABNORMAL /HPF
NRBC BLD AUTO-RTO: 0 /100 WBCS
NRBC BLD AUTO-RTO: 4 /100 WBC (ref 0–2)
O2 CT BLDA-SCNC: 13.8 ML/DL (ref 16–23)
O2 CT BLDA-SCNC: 13.9 ML/DL (ref 16–23)
OXYHGB MFR BLDA: 74.7 % (ref 94–97)
OXYHGB MFR BLDA: 81.6 % (ref 94–97)
P AXIS: 47 DEGREES
P AXIS: 57 DEGREES
PCO2 BLD: 13 MMOL/L (ref 21–32)
PCO2 BLD: 14 MMOL/L (ref 21–32)
PCO2 BLD: 25.4 MM HG (ref 36–44)
PCO2 BLD: 27.4 MM HG (ref 42–50)
PCO2 BLDA: 45.7 MM HG (ref 36–44)
PCO2 BLDA: 49.3 MM HG (ref 36–44)
PH BLD: 7.3 [PH] (ref 7.3–7.4)
PH BLD: 7.31 [PH] (ref 7.35–7.45)
PH BLDA: 7.17 [PH] (ref 7.35–7.45)
PH BLDA: 7.34 [PH] (ref 7.35–7.45)
PH UR STRIP.AUTO: 5.5 [PH]
PHOSPHATE SERPL-MCNC: 1.4 MG/DL (ref 2.3–4.1)
PHOSPHATE SERPL-MCNC: 2.2 MG/DL (ref 2.3–4.1)
PHOSPHATE SERPL-MCNC: 2.3 MG/DL (ref 2.3–4.1)
PLATELET # BLD AUTO: 249 THOUSANDS/UL (ref 149–390)
PLATELET # BLD AUTO: 279 THOUSANDS/UL (ref 149–390)
PLATELET # BLD AUTO: 282 THOUSANDS/UL (ref 149–390)
PLATELET # BLD AUTO: 304 THOUSANDS/UL (ref 149–390)
PLATELET # BLD AUTO: 342 THOUSANDS/UL (ref 149–390)
PLATELET BLD QL SMEAR: ADEQUATE
PMV BLD AUTO: 9.1 FL (ref 8.9–12.7)
PMV BLD AUTO: 9.2 FL (ref 8.9–12.7)
PMV BLD AUTO: 9.3 FL (ref 8.9–12.7)
PMV BLD AUTO: 9.4 FL (ref 8.9–12.7)
PMV BLD AUTO: 9.4 FL (ref 8.9–12.7)
PO2 BLD: 57 MM HG (ref 35–45)
PO2 BLD: 61 MM HG (ref 75–129)
PO2 BLDA: 49.2 MM HG (ref 75–129)
PO2 BLDA: 49.4 MM HG (ref 75–129)
POLYCHROMASIA BLD QL SMEAR: PRESENT
POTASSIUM BLD-SCNC: 3.4 MMOL/L (ref 3.5–5.3)
POTASSIUM BLD-SCNC: 3.8 MMOL/L (ref 3.5–5.3)
POTASSIUM SERPL-SCNC: 3.6 MMOL/L (ref 3.5–5.3)
POTASSIUM SERPL-SCNC: 3.9 MMOL/L (ref 3.5–5.3)
POTASSIUM SERPL-SCNC: 4 MMOL/L (ref 3.5–5.3)
POTASSIUM SERPL-SCNC: 4.8 MMOL/L (ref 3.5–5.3)
POTASSIUM SERPL-SCNC: 5 MMOL/L (ref 3.5–5.3)
POTASSIUM SERPL-SCNC: 5.6 MMOL/L (ref 3.5–5.3)
PR INTERVAL: 142 MS
PR INTERVAL: 154 MS
PROCALCITONIN SERPL-MCNC: 0.37 NG/ML
PROT SERPL-MCNC: 7.8 G/DL (ref 6.4–8.4)
PROT UR STRIP-MCNC: ABNORMAL MG/DL
PROTHROMBIN TIME: 21.9 SECONDS (ref 11.6–14.5)
PROTHROMBIN TIME: 27.3 SECONDS (ref 11.6–14.5)
QRS AXIS: -19 DEGREES
QRS AXIS: -49 DEGREES
QRSD INTERVAL: 117 MS
QRSD INTERVAL: 118 MS
QT INTERVAL: 317 MS
QT INTERVAL: 356 MS
QTC INTERVAL: 458 MS
QTC INTERVAL: 477 MS
RBC # BLD AUTO: 3.15 MILLION/UL (ref 3.81–5.12)
RBC # BLD AUTO: 3.32 MILLION/UL (ref 3.81–5.12)
RBC # BLD AUTO: 3.63 MILLION/UL (ref 3.81–5.12)
RBC # BLD AUTO: 3.83 MILLION/UL (ref 3.81–5.12)
RBC # BLD AUTO: 4.15 MILLION/UL (ref 3.81–5.12)
RBC #/AREA URNS AUTO: ABNORMAL /HPF
RBC MORPH BLD: PRESENT
SAO2 % BLD FROM PO2: 87 % (ref 60–85)
SAO2 % BLD FROM PO2: 89 % (ref 60–85)
SODIUM BLD-SCNC: 147 MMOL/L (ref 136–145)
SODIUM BLD-SCNC: 148 MMOL/L (ref 136–145)
SODIUM SERPL-SCNC: 126 MMOL/L (ref 135–147)
SODIUM SERPL-SCNC: 129 MMOL/L (ref 135–147)
SODIUM SERPL-SCNC: 132 MMOL/L (ref 135–147)
SODIUM SERPL-SCNC: 141 MMOL/L (ref 135–147)
SODIUM SERPL-SCNC: 147 MMOL/L (ref 135–147)
SODIUM SERPL-SCNC: 152 MMOL/L (ref 135–147)
SP GR UR STRIP.AUTO: 1.02 (ref 1–1.03)
SPECIMEN SOURCE: ABNORMAL
T WAVE AXIS: 111 DEGREES
T WAVE AXIS: 66 DEGREES
UROBILINOGEN UR STRIP-ACNC: <2 MG/DL
VENTRICULAR RATE: 108 BPM
VENTRICULAR RATE: 125 BPM
WBC # BLD AUTO: 17.07 THOUSAND/UL (ref 4.31–10.16)
WBC # BLD AUTO: 23.81 THOUSAND/UL (ref 4.31–10.16)
WBC # BLD AUTO: 3.41 THOUSAND/UL (ref 4.31–10.16)
WBC # BLD AUTO: 4.1 THOUSAND/UL (ref 4.31–10.16)
WBC # BLD AUTO: 8.46 THOUSAND/UL (ref 4.31–10.16)
WBC #/AREA URNS AUTO: ABNORMAL /HPF
WBC CLUMPS # UR AUTO: PRESENT /UL

## 2023-01-01 PROCEDURE — 99238 HOSP IP/OBS DSCHRG MGMT 30/<: CPT | Performed by: SURGERY

## 2023-01-01 PROCEDURE — 94760 N-INVAS EAR/PLS OXIMETRY 1: CPT

## 2023-01-01 PROCEDURE — 0W9B30Z DRAINAGE OF LEFT PLEURAL CAVITY WITH DRAINAGE DEVICE, PERCUTANEOUS APPROACH: ICD-10-PCS | Performed by: STUDENT IN AN ORGANIZED HEALTH CARE EDUCATION/TRAINING PROGRAM

## 2023-01-01 PROCEDURE — 85610 PROTHROMBIN TIME: CPT

## 2023-01-01 PROCEDURE — 84145 PROCALCITONIN (PCT): CPT

## 2023-01-01 PROCEDURE — 85014 HEMATOCRIT: CPT

## 2023-01-01 PROCEDURE — 82948 REAGENT STRIP/BLOOD GLUCOSE: CPT

## 2023-01-01 PROCEDURE — 80048 BASIC METABOLIC PNL TOTAL CA: CPT

## 2023-01-01 PROCEDURE — 02HV33Z INSERTION OF INFUSION DEVICE INTO SUPERIOR VENA CAVA, PERCUTANEOUS APPROACH: ICD-10-PCS | Performed by: STUDENT IN AN ORGANIZED HEALTH CARE EDUCATION/TRAINING PROGRAM

## 2023-01-01 PROCEDURE — 71045 X-RAY EXAM CHEST 1 VIEW: CPT

## 2023-01-01 PROCEDURE — 5A1935Z RESPIRATORY VENTILATION, LESS THAN 24 CONSECUTIVE HOURS: ICD-10-PCS | Performed by: STUDENT IN AN ORGANIZED HEALTH CARE EDUCATION/TRAINING PROGRAM

## 2023-01-01 PROCEDURE — 85025 COMPLETE CBC W/AUTO DIFF WBC: CPT

## 2023-01-01 PROCEDURE — 4A133J1 MONITORING OF ARTERIAL PULSE, PERIPHERAL, PERCUTANEOUS APPROACH: ICD-10-PCS | Performed by: STUDENT IN AN ORGANIZED HEALTH CARE EDUCATION/TRAINING PROGRAM

## 2023-01-01 PROCEDURE — 83605 ASSAY OF LACTIC ACID: CPT | Performed by: COLON & RECTAL SURGERY

## 2023-01-01 PROCEDURE — 85027 COMPLETE CBC AUTOMATED: CPT | Performed by: SURGERY

## 2023-01-01 PROCEDURE — 84100 ASSAY OF PHOSPHORUS: CPT

## 2023-01-01 PROCEDURE — 99285 EMERGENCY DEPT VISIT HI MDM: CPT | Performed by: EMERGENCY MEDICINE

## 2023-01-01 PROCEDURE — 93005 ELECTROCARDIOGRAM TRACING: CPT

## 2023-01-01 PROCEDURE — 80048 BASIC METABOLIC PNL TOTAL CA: CPT | Performed by: SURGERY

## 2023-01-01 PROCEDURE — 0BJ08ZZ INSPECTION OF TRACHEOBRONCHIAL TREE, VIA NATURAL OR ARTIFICIAL OPENING ENDOSCOPIC: ICD-10-PCS | Performed by: STUDENT IN AN ORGANIZED HEALTH CARE EDUCATION/TRAINING PROGRAM

## 2023-01-01 PROCEDURE — 0BH17EZ INSERTION OF ENDOTRACHEAL AIRWAY INTO TRACHEA, VIA NATURAL OR ARTIFICIAL OPENING: ICD-10-PCS | Performed by: STUDENT IN AN ORGANIZED HEALTH CARE EDUCATION/TRAINING PROGRAM

## 2023-01-01 PROCEDURE — 83605 ASSAY OF LACTIC ACID: CPT

## 2023-01-01 PROCEDURE — 36415 COLL VENOUS BLD VENIPUNCTURE: CPT

## 2023-01-01 PROCEDURE — 82805 BLOOD GASES W/O2 SATURATION: CPT | Performed by: NURSE PRACTITIONER

## 2023-01-01 PROCEDURE — 85730 THROMBOPLASTIN TIME PARTIAL: CPT

## 2023-01-01 PROCEDURE — 96375 TX/PRO/DX INJ NEW DRUG ADDON: CPT

## 2023-01-01 PROCEDURE — 87040 BLOOD CULTURE FOR BACTERIA: CPT

## 2023-01-01 PROCEDURE — 82805 BLOOD GASES W/O2 SATURATION: CPT

## 2023-01-01 PROCEDURE — 82803 BLOOD GASES ANY COMBINATION: CPT

## 2023-01-01 PROCEDURE — 36620 INSERTION CATHETER ARTERY: CPT | Performed by: STUDENT IN AN ORGANIZED HEALTH CARE EDUCATION/TRAINING PROGRAM

## 2023-01-01 PROCEDURE — 83735 ASSAY OF MAGNESIUM: CPT

## 2023-01-01 PROCEDURE — 84295 ASSAY OF SERUM SODIUM: CPT

## 2023-01-01 PROCEDURE — 0W9930Z DRAINAGE OF RIGHT PLEURAL CAVITY WITH DRAINAGE DEVICE, PERCUTANEOUS APPROACH: ICD-10-PCS | Performed by: STUDENT IN AN ORGANIZED HEALTH CARE EDUCATION/TRAINING PROGRAM

## 2023-01-01 PROCEDURE — 99232 SBSQ HOSP IP/OBS MODERATE 35: CPT | Performed by: STUDENT IN AN ORGANIZED HEALTH CARE EDUCATION/TRAINING PROGRAM

## 2023-01-01 PROCEDURE — 80053 COMPREHEN METABOLIC PANEL: CPT

## 2023-01-01 PROCEDURE — 96361 HYDRATE IV INFUSION ADD-ON: CPT

## 2023-01-01 PROCEDURE — 36600 WITHDRAWAL OF ARTERIAL BLOOD: CPT

## 2023-01-01 PROCEDURE — 84484 ASSAY OF TROPONIN QUANT: CPT | Performed by: SURGERY

## 2023-01-01 PROCEDURE — 99284 EMERGENCY DEPT VISIT MOD MDM: CPT

## 2023-01-01 PROCEDURE — 36556 INSERT NON-TUNNEL CV CATH: CPT | Performed by: STUDENT IN AN ORGANIZED HEALTH CARE EDUCATION/TRAINING PROGRAM

## 2023-01-01 PROCEDURE — 74177 CT ABD & PELVIS W/CONTRAST: CPT

## 2023-01-01 PROCEDURE — 96365 THER/PROPH/DIAG IV INF INIT: CPT

## 2023-01-01 PROCEDURE — 03HY32Z INSERTION OF MONITORING DEVICE INTO UPPER ARTERY, PERCUTANEOUS APPROACH: ICD-10-PCS | Performed by: STUDENT IN AN ORGANIZED HEALTH CARE EDUCATION/TRAINING PROGRAM

## 2023-01-01 PROCEDURE — 99223 1ST HOSP IP/OBS HIGH 75: CPT | Performed by: SURGERY

## 2023-01-01 PROCEDURE — 87086 URINE CULTURE/COLONY COUNT: CPT | Performed by: NURSE PRACTITIONER

## 2023-01-01 PROCEDURE — 85027 COMPLETE CBC AUTOMATED: CPT

## 2023-01-01 PROCEDURE — 4A133B1 MONITORING OF ARTERIAL PRESSURE, PERIPHERAL, PERCUTANEOUS APPROACH: ICD-10-PCS | Performed by: STUDENT IN AN ORGANIZED HEALTH CARE EDUCATION/TRAINING PROGRAM

## 2023-01-01 PROCEDURE — 87077 CULTURE AEROBIC IDENTIFY: CPT | Performed by: NURSE PRACTITIONER

## 2023-01-01 PROCEDURE — 99232 SBSQ HOSP IP/OBS MODERATE 35: CPT | Performed by: SURGERY

## 2023-01-01 PROCEDURE — 93010 ELECTROCARDIOGRAM REPORT: CPT | Performed by: INTERNAL MEDICINE

## 2023-01-01 PROCEDURE — 31500 INSERT EMERGENCY AIRWAY: CPT | Performed by: STUDENT IN AN ORGANIZED HEALTH CARE EDUCATION/TRAINING PROGRAM

## 2023-01-01 PROCEDURE — NC001 PR NO CHARGE: Performed by: STUDENT IN AN ORGANIZED HEALTH CARE EDUCATION/TRAINING PROGRAM

## 2023-01-01 PROCEDURE — 87186 SC STD MICRODIL/AGAR DIL: CPT | Performed by: NURSE PRACTITIONER

## 2023-01-01 PROCEDURE — 82330 ASSAY OF CALCIUM: CPT

## 2023-01-01 PROCEDURE — 82947 ASSAY GLUCOSE BLOOD QUANT: CPT

## 2023-01-01 PROCEDURE — G1004 CDSM NDSC: HCPCS

## 2023-01-01 PROCEDURE — 84132 ASSAY OF SERUM POTASSIUM: CPT

## 2023-01-01 PROCEDURE — 81001 URINALYSIS AUTO W/SCOPE: CPT | Performed by: NURSE PRACTITIONER

## 2023-01-01 PROCEDURE — 84484 ASSAY OF TROPONIN QUANT: CPT | Performed by: NURSE PRACTITIONER

## 2023-01-01 PROCEDURE — 85007 BL SMEAR W/DIFF WBC COUNT: CPT | Performed by: SURGERY

## 2023-01-01 RX ORDER — MIDODRINE HYDROCHLORIDE 5 MG/1
5 TABLET ORAL
Status: DISCONTINUED | OUTPATIENT
Start: 2023-01-01 | End: 2023-01-01 | Stop reason: HOSPADM

## 2023-01-01 RX ORDER — CALCIUM CHLORIDE 100 MG/ML
INJECTION INTRAVENOUS; INTRAVENTRICULAR
Status: COMPLETED
Start: 2023-01-01 | End: 2023-01-01

## 2023-01-01 RX ORDER — PHENYLEPHRINE HYDROCHLORIDE 10 MG/ML
INJECTION INTRAVENOUS
Status: DISCONTINUED
Start: 2023-01-01 | End: 2023-01-01 | Stop reason: HOSPADM

## 2023-01-01 RX ORDER — KETAMINE HCL IN NACL, ISO-OSM 100MG/10ML
60 SYRINGE (ML) INJECTION ONCE
Status: COMPLETED | OUTPATIENT
Start: 2023-01-01 | End: 2023-01-01

## 2023-01-01 RX ORDER — FENTANYL CITRATE-0.9 % NACL/PF 10 MCG/ML
25 PLASTIC BAG, INJECTION (ML) INTRAVENOUS CONTINUOUS
Status: DISCONTINUED | OUTPATIENT
Start: 2023-01-01 | End: 2023-01-01 | Stop reason: HOSPADM

## 2023-01-01 RX ORDER — MAGNESIUM SULFATE HEPTAHYDRATE 40 MG/ML
4 INJECTION, SOLUTION INTRAVENOUS ONCE
Status: CANCELLED | OUTPATIENT
Start: 2023-01-01

## 2023-01-01 RX ORDER — LIDOCAINE HYDROCHLORIDE 10 MG/ML
INJECTION, SOLUTION EPIDURAL; INFILTRATION; INTRACAUDAL; PERINEURAL
Status: DISCONTINUED
Start: 2023-01-01 | End: 2023-01-01 | Stop reason: HOSPADM

## 2023-01-01 RX ORDER — NOREPINEPHRINE BITARTRATE 1 MG/ML
INJECTION, SOLUTION INTRAVENOUS
Status: DISCONTINUED
Start: 2023-01-01 | End: 2023-01-01 | Stop reason: HOSPADM

## 2023-01-01 RX ORDER — FENTANYL CITRATE 50 UG/ML
INJECTION, SOLUTION INTRAMUSCULAR; INTRAVENOUS
Status: COMPLETED
Start: 2023-01-01 | End: 2023-01-01

## 2023-01-01 RX ORDER — SODIUM CHLORIDE, SODIUM GLUCONATE, SODIUM ACETATE, POTASSIUM CHLORIDE, MAGNESIUM CHLORIDE, SODIUM PHOSPHATE, DIBASIC, AND POTASSIUM PHOSPHATE .53; .5; .37; .037; .03; .012; .00082 G/100ML; G/100ML; G/100ML; G/100ML; G/100ML; G/100ML; G/100ML
1000 INJECTION, SOLUTION INTRAVENOUS ONCE
Status: COMPLETED | OUTPATIENT
Start: 2023-01-01 | End: 2023-01-01

## 2023-01-01 RX ORDER — SODIUM CHLORIDE, SODIUM GLUCONATE, SODIUM ACETATE, POTASSIUM CHLORIDE, MAGNESIUM CHLORIDE, SODIUM PHOSPHATE, DIBASIC, AND POTASSIUM PHOSPHATE .53; .5; .37; .037; .03; .012; .00082 G/100ML; G/100ML; G/100ML; G/100ML; G/100ML; G/100ML; G/100ML
125 INJECTION, SOLUTION INTRAVENOUS CONTINUOUS
Status: DISCONTINUED | OUTPATIENT
Start: 2023-01-01 | End: 2023-01-01

## 2023-01-01 RX ORDER — SODIUM CHLORIDE 9 MG/ML
125 INJECTION, SOLUTION INTRAVENOUS CONTINUOUS
Status: DISCONTINUED | OUTPATIENT
Start: 2023-01-01 | End: 2023-01-01

## 2023-01-01 RX ORDER — BIMATOPROST 0.3 MG/ML
1 SOLUTION/ DROPS OPHTHALMIC DAILY
Status: DISCONTINUED | OUTPATIENT
Start: 2023-01-01 | End: 2023-01-01 | Stop reason: HOSPADM

## 2023-01-01 RX ORDER — HEPARIN SODIUM 5000 [USP'U]/ML
5000 INJECTION, SOLUTION INTRAVENOUS; SUBCUTANEOUS EVERY 8 HOURS SCHEDULED
Status: DISCONTINUED | OUTPATIENT
Start: 2023-01-01 | End: 2023-01-01 | Stop reason: HOSPADM

## 2023-01-01 RX ORDER — MAGNESIUM SULFATE HEPTAHYDRATE 40 MG/ML
4 INJECTION, SOLUTION INTRAVENOUS ONCE
Status: COMPLETED | OUTPATIENT
Start: 2023-01-01 | End: 2023-01-01

## 2023-01-01 RX ORDER — KETAMINE HCL IN NACL, ISO-OSM 100MG/10ML
SYRINGE (ML) INJECTION
Status: COMPLETED
Start: 2023-01-01 | End: 2023-01-01

## 2023-01-01 RX ORDER — DEXMEDETOMIDINE HYDROCHLORIDE 4 UG/ML
.1-.7 INJECTION, SOLUTION INTRAVENOUS
Status: DISCONTINUED | OUTPATIENT
Start: 2023-01-01 | End: 2023-01-01 | Stop reason: HOSPADM

## 2023-01-01 RX ORDER — LIDOCAINE HYDROCHLORIDE 10 MG/ML
10 INJECTION, SOLUTION EPIDURAL; INFILTRATION; INTRACAUDAL; PERINEURAL ONCE
Status: DISCONTINUED | OUTPATIENT
Start: 2023-01-01 | End: 2023-01-01 | Stop reason: HOSPADM

## 2023-01-01 RX ORDER — DEXTROSE MONOHYDRATE 25 G/50ML
25 INJECTION, SOLUTION INTRAVENOUS ONCE
Status: CANCELLED | OUTPATIENT
Start: 2023-01-01 | End: 2023-01-01

## 2023-01-01 RX ORDER — SODIUM CHLORIDE, SODIUM GLUCONATE, SODIUM ACETATE, POTASSIUM CHLORIDE, MAGNESIUM CHLORIDE, SODIUM PHOSPHATE, DIBASIC, AND POTASSIUM PHOSPHATE .53; .5; .37; .037; .03; .012; .00082 G/100ML; G/100ML; G/100ML; G/100ML; G/100ML; G/100ML; G/100ML
125 INJECTION, SOLUTION INTRAVENOUS CONTINUOUS
Status: DISCONTINUED | OUTPATIENT
Start: 2023-01-01 | End: 2023-01-01 | Stop reason: HOSPADM

## 2023-01-01 RX ORDER — MAGNESIUM SULFATE HEPTAHYDRATE 40 MG/ML
2 INJECTION, SOLUTION INTRAVENOUS ONCE
Status: COMPLETED | OUTPATIENT
Start: 2023-01-01 | End: 2023-01-01

## 2023-01-01 RX ORDER — FENTANYL CITRATE 50 UG/ML
100 INJECTION, SOLUTION INTRAMUSCULAR; INTRAVENOUS ONCE
Status: COMPLETED | OUTPATIENT
Start: 2023-01-01 | End: 2023-01-01

## 2023-01-01 RX ORDER — FENTANYL CITRATE 50 UG/ML
50 INJECTION, SOLUTION INTRAMUSCULAR; INTRAVENOUS ONCE
Status: COMPLETED | OUTPATIENT
Start: 2023-01-01 | End: 2023-01-01

## 2023-01-01 RX ORDER — ACETAMINOPHEN 325 MG/1
650 TABLET ORAL EVERY 6 HOURS PRN
Status: DISCONTINUED | OUTPATIENT
Start: 2023-01-01 | End: 2023-01-01 | Stop reason: HOSPADM

## 2023-01-01 RX ORDER — CHLORHEXIDINE GLUCONATE ORAL RINSE 1.2 MG/ML
15 SOLUTION DENTAL EVERY 12 HOURS SCHEDULED
Status: DISCONTINUED | OUTPATIENT
Start: 2023-01-01 | End: 2023-01-01 | Stop reason: HOSPADM

## 2023-01-01 RX ORDER — INSULIN LISPRO 100 [IU]/ML
1-5 INJECTION, SOLUTION INTRAVENOUS; SUBCUTANEOUS EVERY 6 HOURS SCHEDULED
Status: DISCONTINUED | OUTPATIENT
Start: 2023-01-01 | End: 2023-01-01 | Stop reason: HOSPADM

## 2023-01-01 RX ORDER — CALCIUM GLUCONATE 20 MG/ML
2 INJECTION, SOLUTION INTRAVENOUS ONCE
Status: COMPLETED | OUTPATIENT
Start: 2023-01-01 | End: 2023-01-01

## 2023-01-01 RX ORDER — ONDANSETRON 2 MG/ML
4 INJECTION INTRAMUSCULAR; INTRAVENOUS EVERY 6 HOURS PRN
Status: DISCONTINUED | OUTPATIENT
Start: 2023-01-01 | End: 2023-01-01 | Stop reason: HOSPADM

## 2023-01-01 RX ORDER — ONDANSETRON 2 MG/ML
4 INJECTION INTRAMUSCULAR; INTRAVENOUS ONCE
Status: COMPLETED | OUTPATIENT
Start: 2023-01-01 | End: 2023-01-01

## 2023-01-01 RX ADMIN — HEPARIN SODIUM 5000 UNITS: 5000 INJECTION INTRAVENOUS; SUBCUTANEOUS at 21:44

## 2023-01-01 RX ADMIN — SODIUM CHLORIDE 500 ML: 0.9 INJECTION, SOLUTION INTRAVENOUS at 21:37

## 2023-01-01 RX ADMIN — SODIUM CHLORIDE, SODIUM GLUCONATE, SODIUM ACETATE, POTASSIUM CHLORIDE, MAGNESIUM CHLORIDE, SODIUM PHOSPHATE, DIBASIC, AND POTASSIUM PHOSPHATE 1000 ML: .53; .5; .37; .037; .03; .012; .00082 INJECTION, SOLUTION INTRAVENOUS at 19:00

## 2023-01-01 RX ADMIN — PIPERACILLIN SODIUM AND TAZOBACTAM SODIUM 3.38 G: 36; 4.5 INJECTION, POWDER, LYOPHILIZED, FOR SOLUTION INTRAVENOUS at 20:29

## 2023-01-01 RX ADMIN — SODIUM CHLORIDE, SODIUM GLUCONATE, SODIUM ACETATE, POTASSIUM CHLORIDE, MAGNESIUM CHLORIDE, SODIUM PHOSPHATE, DIBASIC, AND POTASSIUM PHOSPHATE 1000 ML: .53; .5; .37; .037; .03; .012; .00082 INJECTION, SOLUTION INTRAVENOUS at 20:08

## 2023-01-01 RX ADMIN — HYDROCORTISONE SODIUM SUCCINATE 100 MG: 100 INJECTION, POWDER, FOR SOLUTION INTRAMUSCULAR; INTRAVENOUS at 18:45

## 2023-01-01 RX ADMIN — CEFEPIME 2000 MG: 2 INJECTION, POWDER, FOR SOLUTION INTRAVENOUS at 19:45

## 2023-01-01 RX ADMIN — SODIUM BICARBONATE 50 MEQ: 84 INJECTION INTRAVENOUS at 18:30

## 2023-01-01 RX ADMIN — NOREPINEPHRINE BITARTRATE 30 MCG/MIN: 1 INJECTION INTRAVENOUS at 21:35

## 2023-01-01 RX ADMIN — CALCIUM CHLORIDE: 100 INJECTION INTRAVENOUS; INTRAVENTRICULAR at 19:22

## 2023-01-01 RX ADMIN — Medication 50 MEQ: at 18:30

## 2023-01-01 RX ADMIN — Medication 1 SPRAY: at 05:08

## 2023-01-01 RX ADMIN — HEPARIN SODIUM 5000 UNITS: 5000 INJECTION INTRAVENOUS; SUBCUTANEOUS at 22:12

## 2023-01-01 RX ADMIN — FENTANYL CITRATE 100 MCG: 50 INJECTION, SOLUTION INTRAMUSCULAR; INTRAVENOUS at 19:23

## 2023-01-01 RX ADMIN — FENTANYL CITRATE 50 MCG: 50 INJECTION INTRAMUSCULAR; INTRAVENOUS at 19:00

## 2023-01-01 RX ADMIN — SODIUM BICARBONATE 50 MEQ: 84 INJECTION INTRAVENOUS at 19:30

## 2023-01-01 RX ADMIN — SODIUM CHLORIDE 1000 ML: 0.9 INJECTION, SOLUTION INTRAVENOUS at 19:35

## 2023-01-01 RX ADMIN — INSULIN LISPRO 1 UNITS: 100 INJECTION, SOLUTION INTRAVENOUS; SUBCUTANEOUS at 05:07

## 2023-01-01 RX ADMIN — METHYLENE BLUE 50 MG: 5 INJECTION INTRAVENOUS at 22:12

## 2023-01-01 RX ADMIN — SODIUM CHLORIDE 125 ML/HR: 0.9 INJECTION, SOLUTION INTRAVENOUS at 00:31

## 2023-01-01 RX ADMIN — SODIUM CHLORIDE, SODIUM GLUCONATE, SODIUM ACETATE, POTASSIUM CHLORIDE, MAGNESIUM CHLORIDE, SODIUM PHOSPHATE, DIBASIC, AND POTASSIUM PHOSPHATE 125 ML/HR: .53; .5; .37; .037; .03; .012; .00082 INJECTION, SOLUTION INTRAVENOUS at 00:08

## 2023-01-01 RX ADMIN — SODIUM BICARBONATE 50 MEQ: 84 INJECTION INTRAVENOUS at 19:00

## 2023-01-01 RX ADMIN — INSULIN LISPRO 1 UNITS: 100 INJECTION, SOLUTION INTRAVENOUS; SUBCUTANEOUS at 06:28

## 2023-01-01 RX ADMIN — Medication 60 MG: at 18:45

## 2023-01-01 RX ADMIN — INSULIN LISPRO 1 UNITS: 100 INJECTION, SOLUTION INTRAVENOUS; SUBCUTANEOUS at 20:29

## 2023-01-01 RX ADMIN — VANCOMYCIN HYDROCHLORIDE 1500 MG: 5 INJECTION, POWDER, LYOPHILIZED, FOR SOLUTION INTRAVENOUS at 21:35

## 2023-01-01 RX ADMIN — ONDANSETRON 4 MG: 2 INJECTION INTRAMUSCULAR; INTRAVENOUS at 05:19

## 2023-01-01 RX ADMIN — SODIUM CHLORIDE, SODIUM GLUCONATE, SODIUM ACETATE, POTASSIUM CHLORIDE, MAGNESIUM CHLORIDE, SODIUM PHOSPHATE, DIBASIC, AND POTASSIUM PHOSPHATE 125 ML/HR: .53; .5; .37; .037; .03; .012; .00082 INJECTION, SOLUTION INTRAVENOUS at 09:36

## 2023-01-01 RX ADMIN — HEPARIN SODIUM 5000 UNITS: 5000 INJECTION INTRAVENOUS; SUBCUTANEOUS at 02:36

## 2023-01-01 RX ADMIN — SODIUM BICARBONATE 50 MEQ: 84 INJECTION INTRAVENOUS at 20:12

## 2023-01-01 RX ADMIN — MAGNESIUM SULFATE HEPTAHYDRATE 4 G: 40 INJECTION, SOLUTION INTRAVENOUS at 13:32

## 2023-01-01 RX ADMIN — FENTANYL CITRATE 50 MCG: 50 INJECTION, SOLUTION INTRAMUSCULAR; INTRAVENOUS at 19:00

## 2023-01-01 RX ADMIN — INSULIN LISPRO 1 UNITS: 100 INJECTION, SOLUTION INTRAVENOUS; SUBCUTANEOUS at 11:08

## 2023-01-01 RX ADMIN — POTASSIUM PHOSPHATE, MONOBASIC POTASSIUM PHOSPHATE, DIBASIC 21 MMOL: 224; 236 INJECTION, SOLUTION, CONCENTRATE INTRAVENOUS at 09:36

## 2023-01-01 RX ADMIN — ONDANSETRON 4 MG: 2 INJECTION INTRAMUSCULAR; INTRAVENOUS at 19:35

## 2023-01-01 RX ADMIN — HEPARIN SODIUM 5000 UNITS: 5000 INJECTION INTRAVENOUS; SUBCUTANEOUS at 05:08

## 2023-01-01 RX ADMIN — NOREPINEPHRINE BITARTRATE 30 MCG/MIN: 1 INJECTION INTRAVENOUS at 18:45

## 2023-01-01 RX ADMIN — SODIUM PHOSPHATE, MONOBASIC, MONOHYDRATE AND SODIUM PHOSPHATE, DIBASIC, ANHYDROUS 21 MMOL: 142; 276 INJECTION, SOLUTION INTRAVENOUS at 11:26

## 2023-01-01 RX ADMIN — HEPARIN SODIUM 5000 UNITS: 5000 INJECTION INTRAVENOUS; SUBCUTANEOUS at 13:03

## 2023-01-01 RX ADMIN — VASOPRESSIN 0.04 UNITS/MIN: 20 INJECTION INTRAVENOUS at 19:28

## 2023-01-01 RX ADMIN — INSULIN LISPRO 1 UNITS: 100 INJECTION, SOLUTION INTRAVENOUS; SUBCUTANEOUS at 02:36

## 2023-01-01 RX ADMIN — SODIUM CHLORIDE 125 ML/HR: 0.9 INJECTION, SOLUTION INTRAVENOUS at 00:17

## 2023-01-01 RX ADMIN — MAGNESIUM SULFATE HEPTAHYDRATE 2 G: 40 INJECTION, SOLUTION INTRAVENOUS at 07:56

## 2023-01-01 RX ADMIN — INSULIN LISPRO 1 UNITS: 100 INJECTION, SOLUTION INTRAVENOUS; SUBCUTANEOUS at 00:13

## 2023-01-01 RX ADMIN — MAGNESIUM SULFATE HEPTAHYDRATE 2 G: 40 INJECTION, SOLUTION INTRAVENOUS at 10:56

## 2023-01-01 RX ADMIN — CALCIUM GLUCONATE 2 G: 20 INJECTION, SOLUTION INTRAVENOUS at 12:37

## 2023-01-01 RX ADMIN — HEPARIN SODIUM 5000 UNITS: 5000 INJECTION INTRAVENOUS; SUBCUTANEOUS at 10:11

## 2023-01-01 RX ADMIN — IOHEXOL 100 ML: 350 INJECTION, SOLUTION INTRAVENOUS at 19:56

## 2023-01-01 RX ADMIN — PHENYLEPHRINE HYDROCHLORIDE 180 MCG/MIN: 10 INJECTION INTRAVENOUS at 20:02

## 2023-01-01 RX ADMIN — Medication 50 MEQ: at 19:00

## 2023-01-10 ENCOUNTER — HOSPITAL ENCOUNTER (OUTPATIENT)
Dept: INFUSION CENTER | Facility: HOSPITAL | Age: 78
Discharge: HOME | End: 2023-01-10
Attending: INTERNAL MEDICINE

## 2023-01-10 ENCOUNTER — TELEPHONE (OUTPATIENT)
Dept: HEMATOLOGY ONCOLOGY | Facility: CLINIC | Age: 78
End: 2023-01-10

## 2023-01-10 ENCOUNTER — APPOINTMENT (OUTPATIENT)
Dept: LAB | Facility: CLINIC | Age: 78
End: 2023-01-10

## 2023-01-10 VITALS
HEART RATE: 93 BPM | RESPIRATION RATE: 18 BRPM | DIASTOLIC BLOOD PRESSURE: 85 MMHG | SYSTOLIC BLOOD PRESSURE: 157 MMHG | TEMPERATURE: 97 F

## 2023-01-10 DIAGNOSIS — D50.9 IRON DEFICIENCY ANEMIA, UNSPECIFIED IRON DEFICIENCY ANEMIA TYPE: ICD-10-CM

## 2023-01-10 DIAGNOSIS — D50.9 IRON DEFICIENCY ANEMIA, UNSPECIFIED IRON DEFICIENCY ANEMIA TYPE: Primary | ICD-10-CM

## 2023-01-10 DIAGNOSIS — D64.9 ANEMIA, UNSPECIFIED TYPE: ICD-10-CM

## 2023-01-10 DIAGNOSIS — R19.5 HEME POSITIVE STOOL: Primary | ICD-10-CM

## 2023-01-10 LAB — HEMOCCULT STL QL IA: POSITIVE

## 2023-01-10 RX ORDER — SODIUM CHLORIDE 9 MG/ML
20 INJECTION, SOLUTION INTRAVENOUS ONCE
Status: COMPLETED | OUTPATIENT
Start: 2023-01-10 | End: 2023-01-10

## 2023-01-10 RX ORDER — SODIUM CHLORIDE 9 MG/ML
20 INJECTION, SOLUTION INTRAVENOUS ONCE
Status: CANCELLED | OUTPATIENT
Start: 2023-01-17

## 2023-01-10 RX ADMIN — IRON SUCROSE 200 MG: 20 INJECTION, SOLUTION INTRAVENOUS at 11:03

## 2023-01-10 RX ADMIN — SODIUM CHLORIDE 20 ML/HR: 0.9 INJECTION, SOLUTION INTRAVENOUS at 11:02

## 2023-01-10 NOTE — TELEPHONE ENCOUNTER
Patient has Hemoccult positive stool and I called patient to let her know and send her to a gastroenterologist but I could not leave a message on her answering machine because she probably does not have an answering  an phone kept ringing

## 2023-01-17 ENCOUNTER — HOSPITAL ENCOUNTER (OUTPATIENT)
Dept: INFUSION CENTER | Facility: HOSPITAL | Age: 78
Discharge: HOME | End: 2023-01-17
Attending: INTERNAL MEDICINE

## 2023-01-17 VITALS
HEART RATE: 90 BPM | RESPIRATION RATE: 18 BRPM | DIASTOLIC BLOOD PRESSURE: 75 MMHG | TEMPERATURE: 97.7 F | SYSTOLIC BLOOD PRESSURE: 117 MMHG

## 2023-01-17 DIAGNOSIS — D50.9 IRON DEFICIENCY ANEMIA, UNSPECIFIED IRON DEFICIENCY ANEMIA TYPE: Primary | ICD-10-CM

## 2023-01-17 RX ORDER — SODIUM CHLORIDE 9 MG/ML
20 INJECTION, SOLUTION INTRAVENOUS ONCE
Status: CANCELLED | OUTPATIENT
Start: 2023-01-24

## 2023-01-17 RX ORDER — SODIUM CHLORIDE 9 MG/ML
20 INJECTION, SOLUTION INTRAVENOUS ONCE
Status: COMPLETED | OUTPATIENT
Start: 2023-01-17 | End: 2023-01-17

## 2023-01-17 RX ADMIN — SODIUM CHLORIDE 20 ML/HR: 0.9 INJECTION, SOLUTION INTRAVENOUS at 11:51

## 2023-01-17 RX ADMIN — IRON SUCROSE 200 MG: 20 INJECTION, SOLUTION INTRAVENOUS at 11:51

## 2023-01-24 ENCOUNTER — HOSPITAL ENCOUNTER (OUTPATIENT)
Dept: INFUSION CENTER | Facility: HOSPITAL | Age: 78
Discharge: HOME | End: 2023-01-24
Attending: INTERNAL MEDICINE

## 2023-01-24 VITALS
HEART RATE: 87 BPM | TEMPERATURE: 97 F | SYSTOLIC BLOOD PRESSURE: 96 MMHG | DIASTOLIC BLOOD PRESSURE: 62 MMHG | RESPIRATION RATE: 18 BRPM

## 2023-01-24 DIAGNOSIS — D50.9 IRON DEFICIENCY ANEMIA, UNSPECIFIED IRON DEFICIENCY ANEMIA TYPE: Primary | ICD-10-CM

## 2023-01-24 RX ORDER — SODIUM CHLORIDE 9 MG/ML
20 INJECTION, SOLUTION INTRAVENOUS ONCE
Status: COMPLETED | OUTPATIENT
Start: 2023-01-24 | End: 2023-01-24

## 2023-01-24 RX ORDER — SODIUM CHLORIDE 9 MG/ML
20 INJECTION, SOLUTION INTRAVENOUS ONCE
Status: CANCELLED | OUTPATIENT
Start: 2023-01-31

## 2023-01-24 RX ADMIN — SODIUM CHLORIDE 20 ML/HR: 0.9 INJECTION, SOLUTION INTRAVENOUS at 12:04

## 2023-01-24 RX ADMIN — IRON SUCROSE 200 MG: 20 INJECTION, SOLUTION INTRAVENOUS at 12:04

## 2023-01-30 ENCOUNTER — OFFICE VISIT (OUTPATIENT)
Dept: PODIATRY | Facility: CLINIC | Age: 78
End: 2023-01-30

## 2023-01-30 VITALS
WEIGHT: 137 LBS | DIASTOLIC BLOOD PRESSURE: 81 MMHG | BODY MASS INDEX: 26.9 KG/M2 | SYSTOLIC BLOOD PRESSURE: 145 MMHG | HEIGHT: 60 IN | HEART RATE: 102 BPM

## 2023-01-30 DIAGNOSIS — E11.9 CONTROLLED TYPE 2 DIABETES MELLITUS WITHOUT COMPLICATION, WITHOUT LONG-TERM CURRENT USE OF INSULIN (HCC): Primary | ICD-10-CM

## 2023-01-30 NOTE — PROGRESS NOTES
Patient presents for palliative diabetic foot care  Pedal pulses are palpable  Treatment consisted of trimming of multiple hyperkeratotic lesions of mycotic toenails    Patient is rescheduled in 3 weeks at her request

## 2023-01-31 ENCOUNTER — PATIENT OUTREACH (OUTPATIENT)
Dept: HEMATOLOGY ONCOLOGY | Facility: CLINIC | Age: 78
End: 2023-01-31

## 2023-01-31 ENCOUNTER — HOSPITAL ENCOUNTER (OUTPATIENT)
Dept: INFUSION CENTER | Facility: HOSPITAL | Age: 78
Discharge: HOME | End: 2023-01-31
Attending: INTERNAL MEDICINE

## 2023-01-31 VITALS
SYSTOLIC BLOOD PRESSURE: 130 MMHG | TEMPERATURE: 97.4 F | HEART RATE: 93 BPM | DIASTOLIC BLOOD PRESSURE: 77 MMHG | RESPIRATION RATE: 18 BRPM

## 2023-01-31 DIAGNOSIS — F41.8 ANXIETY ABOUT HEALTH: Primary | ICD-10-CM

## 2023-01-31 DIAGNOSIS — D50.9 IRON DEFICIENCY ANEMIA, UNSPECIFIED IRON DEFICIENCY ANEMIA TYPE: Primary | ICD-10-CM

## 2023-01-31 RX ORDER — SODIUM CHLORIDE 9 MG/ML
20 INJECTION, SOLUTION INTRAVENOUS ONCE
Status: COMPLETED | OUTPATIENT
Start: 2023-01-31 | End: 2023-01-31

## 2023-01-31 RX ORDER — SODIUM CHLORIDE 9 MG/ML
20 INJECTION, SOLUTION INTRAVENOUS ONCE
Status: CANCELLED | OUTPATIENT
Start: 2023-02-07

## 2023-01-31 RX ADMIN — SODIUM CHLORIDE 20 ML/HR: 0.9 INJECTION, SOLUTION INTRAVENOUS at 12:49

## 2023-01-31 RX ADMIN — IRON SUCROSE 200 MG: 20 INJECTION, SOLUTION INTRAVENOUS at 12:49

## 2023-02-01 ENCOUNTER — TELEPHONE (OUTPATIENT)
Dept: GASTROENTEROLOGY | Facility: CLINIC | Age: 78
End: 2023-02-01

## 2023-02-01 ENCOUNTER — TELEPHONE (OUTPATIENT)
Age: 78
End: 2023-02-01

## 2023-02-01 ENCOUNTER — PREP FOR PROCEDURE (OUTPATIENT)
Age: 78
End: 2023-02-01

## 2023-02-01 ENCOUNTER — CONSULT (OUTPATIENT)
Dept: GASTROENTEROLOGY | Facility: CLINIC | Age: 78
End: 2023-02-01

## 2023-02-01 VITALS
BODY MASS INDEX: 26.93 KG/M2 | WEIGHT: 137.2 LBS | TEMPERATURE: 97.8 F | SYSTOLIC BLOOD PRESSURE: 118 MMHG | HEIGHT: 60 IN | DIASTOLIC BLOOD PRESSURE: 70 MMHG

## 2023-02-01 DIAGNOSIS — Z85.038 PERSONAL HISTORY OF COLON CANCER: Primary | ICD-10-CM

## 2023-02-01 DIAGNOSIS — C18.2 MALIGNANT NEOPLASM OF ASCENDING COLON (HCC): Primary | ICD-10-CM

## 2023-02-01 DIAGNOSIS — D50.9 IRON DEFICIENCY ANEMIA, UNSPECIFIED IRON DEFICIENCY ANEMIA TYPE: ICD-10-CM

## 2023-02-01 NOTE — PROGRESS NOTES
[facility] - Outpatient Consultation  Milderd Catrachita 69 y o  female MRN: [id number]  Encounter: [id number]      Assessment and Plan    1  Iron def anemia   2  History of colon cancer s/p right hemicolectomy  In November the patient had a hemoglobin drop from her baseline between 11 6-13 2 down to 9 8 with an iron panel revealing for iron deficiency anemia from she is following with hematology oncology for  Her hemoglobin remains stable at 10 1  She denies any overt GI bleeding or GI complaints at this time including change in bowel habits or weight loss  Her last EGD was in 2017 with a large gastric polyp that was removed and pathology was benign  Last colonoscopy also in 2017 without any disease recurrence  3 year repeat was recommended however this was delayed secondary to COVID  She has had an elevated CEA for a number of years and most recently this is inclined from 4 1-4 8  Her last PET scan was 7/2021 revealing no disease recurrence   -Discussed with the patient that she should continue with hematology oncology to manage her iron deficiency anemia  -Monitor for overt signs of GI bleeding  -Recommend both EGD and colonoscopy for further evaluation, the patient would like Dr Isadora Buchanan to complete her colonoscopy so we will coordinate with his office, she is also requesting that this be done late March to early April secondary to wanting to avoid potential bad weather, she is aware that both her EGD and colonoscopy are being done to rule out malignancy especially as she is high risk with her history of colon cancer, the patient verbalizes understanding of this and would like to wait until Spring    Follow-up after EGD and colonoscopy    ______________________________________________________________________    History of Present Illness  Milderd Catrachita is a 69 y o  female with a history of colon cancer status post right hemicolectomy here for consultation of iron deficiency anemia  The patient had a baseline hemoglobin between 11 6 and 13 2  November she dropped down to 9 8 and is stable at 10 1  She also had an iron panel obtained and revealing an iron saturation of 12, serum iron of 46, and ferritin of 27  At this time the patient is doing well denies any GI complaints or overt GI bleeding  Her last endoscopy was in 2007 teen with a large gastric polyp which was removed and pathology benign  Her last colonoscopy was the same year and revealed moderately severe diverticulosis in the sigmoid colon and a healthy ileocolic region without any signs of recurrent cancer  Repeat colonoscopy was recommended 3 years later however this was not done secondary to COVID  The patient has had an elevated CEA for a number of years, most recently this is elevated to 4 8  Her last PET scan 7/14/2021 revealed no disease recurrence      Review of Systems   Constitutional: Negative for activity change, appetite change, chills, fatigue, fever and unexpected weight change  Gastrointestinal: Negative for abdominal distention, abdominal pain, anal bleeding, blood in stool, constipation, diarrhea, nausea, rectal pain and vomiting  Past Medical History  Past Medical History:   Diagnosis Date   • Acute embolism and thrombosis of deep vein of lower extremity (HCC)    • Adenocarcinoma of colon, [facility] ([facility] [address] )    • Breast cancer ([facility] [address] ) 2012    Right Breast    • Cancer ([facility] [address] )    • Diabetes mellitus ([facility] [address] )    • DVT (deep venous thrombosis) (HCC)    • GERD (gastroesophageal reflux disease)    • Hypertension    • Pes planus        Past Social history  Past Surgical History:   Procedure Laterality Date   • COLONOSCOPY     • HYSTERECTOMY      complete @ age 29   • IR PORT REMOVAL  9/12/2018   • MASTECTOMY Right 2012   • [place] COLONOSCOPY FLX DX W/COLLJ SPEC WHEN PFRMD N/A 8/23/2016    Procedure: COLONOSCOPY;  Surgeon: Yahir Ball MD;  [facility];   Service: Colorectal   • IL COLONOSCOPY FLX DX W/COLLJ SPEC WHEN PFRMD N/A 9/5/2017    Procedure: COLONOSCOPY;  Surgeon: Candis Simmons MD;  [facility]; Service: Colorectal   • [place] ESOPHAGOGASTRODUODENOSCOPY TRANSORAL DIAGNOSTIC N/A 9/5/2017    Procedure: ESOPHAGOGASTRODUODENOSCOPY (EGD); Surgeon: Saul Purvis DO;  [facility];   Service: Gastroenterology     Social History     Socioeconomic History   • Marital status: Single     Spouse name: Not on file   • Number of children: Not on file   • Years of education: Not on file   • Highest education level: Not on file   Occupational History   • Occupation: retired   Tobacco Use   • Smoking status: Never   • Smokeless tobacco: Never   Vaping Use   • Vaping Use: Never used   Substance and Sexual Activity   • Alcohol use: No   • Drug use: No   • Sexual activity: Not Currently   Other Topics Concern   • Not on file   Social History Narrative    No advance directives     Social Determinants of Health     Financial Resource Strain: Not on file   Food Insecurity: Not on file   Transportation Needs: Not on file   Physical Activity: Not on file   Stress: Not on file   Social Connections: Not on file   Intimate Partner Violence: Not on file   Housing Stability: Not on file     Social History     Substance and Sexual Activity   Alcohol Use No     Social History     Substance and Sexual Activity   Drug Use No     Social History     Tobacco Use   Smoking Status Never   Smokeless Tobacco Never       Past Family History  Family History   Problem Relation Age of Onset   • Other Mother         chronic bronchitis   • Brain cancer Father    • Prostate cancer Paternal Grandfather 79   • Pancreatic cancer Maternal Aunt 45   • Breast cancer Neg Hx        Current Medications  Current Outpatient Medications   Medication Sig Dispense Refill   • amLODIPine (NORVASC) 2 5 mg tablet Take 1 tablet (2 5 mg total) by mouth daily 30 tablet 5   • ascorbic acid (VITAMIN C) 500 MG tablet Take 1,000 mg by mouth daily      • aspirin 81 MG tablet Take 81 mg by mouth daily  • benazepril-hydrochlorthiazide (LOTENSIN HCT) 20-12 5 MG per tablet take 1 tablet by mouth once daily 90 tablet 5   • bimatoprost (LUMIGAN) 0 01 % ophthalmic drops Administer 1 drop to both eyes daily at bedtime      • calcium citrate (CALCITRATE) 950 MG tablet Take 1 tablet by mouth in the morning  • Cholecalciferol (VITAMIN D-3 PO) Take 1 capsule by mouth 3 (three) times a day      • Cyanocobalamin (VITAMIN B 12 PO) Take 1 tablet by mouth daily  • Diclofenac Sodium (VOLTAREN) 1 % Apply 2 g topically 4 (four) times a day     • ferrous sulfate 325 (65 Fe) mg tablet Take 325 mg by mouth daily with breakfast      • letrozole (FEMARA) 2 5 mg tablet take 1 tablet by mouth once daily 30 tablet 5   • Magnesium 250 MG TABS Take 1 tablet by mouth daily       • metFORMIN (GLUCOPHAGE) 500 mg tablet Take 1 tablet (500 mg total) by mouth 2 (two) times a day with meals 60 tablet 5   • sitaGLIPtin (JANUVIA) 100 mg tablet Take 1 tablet (100 mg total) by mouth daily 30 tablet 5   • vitamin A 10,000 units capsule Take 10,000 Units by mouth daily  • vitamin E 100 UNIT capsule Take 100 Units by mouth daily        No current facility-administered medications for this visit  Allergies  No Known Allergies      The following portions of the patient's history were reviewed and updated as appropriate: allergies, current medications, past medical history, past social history, past surgical history and problem list       Vitals  There were no vitals filed for this visit  Physical Exam  Constitutional   General appearance: Patient is seated and in no acute distress, well appearing and well nourished  Head and Face   Head and face: Normal     Eyes   Conjunctiva and lids: No erythema, swelling or discharge  Anicteric  Ears, Nose, Mouth, and Throat   Hearing: Normal     Neck: Supple, trachea midline    Pulmonary   Respiratory effort: No increased work of breathing or signs of respiratory distress  Lungs: Clear to ascultation, no wheezes, rhonchi, or rales  Cardiovascular   Heart: Regular rate and rhythm, no murmurs gallops or rubs   Examination of extremities for edema and/or varicosities: Normal     Musculoskeletal   Gait and station: Slow gait   Skin   Skin and subcutaneous tissue: Warm, dry, and intact  No visible jaundice, lesions or rashes  Psychiatric   Judgment and insight: Normal  Recent and remote memory:  Normal  Mood and affect: Normal      Results  No visits with results within 1 Day(s) from this visit  Latest known visit with results is:   Appointment on 01/10/2023   Component Date Value   • OCCULT BLD, FECAL IMMUNO* 01/10/2023 Positive (A)        Radiology Results  No results found  Orders  No orders of the defined types were placed in this encounter

## 2023-02-01 NOTE — TELEPHONE ENCOUNTER
Call from Domenick Hammans to schedule EGD/colonoscopy for patient      DB 3 yr recall, last fc 9/5/17 mod severe diverticulosis in sig colon, previous anastomosis identified, BMI 26     Scheduled DB 4/3 GRIS GREER handed to patient by GI today

## 2023-02-01 NOTE — PATIENT INSTRUCTIONS
Follow pt's OV, scheduling the procedure was coordinated with [facility] @ Dr Jonny Lei office  Colonoscopy w/ Dr Jonny Lei and EGD w/ Dr Tristan Felder  Per Madhavi Cardenas instructions were provided to pt, [facility] will handle scheduling in the system  Scheduled date of colonoscopy (as of today): 04/03/2023  Physician performing colonoscopy:Drs   [address]   [facility] of colonoscopy: [facility]  Bowel prep reviewed with patient: Miralax   Instructions reviewed with patient by: Tatiana Smith   Clearances:  N/A

## 2023-02-01 NOTE — TELEPHONE ENCOUNTER
Follow pt's OV, scheduling procedure was coordinated with [facility] @ Dr Cecy Morales office  Colonoscopy w/ Dr Cecy Morales and EGD w/ Dr Brittany Garcia  Per Jhonny Clark instructions were provided to pt, [facility] will handle scheduling in the system

## 2023-02-03 ENCOUNTER — PATIENT OUTREACH (OUTPATIENT)
Dept: FAMILY MEDICINE CLINIC | Facility: CLINIC | Age: 78
End: 2023-02-03

## 2023-02-07 ENCOUNTER — HOSPITAL ENCOUNTER (OUTPATIENT)
Dept: INFUSION CENTER | Facility: HOSPITAL | Age: 78
Discharge: HOME | End: 2023-02-07
Attending: INTERNAL MEDICINE

## 2023-02-07 VITALS
HEART RATE: 86 BPM | RESPIRATION RATE: 18 BRPM | DIASTOLIC BLOOD PRESSURE: 64 MMHG | TEMPERATURE: 98 F | SYSTOLIC BLOOD PRESSURE: 122 MMHG

## 2023-02-07 DIAGNOSIS — D50.9 IRON DEFICIENCY ANEMIA, UNSPECIFIED IRON DEFICIENCY ANEMIA TYPE: Primary | ICD-10-CM

## 2023-02-07 RX ORDER — SODIUM CHLORIDE 9 MG/ML
20 INJECTION, SOLUTION INTRAVENOUS ONCE
Status: CANCELLED | OUTPATIENT
Start: 2023-02-07

## 2023-02-07 RX ORDER — SODIUM CHLORIDE 9 MG/ML
20 INJECTION, SOLUTION INTRAVENOUS ONCE
Status: COMPLETED | OUTPATIENT
Start: 2023-02-07 | End: 2023-02-07

## 2023-02-07 RX ADMIN — IRON SUCROSE 200 MG: 20 INJECTION, SOLUTION INTRAVENOUS at 13:00

## 2023-02-07 RX ADMIN — SODIUM CHLORIDE 20 ML/HR: 0.9 INJECTION, SOLUTION INTRAVENOUS at 13:00

## 2023-02-07 NOTE — PROGRESS NOTES
Patient received Venofer infusion without incident  AVS declined  This is pt's last ordered/scheduled appointment for Venofer  Therapy complete  Patient left ambulatory in stable condition

## 2023-02-07 NOTE — PROGRESS NOTES
Venofer completed without incident  SPoke with Trung Harvey at dr Donald Muñiz office on patients behalf to relay feelings of nausea and fullness in the last few days  She did not tell GI office about this last week when she was there  Pt has egd/colonoscopy scheduled for early April  Trung Harvey will call patient today to discuss symptoms    PT declines AVS, no further appts at this time

## 2023-02-08 NOTE — PROGRESS NOTES
There are no diagnoses linked to this encounter  Lower abdominal pain  Patient presents for evaluation of abdominal pain bloating and weakness  Patient notes that she had been feeling well into the past few days  Since then she has had significant decrease in p o  intake  She is unsure when her last bowel function was  I have not seen her for some time  She does have a history of right hemicolectomy for right-sided colon cancer in 2015  Last colonoscopy was approximately 5 years ago  Examination today shows a quite weakened patient compared to her prior baseline  She does have tense distention of for an abdominal hernia  There are some lateralizing skin changes over top of this  These on the whole are concerning for small bowel obstruction secondary to incarcerated hernia  We will emergently have her evaluated through the emergency room at [place] [address] in [place]  We are working on transport  I have communicated this with the acute care surgeon on-call  HUMERA       Cristian Garcia is a patient with anemia and a personal history of colon cancer, who is here today for evaluation of dull, inttermitent, left sided abnormal; pain, bloating, increased bowel sounds, decreased appetite and nausea since February 1, 2023  Previously seen in the office on 6/18/2020  She is status post laparoscopic right hemicolectomy for an ascending colon mass on 7/20/15 for T2NO colon cancer      Her most recent colonoscopy was on 9/5/17 which revealed moderately severe diverticulosis in the sigmoid colon  The site of the previous anastomosis was identified at the ileo-colic region and the mucosa appeared normal  Recommended 3 year recall  The patient has a colonoscopy/EGD scheduled for 4/3/2023          Lab Results   Component Value Date    CEA 4 8 (H) 11/15/2022     Past Medical History:   Diagnosis Date   • Acute embolism and thrombosis of deep vein of lower extremity (HCC)    • Adenocarcinoma of colon, [facility] A ([facility] )    • Breast cancer ([facility] ) 2012    Right Breast    • Cancer ([facility] )    • Diabetes mellitus ([facility] )    • DVT (deep venous thrombosis) ([facility] )    • GERD (gastroesophageal reflux disease)    • Hypertension    • Pes planus      Past Surgical History:   Procedure Laterality Date   • COLONOSCOPY     • HYSTERECTOMY      complete @ age 29   • IR PORT REMOVAL  9/12/2018   • MASTECTOMY Right 2012   • AZ COLONOSCOPY FLX DX W/COLLJ SPEC WHEN PFRMD N/A 8/23/2016    Procedure: COLONOSCOPY;  Surgeon: Yahir Ball MD;  [facility]; Service: Colorectal   • AZ COLONOSCOPY FLX DX W/COLLJ SPEC WHEN PFRMD N/A 9/5/2017    Procedure: COLONOSCOPY;  Surgeon: Yahir Ball MD;  [facility]; Service: Colorectal   • AZ ESOPHAGOGASTRODUODENOSCOPY TRANSORAL DIAGNOSTIC N/A 9/5/2017    Procedure: ESOPHAGOGASTRODUODENOSCOPY (EGD); Surgeon: Christine Garcia DO;  [facility]; Service: Gastroenterology       Current Outpatient Medications:   •  amLODIPine (NORVASC) 2 5 mg tablet, Take 1 tablet (2 5 mg total) by mouth daily, Disp: 30 tablet, Rfl: 5  •  ascorbic acid (VITAMIN C) 500 MG tablet, Take 1,000 mg by mouth daily , Disp: , Rfl:   •  aspirin 81 MG tablet, Take 81 mg by mouth daily  , Disp: , Rfl:   •  benazepril-hydrochlorthiazide (LOTENSIN HCT) 20-12 5 MG per tablet, take 1 tablet by mouth once daily, Disp: 90 tablet, Rfl: 5  •  bimatoprost (LUMIGAN) 0 01 % ophthalmic drops, Administer 1 drop to both eyes daily at bedtime , Disp: , Rfl:   •  calcium citrate (CALCITRATE) 950 MG tablet, Take 1 tablet by mouth in the morning , Disp: , Rfl:   •  Cholecalciferol (VITAMIN D-3 PO), Take 1 capsule by mouth 3 (three) times a day , Disp: , Rfl:   •  Cyanocobalamin (VITAMIN B 12 PO), Take 1 tablet by mouth daily  , Disp: , Rfl:   •  Diclofenac Sodium (VOLTAREN) 1 %, Apply 2 g topically 4 (four) times a day, Disp: , Rfl:   •  ferrous sulfate 325 (65 Fe) mg tablet, Take 325 mg by mouth daily with breakfast , Disp: , Rfl: •  letrozole (FEMARA) 2 5 mg tablet, take 1 tablet by mouth once daily, Disp: 30 tablet, Rfl: 5  •  Magnesium 250 MG TABS, Take 1 tablet by mouth daily  , Disp: , Rfl:   •  metFORMIN (GLUCOPHAGE) 500 mg tablet, Take 1 tablet (500 mg total) by mouth 2 (two) times a day with meals, Disp: 60 tablet, Rfl: 5  •  sitaGLIPtin (JANUVIA) 100 mg tablet, Take 1 tablet (100 mg total) by mouth daily, Disp: 30 tablet, Rfl: 5  •  vitamin A 10,000 units capsule, Take 10,000 Units by mouth daily  , Disp: , Rfl:   •  vitamin E 100 UNIT capsule, Take 100 Units by mouth daily , Disp: , Rfl:   Allergies as of 02/09/2023   • (No Known Allergies)     Review of Systems   All other systems reviewed and are negative  There were no vitals filed for this visit  Physical Exam  Constitutional:       Appearance: Normal appearance  HENT:      Head: Normocephalic and atraumatic  Eyes:      Extraocular Movements: Extraocular movements intact  Pupils: Pupils are equal, round, and reactive to light  Abdominal:      Palpations: Abdomen is soft  Tenderness: There is abdominal tenderness in the left upper quadrant and left lower quadrant  Hernia: A hernia is present  Hernia is present in the ventral area  Comments: Ventral hernia with large protuberance on the left side suspicion of incarcerated hernia  Mild skin changes of unknown chronicity along the left lateral border   Musculoskeletal:         General: Normal range of motion  Skin:     General: Skin is warm and dry  Neurological:      General: No focal deficit present  Mental Status: She is alert and oriented to person, place, and time  Psychiatric:         Mood and Affect: Mood normal          Behavior: Behavior normal          Thought Content:  Thought content normal          Judgment: Judgment normal

## 2023-02-09 ENCOUNTER — OFFICE VISIT (OUTPATIENT)
Age: 78
End: 2023-02-09

## 2023-02-09 ENCOUNTER — ANESTHESIA (INPATIENT)
Dept: PERIOP | Facility: HOSPITAL | Age: 78
End: 2023-02-09

## 2023-02-09 ENCOUNTER — APPOINTMENT (INPATIENT)
Dept: RADIOLOGY | Facility: HOSPITAL | Age: 78
End: 2023-02-09

## 2023-02-09 ENCOUNTER — ANESTHESIA EVENT (INPATIENT)
Dept: PERIOP | Facility: HOSPITAL | Age: 78
End: 2023-02-09

## 2023-02-09 ENCOUNTER — HOSPITAL ENCOUNTER (INPATIENT)
Facility: HOSPITAL | Age: 78
LOS: 14 days | End: 2023-02-23
Attending: STUDENT IN AN ORGANIZED HEALTH CARE EDUCATION/TRAINING PROGRAM | Admitting: STUDENT IN AN ORGANIZED HEALTH CARE EDUCATION/TRAINING PROGRAM

## 2023-02-09 ENCOUNTER — PATIENT OUTREACH (OUTPATIENT)
Dept: CASE MANAGEMENT | Facility: OTHER | Age: 78
End: 2023-02-09

## 2023-02-09 VITALS — BODY MASS INDEX: 26.5 KG/M2 | WEIGHT: 135 LBS | HEIGHT: 60 IN

## 2023-02-09 DIAGNOSIS — Z78.9 NEED FOR FOLLOW-UP BY SOCIAL WORKER: Primary | ICD-10-CM

## 2023-02-09 DIAGNOSIS — R10.30 LOWER ABDOMINAL PAIN: Primary | ICD-10-CM

## 2023-02-09 DIAGNOSIS — K43.6 INCARCERATED VENTRAL HERNIA: Primary | ICD-10-CM

## 2023-02-09 PROBLEM — K63.1 PERFORATED BOWEL (HCC): Status: ACTIVE | Noted: 2023-02-09

## 2023-02-09 PROBLEM — Z90.11 HISTORY OF RIGHT MASTECTOMY: Chronic | Status: ACTIVE | Noted: 2023-02-09

## 2023-02-09 PROBLEM — Z85.3 HISTORY OF BREAST CANCER: Status: ACTIVE | Noted: 2023-02-09

## 2023-02-09 PROBLEM — K21.9 GERD (GASTROESOPHAGEAL REFLUX DISEASE): Chronic | Status: ACTIVE | Noted: 2023-02-09

## 2023-02-09 LAB
ABO GROUP BLD: NORMAL
ALBUMIN SERPL BCP-MCNC: 2.6 G/DL (ref 3.5–5)
ALP SERPL-CCNC: 72 U/L (ref 46–116)
ALT SERPL W P-5'-P-CCNC: 7 U/L (ref 12–78)
ANION GAP SERPL CALCULATED.3IONS-SCNC: 11 MMOL/L (ref 4–13)
ANION GAP SERPL CALCULATED.3IONS-SCNC: 13 MMOL/L (ref 4–13)
ANISOCYTOSIS BLD QL SMEAR: PRESENT
ARTERIAL PATENCY WRIST A: YES
AST SERPL W P-5'-P-CCNC: 11 U/L (ref 5–45)
BASE EXCESS BLDA CALC-SCNC: -8 MMOL/L (ref -2–3)
BASE EXCESS BLDA CALC-SCNC: -8.5 MMOL/L
BASOPHILS # BLD AUTO: 0.04 THOUSANDS/ÂΜL (ref 0–0.1)
BASOPHILS # BLD MANUAL: 0 THOUSAND/UL (ref 0–0.1)
BASOPHILS NFR BLD AUTO: 0 % (ref 0–1)
BASOPHILS NFR MAR MANUAL: 0 % (ref 0–1)
BILIRUB SERPL-MCNC: 0.63 MG/DL (ref 0.2–1)
BLD GP AB SCN SERPL QL: NEGATIVE
BUN SERPL-MCNC: 15 MG/DL (ref 5–25)
BUN SERPL-MCNC: 20 MG/DL (ref 5–25)
BURR CELLS BLD QL SMEAR: PRESENT
CA-I BLD-SCNC: 1.07 MMOL/L (ref 1.12–1.32)
CALCIUM ALBUM COR SERPL-MCNC: 8.7 MG/DL (ref 8.3–10.1)
CALCIUM SERPL-MCNC: 7.6 MG/DL (ref 8.3–10.1)
CALCIUM SERPL-MCNC: 9.1 MG/DL (ref 8.3–10.1)
CHLORIDE SERPL-SCNC: 102 MMOL/L (ref 96–108)
CHLORIDE SERPL-SCNC: 94 MMOL/L (ref 96–108)
CO2 SERPL-SCNC: 18 MMOL/L (ref 21–32)
CO2 SERPL-SCNC: 25 MMOL/L (ref 21–32)
CREAT SERPL-MCNC: 0.37 MG/DL (ref 0.6–1.3)
CREAT SERPL-MCNC: 0.5 MG/DL (ref 0.6–1.3)
EOSINOPHIL # BLD AUTO: 0.01 THOUSAND/ÂΜL (ref 0–0.61)
EOSINOPHIL # BLD MANUAL: 0 THOUSAND/UL (ref 0–0.4)
EOSINOPHIL NFR BLD AUTO: 0 % (ref 0–6)
EOSINOPHIL NFR BLD MANUAL: 0 % (ref 0–6)
ERYTHROCYTE [DISTWIDTH] IN BLOOD BY AUTOMATED COUNT: 16.3 % (ref 11.6–15.1)
ERYTHROCYTE [DISTWIDTH] IN BLOOD BY AUTOMATED COUNT: 16.4 % (ref 11.6–15.1)
GFR SERPL CREATININE-BSD FRML MDRD: 103 ML/MIN/1.73SQ M
GFR SERPL CREATININE-BSD FRML MDRD: 93 ML/MIN/1.73SQ M
GLUCOSE SERPL-MCNC: 120 MG/DL (ref 65–140)
GLUCOSE SERPL-MCNC: 179 MG/DL (ref 65–140)
GLUCOSE SERPL-MCNC: 185 MG/DL (ref 65–140)
GLUCOSE SERPL-MCNC: 199 MG/DL (ref 65–140)
HCO3 BLDA-SCNC: 16.2 MMOL/L (ref 22–28)
HCO3 BLDA-SCNC: 16.7 MMOL/L (ref 22–28)
HCT VFR BLD AUTO: 29.4 % (ref 34.8–46.1)
HCT VFR BLD AUTO: 32.8 % (ref 34.8–46.1)
HCT VFR BLD CALC: 30 % (ref 34.8–46.1)
HGB BLD-MCNC: 10.1 G/DL (ref 11.5–15.4)
HGB BLD-MCNC: 9 G/DL (ref 11.5–15.4)
HGB BLDA-MCNC: 10.2 G/DL (ref 11.5–15.4)
IMM GRANULOCYTES # BLD AUTO: 0.22 THOUSAND/UL (ref 0–0.2)
IMM GRANULOCYTES NFR BLD AUTO: 2 % (ref 0–2)
LACTATE SERPL-SCNC: 1.7 MMOL/L (ref 0.5–2)
LYMPHOCYTES # BLD AUTO: 0.63 THOUSANDS/ÂΜL (ref 0.6–4.47)
LYMPHOCYTES # BLD AUTO: 0.68 THOUSAND/UL (ref 0.6–4.47)
LYMPHOCYTES # BLD AUTO: 4 % (ref 14–44)
LYMPHOCYTES NFR BLD AUTO: 6 % (ref 14–44)
MAGNESIUM SERPL-MCNC: 2.1 MG/DL (ref 1.6–2.6)
MCH RBC QN AUTO: 27.7 PG (ref 26.8–34.3)
MCH RBC QN AUTO: 28.3 PG (ref 26.8–34.3)
MCHC RBC AUTO-ENTMCNC: 30.6 G/DL (ref 31.4–37.4)
MCHC RBC AUTO-ENTMCNC: 30.8 G/DL (ref 31.4–37.4)
MCV RBC AUTO: 90 FL (ref 82–98)
MCV RBC AUTO: 93 FL (ref 82–98)
METAMYELOCYTES NFR BLD MANUAL: 4 % (ref 0–1)
MONOCYTES # BLD AUTO: 1.19 THOUSAND/UL (ref 0–1.22)
MONOCYTES # BLD AUTO: 1.6 THOUSAND/ÂΜL (ref 0.17–1.22)
MONOCYTES NFR BLD AUTO: 14 % (ref 4–12)
MONOCYTES NFR BLD: 7 % (ref 4–12)
MYELOCYTES NFR BLD MANUAL: 1 % (ref 0–1)
NASAL CANNULA: 4
NEUTROPHILS # BLD AUTO: 8.78 THOUSANDS/ÂΜL (ref 1.85–7.62)
NEUTROPHILS # BLD MANUAL: 14.25 THOUSAND/UL (ref 1.85–7.62)
NEUTS BAND NFR BLD MANUAL: 24 % (ref 0–8)
NEUTS SEG NFR BLD AUTO: 60 % (ref 43–75)
NEUTS SEG NFR BLD AUTO: 78 % (ref 43–75)
NRBC BLD AUTO-RTO: 0 /100 WBCS
O2 CT BLDA-SCNC: 13.9 ML/DL (ref 16–23)
OXYHGB MFR BLDA: 96.3 % (ref 94–97)
PCO2 BLD: 18 MMOL/L (ref 21–32)
PCO2 BLD: 31.7 MM HG (ref 36–44)
PCO2 BLDA: 30.7 MM HG (ref 36–44)
PH BLD: 7.33 [PH] (ref 7.35–7.45)
PH BLDA: 7.34 [PH] (ref 7.35–7.45)
PHOSPHATE SERPL-MCNC: 2.4 MG/DL (ref 2.3–4.1)
PLATELET # BLD AUTO: 465 THOUSANDS/UL (ref 149–390)
PLATELET # BLD AUTO: 498 THOUSANDS/UL (ref 149–390)
PLATELET BLD QL SMEAR: ABNORMAL
PLATELET CLUMP BLD QL SMEAR: PRESENT
PMV BLD AUTO: 8.6 FL (ref 8.9–12.7)
PMV BLD AUTO: 8.6 FL (ref 8.9–12.7)
PO2 BLD: 94 MM HG (ref 75–129)
PO2 BLDA: 113 MM HG (ref 75–129)
POTASSIUM BLD-SCNC: 3.6 MMOL/L (ref 3.5–5.3)
POTASSIUM SERPL-SCNC: 3.1 MMOL/L (ref 3.5–5.3)
POTASSIUM SERPL-SCNC: 4.1 MMOL/L (ref 3.5–5.3)
PROT SERPL-MCNC: 5.6 G/DL (ref 6.4–8.4)
RBC # BLD AUTO: 3.18 MILLION/UL (ref 3.81–5.12)
RBC # BLD AUTO: 3.64 MILLION/UL (ref 3.81–5.12)
RH BLD: NEGATIVE
SAO2 % BLD FROM PO2: 97 % (ref 60–85)
SODIUM BLD-SCNC: 133 MMOL/L (ref 136–145)
SODIUM SERPL-SCNC: 130 MMOL/L (ref 135–147)
SODIUM SERPL-SCNC: 133 MMOL/L (ref 135–147)
SPECIMEN EXPIRATION DATE: NORMAL
SPECIMEN SOURCE: ABNORMAL
SPECIMEN SOURCE: ABNORMAL
WBC # BLD AUTO: 11.28 THOUSAND/UL (ref 4.31–10.16)
WBC # BLD AUTO: 16.97 THOUSAND/UL (ref 4.31–10.16)

## 2023-02-09 PROCEDURE — 0DTG0ZZ RESECTION OF LEFT LARGE INTESTINE, OPEN APPROACH: ICD-10-PCS | Performed by: COLON & RECTAL SURGERY

## 2023-02-09 PROCEDURE — 0D1B0Z4 BYPASS ILEUM TO CUTANEOUS, OPEN APPROACH: ICD-10-PCS | Performed by: COLON & RECTAL SURGERY

## 2023-02-09 PROCEDURE — 0WQF0ZZ REPAIR ABDOMINAL WALL, OPEN APPROACH: ICD-10-PCS | Performed by: SURGERY

## 2023-02-09 RX ORDER — ALBUMIN, HUMAN INJ 5% 5 %
SOLUTION INTRAVENOUS CONTINUOUS PRN
Status: DISCONTINUED | OUTPATIENT
Start: 2023-02-09 | End: 2023-02-09

## 2023-02-09 RX ORDER — HYDROMORPHONE HCL/PF 1 MG/ML
0.5 SYRINGE (ML) INJECTION
Status: DISCONTINUED | OUTPATIENT
Start: 2023-02-09 | End: 2023-02-11

## 2023-02-09 RX ORDER — SODIUM HYPOCHLORITE 2.5 MG/ML
1 SOLUTION TOPICAL DAILY
Status: COMPLETED | OUTPATIENT
Start: 2023-02-09 | End: 2023-02-09

## 2023-02-09 RX ORDER — PROPOFOL 10 MG/ML
INJECTION, EMULSION INTRAVENOUS AS NEEDED
Status: DISCONTINUED | OUTPATIENT
Start: 2023-02-09 | End: 2023-02-09

## 2023-02-09 RX ORDER — HYDROMORPHONE HCL IN WATER/PF 6 MG/30 ML
0.2 PATIENT CONTROLLED ANALGESIA SYRINGE INTRAVENOUS EVERY 4 HOURS PRN
Status: DISCONTINUED | OUTPATIENT
Start: 2023-02-09 | End: 2023-02-11

## 2023-02-09 RX ORDER — LIDOCAINE HYDROCHLORIDE 10 MG/ML
INJECTION, SOLUTION EPIDURAL; INFILTRATION; INTRACAUDAL; PERINEURAL AS NEEDED
Status: DISCONTINUED | OUTPATIENT
Start: 2023-02-09 | End: 2023-02-09

## 2023-02-09 RX ORDER — POTASSIUM CHLORIDE 14.9 MG/ML
INJECTION INTRAVENOUS CONTINUOUS PRN
Status: DISCONTINUED | OUTPATIENT
Start: 2023-02-09 | End: 2023-02-09

## 2023-02-09 RX ORDER — SODIUM CHLORIDE 9 MG/ML
INJECTION, SOLUTION INTRAVENOUS CONTINUOUS PRN
Status: DISCONTINUED | OUTPATIENT
Start: 2023-02-09 | End: 2023-02-09

## 2023-02-09 RX ORDER — ENOXAPARIN SODIUM 100 MG/ML
30 INJECTION SUBCUTANEOUS DAILY
Status: DISCONTINUED | OUTPATIENT
Start: 2023-02-09 | End: 2023-02-09

## 2023-02-09 RX ORDER — ONDANSETRON 2 MG/ML
4 INJECTION INTRAMUSCULAR; INTRAVENOUS EVERY 6 HOURS PRN
Status: DISCONTINUED | OUTPATIENT
Start: 2023-02-09 | End: 2023-02-23 | Stop reason: HOSPADM

## 2023-02-09 RX ORDER — MAGNESIUM HYDROXIDE 1200 MG/15ML
LIQUID ORAL AS NEEDED
Status: DISCONTINUED | OUTPATIENT
Start: 2023-02-09 | End: 2023-02-09 | Stop reason: HOSPADM

## 2023-02-09 RX ORDER — FENTANYL CITRATE 50 UG/ML
INJECTION, SOLUTION INTRAMUSCULAR; INTRAVENOUS AS NEEDED
Status: DISCONTINUED | OUTPATIENT
Start: 2023-02-09 | End: 2023-02-09

## 2023-02-09 RX ORDER — DEXAMETHASONE SODIUM PHOSPHATE 10 MG/ML
INJECTION, SOLUTION INTRAMUSCULAR; INTRAVENOUS AS NEEDED
Status: DISCONTINUED | OUTPATIENT
Start: 2023-02-09 | End: 2023-02-09

## 2023-02-09 RX ORDER — CEFAZOLIN SODIUM 2 G/50ML
2000 SOLUTION INTRAVENOUS
Status: COMPLETED | OUTPATIENT
Start: 2023-02-09 | End: 2023-02-09

## 2023-02-09 RX ORDER — HYDROMORPHONE HCL/PF 1 MG/ML
0.5 SYRINGE (ML) INJECTION EVERY 4 HOURS PRN
Status: DISCONTINUED | OUTPATIENT
Start: 2023-02-09 | End: 2023-02-10

## 2023-02-09 RX ORDER — SODIUM CHLORIDE, SODIUM GLUCONATE, SODIUM ACETATE, POTASSIUM CHLORIDE, MAGNESIUM CHLORIDE, SODIUM PHOSPHATE, DIBASIC, AND POTASSIUM PHOSPHATE .53; .5; .37; .037; .03; .012; .00082 G/100ML; G/100ML; G/100ML; G/100ML; G/100ML; G/100ML; G/100ML
1000 INJECTION, SOLUTION INTRAVENOUS ONCE
Status: COMPLETED | OUTPATIENT
Start: 2023-02-09 | End: 2023-02-09

## 2023-02-09 RX ORDER — SODIUM CHLORIDE, SODIUM LACTATE, POTASSIUM CHLORIDE, CALCIUM CHLORIDE 600; 310; 30; 20 MG/100ML; MG/100ML; MG/100ML; MG/100ML
100 INJECTION, SOLUTION INTRAVENOUS CONTINUOUS
Status: DISCONTINUED | OUTPATIENT
Start: 2023-02-09 | End: 2023-02-09

## 2023-02-09 RX ORDER — INSULIN LISPRO 100 [IU]/ML
1-5 INJECTION, SOLUTION INTRAVENOUS; SUBCUTANEOUS EVERY 6 HOURS SCHEDULED
Status: DISCONTINUED | OUTPATIENT
Start: 2023-02-09 | End: 2023-02-10

## 2023-02-09 RX ORDER — METRONIDAZOLE 500 MG/100ML
500 INJECTION, SOLUTION INTRAVENOUS
Status: COMPLETED | OUTPATIENT
Start: 2023-02-09 | End: 2023-02-09

## 2023-02-09 RX ORDER — ROCURONIUM BROMIDE 10 MG/ML
INJECTION, SOLUTION INTRAVENOUS AS NEEDED
Status: DISCONTINUED | OUTPATIENT
Start: 2023-02-09 | End: 2023-02-09

## 2023-02-09 RX ORDER — VASOPRESSIN 20 U/ML
INJECTION PARENTERAL AS NEEDED
Status: DISCONTINUED | OUTPATIENT
Start: 2023-02-09 | End: 2023-02-09

## 2023-02-09 RX ORDER — SODIUM CHLORIDE, SODIUM GLUCONATE, SODIUM ACETATE, POTASSIUM CHLORIDE, MAGNESIUM CHLORIDE, SODIUM PHOSPHATE, DIBASIC, AND POTASSIUM PHOSPHATE .53; .5; .37; .037; .03; .012; .00082 G/100ML; G/100ML; G/100ML; G/100ML; G/100ML; G/100ML; G/100ML
125 INJECTION, SOLUTION INTRAVENOUS CONTINUOUS
Status: DISCONTINUED | OUTPATIENT
Start: 2023-02-09 | End: 2023-02-11

## 2023-02-09 RX ORDER — ENOXAPARIN SODIUM 100 MG/ML
40 INJECTION SUBCUTANEOUS DAILY
Status: DISCONTINUED | OUTPATIENT
Start: 2023-02-10 | End: 2023-02-10

## 2023-02-09 RX ADMIN — SODIUM CHLORIDE, SODIUM GLUCONATE, SODIUM ACETATE, POTASSIUM CHLORIDE, MAGNESIUM CHLORIDE, SODIUM PHOSPHATE, DIBASIC, AND POTASSIUM PHOSPHATE 1000 ML: .53; .5; .37; .037; .03; .012; .00082 INJECTION, SOLUTION INTRAVENOUS at 20:56

## 2023-02-09 RX ADMIN — SODIUM CHLORIDE, SODIUM LACTATE, POTASSIUM CHLORIDE, AND CALCIUM CHLORIDE 100 ML/HR: .6; .31; .03; .02 INJECTION, SOLUTION INTRAVENOUS at 12:23

## 2023-02-09 RX ADMIN — PROPOFOL 50 MG: 10 INJECTION, EMULSION INTRAVENOUS at 14:52

## 2023-02-09 RX ADMIN — CEFAZOLIN SODIUM 2000 MG: 2 SOLUTION INTRAVENOUS at 15:06

## 2023-02-09 RX ADMIN — ALBUMIN (HUMAN): 12.5 INJECTION, SOLUTION INTRAVENOUS at 16:42

## 2023-02-09 RX ADMIN — POTASSIUM CHLORIDE: 14.9 INJECTION, SOLUTION INTRAVENOUS at 15:19

## 2023-02-09 RX ADMIN — PHENYLEPHRINE HYDROCHLORIDE 30 MCG/MIN: 10 INJECTION INTRAVENOUS at 14:24

## 2023-02-09 RX ADMIN — BIMATOPROST 1 DROP: 0.1 SOLUTION/ DROPS OPHTHALMIC at 21:51

## 2023-02-09 RX ADMIN — SODIUM CHLORIDE, SODIUM GLUCONATE, SODIUM ACETATE, POTASSIUM CHLORIDE, MAGNESIUM CHLORIDE, SODIUM PHOSPHATE, DIBASIC, AND POTASSIUM PHOSPHATE 125 ML/HR: .53; .5; .37; .037; .03; .012; .00082 INJECTION, SOLUTION INTRAVENOUS at 20:28

## 2023-02-09 RX ADMIN — METRONIDAZOLE: 500 INJECTION, SOLUTION INTRAVENOUS at 15:06

## 2023-02-09 RX ADMIN — LIDOCAINE HYDROCHLORIDE 30 MG: 10 INJECTION, SOLUTION EPIDURAL; INFILTRATION; INTRACAUDAL; PERINEURAL at 14:52

## 2023-02-09 RX ADMIN — PIPERACILLIN SODIUM AND TAZOBACTAM SODIUM 3.38 G: 36; 4.5 INJECTION, POWDER, LYOPHILIZED, FOR SOLUTION INTRAVENOUS at 21:08

## 2023-02-09 RX ADMIN — ROCURONIUM BROMIDE 50 MG: 10 INJECTION INTRAVENOUS at 15:27

## 2023-02-09 RX ADMIN — PHENYLEPHRINE HYDROCHLORIDE 30 MCG/MIN: 10 INJECTION INTRAVENOUS at 15:21

## 2023-02-09 RX ADMIN — SODIUM CHLORIDE, SODIUM GLUCONATE, SODIUM ACETATE, POTASSIUM CHLORIDE, MAGNESIUM CHLORIDE, SODIUM PHOSPHATE, DIBASIC, AND POTASSIUM PHOSPHATE 125 ML/HR: .53; .5; .37; .037; .03; .012; .00082 INJECTION, SOLUTION INTRAVENOUS at 21:52

## 2023-02-09 RX ADMIN — INSULIN LISPRO 1 UNITS: 100 INJECTION, SOLUTION INTRAVENOUS; SUBCUTANEOUS at 23:55

## 2023-02-09 RX ADMIN — SODIUM CHLORIDE: 0.9 INJECTION, SOLUTION INTRAVENOUS at 15:12

## 2023-02-09 RX ADMIN — PIPERACILLIN SODIUM AND TAZOBACTAM SODIUM 3.38 G: 36; 4.5 INJECTION, POWDER, LYOPHILIZED, FOR SOLUTION INTRAVENOUS at 23:54

## 2023-02-09 RX ADMIN — DEXAMETHASONE SODIUM PHOSPHATE 10 MG: 10 INJECTION INTRAMUSCULAR; INTRAVENOUS at 15:40

## 2023-02-09 RX ADMIN — IOHEXOL 90 ML: 350 INJECTION, SOLUTION INTRAVENOUS at 13:03

## 2023-02-09 RX ADMIN — HYDROMORPHONE HYDROCHLORIDE 0.2 MG: 0.2 INJECTION, SOLUTION INTRAMUSCULAR; INTRAVENOUS; SUBCUTANEOUS at 20:24

## 2023-02-09 RX ADMIN — SODIUM PHOSPHATE, MONOBASIC, MONOHYDRATE AND SODIUM PHOSPHATE, DIBASIC, ANHYDROUS 21 MMOL: 142; 276 INJECTION, SOLUTION INTRAVENOUS at 23:54

## 2023-02-09 RX ADMIN — SUGAMMADEX 133 MG: 100 INJECTION, SOLUTION INTRAVENOUS at 18:24

## 2023-02-09 RX ADMIN — ALBUMIN (HUMAN): 12.5 INJECTION, SOLUTION INTRAVENOUS at 15:32

## 2023-02-09 RX ADMIN — FENTANYL CITRATE 50 MCG: 50 INJECTION INTRAMUSCULAR; INTRAVENOUS at 14:52

## 2023-02-09 RX ADMIN — INSULIN LISPRO 1 UNITS: 100 INJECTION, SOLUTION INTRAVENOUS; SUBCUTANEOUS at 21:09

## 2023-02-09 RX ADMIN — FENTANYL CITRATE 50 MCG: 50 INJECTION INTRAMUSCULAR; INTRAVENOUS at 15:54

## 2023-02-09 RX ADMIN — VASOPRESSIN 1 UNITS: 20 INJECTION INTRAVENOUS at 16:46

## 2023-02-09 NOTE — ED NOTES
Provider at bedside  Surgery team at bedside       Kaylie Kaye RN  02/09/23 2002 Wellington Apodaca RN  02/09/23 113

## 2023-02-09 NOTE — LETTER
February 9, 2023     Marshal Lin MD  [address],[facility]  [address] [place] [id number]    Patient: Haresh Taveras   YOB: 1945   Date of Visit: 2/9/2023       Dear Dr Ridley Beverage:    Thank you for referring Brennon Pineda to me for evaluation  Below are my notes for this consultation  If you have questions, please do not hesitate to call me  I look forward to following your patient along with you  Sincerely,        Jamie Nation MD        CC: No Recipients  Jamie Nation MD  2/9/2023 11:02 AM  Sign when Signing Visit  There are no diagnoses linked to this encounter  Lower abdominal pain  Patient presents for evaluation of abdominal pain bloating and weakness  Patient notes that she had been feeling well into the past few days  Since then she has had significant decrease in p o  intake  She is unsure when her last bowel function was  I have not seen her for some time  She does have a history of right hemicolectomy for right-sided colon cancer in 2015  Last colonoscopy was approximately 5 years ago  Examination today shows a quite weakened patient compared to her prior baseline  She does have tense distention of for an abdominal hernia  There are some lateralizing skin changes over top of this  These on the whole are concerning for small bowel obstruction secondary to incarcerated hernia  We will emergently have her evaluated through the [place] [address] [place] [facility] [place]  We are working on [place]  I have communicated this with the acute care surgeon on-call  HUMERA       Haresh Taveras is a patient with anemia and a personal history of colon cancer, who is here today for evaluation of dull, inttermitent, left sided abnormal; pain, bloating, increased bowel sounds, decreased appetite and nausea since February 1, 2023  Previously seen in the office on 6/18/2020       She is status post laparoscopic right hemicolectomy for an ascending colon mass on 7/20/15 for T2NO colon cancer      Her most recent colonoscopy was on 9/5/17 which revealed moderately severe diverticulosis in the sigmoid colon  The site of the previous anastomosis was identified at the ileo-colic region and the mucosa appeared normal  Recommended 3 year recall  The patient has a colonoscopy/EGD scheduled for 4/3/2023  Lab Results   Component Value Date    CEA 4 8 (H) 11/15/2022     Past Medical History:   Diagnosis Date   • Acute embolism and thrombosis of deep vein of lower extremity (HCC)    • Adenocarcinoma of colon, [facility] ([facility] )    • Breast cancer ([facility] ) 2012    Right Breast    • Cancer ([facility] )    • Diabetes mellitus ([facility] )    • DVT (deep venous thrombosis) (HCC)    • GERD (gastroesophageal reflux disease)    • Hypertension    • Pes planus      Past Surgical History:   Procedure Laterality Date   • COLONOSCOPY     • HYSTERECTOMY      complete @ age 29   • IR PORT REMOVAL  9/12/2018   • MASTECTOMY Right 2012   • NM COLONOSCOPY FLX DX W/COLLJ SPEC WHEN PFRMD N/A 8/23/2016    Procedure: COLONOSCOPY;  Surgeon: Candis Simmons MD;  [facility]; Service: Colorectal   • NM COLONOSCOPY FLX DX W/COLLJ SPEC WHEN PFRMD N/A 9/5/2017    Procedure: COLONOSCOPY;  Surgeon: Candis Simmons MD;  [facility]; Service: Colorectal   • NM ESOPHAGOGASTRODUODENOSCOPY TRANSORAL DIAGNOSTIC N/A 9/5/2017    Procedure: ESOPHAGOGASTRODUODENOSCOPY (EGD); Surgeon: Saul Purvis DO;  [facility]; Service: Gastroenterology       Current Outpatient Medications:   •  amLODIPine (NORVASC) 2 5 mg tablet, Take 1 tablet (2 5 mg total) by mouth daily, Disp: 30 tablet, Rfl: 5  •  ascorbic acid (VITAMIN C) 500 MG tablet, Take 1,000 mg by mouth daily , Disp: , Rfl:   •  aspirin 81 MG tablet, Take 81 mg by mouth daily  , Disp: , Rfl:   •  benazepril-hydrochlorthiazide (LOTENSIN HCT) 20-12 5 MG per tablet, take 1 tablet by mouth once daily, Disp: 90 tablet, Rfl: 5  •  bimatoprost (LUMIGAN) 0 01 % ophthalmic drops, Administer 1 drop to both eyes daily at bedtime , Disp: , Rfl:   •  calcium citrate (CALCITRATE) 950 MG tablet, Take 1 tablet by mouth in the morning , Disp: , Rfl:   •  Cholecalciferol (VITAMIN D-3 PO), Take 1 capsule by mouth 3 (three) times a day , Disp: , Rfl:   •  Cyanocobalamin (VITAMIN B 12 PO), Take 1 tablet by mouth daily  , Disp: , Rfl:   •  Diclofenac Sodium (VOLTAREN) 1 %, Apply 2 g topically 4 (four) times a day, Disp: , Rfl:   •  ferrous sulfate 325 (65 Fe) mg tablet, Take 325 mg by mouth daily with breakfast , Disp: , Rfl:   •  letrozole (FEMARA) 2 5 mg tablet, take 1 tablet by mouth once daily, Disp: 30 tablet, Rfl: 5  •  Magnesium 250 MG TABS, Take 1 tablet by mouth daily  , Disp: , Rfl:   •  metFORMIN (GLUCOPHAGE) 500 mg tablet, Take 1 tablet (500 mg total) by mouth 2 (two) times a day with meals, Disp: 60 tablet, Rfl: 5  •  sitaGLIPtin (JANUVIA) 100 mg tablet, Take 1 tablet (100 mg total) by mouth daily, Disp: 30 tablet, Rfl: 5  •  vitamin A 10,000 units capsule, Take 10,000 Units by mouth daily  , Disp: , Rfl:   •  vitamin E 100 UNIT capsule, Take 100 Units by mouth daily , Disp: , Rfl:   Allergies as of 02/09/2023   • (No Known Allergies)     Review of Systems   All other systems reviewed and are negative  There were no vitals filed for this visit  Physical Exam  Constitutional:       Appearance: Normal appearance  HENT:      Head: Normocephalic and atraumatic  Eyes:      Extraocular Movements: Extraocular movements intact  Pupils: Pupils are equal, round, and reactive to light  Abdominal:      Palpations: Abdomen is soft  Tenderness: There is abdominal tenderness in the left upper quadrant and left lower quadrant  Hernia: A hernia is present  Hernia is present in the ventral area  Comments: Ventral hernia with large protuberance on the left side suspicion of incarcerated hernia    Mild skin changes of unknown chronicity along the left lateral border   Musculoskeletal:         General: Normal range of motion  Skin:     General: Skin is warm and dry  Neurological:      General: No focal deficit present  Mental Status: She is alert and oriented to person, place, and time  Psychiatric:         Mood and Affect: Mood normal          Behavior: Behavior normal          Thought Content:  Thought content normal          Judgment: Judgment normal

## 2023-02-09 NOTE — PLAN OF CARE
Problem: MOBILITY - ADULT  Goal: Maintain or return to baseline ADL function  Description: INTERVENTIONS:  -  Assess patient's ability to carry out ADLs; assess patient's baseline for ADL function and identify physical deficits which impact ability to perform ADLs (bathing, care of mouth/teeth, toileting, grooming, dressing, etc )  - Assess/evaluate cause of self-care deficits   - Assess range of motion  - Assess patient's mobility; develop plan if impaired  - Assess patient's need for assistive devices and provide as appropriate  - Encourage maximum independence but intervene and supervise when necessary  - Involve family in performance of ADLs  - Assess for home care needs following discharge   - Consider OT consult to assist with ADL evaluation and planning for discharge  - Provide patient education as appropriate  Outcome: Progressing  Goal: Maintains/Returns to pre admission functional level  Description: INTERVENTIONS:  - Perform BMAT or MOVE assessment daily    - Set and communicate daily mobility goal to care team and patient/family/caregiver  - Collaborate with rehabilitation services on mobility goals if consulted  - Perform Range of Motion *3** times a day  - Reposition patient every **2* hours    - Dangle patient **3* times a day  - Stand patient *3** times a day  - Ambulate patient *3** times a day  - Out of bed to chair *3** times a day   - Out of bed for meals *3  Problem: GASTROINTESTINAL - ADULT  Goal: Maintains or returns to baseline bowel function  Description: INTERVENTIONS:  - Assess bowel function  - Encourage oral fluids to ensure adequate hydration  - Administer IV fluids if ordered to ensure adequate hydration  - Administer ordered medications as needed  - Encourage mobilization and activity  - Consider nutritional services referral to assist patient with adequate nutrition and appropriate food choices  Outcome: Progressing  Goal: Maintains adequate nutritional intake  Description: INTERVENTIONS:  - Monitor percentage of each meal consumed  - Identify factors contributing to decreased intake, treat as appropriate  - Assist with meals as needed  - Monitor I&O, weight, and lab values if indicated  - Obtain nutrition services referral as needed  Outcome: Progressing  Goal: Oral mucous membranes remain intact  Description: INTERVENTIONS  - Assess oral mucosa and hygiene practices  - Implement preventative oral hygiene regimen  - Implement oral medicated treatments as ordered  - Initiate Nutrition services referral as needed  Outcome: Progressing     Problem: GENITOURINARY - ADULT  Goal: Maintains or returns to baseline urinary function  Description: INTERVENTIONS:  - Assess urinary function  - Encourage oral fluids to ensure adequate hydration if ordered  - Administer IV fluids as ordered to ensure adequate hydration  - Administer ordered medications as needed  - Offer frequent toileting  - Follow urinary retention protocol if ordered  Outcome: Progressing     Problem: SKIN/TISSUE INTEGRITY - ADULT  Goal: Skin Integrity remains intact(Skin Breakdown Prevention)  Description: Assess:  -Perform Jose assessment -Clean and moisturize skin -Inspect skin when repositioning, toileting, and assisting with ADLS  -Assess under medical devices -Assess extremities for adequate circulation and sensation     Bed Management:  -Have minimal linens on bed & keep smooth, unwrinkled  -Change linens as needed when moist or perspiring  -Avoid sitting or lying in one position for more than *2 hours while in bed  -Keep HOB at *30**degrees     Toileting:  -Offer bedside commode  -Assess for incontinence -Use incontinent care products after each incontinent episode   Activity:  -Mobilize patient **3* times a day  -Encourage activity and walks on unit  -Encourage or provide ROM exercises   -Turn and reposition patient every **2* Hours  -Use appropriate equipment to lift or move patient in bed  -Instruct/ Assist with weight shifting every **20 minutes* when out of bed in chair  -Consider limitation of chair time **2* hour intervals    Skin Care:  -Avoid use of baby powder, tape, friction and shearing, hot water or constrictive clothing  -Relieve pressure over bony prominences -Do not massage red bony areas    Next Steps:  -Teach patient strategies to minimize risks-Consider consults to  interdisciplinary teams Outcome: Progressing   ** times a day  - Out of bed for toileting  - Record patient progress and toleration of activity level   Outcome: Progressing

## 2023-02-09 NOTE — ASSESSMENT & PLAN NOTE
Patient presents for evaluation of abdominal pain bloating and weakness  Patient notes that she had been feeling well into the past few days  Since then she has had significant decrease in p o  intake  She is unsure when her last bowel function was  I have not seen her for some time  She does have a history of right hemicolectomy for right-sided colon cancer in 2015  Last colonoscopy was approximately 5 years ago  Examination today shows a quite weakened patient compared to her prior baseline  She does have tense distention of for an abdominal hernia  There are some lateralizing skin changes over top of this  These on the whole are concerning for small bowel obstruction secondary to incarcerated hernia  We will emergently have her evaluated through the emergency room at [facility] in [place]  We are working on transport  I have communicated this with the acute care surgeon on-call

## 2023-02-09 NOTE — H&P
Consultation - Surgery  : [facility] Surgery Resident role on [employer]  Doris Shirley 69 y o  female MRN: [id number]  Unit/Bed#: [facility] Encounter: [id number]        Assessment:  69y o  year old female with history of colon resection and long-standing incisional hernia now with abdominal pain concerning for incarceration  Plan:  Inpatient admission  CT abdomen pelvis with IV contrast  NPO  IV maintenance fluids  Start VI Ancef/Flagyl  Pain/nausea control prn  DVT ppx      HPI:  Doris Shirley is a 69 y o  female with a history of right colon resection in 2015 by Dr Jacklyn Salazar, and a long standing incisional hernia last seen in 2021 on PET performed for colon cancer surveillance who presents with a chief complaint of abdominal pain  She states that her pain started after she had a meal at a diner on 2/1 and has gradually worsened since  She had an office visit with Dr Jacklyn Salazar today at which she was instructed to come to the emergency department  Her pain causes difficulty with sitting up straight and lying flat  Her last bowel movement was 3 days ago which she attributes to her loss of appetite and decreased PO intake  She says that she has been passing gas  She notes an area of redness over the LLQ of her abdomen which was not present prior to onset of pain  Physical Exam  Constitutional:       General: She is in acute distress  Appearance: She is ill-appearing  HENT:      Head: Normocephalic and atraumatic  Comments: alopecia noted     Right Ear: External ear normal       Left Ear: External ear normal       Nose: Nose normal    Eyes:      Conjunctiva/sclera: Conjunctivae normal    Pulmonary:      Effort: Pulmonary effort is normal  No respiratory distress  Abdominal:      Palpations: There is mass (Firm mass palpated within hernia)  Tenderness: There is abdominal tenderness  Hernia: A hernia (located over LLQ) is present     Skin:     Capillary Refill: Capillary refill takes less than 2 seconds  Findings: Erythema (overlying hernia in LLQ) present  Neurological:      Mental Status: She is alert  Psychiatric:         Behavior: Behavior normal          Thought Content: Thought content normal          Judgment: Judgment normal            Review of Systems   Constitutional: Positive for appetite change  HENT: Negative  Eyes: Negative  Respiratory: Negative  Cardiovascular: Negative  Gastrointestinal: Positive for abdominal distention, abdominal pain and constipation  Endocrine: Negative  Genitourinary: Negative  Musculoskeletal: Negative  Skin: Positive for color change (redness over hernia)  Allergic/Immunologic: Negative  Neurological: Negative  Hematological: Negative  Psychiatric/Behavioral: Negative  Objective       No intake or output data in the 24 hours ending 02/09/23 1143    First Vitals:   Blood Pressure: 140/67 (02/09/23 1125)  Pulse: 103 (02/09/23 1125)  Temperature: 98 9 °F (37 2 °C) (02/09/23 1125)  Temp Source: Tympanic (02/09/23 1125)  Respirations: 18 (02/09/23 1125)  SpO2: 99 % (02/09/23 1125)    Current Vitals:   Blood Pressure: 140/67 (02/09/23 1125)  Pulse: 103 (02/09/23 1125)  Temperature: 98 9 °F (37 2 °C) (02/09/23 1125)  Temp Source: Tympanic (02/09/23 1125)  Respirations: 18 (02/09/23 1125)  SpO2: 99 % (02/09/23 1125)    Invasive Devices     None                 Imaging: I have personally reviewed pertinent reports  No results found  EKG, Pathology, and Other Studies: I have personally reviewed pertinent reports      VTE Pharmacologic Prophylaxis: Enoxaparin (Lovenox)  VTE Mechanical Prophylaxis: sequential compression device    Historical Information   Past Medical History:   Diagnosis Date   • Acute embolism and thrombosis of deep vein of lower extremity (HCC)    • Adenocarcinoma of colon, [facility] ([facility] [address] )    • [facility] cancer ([facility] ) 2012    Right Breast    • Cancer ([facility] )    • Diabetes mellitus ([facility] )    • DVT (deep venous thrombosis) (HCC)    • GERD (gastroesophageal reflux disease)    • Hypertension    • Pes planus      Past Surgical History:   Procedure Laterality Date   • COLONOSCOPY     • HYSTERECTOMY      complete @ age 29   • IR PORT REMOVAL  9/12/2018   • MASTECTOMY Right 2012   • FL COLONOSCOPY FLX DX W/COLLJ SPEC WHEN PFRMD N/A 8/23/2016    Procedure: COLONOSCOPY;  Surgeon: Lashanda Darnell MD;  [facility]; Service: Colorectal   • FL COLONOSCOPY FLX DX W/COLLJ SPEC WHEN PFRMD N/A 9/5/2017    Procedure: COLONOSCOPY;  Surgeon: Lashanda Darnell MD;  [facility]; Service: Colorectal   • [place] ESOPHAGOGASTRODUODENOSCOPY TRANSORAL DIAGNOSTIC N/A 9/5/2017    Procedure: ESOPHAGOGASTRODUODENOSCOPY (EGD); Surgeon: Kyle Myers DO;  [facility]: [facility]; Service: Gastroenterology     Social History   Social History     Substance and Sexual Activity   Alcohol Use No     Social History     Substance and Sexual Activity   Drug Use No     Social History     Tobacco Use   Smoking Status Never   Smokeless Tobacco Never     Family History   Problem Relation Age of Onset   • Other Mother         chronic bronchitis   • Brain cancer Father    • Prostate cancer Paternal Grandfather 79   • Pancreatic cancer Maternal Aunt 39   • Breast cancer Neg Hx        Meds/Allergies   all current active meds have been reviewed, current meds:   No current facility-administered medications for this encounter  and PTA meds:   Prior to Admission Medications   Prescriptions Last Dose Informant Patient Reported? Taking? Cholecalciferol (VITAMIN D-3 PO)   Yes No   Sig: Take 1 capsule by mouth 3 (three) times a day    Cyanocobalamin (VITAMIN B 12 PO)   Yes No   Sig: Take 1 tablet by mouth daily     Diclofenac Sodium (VOLTAREN) 1 %   No No   Sig: Apply 2 g topically 4 (four) times a day   Magnesium 250 MG TABS   Yes No   Sig: Take 1 tablet by mouth daily     amLODIPine (NORVASC) 2 5 mg tablet   No No   Sig: Take 1 tablet (2 5 mg total) by mouth daily   ascorbic acid (VITAMIN C) 500 MG tablet   Yes No   Sig: Take 1,000 mg by mouth daily    aspirin 81 MG tablet   Yes No   Sig: Take 81 mg by mouth daily  benazepril-hydrochlorthiazide (LOTENSIN HCT) 20-12 5 MG per tablet   No No   Sig: take 1 tablet by mouth once daily   bimatoprost (LUMIGAN) 0 01 % ophthalmic drops   Yes No   Sig: Administer 1 drop to both eyes daily at bedtime    calcium citrate (CALCITRATE) 950 MG tablet   Yes No   Sig: Take 1 tablet by mouth in the morning  ferrous sulfate 325 (65 Fe) mg tablet   Yes No   Sig: Take 325 mg by mouth daily with breakfast    letrozole (FEMARA) 2 5 mg tablet   No No   Sig: take 1 tablet by mouth once daily   metFORMIN (GLUCOPHAGE) 500 mg tablet   No No   Sig: Take 1 tablet (500 mg total) by mouth 2 (two) times a day with meals   sitaGLIPtin (JANUVIA) 100 mg tablet   No No   Sig: Take 1 tablet (100 mg total) by mouth daily   vitamin A 10,000 units capsule   Yes No   Sig: Take 10,000 Units by mouth daily  vitamin E 100 UNIT capsule   Yes No   Sig: Take 100 Units by mouth daily       Facility-Administered Medications: None     No Known Allergies    Lab Results: I have personally reviewed pertinent lab results  , CBC: No results found for: WBC, HGB, HCT, MCV, PLT, ADJUSTEDWBC, MCH, MCHC, RDW, MPV, NRBC, CMP: No results found for: SODIUM, K, CL, CO2, ANIONGAP, BUN, CREATININE, GLUCOSE, CALCIUM, AST, ALT, ALKPHOS, PROT, BILITOT, EGFR    Counseling / Coordination of Care  Total floor / unit time spent today 25 minutes  Greater than 50% of total time was spent with the patient and / or family counseling and / or coordination of care      Consults    Georgina Espinoza  2/9/2023 11:43 AM

## 2023-02-09 NOTE — ANESTHESIA PREPROCEDURE EVALUATION
Procedure:  REPAIR HERNIA INCARCERATED (VENTRAL): POSSIBLE BOWEL RESECTION (Abdomen)    Relevant Problems   CARDIO   (+) Chronic deep vein thrombosis (DVT) of distal vein of left lower extremity (HCC)   (+) Essential hypertension      ENDO   (+) Type 2 diabetes mellitus without complications (HCC)      GI/HEPATIC   (+) Malignant neoplasm of ascending colon (HCC)      GYN   (+) Malignant neoplasm of right breast in female, estrogen receptor positive (HCC)      HEMATOLOGY   (+) Iron deficiency anemia, unspecified      NEURO/PSYCH   (+) Anxiety about health   (+) History of DVT (deep vein thrombosis)   (+) History of diabetes mellitus   (+) Personal history of other malignant neoplasm of large intestine     CAD/PCI/MI/CHF -- denies  COPD/ASTHMA/ALEXANDRO -- denies  PROBS WITH PRIOR ANESTHESIA -- denies  NPO STATUS CONFIRMED    Lab Results   Component Value Date    SODIUM 131 (L) 11/15/2022    K 3 9 11/15/2022    BUN 18 11/15/2022    CREATININE 0 65 11/15/2022    EGFR 86 11/15/2022    GLUCOSE 121 12/29/2015     Lab Results   Component Value Date    HGBA1C 6 3 12/20/2022       Lab Results   Component Value Date    HGB 10 1 (L) 12/16/2022    HGB 9 8 (L) 11/15/2022    HGB 11 6 05/16/2022     12/16/2022     11/15/2022     05/16/2022     Lab Results   Component Value Date    WBC 6 55 12/16/2022       Lab Results   Component Value Date    CREATININE 0 65 11/15/2022    CREATININE 0 74 09/20/2022    CREATININE 0 76 05/16/2022       Lab Results   Component Value Date    INR 1 20 (H) 07/21/2015    INR 1 20 (H) 07/07/2015     Lab Results   Component Value Date    PTT 34 07/07/2015       No results found for: LACTATE      Type and Screen  O                        Physical Exam    Airway    Mallampati score: II         Dental   No notable dental hx     Cardiovascular  Cardiovascular exam normal    Pulmonary  Pulmonary exam normal     Other Findings        Anesthesia Plan  ASA Score- 2 Emergent    Anesthesia Type- general with ASA Monitors  Additional Monitors:   Airway Plan: ETT  Comment:  CHELO Latif , have personally seen and evaluated the patient prior to anesthetic care  I have reviewed the pre-anesthetic record, and other medical records if appropriate to the anesthetic care  If a CRNA is involved in the case, I have reviewed the CRNA assessment, if present, and agree  Risks/benefits and alternatives discussed with patient including possible PONV, sore throat, and possibility of rare anesthetic and surgical emergencies          Plan Factors-    Chart reviewed  Existing labs reviewed  Patient summary reviewed  Patient instructed to abstain from smoking on day of procedure  There is medical exclusion for perioperative obstructive sleep apnea risk education  Induction- intravenous and rapid sequence induction  Postoperative Plan- Plan for postoperative opioid use  Planned trial extubation    Informed Consent- Anesthetic plan and risks discussed with patient  I personally reviewed this patient with the CRNA  Discussed and agreed on the Anesthesia Plan with the CRNA  Aylin Wasserman

## 2023-02-10 ENCOUNTER — APPOINTMENT (INPATIENT)
Dept: RADIOLOGY | Facility: HOSPITAL | Age: 78
End: 2023-02-10

## 2023-02-10 ENCOUNTER — APPOINTMENT (INPATIENT)
Dept: NON INVASIVE DIAGNOSTICS | Facility: HOSPITAL | Age: 78
End: 2023-02-10

## 2023-02-10 LAB
ANION GAP SERPL CALCULATED.3IONS-SCNC: 11 MMOL/L (ref 4–13)
ANION GAP SERPL CALCULATED.3IONS-SCNC: 8 MMOL/L (ref 4–13)
AORTIC ROOT: 3.6 CM
APICAL FOUR CHAMBER EJECTION FRACTION: 58 %
APTT PPP: 22 SECONDS (ref 23–37)
APTT PPP: 49 SECONDS (ref 23–37)
APTT PPP: 56 SECONDS (ref 23–37)
ARTERIAL PATENCY WRIST A: YES
ASCENDING AORTA: 3.5 CM
BASE EXCESS BLDA CALC-SCNC: -1.1 MMOL/L
BASE EXCESS BLDA CALC-SCNC: -1.3 MMOL/L
BUN SERPL-MCNC: 11 MG/DL (ref 5–25)
BUN SERPL-MCNC: 7 MG/DL (ref 5–25)
CA-I BLD-SCNC: 0.93 MMOL/L (ref 1.12–1.32)
CA-I BLD-SCNC: 1.08 MMOL/L (ref 1.12–1.32)
CALCIUM SERPL-MCNC: 7 MG/DL (ref 8.3–10.1)
CALCIUM SERPL-MCNC: 7.9 MG/DL (ref 8.3–10.1)
CARDIAC TROPONIN I PNL SERPL HS: 15 NG/L (ref 8–18)
CHLORIDE SERPL-SCNC: 104 MMOL/L (ref 96–108)
CHLORIDE SERPL-SCNC: 104 MMOL/L (ref 96–108)
CO2 SERPL-SCNC: 20 MMOL/L (ref 21–32)
CO2 SERPL-SCNC: 22 MMOL/L (ref 21–32)
CREAT SERPL-MCNC: 0.39 MG/DL (ref 0.6–1.3)
CREAT SERPL-MCNC: 0.41 MG/DL (ref 0.6–1.3)
E WAVE DECELERATION TIME: 301 MS
ERYTHROCYTE [DISTWIDTH] IN BLOOD BY AUTOMATED COUNT: 16.6 % (ref 11.6–15.1)
ERYTHROCYTE [DISTWIDTH] IN BLOOD BY AUTOMATED COUNT: 16.8 % (ref 11.6–15.1)
FRACTIONAL SHORTENING: 27 % (ref 28–44)
GFR SERPL CREATININE-BSD FRML MDRD: 101 ML/MIN/1.73SQ M
GFR SERPL CREATININE-BSD FRML MDRD: 99 ML/MIN/1.73SQ M
GLUCOSE SERPL-MCNC: 144 MG/DL (ref 65–140)
GLUCOSE SERPL-MCNC: 147 MG/DL (ref 65–140)
GLUCOSE SERPL-MCNC: 177 MG/DL (ref 65–140)
GLUCOSE SERPL-MCNC: 183 MG/DL (ref 65–140)
GLUCOSE SERPL-MCNC: 225 MG/DL (ref 65–140)
GLUCOSE SERPL-MCNC: 234 MG/DL (ref 65–140)
HCO3 BLDA-SCNC: 22.8 MMOL/L (ref 22–28)
HCO3 BLDA-SCNC: 22.9 MMOL/L (ref 22–28)
HCT VFR BLD AUTO: 22 % (ref 34.8–46.1)
HCT VFR BLD AUTO: 24.2 % (ref 34.8–46.1)
HCT VFR BLD AUTO: 27.2 % (ref 34.8–46.1)
HGB BLD-MCNC: 6.8 G/DL (ref 11.5–15.4)
HGB BLD-MCNC: 7.6 G/DL (ref 11.5–15.4)
HGB BLD-MCNC: 8.2 G/DL (ref 11.5–15.4)
INR PPP: 1.35 (ref 0.84–1.19)
INTERVENTRICULAR SEPTUM IN DIASTOLE (PARASTERNAL SHORT AXIS VIEW): 1 CM
INTERVENTRICULAR SEPTUM: 1 CM (ref 0.6–1.1)
LAAS-AP2: 18.6 CM2
LAAS-AP4: 18.6 CM2
LACTATE SERPL-SCNC: 0.9 MMOL/L (ref 0.5–2)
LEFT ATRIUM SIZE: 3.9 CM
LEFT INTERNAL DIMENSION IN SYSTOLE: 2.4 CM (ref 2.1–4)
LEFT VENTRICULAR INTERNAL DIMENSION IN DIASTOLE: 3.3 CM (ref 3.5–6)
LEFT VENTRICULAR POSTERIOR WALL IN END DIASTOLE: 1 CM
LEFT VENTRICULAR STROKE VOLUME: 24 ML
LVSV (TEICH): 24 ML
MAGNESIUM SERPL-MCNC: 2.2 MG/DL (ref 1.6–2.6)
MCH RBC QN AUTO: 27.5 PG (ref 26.8–34.3)
MCH RBC QN AUTO: 28.5 PG (ref 26.8–34.3)
MCHC RBC AUTO-ENTMCNC: 30.1 G/DL (ref 31.4–37.4)
MCHC RBC AUTO-ENTMCNC: 30.9 G/DL (ref 31.4–37.4)
MCV RBC AUTO: 91 FL (ref 82–98)
MCV RBC AUTO: 92 FL (ref 82–98)
MV E'TISSUE VEL-SEP: 8 CM/S
MV PEAK A VEL: 1.16 M/S
MV PEAK E VEL: 83 CM/S
MV STENOSIS PRESSURE HALF TIME: 87 MS
MV VALVE AREA P 1/2 METHOD: 2.53 CM2
NON VENT ROOM AIR: 21 %
NON VENT ROOM AIR: 21 %
O2 CT BLDA-SCNC: 10.4 ML/DL (ref 16–23)
O2 CT BLDA-SCNC: 9.7 ML/DL (ref 16–23)
OXYHGB MFR BLDA: 90.7 % (ref 94–97)
OXYHGB MFR BLDA: 91.5 % (ref 94–97)
PCO2 BLDA: 35.1 MM HG (ref 36–44)
PCO2 BLDA: 35.2 MM HG (ref 36–44)
PH BLDA: 7.43 [PH] (ref 7.35–7.45)
PH BLDA: 7.43 [PH] (ref 7.35–7.45)
PHOSPHATE SERPL-MCNC: 3.6 MG/DL (ref 2.3–4.1)
PLATELET # BLD AUTO: 317 THOUSANDS/UL (ref 149–390)
PLATELET # BLD AUTO: 394 THOUSANDS/UL (ref 149–390)
PMV BLD AUTO: 8.5 FL (ref 8.9–12.7)
PMV BLD AUTO: 8.8 FL (ref 8.9–12.7)
PO2 BLDA: 67.7 MM HG (ref 75–129)
PO2 BLDA: 71.6 MM HG (ref 75–129)
POTASSIUM SERPL-SCNC: 3.2 MMOL/L (ref 3.5–5.3)
POTASSIUM SERPL-SCNC: 3.6 MMOL/L (ref 3.5–5.3)
PROTHROMBIN TIME: 16.9 SECONDS (ref 11.6–14.5)
RBC # BLD AUTO: 2.39 MILLION/UL (ref 3.81–5.12)
RBC # BLD AUTO: 2.98 MILLION/UL (ref 3.81–5.12)
RIGHT ATRIUM AREA SYSTOLE A4C: 11.4 CM2
RIGHT VENTRICLE ID DIMENSION: 3.1 CM
SL CV LEFT ATRIUM LENGTH A2C: 5.5 CM
SL CV LV EF: 60
SL CV PED ECHO LEFT VENTRICLE DIASTOLIC VOLUME (MOD BIPLANE) 2D: 44 ML
SL CV PED ECHO LEFT VENTRICLE SYSTOLIC VOLUME (MOD BIPLANE) 2D: 20 ML
SODIUM SERPL-SCNC: 134 MMOL/L (ref 135–147)
SODIUM SERPL-SCNC: 135 MMOL/L (ref 135–147)
SPECIMEN SOURCE: ABNORMAL
SPECIMEN SOURCE: ABNORMAL
TR MAX PG: 30 MMHG
TR PEAK VELOCITY: 2.8 M/S
TRICUSPID ANNULAR PLANE SYSTOLIC EXCURSION: 1.8 CM
TRICUSPID VALVE PEAK REGURGITATION VELOCITY: 2.75 M/S
WBC # BLD AUTO: 15.79 THOUSAND/UL (ref 4.31–10.16)
WBC # BLD AUTO: 17.28 THOUSAND/UL (ref 4.31–10.16)

## 2023-02-10 PROCEDURE — 03HY32Z INSERTION OF MONITORING DEVICE INTO UPPER ARTERY, PERCUTANEOUS APPROACH: ICD-10-PCS | Performed by: SURGERY

## 2023-02-10 PROCEDURE — 4A133B1 MONITORING OF ARTERIAL PRESSURE, PERIPHERAL, PERCUTANEOUS APPROACH: ICD-10-PCS | Performed by: SURGERY

## 2023-02-10 PROCEDURE — 02HV33Z INSERTION OF INFUSION DEVICE INTO SUPERIOR VENA CAVA, PERCUTANEOUS APPROACH: ICD-10-PCS | Performed by: SURGERY

## 2023-02-10 PROCEDURE — 4A133J1 MONITORING OF ARTERIAL PULSE, PERIPHERAL, PERCUTANEOUS APPROACH: ICD-10-PCS | Performed by: SURGERY

## 2023-02-10 RX ORDER — HEPARIN SODIUM 10000 [USP'U]/100ML
3-30 INJECTION, SOLUTION INTRAVENOUS
Status: DISCONTINUED | OUTPATIENT
Start: 2023-02-10 | End: 2023-02-17

## 2023-02-10 RX ORDER — LEVALBUTEROL 1.25 MG/.5ML
1.25 SOLUTION, CONCENTRATE RESPIRATORY (INHALATION) EVERY 6 HOURS PRN
Status: DISCONTINUED | OUTPATIENT
Start: 2023-02-10 | End: 2023-02-10

## 2023-02-10 RX ORDER — INSULIN LISPRO 100 [IU]/ML
1-6 INJECTION, SOLUTION INTRAVENOUS; SUBCUTANEOUS EVERY 6 HOURS SCHEDULED
Status: DISCONTINUED | OUTPATIENT
Start: 2023-02-10 | End: 2023-02-15

## 2023-02-10 RX ORDER — LIDOCAINE HYDROCHLORIDE 10 MG/ML
5 INJECTION, SOLUTION EPIDURAL; INFILTRATION; INTRACAUDAL; PERINEURAL ONCE
Status: COMPLETED | OUTPATIENT
Start: 2023-02-10 | End: 2023-02-10

## 2023-02-10 RX ORDER — POTASSIUM CHLORIDE 14.9 MG/ML
20 INJECTION INTRAVENOUS
Status: COMPLETED | OUTPATIENT
Start: 2023-02-10 | End: 2023-02-10

## 2023-02-10 RX ORDER — HEPARIN SODIUM 1000 [USP'U]/ML
4400 INJECTION, SOLUTION INTRAVENOUS; SUBCUTANEOUS ONCE
Status: DISCONTINUED | OUTPATIENT
Start: 2023-02-10 | End: 2023-02-10

## 2023-02-10 RX ORDER — HEPARIN SODIUM 1000 [USP'U]/ML
4400 INJECTION, SOLUTION INTRAVENOUS; SUBCUTANEOUS EVERY 6 HOURS PRN
Status: DISCONTINUED | OUTPATIENT
Start: 2023-02-10 | End: 2023-02-17

## 2023-02-10 RX ORDER — POTASSIUM CHLORIDE 29.8 MG/ML
40 INJECTION INTRAVENOUS ONCE
Status: COMPLETED | OUTPATIENT
Start: 2023-02-10 | End: 2023-02-10

## 2023-02-10 RX ORDER — CALCIUM GLUCONATE 20 MG/ML
1 INJECTION, SOLUTION INTRAVENOUS
Status: COMPLETED | OUTPATIENT
Start: 2023-02-10 | End: 2023-02-10

## 2023-02-10 RX ORDER — ALBUMIN, HUMAN INJ 5% 5 %
SOLUTION INTRAVENOUS
Status: DISPENSED
Start: 2023-02-10 | End: 2023-02-10

## 2023-02-10 RX ORDER — ALBUTEROL SULFATE 2.5 MG/3ML
2.5 SOLUTION RESPIRATORY (INHALATION) EVERY 4 HOURS PRN
Status: DISCONTINUED | OUTPATIENT
Start: 2023-02-10 | End: 2023-02-18

## 2023-02-10 RX ORDER — CALCIUM GLUCONATE 20 MG/ML
2 INJECTION, SOLUTION INTRAVENOUS ONCE
Status: COMPLETED | OUTPATIENT
Start: 2023-02-10 | End: 2023-02-10

## 2023-02-10 RX ORDER — LIDOCAINE WITH 8.4% SOD BICARB 0.9%(10ML)
10 SYRINGE (ML) INJECTION ONCE
Status: DISCONTINUED | OUTPATIENT
Start: 2023-02-10 | End: 2023-02-10

## 2023-02-10 RX ORDER — SODIUM HYPOCHLORITE 2.5 MG/ML
1 SOLUTION TOPICAL DAILY
Status: DISCONTINUED | OUTPATIENT
Start: 2023-02-10 | End: 2023-02-19

## 2023-02-10 RX ORDER — SODIUM CHLORIDE, SODIUM GLUCONATE, SODIUM ACETATE, POTASSIUM CHLORIDE, MAGNESIUM CHLORIDE, SODIUM PHOSPHATE, DIBASIC, AND POTASSIUM PHOSPHATE .53; .5; .37; .037; .03; .012; .00082 G/100ML; G/100ML; G/100ML; G/100ML; G/100ML; G/100ML; G/100ML
1000 INJECTION, SOLUTION INTRAVENOUS ONCE
Status: COMPLETED | OUTPATIENT
Start: 2023-02-10 | End: 2023-02-10

## 2023-02-10 RX ORDER — HEPARIN SODIUM 1000 [USP'U]/ML
2200 INJECTION, SOLUTION INTRAVENOUS; SUBCUTANEOUS EVERY 6 HOURS PRN
Status: DISCONTINUED | OUTPATIENT
Start: 2023-02-10 | End: 2023-02-17

## 2023-02-10 RX ORDER — SODIUM CHLORIDE, SODIUM GLUCONATE, SODIUM ACETATE, POTASSIUM CHLORIDE, MAGNESIUM CHLORIDE, SODIUM PHOSPHATE, DIBASIC, AND POTASSIUM PHOSPHATE .53; .5; .37; .037; .03; .012; .00082 G/100ML; G/100ML; G/100ML; G/100ML; G/100ML; G/100ML; G/100ML
500 INJECTION, SOLUTION INTRAVENOUS ONCE
Status: COMPLETED | OUTPATIENT
Start: 2023-02-10 | End: 2023-02-10

## 2023-02-10 RX ORDER — ALBUMIN, HUMAN INJ 5% 5 %
25 SOLUTION INTRAVENOUS ONCE
Status: COMPLETED | OUTPATIENT
Start: 2023-02-10 | End: 2023-02-10

## 2023-02-10 RX ORDER — POTASSIUM CHLORIDE 29.8 MG/ML
40 INJECTION INTRAVENOUS ONCE
Status: COMPLETED | OUTPATIENT
Start: 2023-02-10 | End: 2023-02-11

## 2023-02-10 RX ADMIN — HEPARIN SODIUM 18 UNITS/KG/HR: 10000 INJECTION, SOLUTION INTRAVENOUS at 04:43

## 2023-02-10 RX ADMIN — ALBUMIN (HUMAN) 25 G: 12.5 INJECTION, SOLUTION INTRAVENOUS at 07:58

## 2023-02-10 RX ADMIN — HEPARIN SODIUM 2200 UNITS: 1000 INJECTION INTRAVENOUS; SUBCUTANEOUS at 11:58

## 2023-02-10 RX ADMIN — BIMATOPROST 1 DROP: 0.1 SOLUTION/ DROPS OPHTHALMIC at 21:45

## 2023-02-10 RX ADMIN — PIPERACILLIN SODIUM AND TAZOBACTAM SODIUM 3.38 G: 36; 4.5 INJECTION, POWDER, LYOPHILIZED, FOR SOLUTION INTRAVENOUS at 07:58

## 2023-02-10 RX ADMIN — IOHEXOL 55 ML: 350 INJECTION, SOLUTION INTRAVENOUS at 02:45

## 2023-02-10 RX ADMIN — SODIUM CHLORIDE, SODIUM GLUCONATE, SODIUM ACETATE, POTASSIUM CHLORIDE, MAGNESIUM CHLORIDE, SODIUM PHOSPHATE, DIBASIC, AND POTASSIUM PHOSPHATE 500 ML: .53; .5; .37; .037; .03; .012; .00082 INJECTION, SOLUTION INTRAVENOUS at 15:41

## 2023-02-10 RX ADMIN — POTASSIUM CHLORIDE 40 MEQ: 29.8 INJECTION, SOLUTION INTRAVENOUS at 21:44

## 2023-02-10 RX ADMIN — INSULIN LISPRO 2 UNITS: 100 INJECTION, SOLUTION INTRAVENOUS; SUBCUTANEOUS at 06:53

## 2023-02-10 RX ADMIN — INSULIN LISPRO 1 UNITS: 100 INJECTION, SOLUTION INTRAVENOUS; SUBCUTANEOUS at 12:28

## 2023-02-10 RX ADMIN — POTASSIUM CHLORIDE 20 MEQ: 14.9 INJECTION, SOLUTION INTRAVENOUS at 08:23

## 2023-02-10 RX ADMIN — SODIUM CHLORIDE, SODIUM GLUCONATE, SODIUM ACETATE, POTASSIUM CHLORIDE, MAGNESIUM CHLORIDE, SODIUM PHOSPHATE, DIBASIC, AND POTASSIUM PHOSPHATE 125 ML/HR: .53; .5; .37; .037; .03; .012; .00082 INJECTION, SOLUTION INTRAVENOUS at 18:40

## 2023-02-10 RX ADMIN — SODIUM CHLORIDE, SODIUM GLUCONATE, SODIUM ACETATE, POTASSIUM CHLORIDE, MAGNESIUM CHLORIDE, SODIUM PHOSPHATE, DIBASIC, AND POTASSIUM PHOSPHATE 1000 ML: .53; .5; .37; .037; .03; .012; .00082 INJECTION, SOLUTION INTRAVENOUS at 05:40

## 2023-02-10 RX ADMIN — CALCIUM GLUCONATE 1 G: 20 INJECTION, SOLUTION INTRAVENOUS at 06:52

## 2023-02-10 RX ADMIN — HYDROMORPHONE HYDROCHLORIDE 0.2 MG: 0.2 INJECTION, SOLUTION INTRAMUSCULAR; INTRAVENOUS; SUBCUTANEOUS at 17:32

## 2023-02-10 RX ADMIN — HEPARIN SODIUM 2200 UNITS: 1000 INJECTION INTRAVENOUS; SUBCUTANEOUS at 19:01

## 2023-02-10 RX ADMIN — HYOSCYAMINE SULFATE 1 APPLICATION.: 16 SOLUTION at 11:24

## 2023-02-10 RX ADMIN — SODIUM CHLORIDE, SODIUM GLUCONATE, SODIUM ACETATE, POTASSIUM CHLORIDE, MAGNESIUM CHLORIDE, SODIUM PHOSPHATE, DIBASIC, AND POTASSIUM PHOSPHATE 125 ML/HR: .53; .5; .37; .037; .03; .012; .00082 INJECTION, SOLUTION INTRAVENOUS at 09:57

## 2023-02-10 RX ADMIN — LEVALBUTEROL 1.25 MG: 1.25 SOLUTION, CONCENTRATE RESPIRATORY (INHALATION) at 15:04

## 2023-02-10 RX ADMIN — CALCIUM GLUCONATE 1 G: 20 INJECTION, SOLUTION INTRAVENOUS at 06:23

## 2023-02-10 RX ADMIN — POTASSIUM CHLORIDE 40 MEQ: 29.8 INJECTION, SOLUTION INTRAVENOUS at 17:25

## 2023-02-10 RX ADMIN — CALCIUM GLUCONATE 2 G: 20 INJECTION, SOLUTION INTRAVENOUS at 17:25

## 2023-02-10 RX ADMIN — LIDOCAINE HYDROCHLORIDE 5 ML: 10 INJECTION, SOLUTION EPIDURAL; INFILTRATION; INTRACAUDAL; PERINEURAL at 13:32

## 2023-02-10 RX ADMIN — CALCIUM GLUCONATE 1 G: 20 INJECTION, SOLUTION INTRAVENOUS at 05:42

## 2023-02-10 RX ADMIN — IPRATROPIUM BROMIDE 0.5 MG: 0.5 SOLUTION RESPIRATORY (INHALATION) at 15:04

## 2023-02-10 RX ADMIN — PIPERACILLIN SODIUM AND TAZOBACTAM SODIUM 3.38 G: 36; 4.5 INJECTION, POWDER, LYOPHILIZED, FOR SOLUTION INTRAVENOUS at 15:52

## 2023-02-10 RX ADMIN — POTASSIUM CHLORIDE 20 MEQ: 14.9 INJECTION, SOLUTION INTRAVENOUS at 11:59

## 2023-02-10 NOTE — CONSULTS
[address] [facility] 69 y o  female MRN: [id number]  Unit/Bed#: MICU 01 Encounter: [id number]      -------------------------------------------------------------------------------------------------------------  Chief Complaint: Strangulated ventral hernia, Perforated colon mass    History of Present Illness     Cami Hwang is a 69 y o  female who presents with an incarcerated ventral hernia  Patient has a PMH of HTN, GERD, DM, DVT, R breast CA s/p mastectomy currently receiving chemotherapy, ascending colon cancer s/p laparoscopic right hemicolectomy (Dr Eze Smith 7/15), and hysterectomy  She was seen in the colorectal clinic today by Dr Eze Smith and was noted to have a large left sided ventral hernia with overlying skin changes  CT revealed a large left sided ventral hernia containing large bowel and reactive fluid  She was taken to the OR emergently for exploratory laparotomy  Intraoperatively, she was found to have a left sided colon mass incarcerated within the hernia so a left hemicolectomy and end ileostomy was performed in addition to ventral hernia repair  The fascia was closed and the subcutaneous tissue defect where the hernia had been was packed with Dakin soaked kerlex leaving the skin open  She was extubated postoperatively in the OR and brought to the ICU with an NG tube and a philippe  Currently, patient reports her pain is controlled  She denies nausea or vomiting  She denies ostomy output      History obtained from chart review and the patient   -------------------------------------------------------------------------------------------------------------  Assessment and Plan:    Neuro:   • Analgesia  o Dilaudid 0 2/0 5mg IV q3hr for mod/sev  o Dilaudid 0 5mg IV q3hr for breakthrough      CV:   • Hx of HTN  o Home regimen: Amlodipine 2 5mg PO OD, Benazapril-Hydrochlorothiazide 20-12 5mg PO OD  o Hold home regimen, can add PRN labetalol 5mg IV q4hr for SBP > 160, currently BP 140;s/70's      Pulm:  • No acute issues  o Currently saturating well on 2L NC  o Wean O2 as tolerated  o IS/pulm toilet      GI:   • Hx of GERD  o No regular meds  o GI PPX not indicated  • Ventral hernia with strangulated and perforated large intestine  o General Surgery and Colorectal surgery following  o S/p exploratory laparotomy, left hemicolectomy, ventral hernia repair on 2/9  o Suspicion for perforated colon mass within hernia, follow up path  o Continue antibiotics, Zosyn  o Local wound care to abdominal incision per General surgery, currently packed with Dakin-soaked kerlex  o NPO/NGT for now  o Monitor ostomy appearance and output      :   • No acute issues  o Cr pending (from 0 50)  o Maintain philippe catheter  o Monitor Cr and urine output      F/E/N:   • F: [email or web address], checking lactate and ABG now, resuscitate PRN  • E: Monitor and replete PRN  • N: NPO with NG tube for now      Heme/Onc:   • Hx of DVT  o Previously on Xarelto but stopped in 2015 after a GI bleed  o Currently taking just baby ASA, hold in the setting of NPO status, restart when able to take PO  o Continue LVX for DVT PPX  • Hx of Ascending Colon CA  - S/p right hemicolectomy on 7/22/2015  - Final path Stage I, [id number]  - Did not require adjuvant therapy  - Follows with Heme/Onc as an outpatient  • Hx of R breast CA  o S/p R mastectomy  o Path Stage IIIB hormone receptor positive, HER-2 negative, 9 positive lymph nodes  o Underwent Adriamycin + Cytoxan chemo followed by Taxotere and then radiation therapy  o On Femara since Nov/Dec 2012, per Heme/Onc note in 5/2022 was supposed to continue this for 10 years ending in Dec 2022, however still listed in home meds, hold for now      Endo:   • Hx of T2DM  o Home regimen: Metformin, Sitagliptin  o SSI Algorithm 2, BG goal 140-180      ID:   • Ventral hernia with Strangulated and perforated large bowel  o Suspicion for perforated colon mass during exploratory laparotomy and ventral hernia repair on 2/9  o S/p left hemicolectomy  o Continue Zosyn (Currently day 1)  o WBC pending (from 11 28)  o Trend WBC/fever curve      MSK/Skin:   • Abdominal Incision  o Local wound care per General surgery, currently packed with Dakin-soaked kerlex, plan for transition to [facility] once wound appears clean  • Frequent repositioning  • PT/OT when able    Disposition: Continue Critical Care   Code Status: Level 1 - Full Code  --------------------------------------------------------------------------------------------------------------  Review of Systems    A 12-point, complete review of systems was reviewed and negative except as stated above     Physical Exam  Constitutional:       Appearance: Normal appearance  HENT:      Head: Normocephalic and atraumatic  Right Ear: External ear normal       Left Ear: External ear normal       Nose: Nose normal       Comments: NG tube in place     Mouth/Throat:      Mouth: Mucous membranes are moist       Pharynx: Oropharynx is clear  Eyes:      Extraocular Movements: Extraocular movements intact  Conjunctiva/sclera: Conjunctivae normal       Pupils: Pupils are equal, round, and reactive to light  Cardiovascular:      Rate and Rhythm: Regular rhythm  Tachycardia present  Pulses: Normal pulses  Pulmonary:      Effort: Pulmonary effort is normal       Comments: On 2L NC  Abdominal:      General: Abdomen is flat  Palpations: Abdomen is soft  Tenderness: There is abdominal tenderness (Appropriately tender)  Comments: Abdominal incision cleanly dressed   Genitourinary:     Comments: Gar in place  Musculoskeletal:         General: Normal range of motion  Cervical back: Normal range of motion  Skin:     General: Skin is warm and dry  Neurological:      General: No focal deficit present  Mental Status: She is alert and oriented to person, place, and time     Psychiatric:         Mood and Affect: Mood normal          Behavior: Behavior normal          --------------------------------------------------------------------------------------------------------------  Vitals:   Vitals:    02/09/23 1125 02/09/23 1300 02/09/23 1338   BP: 140/67  145/77   BP Location: Right arm     Pulse: 103  103   Resp: 18  18   Temp: 98 9 °F (37 2 °C)  98 8 °F (37 1 °C)   TempSrc: Tympanic     SpO2: 99%  95%   Weight:  66 3 kg (146 lb 2 6 oz)    Height:  5' (1 524 m)      Temp  Min: 98 8 °F (37 1 °C)  Max: 98 9 °F (37 2 °C)  IBW (Ideal Body Weight): 45 5 kg  Height: 5' (152 4 cm)  Body mass index is 28 55 kg/m²  N/A    Laboratory and Diagnostics:  Results from last 7 days   Lab Units 02/09/23  1224   WBC Thousand/uL 11 28*   HEMOGLOBIN g/dL 10 1*   HEMATOCRIT % 32 8*   PLATELETS Thousands/uL 498*   NEUTROS PCT % 78*   MONOS PCT % 14*     Results from last 7 days   Lab Units 02/09/23  1224   SODIUM mmol/L 130*   POTASSIUM mmol/L 3 1*   CHLORIDE mmol/L 94*   CO2 mmol/L 25   ANION GAP mmol/L 11   BUN mg/dL 20   CREATININE mg/dL 0 50*   CALCIUM mg/dL 9 1   GLUCOSE RANDOM mg/dL 120                       ABG:    VBG:          Micro:        EKG: NSR  Imaging: I have personally reviewed pertinent reports  Historical Information   Past Medical History:   Diagnosis Date   • Acute embolism and thrombosis of deep vein of lower extremity (HCC)    • Adenocarcinoma of colon, [facility] ([facility] )    • Breast cancer ([place] [address] ) 2012    Right Breast    • Cancer ([facility] )    • Diabetes mellitus ([place] [address] )    • DVT (deep venous thrombosis) (HCC)    • GERD (gastroesophageal reflux disease)    • Hypertension    • Pes planus      Past Surgical History:   Procedure Laterality Date   • COLONOSCOPY     • HYSTERECTOMY      complete @ age 29   • IR PORT REMOVAL  9/12/2018   • MASTECTOMY Right 2012   • CT COLONOSCOPY FLX DX W/COLLJ SPEC WHEN PFRMD N/A 8/23/2016    Procedure: COLONOSCOPY;  Surgeon: Radha West MD;  [facility];   Service: Colorectal   • CT COLONOSCOPY FLX DX W/COLLJ SPEC WHEN PFRMD N/A 9/5/2017    Procedure: COLONOSCOPY;  Surgeon: Roxana Fish MD;  [facility]; Service: Colorectal   • [place] ESOPHAGOGASTRODUODENOSCOPY TRANSORAL DIAGNOSTIC N/A 9/5/2017    Procedure: ESOPHAGOGASTRODUODENOSCOPY (EGD); Surgeon: Harry Mercedes DO;  [facility]; Service: Gastroenterology     Social History   Social History     Substance and Sexual Activity   Alcohol Use No     Social History     Substance and Sexual Activity   Drug Use No     Social History     Tobacco Use   Smoking Status Never   Smokeless Tobacco Never       Family History:   Family History   Problem Relation Age of Onset   • Other Mother         chronic bronchitis   • Brain cancer Father    • Prostate cancer Paternal Grandfather 79   • Pancreatic cancer Maternal Aunt 39   • Breast cancer Neg Hx      I have reviewed this patient's family history and commented on sigificant items within the HPI      Medications:  Current Facility-Administered Medications   Medication Dose Route Frequency   • [START ON 2/10/2023] enoxaparin (LOVENOX) subcutaneous injection 40 mg  40 mg Subcutaneous Daily   • HYDROmorphone (DILAUDID) injection 0 5 mg  0 5 mg Intravenous Q4H PRN   • HYDROmorphone (DILAUDID) injection 0 5 mg  0 5 mg Intravenous Q3H PRN   • HYDROmorphone HCl (DILAUDID) injection 0 2 mg  0 2 mg Intravenous Q4H PRN   • multi-electrolyte (PLASMALYTE-A/ISOLYTE-S PH 7 4) IV solution  125 mL/hr Intravenous Continuous   • ondansetron (ZOFRAN) injection 4 mg  4 mg Intravenous Q6H PRN   • piperacillin-tazobactam (ZOSYN) 3 375 g in sodium chloride 0 9 % 100 mL IVPB (EXTENDED-INFUSION)  3 375 g Intravenous Q8H   • piperacillin-tazobactam (ZOSYN) 3 375 g in sodium chloride 0 9 % 100 mL IVPB  3 375 g Intravenous Once     Home medications:  Prior to Admission Medications   Prescriptions Last Dose Informant Patient Reported? Taking?    Cholecalciferol (VITAMIN D-3 PO)   Yes No   Sig: Take 1 capsule by mouth 3 (three) times a day    Cyanocobalamin (VITAMIN B 12 PO)   Yes No   Sig: Take 1 tablet by mouth daily  Diclofenac Sodium (VOLTAREN) 1 %   No No   Sig: Apply 2 g topically 4 (four) times a day   Magnesium 250 MG TABS   Yes No   Sig: Take 1 tablet by mouth daily     amLODIPine (NORVASC) 2 5 mg tablet   No No   Sig: Take 1 tablet (2 5 mg total) by mouth daily   ascorbic acid (VITAMIN C) 500 MG tablet   Yes No   Sig: Take 1,000 mg by mouth daily    aspirin 81 MG tablet   Yes No   Sig: Take 81 mg by mouth daily  benazepril-hydrochlorthiazide (LOTENSIN HCT) 20-12 5 MG per tablet   No No   Sig: take 1 tablet by mouth once daily   bimatoprost (LUMIGAN) 0 01 % ophthalmic drops   Yes No   Sig: Administer 1 drop to both eyes daily at bedtime    calcium citrate (CALCITRATE) 950 MG tablet   Yes No   Sig: Take 1 tablet by mouth in the morning  ferrous sulfate 325 (65 Fe) mg tablet   Yes No   Sig: Take 325 mg by mouth daily with breakfast    letrozole (FEMARA) 2 5 mg tablet   No No   Sig: take 1 tablet by mouth once daily   metFORMIN (GLUCOPHAGE) 500 mg tablet   No No   Sig: Take 1 tablet (500 mg total) by mouth 2 (two) times a day with meals   sitaGLIPtin (JANUVIA) 100 mg tablet   No No   Sig: Take 1 tablet (100 mg total) by mouth daily   vitamin A 10,000 units capsule   Yes No   Sig: Take 10,000 Units by mouth daily     vitamin E 100 UNIT capsule   Yes No   Sig: Take 100 Units by mouth daily       Facility-Administered Medications: None     Allergies:  No Known Allergies  ------------------------------------------------------------------------------------------------------------  Advance Directive and Living Will:      Power of :    POLST:    ------------------------------------------------------------------------------------------------------------  Anticipated Length of Stay is > 2 midnights    Care Time Delivered:   Upon my evaluation, this patient had a high probability of imminent or life-threatening deterioration due to immediate postoperative status after exploratory laparotomy, left hemicolectomy and ventral hernia repair for strangulated ventral hernia with perforated colonic mass, which required my direct attention, intervention, and personal management  I have personally provided 30 minutes (7:42pm to 8:12pm) of critical care time, exclusive of procedures, teaching, family meetings, and any prior time recorded by providers other than myself  Carmen Magallanes MD        Portions of the record may have been created with voice recognition software  Occasional wrong word or "sound a like" substitutions may have occurred due to the inherent limitations of voice recognition software    Read the chart carefully and recognize, using context, where substitutions have occurred

## 2023-02-10 NOTE — PLAN OF CARE
Problem: MOBILITY - ADULT  Goal: Maintain or return to baseline ADL function  Description: INTERVENTIONS:  -  Assess patient's ability to carry out ADLs; assess patient's baseline for ADL function and identify physical deficits which impact ability to perform ADLs (bathing, care of mouth/teeth, toileting, grooming, dressing, etc )  - Assess/evaluate cause of self-care deficits   - Assess range of motion  - Assess patient's mobility; develop plan if impaired  - Assess patient's need for assistive devices and provide as appropriate  - Encourage maximum independence but intervene and supervise when necessary  - Involve family in performance of ADLs  - Assess for home care needs following discharge   - Consider OT consult to assist with ADL evaluation and planning for discharge  - Provide patient education as appropriate  2/10/2023 0118 by Altamese Payment, RN  Outcome: Progressing  2/10/2023 0118 by Altamese Payment, RN  Outcome: Progressing  Goal: Maintains/Returns to pre admission functional level  Description: INTERVENTIONS:  - Perform BMAT or MOVE assessment daily    - Set and communicate daily mobility goal to care team and patient/family/caregiver  - Collaborate with rehabilitation services on mobility goals if consulted  - Perform Range of Motion 3 times a day  - Reposition patient every 2 hours    - Dangle patient 3 times a day  - Stand patient 3 times a day  - Ambulate patient 3 times a day  - Out of bed to chair 3 times a day   - Out of bed for meals 3 times a day  - Out of bed for toileting  - Record patient progress and toleration of activity level   2/10/2023 0118 by Altamese Payment, RN  Outcome: Progressing  2/10/2023 0118 by Altamese Payment, RN  Outcome: Progressing     Problem: GASTROINTESTINAL - ADULT  Goal: Maintains or returns to baseline bowel function  Description: INTERVENTIONS:  - Assess bowel function  - Encourage oral fluids to ensure adequate hydration  - Administer IV fluids if ordered to ensure adequate hydration  - Administer ordered medications as needed  - Encourage mobilization and activity  - Consider nutritional services referral to assist patient with adequate nutrition and appropriate food choices  2/10/2023 0118 by Ramiro Virk RN  Outcome: Progressing  2/10/2023 0118 by Ramiro Virk RN  Outcome: Progressing  Goal: Maintains adequate nutritional intake  Description: INTERVENTIONS:  - Monitor percentage of each meal consumed  - Identify factors contributing to decreased intake, treat as appropriate  - Assist with meals as needed  - Monitor I&O, weight, and lab values if indicated  - Obtain nutrition services referral as needed  2/10/2023 0118 by Ramiro Virk RN  Outcome: Progressing  2/10/2023 0118 by Ramiro Virk RN  Outcome: Progressing  Goal: Oral mucous membranes remain intact  Description: INTERVENTIONS  - Assess oral mucosa and hygiene practices  - Implement preventative oral hygiene regimen  - Implement oral medicated treatments as ordered  - Initiate Nutrition services referral as needed  2/10/2023 0118 by Ramiro Virk RN  Outcome: Progressing  2/10/2023 0118 by Ramiro Virk RN  Outcome: Progressing     Problem: GENITOURINARY - ADULT  Goal: Maintains or returns to baseline urinary function  Description: INTERVENTIONS:  - Assess urinary function  - Encourage oral fluids to ensure adequate hydration if ordered  - Administer IV fluids as ordered to ensure adequate hydration  - Administer ordered medications as needed  - Offer frequent toileting  - Follow urinary retention protocol if ordered  2/10/2023 0118 by Ramiro Virk RN  Outcome: Progressing  2/10/2023 0118 by Ramiro Virk RN  Outcome: Progressing     Problem: SKIN/TISSUE INTEGRITY - ADULT  Goal: Skin Integrity remains intact(Skin Breakdown Prevention)  Description: Assess:  -Perform Jose assessment every shift   -Clean and moisturize skin every shift and prn  -Inspect skin when repositioning, toileting, and assisting with ADLS  -Assess under medical devices such as  every   -Assess extremities for adequate circulation and sensation     Bed Management:  -Have minimal linens on bed & keep smooth, unwrinkled  -Change linens as needed when moist or perspiring  -Avoid sitting or lying in one position for more than 2 hours while in bed  -Keep HOB at 30 degrees     Toileting:  -Offer bedside commode  -Assess for incontinence every shift and prn  -Use incontinent care products after each incontinent episode such as     Activity:  -Mobilize patient 3 times a day  -Encourage activity and walks on unit  -Encourage or provide ROM exercises   -Turn and reposition patient every 2 Hours  -Use appropriate equipment to lift or move patient in bed  -Instruct/ Assist with weight shifting every  when out of bed in chair  -Consider limitation of chair time 2 hour intervals    Skin Care:  -Avoid use of baby powder, tape, friction and shearing, hot water or constrictive clothing  -Relieve pressure over bony prominences using   -Do not massage red bony areas    Next Steps:  -Teach patient strategies to minimize risks such as    -Consider consults to  interdisciplinary teams such as   2/10/2023 0118 by Corrine Summers RN  Outcome: Progressing  2/10/2023 0118 by Corrine Summers RN  Outcome: Progressing     Problem: Prexisting or High Potential for Compromised Skin Integrity  Goal: Skin integrity is maintained or improved  Description: INTERVENTIONS:  - Identify patients at risk for skin breakdown  - Assess and monitor skin integrity  - Assess and monitor nutrition and hydration status  - Monitor labs   - Assess for incontinence   - Turn and reposition patient  - Assist with mobility/ambulation  - Relieve pressure over bony prominences  - Avoid friction and shearing  - Provide appropriate hygiene as needed including keeping skin clean and dry  - Evaluate need for skin moisturizer/barrier cream  - Collaborate with interdisciplinary team   - Patient/family teaching  - Consider wound care consult   2/10/2023 0118 by Ingris Garzon RN  Outcome: Progressing  2/10/2023 0118 by Ingris Garzon RN  Outcome: Progressing     Problem: PAIN - ADULT  Goal: Verbalizes/displays adequate comfort level or baseline comfort level  Description: Interventions:  - Encourage patient to monitor pain and request assistance  - Assess pain using appropriate pain scale  - Administer analgesics based on type and severity of pain and evaluate response  - Implement non-pharmacological measures as appropriate and evaluate response  - Consider cultural and social influences on pain and pain management  - Notify physician/advanced practitioner if interventions unsuccessful or patient reports new pain  Outcome: Progressing     Problem: INFECTION - ADULT  Goal: Absence or prevention of progression during hospitalization  Description: INTERVENTIONS:  - Assess and monitor for signs and symptoms of infection  - Monitor lab/diagnostic results  - Monitor all insertion sites, i e  indwelling lines, tubes, and drains  - Monitor endotracheal if appropriate and nasal secretions for changes in amount and color  - [place] appropriate cooling/warming therapies per order  - Administer medications as ordered  - Instruct and encourage patient and family to use good hand hygiene technique  - Identify and instruct in appropriate isolation precautions for identified infection/condition  Outcome: Progressing  Goal: Absence of fever/infection during neutropenic period  Description: INTERVENTIONS:  - Monitor WBC    Outcome: Progressing     Problem: SAFETY ADULT  Goal: Maintain or return to baseline ADL function  Description: INTERVENTIONS:  -  Assess patient's ability to carry out ADLs; assess patient's baseline for ADL function and identify physical deficits which impact ability to perform ADLs (bathing, care of mouth/teeth, toileting, grooming, dressing, etc )  - Assess/evaluate cause of self-care deficits   - Assess range of motion  - Assess patient's mobility; develop plan if impaired  - Assess patient's need for assistive devices and provide as appropriate  - Encourage maximum independence but intervene and supervise when necessary  - Involve family in performance of ADLs  - Assess for home care needs following discharge   - Consider OT consult to assist with ADL evaluation and planning for discharge  - Provide patient education as appropriate  2/10/2023 0118 by Ramiro Virk RN  Outcome: Progressing  2/10/2023 0118 by Ramiro Virk RN  Outcome: Progressing  Goal: Maintains/Returns to pre admission functional level  Description: INTERVENTIONS:  - Perform BMAT or MOVE assessment daily    - Set and communicate daily mobility goal to care team and patient/family/caregiver  - Collaborate with rehabilitation services on mobility goals if consulted  - Perform Range of Motion 3 times a day  - Reposition patient every 2 hours    - Dangle patient 3 times a day  - Stand patient 3 times a day  - Ambulate patient 3 times a day  - Out of bed to chair 3 times a day   - Out of bed for meals 3 times a day  - Out of bed for toileting  - Record patient progress and toleration of activity level   2/10/2023 0118 by Ramiro Virk RN  Outcome: Progressing  2/10/2023 0118 by Ramiro Virk RN  Outcome: Progressing  Goal: Patient will remain free of falls  Description: INTERVENTIONS:  - Educate patient/family on patient safety including physical limitations  - Instruct patient to call for assistance with activity   - Consult OT/PT to assist with strengthening/mobility   - Keep Call bell within reach  - Keep bed low and locked with side rails adjusted as appropriate  - Keep care items and personal belongings within reach  - Initiate and maintain comfort rounds  - Make Fall Risk Sign visible to staff  - Offer Toileting every 2 Hours, in advance of need  - Initiate/Maintain bed alarm  - Obtain necessary fall risk management equipment: bed alarms  - Apply yellow socks and bracelet for high fall risk patients  - Consider moving patient to room near nurses station  Outcome: Progressing     Problem: DISCHARGE PLANNING  Goal: Discharge to home or other facility with appropriate resources  Description: INTERVENTIONS:  - Identify barriers to discharge w/patient and caregiver  - Arrange for needed discharge resources and transportation as appropriate  - Identify discharge learning needs (meds, wound care, etc )  - Arrange for interpretive services to assist at discharge as needed  - Refer to Case Management Department for coordinating discharge planning if the patient needs post-hospital services based on physician/advanced practitioner order or complex needs related to functional status, cognitive ability, or social support system  Outcome: Progressing     Problem: Knowledge Deficit  Goal: Patient/family/caregiver demonstrates understanding of disease process, treatment plan, medications, and discharge instructions  Description: Complete learning assessment and assess knowledge base  Interventions:  - Provide teaching at level of understanding  - Provide teaching via preferred learning methods  Outcome: Progressing     Problem: Nutrition/Hydration-ADULT  Goal: Nutrient/Hydration intake appropriate for improving, restoring or maintaining nutritional needs  Description: Monitor and assess patient's nutrition/hydration status for malnutrition  Collaborate with interdisciplinary team and initiate plan and interventions as ordered  Monitor patient's weight and dietary intake as ordered or per policy  Utilize nutrition screening tool and intervene as necessary  Determine patient's food preferences and provide high-protein, high-caloric foods as appropriate       INTERVENTIONS:  - Monitor oral intake, urinary output, labs, and treatment plans  - Assess nutrition and hydration status and recommend course of action  - Evaluate amount of meals eaten  - Assist patient with eating if necessary   - Allow adequate time for meals  - Recommend/ encourage appropriate diets, oral nutritional supplements, and vitamin/mineral supplements  - Order, calculate, and assess calorie counts as needed  - Recommend, monitor, and adjust tube feedings and TPN/PPN based on assessed needs  - Assess need for intravenous fluids  - Provide specific nutrition/hydration education as appropriate  - Include patient/family/caregiver in decisions related to nutrition  Outcome: Progressing

## 2023-02-10 NOTE — PROCEDURES
Arterial Line Insertion    Date/Time: 2/10/2023 1:45 PM  Performed by: Berna Buckley PA-C  Authorized by: Berna Buckley PA-C     Patient location:  Bedside and ICU  Other Assisting Provider: Yes (comment) Светлана Acharya PA-C was successful on 3rd attempt after 2 attempts by myself)    Consent:     Consent obtained:  Written    Consent given by:  Patient    Risks discussed:  Bleeding, ischemia, repeat procedure, infection and pain  Universal protocol:     Procedure explained and questions answered to patient or proxy's satisfaction: yes      Relevant documents present and verified: yes      Test results available and properly labeled: yes      Radiology Images displayed and confirmed  If images not available, report reviewed: yes      Required blood products, implants, devices, and special equipment available: yes      Site/side marked: yes      Immediately prior to procedure a time out was called: yes      Patient identity confirmed:  Verbally with patient, arm band and provided demographic data  Indications:     Indications: hemodynamic monitoring and continuous blood pressure monitoring    Pre-procedure details:     Skin preparation:  Chlorhexidine    Preparation: Patient was prepped and draped in sterile fashion    Anesthesia (see MAR for exact dosages): Anesthesia method:  Local infiltration    Local anesthetic:  Lidocaine 1% w/o epi  Procedure details:     Location / Tip of Catheter:  Radial    Laterality:  Left    Valente's test performed: yes      Valente's test abnormal: no      Needle gauge:  20 G    Placement technique:  Seldinger    Number of attempts:  3    Successful placement: yes      Transducer: waveform confirmed    Post-procedure details:     Post-procedure:  Sterile dressing applied and sutured    CMS:  Unchanged    Patient tolerance of procedure:   Tolerated well, no immediate complications

## 2023-02-10 NOTE — PROGRESS NOTES
Progress Note - Teresa Duff 69 y o  female MRN: [id number]    Unit/Bed#: MICU 01 Encounter: [id number]      Assessment:  Teresa Duff is a 69 y o  female with PMH colon cancer s/p lap right hemicolectomy 2/2 ascending colon mass (7/15) p/w incarcerated ventral hernia  S/p ex lap end ileostomy, L colectomy, reduction of ventral hernia on 2/9    Post op course c/b PE now on heparin gtt  WBC 17 2 from 16 9, Hgb 8 2 from 9 from 10  Receiving bolus for MAP 54, no pressors     Plan:  Monitor ostomy function  Continue abx  Monitor hemodynamics  Trend end points  Continue heparin gtt  Strict I/Os  Local wound care to midline per surgical team  Maintain philippe  Eventual MRI liver      Subjective:   Patient seen and examined bedside  States she feels better  Denies pain  Didn't sleep  Ostomy with bowel sweat  Objective:     Vitals: Blood pressure (!) 97/47, pulse 92, temperature 97 7 °F (36 5 °C), temperature source Oral, resp  rate 21, height 5' (1 524 m), weight 59 6 kg (131 lb 6 3 oz), SpO2 96 %, not currently breastfeeding  ,Body mass index is 25 66 kg/m²        Intake/Output Summary (Last 24 hours) at 2/10/2023 3536  Last data filed at 2/10/2023 0000  Gross per 24 hour   Intake 4091 67 ml   Output 1470 ml   Net 2621 67 ml       Physical Exam:   General - no acute distress, responsive and cooperative  CV - warm, regular rate  Pulm - normal work of breathing, no respiratory distress ORA  Abd - soft, nondistended, ostomy viable and pink with bowel sweat per bag, midline with fascia closed and packed with 4x4  Neuro - m/s grossly intact, cn grossly intact  Ext - moving all extremities, philippe       Invasive Devices     Peripheral Intravenous Line  Duration           Peripheral IV 02/09/23 Left Antecubital <1 day    Peripheral IV 02/10/23 Left;Ventral (anterior) Forearm <1 day          Drain  Duration           NG/OG/Enteral Tube Nasogastric 14 Fr Right nare 1 day    Ileostomy Standard (ap Calderón) Umbilicus <1 day Urethral Catheter Latex 16 Fr  <1 day                Lab, Imaging and other studies: I have personally reviewed pertinent reports      VTE Pharmacologic Prophylaxis: Heparin  VTE Mechanical Prophylaxis: sequential compression device

## 2023-02-10 NOTE — SEPSIS NOTE
Sepsis Note   Tarah Aguilera 69 y o  female MRN: [id number]  Unit/Bed#: MICU 01 Encounter: [id number]       qSOFA     Row Name 02/10/23 0104 02/10/23 0100 02/10/23 0000 02/09/23 2300 02/09/23 22:35:27    Altered mental status GCS < 15 -- -- 0 -- --    Respiratory Rate > / =22 1 1 1 1 --    Systolic BP < / =121 1 1 0 1 0    Q Sofa Score 2 2 1 2 1    Row Name 02/09/23 2200 02/09/23 2108 02/09/23 2100 02/09/23 2030 02/09/23 2024    Altered mental status GCS < 15 -- -- -- -- --    Respiratory Rate > / =22 1 1 1 1 1    Systolic BP < / =346 0 0 -- 1 0    Q Sofa Score 1 1 2 2 1    Row Name 02/09/23 2015 02/09/23 2000 02/09/23 1945 02/09/23 1930 02/09/23 1915    Altered mental status GCS < 15 -- -- -- 0 --    Respiratory Rate > / =22 1 1 0 1 0    Systolic BP < / =740 -- -- -- 0 0    Q Sofa Score -- -- 0 1 0    Row Name 02/09/23 1900 02/09/23 13:38:05 02/09/23 1125          Altered mental status GCS < 15 -- -- --      Respiratory Rate > / =22 -- 0 0      Systolic BP < / =337 0 0 0      Q Sofa Score -- 0 0                    Patient already on antibiotics being treated for ventral hernia containing strangulated and perforated colonic mass  She is well resuscitated as indicated by her normal lactate and 470cc of urine output since 7pm (78cc/hr, 1 31cc/kg/hr)  Will continue to monitor

## 2023-02-10 NOTE — PHYSICAL THERAPY NOTE
PT orders received  Chart reviewed  Will hold per RN due to new PE found   Will continue to follow  Pernell Castellanos, PT, DPT     02/10/23 7340   PT Last Visit   PT Visit Date 02/10/23   Note Type   Note type Cancelled Session   Cancel Reasons Medical status

## 2023-02-10 NOTE — CASE MANAGEMENT
Case Management Assessment & Discharge Planning Note    Patient name Everardo Coles  Location MICU /MICU  MRN [id number]  : 1945 Date 2/10/2023       Current Admission Date: 2023  Current Admission Diagnosis:Perforated bowel [facility])   Patient Active Problem List    Diagnosis Date Noted   • Lower abdominal pain 2023   • Per[facility] ([facility] ) 2023   • Strangulated ventral hernia 2023   • GERD (gastroesophageal reflux disease) 2023   • History of right breast cancer 2023   • History of right mastectomy 2023   • Iron deficiency anemia, unspecified 2022   • History of DVT (deep vein thrombosis) 2022   • Thyroid disorder 2022   • Osteoporosis due to aromatase inhibitor 2022   • History of diabetes mellitus 2022   • Anxiety about health 2021   • Diabetic ulcer of toe of left foot associated with type 2 diabetes mellitus, limited to breakdown of skin ([facility] ) 2021   • Abnormal tumor markers 2020   • Essential hypertension 10/20/2020   • D-dimer, elevated 2019   • Type 2 diabetes mellitus without complications ([place] [address] ) 10/41/1276   • Other osteoporosis without current pathological fracture 2018   • 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 2018   • Personal history of other malignant neoplasm of large intestine 2018   • Malignant neoplasm of right breast in female, estrogen receptor positive ([facility] ) 2018   • Malignant neoplasm of ascending colon ([place] [address] ) 2018   • Chronic deep vein thrombosis (DVT) of distal vein of left lower extremity ([facility] ) 2018   • Thyroid nodule 2018      LOS (days): 1  Geometric Mean LOS (GMLOS) (days): 2 10  Days to GMLOS:1 1     OBJECTIVE:    Risk of Unplanned Readmission Score: 16 36         Current admission status: Inpatient       Preferred Pharmacy:   [address] [place] [address] #[id number] Mandy Rodriguez [address] Moises SZYMANSKI  [facility] [address]  Phone: [phone] Fax: [phone]    Primary Care Provider: Reyna Jackson DO    Primary Insurance: [facility] REP  Secondary Insurance:     ASSESSMENT:  Carmen Brandon Proxies    There are no active Health Care Proxies on file  Readmission Root Cause  30 Day Readmission: No    Patient Information  Admitted from[de-identified] Home  Mental Status: Alert  During Assessment patient was accompanied by: Not accompanied during assessment  Assessment information provided by[de-identified] Patient  Primary Caregiver: Self  Support Systems: [facility] Residence: [address] [place] [address] do you live in?: [address] [facility] entry access options  Select all that apply : Stairs  Living Arrangements: Lives Alone  Is patient a ?: No    Activities of Daily Living Prior to Admission  Functional Status: Independent  Completes ADLs independently?: Yes  Ambulates independently?: Yes  Does patient use assisted devices?: No  Does patient currently own DME?: No         Patient Information Continued  Income Source: Pension/skilled nursing  Does patient have prescription coverage?: Yes         Means of Transportation  In the past 12 months, has lack of transportation kept you from medical appointments or from getting medications?: No  In the past 12 months, has lack of transportation kept you from meetings, work, or from getting things needed for daily living?: No  Was application for public transport provided?: N/A        DISCHARGE DETAILS:    Discharge planning discussed with[de-identified] patient  Freedom of Choice: Yes     CM contacted family/caregiver?: No- see comments (patient declines -- states there is no one to call)             Contacts  Patient Contacts: patient states she has no contacts                        Treatment Team Recommendation: Other (TBD)  Discharge Destination Plan[de-identified] Other (TBD)  Transport at Discharge : Other (Comment) (TBD)                Patient/caregiver received discharge checklist   Content reviewed    Patient/caregiver encouraged to participate in discharge plan of care prior to discharge home  CM reviewed d/c planning process including the following: identifying help at home, patient preference for d/c planning needs, Discharge Lounge, [facility] Meds to Bed program, availability of treatment team to discuss questions or concerns patient and/or family may have regarding understanding medications and recognizing signs and symptoms once discharged  CM also encouraged patient to follow up with all recommended appointments after discharge  Patient advised of importance for patient and family to participate in managing patient’s medical well being  Additional Comments: Introduced self and role to patient at bedside  Patient kept her eyes closed through most of conversation, spoke very softly  Patient indicated she lives alone in a home in [facility] [facility], independent, does not use any DME  Patient stated there is "no one" to call, she does not have any friends or family, no decision-maker or POA  CM will continue to follow and continue to re-engage patient to elicit more information to formulate a discharge plan

## 2023-02-10 NOTE — PROCEDURES
Insert PICC line    Date/Time: 2/10/2023 9:29 AM  Performed by: Agueda Birmingham RN  Authorized by: Tom Crump MD     Patient location:  Bedside  Other Assisting Provider: Yes (comment) (Ellis Ortiz , [employer])    Consent:     Consent obtained:  Written (Obtained by provider)    Consent given by:  Patient    Risks discussed:  Arterial puncture, bleeding, infection, incorrect placement, nerve damage and pneumothorax (Discussed by provider and PICC RN)    Alternatives discussed: Discussed by provider  Universal protocol:     Procedure explained and questions answered to patient or proxy's satisfaction: yes      Relevant documents present and verified: yes      Test results available and properly labeled: yes      Radiology Images displayed and confirmed  If images not available, report reviewed: yes      Required blood products, implants, devices, and special equipment available: yes      Site/side marked: yes      Immediately prior to procedure, a time out was called: yes      Patient identity confirmed:  Verbally with patient, arm band, provided demographic data and hospital-assigned identification number  Pre-procedure details:     Hand hygiene: Hand hygiene performed prior to insertion      Sterile barrier technique: All elements of maximal sterile technique followed      Skin preparation:  ChloraPrep    Skin preparation agent: Skin preparation agent completely dried prior to procedure    Indications:     PICC line indications: no peripheral vascular access    Sedation:     Sedation type: None  Anesthesia (see MAR for exact dosages):      Anesthesia method:  Local infiltration    Local anesthetic:  Lidocaine 1% w/o epi (2 mL administered)  Procedure details:     Location:  Brachial (medial brachial vein)    Vessel type: vein      Laterality:  Left    Site selection rationale:  Largest, most patent vessel of LUE    Approach: percutaneous technique used      Patient position:  Flat    Procedural supplies:  Double lumen    Catheter size:  5 Fr    Landmarks identified: yes      Ultrasound guidance: yes      Ultrasound image availability:  Still images obtained (41% vessel occupancy  Discussed with Travis NOGUEIRA) if acceptable to place with more occupancy  Given risk vs  benefit agreed to proceed with PICC placement )    Sterile ultrasound techniques: Sterile gel and sterile probe covers were used      Number of attempts:  1    Successful placement: yes      Vessel of catheter tip end:  Sherlock 3CG confirmed (Ok to use PICC  Sherlock 3CG confirmed placement  Results saved to chart )    Total catheter length (cm):  41    Catheter out on skin (cm):  0    Max flow rate:  23 5    Arm circumference:  999 mL/hr  Post-procedure details:     Post-procedure:  Dressing applied and securement device placed    Assessment:  Blood return through all ports and free fluid flow    Post-procedure complications: none      Patient tolerance of procedure:   Tolerated well, no immediate complications

## 2023-02-10 NOTE — PROGRESS NOTES
[facility]    2/10/2023  Patient: Hilario Torres : 1945  Admission Date & Time: 2023 1123  MRN: [id number] [facility]: [id number]      Patient with limited ability to converse, acknowledged a lack of support network, a desire to hear scripture and hymns, and conveyed their belief that "it will get better"  This  read a couple scripture passages, sang several hymns and prayed  The patient conveyed their appreciation and requested on-going visits from [facility] as able  02/10/23 1800   Clinical Encounter Type   Visited With Patient and family together   Routine Visit Introduction   Referral From Nurse   Yazdanism Encounters   Yazdanism Needs Prayer   Patient Spiritual Encounters   Spiritual Encounter Notes This  provided support, prayer and hymns for patient

## 2023-02-10 NOTE — OCCUPATIONAL THERAPY NOTE
Occupational Therapy Cx        Patient Name: Linda Pelaez  WSQNC'H Date: 2/10/2023                 02/10/23 1045   OT Last Visit   OT Visit Date 02/10/23   Note Type   Note type Evaluation; Cancelled Session     OT orders received  Chart reviewed  Spoke with pt's RN who reports pt had a PE overnight  Will hold OT eval at this time  OT will cont to follow as medically appropriate          TAMMY John/BENITA

## 2023-02-10 NOTE — OP NOTE
OPERATIVE REPORT  PATIENT NAME: Tanya Jacob    :  1945  MRN: [id number]  Pt Location: BE OR ROOM 03    SURGERY DATE: 2023    Surgeon(s) and Role:     * Aleksandar Wheeler DO - Primary     * Casimiro Escobar MD - Ramirez Osborne MD - Assisting     * Anastasia De La Rosa PA-C - Observing    Preop Diagnosis:  Incarcerated ventral hernia [K43 6]    Post-Op Diagnosis Codes:     * Incarcerated ventral hernia [K43 6]   Colonic (transverse colon) mass  Perforated Colonic mass  Localized Necrotizing Soft tissue infection of the abdominal wall  Procedure(s):  EXPLORATORY LAPAROTOMY; ABDOMINAL WALL DEBRIDEMENT; REDUCTION VENTRAL HERNIA; LEFT COLON RESECTION; CREATION ILEOSTOMY; WASHOUT    Specimen(s):  ID Type Source Tests Collected by Time Destination   1 : Hernia Sac Tissue Abdominal TISSUE EXAM Aleksandar Wheeler DO 2023  4:02 PM    2 : Left Colectomy  Tissue Colon TISSUE EXAM Aleksandar Wheeler DO 2023  4:22 PM        Estimated Blood Loss:   50 mL    Drains:  Ileostomy Standard Bonyojana Coburn, ap) Umbilicus (Active)   Stomal Appliance Clean;Dry; Intact 23   Stoma Assessment Red;Laurel 23   Stoma Shape Round 23 1713   Number of days: 0       Urethral Catheter Latex 16 Fr  (Active)   Collection Container Standard drainage bag 23 1537   Number of days: 0       Anesthesia Type:   Choice    Operative Indications:  Incarcerated ventral hernia [K43 6]      Operative Findings:  1  Large Incarcerated Ventral Hernia in the left lower quadrant  2  Hernia contained a loop of transverse colon with a mass that had perforated into the abdominal wall (see image below)  3  Localized necrotizing soft tissue infection of surrounding abdominal wall  4  Erythema of the overlying LLQ skin without obvious features of gangrene or non-viability  5  Final wound defect measured; 18 cm x 20 cm x 8 cm  6  Prior ileo-colic anastomosis noted to be intact              Complications: None    Procedure and Technique:  The patient was identified in the pre-operative holding area by name, MRN and date of birth following which she was transported to the OR suite  She was positioned supine on the operating table with arms abducted and pressure points padded  General anesthesia with endotracheal intubation was performed and antibiotic prophylaxis was administered  A philippe catheter was placed under strict asepsis  Her abdomen was prepped from xiphoid to pubic symphysis with alcohol-based chlorhexidine solution following which sterile draping was done  A time-out was done with all in agreement  A midline incision was made with a size 15 blade and deepened with electrocautery until the abdominal cavity was entered  The large hernia sac was evident and was opened with electrocautery revealing an incarcerated loop of colon  We opened up the neck of the hernia in the abdominal wall following which we encountered foul-smelling necrotic tissue and fecal matter in the abdominal wall  The abdominal wall around the defect was then excised en-bloc with the hernia content  Close inspection revealed a perforated colonic mass  An en-bloc colectomy (left colon & mass, abdominal wall) was performed  The details of the colectomy is described in a separate operative note by Dr Tovar Early  The abdominal wall in the left lower quadrant was sharply debrided with electrocautery taking care to preserve the vascularity of the overlying skin with a final wound dimension of 18 cm x 20 cm x 8 cm  Copious warm saline irrigation of the abdominal cavity was performed  The ileo-jejunal end of the colectomy was brought up through a cruciate fascial incision in the right mid-abdomen after circumferential skin excision  The midline fascia was closed with multiple interrupted PDS-1 sutures and an end-ileostomy was fashioned using the Kaitlynn technique   We elected to defer skin closure and packed the abdominal wall in the LLQ with Kerlex soaked in Dakin's solution  The midline was covered with 4 x 4 gauze and ABD dressing  General anesthesia was reversed and the patient was extubated uneventfully  She was transported to the intensive care unit in stable condition  Dr Cassidy Muñiz was present for the entire procedure      Patient Disposition:  Critical Care Unit and extubated and stable        SIGNATURE: Bruno Scales MD  DATE: February 9, 2023  TIME: 7:03 PM

## 2023-02-10 NOTE — PROGRESS NOTES
The patient’s standard-infusion Piperacillin-Tazobactam / Zosyn (infused over 30-60 minutes) has been converted to extended-infusion (infused over 4 hours) per [facility] [facility] Extended-Infusion Piperacillin-Tazobactam Protocol for Adults as approved by the Pharmacy and Therapeutics Committee (accessible here on MyNET)       The patient met ALL eligible criteria:    Age >= 25years old   Critical Care patient    And did NOT have ANY exclusions:     Emergency Department or Operating Room patient  Drug incompatibilities that could NOT be avoided with timing or separate line administration    The following are reminders for Nursing regarding administration:  Infuse the first dose of Zosyn over 30min as a load (if new start), and then all subsequent doses will be given as an extended-infusion over 4 hours (see dosing below)  Use primary tubing as an intermittent infusion; change out primary tubing every 24 hours   Ensure full dose of the medication is given at the appropriate rate  Most incompatible drugs can be scheduled during times when the Zosyn is not being infused; however, if one requires administration during the same time, a separate site or lumen MUST be used  If access is limited and an incompatible medication urgently needs to be given, the Zosyn extended-infusion can be held for up to 30min (remember to flush line before/after)  Extended-infusion Zosyn does NOT require special timing around hemodialysis (it can even be given simultaneously)  If a patient needs an urgent MRI while Zosyn is infusing and there is not a MRI-compatible pump available for use, finish the infusion over the traditional length (30min) and ask Pharmacy to reschedule the next doses so that they start a few hours earlier  Pharmacy will assist nursing in troubleshooting other administration issues as they arise  Dosing for Piperacillin-Tazobactam  CrCl (mL/min) Traditional Dosing Extended-Infusion Dosing #   CrCl > 40 High-Dose  CrCl > 40 Low-Dose 4 5g Q6H (over 30min)  3 375g IV Q6H (over 30min) 3 375g IV Q8H (over 4hr)*    *1st dose loaded over 30min, then start extended-infusion dosing 4hr later   CrCl 20-40 High-Dose  CrCl 20-40 Low-Dose 3 375g IV Q6H (over 30min)  2 25g IV Q6H (over 30min)    CrCl < 20 High-Dose  CrCl < 20 Low-Dose 2 25g IV Q6H (over 30min)  2 25g IV Q8H (over 30min) 3 375g IV Q12H (over 4hr)*    *1st dose loaded over 30min, then start extended-infusion dosing 6hr later   Hemo/Peritoneal Dialysis High-Dose  Hemo/Peritoneal Dialysis Low-Dose 2 25g IV Q6H (over 30min)  2 25g IV Q8H (over 30min)    CVVH/D High-Dose  CVVH/D Low-Dose 3 375g IV Q6H (over 30min)  2 25 IV Q6H (over 30min) 3 375g IV Q8H (over 4hr)*    *1st dose loaded over 30min, then start extended-infusion dosing 4hr later   # = Use 4 5g dosing (same interval) if morbidly obese (BMI ?40)    Please call the Pharmacy with any questions or concerns

## 2023-02-10 NOTE — QUICK NOTE
Seen and reevaluated for abdominal dressing change and assessment  Patient reports that she is feeling better today than yesterday and notes minimal abdominal pain  On assessment of her abdomen, her right-sided ostomy is pink and healthy with minimal serosanguineous output in the bag thus far  Her left anterior abdominal wall wound was reevaluated with the dressing taken down and packing removed  There was some serosanguineous and light brownish colored drainage noted on the Curlex packing  The wound however did not have any foul odor, exhibit any purulent or murky drainage at this time and the wound base appeared pink and healthy with no necrotic or nonviable tissue  Wound image:        Plan to continue local wound care with daily packing changes  One piece of Kerlix dressing was packed into the wound today that was moistened with Dakin's solution  Anticipate transition to saline moistened gauze packing potentially as early as 2/11/2023  The packing and wound were then covered with dry abdominal pad secured with tape  The nursing staff was updated      Fercho Roche PA-C  2/10/2023 11:37 AM

## 2023-02-10 NOTE — PROGRESS NOTES
Daily Progress Note - Critical Care   Lynne Sadler 69 y o  female MRN: [id number]  Unit/Bed#: MICU 01 Encounter: [id number]        ----------------------------------------------------------------------------------------  HPI/24hr events: Patient underwent ex-lap, left hemicolectomy and ventral hernia repair yesterday, patient brought back to the ICU extubated with NG tube and a philippe, off pressors, given 500cc crystalloid for metabolic acidosis on ABG, lactate normal post op, overnight with some hemoptysis so sent for CTA PE study showing JOANNA and RLL segmental PE's, no right heart strain on CT, started on a heparin drip, ECHO ordered and pending, hypotensive this morning, responded to 1L crystalloid, now normotensive    ---------------------------------------------------------------------------------------  SUBJECTIVE  Pain controlled, denies nausea or vomiting, remains NPO with NG tube, denies ostomy output    Review of Systems  Review of systems was reviewed and negative unless stated above in HPI/24-hour events   ---------------------------------------------------------------------------------------  Assessment and Plan:    Neuro:   • Analgesia  ? Dilaudid 0 2/0 5mg IV q3hr for mod/sev  ? Dilaudid 0 5mg IV q3hr for breakthrough        CV:   • Hx of HTN  ? Home regimen: Amlodipine 2 5mg PO OD, Benazapril-Hydrochlorothiazide 20-12 5mg PO OD  ? Hold home regimen, can add PRN labetalol 5mg IV q4hr for SBP > 160, currently ;s/70's        Pulm:  • Pulmonary embolism  • No right heart strain on CTA PE study 2/10  • Follow up ECHO  • Follow up troponin  ? Continue heparin drip  ? Saturating well on room air though mildly tachypneic with RR 23-27 overnight, now improved        GI:   • Hx of GERD  ? No regular meds  ? GI PPX not indicated  • Ventral hernia with strangulated and perforated large intestine  ? General Surgery and Colorectal surgery following  ?  S/p exploratory laparotomy, left hemicolectomy, ventral hernia repair on 2/9  ? Suspicion for perforated colon mass within hernia, follow up path  ? Continue antibiotics, Zosyn  ? Local wound care to abdominal incision per General surgery, currently packed with Dakin-soaked kerlex  ? NPO/NGT for now  ? Monitor ostomy appearance and output        :   • No acute issues  ? Cr 0 41 (from 0 37)  ? UOP 1370cc + 1 unmeasured occurrence over last 24 hours  ? Maintain philippe catheter  ? Monitor Cr and urine output        F/E/N:   • F: [email or web address]  • E: Monitor and replete PRN  • N: NPO with NG tube for now        Heme/Onc:   • PE  • Plan as above  • Continue heparin drip  • Follow up ECHO  • Follow up troponin  • Hx of DVT  ? Previously on Xarelto but stopped in 2015 after a GI bleed  ? Currently taking just baby ASA, hold in the setting of NPO status, restart when able to take PO  ? Continue heparin drip given PE  • Hx of Ascending Colon CA  - S/p right hemicolectomy on 7/22/2015  - Final path Stage I, [id number]  - Did not require adjuvant therapy  - Follows with Heme/Onc as an outpatient  • Hx of R breast CA  ? S/p R mastectomy  ? Path Stage IIIB hormone receptor positive, HER-2 negative, 9 positive lymph nodes  ? Underwent Adriamycin + Cytoxan chemo followed by Taxotere and then radiation therapy  ? On Femara since Nov/Dec 2012, per Heme/Onc note in 5/2022 was supposed to continue this for 10 years ending in Dec 2022, however still listed in home meds, hold for now        Endo:   • Hx of T2DM  ? Home regimen: Metformin, Sitagliptin  ? Increase to SSI Algorithm 3, BG goal 140-180  ? , 199, 179, 177, 120        ID:   • Ventral hernia with Strangulated and perforated large bowel  ? Suspicion for perforated colon mass during exploratory laparotomy and ventral hernia repair on 2/9  ? S/p left hemicolectomy  ? Continue Zosyn (Currently day 2)  ? Afebrile over last 24 hours  ? WBC 17 28 (from 16 97)  ? Trend WBC/fever curve        MSK/Skin:   • Abdominal Incision  ?  Local wound care per General surgery, currently packed with Dakin-soaked kerlex, plan for transition to [facility] once wound appears clean  • Frequent repositioning  • PT/OT when able      Patient appropriate for transfer out of the ICU today?: Patient does not meet criteria for ICU Follow-up Clinic; referral has not been made  Disposition: Transfer to Stepdown Level 2  Code Status: Level 1 - Full Code  ---------------------------------------------------------------------------------------  ICU CORE MEASURES    Prophylaxis   VTE Pharmacologic Prophylaxis: Heparin Drip  VTE Mechanical Prophylaxis: sequential compression device  Stress Ulcer Prophylaxis: Prophylaxis Not Indicated     ABCDE Protocol (if indicated)  Plan to perform spontaneous awakening trial today? Not applicable  Plan to perform spontaneous breathing trial today? Not applicable  Obvious barriers to extubation? Not applicable  CAM-ICU: Negative    Invasive Devices Review  Invasive Devices     Peripheral Intravenous Line  Duration           Peripheral IV 23 Left Antecubital <1 day    Peripheral IV 02/10/23 Left;Ventral (anterior) Forearm <1 day          Drain  Duration           NG/OG/Enteral Tube Nasogastric 14 Fr Right nare 1 day    Ileostomy Standard (ap Calderón) Umbilicus <1 day    Urethral Catheter Latex 16 Fr  <1 day              Can any invasive devices be discontinued today?  No  ---------------------------------------------------------------------------------------  OBJECTIVE    Vitals   Vitals:    02/10/23 0600 02/10/23 0615 02/10/23 0630 02/10/23 0636   BP: (!) 77/41 (!) 78/45 (!) 90/48 (!) 97/47   BP Location:       Pulse: 88 90 90 92   Resp: 18 18 17 21   Temp:       TempSrc:       SpO2: 95% 95% 95% 96%   Weight: 59 6 kg (131 lb 6 3 oz)      Height:         Temp (24hrs), Av 4 °F (36 9 °C), Min:97 7 °F (36 5 °C), Max:99 °F (37 2 °C)  Current: Temperature: 97 7 °F (36 5 °C)    Respiratory:  SpO2 Device: O2 Device: None (Room air)  Nasal Cannula O2 Flow Rate (L/min): 2 L/min    Invasive/non-invasive ventilation settings   Respiratory    Lab Data (Last 4 hours)    None         O2/Vent Data (Last 4 hours)    None                Physical Exam  Constitutional:       Appearance: Normal appearance  HENT:      Head: Normocephalic and atraumatic  Right Ear: External ear normal       Left Ear: External ear normal       Nose: Nose normal       Mouth/Throat:      Mouth: Mucous membranes are moist       Pharynx: Oropharynx is clear  Eyes:      Extraocular Movements: Extraocular movements intact  Conjunctiva/sclera: Conjunctivae normal       Pupils: Pupils are equal, round, and reactive to light  Cardiovascular:      Rate and Rhythm: Regular rhythm  Tachycardia present  Pulses: Normal pulses  Pulmonary:      Comments: Tachypneic 23-26, on room air  Abdominal:      General: Abdomen is flat  Palpations: Abdomen is soft  Tenderness: There is abdominal tenderness (Appropriately tender)  Comments: Abdominal wound cleanly dressed   Musculoskeletal:         General: Normal range of motion  Cervical back: Normal range of motion  Skin:     General: Skin is warm and dry  Neurological:      General: No focal deficit present  Mental Status: She is alert and oriented to person, place, and time     Psychiatric:         Mood and Affect: Mood normal          Behavior: Behavior normal                Laboratory and Diagnostics:  Results from last 7 days   Lab Units 02/10/23  0456 02/09/23  2102 02/09/23  1954 02/09/23  1224   WBC Thousand/uL 17 28* 16 97*  --  11 28*   HEMOGLOBIN g/dL 8 2* 9 0*  --  10 1*   I STAT HEMOGLOBIN g/dl  --   --  10 2*  --    HEMATOCRIT % 27 2* 29 4*  --  32 8*   HEMATOCRIT, ISTAT %  --   --  30*  --    PLATELETS Thousands/uL 394* 465*  --  498*   NEUTROS PCT %  --   --   --  78*   BANDS PCT %  --  24*  --   --    MONOS PCT %  --   --   --  14*   MONO PCT %  --  7  --   --      Results from last 7 days   Lab Units 02/10/23  0418 02/09/23  2102 02/09/23  1954 02/09/23  1224   SODIUM mmol/L 135 133*  --  130*   POTASSIUM mmol/L 3 6 4 1  --  3 1*   CHLORIDE mmol/L 104 102  --  94*   CO2 mmol/L 20* 18*  --  25   CO2, I-STAT mmol/L  --   --  18*  --    ANION GAP mmol/L 11 13  --  11   BUN mg/dL 11 15  --  20   CREATININE mg/dL 0 41* 0 37*  --  0 50*   CALCIUM mg/dL 7 0* 7 6*  --  9 1   GLUCOSE RANDOM mg/dL 234* 179*  --  120   ALT U/L  --  7*  --   --    AST U/L  --  11  --   --    ALK PHOS U/L  --  72  --   --    ALBUMIN g/dL  --  2 6*  --   --    TOTAL BILIRUBIN mg/dL  --  0 63  --   --      Results from last 7 days   Lab Units 02/10/23  0418 02/09/23  2102   MAGNESIUM mg/dL 2 2 2 1   PHOSPHORUS mg/dL 3 6 2 4      Results from last 7 days   Lab Units 02/10/23  0421   INR  1 35*   PTT seconds 22*          Results from last 7 days   Lab Units 02/09/23 2102   LACTIC ACID mmol/L 1 7     ABG:  Results from last 7 days   Lab Units 02/09/23 1952   PH ART  7 341*   PCO2 ART mm Hg 30 7*   PO2 ART mm Hg 113 0   HCO3 ART mmol/L 16 2*   BASE EXC ART mmol/L -8 5   ABG SOURCE  Radial, Left     VBG:  Results from last 7 days   Lab Units 02/09/23 1952   ABG SOURCE  Radial, Left           Micro        EKG: Sinus tachycardia  Imaging:  I have personally reviewed pertinent reports  Intake and Output  I/O       02/08 0701 02/09 0700 02/09 0701  02/10 0700    I V  (mL/kg)  3441 7 (57 7)    NG/GT  0    IV Piggyback  650    Total Intake(mL/kg)  4091 7 (68 7)    Urine (mL/kg/hr)  1370    Emesis/NG output  0    Stool  0    Blood  100    Total Output  1470    Net  +2621 7          Unmeasured Urine Occurrence  1 x    Unmeasured Stool Occurrence  1 x          Height and Weights   Height: 5' (152 4 cm)  IBW (Ideal Body Weight): 45 5 kg  Body mass index is 25 66 kg/m²    Weight (last 2 days)     Date/Time Weight    02/10/23 0600 59 6 (131 39)    02/09/23 1930 59 6 (131 39)    02/09/23 1300 66 3 (146 17)            Nutrition       Diet Orders   (From admission, onward)             Start     Ordered    02/09/23 1149  Diet NPO; Sips with meds  Diet effective now        References:    Nutrtion Support Algorithm Enteral vs  Parenteral   Question Answer Comment   Diet Type NPO    NPO Except: Sips with meds    RD to adjust diet per protocol?  Yes        02/09/23 1150                Active Medications  Scheduled Meds:  Current Facility-Administered Medications   Medication Dose Route Frequency Provider Last Rate   • Albumin 5%  25 g Intravenous Once Kassy Ordaz MD     • bimatoprost  1 drop Both Eyes HS Klaudia Lion MD     • heparin (porcine)  3-30 Units/kg/hr (Order-Specific) Intravenous Titrated Brittany Mari DO 18 Units/kg/hr (02/10/23 0443)   • heparin (porcine)  2,200 Units Intravenous Q6H PRN Brittany Mari DO     • heparin (porcine)  4,400 Units Intravenous Q6H PRN Brittany Mrai DO     • HYDROmorphone  0 5 mg Intravenous Q3H PRN Klaudia Lion MD     • HYDROmorphone  0 2 mg Intravenous Q4H PRN Gary Moctezuma PA-C     • insulin lispro  1-5 Units Subcutaneous Q6H [facility] Klaudia Lion MD     • multi-electrolyte  125 mL/hr Intravenous Continuous Raffy Salgado  mL/hr (02/09/23 2152)   • ondansetron  4 mg Intravenous Q6H PRN Gary Moctezuma PA-C     • piperacillin-tazobactam  3 375 g Intravenous Q8H Raffy Salgado MD 3 375 g (02/09/23 2354)     Continuous Infusions:  heparin (porcine), 3-30 Units/kg/hr (Order-Specific), Last Rate: 18 Units/kg/hr (02/10/23 0443)  multi-electrolyte, 125 mL/hr, Last Rate: 125 mL/hr (02/09/23 2152)      PRN Meds:   heparin (porcine), 2,200 Units, Q6H PRN  heparin (porcine), 4,400 Units, Q6H PRN  HYDROmorphone, 0 5 mg, Q3H PRN  HYDROmorphone, 0 2 mg, Q4H PRN  ondansetron, 4 mg, Q6H PRN        Allergies   No Known Allergies  ---------------------------------------------------------------------------------------  Advance Directive and Living Will:      Power of :    POLST: ---------------------------------------------------------------------------------------  Care Time Delivered:   Upon my evaluation, this patient had a high probability of imminent or life-threatening deterioration due to immediate post operative status following exploratory laparotomy, left hemicolectomy and ventral hernia repair for ventral hernia containing strangulated and perforated colonic mass and acute hypoxic respiratory failure in the setting of PE, which required my direct attention, intervention, and personal management  I have personally provided 30 minutes (6am to 6:30am) of critical care time, exclusive of procedures, teaching, family meetings, and any prior time recorded by providers other than myself  Izaiah Jim MD      Portions of the record may have been created with voice recognition software  Occasional wrong word or "sound a like" substitutions may have occurred due to the inherent limitations of voice recognition software    Read the chart carefully and recognize, using context, where substitutions have occurred

## 2023-02-10 NOTE — ANESTHESIA POSTPROCEDURE EVALUATION
Post-Op Assessment Note    CV Status:  Stable    Pain management: adequate     Mental Status:  Alert and awake   Hydration Status:  Euvolemic   PONV Controlled:  Controlled   Airway Patency:  Patent      Post Op Vitals Reviewed: Yes      Staff: CRNA, Anesthesiologist   Comments: Patient transported to ICU on 6 LFM, obeys commands, VSS, patient alert and comfortable  Report given to ICU team         No notable events documented      BP   115/61   Temp   97 6   Pulse 89   Resp   20   SpO2   98

## 2023-02-10 NOTE — QUICK NOTE
Post-op check s/p ex-lap, reduction of ventral hernia, L colectomy, and creation of end ileostomy  No events post-op  At bedside the patient reported only mild abd pain w/ no fevers/chills, nausea, or emesis  She has not ambulated nor been OOB and remains NPO  Vitals:    02/09/23 2030   BP: 94/60   Pulse: (!) 106   Resp: (!) 23   Temp:    SpO2: 96%     Abdomen soft, non-distended, and appropriately tender around non-saturated surgical dressings w/ no visible strike-through  Ostomy pink, patent, and not yet productive of stool nor flatus  Foster yellow urine in collection w/ 135cc post-op  Plan: Will continue NPO/NGT until return of bowel function, IVFs, philippe catheter, analgesics/antiemetics PRN, and routine ostomy care w/ repeat labs tomorrow AM  SCC consulted, appreciate assistance

## 2023-02-10 NOTE — PROGRESS NOTES
Progress Note - Kylie De La Cruz 69 y o  female MRN: [id number]    Unit/Bed#: MICU 01 Encounter: [id number]      Assessment:  Kylie De La Cruz is a 69 y o  female with PMH colon cancer s/p lap right hemicolectomy 2/2 ascending colon mass (7/15) p/w incarcerated ventral hernia  S/p ex lap end ileostomy, L colectomy, reduction of ventral hernia on 2/9    Post op course c/b PE now on heparin gtt  WBC 17 2 from 16 9, Hgb 8 2 from 9 from 10  Receiving bolus for MAP 54, no pressors     Plan:  Monitor ostomy function  Continue abx  Monitor hemodynamics  Trend end points  Continue heparin gtt  Strict I/Os  Local wound care to midline per surgical team  Maintain philippe  Eventual MRI liver      Subjective:   Patient seen and examined bedside  States she feels better  Denies pain  Didn't sleep  Ostomy with bowel sweat  Objective:     Vitals: Blood pressure (!) 89/50, pulse 100, temperature 97 7 °F (36 5 °C), temperature source Oral, resp  rate (!) 23, height 5' (1 524 m), weight 59 6 kg (131 lb 6 3 oz), SpO2 96 %, not currently breastfeeding  ,Body mass index is 25 66 kg/m²        Intake/Output Summary (Last 24 hours) at 2/10/2023 7792  Last data filed at 2/10/2023 0000  Gross per 24 hour   Intake 4091 67 ml   Output 1470 ml   Net 2621 67 ml       Physical Exam:   General - no acute distress, responsive and cooperative  CV - warm, regular rate  Pulm - normal work of breathing, no respiratory distress ORA  Abd - soft, nondistended, ostomy viable and pink with bowel sweat per bag, midline with fascia closed and packed with 4x4  Neuro - m/s grossly intact, cn grossly intact  Ext - moving all extremities, philippe       Invasive Devices     Peripheral Intravenous Line  Duration           Peripheral IV 02/09/23 Left Antecubital <1 day    Peripheral IV 02/10/23 Left;Ventral (anterior) Forearm <1 day          Drain  Duration           NG/OG/Enteral Tube Nasogastric 14 Fr Right nare 1 day    Ileostomy Standard (ap Calderón) Umbilicus <1 day    Urethral Catheter Latex 16 Fr  <1 day                Lab, Imaging and other studies: I have personally reviewed pertinent reports      VTE Pharmacologic Prophylaxis: Heparin  VTE Mechanical Prophylaxis: sequential compression device

## 2023-02-10 NOTE — OP NOTE
OPERATIVE REPORT  PATIENT NAME: Tanya Jacob    :  1945  MRN: [id number]  Pt Location: BE OR ROOM 03    SURGERY DATE: 2023    Surgeon(s) and Role:     * Aleksandar Wheeler DO - Primary      * Yeni Aviles MD - Assisting     * SAJAN Foster-C - Observing       *Casimiro Escobar MD-Primary- Extended left hemicolectomy     Preop Diagnosis:  Incarcerated ventral hernia [K43 6]    Post-Op Diagnosis Codes:     * Incarcerated ventral hernia [K43 6]    Procedure(s):  Extended left hemicolectomy -36909    EXPLORATORY LAPAROTOMY; ABDOMINAL WALL DEBRIDEMENT; REDUCTION VENTRAL HERNIA; CREATION ILEOSTOMY; WASHOUT    Specimen(s):  ID Type Source Tests Collected by Time Destination   1 : Hernia Sac Tissue Abdominal TISSUE EXAM Aleksandar Wheeler DO 2023 1602    2 : Left Colectomy  Tissue Colon TISSUE EXAM Aleksandar Wheeler DO 2023 1622      Estimated Blood Loss:   50 mL    Drains:  NG/OG/Enteral Tube Nasogastric 14 Fr Right nare (Active)   Placement Reverification Auscultation 02/10/23 0000   Site Assessment Clean;Dry 02/10/23 0000   Status Suction-low continuous 02/10/23 0000   Drainage Appearance None 02/10/23 0000   Intake (mL) 0 mL 02/10/23 0000   Output (mL) 0 mL 02/10/23 0000   Number of days: 1       Ileostomy Standard (Kaitlynn, end) Umbilicus (Active)   Stomal Appliance Clean;Dry; Intact 02/10/23 0000   Stoma Assessment Red;Pink 02/10/23 0000   Stoma Shape Round 02/10/23 0000   Peristomal Assessment Clean; Intact 02/10/23 0000   Output (mL) 0 mL 23   Number of days: 1       Urethral Catheter Latex 16 Fr   (Active)   Reasons to continue Urinary Catheter  Accurate I&O assessment in critically ill patients (48 hr  max) 02/10/23 0000   Goal for Removal Voiding trial when ambulation improves 02/10/23 0000   Site Assessment Clean;Skin intact 02/10/23 0000   Gar Care Done 23 2100   Collection Container Standard drainage bag 02/10/23 0000   Output (mL) 170 mL 02/10/23 0000   Number of days: 1 Anesthesia Type:   GETA    Operative Indications:  Incarcerated ventral hernia [K43 6]    Operative Findings:  -Incarcerated ventral hernia containing perforated colon mass concerning for malignancy, there is contamination with liquid green stool, all of these findings are present on admission given her emergency course  -Right lobe mild nodularity palpated cannot rule out metastatic disease, liver  -Descending stapled long Glenroy's, end ileostomy left for Dr Cuong Callahan completion and maturation after fascial closure    Complications:   None    Procedure and Technique:  29-year-old female seen by Dr Maria De Jesus Fajardo, concern for incarcerated ventral hernia and sent to ER with evaluation coordinated with acute care surgery  She has history of stage I colon cancer approximately 8 years prior  I was asked for intraoperative consultation by Dr Cuong Callahan  Patient had laparotomy performed and on my scrubbing into the operating room it was obvious that there was colon entering ventral hernia sac through a narrow neck  She had ileocolic anastomosis well proximal to this  Upon opening the hernia sac it was obvious that there was green stool contamination with perforation of a phlegmonous mass  This area was kept separate as best as possible however did communicate directly with the abdomen with likely stool/liquid contamination, all of these findings were present on admission given their finding at exploration  We proceeded around the hernia sac to remove the soft tissue infection from the abdominal wall and coming around the entirety of the hernia sac with the perforation to take the specimen completely for cancer operation      Once the hernia area with mass/phlegmon was completely isolated the descending colon which appeared with good blood supply was encircled, and a window was created in the mesentery for a HAROON 100 blue load stapler, this was followed by ileum encircled and skeletonized, HAROON 100 blue load was used to take this  Enseal super jaw energy device was used and a high/central ligation to take the mesentery as well as the adherent omentum to complete an oncologic resection, leaving the descending stapled as a long Glenroy's  Specimen was passed off the field, at this point I turned the case back over to [address] [place] [address] for completion, closure and ileostomy formation  I was present for the entirety of my portion of procedure, all sponge, needle, instrument counts were correct for my portion of the procedure  Patient Disposition:  Patient was turned back over to Dr Eller La [facility]      Synoptic Colorectal Cancer Reporting:  Operation performed with curative intent: YES  Tumor Location: Transverse colon  Extent of colon, vascular resection: Transverse colon, left colon, high/central ligation of the middle colic artery    SIGNATURE: Brandy Quiros MD  DATE: February 10, 2023  TIME: 2:21 AM

## 2023-02-10 NOTE — UTILIZATION REVIEW
Initial Clinical Review    Admission: Date/Time/Statement:   Admission Orders (From admission, onward)     Ordered        02/09/23 1150  Inpatient Admission  Once                      Orders Placed This Encounter   Procedures   • Inpatient Admission     Standing Status:   Standing     Number of Occurrences:   1     Order Specific Question:   Level of Care     Answer:   Med Surg [16]     Order Specific Question:   Estimated length of stay     Answer:   More than 2 Midnights     Order Specific Question:   Certification     Answer:   I certify that inpatient services are medically necessary for this patient for a duration of greater than two midnights  See H&P and MD Progress Notes for additional information about the patient's course of treatment  ED Arrival Information     Expected   -    Arrival   2/9/2023 11:19    Acuity   Urgent            Means of arrival   Ambulance    Escorted by   [facility] [address] [facility]/ICU    Admission type   Emergency            Arrival complaint   abdominal pain           Chief Complaint   Patient presents with   • Abdominal Pain     Pt comes via EMS from colorectal doctors office with concern for SBO and incarcerated hernia  Pt reports nausea, no diarrhea, lack of appetite, unsure of last BM  Initial Presentation: 69 y o  female with PMH of R colon resection in 2015 and long standing incisional hernia presented to the ED from OP colorectal office via EMS w/ abdominal pain  Pt reports pain started after having meal at a diner on 02/01 and gradually worsened since  She had difficulty w/ sitting up straight and lying flat  Last BM 3 days ago, passing flatus  Has decreased po intake, poor appetite  Reports area of redness over LLQ of her abd  Saw OP colorectal today and instructed to go to the ED  In the ED, pt w/ tachycardia, tachypnea  CT showed Large left-sided ventral hernia containing mostly large bowel with reactive fluid, official read pending   On exam, sizable left ventral hernia with overlying erythema, tender to palpation  Admit as Inpatient to surgery d/t incarcerated ventral hernia  Plan:OR for exploratory laparotomy, possible bowel resection, ventral hernia repair, possible ostomy  Discussed risk of bleeding, infection, damage to surrounding structures, anastomotic leak, recurrence, poor wound healing  N p o  except meds  IV on-call antibiotics  OP Note:   SURGERY DATE: 2/9/2023  Procedure(s):  EXPLORATORY LAPAROTOMY; ABDOMINAL WALL DEBRIDEMENT; REDUCTION VENTRAL HERNIA; LEFT COLON RESECTION; CREATION ILEOSTOMY; WASHOUT  Anesthesia Type: General anesthesia with endotracheal intubation    Operative Findings: Gen Surg  1  Large Incarcerated Ventral Hernia in the left lower quadrant  2  Hernia contained a loop of transverse colon with a mass that had perforated into the abdominal wall (see image below)  3  Localized necrotizing soft tissue infection of surrounding abdominal wall  4  Erythema of the overlying LLQ skin without obvious features of gangrene or non-viability  5  Final wound defect measured; 18 cm x 20 cm x 8 cm  6  Prior ileo-colic anastomosis noted to be intact  Operative Findings: Colorectal  -Incarcerated ventral hernia containing perforated colon mass concerning for malignancy, there is contamination with liquid green stool, all of these findings are present on admission given her emergency course  -Right lobe mild nodularity palpated cannot rule out metastatic disease, liver  -Descending stapled long Glenroy's, end ileostomy left for Dr Claudene Members completion and maturation after fascial closure      02/09 Critical Care Consult: CT revealed a large left sided ventral hernia containing large bowel and reactive fluid  Pt was taken to the OR emergently for exploratory lap  S/p exploratory laparotomy, left hemicolectomy, ventral hernia repair on 2/9   She was extubated postoperatively in the OR and brought to the ICU with an NGT and a philippe  Currently reports her pain is controlled  On 2L NC, wean O2 as tolerated  Cont NPO/NGT  Monitor ostomy appearance and output  Cont IV abx  Maintain philippe cath, mon Cr and UOP  DVT ppx  Trend WBC and fever curve  Local wound care per General surgery, currently packed with Dakin-soaked kerlex, plan for transition to [facility] once wound appears clean       Date: 02/10   Day 2: Overnight, pt had some hemoptysis, CTA PE study showed JOANNA and RLL segmental PE's, no right heart strain on CT, started on a heparin drip, ECHO ordered and pending, hypotensive this morning, responded to 1L crystalloid, now normotensive  Cont NPO/NGT  No ostomy output  Pain control sched an dprn  Follow trop  Cont hep gtt  Monitor ostomy appearance and output  IV abx  [address] [place]  Mon cr and UOP  Maintain philippe  Gen surg Notes: Abdominal dressing change and assessment : Pt feeling better today than yesterday  R side ostomy pink, healthy, minimal serosanguineous output in the bag thus far  L anterior abdominal wall wound w/ some serosanguineous and light brownish colored drainage noted on the Curlex packing  Plan to continue local wound care with daily packing changes  One piece of Kerlix dressing was packed into the wound today that was moistened with Dakin's solution  Anticipate transition to saline moistened gauze packing potentially as early as 2/11/2023         ED Triage Vitals   Temperature Pulse Respirations Blood Pressure SpO2   02/09/23 1125 02/09/23 1125 02/09/23 1125 02/09/23 1125 02/09/23 1125   98 9 °F (37 2 °C) 103 18 140/67 99 %      Temp Source Heart Rate Source Patient Position - Orthostatic VS BP Location FiO2 (%)   02/09/23 1125 02/09/23 1125 02/09/23 1930 02/09/23 1125 --   Tympanic Monitor Lying Right arm       Pain Score       02/09/23 1341       No Pain          Wt Readings from Last 1 Encounters:   02/10/23 59 6 kg (131 lb 6 3 oz)     Additional Vital Signs:   Date/Time Temp Pulse Resp BP MAP (mmHg) SpO2 Calculated FIO2 (%) - Nasal Cannula Nasal Cannula O2 Flow Rate (L/min) O2 Device Patient Position - Orthostatic VS   02/10/23 1200 97 8 °F (36 6 °C) 102 21 108/62 77 94 % -- -- None (Room air) Lying   02/10/23 0959 -- 94 20 93/52 65 95 % -- -- -- --   02/10/23 0900 -- 98 19 91/48 Abnormal  55 Abnormal  96 % -- -- -- --   02/10/23 0830 -- 96 22 96/49 Abnormal  73 96 % -- -- -- --   02/10/23 0827 98 1 °F (36 7 °C) -- -- -- -- -- -- -- -- --   02/10/23 0815 -- 98 20 80/41 Abnormal  56 Abnormal  95 % -- -- -- --   02/10/23 0745 -- 94 19 108/44 Abnormal  65 95 % -- -- -- --   02/10/23 0700 -- 100 -- 97/47 Abnormal  -- -- -- -- -- --   02/10/23 0636 -- 92 21 97/47 Abnormal  68 96 % -- -- -- --   02/10/23 0630 -- 90 17 90/48 Abnormal  66 95 % -- -- -- --   02/10/23 0615 -- 90 18 78/45 Abnormal  60 Abnormal  95 % -- -- -- --   02/10/23 0600 -- 88 18 77/41 Abnormal  54 Abnormal  95 % -- -- -- --   02/10/23 0415 -- 100 24 Abnormal  84/52 Abnormal  62 Abnormal  95 % -- -- -- --   02/10/23 0400 -- 92 19 75/44 Abnormal  55 Abnormal  94 % -- -- -- --   02/10/23 0300 -- 102 24 Abnormal  99/50 68 96 % -- -- -- --   02/10/23 0215 -- 100 21 82/50 Abnormal  64 Abnormal  98 % -- -- -- --   02/10/23 0104 -- 100 23 Abnormal  89/50 Abnormal  72 96 % -- -- -- --   02/10/23 0100 -- 98 23 Abnormal  81/55 Abnormal  61 Abnormal  96 % -- -- -- --   02/10/23 0000 97 7 °F (36 5 °C) 106 Abnormal  24 Abnormal  105/58 74 96 % -- -- None (Room air) Lying   02/09/23 2300 -- 98 27 Abnormal  94/59 69 92 % -- -- -- --   02/09/23 22:35:27 99 °F (37 2 °C) 100 -- 136/77 97 95 % -- -- -- --   02/09/23 2200 -- 102 23 Abnormal  101/57 71 96 % 28 2 L/min Nasal cannula --   02/09/23 2108 -- 106 Abnormal  24 Abnormal  107/67 79 97 % -- -- -- --   02/09/23 2100 -- 108 Abnormal  26 Abnormal  -- -- 96 % 36 4 L/min Nasal cannula --   02/09/23 2030 -- 106 Abnormal  23 Abnormal  94/60 70 96 % -- -- -- --   02/09/23 2024 -- 104 24 Abnormal  116/65 84 98 % -- -- -- --   02/09/23 2015 -- 106 Abnormal  25 Abnormal  -- -- 98 % -- -- -- --   02/09/23 2000 97 9 °F (36 6 °C) 104 23 Abnormal  -- -- 98 % -- -- -- --   02/09/23 1945 -- 102 20 -- -- 99 % -- -- -- --   02/09/23 1930 97 9 °F (36 6 °C) 102 24 Abnormal  109/47 Abnormal  70 96 % 44 6 L/min Nasal cannula Lying   02/09/23 1915 -- 104 21 115/52 73 95 % -- -- -- --   02/09/23 1900 -- 100 -- 119/64 81 95 % -- -- -- --   02/09/23 13:38:05 98 8 °F (37 1 °C) 103 18 145/77 100 95 % -- -- -- --   02/09/23 1143 -- -- -- -- -- -- -- -- None (Room air) --       Pertinent Labs/Diagnostic Test Results:   CTA chest pe study   Final Result by Shira Bowman MD (02/10 3418)      1  Segmental filling defects within the left upper lobe and right lower lobe pulmonary arteries compatible with pulmonary emboli  Measured RV/LV ratio is within normal limits at less than 0 9      2   Trace bilateral pleural effusions  3   Pneumoperitoneum which is likely postoperative, correlate clinically  4   Other findings as above  I personally discussed this study with Rosalina Steinberg on 2/10/2023 at 3:44 AM       Workstation performed: [id number]         CT abdomen pelvis w contrast   Final Result by Indio Layton MD (02/09 9551)      1  Bowel obstruction involving the transverse colon with suspected colonic mass  The patient is status post right hemicolectomy with a segment of transverse colon, ileocolic anastomosis and distal ileum within the large ventral hernia  2   Hypodense lesions in the liver, not present on the prior CT scan  Metastases to be excluded  Consider MRI for further evaluation purposes  I sent a tiger text to Sameer Rivers on 2/9/2023 at 2:23 PM                Workstation performed: [id number]           02/10 Echo:   Left Ventricle: Left ventricular cavity size is normal  Wall thickness is normal  The left ventricular ejection fraction is 60%   Systolic function is normal   •  Right Ventricle: Right ventricular cavity size is normal  Systolic function is normal   •  Tricuspid Valve:  There is mild regurgitation        Results from last 7 days   Lab Units 02/10/23  0456 02/09/23  2102 02/09/23  1954 02/09/23  1224   WBC Thousand/uL 17 28* 16 97*  --  11 28*   HEMOGLOBIN g/dL 8 2* 9 0*  --  10 1*   I STAT HEMOGLOBIN g/dl  --   --  10 2*  --    HEMATOCRIT % 27 2* 29 4*  --  32 8*   HEMATOCRIT, ISTAT %  --   --  30*  --    PLATELETS Thousands/uL 394* 465*  --  498*   NEUTROS ABS Thousands/µL  --   --   --  8 78*   BANDS PCT %  --  24*  --   --          Results from last 7 days   Lab Units 02/10/23  0418 02/09/23  2102 02/09/23  1954 02/09/23  1224   SODIUM mmol/L 135 133*  --  130*   POTASSIUM mmol/L 3 6 4 1  --  3 1*   CHLORIDE mmol/L 104 102  --  94*   CO2 mmol/L 20* 18*  --  25   CO2, I-STAT mmol/L  --   --  18*  --    ANION GAP mmol/L 11 13  --  11   BUN mg/dL 11 15  --  20   CREATININE mg/dL 0 41* 0 37*  --  0 50*   EGFR ml/min/1 73sq m 99 103  --  93   CALCIUM mg/dL 7 0* 7 6*  --  9 1   CALCIUM, IONIZED mmol/L 0 93*  --   --   --    CALCIUM, IONIZED, ISTAT mmol/L  --   --  1 07*  --    MAGNESIUM mg/dL 2 2 2 1  --   --    PHOSPHORUS mg/dL 3 6 2 4  --   --      Results from last 7 days   Lab Units 02/09/23 2102   AST U/L 11   ALT U/L 7*   ALK PHOS U/L 72   TOTAL PROTEIN g/dL 5 6*   ALBUMIN g/dL 2 6*   TOTAL BILIRUBIN mg/dL 0 63     Results from last 7 days   Lab Units 02/10/23  1208 02/10/23  0652 02/09/23  2330 02/09/23  1947   POC GLUCOSE mg/dl 183* 225* 199* 177*     Results from last 7 days   Lab Units 02/10/23  0418 02/09/23  2102 02/09/23  1224   GLUCOSE RANDOM mg/dL 234* 179* 120     Results from last 7 days   Lab Units 02/10/23  0842 02/09/23  1952   PH ART  7 433 7 341*   PCO2 ART mm Hg 35 1* 30 7*   PO2 ART mm Hg 67 7* 113 0   HCO3 ART mmol/L 22 9 16 2*   BASE EXC ART mmol/L -1 1 -8 5   O2 CONTENT ART mL/dL 10 4* 13 9*   O2 HGB, ARTERIAL % 91 5* 96 3   ABG SOURCE  Radial, Left Radial, Left         Results from last 7 days   Lab Units 02/09/23  1954   I STAT BASE EXC mmol/L -8*   I STAT O2 SAT % 97*   ISTAT PH ART  7 331*   I STAT ART PCO2 mm HG 31 7*   I STAT ART PO2 mm HG 94 0   I STAT ART HCO3 mmol/L 16 7*                 Results from last 7 days   Lab Units 02/10/23  1123 02/10/23  0421   PROTIME seconds  --  16 9*   INR   --  1 35*   PTT seconds 49* 22*             Results from last 7 days   Lab Units 02/09/23  2102   LACTIC ACID mmol/L 1 7            ED Treatment:   Medication Administration from 02/09/2023 1119 to 02/09/2023 1317       Date/Time Order Dose Route Action     02/09/2023 1223 EST lactated ringers infusion 100 mL/hr Intravenous New Bag     02/09/2023 1303 EST iohexol (OMNIPAQUE) 350 MG/ML injection (SINGLE-DOSE) 90 mL 90 mL Intravenous Given        Past Medical History:   Diagnosis Date   • Acute embolism and thrombosis of deep vein of lower extremity (HCC)    • Adenocarcinoma of colon, [facility] A (HCC)    • [facility] 2012    Right Breast    • Cancer ([facility] )    • Diabetes mellitus ([facility] )    • DVT (deep venous thrombosis) (HCC)    • GERD (gastroesophageal reflux disease)    • Hypertension    • Pes planus      Present on Admission:  • Perforated bowel ([facility] )  • Strangulated ventral hernia  • Essential hypertension  • GERD (gastroesophageal reflux disease)  • Personal history of other malignant neoplasm of large intestine  • Type 2 diabetes mellitus without complications ([facility] )      Admitting Diagnosis: Incarcerated ventral hernia [K43 6]  Abdominal pain [R10 9]  Age/Sex: 69 y o  female  Admission Orders:  SCD  NGT to low cont suction  Daily wts  I/O  NPO    Scheduled Medications:  albumin human (FLEXBUMIN) 5 % injection 25 g IV once 02/10  bimatoprost, 1 drop, Both Eyes, HS  insulin lispro, 1-6 Units, Subcutaneous, Q6H LUCINA  piperacillin-tazobactam, 3 375 g, Intravenous, Q8H  potassium chloride, 20 mEq, Intravenous, Q2H  sodium hypochlorite, 1 application, Irrigation, Daily  multi-electrolyte (ISOLYTE-S PH 7 4) bolus 1,000 mL IV once 02/10  calcium gluconate 1 g in sodium chloride 0 9% 50 mL (premix)  Dose: 1 g  Freq: Every 30 minutes Route: IV  Last Dose: Stopped (02/10/23 0720)  Start: 02/10/23 0545 End: 02/10/23 0720    Continuous IV Infusions:  heparin (porcine), 3-30 Units/kg/hr (Order-Specific), Intravenous, Titrated  multi-electrolyte, 125 mL/hr, Intravenous, Continuous      PRN Meds:  heparin (porcine), 2,200 Units, Intravenous, Q6H PRN 02/10 x 1  heparin (porcine), 4,400 Units, Intravenous, Q6H PRN  HYDROmorphone, 0 5 mg, Intravenous, Q3H PRN  HYDROmorphone, 0 2 mg, Intravenous, Q4H PRN 02/09 x 1  ondansetron, 4 mg, Intravenous, Q6H PRN        IP CONSULT TO PICC TEAM    Network Utilization Review Department  ATTENTION: Please call with any questions or concerns to [phone] and carefully listen to the prompts so that you are directed to the right person  All voicemails are confidential   Brianda Martínez all requests for admission clinical reviews, approved or denied determinations and any other requests to dedicated fax number below belonging to the campus where the patient is receiving treatment   List of dedicated fax numbers for the Facilities:  [address] DENIALS (Administrative/Medical Necessity) [phone]   [address] (Maternity/NICU/Pediatrics) [phone]   [address] [phone]   [facility] [address] [phone]   [address] [place] [address] [address] [place] [address] Lauro Coughlin [place] [address] [phone]   [address] [place] Nolvia Santillan [facility] [place] [address] [phone]

## 2023-02-10 NOTE — PROGRESS NOTES
Pastoral Care Progress Note    2/10/2023  Patient: Fabio Huang : 1945  Admission Date & Time: 2023 1123  MRN: [id number] CSN: [id number]        MICU nurse texted  to visit Pt who seemed discouraged  When I got to room Pt was sleeping so I did not disturb  Will ask 4-8 pm  to visit

## 2023-02-11 LAB
ABO GROUP BLD BPU: NORMAL
ANION GAP SERPL CALCULATED.3IONS-SCNC: 10 MMOL/L (ref 4–13)
APTT PPP: 78 SECONDS (ref 23–37)
APTT PPP: 79 SECONDS (ref 23–37)
BPU ID: NORMAL
BUN SERPL-MCNC: 7 MG/DL (ref 5–25)
CA-I BLD-SCNC: 1.09 MMOL/L (ref 1.12–1.32)
CALCIUM SERPL-MCNC: 8 MG/DL (ref 8.3–10.1)
CHLORIDE SERPL-SCNC: 109 MMOL/L (ref 96–108)
CO2 SERPL-SCNC: 20 MMOL/L (ref 21–32)
CREAT SERPL-MCNC: 0.29 MG/DL (ref 0.6–1.3)
CROSSMATCH: NORMAL
ERYTHROCYTE [DISTWIDTH] IN BLOOD BY AUTOMATED COUNT: 16.8 % (ref 11.6–15.1)
GFR SERPL CREATININE-BSD FRML MDRD: 112 ML/MIN/1.73SQ M
GLUCOSE SERPL-MCNC: 147 MG/DL (ref 65–140)
GLUCOSE SERPL-MCNC: 156 MG/DL (ref 65–140)
GLUCOSE SERPL-MCNC: 181 MG/DL (ref 65–140)
GLUCOSE SERPL-MCNC: 218 MG/DL (ref 65–140)
HCT VFR BLD AUTO: 24 % (ref 34.8–46.1)
HGB BLD-MCNC: 7.3 G/DL (ref 11.5–15.4)
MCH RBC QN AUTO: 28.1 PG (ref 26.8–34.3)
MCHC RBC AUTO-ENTMCNC: 30.4 G/DL (ref 31.4–37.4)
MCV RBC AUTO: 92 FL (ref 82–98)
PLATELET # BLD AUTO: 302 THOUSANDS/UL (ref 149–390)
PMV BLD AUTO: 9 FL (ref 8.9–12.7)
POTASSIUM SERPL-SCNC: 3.6 MMOL/L (ref 3.5–5.3)
RBC # BLD AUTO: 2.6 MILLION/UL (ref 3.81–5.12)
SODIUM SERPL-SCNC: 139 MMOL/L (ref 135–147)
UNIT DISPENSE STATUS: NORMAL
UNIT PRODUCT CODE: NORMAL
UNIT PRODUCT VOLUME: 350 ML
UNIT RH: NORMAL
WBC # BLD AUTO: 16.63 THOUSAND/UL (ref 4.31–10.16)

## 2023-02-11 RX ORDER — HYDROMORPHONE HCL/PF 1 MG/ML
0.5 SYRINGE (ML) INJECTION EVERY 4 HOURS PRN
Status: DISCONTINUED | OUTPATIENT
Start: 2023-02-11 | End: 2023-02-23 | Stop reason: HOSPADM

## 2023-02-11 RX ORDER — PANTOPRAZOLE SODIUM 40 MG/10ML
40 INJECTION, POWDER, LYOPHILIZED, FOR SOLUTION INTRAVENOUS
Status: DISCONTINUED | OUTPATIENT
Start: 2023-02-11 | End: 2023-02-22

## 2023-02-11 RX ORDER — HYDROMORPHONE HCL IN WATER/PF 6 MG/30 ML
0.2 PATIENT CONTROLLED ANALGESIA SYRINGE INTRAVENOUS EVERY 4 HOURS PRN
Status: DISCONTINUED | OUTPATIENT
Start: 2023-02-11 | End: 2023-02-23 | Stop reason: HOSPADM

## 2023-02-11 RX ORDER — ALBUMIN, HUMAN INJ 5% 5 %
12.5 SOLUTION INTRAVENOUS ONCE
Status: COMPLETED | OUTPATIENT
Start: 2023-02-11 | End: 2023-02-11

## 2023-02-11 RX ORDER — MAGNESIUM SULFATE HEPTAHYDRATE 40 MG/ML
2 INJECTION, SOLUTION INTRAVENOUS ONCE
Status: COMPLETED | OUTPATIENT
Start: 2023-02-11 | End: 2023-02-11

## 2023-02-11 RX ORDER — POTASSIUM CHLORIDE 29.8 MG/ML
40 INJECTION INTRAVENOUS
Status: COMPLETED | OUTPATIENT
Start: 2023-02-11 | End: 2023-02-11

## 2023-02-11 RX ORDER — CALCIUM GLUCONATE 20 MG/ML
2 INJECTION, SOLUTION INTRAVENOUS ONCE
Status: COMPLETED | OUTPATIENT
Start: 2023-02-11 | End: 2023-02-11

## 2023-02-11 RX ORDER — DEXTROSE AND SODIUM CHLORIDE 5; .45 G/100ML; G/100ML
100 INJECTION, SOLUTION INTRAVENOUS CONTINUOUS
Status: DISCONTINUED | OUTPATIENT
Start: 2023-02-11 | End: 2023-02-12

## 2023-02-11 RX ADMIN — INSULIN LISPRO 1 UNITS: 100 INJECTION, SOLUTION INTRAVENOUS; SUBCUTANEOUS at 00:39

## 2023-02-11 RX ADMIN — ALBUTEROL SULFATE 2.5 MG: 2.5 SOLUTION RESPIRATORY (INHALATION) at 15:23

## 2023-02-11 RX ADMIN — HEPARIN SODIUM 22 UNITS/KG/HR: 10000 INJECTION, SOLUTION INTRAVENOUS at 03:07

## 2023-02-11 RX ADMIN — MAGNESIUM SULFATE HEPTAHYDRATE 2 G: 40 INJECTION, SOLUTION INTRAVENOUS at 07:29

## 2023-02-11 RX ADMIN — ALBUMIN (HUMAN) 12.5 G: 12.5 INJECTION, SOLUTION INTRAVENOUS at 02:54

## 2023-02-11 RX ADMIN — PIPERACILLIN SODIUM AND TAZOBACTAM SODIUM 3.38 G: 36; 4.5 INJECTION, POWDER, LYOPHILIZED, FOR SOLUTION INTRAVENOUS at 15:10

## 2023-02-11 RX ADMIN — INSULIN LISPRO 1 UNITS: 100 INJECTION, SOLUTION INTRAVENOUS; SUBCUTANEOUS at 06:05

## 2023-02-11 RX ADMIN — SODIUM CHLORIDE, SODIUM GLUCONATE, SODIUM ACETATE, POTASSIUM CHLORIDE, MAGNESIUM CHLORIDE, SODIUM PHOSPHATE, DIBASIC, AND POTASSIUM PHOSPHATE 125 ML/HR: .53; .5; .37; .037; .03; .012; .00082 INJECTION, SOLUTION INTRAVENOUS at 02:50

## 2023-02-11 RX ADMIN — HYOSCYAMINE SULFATE 1 APPLICATION.: 16 SOLUTION at 08:41

## 2023-02-11 RX ADMIN — POTASSIUM CHLORIDE 40 MEQ: 29.8 INJECTION, SOLUTION INTRAVENOUS at 08:41

## 2023-02-11 RX ADMIN — BIMATOPROST 1 DROP: 0.1 SOLUTION/ DROPS OPHTHALMIC at 22:08

## 2023-02-11 RX ADMIN — PANTOPRAZOLE SODIUM 40 MG: 40 INJECTION, POWDER, FOR SOLUTION INTRAVENOUS at 06:23

## 2023-02-11 RX ADMIN — PIPERACILLIN SODIUM AND TAZOBACTAM SODIUM 3.38 G: 36; 4.5 INJECTION, POWDER, LYOPHILIZED, FOR SOLUTION INTRAVENOUS at 22:50

## 2023-02-11 RX ADMIN — INSULIN LISPRO 1 UNITS: 100 INJECTION, SOLUTION INTRAVENOUS; SUBCUTANEOUS at 23:53

## 2023-02-11 RX ADMIN — ALBUTEROL SULFATE 2.5 MG: 2.5 SOLUTION RESPIRATORY (INHALATION) at 00:49

## 2023-02-11 RX ADMIN — DEXTROSE AND SODIUM CHLORIDE 75 ML/HR: 5; .45 INJECTION, SOLUTION INTRAVENOUS at 15:10

## 2023-02-11 RX ADMIN — ALBUTEROL SULFATE 2.5 MG: 2.5 SOLUTION RESPIRATORY (INHALATION) at 07:02

## 2023-02-11 RX ADMIN — PIPERACILLIN SODIUM AND TAZOBACTAM SODIUM 3.38 G: 36; 4.5 INJECTION, POWDER, LYOPHILIZED, FOR SOLUTION INTRAVENOUS at 00:39

## 2023-02-11 RX ADMIN — POTASSIUM CHLORIDE 40 MEQ: 29.8 INJECTION, SOLUTION INTRAVENOUS at 10:45

## 2023-02-11 RX ADMIN — CALCIUM GLUCONATE 2 G: 20 INJECTION, SOLUTION INTRAVENOUS at 07:29

## 2023-02-11 RX ADMIN — PIPERACILLIN SODIUM AND TAZOBACTAM SODIUM 3.38 G: 36; 4.5 INJECTION, POWDER, LYOPHILIZED, FOR SOLUTION INTRAVENOUS at 06:45

## 2023-02-11 NOTE — PLAN OF CARE
Problem: OCCUPATIONAL THERAPY ADULT  Goal: Performs self-care activities at highest level of function for planned discharge setting  See evaluation for individualized goals  Description: Treatment Interventions: ADL retraining, Functional transfer training, UE strengthening/ROM, Endurance training, Patient/family training, Equipment evaluation/education, Compensatory technique education, Activityengagement          See flowsheet documentation for full assessment, interventions and recommendations  Note: Limitation: Decreased ADL status, Decreased endurance, Decreased self-care trans, Decreased high-level ADLs  Prognosis: Good  Assessment: Pt is a 69 y o  female who was admitted to [facility] on 2/9/2023 with Perforated bowel ([facility] ) s/p ex lap, ileostomy, L colon resection, reduction of ventral hernia and washout on 2/9  Pt's problem list also includes PMH of DM, previous surgery, cancer history and DVT, R breast CA, malignant neoplasm of ascending colon, osteopenia, osteoporosis, THN, diabetic ulcer L foot, anxiety, GERD  At baseline pt was completing adls and mobility independently - I iadls  Pt lives alone in 2 story home with 5 MICHELE- has 1/2 bath available on FF  Currently pt requires max assist for overall ADLS and mod a x 2  for functional mobility/transfers  Pt currently presents with impairments in the following categories -steps to enter environment, limited home support, difficulty performing ADLS, difficulty performing IADLS , flat affect, decreased initiation and engagement , health management  and environment activity tolerance, endurance, standing balance/tolerance, sitting balance/tolerance and UE strength   These impairments, as well as pt's fatigue, pain, decreased caregiver support, risk for falls and home environment  limit pt's ability to safely engage in all baseline areas of occupation, includinggrooming, bathing, dressing, toileting, functional mobility/transfers, community mobility, laundry , driving, house maintenance, medication management, meal prep, cleaning, social participation  and leisure activities  From OT standpoint, recommend inpt rehab upon D/C  OT will continue to follow to address the below stated goals       OT Discharge Recommendation: Post acute rehabilitation services

## 2023-02-11 NOTE — PLAN OF CARE
Problem: MOBILITY - ADULT  Goal: Maintain or return to baseline ADL function  Description: INTERVENTIONS:  -  Assess patient's ability to carry out ADLs; assess patient's baseline for ADL function and identify physical deficits which impact ability to perform ADLs (bathing, care of mouth/teeth, toileting, grooming, dressing, etc )  - Assess/evaluate cause of self-care deficits   - Assess range of motion  - Assess patient's mobility; develop plan if impaired  - Assess patient's need for assistive devices and provide as appropriate  - Encourage maximum independence but intervene and supervise when necessary  - Involve family in performance of ADLs  - Assess for home care needs following discharge   - Consider OT consult to assist with ADL evaluation and planning for discharge  - Provide patient education as appropriate  Outcome: Progressing  Goal: Maintains/Returns to pre admission functional level  Description: INTERVENTIONS:  - Perform BMAT or MOVE assessment daily    - Set and communicate daily mobility goal to care team and patient/family/caregiver  - Collaborate with rehabilitation services on mobility goals if consulted  - Perform Range of Motion 4 times a day  - Reposition patient every 2 hours    - Dangle patient 0 times a day  - Stand patient 0 times a day  - Ambulate patient 0 times a day  - Out of bed to chair 0 times a day   - Out of bed for meals 0 times a day  - Out of bed for toileting  - Record patient progress and toleration of activity level   Outcome: Progressing     Problem: GASTROINTESTINAL - ADULT  Goal: Maintains or returns to baseline bowel function  Description: INTERVENTIONS:  - Assess bowel function  - Encourage oral fluids to ensure adequate hydration  - Administer IV fluids if ordered to ensure adequate hydration  - Administer ordered medications as needed  - Encourage mobilization and activity  - Consider nutritional services referral to assist patient with adequate nutrition and appropriate food choices  Outcome: Progressing  Goal: Maintains adequate nutritional intake  Description: INTERVENTIONS:  - Monitor percentage of each meal consumed  - Identify factors contributing to decreased intake, treat as appropriate  - Assist with meals as needed  - Monitor I&O, weight, and lab values if indicated  - Obtain nutrition services referral as needed  Outcome: Progressing  Goal: Oral mucous membranes remain intact  Description: INTERVENTIONS  - Assess oral mucosa and hygiene practices  - Implement preventative oral hygiene regimen  - Implement oral medicated treatments as ordered  - Initiate Nutrition services referral as needed  Outcome: Progressing     Problem: GENITOURINARY - ADULT  Goal: Maintains or returns to baseline urinary function  Description: INTERVENTIONS:  - Assess urinary function  - Encourage oral fluids to ensure adequate hydration if ordered  - Administer IV fluids as ordered to ensure adequate hydration  - Administer ordered medications as needed  - Offer frequent toileting  - Follow urinary retention protocol if ordered  Outcome: Progressing     Problem: SKIN/TISSUE INTEGRITY - ADULT  Goal: Skin Integrity remains intact(Skin Breakdown Prevention)  Description: Assess:  -Perform Jose assessment every 12 hrs  -Clean and moisturize skin every 12 hrs  -Inspect skin when repositioning, toileting, and assisting with ADLS  -Assess under medical devices such as philippe cath every 4 hrs  -Assess extremities for adequate circulation and sensation     Bed Management:  -Have minimal linens on bed & keep smooth, unwrinkled  -Change linens as needed when moist or perspiring  -Avoid sitting or lying in one position for more than 2 hours while in bed  -Keep HOB at 30 degrees     Toileting:  -Offer bedside commode  -Assess for incontinence every 2 hrs  -Use incontinent care products after each incontinent episode such as pads    Activity:  -Mobilize patient 0 times a day  -Encourage activity and walks on unit  -Encourage or provide ROM exercises   -Turn and reposition patient every 0 Hours  -Use appropriate equipment to lift or move patient in bed  -Instruct/ Assist with weight shifting every 2 hrs when out of bed in chair  -Consider limitation of chair time 8 hour intervals    Skin Care:  -Avoid use of baby powder, tape, friction and shearing, hot water or constrictive clothing  -Relieve pressure over bony prominences using wedges  -Do not massage red bony areas    Problem: Prexisting or High Potential for Compromised Skin Integrity  Goal: Skin integrity is maintained or improved  Description: INTERVENTIONS:  - Identify patients at risk for skin breakdown  - Assess and monitor skin integrity  - Assess and monitor nutrition and hydration status  - Monitor labs   - Assess for incontinence   - Turn and reposition patient  - Assist with mobility/ambulation  - Relieve pressure over bony prominences  - Avoid friction and shearing  - Provide appropriate hygiene as needed including keeping skin clean and dry  - Evaluate need for skin moisturizer/barrier cream  - Collaborate with interdisciplinary team   - Patient/family teaching  - Consider wound care consult   Outcome: Progressing     Problem: PAIN - ADULT  Goal: Verbalizes/displays adequate comfort level or baseline comfort level  Description: Interventions:  - Encourage patient to monitor pain and request assistance  - Assess pain using appropriate pain scale  - Administer analgesics based on type and severity of pain and evaluate response  - Implement non-pharmacological measures as appropriate and evaluate response  - Consider cultural and social influences on pain and pain management  - Notify physician/advanced practitioner if interventions unsuccessful or patient reports new pain  Outcome: Progressing     Problem: INFECTION - ADULT  Goal: Absence or prevention of progression during hospitalization  Description: INTERVENTIONS:  - Assess and monitor for signs and symptoms of infection  - Monitor lab/diagnostic results  - Monitor all insertion sites, i e  indwelling lines, tubes, and drains  - Monitor endotracheal if appropriate and nasal secretions for changes in amount and color  - Spencer appropriate cooling/warming therapies per order  - Administer medications as ordered  - Instruct and encourage patient and family to use good hand hygiene technique  - Identify and instruct in appropriate isolation precautions for identified infection/condition  Outcome: Progressing  Goal: Absence of fever/infection during neutropenic period  Description: INTERVENTIONS:  - Monitor WBC    Outcome: Progressing     Problem: SAFETY ADULT  Goal: Maintain or return to baseline ADL function  Description: INTERVENTIONS:  -  Assess patient's ability to carry out ADLs; assess patient's baseline for ADL function and identify physical deficits which impact ability to perform ADLs (bathing, care of mouth/teeth, toileting, grooming, dressing, etc )  - Assess/evaluate cause of self-care deficits   - Assess range of motion  - Assess patient's mobility; develop plan if impaired  - Assess patient's need for assistive devices and provide as appropriate  - Encourage maximum independence but intervene and supervise when necessary  - Involve family in performance of ADLs  - Assess for home care needs following discharge   - Consider OT consult to assist with ADL evaluation and planning for discharge  - Provide patient education as appropriate  Outcome: Progressing       Problem: DISCHARGE PLANNING  Goal: Discharge to home or other facility with appropriate resources  Description: INTERVENTIONS:  - Identify barriers to discharge w/patient and caregiver  - Arrange for needed discharge resources and transportation as appropriate  - Identify discharge learning needs (meds, wound care, etc )  - Arrange for interpretive services to assist at discharge as needed  - Refer to Case Management Department for coordinating discharge planning if the patient needs post-hospital services based on physician/advanced practitioner order or complex needs related to functional status, cognitive ability, or social support system  Outcome: Progressing     Problem: Knowledge Deficit  Goal: Patient/family/caregiver demonstrates understanding of disease process, treatment plan, medications, and discharge instructions  Description: Complete learning assessment and assess knowledge base  Interventions:  - Provide teaching at level of understanding  - Provide teaching via preferred learning methods  Outcome: Progressing     Problem: Nutrition/Hydration-ADULT  Goal: Nutrient/Hydration intake appropriate for improving, restoring or maintaining nutritional needs  Description: Monitor and assess patient's nutrition/hydration status for malnutrition  Collaborate with interdisciplinary team and initiate plan and interventions as ordered  Monitor patient's weight and dietary intake as ordered or per policy  Utilize nutrition screening tool and intervene as necessary  Determine patient's food preferences and provide high-protein, high-caloric foods as appropriate       INTERVENTIONS:  - Monitor oral intake, urinary output, labs, and treatment plans  - Assess nutrition and hydration status and recommend course of action  - Evaluate amount of meals eaten  - Assist patient with eating if necessary   - Allow adequate time for meals  - Recommend/ encourage appropriate diets, oral nutritional supplements, and vitamin/mineral supplements  - Order, calculate, and assess calorie counts as needed  - Recommend, monitor, and adjust tube feedings and TPN/PPN based on assessed needs  - Assess need for intravenous fluids  - Provide specific nutrition/hydration education as appropriate  - Include patient/family/caregiver in decisions related to nutrition  Outcome: Progressing

## 2023-02-11 NOTE — QUICK NOTE
Patient seen and evaluated by Critical Care today and deemed to be appropriate for transfer to Stepdown Level 2  Spoke to Dr Kiana Barboza from General Surgery to accept patient transfer  Critical care can be contacted via [employer] with any questions or concerns

## 2023-02-11 NOTE — UTILIZATION REVIEW
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 (7/15) p/w incarcerated ventral hernia  S/p ex lap end ileostomy, L colectomy, reduction of ventral hernia on 2/9     Post op course c/b PE on heparin gtt  WBC 16 63 from 15 79, 17 2, Hgb 7 3 from 7 6, 6 8  Received 1u pRBC yesterday and 12 5g 5% albumin this morning for hypotension  No pressors   Ostomy pink with bowel sweat and mucus per bag     Plan:  Monitor ostomy function  Continue abx - zosyn  Monitor hemodynamics  Trend end points  Continue heparin gtt  Strict I/Os  Local wound care to midline per surgical team - dakins kerlex changed this morning  Maintain philippe  Eventual MRI liver       Vital Signs:   Date/Time Temp Pulse Resp BP MAP (mmHg) Arterial Line BP MAP SpO2 Calculated FIO2 (%) - Nasal Cannula Nasal Cannula O2 Flow Rate (L/min) O2 Device Patient Position - Orthostatic VS   02/11/23 1121 -- 102 20 121/56 81 -- -- 93 % -- -- None (Room air) Lying   02/11/23 1100 -- 102 22 -- -- 114/60 80 mmHg 95 % -- -- -- --   02/11/23 1030 -- 106 Abnormal  23 Abnormal  -- -- 124/56 84 mmHg 95 % -- -- -- --   02/11/23 0944 -- 94 15 -- -- 102/44 67 mmHg 95 % -- -- -- --   02/11/23 0915 -- 96 16 -- -- 94/38 60 mmHg Abnormal  94 % -- -- -- --   02/11/23 0900 -- 96 16 -- -- 92/38 58 mmHg Abnormal  93 % -- -- -- --   02/11/23 0730 -- 112 Abnormal  24 Abnormal  -- -- 108/46 72 mmHg 92 % -- -- None (Room air) --   02/11/23 0702 -- -- -- -- -- -- -- -- -- -- None (Room air) --   02/11/23 0600 -- 100 19 -- -- 96/44 68 mmHg 93 % -- -- -- --   02/11/23 0500 -- 96 16 -- -- 84/38 58 mmHg Abnormal  95 % -- -- -- --   02/11/23 0400 98 3 °F (36 8 °C) 104 21 -- -- 90/42 62 mmHg Abnormal  95 % -- -- None (Room air) --   02/11/23 0300 -- 104 17 -- -- 78/34 52 mmHg Abnormal  94 % -- -- -- --   02/11/23 0200 -- 106 Abnormal  22 -- -- 94/40 62 mmHg Abnormal  100 % -- -- -- -- 02/11/23 0100 -- 98 20 -- -- 116/48 74 mmHg 100 % -- -- -- --   02/11/23 0049 -- -- -- -- -- -- -- 93 % -- -- None (Room air) --   02/11/23 0000 98 1 °F (36 7 °C) 104 22 -- -- 110/48 72 mmHg 98 % -- -- None (Room air) --     Pertinent Labs/Diagnostic Results:     Results from last 7 days   Lab Units 02/11/23  0558 02/10/23  1936 02/10/23  1542 02/10/23  0456 02/09/23 2102 02/09/23 1954 02/09/23  1224   WBC Thousand/uL 16 63*  --  15 79* 17 28* 16 97*  --  11 28*   HEMOGLOBIN g/dL 7 3* 7 6* 6 8* 8 2* 9 0*  --  10 1*   I STAT HEMOGLOBIN   --   --   --   --   --    < >  --    HEMATOCRIT % 24 0* 24 2* 22 0* 27 2* 29 4*  --  32 8*   HEMATOCRIT, ISTAT   --   --   --   --   --    < >  --    PLATELETS Thousands/uL 302  --  317 394* 465*  --  498*   NEUTROS ABS Thousands/µL  --   --   --   --   --   --  8 78*   BANDS PCT %  --   --   --   --  24*  --   --     < > = values in this interval not displayed       Results from last 7 days   Lab Units 02/11/23  0558 02/10/23  1542 02/10/23  0418 02/09/23 2102 02/09/23 1954 02/09/23  1224   SODIUM mmol/L 139 134* 135 133*  --  130*   POTASSIUM mmol/L 3 6 3 2* 3 6 4 1  --  3 1*   CHLORIDE mmol/L 109* 104 104 102  --  94*   CO2 mmol/L 20* 22 20* 18*  --  25   CO2, I-STAT mmol/L  --   --   --   --  18*  --    ANION GAP mmol/L 10 8 11 13  --  11   BUN mg/dL 7 7 11 15  --  20   CREATININE mg/dL 0 29* 0 39* 0 41* 0 37*  --  0 50*   EGFR ml/min/1 73sq m 112 101 99 103  --  93   CALCIUM mg/dL 8 0* 7 9* 7 0* 7 6*  --  9 1   CALCIUM, IONIZED mmol/L 1 09* 1 08* 0 93*  --   --   --    CALCIUM, IONIZED, ISTAT mmol/L  --   --   --   --  1 07*  --    MAGNESIUM mg/dL  --   --  2 2 2 1  --   --    PHOSPHORUS mg/dL  --   --  3 6 2 4  --   --      Results from last 7 days   Lab Units 02/10/23  2358 02/10/23  1726 02/10/23  1208 02/10/23  0652 02/09/23  2330 02/09/23  1947   POC GLUCOSE mg/dl 181* 147* 183* 225* 199* 177*     Results from last 7 days   Lab Units 02/11/23  0558 02/10/23  1549 02/10/23  0418 02/09/23  2102 02/09/23  1224   GLUCOSE RANDOM mg/dL 156* 144* 234* 179* 120     Results from last 7 days   Lab Units 02/10/23  1542 02/10/23  0842 02/09/23  1952   PH ART  7 429 7 433 7 341*   PCO2 ART mm Hg 35 2* 35 1* 30 7*   PO2 ART mm Hg 71 6* 67 7* 113 0   HCO3 ART mmol/L 22 8 22 9 16 2*   BASE EXC ART mmol/L -1 3 -1 1 -8 5   O2 CONTENT ART mL/dL 9 7* 10 4* 13 9*   O2 HGB, ARTERIAL % 90 7* 91 5* 96 3   ABG SOURCE  Line, Arterial Radial, Left Radial, Left     Results from last 7 days   Lab Units 02/11/23  0716 02/11/23  0044 02/10/23  1800 02/10/23  1123 02/10/23  0421   PROTIME seconds  --   --   --   --  16 9*   INR   --   --   --   --  1 35*   PTT seconds 78* 79* 56*   < > 22*    < > = values in this interval not displayed  Medications:   Scheduled Medications:  bimatoprost, 1 drop, Both Eyes, HS  insulin lispro, 1-6 Units, Subcutaneous, Q6H LUCINA  pantoprazole, 40 mg, Intravenous, Q24H LUCINA  piperacillin-tazobactam, 3 375 g, Intravenous, Q8H  potassium chloride, 40 mEq, Intravenous, Q2H  sodium hypochlorite, 1 application, Irrigation, Daily    Continuous IV Infusions:  heparin (porcine), 3-30 Units/kg/hr (Order-Specific), Intravenous, Titrated    PRN Meds:  albuterol, 2 5 mg, Nebulization, Q4H PRN  heparin (porcine), 2,200 Units, Intravenous, Q6H PRN  heparin (porcine), 4,400 Units, Intravenous, Q6H PRN  HYDROmorphone, 0 5 mg, Intravenous, Q3H PRN  HYDROmorphone, 0 2 mg, Intravenous, Q4H PRN  ondansetron, 4 mg, Intravenous, Q6H PRN          Network Utilization Review Department  ATTENTION: Please call with any questions or concerns to [phone] and carefully listen to the prompts so that you are directed to the right person  All voicemails are confidential   Lynette Kussmaul all requests for admission clinical reviews, approved or denied determinations and any other requests to dedicated fax number below belonging to the campus where the patient is receiving treatment   List of dedicated fax numbers for the Facilities:  FACILITY NAME UR FAX NUMBER   ADMISSION DENIALS (Administrative/Medical Necessity) [phone]   [address]  (Maternity/NICU/Pediatrics) [phone]   [address] [phone]   [address] [address] [facility]John [facility] [phone]   [facility] [phone]

## 2023-02-11 NOTE — PHYSICAL THERAPY NOTE
Physical Therapy Evaluation     Patient's Name: Fabio Huang    Admitting Diagnosis  Incarcerated ventral hernia [K43 6]  Abdominal pain [R10 9]    Problem List  Patient Active Problem List   Diagnosis    Chronic deep vein thrombosis (DVT) of distal vein of left lower extremity (HCC)    Thyroid nodule    Malignant neoplasm of right breast in female, estrogen receptor positive ([facility] )    Malignant neoplasm of ascending colon ([facility] )    Personal history of other malignant neoplasm of large intestine    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    Other osteoporosis without current pathological fracture    Type 2 diabetes mellitus without complications (HCC)    D-dimer, elevated    Essential hypertension    Abnormal tumor markers    Diabetic ulcer of toe of left foot associated with type 2 diabetes mellitus, limited to breakdown of skin (HCC)    Anxiety about health    History of DVT (deep vein thrombosis)    Thyroid disorder    Osteoporosis due to aromatase inhibitor    History of diabetes mellitus    Iron deficiency anemia, unspecified    Lower abdominal pain    Perforated bowel ([facility] )    Strangulated ventral hernia    GERD (gastroesophageal reflux disease)    History of right breast cancer    History of right mastectomy       Past Medical History  Past Medical History:   Diagnosis Date    Acute embolism and thrombosis of deep vein of lower extremity (HCC)     Adenocarcinoma of colon, [facility] ([facility] )     Breast cancer ([facility] ) 2012    Right Breast     Cancer ([facility] )     Diabetes mellitus ([facility] )     DVT (deep venous thrombosis) (HCC)     GERD (gastroesophageal reflux disease)     Hypertension     Pes planus        Past Surgical History  Past Surgical History:   Procedure Laterality Date    COLONOSCOPY      HERNIA REPAIR N/A 2/9/2023    Procedure: EXPLORATORY LAPAROTOMY; ABDOMINAL WALL DEBRIDEMENT; REDUCTION VENTRAL HERNIA; LEFT COLON RESECTION; CREATION ILEOSTOMY; WASHOUT;  Surgeon: Frandy Morrow DO;  Location: [facility];  Service: General    HYSTERECTOMY      complete @ age 29    IR PORT REMOVAL  9/12/2018    MASTECTOMY Right 2012    AK COLONOSCOPY FLX DX W/COLLJ SPEC WHEN PFRMD N/A 8/23/2016    Procedure: COLONOSCOPY;  Surgeon: Chalino Ariza MD;  Location: [facility]; Service: Colorectal    AK COLONOSCOPY FLX DX W/COLLJ SPEC WHEN PFRMD N/A 9/5/2017    Procedure: COLONOSCOPY;  Surgeon: Chalino Ariza MD;  Location: [facility]; Service: Colorectal    AK ESOPHAGOGASTRODUODENOSCOPY TRANSORAL DIAGNOSTIC N/A 9/5/2017    Procedure: ESOPHAGOGASTRODUODENOSCOPY (EGD); Surgeon: Silvia Heredia DO;  Location: [facility]; Service: Gastroenterology          02/11/23 1147   PT Last Visit   PT Visit Date 02/11/23   Note Type   Note type Evaluation   Pain Assessment   Pain Assessment Tool 0-10   Pain Score No Pain   Restrictions/Precautions   Weight Bearing Precautions Per Order No   Other Precautions Chair Alarm; Bed Alarm;Multiple lines; Fall Risk;Pain;Telemetry   Home Living   Type of [address] [place] [address] Two level;1/2 bath on main level  (5 MICHELE)   Bathroom Shower/Tub   (reports spongebathing on first floor at baseline)   [address] Walker;Cane  (did not use PTA)   Prior Function   Level of [place] Independent with ADLs; Independent with functional mobility   Lives With (S)  Alone   Receives Help From   (reports limited home support)   IADLs Independent with meal prep; Independent with medication management   Falls in the last 6 months 0   General   Family/Caregiver Present No   Cognition   Arousal/Participation Alert   Orientation Level Oriented X4   Following Commands Follows one step commands with increased time or repetition   Comments pt with flat affect   cooperative to participate in therapy session   RLE Assessment   RLE Assessment   (functionally 3-/5)   LLE Assessment   LLE Assessment   (functionally 3-/5)   Bed Mobility   Supine to Sit 3  Moderate assistance   Additional items Assist x 2;HOB elevated; Bedrails; Increased time required;Verbal cues;LE management   Sit to Supine Unable to assess   Additional Comments pt supine in bed upon arrival  pt sitting EOB with min A required to correct posterior lean  Pt left sitting OOB in recliner with all needs wihtin reach   Transfers   Sit to Stand 3  Moderate assistance   Additional items Assist x 2; Increased time required;Verbal cues   Stand to Sit 3  Moderate assistance   Additional items Assist x 2; Increased time required;Verbal cues   Stand pivot 3  Moderate assistance   Additional items Assist x 2; Increased time required;Verbal cues   Additional Comments transfers with b/l HHA   Ambulation/Elevation   Gait pattern Excessively slow; Short stride; Foward flexed;Decreased foot clearance; Improper Weight shift   Gait Assistance 3  Moderate assist   Additional items Assist x 2;Verbal cues; Tactile cues   Assistive Device Other (Comment)  (b/l HHA)   Distance 2 steps toward bedside chair   Balance   Static Sitting Fair -   Dynamic Sitting Poor +   Static Standing Poor   Dynamic Standing Poor   Ambulatory Poor -   Endurance Deficit   Endurance Deficit Yes   Endurance Deficit Description generalized weakness, fatigue, deconditioning   Activity Tolerance   Activity Tolerance Patient limited by fatigue   Medical Staff Made Aware OT; co-eval performed this date 2* increased medical complexity and multiple co-morbidities   Nurse Made Aware RN cleared pt to participate in therapy session   Assessment   Prognosis Fair   Problem List Decreased strength;Decreased endurance; Impaired balance;Decreased mobility;Pain   Assessment Pt is a 69 y o  female seen for PT evaluation s/p admit to [facility] on 2/9/2023  Pt was admitted with a primary dx of: perforated bowel s/p ex lap end ileostomy, L colectomy, reduction of ventral hernia on 2/9  PT now consulted for assessment of mobility and d/c needs  Pt with Ambulate, Up as tolerated orders    Pts current comorbidities effecting treatment include: malignant neoplasm of large intestine, type 2 DM, HTN, ventral hernia, GERD, hx of breast cancer and mastectomy  Pts current clinical presentation is Unstable/ Unpredictable (high complexity) due to Ongoing medical management for primary dx, Increased reliance on more restrictive AD compared to baseline, Decreased activity tolerance compared to baseline, Fall risk, Increased assistance needed from caregiver at current time, Ongoing telemetry monitoring, Trending lab values, Continuous pulse oximetry monitoring , s/p surgical intervention  Prior to admission, pt was residing alone in [address] [place] [address] with 5 MICHELE and was independent with ADLs/ IADLs  Upon evaluation, pt currently is requiring mod x2 for bed mobility; mod x2 for transfers; mod x2 for ambulation  Pt presents at PT eval functioning below baseline and currently w/ overall mobility deficits 2* to: BLE weakness, decreased ROM, impaired balance, decreased endurance, impaired coordination, gait deviations, pain, decreased activity tolerance compared to baseline, decreased functional mobility tolerance compared to baseline, fall risk  Pt currently at a fall risk 2* to impairments listed above  Pt will continue to benefit from skilled acute PT interventions to address stated impairments; to maximize functional mobility; for ongoing pt/ family training; and DME needs  At conclusion of PT session pt returned back in chair with phone and call bell within reach  Pt denies any further questions at this time  The patient's AM-PAC Basic Mobility Inpatient Short Form Raw Score is 7  A Raw score of less than or equal to 16 suggests the patient may benefit from discharge to post-acute rehabilitation services  Please also refer to the recommendation of the Physical Therapist for safe discharge planning  Recommend rehab upon hospital D/C     Barriers to Discharge Inaccessible home environment;Decreased caregiver support   Goals   Patient Goals to get OOB STG Expiration Date 02/25/23   Short Term Goal #1 STG 1  Pt will be able to perform bed mobility tasks with mod I level in order to improve overall functional mobility and assist in safe d/c  STG 2  Pt with sit EOB for at least 25 minutes at mod I level in order to strengthen abdominal musculature and assist in future transfers/ ambulation  STG 3  Pt will be able to perform functional transfer with mod I level in order to improve overall functional mobility and assist in safe d/c  STG 4  Pt will be able to ambulate at least 250 feet with least restrictive device with mod I level A in order to improve overall functional mobility and assist in safe d/c  STG 5  Pt will improve sitting/standing static/dynamic balance 1/2 grade in order to improve functional mobility and assist in safe d/c  STG 6  Pt will improve LE strength by 1/2 grade in order to improve functional mobility and assist in safe d/c  STG 7  Pt will be able to negotiate at least 5 stairs with least restrictive device with mod I level A in order to improve overall functional mobility and assist in safe d/c    PT Treatment Day 0   Plan   Treatment/Interventions Functional transfer training;LE strengthening/ROM; Endurance training;Patient/family training;Bed mobility;Gait training;Spoke to nursing;Spoke to case management;OT   PT Frequency 2-3x/wk   Recommendation   PT Discharge Recommendation Post acute rehabilitation services   AM-PAC Basic Mobility Inpatient   Turning in Flat Bed Without Bedrails 2   Lying on Back to Sitting on Edge of Flat Bed Without Bedrails 1   Moving Bed to Chair 1   Standing Up From Chair Using Arms 1   Walk in Room 1   Climb 3-5 Stairs With Railing 1   Basic Mobility Inpatient Raw Score 7   Highest Level Of Mobility   -HLM Goal 2: Bed activities/Dependent transfer   -HLM Achieved 4: Move to chair/commode   Modified Slope Scale   Modified Ruth Scale 4         Danyelle Jane, PT DPT

## 2023-02-11 NOTE — PROGRESS NOTES
Progress Note - Doris Shirley 69 y o  female MRN: [id number]    Unit/Bed#: MICU 01 Encounter: [id number]      Assessment:  Doris Shirley is a 69 y o  female with PMH colon cancer s/p lap right hemicolectomy 2/2 ascending colon mass (7/15) p/w incarcerated ventral hernia  S/p ex lap end ileostomy, L colectomy, reduction of ventral hernia on 2/9    Post op course c/b PE on heparin gtt  WBC 16 63 from 15 79, 17 2, Hgb 7 3 from 7 6, 6 8  Received 1u pRBC yesterday and 12 5g 5% albumin this morning for hypotension  No pressors   Ostomy pink with bowel sweat and mucus per bag    Plan:  Monitor ostomy function  Continue abx - zosyn  Monitor hemodynamics  Trend end points  Continue heparin gtt  Strict I/Os  Local wound care to midline per surgical team - dakins kerlex changed this morning  Maintain philippe  Eventual MRI liver      Subjective:   Patient seen and examined bedside  Denies pain this morning  No new symptoms  No nausea  Tolerated bedside packing change without issue  Objective:     Vitals: Blood pressure (!) 112/38, pulse 100, temperature 98 3 °F (36 8 °C), temperature source Oral, resp  rate 19, height 5' (1 524 m), weight 59 6 kg (131 lb 6 3 oz), SpO2 93 %, not currently breastfeeding  ,Body mass index is 25 66 kg/m²        Intake/Output Summary (Last 24 hours) at 2/11/2023 6966  Last data filed at 2/11/2023 0600  Gross per 24 hour   Intake 5591 7 ml   Output 1425 ml   Net 4166 7 ml       Physical Exam:   General - no acute distress, responsive and cooperative  CV - warm, regular rate  Pulm - normal work of breathing, no respiratory distress ORA, NG with empty canister  Abd - soft, nondistended, ostomy viable and pink with bowel sweat and mucus per bag, midline with fascia closed and packed with kerlex soaked with dakins  Neuro - m/s grossly intact, cn grossly intact  Ext - moving all extremities, philippe        Invasive Devices     Peripherally Inserted Central Catheter Line  Duration           PICC Line 02/10/23 Left Brachial <1 day          Peripheral Intravenous Line  Duration           Peripheral IV 02/09/23 Left Antecubital 1 day    Peripheral IV 02/10/23 Left;Ventral (anterior) Forearm 1 day          Arterial Line  Duration           Arterial Line 02/10/23 Radial <1 day          Drain  Duration           NG/OG/Enteral Tube Nasogastric 14 Fr Right nare 2 days    Ileostomy Standard (Kaitlynn, ap) Umbilicus 1 day    Urethral Catheter Latex 16 Fr  1 day                Lab, Imaging and other studies: I have personally reviewed pertinent reports      VTE Pharmacologic Prophylaxis: Heparin  VTE Mechanical Prophylaxis: sequential compression device

## 2023-02-11 NOTE — PROGRESS NOTES
Daily Progress Note - Critical Care   Everardo Coles 69 y o  female MRN: [id number]  Unit/Bed#: MICU 01 Encounter: [id number]        ----------------------------------------------------------------------------------------  HPI/24hr events: 68 y/o female with PMHx HPTN, HLD, GERD, DM, DVT, R breast Ca s/p mastectomy currently on chemo, colon cancer s/p R hemicolectomy and hysterectomy who now presents with incarcerated ventral hernia s/p ex-lap L hemicolectomy, end ileostomy, and ventral hernia repair  Was found to have left sided colon mass within the hernia sac  Post-op was noted to have hemoptysis, CTA with JOANNA and RLL PE  Hypotension responsive to fluid bolus throughout the day yesterday  Abd wound packing changed  Transfused 1u PRBC yesterday afternoon with appropriate rise in Hgb  250 albumin for hypotension overnight with resolution     ---------------------------------------------------------------------------------------  SUBJECTIVE    Review of Systems   Constitutional: Positive for fatigue  Gastrointestinal: Positive for abdominal distention and abdominal pain       Review of systems was reviewed and negative unless stated above in HPI/24-hour events   ---------------------------------------------------------------------------------------  Assessment and Plan:    Neuro:   • Analgesia: PRN dilaudid 0 2mg moderate and 0 5mg severe/breakthrough  • Sedation: NA  • Delirium PPX: CAM-ICU, Sleep Hygiene       CV:   • Hx Hypertension: hold PTA meds  o PRN labetalol, is normotensive currently       Pulm:  • Segmental JOANNA and RLL Pulmonary embolism  o ECHO w/o RV strain   o Continue heparin drip   • On RA, continue IS      GI:   • Incarcerated ventral hernia: s/p ex-lap hemicolectomy and end ileostomy   o Fascia closed, hernia sac packed with Dakins soaked Krelix, daily packing changes per red surgery  o Await ostomy output  o Continue zosyn day 3/7 for perforated colon mass  • Colon cancer: with recurrence, mass in hernia sac  o Colorectal surgery input appreciated  o Outpatient f/u with med onc for further treatment plan   o Liver masses likely representing metastatic disease, will require abd MRI  • NPO, NGT  o Await ostomy function  • Bowel reg: hold      :   • Breast cancer s/p R mastectomy: resume PO chemo agent when able   • D/c philippe catheter      F/E/N:   • Electrolytes - Monitor/trend - replete based on deficiencies   •       Heme/Onc:   • ABLA: s/p 1u PRBC on 2/10  o hgb stable, continue to trend daily   • DVT PPX: heparin drip, SCDs      Endo:   • DM: SSI  o Goal blood glucose 140-180      ID:   • Severe sepsis  o Continue Zosyn day 3/7 for perforated colon mass with feculence now s/p source control     MSK/Skin:   • Turn frequently and reposition   • PT/OT  • OOB to chair today    LDA  PICC-maintain 2/2 poor access  Philippe-d/c  Arterial line-d/c  NGT-maintain while awaiting ostomy function       Disposition: Transfer to Stepdown Level 2  Code Status: Level 1 - Full Code  ---------------------------------------------------------------------------------------  ICU CORE MEASURES    Prophylaxis   VTE Pharmacologic Prophylaxis: Heparin Drip  VTE Mechanical Prophylaxis: sequential compression device  Stress Ulcer Prophylaxis: Pantoprazole IV       Invasive Devices Review  Invasive Devices     Peripherally Inserted Central Catheter Line  Duration           PICC Line 02/10/23 Left Brachial <1 day          Peripheral Intravenous Line  Duration           Peripheral IV 02/09/23 Left Antecubital 1 day    Peripheral IV 02/10/23 Left;Ventral (anterior) Forearm 1 day          Arterial Line  Duration           Arterial Line 02/10/23 Radial <1 day          Drain  Duration           NG/OG/Enteral Tube Nasogastric 14 Fr Right nare 2 days    Ileostomy Standard (Kaitlynn, ap) Umbilicus 1 day    Urethral Catheter Latex 16 Fr  1 day              Can any invasive devices be discontinued today? Yes  ---------------------------------------------------------------------------------------  OBJECTIVE    Physical Exam  HENT:      Head: Normocephalic  Mouth/Throat:      Mouth: Mucous membranes are moist    Eyes:      Pupils: Pupils are equal, round, and reactive to light  Cardiovascular:      Rate and Rhythm: Normal rate and regular rhythm  Abdominal:      General: There is distension  Tenderness: There is abdominal tenderness  Comments: ileostomy pink, sweat in bag   Musculoskeletal:      Cervical back: Normal range of motion  Skin:     General: Skin is warm  Capillary Refill: Capillary refill takes less than 2 seconds  Neurological:      General: No focal deficit present  Mental Status: She is alert  Vitals   Vitals:    23 0100 23 0200 23 0300 23 0400   BP:       BP Location:       Pulse: 98 (!) 106 104 104   Resp: 20 22 17 21   Temp:    98 3 °F (36 8 °C)   TempSrc:    Oral   SpO2: 100% 100% 94% 95%   Weight:       Height:         Temp (24hrs), Av 8 °F (36 6 °C), Min:97 2 °F (36 2 °C), Max:98 3 °F (36 8 °C)  Current: Temperature: 98 3 °F (36 8 °C)      Invasive/non-invasive ventilation settings   Respiratory    Lab Data (Last 4 hours)    None         O2/Vent Data (Last 4 hours)    None                Height and Weights   Height: 5' (152 4 cm)  IBW (Ideal Body Weight): 45 5 kg  Body mass index is 25 66 kg/m²    Weight (last 2 days)     Date/Time Weight    02/10/23 0700 59 6 (131 39)    02/10/23 0600 59 6 (131 39)    23 1930 59 6 (131 39)    23 1300 66 3 (146 17)            Intake and Output  I/O        0701  02/10 0700 02/10 07 07    I V  (mL/kg) 5204 4 (87 3) 3029 9 (50 8)    Blood  302 5    NG/GT 0 0    IV Piggyback 1100 971 3    Total Intake(mL/kg) 6304 4 (105 8) 4303 7 (72 2)    Urine (mL/kg/hr) 1720 1000 (0 7)    Emesis/NG output 0 0    Stool 50 100    Blood 100     Total Output 1870 1100    Net +4434 4 +3203 7 Unmeasured Urine Occurrence 1 x     Unmeasured Stool Occurrence 1 x             Nutrition       Diet Orders   (From admission, onward)             Start     Ordered    02/09/23 1149  Diet NPO; Sips with meds  Diet effective now        References:    Nutrtion Support Algorithm Enteral vs  Parenteral   Question Answer Comment   Diet Type NPO    NPO Except: Sips with meds    RD to adjust diet per protocol?  Yes        02/09/23 1150                  Laboratory and Diagnostics:  Results from last 7 days   Lab Units 02/10/23  1936 02/10/23  1542 02/10/23  0456 02/09/23 2102 02/09/23 1954 02/09/23  1224   WBC Thousand/uL  --  15 79* 17 28* 16 97*  --  11 28*   HEMOGLOBIN g/dL 7 6* 6 8* 8 2* 9 0*  --  10 1*   I STAT HEMOGLOBIN g/dl  --   --   --   --  10 2*  --    HEMATOCRIT % 24 2* 22 0* 27 2* 29 4*  --  32 8*   HEMATOCRIT, ISTAT %  --   --   --   --  30*  --    PLATELETS Thousands/uL  --  317 394* 465*  --  498*   NEUTROS PCT %  --   --   --   --   --  78*   BANDS PCT %  --   --   --  24*  --   --    MONOS PCT %  --   --   --   --   --  14*   MONO PCT %  --   --   --  7  --   --      Results from last 7 days   Lab Units 02/10/23  1542 02/10/23  0418 02/09/23 2102 02/09/23 1954 02/09/23  1224   SODIUM mmol/L 134* 135 133*  --  130*   POTASSIUM mmol/L 3 2* 3 6 4 1  --  3 1*   CHLORIDE mmol/L 104 104 102  --  94*   CO2 mmol/L 22 20* 18*  --  25   CO2, I-STAT mmol/L  --   --   --  18*  --    ANION GAP mmol/L 8 11 13  --  11   BUN mg/dL 7 11 15  --  20   CREATININE mg/dL 0 39* 0 41* 0 37*  --  0 50*   CALCIUM mg/dL 7 9* 7 0* 7 6*  --  9 1   GLUCOSE RANDOM mg/dL 144* 234* 179*  --  120   ALT U/L  --   --  7*  --   --    AST U/L  --   --  11  --   --    ALK PHOS U/L  --   --  72  --   --    ALBUMIN g/dL  --   --  2 6*  --   --    TOTAL BILIRUBIN mg/dL  --   --  0 63  --   --      Results from last 7 days   Lab Units 02/10/23  0418 02/09/23  2102   MAGNESIUM mg/dL 2 2 2 1   PHOSPHORUS mg/dL 3 6 2 4      Results from last 7 days   Lab Units 02/11/23  0044 02/10/23  1800 02/10/23  1123 02/10/23  0421   INR   --   --   --  1 35*   PTT seconds 79* 56* 49* 22*          Results from last 7 days   Lab Units 02/10/23  1542 02/09/23  2102   LACTIC ACID mmol/L 0 9 1 7     ABG:  Results from last 7 days   Lab Units 02/10/23  1542   PH ART  7 429   PCO2 ART mm Hg 35 2*   PO2 ART mm Hg 71 6*   HCO3 ART mmol/L 22 8   BASE EXC ART mmol/L -1 3   ABG SOURCE  Line, Arterial     VBG:  Results from last 7 days   Lab Units 02/10/23  1542   ABG SOURCE  Line, Arterial           Micro            Active Medications  Scheduled Meds:  Current Facility-Administered Medications   Medication Dose Route Frequency Provider Last Rate   • albuterol  2 5 mg Nebulization Q4H PRN Geofferkirby Pearcea, DO     • bimatoprost  1 drop Both Eyes HS Ger Conklin MD     • heparin (porcine)  3-30 Units/kg/hr (Order-Specific) Intravenous Titrated Geoffery Brandon, DO 22 Units/kg/hr (02/11/23 4127)   • heparin (porcine)  2,200 Units Intravenous Q6H PRN Geoffery Brandon, DO     • heparin (porcine)  4,400 Units Intravenous Q6H PRN Geoffery Brandon, DO     • HYDROmorphone  0 5 mg Intravenous Q3H PRN Ger Conklin MD     • HYDROmorphone  0 2 mg Intravenous Q4H PRN Rickey Moscoso PA-C     • insulin lispro  1-6 Units Subcutaneous Q6H [address] Ger Conklin MD     • multi-electrolyte  125 mL/hr Intravenous Continuous Reed Holland  mL/hr (02/11/23 0250)   • ondansetron  4 mg Intravenous Q6H PRN Rickey Moscoso PA-C     • pantoprazole  40 mg Intravenous Q24H [address] Argelia Palma PA-C     • piperacillin-tazobactam  3 375 g Intravenous Q8H Reed Holland MD 3 375 g (02/11/23 0039)   • sodium hypochlorite  1 application Irrigation Daily Hakan Magaña, PA-C       Continuous Infusions:  heparin (porcine), 3-30 Units/kg/hr (Order-Specific), Last Rate: 22 Units/kg/hr (02/11/23 0307)  multi-electrolyte, 125 mL/hr, Last Rate: 125 mL/hr (02/11/23 0250)      PRN Meds:   albuterol, 2 5 mg, Q4H PRN  heparin (porcine), 2,200 Units, Q6H PRN  heparin (porcine), 4,400 Units, Q6H PRN  HYDROmorphone, 0 5 mg, Q3H PRN  HYDROmorphone, 0 2 mg, Q4H PRN  ondansetron, 4 mg, Q6H PRN        Allergies   No Known Allergies    Advance Directive and Living Will:      Power of :    POLST:        Counseling / Coordination of Care  Total Critical Care time spent 0 minutes excluding procedures, teaching and family updates  Cate Campo PA-C        Portions of the record may have been created with voice recognition software  Occasional wrong word or "sound a like" substitutions may have occurred due to the inherent limitations of voice recognition software    Read the chart carefully and recognize, using context, where substitutions have occurred

## 2023-02-11 NOTE — PLAN OF CARE
Problem: PHYSICAL THERAPY ADULT  Goal: Performs mobility at highest level of function for planned discharge setting  See evaluation for individualized goals  Description: Treatment/Interventions: Functional transfer training, LE strengthening/ROM, Endurance training, Patient/family training, Bed mobility, Gait training, Spoke to nursing, Spoke to case management, OT          See flowsheet documentation for full assessment, interventions and recommendations  Note: Prognosis: Fair  Problem List: Decreased strength, Decreased endurance, Impaired balance, Decreased mobility, Pain  Assessment: Pt is a 69 y o  female seen for PT evaluation s/p admit to [facility] on 2/9/2023  Pt was admitted with a primary dx of: perforated bowel s/p ex lap end ileostomy, L colectomy, reduction of ventral hernia on 2/9  PT now consulted for assessment of mobility and d/c needs  Pt with Ambulate, Up as tolerated orders  Pts current comorbidities effecting treatment include: malignant neoplasm of large intestine, type 2 DM, HTN, ventral hernia, GERD, hx of breast cancer and mastectomy  Pts current clinical presentation is Unstable/ Unpredictable (high complexity) due to Ongoing medical management for primary dx, Increased reliance on more restrictive AD compared to baseline, Decreased activity tolerance compared to baseline, Fall risk, Increased assistance needed from caregiver at current time, Ongoing telemetry monitoring, Trending lab values, Continuous pulse oximetry monitoring , s/p surgical intervention  Prior to admission, pt was residing alone in [address] [place] [address] with 5 MICHELE and was independent with ADLs/ IADLs  Upon evaluation, pt currently is requiring mod x2 for bed mobility; mod x2 for transfers; mod x2 for ambulation   Pt presents at PT eval functioning below baseline and currently w/ overall mobility deficits 2* to: BLE weakness, decreased ROM, impaired balance, decreased endurance, impaired coordination, gait deviations, pain, decreased activity tolerance compared to baseline, decreased functional mobility tolerance compared to baseline, fall risk  Pt currently at a fall risk 2* to impairments listed above  Pt will continue to benefit from skilled acute PT interventions to address stated impairments; to maximize functional mobility; for ongoing pt/ family training; and DME needs  At conclusion of PT session pt returned back in chair with phone and call bell within reach  Pt denies any further questions at this time  The patient's AM-PAC Basic Mobility Inpatient Short Form Raw Score is 7  A Raw score of less than or equal to 16 suggests the patient may benefit from discharge to post-acute rehabilitation services  Please also refer to the recommendation of the Physical Therapist for safe discharge planning  Recommend rehab upon hospital D/C  Barriers to Discharge: Inaccessible home environment, Decreased caregiver support     PT Discharge Recommendation: Post acute rehabilitation services    See flowsheet documentation for full assessment

## 2023-02-11 NOTE — OCCUPATIONAL THERAPY NOTE
Occupational Therapy Evaluation     Patient Name: Hilario SIDDIQI Date: 2/11/2023  Problem List  Principal Problem:    Perforated bowel ([facility] )  Active Problems:    Personal history of other malignant neoplasm of large intestine    Type 2 diabetes mellitus without complications ([email or web address] )    Essential hypertension    Strangulated ventral hernia    GERD (gastroesophageal reflux disease)    History of right breast cancer    History of right mastectomy    Past Medical History  Past Medical History:   Diagnosis Date    Acute embolism and thrombosis of deep vein of lower extremity (HCC)     Adenocarcinoma of colon, [facility] ([facility] )     Breast cancer ([facility] ) 2012    Right Breast     Cancer ([email or web address] )     Diabetes mellitus ([email or web address] )     DVT (deep venous thrombosis) (HCC)     GERD (gastroesophageal reflux disease)     Hypertension     Pes planus      Past Surgical History  Past Surgical History:   Procedure Laterality Date    COLONOSCOPY      HERNIA REPAIR N/A 2/9/2023    Procedure: EXPLORATORY LAPAROTOMY; ABDOMINAL WALL DEBRIDEMENT; REDUCTION VENTRAL HERNIA; LEFT COLON RESECTION; CREATION ILEOSTOMY; WASHOUT;  Surgeon: Laith Hendrickson DO;  Location: [facility];  Service: General    HYSTERECTOMY      complete @ age 29    IR PORT REMOVAL  9/12/2018    MASTECTOMY Right 2012    ME COLONOSCOPY FLX DX W/COLLJ SPEC WHEN PFRMD N/A 8/23/2016    Procedure: COLONOSCOPY;  Surgeon: Balaji Mai MD;  Location: [facility] GI [facility]; Service: Colorectal    ME COLONOSCOPY FLX DX W/COLLJ SPEC WHEN PFRMD N/A 9/5/2017    Procedure: COLONOSCOPY;  Surgeon: Balaji Mai MD;  Location: [facility]; Service: Colorectal    ME ESOPHAGOGASTRODUODENOSCOPY TRANSORAL DIAGNOSTIC N/A 9/5/2017    Procedure: ESOPHAGOGASTRODUODENOSCOPY (EGD); Surgeon: Vita Benjamin DO;  Location: [facility];   Service: Gastroenterology         02/11/23 1150   OT Last Visit   OT Visit Date 02/11/23   Note Type   Note type Evaluation   Pain Assessment   Pain Assessment Tool 0-10   Pain Score No Pain   Restrictions/Precautions   Weight Bearing Precautions Per Order No   Other Precautions Chair Alarm; Bed Alarm;Multiple lines; Fall Risk   Home Living   Type of [address] Two level;1/2 bath on main level;Stairs to enter with rails  (5 MICHELE)   [address] Walker;Cane   Prior Function   Level of Warrior Independent with ADLs; Independent with functional mobility; Independent with IADLS   Lives With (S)  Alone   Receives Help From   (limited support available)   IADLs Independent with driving; Independent with meal prep; Independent with medication management   Falls in the last 6 months 0   Lifestyle   Autonomy I adls and mobility - i iadls   Reciprocal Relationships limited support available per pt   Service to Others retired   Intrinsic Gratification sedentary - enjoys watching TV and reading   Subjective   Subjective offers no c/o   ADL   Grooming Assistance 4  Minimal Assistance   UB Bathing Assistance 3  Moderate Assistance   LB Bathing Assistance 2  Maximal Assistance   [address] 3  Moderate Assistance   LB Dressing Assistance 2  Maximal [address]  2  Maximal Assistance   Bed Mobility   Supine to Sit 3  Moderate assistance   Additional items Assist x 2   Transfers   Sit to Stand 3  Moderate assistance   Additional items Assist x 2   Stand to Sit 3  Moderate assistance   Additional items Assist x 2   Stand pivot 3  Moderate assistance   Additional items Assist x 2   Functional Mobility   Functional Mobility 3  Moderate assistance   Additional Comments x2 to take a few steps to chair HHA   Additional items Hand hold assistance   Balance   Static Sitting Fair -   Dynamic Sitting Poor +   Static Standing Poor   Dynamic Standing Poor   Ambulatory Poor -   Activity Tolerance   Activity Tolerance Patient limited by fatigue;Treatment limited secondary to medical complications (Comment)   Medical Staff Made Aware Willis Cheek DPT present for coeval 2* medical complexity, comorbidities and limited overall tolerance to activity   RUE Assessment   RUE Assessment WFL   LUE Assessment   LUE Assessment WFL   Cognition   Arousal/Participation Arousable; Cooperative   Attention Attends with cues to redirect   Orientation Level Oriented X4   Memory Decreased recall of precautions   Following Commands Follows one step commands with increased time or repetition   Assessment   Limitation Decreased ADL status; Decreased endurance;Decreased self-care trans;Decreased high-level ADLs   Prognosis Good   Assessment Pt is a 69 y o  female who was admitted to [facility] on 2/9/2023 with Perforated bowel ([address] ) s/p ex lap, ileostomy, L colon resection, reduction of ventral hernia and washout on 2/9  Pt's problem list also includes PMH of DM, previous surgery, cancer history and DVT, R breast CA, malignant neoplasm of ascending colon, osteopenia, osteoporosis, THN, diabetic ulcer L foot, anxiety, GERD  At baseline pt was completing adls and mobility independently - I iadls  Pt lives alone in 2 story home with 5 MICHELE- has 1/2 bath available on FF  Currently pt requires max assist for overall ADLS and mod a x 2  for functional mobility/transfers  Pt currently presents with impairments in the following categories -steps to enter environment, limited home support, difficulty performing ADLS, difficulty performing IADLS , flat affect, decreased initiation and engagement , health management  and environment activity tolerance, endurance, standing balance/tolerance, sitting balance/tolerance and UE strength   These impairments, as well as pt's fatigue, pain, decreased caregiver support, risk for falls and home environment  limit pt's ability to safely engage in all baseline areas of occupation, includinggrooming, bathing, dressing, toileting, functional mobility/transfers, community mobility, laundry , driving, house maintenance, medication management, meal prep, cleaning, social participation  and leisure activities  From OT standpoint, recommend inpt rehab upon D/C  OT will continue to follow to address the below stated goals  Goals   Patient Goals get OOB   LTG Time Frame 10-14   Long Term Goal #1 refer to established goals below   Plan   Treatment Interventions ADL retraining;Functional transfer training;UE strengthening/ROM; Endurance training;Patient/family training;Equipment evaluation/education; Compensatory technique education; Activityengagement   Goal Expiration Date 02/25/23   OT Frequency 2-3x/wk   Recommendation   OT Discharge Recommendation Post acute rehabilitation services   AM-PAC Daily Activity Inpatient   Lower Body Dressing 2   Bathing 2   Toileting 2   Upper Body Dressing 2   Grooming 3   Eating 4   Daily Activity Raw Score 15   Daily Activity Standardized Score (Calc for Raw Score >=11) 34 69   AM-PAC Applied Cognition Inpatient   Following a Speech/Presentation 3   Understanding Ordinary Conversation 4   Taking Medications 3   Remembering Where Things Are Placed or Put Away 3   Remembering List of 4-5 Errands 3   Taking Care of Complicated Tasks 3   Applied Cognition Raw Score 19   Applied Cognition Standardized Score 39 77   End of Consult   Education Provided Yes   Patient Position at End of Consult Bedside chair;Bed/Chair alarm activated; All needs within reach   Nurse Communication Nurse aware of consult       OCCUPATIONAL THERAPY GOALS:    *S feeding/grooming after setup  *Min a adls after setup with use of AE PRN  *Min a toileting and clothing management   *Min a  functional mobility and transfers to/from all surfaces with fair+ dynamic balance and safety for participation in dynamic adls and iadl tasks   *Demonstrate fair+ carryover with safe use of RW during functional tasks   *Assess DME needs   *Increase activity tolerance to 30-35 minutes for participation in adls and enjoyable activities  *Assist with safe d/c recommendations         The patient's raw score on the AM-PAC Daily Activity Inpatient Short Form is 15  A raw score of less than 19 suggests the patient may benefit from discharge to post-acute rehabilitation services  Please refer to the recommendation of the Occupational Therapist for safe discharge planning        [facility]

## 2023-02-12 LAB
ANION GAP SERPL CALCULATED.3IONS-SCNC: 4 MMOL/L (ref 4–13)
ANION GAP SERPL CALCULATED.3IONS-SCNC: 4 MMOL/L (ref 4–13)
APTT PPP: 64 SECONDS (ref 23–37)
BASOPHILS # BLD MANUAL: 0 THOUSAND/UL (ref 0–0.1)
BASOPHILS NFR MAR MANUAL: 0 % (ref 0–1)
BUN SERPL-MCNC: 5 MG/DL (ref 5–25)
BUN SERPL-MCNC: 5 MG/DL (ref 5–25)
CA-I BLD-SCNC: 1.04 MMOL/L (ref 1.12–1.32)
CALCIUM SERPL-MCNC: 7.6 MG/DL (ref 8.3–10.1)
CALCIUM SERPL-MCNC: 8 MG/DL (ref 8.3–10.1)
CHLORIDE SERPL-SCNC: 105 MMOL/L (ref 96–108)
CHLORIDE SERPL-SCNC: 106 MMOL/L (ref 96–108)
CO2 SERPL-SCNC: 27 MMOL/L (ref 21–32)
CO2 SERPL-SCNC: 27 MMOL/L (ref 21–32)
CREAT SERPL-MCNC: 0.28 MG/DL (ref 0.6–1.3)
CREAT SERPL-MCNC: 0.31 MG/DL (ref 0.6–1.3)
EOSINOPHIL # BLD MANUAL: 0 THOUSAND/UL (ref 0–0.4)
EOSINOPHIL NFR BLD MANUAL: 0 % (ref 0–6)
ERYTHROCYTE [DISTWIDTH] IN BLOOD BY AUTOMATED COUNT: 17.1 % (ref 11.6–15.1)
GFR SERPL CREATININE-BSD FRML MDRD: 109 ML/MIN/1.73SQ M
GFR SERPL CREATININE-BSD FRML MDRD: 113 ML/MIN/1.73SQ M
GLUCOSE SERPL-MCNC: 143 MG/DL (ref 65–140)
GLUCOSE SERPL-MCNC: 148 MG/DL (ref 65–140)
GLUCOSE SERPL-MCNC: 156 MG/DL (ref 65–140)
GLUCOSE SERPL-MCNC: 158 MG/DL (ref 65–140)
GLUCOSE SERPL-MCNC: 160 MG/DL (ref 65–140)
GLUCOSE SERPL-MCNC: 185 MG/DL (ref 65–140)
GLUCOSE SERPL-MCNC: 191 MG/DL (ref 65–140)
GLUCOSE SERPL-MCNC: 211 MG/DL (ref 65–140)
GLUCOSE SERPL-MCNC: 211 MG/DL (ref 65–140)
HCT VFR BLD AUTO: 26.9 % (ref 34.8–46.1)
HGB BLD-MCNC: 8.2 G/DL (ref 11.5–15.4)
HYPERCHROMIA BLD QL SMEAR: PRESENT
LYMPHOCYTES # BLD AUTO: 0.19 THOUSAND/UL (ref 0.6–4.47)
LYMPHOCYTES # BLD AUTO: 1 % (ref 14–44)
MAGNESIUM SERPL-MCNC: 2.3 MG/DL (ref 1.6–2.6)
MCH RBC QN AUTO: 27.8 PG (ref 26.8–34.3)
MCHC RBC AUTO-ENTMCNC: 30.5 G/DL (ref 31.4–37.4)
MCV RBC AUTO: 91 FL (ref 82–98)
MONOCYTES # BLD AUTO: 1.14 THOUSAND/UL (ref 0–1.22)
MONOCYTES NFR BLD: 6 % (ref 4–12)
MYELOCYTES NFR BLD MANUAL: 2 % (ref 0–1)
NEUTROPHILS # BLD MANUAL: 17.22 THOUSAND/UL (ref 1.85–7.62)
NEUTS BAND NFR BLD MANUAL: 4 % (ref 0–8)
NEUTS SEG NFR BLD AUTO: 87 % (ref 43–75)
PHOSPHATE SERPL-MCNC: 0.8 MG/DL (ref 2.3–4.1)
PHOSPHATE SERPL-MCNC: 0.8 MG/DL (ref 2.3–4.1)
PHOSPHATE SERPL-MCNC: 3.1 MG/DL (ref 2.3–4.1)
PLATELET # BLD AUTO: 318 THOUSANDS/UL (ref 149–390)
PLATELET BLD QL SMEAR: ADEQUATE
PMV BLD AUTO: 9.2 FL (ref 8.9–12.7)
POTASSIUM SERPL-SCNC: 3 MMOL/L (ref 3.5–5.3)
POTASSIUM SERPL-SCNC: 4 MMOL/L (ref 3.5–5.3)
RBC # BLD AUTO: 2.95 MILLION/UL (ref 3.81–5.12)
RBC MORPH BLD: PRESENT
SODIUM SERPL-SCNC: 136 MMOL/L (ref 135–147)
SODIUM SERPL-SCNC: 137 MMOL/L (ref 135–147)
WBC # BLD AUTO: 18.92 THOUSAND/UL (ref 4.31–10.16)

## 2023-02-12 RX ORDER — POTASSIUM CHLORIDE 14.9 MG/ML
20 INJECTION INTRAVENOUS 2 TIMES DAILY
Status: DISCONTINUED | OUTPATIENT
Start: 2023-02-12 | End: 2023-02-12

## 2023-02-12 RX ORDER — POTASSIUM CHLORIDE 14.9 MG/ML
20 INJECTION INTRAVENOUS 2 TIMES DAILY
Status: COMPLETED | OUTPATIENT
Start: 2023-02-12 | End: 2023-02-13

## 2023-02-12 RX ORDER — POTASSIUM CHLORIDE 20 MEQ/1
40 TABLET, EXTENDED RELEASE ORAL ONCE
Status: DISCONTINUED | OUTPATIENT
Start: 2023-02-12 | End: 2023-02-12

## 2023-02-12 RX ORDER — POTASSIUM CHLORIDE 29.8 MG/ML
40 INJECTION INTRAVENOUS ONCE
Status: DISCONTINUED | OUTPATIENT
Start: 2023-02-12 | End: 2023-02-12

## 2023-02-12 RX ORDER — LANOLIN ALCOHOL/MO/W.PET/CERES
100 CREAM (GRAM) TOPICAL DAILY
Status: DISCONTINUED | OUTPATIENT
Start: 2023-02-12 | End: 2023-02-23 | Stop reason: HOSPADM

## 2023-02-12 RX ADMIN — PIPERACILLIN SODIUM AND TAZOBACTAM SODIUM 3.38 G: 36; 4.5 INJECTION, POWDER, LYOPHILIZED, FOR SOLUTION INTRAVENOUS at 07:36

## 2023-02-12 RX ADMIN — POTASSIUM PHOSPHATE, MONOBASIC AND POTASSIUM PHOSPHATE, DIBASIC 45 MMOL: 224; 236 INJECTION, SOLUTION, CONCENTRATE INTRAVENOUS at 12:51

## 2023-02-12 RX ADMIN — POTASSIUM CHLORIDE 20 MEQ: 14.9 INJECTION, SOLUTION INTRAVENOUS at 17:50

## 2023-02-12 RX ADMIN — INSULIN LISPRO 2 UNITS: 100 INJECTION, SOLUTION INTRAVENOUS; SUBCUTANEOUS at 06:18

## 2023-02-12 RX ADMIN — BIMATOPROST 1 DROP: 0.1 SOLUTION/ DROPS OPHTHALMIC at 21:09

## 2023-02-12 RX ADMIN — HEPARIN SODIUM 22 UNITS/KG/HR: 10000 INJECTION, SOLUTION INTRAVENOUS at 03:03

## 2023-02-12 RX ADMIN — HEPARIN SODIUM 22 UNITS/KG/HR: 10000 INJECTION, SOLUTION INTRAVENOUS at 23:58

## 2023-02-12 RX ADMIN — PANTOPRAZOLE SODIUM 40 MG: 40 INJECTION, POWDER, FOR SOLUTION INTRAVENOUS at 06:17

## 2023-02-12 RX ADMIN — INSULIN LISPRO 1 UNITS: 100 INJECTION, SOLUTION INTRAVENOUS; SUBCUTANEOUS at 18:32

## 2023-02-12 RX ADMIN — PIPERACILLIN SODIUM AND TAZOBACTAM SODIUM 3.38 G: 36; 4.5 INJECTION, POWDER, LYOPHILIZED, FOR SOLUTION INTRAVENOUS at 22:31

## 2023-02-12 RX ADMIN — INSULIN LISPRO 1 UNITS: 100 INJECTION, SOLUTION INTRAVENOUS; SUBCUTANEOUS at 12:58

## 2023-02-12 RX ADMIN — PIPERACILLIN SODIUM AND TAZOBACTAM SODIUM 3.38 G: 36; 4.5 INJECTION, POWDER, LYOPHILIZED, FOR SOLUTION INTRAVENOUS at 15:49

## 2023-02-12 NOTE — PROGRESS NOTES
Progress Note    Julienne Wallace 69 y o  female MRN: [id number]  Unit/Bed#: [facility] 677-36 Encounter: [id number]    Assessment:  Julienne Wallace is a 69 y o  female with PMH colon cancer s/p lap right hemicolectomy 2/2 ascending colon mass (7/15) p/w incarcerated ventral hernia  S/p ex lap end ileostomy, L colectomy, reduction of ventral hernia on 2/9  Post op course c/b PE on heparin gtt    AVSS/afebrile  Ileostomy pink & functional   UO: 1 2 L  PLAN:  - oral diet; low res  - daily dressing change   - PT/OT/OOB/ambulate/  - DVT/PE chemoppx  Subjective:   - no new complaints this morning  Objective:     Vitals: Temp:  [97 2 °F (36 2 °C)-98 °F (36 7 °C)] 97 3 °F (36 3 °C)  HR:  [] 107  Resp:  [12-23] 23  BP: (105-153)/(48-85) 139/85  Body mass index is 26 52 kg/m²  I/O       02/10 0701 02/11 0700 02/11 0701  02/12 0700 02/12 0701  02/13 0700    I V  (mL/kg) 3882 5 (63) 644 5 (10 5)     Blood 302 5      NG/GT 0 0     IV Piggyback 1406 7 450     Total Intake(mL/kg) 5591 7 (90 8) 1094 5 (17 8)     Urine (mL/kg/hr) 1275 (0 9) 1200 (0 8)     Emesis/NG output 0 0     Stool 150      Blood       Total Output 1425 1200     Net +4166 7 -105 5                  Physical Exam:  GEN: semi-recumbent in bed; not in any painful distress  HEENT: atraumatic  CV: RRR  Lung: Normal effort  Ab: Soft, ND, appro tender  Dressing changed  Extrem: No CCE  Neuro: A+Ox3    Lab, Imaging and other studies:   I have personally reviewed pertinent reports    , CBC with diff:   Lab Results   Component Value Date    WBC 18 92 (H) 02/12/2023    HGB 8 2 (L) 02/12/2023    HCT 26 9 (L) 02/12/2023    MCV 91 02/12/2023     02/12/2023    MCH 27 8 02/12/2023    MCHC 30 5 (L) 02/12/2023    RDW 17 1 (H) 02/12/2023    MPV 9 2 02/12/2023   , BMP/CMP:   Lab Results   Component Value Date    SODIUM 137 02/12/2023    K 3 0 (L) 02/12/2023     02/12/2023    CO2 27 02/12/2023    BUN 5 02/12/2023    CREATININE 0 31 (L) 02/12/2023    CALCIUM 8 0 (L) 02/12/2023    EGFR 109 02/12/2023     VTE Pharmacologic Prophylaxis: Heparin gtt    VTE Mechanical Prophylaxis: sequential compression device

## 2023-02-12 NOTE — RESPIRATORY THERAPY NOTE
02/12/23 0808   Respiratory Assessment   Assessment Type Assess only   General Appearance Alert; Awake   Respiratory Pattern Shallow   Chest Assessment Chest expansion symmetrical   Bilateral Breath Sounds Clear;Diminished   Resp Comments Bilateral BS clear/dimnshed  No distress noted   No UDN indicated   O2 Device ra   Additional Assessments   Pulse 97   Respirations 20   SpO2 92 %   Position Semi-Taylor's

## 2023-02-12 NOTE — PLAN OF CARE
Problem: GASTROINTESTINAL - ADULT  Goal: Maintains or returns to baseline bowel function  Description: INTERVENTIONS:  - Assess bowel function  - Encourage oral fluids to ensure adequate hydration  - Administer IV fluids if ordered to ensure adequate hydration  - Administer ordered medications as needed  - Encourage mobilization and activity  - Consider nutritional services referral to assist patient with adequate nutrition and appropriate food choices  2/12/2023 0206 by Jassi Mcdonnell RN  Outcome: Progressing  2/12/2023 0205 by Jassi Mcdonnell RN  Outcome: Progressing  Goal: Maintains adequate nutritional intake  Description: INTERVENTIONS:  - Monitor percentage of each meal consumed  - Identify factors contributing to decreased intake, treat as appropriate  - Assist with meals as needed  - Monitor I&O, weight, and lab values if indicated  - Obtain nutrition services referral as needed  2/12/2023 0206 by Jassi Mcdonnell RN  Outcome: Progressing  2/12/2023 0205 by Jassi Mcdonnell RN  Outcome: Progressing  Goal: Oral mucous membranes remain intact  Description: INTERVENTIONS  - Assess oral mucosa and hygiene practices  - Implement preventative oral hygiene regimen  - Implement oral medicated treatments as ordered  - Initiate Nutrition services referral as needed  2/12/2023 0206 by Jassi Mcdonnell RN  Outcome: Progressing  2/12/2023 0205 by Jassi Mcdonnell RN  Outcome: Progressing     Problem: GENITOURINARY - ADULT  Goal: Maintains or returns to baseline urinary function  Description: INTERVENTIONS:  - Assess urinary function  - Encourage oral fluids to ensure adequate hydration if ordered  - Administer IV fluids as ordered to ensure adequate hydration  - Administer ordered medications as needed  - Offer frequent toileting  - Follow urinary retention protocol if ordered  2/12/2023 0206 by Jassi Mcdonnell RN  Outcome: Progressing  2/12/2023 0205 by Jassi Mcdonnell RN  Outcome: Progressing     Problem: PAIN - ADULT  Goal: Verbalizes/displays adequate comfort level or baseline comfort level  Description: Interventions:  - Encourage patient to monitor pain and request assistance  - Assess pain using appropriate pain scale  - Administer analgesics based on type and severity of pain and evaluate response  - Implement non-pharmacological measures as appropriate and evaluate response  - Consider cultural and social influences on pain and pain management  - Notify physician/advanced practitioner if interventions unsuccessful or patient reports new pain  2/12/2023 0206 by Kamla Lincoln RN  Outcome: Progressing  2/12/2023 0205 by Kamla Lincoln RN  Outcome: Progressing     Problem: INFECTION - ADULT  Goal: Absence or prevention of progression during hospitalization  Description: INTERVENTIONS:  - Assess and monitor for signs and symptoms of infection  - Monitor lab/diagnostic results  - Monitor all insertion sites, i e  indwelling lines, tubes, and drains  - Monitor endotracheal if appropriate and nasal secretions for changes in amount and color  - Todd appropriate cooling/warming therapies per order  - Administer medications as ordered  - Instruct and encourage patient and family to use good hand hygiene technique  - Identify and instruct in appropriate isolation precautions for identified infection/condition  Outcome: Progressing  Goal: Absence of fever/infection during neutropenic period  Description: INTERVENTIONS:  - Monitor WBC    Outcome: Progressing     Problem: DISCHARGE PLANNING  Goal: Discharge to home or other facility with appropriate resources  Description: INTERVENTIONS:  - Identify barriers to discharge w/patient and caregiver  - Arrange for needed discharge resources and transportation as appropriate  - Identify discharge learning needs (meds, wound care, etc )  - Arrange for interpretive services to assist at discharge as needed  - Refer to Case Management Department for coordinating discharge planning if the patient needs post-hospital services based on physician/advanced practitioner order or complex needs related to functional status, cognitive ability, or social support system  Outcome: Progressing     Problem: Knowledge Deficit  Goal: Patient/family/caregiver demonstrates understanding of disease process, treatment plan, medications, and discharge instructions  Description: Complete learning assessment and assess knowledge base  Interventions:  - Provide teaching at level of understanding  - Provide teaching via preferred learning methods  Outcome: Progressing     Problem: Nutrition/Hydration-ADULT  Goal: Nutrient/Hydration intake appropriate for improving, restoring or maintaining nutritional needs  Description: Monitor and assess patient's nutrition/hydration status for malnutrition  Collaborate with interdisciplinary team and initiate plan and interventions as ordered  Monitor patient's weight and dietary intake as ordered or per policy  Utilize nutrition screening tool and intervene as necessary  Determine patient's food preferences and provide high-protein, high-caloric foods as appropriate       INTERVENTIONS:  - Monitor oral intake, urinary output, labs, and treatment plans  - Assess nutrition and hydration status and recommend course of action  - Evaluate amount of meals eaten  - Assist patient with eating if necessary   - Allow adequate time for meals  - Recommend/ encourage appropriate diets, oral nutritional supplements, and vitamin/mineral supplements  - Order, calculate, and assess calorie counts as needed  - Recommend, monitor, and adjust tube feedings and TPN/PPN based on assessed needs  - Assess need for intravenous fluids  - Provide specific nutrition/hydration education as appropriate  - Include patient/family/caregiver in decisions related to nutrition  Outcome: Progressing

## 2023-02-13 ENCOUNTER — APPOINTMENT (INPATIENT)
Dept: NON INVASIVE DIAGNOSTICS | Facility: HOSPITAL | Age: 78
End: 2023-02-13

## 2023-02-13 LAB
ANION GAP SERPL CALCULATED.3IONS-SCNC: 9 MMOL/L (ref 4–13)
APTT PPP: 65 SECONDS (ref 23–37)
BUN SERPL-MCNC: 10 MG/DL (ref 5–25)
CALCIUM SERPL-MCNC: 8.1 MG/DL (ref 8.3–10.1)
CHLORIDE SERPL-SCNC: 103 MMOL/L (ref 96–108)
CO2 SERPL-SCNC: 24 MMOL/L (ref 21–32)
CREAT SERPL-MCNC: 0.36 MG/DL (ref 0.6–1.3)
ERYTHROCYTE [DISTWIDTH] IN BLOOD BY AUTOMATED COUNT: 16.9 % (ref 11.6–15.1)
GFR SERPL CREATININE-BSD FRML MDRD: 104 ML/MIN/1.73SQ M
GLUCOSE SERPL-MCNC: 152 MG/DL (ref 65–140)
GLUCOSE SERPL-MCNC: 161 MG/DL (ref 65–140)
GLUCOSE SERPL-MCNC: 181 MG/DL (ref 65–140)
GLUCOSE SERPL-MCNC: 182 MG/DL (ref 65–140)
GLUCOSE SERPL-MCNC: 190 MG/DL (ref 65–140)
GLUCOSE SERPL-MCNC: 202 MG/DL (ref 65–140)
HCT VFR BLD AUTO: 24.1 % (ref 34.8–46.1)
HGB BLD-MCNC: 7.6 G/DL (ref 11.5–15.4)
MAGNESIUM SERPL-MCNC: 2.3 MG/DL (ref 1.6–2.6)
MCH RBC QN AUTO: 28.5 PG (ref 26.8–34.3)
MCHC RBC AUTO-ENTMCNC: 31.5 G/DL (ref 31.4–37.4)
MCV RBC AUTO: 90 FL (ref 82–98)
PHOSPHATE SERPL-MCNC: 2.2 MG/DL (ref 2.3–4.1)
PLATELET # BLD AUTO: 314 THOUSANDS/UL (ref 149–390)
PMV BLD AUTO: 9.1 FL (ref 8.9–12.7)
POTASSIUM SERPL-SCNC: 3.7 MMOL/L (ref 3.5–5.3)
RBC # BLD AUTO: 2.67 MILLION/UL (ref 3.81–5.12)
SODIUM SERPL-SCNC: 136 MMOL/L (ref 135–147)
WBC # BLD AUTO: 17.84 THOUSAND/UL (ref 4.31–10.16)

## 2023-02-13 PROCEDURE — 2W13X6Z COMPRESSION OF ABDOMINAL WALL USING PRESSURE DRESSING: ICD-10-PCS | Performed by: SURGERY

## 2023-02-13 RX ORDER — DEXTROSE, SODIUM CHLORIDE, AND POTASSIUM CHLORIDE 5; .45; .15 G/100ML; G/100ML; G/100ML
50 INJECTION INTRAVENOUS CONTINUOUS
Status: DISCONTINUED | OUTPATIENT
Start: 2023-02-13 | End: 2023-02-23

## 2023-02-13 RX ADMIN — INSULIN LISPRO 1 UNITS: 100 INJECTION, SOLUTION INTRAVENOUS; SUBCUTANEOUS at 05:27

## 2023-02-13 RX ADMIN — POTASSIUM PHOSPHATE, MONOBASIC AND POTASSIUM PHOSPHATE, DIBASIC 30 MMOL: 224; 236 INJECTION, SOLUTION, CONCENTRATE INTRAVENOUS at 11:33

## 2023-02-13 RX ADMIN — INSULIN LISPRO 1 UNITS: 100 INJECTION, SOLUTION INTRAVENOUS; SUBCUTANEOUS at 23:57

## 2023-02-13 RX ADMIN — INSULIN LISPRO 2 UNITS: 100 INJECTION, SOLUTION INTRAVENOUS; SUBCUTANEOUS at 11:42

## 2023-02-13 RX ADMIN — DEXTROSE, SODIUM CHLORIDE, AND POTASSIUM CHLORIDE 50 ML/HR: 5; .45; .15 INJECTION INTRAVENOUS at 11:40

## 2023-02-13 RX ADMIN — PIPERACILLIN SODIUM AND TAZOBACTAM SODIUM 3.38 G: 36; 4.5 INJECTION, POWDER, LYOPHILIZED, FOR SOLUTION INTRAVENOUS at 07:55

## 2023-02-13 RX ADMIN — HEPARIN SODIUM 22 UNITS/KG/HR: 10000 INJECTION, SOLUTION INTRAVENOUS at 19:42

## 2023-02-13 RX ADMIN — PIPERACILLIN SODIUM AND TAZOBACTAM SODIUM 3.38 G: 36; 4.5 INJECTION, POWDER, LYOPHILIZED, FOR SOLUTION INTRAVENOUS at 15:12

## 2023-02-13 RX ADMIN — BIMATOPROST 1 DROP: 0.1 SOLUTION/ DROPS OPHTHALMIC at 21:47

## 2023-02-13 RX ADMIN — POTASSIUM CHLORIDE 20 MEQ: 14.9 INJECTION, SOLUTION INTRAVENOUS at 09:34

## 2023-02-13 RX ADMIN — INSULIN LISPRO 2 UNITS: 100 INJECTION, SOLUTION INTRAVENOUS; SUBCUTANEOUS at 17:44

## 2023-02-13 RX ADMIN — PANTOPRAZOLE SODIUM 40 MG: 40 INJECTION, POWDER, FOR SOLUTION INTRAVENOUS at 05:20

## 2023-02-13 RX ADMIN — PIPERACILLIN SODIUM AND TAZOBACTAM SODIUM 3.38 G: 36; 4.5 INJECTION, POWDER, LYOPHILIZED, FOR SOLUTION INTRAVENOUS at 23:57

## 2023-02-13 NOTE — PROGRESS NOTES
Progress Note - Doris Shirley 69 y o  female MRN: [id number]    Unit/Bed#: [facility] 759-09 Encounter: [id number]      Assessment:  Doris Shirley is a 69 y o  female with PMH colon cancer s/p lap right hemicolectomy 2/2 ascending colon mass (7/15) p/w incarcerated ventral hernia  S/p ex lap end ileostomy, L colectomy, reduction of ventral hernia on 2/9  Post op course c/b PE on heparin gtt    WBC 17 84 from 18 9, 16 63, 15 79  Hgb 7 6 from 8 2, 7 3    Ostomy 425cc dark green stool  , cre 0 36    Plan:  Diet as tolerated  Initiate IVF for c/f dehydration and low PO intake  Monitor ostomy function  Continue abx - zosyn  Monitor hemodynamics  Trend end points  Continue heparin gtt  F/u LUE duplex  Strict I/Os  Local wound care to midline per surgical team   Eventual MRI liver      Subjective:   Patient seen and examined bedside  Endorses swelling to left upper extremity  Erythema noted to digits on left hand as well  States she feels thirsty with dry mouth  Has not eaten much at all  Objective:     Vitals: Blood pressure 109/70, pulse (!) 114, temperature 98 4 °F (36 9 °C), resp  rate 12, height 5' (1 524 m), weight 61 6 kg (135 lb 12 9 oz), SpO2 94 %, not currently breastfeeding  ,Body mass index is 26 52 kg/m²        Intake/Output Summary (Last 24 hours) at 2/13/2023 0908  Last data filed at 2/13/2023 0300  Gross per 24 hour   Intake 2149 54 ml   Output 1225 ml   Net 924 54 ml       Physical Exam:   General - no acute distress, responsive and cooperative  CV - warm, regular rate  Pulm - normal work of breathing, no respiratory distress ORA  Abd - soft, ostomy viable with dark green stool per bag, midline with fascia closed and packed with kerlex soaked with dakins  Neuro - m/s grossly intact, cn grossly intact  Ext - moving all extremities, left arm swollen/nontender/erythema to digits        Invasive Devices     Peripherally Inserted Central Catheter Line  Duration           PICC Line 02/10/23 Left Brachial 2 days          Peripheral Intravenous Line  Duration           Peripheral IV 02/10/23 Left;Ventral (anterior) Forearm 3 days          Drain  Duration           Ileostomy Standard (Kaitlynn, end) Umbilicus 3 days                Lab, Imaging and other studies: I have personally reviewed pertinent reports      VTE Pharmacologic Prophylaxis: Heparin  VTE Mechanical Prophylaxis: sequential compression device

## 2023-02-13 NOTE — PLAN OF CARE
Problem: MOBILITY - ADULT  Goal: Maintain or return to baseline ADL function  Description: INTERVENTIONS:  -  Assess patient's ability to carry out ADLs; assess patient's baseline for ADL function and identify physical deficits which impact ability to perform ADLs (bathing, care of mouth/teeth, toileting, grooming, dressing, etc )  - Assess/evaluate cause of self-care deficits   - Assess range of motion  - Assess patient's mobility; develop plan if impaired  - Assess patient's need for assistive devices and provide as appropriate  - Encourage maximum independence but intervene and supervise when necessary  - Involve family in performance of ADLs  - Assess for home care needs following discharge   - Consider OT consult to assist with ADL evaluation and planning for discharge  - Provide patient education as appropriate  Outcome: Progressing  Goal: Maintains/Returns to pre admission functional level  Description: INTERVENTIONS:  - Perform BMAT or MOVE assessment daily    - Set and communicate daily mobility goal to care team and patient/family/caregiver  - Collaborate with rehabilitation services on mobility goals if consulted  - Perform Range of Motion 3 times a day  - Reposition patient every 2 hours    - Dangle patient 0 times a day  - Stand patient 0 times a day  - Ambulate patient 0 times a day  - Out of bed to chair 3 times a day   - Out of bed for meals 3 times a day  - Out of bed for toileting  - Record patient progress and toleration of activity level   Outcome: Progressing     Problem: GASTROINTESTINAL - ADULT  Goal: Maintains or returns to baseline bowel function  Description: INTERVENTIONS:  - Assess bowel function  - Encourage oral fluids to ensure adequate hydration  - Administer IV fluids if ordered to ensure adequate hydration  - Administer ordered medications as needed  - Encourage mobilization and activity  - Consider nutritional services referral to assist patient with adequate nutrition and appropriate food choices  Outcome: Progressing  Goal: Maintains adequate nutritional intake  Description: INTERVENTIONS:  - Monitor percentage of each meal consumed  - Identify factors contributing to decreased intake, treat as appropriate  - Assist with meals as needed  - Monitor I&O, weight, and lab values if indicated  - Obtain nutrition services referral as needed  Outcome: Progressing  Goal: Oral mucous membranes remain intact  Description: INTERVENTIONS  - Assess oral mucosa and hygiene practices  - Implement preventative oral hygiene regimen  - Implement oral medicated treatments as ordered  - Initiate Nutrition services referral as needed  Outcome: Progressing     Problem: GENITOURINARY - ADULT  Goal: Maintains or returns to baseline urinary function  Description: INTERVENTIONS:  - Assess urinary function  - Encourage oral fluids to ensure adequate hydration if ordered  - Administer IV fluids as ordered to ensure adequate hydration  - Administer ordered medications as needed  - Offer frequent toileting  - Follow urinary retention protocol if ordered  Outcome: Progressing     Problem: SKIN/TISSUE INTEGRITY - ADULT  Goal: Skin Integrity remains intact(Skin Breakdown Prevention)  Description: Assess:  -Perform Jose assessment every 8  -Clean and moisturize skin every 8  -Inspect skin when repositioning, toileting, and assisting with ADLS  -Assess under medical devices such as  every   -Assess extremities for adequate circulation and sensation     Bed Management:  -Have minimal linens on bed & keep smooth, unwrinkled  -Change linens as needed when moist or perspiring  -Avoid sitting or lying in one position for more than 2 hours while in bed  -Keep HOB at 30 degrees     Toileting:  -Offer bedside commode  -Assess for incontinence every 2  -Use incontinent care products after each incontinent episode such as     Activity:  -Mobilize patient  times a day  -Encourage activity and walks on unit  -Encourage or provide ROM exercises   -Turn and reposition patient every 2 Hours  -Use appropriate equipment to lift or move patient in bed  -Instruct/ Assist with weight shifting every 2 when out of bed in chair  -Consider limitation of chair time  hour intervals    Skin Care:  -Avoid use of baby powder, tape, friction and shearing, hot water or constrictive clothing  -Relieve pressure over bony prominences using pillows  -Do not massage red bony areas    Next Steps:  -Teach patient strategies to minimize risks such as    -Consider consults to  interdisciplinary teams such as   Outcome: Progressing     Problem: Prexisting or High Potential for Compromised Skin Integrity  Goal: Skin integrity is maintained or improved  Description: INTERVENTIONS:  - Identify patients at risk for skin breakdown  - Assess and monitor skin integrity  - Assess and monitor nutrition and hydration status  - Monitor labs   - Assess for incontinence   - Turn and reposition patient  - Assist with mobility/ambulation  - Relieve pressure over bony prominences  - Avoid friction and shearing  - Provide appropriate hygiene as needed including keeping skin clean and dry  - Evaluate need for skin moisturizer/barrier cream  - Collaborate with interdisciplinary team   - Patient/family teaching  - Consider wound care consult   Outcome: Progressing     Problem: PAIN - ADULT  Goal: Verbalizes/displays adequate comfort level or baseline comfort level  Description: Interventions:  - Encourage patient to monitor pain and request assistance  - Assess pain using appropriate pain scale  - Administer analgesics based on type and severity of pain and evaluate response  - Implement non-pharmacological measures as appropriate and evaluate response  - Consider cultural and social influences on pain and pain management  - Notify physician/advanced practitioner if interventions unsuccessful or patient reports new pain  Outcome: Progressing     Problem: INFECTION - ADULT  Goal: Absence or prevention of progression during hospitalization  Description: INTERVENTIONS:  - Assess and monitor for signs and symptoms of infection  - Monitor lab/diagnostic results  - Monitor all insertion sites, i e  indwelling lines, tubes, and drains  - Monitor endotracheal if appropriate and nasal secretions for changes in amount and color  - [place] appropriate cooling/warming therapies per order  - Administer medications as ordered  - Instruct and encourage patient and family to use good hand hygiene technique  - Identify and instruct in appropriate isolation precautions for identified infection/condition  Outcome: Progressing  Goal: Absence of fever/infection during neutropenic period  Description: INTERVENTIONS:  - Monitor WBC    Outcome: Progressing     Problem: SAFETY ADULT  Goal: Maintain or return to baseline ADL function  Description: INTERVENTIONS:  -  Assess patient's ability to carry out ADLs; assess patient's baseline for ADL function and identify physical deficits which impact ability to perform ADLs (bathing, care of mouth/teeth, toileting, grooming, dressing, etc )  - Assess/evaluate cause of self-care deficits   - Assess range of motion  - Assess patient's mobility; develop plan if impaired  - Assess patient's need for assistive devices and provide as appropriate  - Encourage maximum independence but intervene and supervise when necessary  - Involve family in performance of ADLs  - Assess for home care needs following discharge   - Consider OT consult to assist with ADL evaluation and planning for discharge  - Provide patient education as appropriate  Outcome: Progressing  Goal: Maintains/Returns to pre admission functional level  Description: INTERVENTIONS:  - Perform BMAT or MOVE assessment daily    - Set and communicate daily mobility goal to care team and patient/family/caregiver     - Collaborate with rehabilitation services on mobility goals if consulted  - Perform Range of Motion 3 times a day   - Reposition patient every 2 hours    - Dangle patient  times a day  - Stand patient  times a day  - Ambulate patient  times a day  - Out of bed to chair 3 times a day   - Out of bed for meals 3 times a day  - Out of bed for toileting  - Record patient progress and toleration of activity level   Outcome: Progressing  Goal: Patient will remain free of falls  Description: INTERVENTIONS:  - Educate patient/family on patient safety including physical limitations  - Instruct patient to call for assistance with activity   - Consult OT/PT to assist with strengthening/mobility   - Keep Call bell within reach  - Keep bed low and locked with side rails adjusted as appropriate  - Keep care items and personal belongings within reach  - Initiate and maintain comfort rounds  - Make Fall Risk Sign visible to staff  - Offer Toileting every 2 Hours, in advance of need  - Initiate/Maintain bed alarm  - Obtain necessary fall risk management equipment:   - Apply yellow socks and bracelet for high fall risk patients  - Consider moving patient to room near nurses station  Outcome: Progressing     Problem: DISCHARGE PLANNING  Goal: Discharge to home or other facility with appropriate resources  Description: INTERVENTIONS:  - Identify barriers to discharge w/patient and caregiver  - Arrange for needed discharge resources and transportation as appropriate  - Identify discharge learning needs (meds, wound care, etc )  - Arrange for interpretive services to assist at discharge as needed  - Refer to Case Management Department for coordinating discharge planning if the patient needs post-hospital services based on physician/advanced practitioner order or complex needs related to functional status, cognitive ability, or social support system  Outcome: Progressing     Problem: Knowledge Deficit  Goal: Patient/family/caregiver demonstrates understanding of disease process, treatment plan, medications, and discharge instructions  Description: Complete learning assessment and assess knowledge base  Interventions:  - Provide teaching at level of understanding  - Provide teaching via preferred learning methods  Outcome: Progressing     Problem: Nutrition/Hydration-ADULT  Goal: Nutrient/Hydration intake appropriate for improving, restoring or maintaining nutritional needs  Description: Monitor and assess patient's nutrition/hydration status for malnutrition  Collaborate with interdisciplinary team and initiate plan and interventions as ordered  Monitor patient's weight and dietary intake as ordered or per policy  Utilize nutrition screening tool and intervene as necessary  Determine patient's food preferences and provide high-protein, high-caloric foods as appropriate       INTERVENTIONS:  - Monitor oral intake, urinary output, labs, and treatment plans  - Assess nutrition and hydration status and recommend course of action  - Evaluate amount of meals eaten  - Assist patient with eating if necessary   - Allow adequate time for meals  - Recommend/ encourage appropriate diets, oral nutritional supplements, and vitamin/mineral supplements  - Order, calculate, and assess calorie counts as needed  - Recommend, monitor, and adjust tube feedings and TPN/PPN based on assessed needs  - Assess need for intravenous fluids  - Provide specific nutrition/hydration education as appropriate  - Include patient/family/caregiver in decisions related to nutrition  Outcome: Progressing

## 2023-02-13 NOTE — PROCEDURES
Acute Care Surgery  Bedside V A C  Procedure Note      Location of wound: Abdominal wall    Dressings and Foam removed:  1 piece of Kerlix packing and dry abdominal pad dressings    Dimensions of wound: 8 cm x 4 cm x 3 cm with up to 14 cm of tunneling/undermining to the left lateral abdominal wall    Description of wound: On inspection of the wound today, the wound base was clean, pink and healthy  There was no necrotic or nonviable tissue requiring debridement  The periwound skin remains clean and intact and unremarkable  VAC dressing application:  In order to facilitate VAC dressing application, the patient's ostomy appliance was removed  The periwound skin was cleaned and dried  One piece of oil emulsion gauze dressing was placed over the fascial suture line and the medial wound base followed by 1 piece of black foam dressing placed into the wound  The dressings were then covered with VAC drape  The track pad was then placed over the base of black foam  The VAC was then set to 125 mmHg low Continuous suction  Upon completion of the [facility] dressing application, a new 1 piece ostomy appliance was placed  The patient tolerated the procedure well and there were no complications  The patient did not require any excisional debridement during today's dressing change  VAC settings:  125 mmHg  Continuous    Wound Images: Additional Notes: The [facility] sticker placed over the dressing per protocol  The next [facility] dressing change will be planned for Wednesday, 2/15/2023  This dressing change took greater than 30 minutes to complete      Aaron Porras PA-C  2/13/2023 1:42 PM

## 2023-02-13 NOTE — QUICK NOTE
Nurse-Patient-Provider rounds were completed with the patient's nurse today, Francisco Javier Greene  We discussed the plan is to continue diet as tolerated  Plan for speech therapy evaluation as nursing raised some concern about the patient's hesitancy to swallow pills and solid foods; nursing staff did note no overt signs of aspiration witnessed  Maintain aspiration precautions  Continue local wound care with anticipated transition to [facility] therapy for abdominal wound today  Continue routine ostomy care  Continue current antibiotic regimen and continue to monitor hemodynamic status, temperature trend and CBC  Continue anticoagulation with heparin and await left upper extremity venous duplex study  Patient to eventually undergo further work-up with MRI of the liver when appropriate  We reviewed all of the invasive devices/lines/telemetry orders   - Maintain PICC line for ongoing IV access until no longer requiring IV medications  DVT Prophylaxis:  - Anticoagulated with heparin  Pain Assessment / Plan:  - Continue current analgesic regimen  Mobility Assessment / Plan:  - Activity as tolerated with assistance  - Continue PT and OT evaluation and treatment as indicated  Goals / Barriers for discharge:  - Not yet appropriate for discharge  All questions and concerns were addressed  I spent greater than 20 minutes reviewing the plan with the patient and the nurse, and coordinating care for the day      Raine Dudley PA-C  2/13/2023 10:45 AM

## 2023-02-13 NOTE — ARC ADMISSION
Referral received for consideration of patient for inpatient acute rehab  Will review patient's case with [facility] physician and update CM as able

## 2023-02-13 NOTE — CASE MANAGEMENT
Case Management Discharge Planning Note    [id number] [id number]  : 1945 Date 2023       Current Admission Date: 2023  Current Admission Diagnosis:Perforated bowel [facility])   Patient Active Problem List    Diagnosis Date Noted   • Lower abdominal pain 2023   • [facility] 20[facility]   • Strangulated ventral hernia 2023   • GERD (gastroesophageal reflux disease) 2023   • History of right breast cancer 2023   • History of right mastectomy 2023   • Iron deficiency anemia, unspecified 2022   • History of DVT (deep vein thrombosis) 2022   • Thyroid disorder 2022   • Osteoporosis due to aromatase inhibitor 2022   • History of diabetes mellitus 2022   • Anxiety about health 2021   • Diabetic ulcer of toe of left foot associated with type 2 diabetes mellitus, limited to breakdown of skin ([facility] ) 2021   • Abnormal tumor markers 2020   • Essential hypertension 10/20/2020   • D-dimer, elevated 2019   • Type 2 diabetes mellitus without complications ([place] [address] )    • Other osteoporosis without current pathological fracture 2018   • 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 2018   • Personal history of other malignant neoplasm of large intestine 2018   • Malignant neoplasm of right breast in female, estrogen receptor positive ([facility] ) 2018   • Malignant neoplasm of ascending colon ([place] [address] ) 2018   • Chronic deep vein thrombosis (DVT) of distal vein of left lower extremity ([facility] ) 2018   • Thyroid nodule 2018      LOS (days): 4  Geometric Mean LOS (GMLOS) (days): 9 80  Days to GMLOS:5 6     OBJECTIVE:  Risk of Unplanned Readmission Score: 16 23         Current admission status: Inpatient   Preferred Pharmacy:   [address] [place] [address] #[id number] Hector Masterson[address] Moises SZYMANSKI  [facility] [address]  Phone: [phone] Fax: [phone]    Primary Care Provider: Tanisha Reynolds DO    Primary Insurance: Makayla Bright [address] REP  Secondary Insurance:     DISCHARGE DETAILS:          Comments - Freedom of Choice: CM reviewed therapy recommendations of STR with pt, who was agreeable and identified [facility] facilities, both acute and post-acute in [facility], as preferred locations  Pt was agreeable to backup blanket referrals  CM made referrals and will follow  Additional Comments: CM spoke to [facility] pt about not having emergency contacts, inquiring into any possible family or friends she might want listed in the event of an emergency  Pt declined to identify any names, only explaining "I'm alone in the world "  CM left card with contact information in the event the pt thinks of someone and requested a  consult

## 2023-02-13 NOTE — PLAN OF CARE
Problem: GASTROINTESTINAL - ADULT  Goal: Maintains or returns to baseline bowel function  Description: INTERVENTIONS:  - Assess bowel function  - Encourage oral fluids to ensure adequate hydration  - Administer IV fluids if ordered to ensure adequate hydration  - Administer ordered medications as needed  - Encourage mobilization and activity  - Consider nutritional services referral to assist patient with adequate nutrition and appropriate food choices  Outcome: Progressing  Goal: Maintains adequate nutritional intake  Description: INTERVENTIONS:  - Monitor percentage of each meal consumed  - Identify factors contributing to decreased intake, treat as appropriate  - Assist with meals as needed  - Monitor I&O, weight, and lab values if indicated  - Obtain nutrition services referral as needed  Outcome: Progressing  Goal: Oral mucous membranes remain intact  Description: INTERVENTIONS  - Assess oral mucosa and hygiene practices  - Implement preventative oral hygiene regimen  - Implement oral medicated treatments as ordered  - Initiate Nutrition services referral as needed  Outcome: Progressing     Problem: GENITOURINARY - ADULT  Goal: Maintains or returns to baseline urinary function  Description: INTERVENTIONS:  - Assess urinary function  - Encourage oral fluids to ensure adequate hydration if ordered  - Administer IV fluids as ordered to ensure adequate hydration  - Administer ordered medications as needed  - Offer frequent toileting  - Follow urinary retention protocol if ordered  Outcome: Progressing     Problem: PAIN - ADULT  Goal: Verbalizes/displays adequate comfort level or baseline comfort level  Description: Interventions:  - Encourage patient to monitor pain and request assistance  - Assess pain using appropriate pain scale  - Administer analgesics based on type and severity of pain and evaluate response  - Implement non-pharmacological measures as appropriate and evaluate response  - Consider cultural and social influences on pain and pain management  - Notify physician/advanced practitioner if interventions unsuccessful or patient reports new pain  Outcome: Progressing     Problem: INFECTION - ADULT  Goal: Absence or prevention of progression during hospitalization  Description: INTERVENTIONS:  - Assess and monitor for signs and symptoms of infection  - Monitor lab/diagnostic results  - Monitor all insertion sites, i e  indwelling lines, tubes, and drains  - Monitor endotracheal if appropriate and nasal secretions for changes in amount and color  - [place] appropriate cooling/warming therapies per order  - Administer medications as ordered  - Instruct and encourage patient and family to use good hand hygiene technique  - Identify and instruct in appropriate isolation precautions for identified infection/condition  Outcome: Progressing  Goal: Absence of fever/infection during neutropenic period  Description: INTERVENTIONS:  - Monitor WBC    Outcome: Progressing     Problem: DISCHARGE PLANNING  Goal: Discharge to home or other facility with appropriate resources  Description: INTERVENTIONS:  - Identify barriers to discharge w/patient and caregiver  - Arrange for needed discharge resources and transportation as appropriate  - Identify discharge learning needs (meds, wound care, etc )  - Arrange for interpretive services to assist at discharge as needed  - Refer to Case Management Department for coordinating discharge planning if the patient needs post-hospital services based on physician/advanced practitioner order or complex needs related to functional status, cognitive ability, or social support system  Outcome: Progressing     Problem: Knowledge Deficit  Goal: Patient/family/caregiver demonstrates understanding of disease process, treatment plan, medications, and discharge instructions  Description: Complete learning assessment and assess knowledge base    Interventions:  - Provide teaching at level of understanding  - Provide teaching via preferred learning methods  Outcome: Progressing     Problem: Nutrition/Hydration-ADULT  Goal: Nutrient/Hydration intake appropriate for improving, restoring or maintaining nutritional needs  Description: Monitor and assess patient's nutrition/hydration status for malnutrition  Collaborate with interdisciplinary team and initiate plan and interventions as ordered  Monitor patient's weight and dietary intake as ordered or per policy  Utilize nutrition screening tool and intervene as necessary  Determine patient's food preferences and provide high-protein, high-caloric foods as appropriate       INTERVENTIONS:  - Monitor oral intake, urinary output, labs, and treatment plans  - Assess nutrition and hydration status and recommend course of action  - Evaluate amount of meals eaten  - Assist patient with eating if necessary   - Allow adequate time for meals  - Recommend/ encourage appropriate diets, oral nutritional supplements, and vitamin/mineral supplements  - Order, calculate, and assess calorie counts as needed  - Recommend, monitor, and adjust tube feedings and TPN/PPN based on assessed needs  - Assess need for intravenous fluids  - Provide specific nutrition/hydration education as appropriate  - Include patient/family/caregiver in decisions related to nutrition  Outcome: Progressing

## 2023-02-14 LAB
ABO GROUP BLD: NORMAL
ANION GAP SERPL CALCULATED.3IONS-SCNC: 7 MMOL/L (ref 4–13)
APTT PPP: 78 SECONDS (ref 23–37)
BLD GP AB SCN SERPL QL: NEGATIVE
BUN SERPL-MCNC: 11 MG/DL (ref 5–25)
CALCIUM SERPL-MCNC: 7.8 MG/DL (ref 8.3–10.1)
CHLORIDE SERPL-SCNC: 105 MMOL/L (ref 96–108)
CO2 SERPL-SCNC: 24 MMOL/L (ref 21–32)
CREAT SERPL-MCNC: 0.4 MG/DL (ref 0.6–1.3)
ERYTHROCYTE [DISTWIDTH] IN BLOOD BY AUTOMATED COUNT: 17 % (ref 11.6–15.1)
GFR SERPL CREATININE-BSD FRML MDRD: 100 ML/MIN/1.73SQ M
GLUCOSE SERPL-MCNC: 123 MG/DL (ref 65–140)
GLUCOSE SERPL-MCNC: 151 MG/DL (ref 65–140)
GLUCOSE SERPL-MCNC: 156 MG/DL (ref 65–140)
GLUCOSE SERPL-MCNC: 176 MG/DL (ref 65–140)
HCT VFR BLD AUTO: 20.7 % (ref 34.8–46.1)
HGB BLD-MCNC: 6.3 G/DL (ref 11.5–15.4)
MAGNESIUM SERPL-MCNC: 2.2 MG/DL (ref 1.6–2.6)
MCH RBC QN AUTO: 28.3 PG (ref 26.8–34.3)
MCHC RBC AUTO-ENTMCNC: 30.8 G/DL (ref 31.4–37.4)
MCV RBC AUTO: 92 FL (ref 82–98)
NRBC BLD AUTO-RTO: 1 /100 WBCS
PHOSPHATE SERPL-MCNC: 1.8 MG/DL (ref 2.3–4.1)
PLATELET # BLD AUTO: 223 THOUSANDS/UL (ref 149–390)
PMV BLD AUTO: 9.7 FL (ref 8.9–12.7)
POTASSIUM SERPL-SCNC: 3.8 MMOL/L (ref 3.5–5.3)
RBC # BLD AUTO: 2.19 MILLION/UL (ref 3.81–5.12)
RH BLD: NEGATIVE
SODIUM SERPL-SCNC: 136 MMOL/L (ref 135–147)
SPECIMEN EXPIRATION DATE: NORMAL
WBC # BLD AUTO: 15.58 THOUSAND/UL (ref 4.31–10.16)

## 2023-02-14 RX ORDER — ACETYLCYSTEINE 200 MG/ML
3 SOLUTION ORAL; RESPIRATORY (INHALATION)
Status: DISCONTINUED | OUTPATIENT
Start: 2023-02-14 | End: 2023-02-14

## 2023-02-14 RX ADMIN — PIPERACILLIN SODIUM AND TAZOBACTAM SODIUM 3.38 G: 36; 4.5 INJECTION, POWDER, LYOPHILIZED, FOR SOLUTION INTRAVENOUS at 16:32

## 2023-02-14 RX ADMIN — DEXTROSE, SODIUM CHLORIDE, AND POTASSIUM CHLORIDE 50 ML/HR: 5; .45; .15 INJECTION INTRAVENOUS at 08:28

## 2023-02-14 RX ADMIN — PANTOPRAZOLE SODIUM 40 MG: 40 INJECTION, POWDER, FOR SOLUTION INTRAVENOUS at 05:25

## 2023-02-14 RX ADMIN — INSULIN LISPRO 1 UNITS: 100 INJECTION, SOLUTION INTRAVENOUS; SUBCUTANEOUS at 12:27

## 2023-02-14 RX ADMIN — BIMATOPROST 1 DROP: 0.1 SOLUTION/ DROPS OPHTHALMIC at 22:31

## 2023-02-14 RX ADMIN — INSULIN LISPRO 1 UNITS: 100 INJECTION, SOLUTION INTRAVENOUS; SUBCUTANEOUS at 05:27

## 2023-02-14 RX ADMIN — PIPERACILLIN SODIUM AND TAZOBACTAM SODIUM 3.38 G: 36; 4.5 INJECTION, POWDER, LYOPHILIZED, FOR SOLUTION INTRAVENOUS at 08:30

## 2023-02-14 RX ADMIN — PIPERACILLIN SODIUM AND TAZOBACTAM SODIUM 3.38 G: 36; 4.5 INJECTION, POWDER, LYOPHILIZED, FOR SOLUTION INTRAVENOUS at 22:31

## 2023-02-14 RX ADMIN — HEPARIN SODIUM 22 UNITS/KG/HR: 10000 INJECTION, SOLUTION INTRAVENOUS at 17:38

## 2023-02-14 NOTE — RESTORATIVE TECHNICIAN NOTE
Restorative Technician Note      Patient Name: Lilia Paul     Restorative Tech Visit Date: 02/14/23  Note Type: Mobility  Patient Position Upon Consult: Supine  Education Provided: Yes; Other (comment) (Pt declined participation despite encouragement and education on the benefits of mobility  Pt aware to reach out for assistance when ready to participate)  Patient Position at End of Consult: Supine;  All needs within reach    Kae Arnold  DPT, Restorative Technician

## 2023-02-14 NOTE — PLAN OF CARE
Problem: MOBILITY - ADULT  Goal: Maintain or return to baseline ADL function  Description: INTERVENTIONS:  -  Assess patient's ability to carry out ADLs; assess patient's baseline for ADL function and identify physical deficits which impact ability to perform ADLs (bathing, care of mouth/teeth, toileting, grooming, dressing, etc )  - Assess/evaluate cause of self-care deficits   - Assess range of motion  - Assess patient's mobility; develop plan if impaired  - Assess patient's need for assistive devices and provide as appropriate  - Encourage maximum independence but intervene and supervise when necessary  - Involve family in performance of ADLs  - Assess for home care needs following discharge   - Consider OT consult to assist with ADL evaluation and planning for discharge  - Provide patient education as appropriate  Outcome: Progressing  Goal: Maintains/Returns to pre admission functional level  Description: INTERVENTIONS:  - Perform BMAT or MOVE assessment daily    - Set and communicate daily mobility goal to care team and patient/family/caregiver  - Collaborate with rehabilitation services on mobility goals if consulted  - Reposition patient every 2 hours    - Record patient progress and toleration of activity level   Outcome: Progressing     Problem: GASTROINTESTINAL - ADULT  Goal: Maintains or returns to baseline bowel function  Description: INTERVENTIONS:  - Assess bowel function  - Encourage oral fluids to ensure adequate hydration  - Administer IV fluids if ordered to ensure adequate hydration  - Administer ordered medications as needed  - Encourage mobilization and activity  - Consider nutritional services referral to assist patient with adequate nutrition and appropriate food choices  Outcome: Progressing  Goal: Maintains adequate nutritional intake  Description: INTERVENTIONS:  - Monitor percentage of each meal consumed  - Identify factors contributing to decreased intake, treat as appropriate  - Assist with meals as needed  - Monitor I&O, weight, and lab values if indicated  - Obtain nutrition services referral as needed  Outcome: Progressing  Goal: Oral mucous membranes remain intact  Description: INTERVENTIONS  - Assess oral mucosa and hygiene practices  - Implement preventative oral hygiene regimen  - Implement oral medicated treatments as ordered  - Initiate Nutrition services referral as needed  Outcome: Progressing     Problem: GENITOURINARY - ADULT  Goal: Maintains or returns to baseline urinary function  Description: INTERVENTIONS:  - Assess urinary function  - Encourage oral fluids to ensure adequate hydration if ordered  - Administer IV fluids as ordered to ensure adequate hydration  - Administer ordered medications as needed  - Offer frequent toileting  - Follow urinary retention protocol if ordered  Outcome: Progressing     Problem: SKIN/TISSUE INTEGRITY - ADULT  Goal: Skin Integrity remains intact(Skin Breakdown Prevention)  Description: Assess:  -Perform Jose assessment every shift  -Clean and moisturize skin every shift  -Inspect skin when repositioning, toileting, and assisting with ADLS  -Assess extremities for adequate circulation and sensation     Bed Management:  -Have minimal linens on bed & keep smooth, unwrinkled  -Change linens as needed when moist or perspiring  -Avoid sitting or lying in one position for more than 2 hours while in bed  -Keep HOB at 45degrees     Toileting:  -Offer bedside commode  -Assess for incontinence every shift    Activity:  -Encourage or provide ROM exercises   -Turn and reposition patient every 2 Hours  -Use appropriate equipment to lift or move patient in bed    Skin Care:  -Avoid use of baby powder, tape, friction and shearing, hot water or constrictive clothing  -Do not massage red bony areas    Outcome: Progressing     Problem: Prexisting or High Potential for Compromised Skin Integrity  Goal: Skin integrity is maintained or improved  Description: INTERVENTIONS:  - Identify patients at risk for skin breakdown  - Assess and monitor skin integrity  - Assess and monitor nutrition and hydration status  - Monitor labs   - Assess for incontinence   - Turn and reposition patient  - Assist with mobility/ambulation  - Relieve pressure over bony prominences  - Avoid friction and shearing  - Provide appropriate hygiene as needed including keeping skin clean and dry  - Evaluate need for skin moisturizer/barrier cream  - Collaborate with interdisciplinary team   - Patient/family teaching  - Consider wound care consult   Outcome: Progressing     Problem: PAIN - ADULT  Goal: Verbalizes/displays adequate comfort level or baseline comfort level  Description: Interventions:  - Encourage patient to monitor pain and request assistance  - Assess pain using appropriate pain scale  - Administer analgesics based on type and severity of pain and evaluate response  - Implement non-pharmacological measures as appropriate and evaluate response  - Consider cultural and social influences on pain and pain management  - Notify physician/advanced practitioner if interventions unsuccessful or patient reports new pain  Outcome: Progressing     Problem: INFECTION - ADULT  Goal: Absence or prevention of progression during hospitalization  Description: INTERVENTIONS:  - Assess and monitor for signs and symptoms of infection  - Monitor lab/diagnostic results  - Monitor all insertion sites, i e  indwelling lines, tubes, and drains  - Monitor endotracheal if appropriate and nasal secretions for changes in amount and color  - [place] appropriate cooling/warming therapies per order  - Administer medications as ordered  - Instruct and encourage patient and family to use good hand hygiene technique  - Identify and instruct in appropriate isolation precautions for identified infection/condition  Outcome: Progressing  Goal: Absence of fever/infection during neutropenic period  Description: INTERVENTIONS:  - Monitor WBC    Outcome: Progressing     Problem: SAFETY ADULT  Goal: Maintain or return to baseline ADL function  Description: INTERVENTIONS:  -  Assess patient's ability to carry out ADLs; assess patient's baseline for ADL function and identify physical deficits which impact ability to perform ADLs (bathing, care of mouth/teeth, toileting, grooming, dressing, etc )  - Assess/evaluate cause of self-care deficits   - Assess range of motion  - Assess patient's mobility; develop plan if impaired  - Assess patient's need for assistive devices and provide as appropriate  - Encourage maximum independence but intervene and supervise when necessary  - Involve family in performance of ADLs  - Assess for home care needs following discharge   - Consider OT consult to assist with ADL evaluation and planning for discharge  - Provide patient education as appropriate  Outcome: Progressing  Goal: Maintains/Returns to pre admission functional level  Description: INTERVENTIONS:  - Perform BMAT or MOVE assessment daily    - Set and communicate daily mobility goal to care team and patient/family/caregiver  - Collaborate with rehabilitation services on mobility goals if consulted  - Reposition patient every 2 hours    - Record patient progress and toleration of activity level   Outcome: Progressing  Goal: Patient will remain free of falls  Description: INTERVENTIONS:  - Educate patient/family on patient safety including physical limitations  - Instruct patient to call for assistance with activity   - Consult OT/PT to assist with strengthening/mobility   - Keep Call bell within reach  - Keep bed low and locked with side rails adjusted as appropriate  - Keep care items and personal belongings within reach  - Initiate and maintain comfort rounds  - Make Fall Risk Sign visible to staff  - Apply yellow socks and bracelet for high fall risk patients  - Consider moving patient to room near nurses station  Outcome: Progressing     Problem: DISCHARGE PLANNING  Goal: Discharge to home or other facility with appropriate resources  Description: INTERVENTIONS:  - Identify barriers to discharge w/patient and caregiver  - Arrange for needed discharge resources and transportation as appropriate  - Identify discharge learning needs (meds, wound care, etc )  - Arrange for interpretive services to assist at discharge as needed  - Refer to Case Management Department for coordinating discharge planning if the patient needs post-hospital services based on physician/advanced practitioner order or complex needs related to functional status, cognitive ability, or social support system  Outcome: Progressing     Problem: Knowledge Deficit  Goal: Patient/family/caregiver demonstrates understanding of disease process, treatment plan, medications, and discharge instructions  Description: Complete learning assessment and assess knowledge base  Interventions:  - Provide teaching at level of understanding  - Provide teaching via preferred learning methods  Outcome: Progressing     Problem: Nutrition/Hydration-ADULT  Goal: Nutrient/Hydration intake appropriate for improving, restoring or maintaining nutritional needs  Description: Monitor and assess patient's nutrition/hydration status for malnutrition  Collaborate with interdisciplinary team and initiate plan and interventions as ordered  Monitor patient's weight and dietary intake as ordered or per policy  Utilize nutrition screening tool and intervene as necessary  Determine patient's food preferences and provide high-protein, high-caloric foods as appropriate       INTERVENTIONS:  - Monitor oral intake, urinary output, labs, and treatment plans  - Assess nutrition and hydration status and recommend course of action  - Evaluate amount of meals eaten  - Assist patient with eating if necessary   - Allow adequate time for meals  - Recommend/ encourage appropriate diets, oral nutritional supplements, and vitamin/mineral supplements  - Order, calculate, and assess calorie counts as needed  - Recommend, monitor, and adjust tube feedings and TPN/PPN based on assessed needs  - Assess need for intravenous fluids  - Provide specific nutrition/hydration education as appropriate  - Include patient/family/caregiver in decisions related to nutrition  Outcome: Progressing

## 2023-02-14 NOTE — PHYSICAL THERAPY NOTE
PHYSICAL THERAPY NOTE          Patient Name: Gustavo Díaz  HFLYY'[id number] Date: 2/14/2023 02/14/23 1200   PT Last Visit   PT Visit Date 02/14/23   Note Type   Note Type Treatment   Pain Assessment   Pain Assessment Tool 0-10   Pain Score No Pain   Restrictions/Precautions   Weight Bearing Precautions Per Order No   Other Precautions Chair Alarm; Bed Alarm;Pain; Fall Risk;Multiple lines;Telemetry   General   Chart Reviewed Yes   Cognition   Arousal/Participation Alert; Responsive   Attention Attends with cues to redirect   Following Commands Follows one step commands without difficulty   Comments Pt agreeable to participate wt PT, cooperative during session  Bed Mobility   Supine to Sit 2  Maximal assistance   Additional items Assist x 1;HOB elevated; Increased time required;LE management;Verbal cues   Sit to Supine 2  Maximal assistance   Additional items Assist x 1; Increased time required;LE management;Verbal cues   Additional Comments Pt noted to be in bed upon arrival  Pt gets up to EOB with max assist, maintains sitting with assist, transfers not attempted at [facility] stime due to fatigue/ impaired sitting balance  Transfers   Sit to Stand Unable to assess   Balance   Static Sitting Fair -   Dynamic Sitting Poor +   Endurance Deficit   Endurance Deficit Yes   Activity Tolerance   Activity Tolerance Patient limited by fatigue   Nurse Made Aware RN cleared pt for therapy   Exercises   Heelslides AAROM;15 reps; Sitting;Bilateral   Knee AROM Long Arc Quad 10 reps;Bilateral;AROM   Ankle Pumps 20 reps;Bilateral;AROM; Supine   Assessment   Prognosis Fair   Problem List Decreased endurance;Decreased strength;Decreased mobility;Pain   Assessment Pt seen for PT treatment session this date  Therapy session focused on bed mobility, functional transfers,  in order to improve overall mobility and independence   Pt requires Max assist X 1 for supine to sit using bedrails, pt maintains sitting with min assist x 1 for trunk managment, leans to R side able to correct with cueing  Pt tolerates sitting EOB ~ 6-7 minutes, performs AROM LEs  Pt fatigued with activity, assisted back to bed with max asists X 1, assist X 2 for boosting to reposition in bed   Pt tolerates in bed UE and LE therapeutic exercise    Pt was left in semisupine at the end of PT session with all needs in reach  Pt would benefit from continued PT services while in hospital to address remaining limitations  The patient's AM-PAC Basic Mobility Inpatient Short Form Raw Score is 8  A Raw score of less than or equal to 16 suggests the patient may benefit from discharge to post-acute rehabilitation services  Please also refer to the recommendation of the Physical Therapist for safe discharge planning  Barriers to Discharge Decreased caregiver support; Inaccessible home environment   Goals   Patient Goals feel better   STG Expiration Date 02/25/23   PT Treatment Day 1   Plan   Treatment/Interventions Gait training;Bed mobility; Endurance training; Therapeutic exercise;LE strengthening/ROM; Functional transfer training;Spoke to nursing   Progress Progressing toward goals   PT Frequency 2-3x/wk   Recommendation   PT Discharge Recommendation Post acute rehabilitation services   AM-PAC Basic Mobility Inpatient   Turning in Flat Bed Without Bedrails 2   Lying on Back to Sitting on Edge of Flat Bed Without Bedrails 2   Moving Bed to Chair 1   Standing Up From Chair Using Arms 1   Walk in Room 1   Climb 3-5 Stairs With Railing 1   Basic Mobility Inpatient Raw Score 8   Turning Head Towards Sound 3   Follow Simple Instructions 3   Low Function Basic Mobility Raw Score 14   Low Function Basic Mobility Standardized Score 22 01   Highest Level Of Mobility   [facility] Goal 3: Sit at edge of bed   Education   Education Provided Mobility training   Patient Demonstrates verbal understanding   End of Consult   Patient Position at End of Consult Supine;Bed/Chair alarm activated; All needs within reach     Isla Leyden PT DPT

## 2023-02-14 NOTE — PLAN OF CARE
Problem: PHYSICAL THERAPY ADULT  Goal: Performs mobility at highest level of function for planned discharge setting  See evaluation for individualized goals  Description: Treatment/Interventions: Functional transfer training, LE strengthening/ROM, Endurance training, Patient/family training, Bed mobility, Gait training, Spoke to nursing, Spoke to case management, OT          See flowsheet documentation for full assessment, interventions and recommendations  Outcome: Progressing  Note: Prognosis: Fair  Problem List: Decreased endurance, Decreased strength, Decreased mobility, Pain  Assessment: Pt seen for PT treatment session this date  Therapy session focused on bed mobility, functional transfers,  in order to improve overall mobility and independence  Pt requires Max assist X 1 for supine to sit using bedrails, pt maintains sitting with min assist x 1 for trunk managment, leans to R side able to correct with cueing  Pt tolerates sitting EOB ~ 6-7 minutes, performs AROM LEs  Pt fatigued with activity, assisted back to bed with max asists X 1, assist X 2 for boosting to reposition in bed   Pt tolerates in bed UE and LE therapeutic exercise    Pt was left in semisupine at the end of PT session with all needs in reach  Pt would benefit from continued PT services while in hospital to address remaining limitations  The patient's AM-PAC Basic Mobility Inpatient Short Form Raw Score is 8  A Raw score of less than or equal to 16 suggests the patient may benefit from discharge to post-acute rehabilitation services  Please also refer to the recommendation of the Physical Therapist for safe discharge planning  Barriers to Discharge: Decreased caregiver support, Inaccessible home environment     PT Discharge Recommendation: Post acute rehabilitation services    See flowsheet documentation for full assessment

## 2023-02-14 NOTE — PLAN OF CARE
Problem: MOBILITY - ADULT  Goal: Maintain or return to baseline ADL function  Description: INTERVENTIONS:  -  Assess patient's ability to carry out ADLs; assess patient's baseline for ADL function and identify physical deficits which impact ability to perform ADLs (bathing, care of mouth/teeth, toileting, grooming, dressing, etc )  - Assess/evaluate cause of self-care deficits   - Assess range of motion  - Assess patient's mobility; develop plan if impaired  - Assess patient's need for assistive devices and provide as appropriate  - Encourage maximum independence but intervene and supervise when necessary  - Involve family in performance of ADLs  - Assess for home care needs following discharge   - Consider OT consult to assist with ADL evaluation and planning for discharge  - Provide patient education as appropriate  Outcome: Progressing  Goal: Maintains/Returns to pre admission functional level  Description: INTERVENTIONS:  - Perform BMAT or MOVE assessment daily    - Set and communicate daily mobility goal to care team and patient/family/caregiver     - Collaborate with rehabilitation services on mobility goals if consulted  - Out of bed for toileting  - Record patient progress and toleration of activity level   Outcome: Progressing     Problem: SKIN/TISSUE INTEGRITY - ADULT  Goal: Skin Integrity remains intact(Skin Breakdown Prevention)  Description:   -Assess extremities for adequate circulation and sensation     Bed Management:  -Have minimal linens on bed & keep smooth, unwrinkled  -Change linens as needed when moist or perspiring    Toileting  Skin Care:  -Avoid use of baby powder, tape, friction and shearing, hot water or constrictive clothing  Outcome: Progressing     Problem: Prexisting or High Potential for Compromised Skin Integrity  Goal: Skin integrity is maintained or improved  Description: INTERVENTIONS:  - Identify patients at risk for skin breakdown  - Assess and monitor skin integrity  - Assess and monitor nutrition and hydration status  - Monitor labs   - Assess for incontinence   - Turn and reposition patient  - Assist with mobility/ambulation  - Relieve pressure over bony prominences  - Avoid friction and shearing  - Provide appropriate hygiene as needed including keeping skin clean and dry  - Evaluate need for skin moisturizer/barrier cream  - Collaborate with interdisciplinary team   - Patient/family teaching  - Consider wound care consult   Outcome: Progressing     Problem: INFECTION - ADULT  Goal: Absence or prevention of progression during hospitalization  Description: INTERVENTIONS:  - Assess and monitor for signs and symptoms of infection  - Monitor lab/diagnostic results  - Monitor all insertion sites, i e  indwelling lines, tubes, and drains  - Monitor endotracheal if appropriate and nasal secretions for changes in amount and color  - [place] appropriate cooling/warming therapies per order  - Administer medications as ordered  - Instruct and encourage patient and family to use good hand hygiene technique  - Identify and instruct in appropriate isolation precautions for identified infection/condition  Outcome: Progressing  Goal: Absence of fever/infection during neutropenic period  Description: INTERVENTIONS:  - Monitor WBC    Outcome: Progressing     Problem: SAFETY ADULT  Goal: Maintain or return to baseline ADL function  Description: INTERVENTIONS:  -  Assess patient's ability to carry out ADLs; assess patient's baseline for ADL function and identify physical deficits which impact ability to perform ADLs (bathing, care of mouth/teeth, toileting, grooming, dressing, etc )  - Assess/evaluate cause of self-care deficits   - Assess range of motion  - Assess patient's mobility; develop plan if impaired  - Assess patient's need for assistive devices and provide as appropriate  - Encourage maximum independence but intervene and supervise when necessary  - Involve family in performance of ADLs  - Assess for home care needs following discharge   - Consider OT consult to assist with ADL evaluation and planning for discharge  - Provide patient education as appropriate  Outcome: Progressing  Goal: Maintains/Returns to pre admission functional level  Description: INTERVENTIONS:  - Perform BMAT or MOVE assessment daily    - Set and communicate daily mobility goal to care team and patient/family/caregiver     - Out of bed for toileting  - Record patient progress and toleration of activity level   Outcome: Progressing  Goal: Patient will remain free of falls  Description: INTERVENTIONS:  - Educate patient/family on patient safety including physical limitations  - Instruct patient to call for assistance with activity   - Consult OT/PT to assist with strengthening/mobility   - Keep Call bell within reach  - Keep bed low and locked with side rails adjusted as appropriate  - Keep care items and personal belongings within reach  - Initiate and maintain comfort rounds  - Make Fall Risk Sign visible to staff  - Apply yellow socks and bracelet for high fall risk patients  - Consider moving patient to room near nurses station  Outcome: Progressing     Problem: DISCHARGE PLANNING  Goal: Discharge to home or other facility with appropriate resources  Description: INTERVENTIONS:  - Identify barriers to discharge w/patient and caregiver  - Arrange for needed discharge resources and transportation as appropriate  - Identify discharge learning needs (meds, wound care, etc )  - Arrange for interpretive services to assist at discharge as needed  - Refer to Case Management Department for coordinating discharge planning if the patient needs post-hospital services based on physician/advanced practitioner order or complex needs related to functional status, cognitive ability, or social support system  Outcome: Progressing     Problem: Knowledge Deficit  Goal: Patient/family/caregiver demonstrates understanding of disease process, treatment plan, medications, and discharge instructions  Description: Complete learning assessment and assess knowledge base    Interventions:  - Provide teaching at level of understanding  - Provide teaching via preferred learning methods  Outcome: Progressing

## 2023-02-14 NOTE — PROGRESS NOTES
Progress Note - Sterling Pollard 69 y o  female MRN: [id number]    Unit/Bed#: [facility] 560-71 Encounter: [id number]      Assessment:  Sterling Pollard is a 69 y o  female with PMH colon cancer s/p lap right hemicolectomy 2/2 ascending colon mass (7/15) p/w incarcerated ventral hernia  S/p ex lap end ileostomy, L colectomy, reduction of ventral hernia on 2/9  Post op course c/b PE on heparin gtt  WBC 15 5 from 17 84, 18 39  Hgb 6 3 from 7 6  Ostomy 475 cc   cc    Plan:  -Diet as tolerated  -Continue IVF hydration  -Encouraged to eat meals, drink ensures  -Needs to work with PT  -Monitor wound vac output  -Aspiration precautions  -Aggressive pulmonary toilet, airway clearance protocol (chest PT)  -Monitor ostomy function  -Continue abx - zosyn  -Monitor hemodynamics  -Continue heparin gtt  -Strict I/Os  -Eventual MRI liver    Subjective:   Patient seen and examined bedside  LUE duplex yesterday without any new concerns  Continues with swelling of left forearm and hand  Still not eating or drinking much because she notes she is afraid of aspiration  Has not been getting up out of bed  Objective:     Vitals: Blood pressure 124/76, pulse 96, temperature 97 9 °F (36 6 °C), resp  rate 12, height 5' (1 524 m), weight 61 6 kg (135 lb 12 9 oz), SpO2 93 %, not currently breastfeeding  ,Body mass index is 26 52 kg/m²        Intake/Output Summary (Last 24 hours) at 2/14/2023 0130  Last data filed at 2/13/2023 2256  Gross per 24 hour   Intake 1770 ml   Output 475 ml   Net 1295 ml       Physical Exam:  Gen: Lying comfortably in bed, no acute distress  CV: Regular rate and rhythm  Pulm: Breathing comfortably on RA, no respiratory distress  Abd: Soft, nondistended, viable ostomy with stool/gas in bag, midline fascia remains closed with wound vac in place on suction  Ext: Warm and dry, moving all, LUE with stable edema and erythema  Neuro: Alert and oriented     Invasive Devices     Peripherally Inserted Central Catheter Line Duration           PICC Line 02/10/23 Left Brachial 3 days          Peripheral Intravenous Line  Duration           Peripheral IV 02/10/23 Left;Ventral (anterior) Forearm 3 days          Drain  Duration           Ileostomy Standard (Kaitlynn, ap) Umbilicus 4 days                Lab, Imaging and other studies: I have personally reviewed pertinent reports      VTE Pharmacologic Prophylaxis: Heparin  VTE Mechanical Prophylaxis: sequential compression device

## 2023-02-14 NOTE — SPEECH THERAPY NOTE
Speech Language/Pathology  Speech-Language Pathology Bedside Swallow Evaluation      Patient Name: Lynne Sadler    LBBSZ'S Date: 2/14/2023     Problem List  Principal Problem:    Perforated bowel ([facility] )  Active Problems:    Personal history of other malignant neoplasm of large intestine    Type 2 diabetes mellitus without complications ([facility] )    Essential hypertension    Strangulated ventral hernia    GERD (gastroesophageal reflux disease)    History of right breast cancer    History of right mastectomy      Past Medical History  Past Medical History:   Diagnosis Date   • Acute embolism and thrombosis of deep vein of lower extremity (HCC)    • Adenocarcinoma of colon, [facility] ([facility] )    • Breast cancer ([facility] ) 2012    Right Breast    • Cancer ([facility] )    • Diabetes mellitus ([facility] )    • DVT (deep venous thrombosis) (HCC)    • GERD (gastroesophageal reflux disease)    • Hypertension    • Pes planus        Past Surgical History  Past Surgical History:   Procedure Laterality Date   • COLONOSCOPY     • HERNIA REPAIR N/A 2/9/2023    Procedure: EXPLORATORY LAPAROTOMY; ABDOMINAL WALL DEBRIDEMENT; REDUCTION VENTRAL HERNIA; LEFT COLON RESECTION; CREATION ILEOSTOMY; WASHOUT;  Surgeon: Karen Rodriguez DO;  Location: [facility];  Service: General   • HYSTERECTOMY      complete @ age 29   • IR PORT REMOVAL  9/12/2018   • MASTECTOMY Right 2012   • MS COLONOSCOPY FLX DX W/COLLJ SPEC WHEN PFRMD N/A 8/23/2016    Procedure: COLONOSCOPY;  Surgeon: Eddy Hines MD;  Location: [facility]; Service: Colorectal   • MS COLONOSCOPY FLX DX W/COLLJ SPEC WHEN PFRMD N/A 9/5/2017    Procedure: COLONOSCOPY;  Surgeon: Eddy Hines MD;  Location: [facility]; Service: Colorectal   • MS ESOPHAGOGASTRODUODENOSCOPY TRANSORAL DIAGNOSTIC N/A 9/5/2017    Procedure: ESOPHAGOGASTRODUODENOSCOPY (EGD); Surgeon: Bruno Marshall DO;  Location: [facility];   Service: Gastroenterology       Summary   Pt presented with s/s suggestive of mild and mild-moderate oral and suspected mild-moderate pharyngeal dysphagia  Symptoms or concerns included decreased bolus acceptance, decreased bolus propulsion and delayed oral intiation multiple swallows and ?able airway protection during swallows  Pt holds material, needed cues for each bolus to transfer  Oral control appears wfl, ?able transfer vs anxiety  +dry coughing after each sip of thin even w/ chin down  Pt also only taking small amounts from the tsp    Risk/s for Aspiration: recent surgery    Recommended Diet: puree/level 1 diet and nectar thick liquids   Recommended Form of Meds: crushed with puree and non-oral if possible   Aspiration precautions and swallowing strategies: upright posture, only feed when fully alert, slow rate of feeding, small bites/sips, chin tuck and alternating bites and sips  Other Recommendations: Continue frequent oral care, would benefit from a vbs        Current Medical Status  76-year-old female with a history of colon cancer status post laparoscopic right hemicolectomy secondary to an ascending colon mass 7/15 she presents for an evaluation of an incarcerated ventral hernia  Patient was evaluated at an colorectal clinic today by Dr Oneida Baca and noted to have overlying skin changes and increased pain over her hernia site  Due to this she was transferred to the ED for evaluation  Dx w/ incarcerated ventral hernia, colonic mass, perforated colonic mass, localized nectotizing soft tissue of the abdominal wall  EXPLORATORY LAPAROTOMY; ABDOMINAL WALL DEBRIDEMENT; REDUCTION VENTRAL HERNIA; LEFT COLON RESECTION; CREATION ILEOSTOMY; WASHOUT completed on 2/9/23  Pt w/ increased difficulty swallowing yesterday morning  Unable to tolerate any po  Current Precautions:  Fall  Aspiration    Allergies:  No known food allergies    Past medical history:  Please see H&P for details    Special Studies  CTA chest pe studyIMPRESSION:     1    Segmental filling defects within the left upper lobe and right lower lobe pulmonary arteries compatible with pulmonary emboli  Measured RV/LV ratio is within normal limits at less than 0 9     2   Trace bilateral pleural effusions  3   Pneumoperitoneum which is likely postoperative, correlate clinically  4   Other findings as above  Social/Education/Vocational Hx:  Pt lives alone    Swallow Information   Current Risks for Dysphagia & Aspiration: known history of dysphagia  Current Symptoms/Concerns: coughing with po  Current Diet: NPO   Baseline Diet: regular diet and thin liquids      Baseline Assessment   Behavior/Cognition: alert  Speech/Language Status: able to participate in basic conversation and able to follow commands  Patient Positioning: upright in bed  Pain Status/Interventions/Response to Interventions:   No report of or nonverbal indications of pain  Swallow Mechanism Exam  Facial: symmetrical  Labial: bilateral decreased ROM and WFL  Lingual: decreased ROM  Velum: unable to visualize  Mandible: adequate ROM  Dentition: adequate  Vocal quality:strained   Volitional Cough: weak   Respiratory Status: on ra      Consistencies Assessed and Performance   Consistencies Administered: ice chips, thin liquids, nectar thick and puree  Materials administered by tsp/cup/straw    Oral Stage: mild, mild-moderate, decreased bolus acceptance, decreased bolus propulsion and delayed oral intiation, poor retrieval and takes only a small amount at a time  Needs encouragement to take 1/2 the tsp  Orally holds all material   Needed cues to transfer and at times would ask "should I swallow"  Pharyngeal Stage: suspected, mild-moderate, suspected decreased hyolaryngeal elevation upon palpation and ?able airway protection during swallows  Swallow Mechanics:  Swallowing initiation appeared prompt  Laryngeal rise was palpated and judged to be within functional limits  inconsistent coughing w/ thin, solids  No coughing w/ the nt or puree      Esophageal Concerns: belching    Strategies and Efficacy: -    Summary and Recommendations (see above)    Results Reviewed with: patient and RN     Treatment Recommended: yes    Frequency of treatment: yes    Patient Stated Goal: figure out what is going on     Dysphagia LTG  -Patient will demonstrate safe and effective oral intake (without overt s/s significant oral/pharyngeal dysphagia including s/s penetration or aspiration) for the highest appropriate diet level       Speech Therapy Prognosis   Prognosis: fair    Prognosis Considerations: medical status and prior medical history

## 2023-02-14 NOTE — ARC ADMISSION
Reviewed patient's case with [facility] physician - patient is recommended for SNF/subacute rehab for longer, slower paced therapy needs at this time  CM has been updated

## 2023-02-14 NOTE — RESPIRATORY THERAPY NOTE
RT Protocol Note  Linda Pelaez 69 y o  female MRN: [id number]  Unit/Bed#: [facility] 811-01 Encounter: [id number]    Assessment    Principal Problem:    Perforated bowel ([place] [address] )  Active Problems:    Personal history of other malignant neoplasm of large intestine    Type 2 diabetes mellitus without complications ([place] [address] )    Essential hypertension    Strangulated ventral hernia    GERD (gastroesophageal reflux disease)    History of right breast cancer    History of right mastectomy      Home Pulmonary Medications:  N/a       Past Medical History:   Diagnosis Date    Acute embolism and thrombosis of deep vein of lower extremity (HCC)     Adenocarcinoma of colon, [facility] (HCC)     Breast cancer ([place] [address] ) 2012    Right Breast     Cancer ([place] [address] )     Diabetes mellitus (HCC)     DVT (deep venous thrombosis) (HCC)     GERD (gastroesophageal reflux disease)     Hypertension     Pes planus      Social History     Socioeconomic History    Marital status: Single     Spouse name: None    Number of children: None    Years of education: None    Highest education level: None   Occupational History    Occupation: retired   Tobacco Use    Smoking status: Never    Smokeless tobacco: Never   Vaping Use    Vaping Use: Never used   Substance and Sexual Activity    Alcohol use: No    Drug use: No    Sexual activity: Not Currently   Other Topics Concern    None   Social History Narrative    No advance directives     Social Determinants of Health     Financial Resource Strain: Not on file   Food Insecurity: Not on file   Transportation Needs: No Transportation Needs    Lack of Transportation (Medical): No    Lack of Transportation (Non-Medical):  No   Physical Activity: Not on file   Stress: Not on file   Social Connections: Not on file   Intimate Partner Violence: Not on file   Housing Stability: Not on file       Subjective         Objective    Physical Exam:   Assessment Type: Assess only  General Appearance: Alert, Awake  Respiratory Pattern: Normal  Chest Assessment: Chest expansion symmetrical  Bilateral Breath Sounds: Clear, Diminished  Cough: Non-productive, Dry, Strong    Vitals:  Blood pressure 138/97, pulse 95, temperature 98 1 °F (36 7 °C), resp  rate 12, height 5' (1 524 m), weight 61 6 kg (135 lb 12 9 oz), SpO2 91 %, not currently breastfeeding  Results from last 7 days   Lab Units 02/10/23  1542 02/10/23  0842   PH ART  7 429 7 433   PCO2 ART mm Hg 35 2* 35 1*   PO2 ART mm Hg 71 6* 67 7*   HCO3 ART mmol/L 22 8 22 9   BASE EXC ART mmol/L -1 3 -1 1   O2 CONTENT ART mL/dL 9 7* 10 4*   O2 HGB, ARTERIAL % 90 7* 91 5*   ABG SOURCE  Line, Arterial Radial, Left   CARMEN TEST   --  Yes   NON VENT ROOM AIR % 21 21       Imaging and other studies: I have personally reviewed pertinent reports  O2 Device: ra     Plan    Respiratory Plan: Mild Distress pathway  Airway Clearance Plan: Incentive Spirometer, Flutter     Resp Comments: Pt assessed per airway clearance protocol Pt admitted with perforated bowel  Pt has a dry non-productive cough  BS bilat dim but clear  Pt has no recent x-ray  Ctscan on 2/10/23  Streaky opacities within the bilateral lower lobes likely due to atelectasis versus scarring  The central airways are patent  Vest therapy not indicated at this time  Pt resting currently on room air  VSS no distress noted at this time   Pt with IS and flutter at bedside will encourage use and order flutter valve tid

## 2023-02-15 ENCOUNTER — APPOINTMENT (INPATIENT)
Dept: RADIOLOGY | Facility: HOSPITAL | Age: 78
End: 2023-02-15

## 2023-02-15 PROBLEM — E44.0 MODERATE PROTEIN-CALORIE MALNUTRITION (HCC): Status: ACTIVE | Noted: 2023-02-15

## 2023-02-15 LAB
ABO GROUP BLD BPU: NORMAL
ANION GAP SERPL CALCULATED.3IONS-SCNC: 6 MMOL/L (ref 4–13)
APTT PPP: 67 SECONDS (ref 23–37)
BPU ID: NORMAL
BUN SERPL-MCNC: 5 MG/DL (ref 5–25)
CALCIUM SERPL-MCNC: 8 MG/DL (ref 8.3–10.1)
CHLORIDE SERPL-SCNC: 100 MMOL/L (ref 96–108)
CO2 SERPL-SCNC: 26 MMOL/L (ref 21–32)
CREAT SERPL-MCNC: 0.37 MG/DL (ref 0.6–1.3)
CROSSMATCH: NORMAL
ERYTHROCYTE [DISTWIDTH] IN BLOOD BY AUTOMATED COUNT: 16.1 % (ref 11.6–15.1)
GFR SERPL CREATININE-BSD FRML MDRD: 103 ML/MIN/1.73SQ M
GLUCOSE SERPL-MCNC: 141 MG/DL (ref 65–140)
GLUCOSE SERPL-MCNC: 148 MG/DL (ref 65–140)
GLUCOSE SERPL-MCNC: 166 MG/DL (ref 65–140)
GLUCOSE SERPL-MCNC: 168 MG/DL (ref 65–140)
GLUCOSE SERPL-MCNC: 201 MG/DL (ref 65–140)
GLUCOSE SERPL-MCNC: 234 MG/DL (ref 65–140)
HCT VFR BLD AUTO: 26.4 % (ref 34.8–46.1)
HGB BLD-MCNC: 8.5 G/DL (ref 11.5–15.4)
MCH RBC QN AUTO: 28.8 PG (ref 26.8–34.3)
MCHC RBC AUTO-ENTMCNC: 32.2 G/DL (ref 31.4–37.4)
MCV RBC AUTO: 90 FL (ref 82–98)
PLATELET # BLD AUTO: 216 THOUSANDS/UL (ref 149–390)
PMV BLD AUTO: 9.7 FL (ref 8.9–12.7)
POTASSIUM SERPL-SCNC: 3.1 MMOL/L (ref 3.5–5.3)
RBC # BLD AUTO: 2.95 MILLION/UL (ref 3.81–5.12)
SODIUM SERPL-SCNC: 132 MMOL/L (ref 135–147)
UNIT DISPENSE STATUS: NORMAL
UNIT PRODUCT CODE: NORMAL
UNIT PRODUCT VOLUME: 350 ML
UNIT RH: NORMAL
WBC # BLD AUTO: 20.41 THOUSAND/UL (ref 4.31–10.16)

## 2023-02-15 PROCEDURE — 2W03X6Z CHANGE PRESSURE DRESSING ON ABDOMINAL WALL: ICD-10-PCS

## 2023-02-15 RX ORDER — INSULIN LISPRO 100 [IU]/ML
1-6 INJECTION, SOLUTION INTRAVENOUS; SUBCUTANEOUS
Status: DISCONTINUED | OUTPATIENT
Start: 2023-02-15 | End: 2023-02-23 | Stop reason: HOSPADM

## 2023-02-15 RX ORDER — POTASSIUM CHLORIDE 29.8 MG/ML
40 INJECTION INTRAVENOUS ONCE
Status: COMPLETED | OUTPATIENT
Start: 2023-02-15 | End: 2023-02-15

## 2023-02-15 RX ADMIN — BIMATOPROST 1 DROP: 0.1 SOLUTION/ DROPS OPHTHALMIC at 21:31

## 2023-02-15 RX ADMIN — POTASSIUM CHLORIDE 40 MEQ: 29.8 INJECTION, SOLUTION INTRAVENOUS at 11:59

## 2023-02-15 RX ADMIN — PIPERACILLIN SODIUM AND TAZOBACTAM SODIUM 3.38 G: 36; 4.5 INJECTION, POWDER, LYOPHILIZED, FOR SOLUTION INTRAVENOUS at 08:44

## 2023-02-15 RX ADMIN — DEXTROSE, SODIUM CHLORIDE, AND POTASSIUM CHLORIDE 50 ML/HR: 5; .45; .15 INJECTION INTRAVENOUS at 05:42

## 2023-02-15 RX ADMIN — INSULIN LISPRO 1 UNITS: 100 INJECTION, SOLUTION INTRAVENOUS; SUBCUTANEOUS at 00:25

## 2023-02-15 RX ADMIN — PANTOPRAZOLE SODIUM 40 MG: 40 INJECTION, POWDER, FOR SOLUTION INTRAVENOUS at 05:34

## 2023-02-15 RX ADMIN — PIPERACILLIN SODIUM AND TAZOBACTAM SODIUM 3.38 G: 36; 4.5 INJECTION, POWDER, LYOPHILIZED, FOR SOLUTION INTRAVENOUS at 16:30

## 2023-02-15 RX ADMIN — INSULIN LISPRO 3 UNITS: 100 INJECTION, SOLUTION INTRAVENOUS; SUBCUTANEOUS at 12:00

## 2023-02-15 RX ADMIN — INSULIN LISPRO 2 UNITS: 100 INJECTION, SOLUTION INTRAVENOUS; SUBCUTANEOUS at 18:31

## 2023-02-15 RX ADMIN — Medication 1 TABLET: at 08:44

## 2023-02-15 RX ADMIN — IOHEXOL 100 ML: 350 INJECTION, SOLUTION INTRAVENOUS at 13:44

## 2023-02-15 RX ADMIN — THIAMINE HCL TAB 100 MG 100 MG: 100 TAB at 08:44

## 2023-02-15 RX ADMIN — PIPERACILLIN SODIUM AND TAZOBACTAM SODIUM 3.38 G: 36; 4.5 INJECTION, POWDER, LYOPHILIZED, FOR SOLUTION INTRAVENOUS at 22:38

## 2023-02-15 RX ADMIN — INSULIN LISPRO 1 UNITS: 100 INJECTION, SOLUTION INTRAVENOUS; SUBCUTANEOUS at 21:31

## 2023-02-15 RX ADMIN — HEPARIN SODIUM 22 UNITS/KG/HR: 10000 INJECTION, SOLUTION INTRAVENOUS at 09:04

## 2023-02-15 NOTE — PHYSICAL THERAPY NOTE
PHYSICAL THERAPY NOTE          Patient Name: Cami KHOURY Date: 2/15/2023     02/15/23 1118   PT Last Visit   PT Visit Date 02/15/23   Note Type   Note Type Treatment   Education   Education Provided Yes   Pain Assessment   Pain Assessment Tool 0-10   Pain Score No Pain   Restrictions/Precautions   Weight Bearing Precautions Per Order No   Other Precautions Chair Alarm; Bed Alarm; Fall Risk;Pain;Multiple lines  (wound vac)   General   Chart Reviewed Yes   Cognition   Overall Cognitive Status WFL   Arousal/Participation Alert; Responsive   Attention Attends with cues to redirect   Following Commands Follows one step commands without difficulty   Bed Mobility   Supine to Sit 3  Moderate assistance   Additional items Assist x 2; Increased time required;LE management;Verbal cues;HOB elevated   Sit to Supine Unable to assess   Additional Comments Pt assisted to bedside recliner from bed   Transfers   Sit to Stand 2  Maximal assistance   Additional items Assist x 2; Increased time required;Verbal cues   Stand to Sit 2  Maximal assistance   Additional items Assist x 2; Increased time required;Verbal cues   Sit pivot 2  Maximal assistance   Additional items Assist x 2; Increased time required;Verbal cues   Additional Comments STS with RW from chair   Balance   Static Sitting Fair   Dynamic Sitting Fair -   Static Standing Poor +   Dynamic Standing Poor   Endurance Deficit   Endurance Deficit Yes   Activity Tolerance   Activity Tolerance Patient limited by fatigue   Medical Staff Made Aware OT [facility]   Nurse Made Aware RN cleared pt for therapy   Assessment   Prognosis Fair   Problem List Decreased endurance; Impaired balance;Pain   Assessment Pt seen for PT treatment session this date  Therapy session focused on bed mobility, functional transfers, and ambulation in order to improve overall mobility and independence   Pt requires Mod AX2 for supine to sit, maintains sitting with min assist for posterior and R lean initially then improving to supervision  Pt assisted from bed to chair with sit pivot sit transfer with max assist X 2  Pt then performed 2 STS transfers from chair with max assist X 2, using RW, standing time 1min -1 30 mins    Pt performed seated hygiene care, PT focused on balance training in sitting and independence with functional mobility  Pt making steady progress toward goals  Pt was left in recliner at the end of PT session with all needs in reach, alarm on   Pt would benefit from continued PT services while in hospital to address remaining limitations  The patient's AM-PAC Basic Mobility Inpatient Short Form Raw Score is 10  A Raw score of less than or equal to 16 suggests the patient may benefit from discharge to post-acute rehabilitation services  Please also refer to the recommendation of the Physical Therapist for safe discharge planning  Barriers to Discharge Decreased caregiver support; Inaccessible home environment   Goals   Patient Goals to get in chair   STG Expiration Date 02/25/23   PT Treatment Day 2   Plan   Treatment/Interventions Therapeutic exercise; Functional transfer training;Gait training;Bed mobility;OT;Spoke to nursing   Progress Progressing toward goals   PT Frequency 2-3x/wk   Recommendation   PT Discharge Recommendation Post acute rehabilitation services   AM-PAC Basic Mobility Inpatient   Turning in Flat Bed Without Bedrails 2   Lying on Back to Sitting on Edge of Flat Bed Without Bedrails 2   Moving Bed to Chair 2   Standing Up From Chair Using Arms 2   Walk in Room 1   Climb 3-5 Stairs With Railing 1   Basic Mobility Inpatient Raw Score 10   Highest Level Of Mobility   JH-HLM Goal 4: Move to chair/commode   JH-HLM Achieved 4: Move to chair/commode   Education   Education Provided Mobility training   Patient Reinforcement needed;Demonstrates verbal understanding   End of Consult   Patient Position at End of Consult Bed/Chair alarm activated; All needs within reach; Bedside chair     Render Sessions PT DPT

## 2023-02-15 NOTE — OCCUPATIONAL THERAPY NOTE
Occupational Therapy Progress Note     Patient Name: Inna Montgomery  QWIBA'G Date: 2/15/2023  Problem List  Principal Problem:    Perforated bowel ([facility] [address] )  Active Problems:    Personal history of other malignant neoplasm of large intestine    Type [facility] complications [facility] )    Essential hypertension    Strangulated ventral hernia    GERD (gastroesophageal reflux disease)    History of right breast cancer    History of right mastectomy            02/15/23 1117   OT Last Visit   OT Visit Date 02/15/23   Note Type   Note Type Treatment   Pain Assessment   Pain Assessment Tool 0-10   Pain Score No Pain   Restrictions/Precautions   Weight Bearing Precautions Per Order No   Other Precautions Chair Alarm; Bed Alarm;Multiple lines; Fall Risk;Pain  (IV, wound vac abdomen, PICC, ileostomy)   Lifestyle   Autonomy I adls and mobility - i iadls   Reciprocal Relationships limited support available per pt   Service to Others retired   Intrinsic Gratification sedentary - enjoys watching TV and reading   ADL   Where Assessed Chair   Grooming Assistance 3  Moderate Assistance   Grooming Deficit Setup;Supervision/safety; Increased time to complete; Teeth care; Shaving  (Oral care and shaving face seated- Pt required mod A 2* to edema in RUE- RN aware)   LB Dressing Assistance 1  Total Assistance   LB Dressing Deficit Don/doff R sock; Don/doff L sock   Bed Mobility   Supine to Sit 3  Moderate assistance   Additional items Assist x 2; Increased time required;Verbal cues;LE management   Sit to Supine Unable to assess   Additional Comments Pt greeted supine in bed  Transfers   Sit to Stand 3  Moderate assistance   Additional items Assist x 2;Verbal cues; Increased time required;Armrests   Stand to Sit 3  Moderate assistance   Additional items Assist x 2; Increased time required;Verbal cues;Armrests   Sit pivot 2  Maximal assistance   Additional items Assist x 2; Increased time required;Verbal cues   Additional Comments Pt requires max AX2 with functional sit pivot from EOB to drop arm recliner chair (Therapist anterior/posterior)  Pt completes x2 STS from recliner chair with mod Ax1 with RW- Pt with R lateral lean  Cognition   Overall Cognitive Status WFL   Arousal/Participation Alert; Cooperative   Attention Attends with cues to redirect   Orientation Level Oriented X4   Memory Decreased recall of precautions;Decreased recall of recent events   Following Commands Follows one step commands without difficulty   Comments Pt very pleasant and cooperative during OT session  Pt with c/o of overall fatigue and lethargy this AM  Pt willing to participate and motivated to get stronger  Activity Tolerance   Activity Tolerance Patient limited by fatigue   Medical Staff Made Aware Portions of tx completed with PT 2* to Pt's medical complexity and decreased endurance  RN cleared/updated  Assessment   Assessment Pt greeted bedside for OT treatment on 2/15/2023 focusing on maximizing independence with ADLs  Pt completes bed mobility with mod AX2 and functional sit pivot transfers with max AX2 from EOB to drop arm recliner chair  Pt engages in functional STS transfers with mod AX2 with RW and with R lateral lean (VCs/TCs to correct)  Pt with static standing tolerance of x1 min upon x2 trials  Pt engages in LB dressing at total A and grooming activities seated at mod A level 2* to Pt c/o of lethargy and fatigue s/p transfers  Limitations that impact functional performance include decreased ADL status, decreased UE ROM, decreased UE strength, decreased safe judgement during ADLs, decreased cognition, decreased endurance, decreased self care transfers, decreased high level ADLs and pain   Occupational performance areas to address ADL retraining, functional transfer training, UE strengthening/ROM, endurance training, cognitive reorientation, Pt/caregiver education, equipment evaluation/education, compensatory technique education, energy conservation and activity engagement   Pt would benefit from continued skilled OT services while in hospital to maximize independence with ADLs  Will continue to follow Pt's goals and progress  Pt would benefit from post acute rehabilitation services upon DC to maximize safety and independence with ADLs and functional tasks of choice  Plan   Treatment Interventions ADL retraining;UE strengthening/ROM; Endurance training;Cognitive reorientation; Functional transfer training;Patient/family training;Equipment evaluation/education; Compensatory technique education; Energy conservation; Activityengagement   Goal Expiration Date 02/25/23   OT Treatment Day 1   OT Frequency 2-3x/wk   Recommendation   OT Discharge Recommendation Post acute rehabilitation services   Additional Comments  The patient's raw score on the AM-PAC Daily Activity Inpatient Short Form is 12  A raw score of less than 19 suggests the patient may benefit from discharge to post-acute rehabilitation services  Please refer to the recommendation of the Occupational Therapist for safe discharge planning  AM-PAC Daily Activity Inpatient   Lower Body Dressing 1   Bathing 2   Toileting 2   Upper Body Dressing 2   Grooming 2   Eating 3   Daily Activity Raw Score 12   Daily Activity Standardized Score (Calc for Raw Score >=11) 30 6   AM-PAC Applied Cognition Inpatient   Following a Speech/Presentation 3   Understanding Ordinary Conversation 4   Taking Medications 3   Remembering Where Things Are Placed or Put Away 3   Remembering List of 4-5 Errands 3   Taking Care of Complicated Tasks 2   Applied Cognition Raw Score 18   Applied Cognition Standardized Score 38 07   End of Consult   Education Provided Yes   Patient Position at End of Consult Bedside chair; All needs within reach;Bed/Chair alarm activated   Nurse Communication Nurse aware of consult       Meredith Regalado MS, OTR/L

## 2023-02-15 NOTE — PLAN OF CARE
Problem: PHYSICAL THERAPY ADULT  Goal: Performs mobility at highest level of function for planned discharge setting  See evaluation for individualized goals  Description: Treatment/Interventions: Functional transfer training, LE strengthening/ROM, Endurance training, Patient/family training, Bed mobility, Gait training, Spoke to nursing, Spoke to case management, OT          See flowsheet documentation for full assessment, interventions and recommendations  Outcome: Progressing  Note: Prognosis: Fair  Problem List: Decreased endurance, Impaired balance, Pain  Assessment: Pt seen for PT treatment session this date  Therapy session focused on bed mobility, functional transfers, and ambulation in order to improve overall mobility and independence  Pt requires Mod AX2 for supine to sit, maintains sitting with min assist for posterior and R lean initially then improving to supervision  Pt assisted from bed to chair with sit pivot sit transfer with max assist X 2  Pt then performed 2 STS transfers from chair with max assist X 2, using RW, standing time 1min -1 30 mins    Pt performed seated hygiene care, PT focused on balance training in sitting and independence with functional mobility  Pt making steady progress toward goals  Pt was left in recliner at the end of PT session with all needs in reach, alarm on   Pt would benefit from continued PT services while in hospital to address remaining limitations  The patient's AM-PAC Basic Mobility Inpatient Short Form Raw Score is 10  A Raw score of less than or equal to 16 suggests the patient may benefit from discharge to post-acute rehabilitation services  Please also refer to the recommendation of the Physical Therapist for safe discharge planning  Barriers to Discharge: Decreased caregiver support, Inaccessible home environment     PT Discharge Recommendation: Post acute rehabilitation services    See flowsheet documentation for full assessment

## 2023-02-15 NOTE — QUICK NOTE
Acute Care Surgery  Bedside V A C  Procedure Note    Location of wound: midline abdomen    Dressings and Foam removed:  1 Dressings  1 Black Foam  1 Adaptic    Dimensions of wound: 13 cm x 3 5 cm x 1 5 cm    Description of wound: On inspection of the wound today, there is intact fascia with sutures and surrounding granulation tissue  There was not devitalized tissue  The periwound skin remains clean, intact, and unremarkable  VAC dressing application:  The periwound skin was cleaned and dried  1 piece of Adaptic was placed over the suture line  1 black foam was cut to size and placed into the wound  The dressings were covered with VAC drape  The track pad was then placed over the base of black foam  The VAC was then set to 125 mmHg continuous suction  The patient tolerated the procedure well and there were no complications  The patient did not require any excisional debridement during today's dressing change  Wound Images: Additional Notes: The [facility] sticker was placed over the dressing per protocol  The next [facility] dressing change will be planned for Friday 2/17  This dressing change took 15 minutes to complete      Keyshawn Griffin MD  2/15/2023 4:53 PM

## 2023-02-15 NOTE — PLAN OF CARE
Problem: OCCUPATIONAL THERAPY ADULT  Goal: Performs self-care activities at highest level of function for planned discharge setting  See evaluation for individualized goals  Description: Treatment Interventions: ADL retraining, Functional transfer training, UE strengthening/ROM, Endurance training, Patient/family training, Equipment evaluation/education, Compensatory technique education, Activityengagement          See flowsheet documentation for full assessment, interventions and recommendations  Outcome: Progressing  Note: Limitation: Decreased ADL status, Decreased endurance, Decreased self-care trans, Decreased high-level ADLs  Prognosis: Good  Assessment: Pt greeted bedside for OT treatment on 2/15/2023 focusing on maximizing independence with ADLs  Pt completes bed mobility with mod AX2 and functional sit pivot transfers with max AX2 from EOB to drop arm recliner chair  Pt engages in functional STS transfers with mod AX2 with RW and with R lateral lean (VCs/TCs to correct)  Pt with static standing tolerance of x1 min upon x2 trials  Pt engages in LB dressing at total A and grooming activities seated at mod A level 2* to Pt c/o of lethargy and fatigue s/p transfers  Limitations that impact functional performance include decreased ADL status, decreased UE ROM, decreased UE strength, decreased safe judgement during ADLs, decreased cognition, decreased endurance, decreased self care transfers, decreased high level ADLs and pain  Occupational performance areas to address ADL retraining, functional transfer training, UE strengthening/ROM, endurance training, cognitive reorientation, Pt/caregiver education, equipment evaluation/education, compensatory technique education, energy conservation and activity engagement   Pt would benefit from continued skilled OT services while in hospital to maximize independence with ADLs  Will continue to follow Pt's goals and progress   Pt would benefit from post acute rehabilitation services upon DC to maximize safety and independence with ADLs and functional tasks of choice       OT Discharge Recommendation: Post acute rehabilitation services

## 2023-02-15 NOTE — PLAN OF CARE
Problem: MOBILITY - ADULT  Goal: Maintain or return to baseline ADL function  Description: INTERVENTIONS:  -  Assess patient's ability to carry out ADLs; assess patient's baseline for ADL function and identify physical deficits which impact ability to perform ADLs (bathing, care of mouth/teeth, toileting, grooming, dressing, etc )  - Assess/evaluate cause of self-care deficits   - Assess range of motion  - Assess patient's mobility; develop plan if impaired  - Assess patient's need for assistive devices and provide as appropriate  - Encourage maximum independence but intervene and supervise when necessary  - Involve family in performance of ADLs  - Assess for home care needs following discharge   - Consider OT consult to assist with ADL evaluation and planning for discharge  - Provide patient education as appropriate  Outcome: Progressing  Goal: Maintains/Returns to pre admission functional level  Description: INTERVENTIONS:  - Perform BMAT or MOVE assessment daily    - Set and communicate daily mobility goal to care team and patient/family/caregiver     - Collaborate with rehabilitation services on mobility goals if consulted  - Record patient progress and toleration of activity level   Outcome: Progressing     Problem: GASTROINTESTINAL - ADULT  Goal: Maintains or returns to baseline bowel function  Description: INTERVENTIONS:  - Assess bowel function  - Encourage oral fluids to ensure adequate hydration  - Administer IV fluids if ordered to ensure adequate hydration  - Administer ordered medications as needed  - Encourage mobilization and activity  - Consider nutritional services referral to assist patient with adequate nutrition and appropriate food choices  Outcome: Progressing  Goal: Maintains adequate nutritional intake  Description: INTERVENTIONS:  - Monitor percentage of each meal consumed  - Identify factors contributing to decreased intake, treat as appropriate  - Assist with meals as needed  - Monitor I&O, weight, and lab values if indicated  - Obtain nutrition services referral as needed  Outcome: Progressing  Goal: Oral mucous membranes remain intact  Description: INTERVENTIONS  - Assess oral mucosa and hygiene practices  - Implement preventative oral hygiene regimen  - Implement oral medicated treatments as ordered  - Initiate Nutrition services referral as needed  Outcome: Progressing     Problem: GENITOURINARY - ADULT  Goal: Maintains or returns to baseline urinary function  Description: INTERVENTIONS:  - Assess urinary function  - Encourage oral fluids to ensure adequate hydration if ordered  - Administer IV fluids as ordered to ensure adequate hydration  - Administer ordered medications as needed  - Offer frequent toileting  - Follow urinary retention protocol if ordered  Outcome: Progressing     Problem: SKIN/TISSUE INTEGRITY - ADULT  Goal: Skin Integrity remains intact(Skin Breakdown Prevention)  Description: Assess:  -Perform Jose assessment every  -Clean and moisturize skin every  -Inspect skin when repositioning, toileting, and assisting with ADLS  -Assess extremities for adequate circulation and sensation     Bed Management:  -Have minimal linens on bed & keep smooth, unwrinkled  -Change linens as needed when moist or perspiring  -Avoid sitting or lying in one position for more than  2 hours while in bed    Toileting:  -Offer bedside commode    Activity:  -Encourage activity and walks on unit  -Encourage or provide ROM exercises   -Turn and reposition patient every 2 Hours  -Use appropriate equipment to lift or move patient in bed    Skin Care:  -Avoid use of baby powder, tape, friction and shearing, hot water or constrictive clothing  -Relieve pressure over bony prominences using wedges  -Do not massage red bony areas    Outcome: Progressing     Problem: Prexisting or High Potential for Compromised Skin Integrity  Goal: Skin integrity is maintained or improved  Description: INTERVENTIONS:  - Identify patients at risk for skin breakdown  - Assess and monitor skin integrity  - Assess and monitor nutrition and hydration status  - Monitor labs   - Assess for incontinence   - Turn and reposition patient  - Assist with mobility/ambulation  - Relieve pressure over bony prominences  - Avoid friction and shearing  - Provide appropriate hygiene as needed including keeping skin clean and dry  - Evaluate need for skin moisturizer/barrier cream  - Collaborate with interdisciplinary team   - Patient/family teaching  - Consider wound care consult   Outcome: Progressing     Problem: PAIN - ADULT  Goal: Verbalizes/displays adequate comfort level or baseline comfort level  Description: Interventions:  - Encourage patient to monitor pain and request assistance  - Assess pain using appropriate pain scale  - Administer analgesics based on type and severity of pain and evaluate response  - Implement non-pharmacological measures as appropriate and evaluate response  - Consider cultural and social influences on pain and pain management  - Notify physician/advanced practitioner if interventions unsuccessful or patient reports new pain  Outcome: Progressing     Problem: INFECTION - ADULT  Goal: Absence or prevention of progression during hospitalization  Description: INTERVENTIONS:  - Assess and monitor for signs and symptoms of infection  - Monitor lab/diagnostic results  - Monitor all insertion sites, i e  indwelling lines, tubes, and drains  - Monitor endotracheal if appropriate and nasal secretions for changes in amount and color  - [place] appropriate cooling/warming therapies per order  - Administer medications as ordered  - Instruct and encourage patient and family to use good hand hygiene technique  - Identify and instruct in appropriate isolation precautions for identified infection/condition  Outcome: Progressing  Goal: Absence of fever/infection during neutropenic period  Description: INTERVENTIONS:  - Monitor WBC    Outcome: Progressing     Problem: SAFETY ADULT  Goal: Maintain or return to baseline ADL function  Description: INTERVENTIONS:  -  Assess patient's ability to carry out ADLs; assess patient's baseline for ADL function and identify physical deficits which impact ability to perform ADLs (bathing, care of mouth/teeth, toileting, grooming, dressing, etc )  - Assess/evaluate cause of self-care deficits   - Assess range of motion  - Assess patient's mobility; develop plan if impaired  - Assess patient's need for assistive devices and provide as appropriate  - Encourage maximum independence but intervene and supervise when necessary  - Involve family in performance of ADLs  - Assess for home care needs following discharge   - Consider OT consult to assist with ADL evaluation and planning for discharge  - Provide patient education as appropriate  Outcome: Progressing  Goal: Maintains/Returns to pre admission functional level  Description: INTERVENTIONS:  - Perform BMAT or MOVE assessment daily    - Set and communicate daily mobility goal to care team and patient/family/caregiver     - Collaborate with rehabilitation services on mobility goals if consulted  - Record patient progress and toleration of activity level   Outcome: Progressing  Goal: Patient will remain free of falls  Description: INTERVENTIONS:  - Educate patient/family on patient safety including physical limitations  - Instruct patient to call for assistance with activity   - Consult OT/PT to assist with strengthening/mobility   - Keep Call bell within reach  - Keep bed low and locked with side rails adjusted as appropriate  - Keep care items and personal belongings within reach  - Initiate and maintain comfort rounds  - Make Fall Risk Sign visible to staff  - Apply yellow socks and bracelet for high fall risk patients  - Consider moving patient to room near nurses station  Outcome: Progressing     Problem: DISCHARGE PLANNING  Goal: Discharge to home or other facility with appropriate resources  Description: INTERVENTIONS:  - Identify barriers to discharge w/patient and caregiver  - Arrange for needed discharge resources and transportation as appropriate  - Identify discharge learning needs (meds, wound care, etc )  - Arrange for interpretive services to assist at discharge as needed  - Refer to Case Management Department for coordinating discharge planning if the patient needs post-hospital services based on physician/advanced practitioner order or complex needs related to functional status, cognitive ability, or social support system  Outcome: Progressing     Problem: Knowledge Deficit  Goal: Patient/family/caregiver demonstrates understanding of disease process, treatment plan, medications, and discharge instructions  Description: Complete learning assessment and assess knowledge base  Interventions:  - Provide teaching at level of understanding  - Provide teaching via preferred learning methods  Outcome: Progressing     Problem: Nutrition/Hydration-ADULT  Goal: Nutrient/Hydration intake appropriate for improving, restoring or maintaining nutritional needs  Description: Monitor and assess patient's nutrition/hydration status for malnutrition  Collaborate with interdisciplinary team and initiate plan and interventions as ordered  Monitor patient's weight and dietary intake as ordered or per policy  Utilize nutrition screening tool and intervene as necessary  Determine patient's food preferences and provide high-protein, high-caloric foods as appropriate       INTERVENTIONS:  - Monitor oral intake, urinary output, labs, and treatment plans  - Assess nutrition and hydration status and recommend course of action  - Evaluate amount of meals eaten  - Assist patient with eating if necessary   - Allow adequate time for meals  - Recommend/ encourage appropriate diets, oral nutritional supplements, and vitamin/mineral supplements  - Order, calculate, and assess calorie counts as needed  - Recommend, monitor, and adjust tube feedings and TPN/PPN based on assessed needs  - Assess need for intravenous fluids  - Provide specific nutrition/hydration education as appropriate  - Include patient/family/caregiver in decisions related to nutrition  Outcome: Progressing

## 2023-02-15 NOTE — SPEECH THERAPY NOTE
Speech Language/Pathology    Speech/Language Pathology Progress Note    Patient Name: Maddie Carlos  [id number] Date: 2/15/2023     Problem List  Principal Problem:    Perforated bowel ([facility] [address] )  Active Problems:    Personal history of other malignant neoplasm of large intestine    Type 2 diabetes mellitus without complications ([facility] [address] )    Essential hypertension    Strangulated ventral hernia    GERD (gastroesophageal reflux disease)    History of right breast cancer    History of right mastectomy       Past Medical History  Past Medical History:   Diagnosis Date   • Acute embolism and thrombosis of deep vein of lower extremity (HCC)    • Adenocarcinoma of colon, [facility] ([facility] [address] )    • Breast cancer ([facility] [address] ) 2012    Right Breast    • Cancer ([facility] [address] )    • Diabetes mellitus ([facility] [address] )    • DVT (deep venous thrombosis) (HCC)    • GERD (gastroesophageal reflux disease)    • Hypertension    • Pes planus         Past Surgical History  Past Surgical History:   Procedure Laterality Date   • COLONOSCOPY     • HERNIA REPAIR N/A 2/9/2023    Procedure: EXPLORATORY LAPAROTOMY; ABDOMINAL WALL DEBRIDEMENT; REDUCTION VENTRAL HERNIA; LEFT COLON RESECTION; CREATION ILEOSTOMY; WASHOUT;  Surgeon: Jigar Rivera DO;  Location: [facility];  Service: General   • HYSTERECTOMY      complete @ age 29   • IR PORT REMOVAL  9/12/2018   • MASTECTOMY Right 2012   • MI COLONOSCOPY FLX DX W/COLLJ SPEC WHEN PFRMD N/A 8/23/2016    Procedure: COLONOSCOPY;  Surgeon: Bill Tim MD;  Location: [facility]; Service: Colorectal   • MI COLONOSCOPY FLX DX W/COLLJ SPEC WHEN PFRMD N/A 9/5/2017    Procedure: COLONOSCOPY;  Surgeon: Bill Tim MD;  Location: [facility]; Service: Colorectal   • MI ESOPHAGOGASTRODUODENOSCOPY TRANSORAL DIAGNOSTIC N/A 9/5/2017    Procedure: ESOPHAGOGASTRODUODENOSCOPY (EGD); Surgeon: Rodrigo Stock DO;  Location: [facility]; Service: Gastroenterology         Subjective:  Pt seen for dysphagia tx  Pt was seated upright in bed   Pt was pleasant, cooperative  "I have a lot of anxiety about swallowing"  Objective:  Reviewed chart, discussed with RN  Pt reportedly ate a whole bowl of cream of wheat this morning, and tolerating it well  Pt reports that she is satisfied with current diet, and feels she is doing well on it  Discussed with pt the possibility of completing a VBS today  Explained the procedure and rationale for completing the test  Pt states that she does want to participate in a VBS at some point, but does not yet feel ready; she expressed anxiety about swallowing, and she is afraid to try anything besides pureed and NTL since she is doing well with those  Offered to get pt thin liquids to try at bedside; she declined, stating that she isn't ready  She did drink approximately 7-8 sips of NTL via straw; swallows appeared fairly prompt, and there were no overt s/s aspiration  Offered pt something to eat, but pt declined, due to being too full from breakfast  Encouraged pt to consider VBS soon, she said "maybe tomorrow"  Assessment:  Pt expressed a lot of anxiety about her swallowing, and does not currently desire to participate in VBS or PO trials of other consistencies of food/drink  She is content with current diet, as she feels comfortable with it and has been tolerating well  Pt tolerated several sips of NTL via straw without any overt s/s of aspiration  Plan/Recommendations:  Continue current diet, pureed and NTL, and aspiration precautions  VBS when pt is willing/able to participate (anxiety is a factor)  ST to continue with dysphagia tx

## 2023-02-15 NOTE — PROGRESS NOTES
Progress Note - Hilario Torres 69 y o  female MRN: [id number]    Unit/Bed#: [facility] 189-75 Encounter: [id number]      Assessment:  Hilario Torres is a 69 y o  female with PMH colon cancer s/p lap right hemicolectomy 2/2 ascending colon mass (7/15) p/w incarcerated ventral hernia  S/p ex lap end ileostomy, L colectomy, reduction of ventral hernia on 2/9  Post op course c/b PE on heparin gtt  WBC 15 5 from 17 84, 18 39  Hgb 6 3 from 7 6  Ostomy 475 cc   cc    Plan:  -Diet per SLP: Dysphagia 1, nectar thick liquids  -Continue to encourage increased p o  intake  -Needs to be out of bed to chair or ambulating at least twice daily  -Continue IVF hydration  -Monitor wound vac output  -Aspiration precautions  -Aggressive pulmonary toilet, airway clearance protocol (chest PT) --please help patient with incentive spirometer usage  -Monitor ostomy function  -Continue abx - zosyn  -Monitor hemodynamics  -Continue heparin gtt  -Strict I/Os  -Eventual MRI liver    Subjective:   Hemoglobin had drifted down to 6 3 yesterday, now status post 1 unit of packed red blood cells, a m  labs pending  Notes that she was able to eat a couple of items yesterday and drink 1 Ensure  Still has not been out of bed  Working on flutter valve, but requires further education on incentive spirometer  Objective:     Vitals: Blood pressure 116/68, pulse 92, temperature 97 5 °F (36 4 °C), resp  rate 18, height 5' (1 524 m), weight 61 6 kg (135 lb 12 9 oz), SpO2 95 %, not currently breastfeeding  ,Body mass index is 26 52 kg/m²        Intake/Output Summary (Last 24 hours) at 2/15/2023 0622  Last data filed at 2/15/2023 0543  Gross per 24 hour   Intake 2346 67 ml   Output 2550 ml   Net -203 33 ml       Physical Exam:  Gen: Lying comfortably in bed, no acute distress  CV: Regular rate and rhythm  Pulm: Breathing comfortably on RA, no respiratory distress  Abd: Soft, nondistended, nontender to palpation, ostomy functioning and healthy-appearing  : Pure wick in place draining clear yellow urine  Ext: Warm and dry, no edema  Neuro: Alert and oriented      Invasive Devices     Peripherally Inserted Central Catheter Line  Duration           PICC Line 02/10/23 Left Brachial 4 days          Drain  Duration           Ileostomy Standard (ap Calderón) Umbilicus 5 days                Lab, Imaging and other studies: I have personally reviewed pertinent reports      VTE Pharmacologic Prophylaxis: Heparin  VTE Mechanical Prophylaxis: sequential compression device

## 2023-02-15 NOTE — PLAN OF CARE
Problem: MOBILITY - ADULT  Goal: Maintain or return to baseline ADL function  Description: INTERVENTIONS:  -  Assess patient's ability to carry out ADLs; assess patient's baseline for ADL function and identify physical deficits which impact ability to perform ADLs (bathing, care of mouth/teeth, toileting, grooming, dressing, etc )  - Assess/evaluate cause of self-care deficits   - Assess range of motion  - Assess patient's mobility; develop plan if impaired  - Assess patient's need for assistive devices and provide as appropriate  - Encourage maximum independence but intervene and supervise when necessary  - Involve family in performance of ADLs  - Assess for home care needs following discharge   - Consider OT consult to assist with ADL evaluation and planning for discharge  - Provide patient education as appropriate  Outcome: Progressing  Goal: Maintains/Returns to pre admission functional level  Description: INTERVENTIONS:  - Perform BMAT or MOVE assessment daily    - Set and communicate daily mobility goal to care team and patient/family/caregiver     - Collaborate with rehabilitation services on mobility goals if consulted  - Out of bed for toileting  - Record patient progress and toleration of activity level   Outcome: Progressing     Problem: GASTROINTESTINAL - ADULT  Goal: Maintains or returns to baseline bowel function  Description: INTERVENTIONS:  - Assess bowel function  - Encourage oral fluids to ensure adequate hydration  - Administer IV fluids if ordered to ensure adequate hydration  - Administer ordered medications as needed  - Encourage mobilization and activity  - Consider nutritional services referral to assist patient with adequate nutrition and appropriate food choices  Outcome: Progressing  Goal: Maintains adequate nutritional intake  Description: INTERVENTIONS:  - Monitor percentage of each meal consumed  - Identify factors contributing to decreased intake, treat as appropriate  - Assist with meals as needed  - Monitor I&O, weight, and lab values if indicated  - Obtain nutrition services referral as needed  Outcome: Progressing  Goal: Oral mucous membranes remain intact  Description: INTERVENTIONS  - Assess oral mucosa and hygiene practices  - Implement preventative oral hygiene regimen  - Implement oral medicated treatments as ordered  - Initiate Nutrition services referral as needed  Outcome: Progressing     Problem: GENITOURINARY - ADULT  Goal: Maintains or returns to baseline urinary function  Description: INTERVENTIONS:  - Assess urinary function  - Encourage oral fluids to ensure adequate hydration if ordered  - Administer IV fluids as ordered to ensure adequate hydration  - Administer ordered medications as needed  - Offer frequent toileting  - Follow urinary retention protocol if ordered  Outcome: Progressing     Problem: SKIN/TISSUE INTEGRITY - ADULT  Goal: Skin Integrity remains intact(Skin Breakdown Prevention)  Description: Assess:  -Assess extremities for adequate circulation and sensation     Bed Management:  -Have minimal linens on bed & keep smooth, unwrinkled  -Change linens as needed when moist or perspiring    Toileting:  -Offer bedside commode    Skin Care:  -Avoid use of baby powder, tape, friction and shearing, hot water or constrictive clothing  -Do not massage red bony areas    Next Steps:  Outcome: Progressing     Problem: PAIN - ADULT  Goal: Verbalizes/displays adequate comfort level or baseline comfort level  Description: Interventions:  - Encourage patient to monitor pain and request assistance  - Assess pain using appropriate pain scale  - Administer analgesics based on type and severity of pain and evaluate response  - Implement non-pharmacological measures as appropriate and evaluate response  - Consider cultural and social influences on pain and pain management  - Notify physician/advanced practitioner if interventions unsuccessful or patient reports new pain  Outcome: Progressing     Problem: PAIN - ADULT  Goal: Verbalizes/displays adequate comfort level or baseline comfort level  Description: Interventions:  - Encourage patient to monitor pain and request assistance  - Assess pain using appropriate pain scale  - Administer analgesics based on type and severity of pain and evaluate response  - Implement non-pharmacological measures as appropriate and evaluate response  - Consider cultural and social influences on pain and pain management  - Notify physician/advanced practitioner if interventions unsuccessful or patient reports new pain  Outcome: Progressing     Problem: INFECTION - ADULT  Goal: Absence or prevention of progression during hospitalization  Description: INTERVENTIONS:  - Assess and monitor for signs and symptoms of infection  - Monitor lab/diagnostic results  - Monitor all insertion sites, i e  indwelling lines, tubes, and drains  - Monitor endotracheal if appropriate and nasal secretions for changes in amount and color  - [place] appropriate cooling/warming therapies per order  - Administer medications as ordered  - Instruct and encourage patient and family to use good hand hygiene technique  - Identify and instruct in appropriate isolation precautions for identified infection/condition  Outcome: Progressing  Goal: Absence of fever/infection during neutropenic period  Description: INTERVENTIONS:  - Monitor WBC    Outcome: Progressing     Problem: DISCHARGE PLANNING  Goal: Discharge to home or other facility with appropriate resources  Description: INTERVENTIONS:  - Identify barriers to discharge w/patient and caregiver  - Arrange for needed discharge resources and transportation as appropriate  - Identify discharge learning needs (meds, wound care, etc )  - Arrange for interpretive services to assist at discharge as needed  - Refer to Case Management Department for coordinating discharge planning if the patient needs post-hospital services based on physician/advanced practitioner order or complex needs related to functional status, cognitive ability, or social support system  Outcome: Progressing     Problem: Knowledge Deficit  Goal: Patient/family/caregiver demonstrates understanding of disease process, treatment plan, medications, and discharge instructions  Description: Complete learning assessment and assess knowledge base  Interventions:  - Provide teaching at level of understanding  - Provide teaching via preferred learning methods  Outcome: Progressing     Problem: Nutrition/Hydration-ADULT  Goal: Nutrient/Hydration intake appropriate for improving, restoring or maintaining nutritional needs  Description: Monitor and assess patient's nutrition/hydration status for malnutrition  Collaborate with interdisciplinary team and initiate plan and interventions as ordered  Monitor patient's weight and dietary intake as ordered or per policy  Utilize nutrition screening tool and intervene as necessary  Determine patient's food preferences and provide high-protein, high-caloric foods as appropriate       INTERVENTIONS:  - Monitor oral intake, urinary output, labs, and treatment plans  - Assess nutrition and hydration status and recommend course of action  - Evaluate amount of meals eaten  - Assist patient with eating if necessary   - Allow adequate time for meals  - Recommend/ encourage appropriate diets, oral nutritional supplements, and vitamin/mineral supplements  - Order, calculate, and assess calorie counts as needed  - Recommend, monitor, and adjust tube feedings and TPN/PPN based on assessed needs  - Assess need for intravenous fluids  - Provide specific nutrition/hydration education as appropriate  - Include patient/family/caregiver in decisions related to nutrition  Outcome: Progressing

## 2023-02-15 NOTE — MALNUTRITION/BMI
This medical record reflects one or more clinical indicators suggestive of malnutrition  Malnutrition Findings:   Adult Malnutrition type:  (acute on chronic illness)  Adult Degree of Malnutrition: Malnutrition of moderate degree  Malnutrition Characteristics: Muscle loss, Inadequate energy                  360 Statement: Moderate malnutrition r/t acute on chronic illness (recent surgery in the setting of hx of colon cancer) AEB PO intake <50% of needs for >5 days, mild muscle loss in temporal, deltoid, and clavicular areas  Treatment with oral diet and oral nutrition supplements; modified diet texture/liquids to facilitate swallowing  BMI Findings: Body mass index is 26 52 kg/m²  See Nutrition note dated 2/15/23 for additional details  Completed nutrition assessment is viewable in the nutrition documentation

## 2023-02-16 ENCOUNTER — APPOINTMENT (INPATIENT)
Dept: RADIOLOGY | Facility: HOSPITAL | Age: 78
End: 2023-02-16

## 2023-02-16 LAB
ANION GAP SERPL CALCULATED.3IONS-SCNC: 5 MMOL/L (ref 4–13)
APTT PPP: 62 SECONDS (ref 23–37)
BUN SERPL-MCNC: 3 MG/DL (ref 5–25)
CALCIUM SERPL-MCNC: 8 MG/DL (ref 8.3–10.1)
CHLORIDE SERPL-SCNC: 102 MMOL/L (ref 96–108)
CO2 SERPL-SCNC: 27 MMOL/L (ref 21–32)
CREAT SERPL-MCNC: 0.37 MG/DL (ref 0.6–1.3)
ERYTHROCYTE [DISTWIDTH] IN BLOOD BY AUTOMATED COUNT: 16.5 % (ref 11.6–15.1)
GFR SERPL CREATININE-BSD FRML MDRD: 103 ML/MIN/1.73SQ M
GLUCOSE SERPL-MCNC: 144 MG/DL (ref 65–140)
GLUCOSE SERPL-MCNC: 166 MG/DL (ref 65–140)
GLUCOSE SERPL-MCNC: 202 MG/DL (ref 65–140)
GLUCOSE SERPL-MCNC: 245 MG/DL (ref 65–140)
HCT VFR BLD AUTO: 26.1 % (ref 34.8–46.1)
HGB BLD-MCNC: 8.1 G/DL (ref 11.5–15.4)
MAGNESIUM SERPL-MCNC: 2 MG/DL (ref 1.6–2.6)
MCH RBC QN AUTO: 28 PG (ref 26.8–34.3)
MCHC RBC AUTO-ENTMCNC: 31 G/DL (ref 31.4–37.4)
MCV RBC AUTO: 90 FL (ref 82–98)
PHOSPHATE SERPL-MCNC: 2.6 MG/DL (ref 2.3–4.1)
PLATELET # BLD AUTO: 222 THOUSANDS/UL (ref 149–390)
PMV BLD AUTO: 9.8 FL (ref 8.9–12.7)
POTASSIUM SERPL-SCNC: 3.4 MMOL/L (ref 3.5–5.3)
RBC # BLD AUTO: 2.89 MILLION/UL (ref 3.81–5.12)
SODIUM SERPL-SCNC: 134 MMOL/L (ref 135–147)
WBC # BLD AUTO: 17.43 THOUSAND/UL (ref 4.31–10.16)

## 2023-02-16 RX ORDER — POTASSIUM CHLORIDE 14.9 MG/ML
20 INJECTION INTRAVENOUS ONCE
Status: COMPLETED | OUTPATIENT
Start: 2023-02-16 | End: 2023-02-16

## 2023-02-16 RX ADMIN — INSULIN LISPRO 3 UNITS: 100 INJECTION, SOLUTION INTRAVENOUS; SUBCUTANEOUS at 11:59

## 2023-02-16 RX ADMIN — BIMATOPROST 1 DROP: 0.1 SOLUTION/ DROPS OPHTHALMIC at 21:53

## 2023-02-16 RX ADMIN — PIPERACILLIN SODIUM AND TAZOBACTAM SODIUM 3.38 G: 36; 4.5 INJECTION, POWDER, LYOPHILIZED, FOR SOLUTION INTRAVENOUS at 23:01

## 2023-02-16 RX ADMIN — POTASSIUM CHLORIDE 20 MEQ: 14.9 INJECTION, SOLUTION INTRAVENOUS at 08:41

## 2023-02-16 RX ADMIN — INSULIN LISPRO 1 UNITS: 100 INJECTION, SOLUTION INTRAVENOUS; SUBCUTANEOUS at 21:53

## 2023-02-16 RX ADMIN — Medication 1 TABLET: at 08:41

## 2023-02-16 RX ADMIN — PIPERACILLIN SODIUM AND TAZOBACTAM SODIUM 3.38 G: 36; 4.5 INJECTION, POWDER, LYOPHILIZED, FOR SOLUTION INTRAVENOUS at 17:19

## 2023-02-16 RX ADMIN — PANTOPRAZOLE SODIUM 40 MG: 40 INJECTION, POWDER, FOR SOLUTION INTRAVENOUS at 05:11

## 2023-02-16 RX ADMIN — PIPERACILLIN SODIUM AND TAZOBACTAM SODIUM 3.38 G: 36; 4.5 INJECTION, POWDER, LYOPHILIZED, FOR SOLUTION INTRAVENOUS at 10:52

## 2023-02-16 RX ADMIN — HEPARIN SODIUM 22 UNITS/KG/HR: 10000 INJECTION, SOLUTION INTRAVENOUS at 05:20

## 2023-02-16 RX ADMIN — INSULIN LISPRO 2 UNITS: 100 INJECTION, SOLUTION INTRAVENOUS; SUBCUTANEOUS at 17:19

## 2023-02-16 RX ADMIN — THIAMINE HCL TAB 100 MG 100 MG: 100 TAB at 08:41

## 2023-02-16 NOTE — PROCEDURES
Video Swallow Study      Patient Name: Nikko Bender  Today's Date: 2/16/2023        Past Medical History  Past Medical History:   Diagnosis Date   • Acute embolism and thrombosis of deep vein of lower extremity ([facility] [address] )    • Adenocarcinoma of colon, Duke's A ([facility] [address] )    • Breast cancer ([facility] [address] ) 2012    Right Breast    • Cancer ([facility] [address] )    • Diabetes mellitus ([facility] [address] )    • DVT (deep venous thrombosis) ([facility])    • GERD (gastroesophageal reflux disease)    • Hypertension    • Pes planus         Past Surgical History  Past Surgical History:   Procedure Laterality Date   • COLONOSCOPY     • HERNIA REPAIR N/A 2/9/2023    Procedure: EXPLORATORY LAPAROTOMY; ABDOMINAL WALL DEBRIDEMENT; REDUCTION VENTRAL HERNIA; LEFT COLON RESECTION; CREATION ILEOSTOMY; WASHOUT;  Surgeon: Carlton Caruso DO;  Location: [facility];  Service: General   • HYSTERECTOMY      complete @ age 29   • IR PORT REMOVAL  9/12/2018   • MASTECTOMY Right 2012   • PA COLONOSCOPY FLX DX W/COLLJ SPEC WHEN PFRMD N/A 8/23/2016    Procedure: COLONOSCOPY;  Surgeon: Shivani Hoover MD;  Location: [facility]; Service: Colorectal   • PA COLONOSCOPY FLX DX W/COLLJ SPEC WHEN PFRMD N/A 9/5/2017    Procedure: COLONOSCOPY;  Surgeon: Shivani Hoover MD;  Location: [facility]; Service: Colorectal   • PA ESOPHAGOGASTRODUODENOSCOPY TRANSORAL DIAGNOSTIC N/A 9/5/2017    Procedure: ESOPHAGOGASTRODUODENOSCOPY (EGD); Surgeon: Analia Rawls DO;  Location: [facility]; Service: Gastroenterology     Modified (Video) Barium Swallow Study    Summary:  Images are on PACS for review  Pt presents w/ mod oral, mild/mod pharyngeal dysphagia paloma by prolonged manipulation of material, reduced lingual retraction, mild delay, reduced anterior hyoid movement along w/ incomplete epiglottic inversion  The pt has significant curvature of her spine and the epiglottis abuts it each swallow w/o fully clearing   She is leaning right for the whole study w/ need for a bolster and frequent repositioning  Poor pharyngeal clearing w/ mod/severe vallecular retention w/ solids/puree and pooling in the pyriforms w/ need for mult swallows to clear  THe mult swallows do not consistently clear the valleculae  Penetration to the cords noted w/ thin in larger sips as well as with some sips nectar  No gross aspiration noted  Recommendations:  Diet: puree  Liquids: thin   Meds: crush in puree   Strategies: mult swallows  Alternate bites/sips   Frequent oral care  Upright position  F/u ST tx: as able   Therapy Prognosis:guarded  Prognosis considerations: medical condition   Intermittent Supervision  Aspiration Precautions  Reflux Precautions  Consider consult with: JEEVAN for supplements  Results reviewed with: pt, nursing  Aspiration precautions posted  Repeat MBS as necessary  If a dedicated assessment of the esophagus is desired, consider esophagram/barium swallow or EGD  Goals:  Pt will tolerate least restrictive diet w/out s/s aspiration or oral/pharyngeal difficulties  Patient's goal:-      H&P/pertinent provider notes: (PMH noted above)  Refer to bedside for full info    Special Studies:  CT abd/pelvis IMPRESSION:   1   Small to moderate abdominopelvic ascites with peritoneal thickening in the pelvis, most consistent with peritonitis  No well-organized intra-abdominal abscess identified    2   Nonspecific enteritis    3   Wound dehiscence of the open midline abdominal wound with subcutaneous gas extending into the left anterior abdominal wall    4   Small to moderate bilateral pleural effusions, increased since 2/10/2023    5   Possible cystitis  Recommend correlation with urinalysis and patient's signs/symptoms    6   Nondependent gas within the urinary bladder  Recommend correlation with recent instrumentation   7   Multiple liver lesions, similar to prior study 2/9/2023 but new since CT 5/2/2019, suspicious for hepatic metastases    Recommend further evaluation with contrast-enhanced MRI abdomen    8   Left breast skin thickening, nonspecific, and multiple left breast nodules  Recommend further evaluation with diagnostic mammography  Previous VBS:  -    Does the pt have pain? No   If yes, was nursing made aware/was it addressed? Swallow Mechanism Exam  Facial: symmetrical  Labial: decreased strength  Lingual: bilateral decreased strength  Velum: symmetrical  Mandible: adequate ROM  Dentition: adequate  Vocal quality:weak   Volitional Cough: unable to initiate volitional cough   Respiratory Status: on RA     Swallow Information   Current Risks for Dysphagia & Aspiration: known history of dysphagia  Current Symptoms/Concerns: coughing with po, difficulty getting food down  Current Diet: puree/level 1 diet and nectar thick liquids   Baseline Diet: regular diet and thin liquids      Consistencies Administered:  Pt was viewed sitting upright in the lateral and AP positions  Due to  patient inability to take successive sips & fatigue, trials provided deviated from the MBSImP Validated Protocol  : 5-mL thin liquid x2, 20-mL cup sip thin,  5-mL nectar thick, 20-mL cup sip nectar thick, 40-mL sequential swallow nectar thick, 5-mL Honey thick, 5-mL pudding, ½ cookie coated with 3-mL pudding, 5-mL nectar thick in the AP position, and 5-mL pudding in the AP position   Pt was also given thin & nectar liquids by straw  Oral Impairment:  Lip Closure: fair, pt initially takes small amounts but with cues is able to take the tsp   Tongue Control During Bolus Hold: mildly reduced   Bolus Preparation/Mastication: mod prolonged  Bolus Transport/Lingual Motion:mildly reduced w/ ongoing pumping movement   Oral Residue:mild/mod post lingual residue with puree/solids   Initiation of the Pharyngeal Swallow: mild delayed     Pharyngeal Impairment:  Soft Palate Elevation: mildly reduced   Laryngeal Elevation: fair  Anterior Hyoid Excursion: mild/mod reduced   Epiglottic Movement: poor hitting the PPW with the pt's curved cervical spine   Laryngeal Vestibular Closure: mildly reduced   Pharyngeal Stripping Wave: mild/mod reduced viji w/ solids   Pharyngeal Contraction: reduced   PES Opening: fair, mild hypertrophy noted though the pt has severe lordosis  Tongue Base Retraction: mild/mod reduced   Pharyngeal Residue:  Mod/severe vallecular retention w/ puree, solids, mild/mod PS retention w/ puree/solids but also some liquid pooling noted as well        Screening of Esophageal Impairment   Esophageal Clearance: anterior view, no gross retention      Penetration/Aspiration:  Thin: at times there is penetration during swallow with cup sips thin  to the cords (PAS5), cued to cough and can clear  Nectar: trace penetration (PAS   Honey:-  Puree: -  Solid: -  Response to Aspiration: response to penetration,   Strategies/Efficacy: mult swallows, head in chin tuck as much as possible, attempted head turn R/L in AP ( retention noted moreso on the R) neither side turn fully cleared the area    8-Point Penetration-Aspiration Scale   1 Material does not enter the airway   2 Material enters the airway, remains above the vocal folds, and is ejected  from the  airway    3 Material enters the airway, remains above the vocal folds, and is not ejected from the airway   4 Material enters the airway, contacts the vocal folds, and is ejected from the airway   5 Material enters the airway, contacts the vocal folds, and is not ejected from the airway    6 Material enters the airway, passes below the vocal folds and is ejected into the larynx or out of the airway    7 Material enters the airway, passes below the vocal folds, and is not ejected from the trachea despite effort    8 Material enters the airway, passes below the vocal folds, and no effort is made to eject

## 2023-02-16 NOTE — PLAN OF CARE
Problem: MOBILITY - ADULT  Goal: Maintain or return to baseline ADL function  Description: INTERVENTIONS:  -  Assess patient's ability to carry out ADLs; assess patient's baseline for ADL function and identify physical deficits which impact ability to perform ADLs (bathing, care of mouth/teeth, toileting, grooming, dressing, etc )  - Assess/evaluate cause of self-care deficits   - Assess range of motion  - Assess patient's mobility; develop plan if impaired  - Assess patient's need for assistive devices and provide as appropriate  - Encourage maximum independence but intervene and supervise when necessary  - Involve family in performance of ADLs  - Assess for home care needs following discharge   - Consider OT consult to assist with ADL evaluation and planning for discharge  - Provide patient education as appropriate  Outcome: Progressing  Goal: Maintains/Returns to pre admission functional level  Description: INTERVENTIONS:  - Perform BMAT or MOVE assessment daily    - Set and communicate daily mobility goal to care team and patient/family/caregiver     - Collaborate with rehabilitation services on mobility goals if consulted  - Record patient progress and toleration of activity level   Outcome: Progressing     Problem: GASTROINTESTINAL - ADULT  Goal: Maintains or returns to baseline bowel function  Description: INTERVENTIONS:  - Assess bowel function  - Encourage oral fluids to ensure adequate hydration  - Administer IV fluids if ordered to ensure adequate hydration  - Administer ordered medications as needed  - Encourage mobilization and activity  - Consider nutritional services referral to assist patient with adequate nutrition and appropriate food choices  Outcome: Progressing  Goal: Maintains adequate nutritional intake  Description: INTERVENTIONS:  - Monitor percentage of each meal consumed  - Identify factors contributing to decreased intake, treat as appropriate  - Assist with meals as needed  - Monitor I&O, weight, and lab values if indicated  - Obtain nutrition services referral as needed  Outcome: Progressing  Goal: Oral mucous membranes remain intact  Description: INTERVENTIONS  - Assess oral mucosa and hygiene practices  - Implement preventative oral hygiene regimen  - Implement oral medicated treatments as ordered  - Initiate Nutrition services referral as needed  Outcome: Progressing     Problem: GENITOURINARY - ADULT  Goal: Maintains or returns to baseline urinary function  Description: INTERVENTIONS:  - Assess urinary function  - Encourage oral fluids to ensure adequate hydration if ordered  - Administer IV fluids as ordered to ensure adequate hydration  - Administer ordered medications as needed  - Offer frequent toileting  - Follow urinary retention protocol if ordered  Outcome: Progressing     Problem: SKIN/TISSUE INTEGRITY - ADULT  Goal: Skin Integrity remains intact(Skin Breakdown Prevention)  Description: Assess:  -Perform Jose assessment every  -Clean and moisturize skin every  -Inspect skin when repositioning, toileting, and assisting with ADLS  -Assess extremities for adequate circulation and sensation     Bed Management:  -Have minimal linens on bed & keep smooth, unwrinkled  -Change linens as needed when moist or perspiring  -Avoid sitting or lying in one position for more than  2 hours while in bed    Toileting:  -Offer bedside commode    Activity:  -Encourage activity and walks on unit  -Encourage or provide ROM exercises   -Turn and reposition patient every 2 Hours  -Use appropriate equipment to lift or move patient in bed    Skin Care:  -Avoid use of baby powder, tape, friction and shearing, hot water or constrictive clothing  -Relieve pressure over bony prominences using wedges  -Do not massage red bony areas    Outcome: Progressing     Problem: Prexisting or High Potential for Compromised Skin Integrity  Goal: Skin integrity is maintained or improved  Description: INTERVENTIONS:  - Identify patients at risk for skin breakdown  - Assess and monitor skin integrity  - Assess and monitor nutrition and hydration status  - Monitor labs   - Assess for incontinence   - Turn and reposition patient  - Assist with mobility/ambulation  - Relieve pressure over bony prominences  - Avoid friction and shearing  - Provide appropriate hygiene as needed including keeping skin clean and dry  - Evaluate need for skin moisturizer/barrier cream  - Collaborate with interdisciplinary team   - Patient/family teaching  - Consider wound care consult   Outcome: Progressing     Problem: PAIN - ADULT  Goal: Verbalizes/displays adequate comfort level or baseline comfort level  Description: Interventions:  - Encourage patient to monitor pain and request assistance  - Assess pain using appropriate pain scale  - Administer analgesics based on type and severity of pain and evaluate response  - Implement non-pharmacological measures as appropriate and evaluate response  - Consider cultural and social influences on pain and pain management  - Notify physician/advanced practitioner if interventions unsuccessful or patient reports new pain  Outcome: Progressing     Problem: INFECTION - ADULT  Goal: Absence or prevention of progression during hospitalization  Description: INTERVENTIONS:  - Assess and monitor for signs and symptoms of infection  - Monitor lab/diagnostic results  - Monitor all insertion sites, i e  indwelling lines, tubes, and drains  - Monitor endotracheal if appropriate and nasal secretions for changes in amount and color  - [place] appropriate cooling/warming therapies per order  - Administer medications as ordered  - Instruct and encourage patient and family to use good hand hygiene technique  - Identify and instruct in appropriate isolation precautions for identified infection/condition  Outcome: Progressing  Goal: Absence of fever/infection during neutropenic period  Description: INTERVENTIONS:  - Monitor WBC    Outcome: Progressing     Problem: SAFETY ADULT  Goal: Maintain or return to baseline ADL function  Description: INTERVENTIONS:  -  Assess patient's ability to carry out ADLs; assess patient's baseline for ADL function and identify physical deficits which impact ability to perform ADLs (bathing, care of mouth/teeth, toileting, grooming, dressing, etc )  - Assess/evaluate cause of self-care deficits   - Assess range of motion  - Assess patient's mobility; develop plan if impaired  - Assess patient's need for assistive devices and provide as appropriate  - Encourage maximum independence but intervene and supervise when necessary  - Involve family in performance of ADLs  - Assess for home care needs following discharge   - Consider OT consult to assist with ADL evaluation and planning for discharge  - Provide patient education as appropriate  Outcome: Progressing  Goal: Maintains/Returns to pre admission functional level  Description: INTERVENTIONS:  - Perform BMAT or MOVE assessment daily    - Set and communicate daily mobility goal to care team and patient/family/caregiver     - Collaborate with rehabilitation services on mobility goals if consulted  - Record patient progress and toleration of activity level   Outcome: Progressing  Goal: Patient will remain free of falls  Description: INTERVENTIONS:  - Educate patient/family on patient safety including physical limitations  - Instruct patient to call for assistance with activity   - Consult OT/PT to assist with strengthening/mobility   - Keep Call bell within reach  - Keep bed low and locked with side rails adjusted as appropriate  - Keep care items and personal belongings within reach  - Initiate and maintain comfort rounds  - Make Fall Risk Sign visible to staff  - Initiate/Maintain bed and chair alarm  - Apply yellow socks and bracelet for high fall risk patients  - Consider moving patient to room near nurses station  Outcome: Progressing     Problem: DISCHARGE PLANNING  Goal: Discharge to home or other facility with appropriate resources  Description: INTERVENTIONS:  - Identify barriers to discharge w/patient and caregiver  - Arrange for needed discharge resources and transportation as appropriate  - Identify discharge learning needs (meds, wound care, etc )  - Arrange for interpretive services to assist at discharge as needed  - Refer to Case Management Department for coordinating discharge planning if the patient needs post-hospital services based on physician/advanced practitioner order or complex needs related to functional status, cognitive ability, or social support system  Outcome: Progressing     Problem: Knowledge Deficit  Goal: Patient/family/caregiver demonstrates understanding of disease process, treatment plan, medications, and discharge instructions  Description: Complete learning assessment and assess knowledge base  Interventions:  - Provide teaching at level of understanding  - Provide teaching via preferred learning methods  Outcome: Progressing     Problem: Nutrition/Hydration-ADULT  Goal: Nutrient/Hydration intake appropriate for improving, restoring or maintaining nutritional needs  Description: Monitor and assess patient's nutrition/hydration status for malnutrition  Collaborate with interdisciplinary team and initiate plan and interventions as ordered  Monitor patient's weight and dietary intake as ordered or per policy  Utilize nutrition screening tool and intervene as necessary  Determine patient's food preferences and provide high-protein, high-caloric foods as appropriate       INTERVENTIONS:  - Monitor oral intake, urinary output, labs, and treatment plans  - Assess nutrition and hydration status and recommend course of action  - Evaluate amount of meals eaten  - Assist patient with eating if necessary   - Allow adequate time for meals  - Recommend/ encourage appropriate diets, oral nutritional supplements, and vitamin/mineral supplements  - Order, calculate, and assess calorie counts as needed  - Recommend, monitor, and adjust tube feedings and TPN/PPN based on assessed needs  - Assess need for intravenous fluids  - Provide specific nutrition/hydration education as appropriate  - Include patient/family/caregiver in decisions related to nutrition  Outcome: Progressing

## 2023-02-16 NOTE — PLAN OF CARE
Problem: GASTROINTESTINAL - ADULT  Goal: Maintains or returns to baseline bowel function  Description: INTERVENTIONS:  - Assess bowel function  - Encourage oral fluids to ensure adequate hydration  - Administer IV fluids if ordered to ensure adequate hydration  - Administer ordered medications as needed  - Encourage mobilization and activity  - Consider nutritional services referral to assist patient with adequate nutrition and appropriate food choices  Outcome: Progressing  Goal: Maintains adequate nutritional intake  Description: INTERVENTIONS:  - Monitor percentage of each meal consumed  - Identify factors contributing to decreased intake, treat as appropriate  - Assist with meals as needed  - Monitor I&O, weight, and lab values if indicated  - Obtain nutrition services referral as needed  Outcome: Progressing  Goal: Oral mucous membranes remain intact  Description: INTERVENTIONS  - Assess oral mucosa and hygiene practices  - Implement preventative oral hygiene regimen  - Implement oral medicated treatments as ordered  - Initiate Nutrition services referral as needed  Outcome: Progressing     Problem: GENITOURINARY - ADULT  Goal: Maintains or returns to baseline urinary function  Description: INTERVENTIONS:  - Assess urinary function  - Encourage oral fluids to ensure adequate hydration if ordered  - Administer IV fluids as ordered to ensure adequate hydration  - Administer ordered medications as needed  - Offer frequent toileting  - Follow urinary retention protocol if ordered  Outcome: Progressing     Problem: PAIN - ADULT  Goal: Verbalizes/displays adequate comfort level or baseline comfort level  Description: Interventions:  - Encourage patient to monitor pain and request assistance  - Assess pain using appropriate pain scale  - Administer analgesics based on type and severity of pain and evaluate response  - Implement non-pharmacological measures as appropriate and evaluate response  - Consider cultural and social influences on pain and pain management  - Notify physician/advanced practitioner if interventions unsuccessful or patient reports new pain  Outcome: Progressing     Problem: INFECTION - ADULT  Goal: Absence or prevention of progression during hospitalization  Description: INTERVENTIONS:  - Assess and monitor for signs and symptoms of infection  - Monitor lab/diagnostic results  - Monitor all insertion sites, i e  indwelling lines, tubes, and drains  - Monitor endotracheal if appropriate and nasal secretions for changes in amount and color  - [place] appropriate cooling/warming therapies per order  - Administer medications as ordered  - Instruct and encourage patient and family to use good hand hygiene technique  - Identify and instruct in appropriate isolation precautions for identified infection/condition  Outcome: Progressing  Goal: Absence of fever/infection during neutropenic period  Description: INTERVENTIONS:  - Monitor WBC    Outcome: Progressing     Problem: DISCHARGE PLANNING  Goal: Discharge to home or other facility with appropriate resources  Description: INTERVENTIONS:  - Identify barriers to discharge w/patient and caregiver  - Arrange for needed discharge resources and transportation as appropriate  - Identify discharge learning needs (meds, wound care, etc )  - Arrange for interpretive services to assist at discharge as needed  - Refer to Case Management Department for coordinating discharge planning if the patient needs post-hospital services based on physician/advanced practitioner order or complex needs related to functional status, cognitive ability, or social support system  Outcome: Progressing     Problem: Knowledge Deficit  Goal: Patient/family/caregiver demonstrates understanding of disease process, treatment plan, medications, and discharge instructions  Description: Complete learning assessment and assess knowledge base    Interventions:  - Provide teaching at level of understanding  - Provide teaching via preferred learning methods  Outcome: Progressing     Problem: Nutrition/Hydration-ADULT  Goal: Nutrient/Hydration intake appropriate for improving, restoring or maintaining nutritional needs  Description: Monitor and assess patient's nutrition/hydration status for malnutrition  Collaborate with interdisciplinary team and initiate plan and interventions as ordered  Monitor patient's weight and dietary intake as ordered or per policy  Utilize nutrition screening tool and intervene as necessary  Determine patient's food preferences and provide high-protein, high-caloric foods as appropriate       INTERVENTIONS:  - Monitor oral intake, urinary output, labs, and treatment plans  - Assess nutrition and hydration status and recommend course of action  - Evaluate amount of meals eaten  - Assist patient with eating if necessary   - Allow adequate time for meals  - Recommend/ encourage appropriate diets, oral nutritional supplements, and vitamin/mineral supplements  - Order, calculate, and assess calorie counts as needed  - Recommend, monitor, and adjust tube feedings and TPN/PPN based on assessed needs  - Assess need for intravenous fluids  - Provide specific nutrition/hydration education as appropriate  - Include patient/family/caregiver in decisions related to nutrition  Outcome: Progressing

## 2023-02-16 NOTE — RESTORATIVE TECHNICIAN NOTE
Restorative Technician Note      Patient Name: Giovanni Quintero     Restorative Tech Visit Date: 02/16/23  Note Type: Mobility  Patient Position Upon Consult: Bedside chair  Activity Performed: Repositioned; Other (Comment) (Assisted PCA with linen change and weight shifting)  Assistive Device: Other (Comment) (Ax2)  Education Provided: Yes  Patient Position at End of Consult: Bedside chair;  All needs within reach; Bed/Chair alarm activated    Marilee Palomo  DPT, Restorative Technician

## 2023-02-16 NOTE — QUICK NOTE
Nurse-Patient-Provider rounds were completed with the patient's nurse today, lynn  We discussed the plan is to change midline vac tomorrow, finish pricing DOAC today with pharmacy,Vac Paperwork completed and given to , Brandi Roca    We reviewed all of the invasive devices/lines/telemetry orders   - None  L PICC    DVT Prophylaxis:  Venodynes    Pain Assessment / Plan:  - Continue current analgesic regimen  Mobility Assessment / Plan:  - Activity as tolerated  Goals / Barriers for discharge:  Monitor labs  CT C/A?p today  Pricing DOAc's for discharge  Vac paperwork completed  Case management following; case and discharge needs discussed  All questions and concerns were addressed  I spent greater than 7 minutes reviewing the plan with the patient and the nurse, and coordinating care for the day      BALTAZAR Reid  2/16/2023

## 2023-02-16 NOTE — RESTORATIVE TECHNICIAN NOTE
Restorative Technician Note      Patient Name: Kylie De La Cruz     [facility] Tech Visit Date: 02/16/23  Note Type: Mobility  Patient Position Upon Consult: Bedside chair  Activity Performed: Transferred; Other (Comment) (slide board)  Assistive Device: Other (Comment) (Ax2)  Patient Position at End of Consult: Supine; All needs within reach;  Other (comment) (left in the care of the transport team)    Landy Oliveira  DPT, Restorative Technician

## 2023-02-16 NOTE — CASE MANAGEMENT
Case Management Discharge Planning Note    Patient name Inna Montgomery  Location [facility] 811/[facility] 986-78 MRN [id number]  : 1945 Date 2023       Current Admission Date: 2023  Current Admission Diagnosis:Perforated bowel [facility])   Patient Active Problem List    Diagnosis Date Noted   • Moderate protein-calorie malnutrition ([facility] ) 02/15/2023   • Lower abdominal pain 2023   • Perforated bowel ([facility] [address] ) 2023   • Strangulated ventral hernia 2023   • GERD (gastroesophageal reflux disease) 2023   • History of right breast cancer 2023   • History of right mastectomy 2023   • Iron deficiency anemia, unspecified 2022   • History of DVT (deep vein thrombosis) 2022   • Thyroid disorder 2022   • Osteoporosis due to aromatase inhibitor 2022   • History of diabetes mellitus 2022   • Anxiety about health 2021   • Diabetic ulcer of toe of left foot associated with type 2 diabetes mellitus, limited to breakdown of skin ([facility] ) 2021   • Abnormal tumor markers 2020   • Essential hypertension 10/20/2020   • D-dimer, elevated 2019   • Type 2 diabetes mellitus without complications ([facility] ) 10/17/7257   • Other osteoporosis without current pathological fracture 2018   • 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 2018   • Personal history of other malignant neoplasm of large intestine 2018   • Malignant neoplasm of right breast in female, estrogen receptor positive ([facility] ) 2018   • Malignant neoplasm of ascending colon ([facility] ) 2018   • Chronic deep vein thrombosis (DVT) of distal vein of left lower extremity ([facility] ) 2018   • Thyroid nodule 2018      LOS (days): 7  Geometric Mean LOS (GMLOS) (days): 9 80  Days to GMLOS:2 6     OBJECTIVE:  Risk of Unplanned Readmission Score: 17 35         Current admission status: Inpatient   Preferred Pharmacy:   [address] [place] [address] #[id number] [place] [address]  Phone: [phone] Fax: [address] [place] [address] [facility] [address] [place] [address] [address]  Phone: [phone] Fax: [phone]    Primary Care Provider: José London DO    Primary Insurance: [employer] [facility]  Secondary Insurance:     DISCHARGE DETAILS:              Other Referral/Resources/Interventions Provided:  Referral Comments: Pt accepted by Francie Li for rehab when medically cleared for DC  CM reviewed placement with pt, who was agreeable  CM will follow

## 2023-02-17 ENCOUNTER — APPOINTMENT (INPATIENT)
Dept: RADIOLOGY | Facility: HOSPITAL | Age: 78
End: 2023-02-17

## 2023-02-17 LAB
ANION GAP SERPL CALCULATED.3IONS-SCNC: 8 MMOL/L (ref 4–13)
ANISOCYTOSIS BLD QL SMEAR: PRESENT
APTT PPP: 67 SECONDS (ref 23–37)
BACTERIA UR QL AUTO: NORMAL /HPF
BASOPHILS # BLD MANUAL: 0.13 THOUSAND/UL (ref 0–0.1)
BASOPHILS NFR MAR MANUAL: 1 % (ref 0–1)
BILIRUB UR QL STRIP: NEGATIVE
BUN SERPL-MCNC: 4 MG/DL (ref 5–25)
CALCIUM SERPL-MCNC: 8.1 MG/DL (ref 8.3–10.1)
CHLORIDE SERPL-SCNC: 101 MMOL/L (ref 96–108)
CLARITY UR: CLEAR
CO2 SERPL-SCNC: 24 MMOL/L (ref 21–32)
COLOR UR: NORMAL
CREAT SERPL-MCNC: 0.4 MG/DL (ref 0.6–1.3)
EOSINOPHIL # BLD MANUAL: 0.53 THOUSAND/UL (ref 0–0.4)
EOSINOPHIL NFR BLD MANUAL: 4 % (ref 0–6)
ERYTHROCYTE [DISTWIDTH] IN BLOOD BY AUTOMATED COUNT: 17.3 % (ref 11.6–15.1)
GFR SERPL CREATININE-BSD FRML MDRD: 100 ML/MIN/1.73SQ M
GLUCOSE SERPL-MCNC: 143 MG/DL (ref 65–140)
GLUCOSE SERPL-MCNC: 157 MG/DL (ref 65–140)
GLUCOSE SERPL-MCNC: 180 MG/DL (ref 65–140)
GLUCOSE SERPL-MCNC: 195 MG/DL (ref 65–140)
GLUCOSE SERPL-MCNC: 196 MG/DL (ref 65–140)
GLUCOSE SERPL-MCNC: 206 MG/DL (ref 65–140)
GLUCOSE UR STRIP-MCNC: NEGATIVE MG/DL
HCT VFR BLD AUTO: 30.7 % (ref 34.8–46.1)
HGB BLD-MCNC: 9.5 G/DL (ref 11.5–15.4)
HGB UR QL STRIP.AUTO: NEGATIVE
KETONES UR STRIP-MCNC: NEGATIVE MG/DL
LEUKOCYTE ESTERASE UR QL STRIP: NEGATIVE
LYMPHOCYTES # BLD AUTO: 0.4 THOUSAND/UL (ref 0.6–4.47)
LYMPHOCYTES # BLD AUTO: 3 % (ref 14–44)
MCH RBC QN AUTO: 28.4 PG (ref 26.8–34.3)
MCHC RBC AUTO-ENTMCNC: 30.9 G/DL (ref 31.4–37.4)
MCV RBC AUTO: 92 FL (ref 82–98)
MONOCYTES # BLD AUTO: 0.53 THOUSAND/UL (ref 0–1.22)
MONOCYTES NFR BLD: 4 % (ref 4–12)
MYELOCYTES NFR BLD MANUAL: 2 % (ref 0–1)
NEUTROPHILS # BLD MANUAL: 11.16 THOUSAND/UL (ref 1.85–7.62)
NEUTS SEG NFR BLD AUTO: 84 % (ref 43–75)
NITRITE UR QL STRIP: NEGATIVE
NON-SQ EPI CELLS URNS QL MICRO: NORMAL /HPF
OVALOCYTES BLD QL SMEAR: PRESENT
PH UR STRIP.AUTO: 7 [PH]
PLATELET # BLD AUTO: 211 THOUSANDS/UL (ref 149–390)
PLATELET BLD QL SMEAR: ADEQUATE
PMV BLD AUTO: 10 FL (ref 8.9–12.7)
POIKILOCYTOSIS BLD QL SMEAR: PRESENT
POLYCHROMASIA BLD QL SMEAR: PRESENT
POTASSIUM SERPL-SCNC: 3.4 MMOL/L (ref 3.5–5.3)
PROT UR STRIP-MCNC: NEGATIVE MG/DL
RBC # BLD AUTO: 3.34 MILLION/UL (ref 3.81–5.12)
RBC #/AREA URNS AUTO: NORMAL /HPF
RBC MORPH BLD: PRESENT
SODIUM SERPL-SCNC: 133 MMOL/L (ref 135–147)
SP GR UR STRIP.AUTO: 1.01 (ref 1–1.03)
UROBILINOGEN UR STRIP-ACNC: <2 MG/DL
VARIANT LYMPHS # BLD AUTO: 2 %
WBC # BLD AUTO: 13.28 THOUSAND/UL (ref 4.31–10.16)
WBC #/AREA URNS AUTO: NORMAL /HPF

## 2023-02-17 RX ADMIN — INSULIN LISPRO 2 UNITS: 100 INJECTION, SOLUTION INTRAVENOUS; SUBCUTANEOUS at 12:21

## 2023-02-17 RX ADMIN — THIAMINE HCL TAB 100 MG 100 MG: 100 TAB at 09:52

## 2023-02-17 RX ADMIN — DEXTROSE, SODIUM CHLORIDE, AND POTASSIUM CHLORIDE 50 ML/HR: 5; .45; .15 INJECTION INTRAVENOUS at 10:10

## 2023-02-17 RX ADMIN — INSULIN LISPRO 2 UNITS: 100 INJECTION, SOLUTION INTRAVENOUS; SUBCUTANEOUS at 17:10

## 2023-02-17 RX ADMIN — Medication 1 TABLET: at 09:52

## 2023-02-17 RX ADMIN — PIPERACILLIN SODIUM AND TAZOBACTAM SODIUM 3.38 G: 36; 4.5 INJECTION, POWDER, LYOPHILIZED, FOR SOLUTION INTRAVENOUS at 17:11

## 2023-02-17 RX ADMIN — HEPARIN SODIUM 22 UNITS/KG/HR: 10000 INJECTION, SOLUTION INTRAVENOUS at 02:17

## 2023-02-17 RX ADMIN — PIPERACILLIN SODIUM AND TAZOBACTAM SODIUM 3.38 G: 36; 4.5 INJECTION, POWDER, LYOPHILIZED, FOR SOLUTION INTRAVENOUS at 09:52

## 2023-02-17 RX ADMIN — INSULIN LISPRO 2 UNITS: 100 INJECTION, SOLUTION INTRAVENOUS; SUBCUTANEOUS at 21:20

## 2023-02-17 RX ADMIN — BIMATOPROST 1 DROP: 0.1 SOLUTION/ DROPS OPHTHALMIC at 21:20

## 2023-02-17 RX ADMIN — PANTOPRAZOLE SODIUM 40 MG: 40 INJECTION, POWDER, FOR SOLUTION INTRAVENOUS at 05:50

## 2023-02-17 RX ADMIN — GADOBUTROL 6 ML: 604.72 INJECTION INTRAVENOUS at 23:41

## 2023-02-17 RX ADMIN — RIVAROXABAN 10 MG: 10 TABLET, FILM COATED ORAL at 17:10

## 2023-02-17 NOTE — PROGRESS NOTES
Pastoral Care Progress Note    2023  Patient: Sandra Ayala : 1945  Admission Date & Time: 2023 1123  MRN: [id number] CSN: [id number]      Went to visit Cierajuan jose Lockwood but staff was working with her in room, unable to see her

## 2023-02-17 NOTE — SPEECH THERAPY NOTE
Speech Language/Pathology    Speech/Language Pathology Progress Note    Patient Name: Randee HACKETT Date: 2/17/2023     Problem List  Principal Problem:    Perforated bowel ([facility] [address] )  Active Problems:    Personal history of other malignant neoplasm of large intestine    Type 2 diabetes mellitus without complications ([facility] )    Essential hypertension    Strangulated ventral hernia    GERD (gastroesophageal reflux disease)    History of right breast cancer    History of right mastectomy    Moderate protein-calorie malnutrition ([facility] )       Past Medical History  Past Medical History:   Diagnosis Date   • Acute embolism and thrombosis of deep vein of lower extremity (HCC)    • Adenocarcinoma of colon, Duke's A ([facility] )    • Breast cancer ([facility] ) 2012    Right Breast    • Cancer ([facility] )    • Diabetes mellitus ([facility] )    • DVT (deep venous thrombosis) (HCC)    • GERD (gastroesophageal reflux disease)    • Hypertension    • Pes planus         Past Surgical History  Past Surgical History:   Procedure Laterality Date   • COLONOSCOPY     • HERNIA REPAIR N/A 2/9/2023    Procedure: EXPLORATORY LAPAROTOMY; ABDOMINAL WALL DEBRIDEMENT; REDUCTION VENTRAL HERNIA; LEFT COLON RESECTION; CREATION ILEOSTOMY; WASHOUT;  Surgeon: Brittany Mari DO;  Location: [facility];  Service: General   • HYSTERECTOMY      complete @ age 29   • IR PORT REMOVAL  9/12/2018   • MASTECTOMY Right 2012   • MD COLONOSCOPY FLX DX W/COLLJ SPEC WHEN PFRMD N/A 8/23/2016    Procedure: COLONOSCOPY;  Surgeon: Chalino Ariza MD;  Location: [facility]; Service: Colorectal   • MD COLONOSCOPY FLX DX W/COLLJ SPEC WHEN PFRMD N/A 9/5/2017    Procedure: COLONOSCOPY;  Surgeon: Chalino Ariza MD;  Location: [facility]; Service: Colorectal   • MD ESOPHAGOGASTRODUODENOSCOPY TRANSORAL DIAGNOSTIC N/A 9/5/2017    Procedure: ESOPHAGOGASTRODUODENOSCOPY (EGD); Surgeon: Silvia Heredia DO;  Location: [facility]; Service: Gastroenterology         Subjective:  Pt upright OOB in chair  Current diet: puree, nt  Objective:  Pt seen for trials w/ thin liquids, seen w/ straw sips thin water (at least 8oz)  Adequate draw from straw w/ prompt transfers and mult swallows though there is no overt gagging or coughing  Pt takes controlled sips from straw  Pt declined the solids and the puree today, just had applesauce w/ crushed meds  Assessment:  Pt tolerates straw sips thin bedside w/o overt s/s aspiration  Plan/Recommendations:  Change to puree with thin liquids    Will follow for upgrades of the solids as able

## 2023-02-17 NOTE — PHYSICAL THERAPY NOTE
PHYSICAL THERAPY NOTE          Patient Name: Trung De La Vega  EIRNK'L Date: 2/17/2023 02/17/23 1122   PT Last Visit   PT Visit Date 02/17/23   Note Type   Note Type Treatment   Education   Education Provided Yes   End of Consult   Patient Position at End of Consult Bedside chair; All needs within reach   Pain Assessment   Pain Assessment Tool 0-10   Pain Score No Pain   Restrictions/Precautions   Weight Bearing Precautions Per Order No   Other Precautions Chair Alarm; Bed Alarm; Fall Risk;Pain;Multiple lines  (wound vac)   General   Chart Reviewed Yes   Cognition   Arousal/Participation Alert; Responsive   Attention Attends with cues to redirect   Following Commands Follows one step commands with increased time or repetition   Comments Pt pleasant and motivated to participate   Bed Mobility   Supine to Sit 3  Moderate assistance   Additional items Assist x 2; Increased time required;Verbal cues;LE management;HOB elevated; Bedrails   Sit to Supine Unable to assess   Additional Comments Pt assisted to EOB with assist x 2  Pt maintains sitting initially with min assist for trunk managmenet, but improves to supervision  Transfers   Sit to Stand 2  Maximal assistance   Additional items Assist x 2; Increased time required;Verbal cues   Stand to Sit 2  Maximal assistance   Additional items Assist x 2; Increased time required;Verbal cues   Sit pivot 2  Maximal assistance   Additional items Assist x 2; Increased time required;Verbal cues   Additional Comments STS from chair using RW     Balance   Static Sitting Fair +   Dynamic Sitting Fair   Static Standing Fair -   Dynamic Standing Poor +   Endurance Deficit   Endurance Deficit Yes   Activity Tolerance   Activity Tolerance Patient limited by fatigue   Medical Staff Made Aware OT [address] [facility]   Nurse Made Aware RN cleared pt for therapy   Assessment   Prognosis Fair   Problem List Decreased endurance;Decreased mobility; Impaired balance;Pain   Assessment Pt seen for PT treatment session this date  Therapy session focused on bed mobility, functional transfers,in order to improve overall mobility and independence  Pt requires mod assist x 2 for bed mobility, increased tolerance with sitting this date  Pt completes sit pivot transfer from bed to chair with assist x 2  Pt performs 2 STS transfers with max assist X 2, standing tolerance ~ 2 mins each stand  Pt able to perform lateral weight shifts with 2nd stand with assist    Pt making steady progress toward goals  Pt was left in recliner at the end of PT session with all needs in reach, alarm on   Pt would benefit from continued PT services while in hospital to address remaining limitations  The patient's AM-[facility] Basic Mobility Inpatient Short Form Raw Score is 10  A Raw score of less than or equal to 16 suggests the patient may benefit from discharge to post-acute rehabilitation services  Please also refer to the recommendation of the Physical Therapist for safe discharge planning  Barriers to Discharge Decreased caregiver support; Inaccessible home environment   Goals   Patient Goals to stand more   STG Expiration Date 02/25/23   Plan   Treatment/Interventions Gait training;Bed mobility;LE strengthening/ROM; Functional transfer training;OT   Progress Progressing toward goals   PT Frequency 2-3x/wk   Recommendation   PT Discharge Recommendation Post acute rehabilitation services   AM-PAC Basic Mobility Inpatient   Turning in Flat Bed Without Bedrails 2   Lying on Back to Sitting on Edge of Flat Bed Without Bedrails 2   Moving Bed to Chair 2   Standing Up From Chair Using Arms 2   Walk in Room 1   Climb 3-5 Stairs With Railing 1   Basic Mobility Inpatient Raw Score 10   Turning Head Towards Sound 3   Follow Simple Instructions 3   Low Function Basic Mobility Raw Score 16   Low Function Basic Mobility Standardized Score 25 72   Highest Level Of Mobility JH-HLM Goal 4: Move to chair/commode   JH-HLM Achieved 4: Move to [employer]   Education Provided Mobility training   Patient Reinforcement needed;Demonstrates verbal understanding     Darling Pelayo PT DPT

## 2023-02-17 NOTE — PROCEDURES
eout was performed with the medical student  prior to beginning the dressing change  The nurse remained present to confirm the correct dressing counts on removal of the VAC dressing and was debriefed at the completion of the dressing change  Location of wound: mid abdomen / umbilical region    Dressings and Foam removed:  1 tegaderm  Dressings  1 Black Foam  0 White Foam    Dimensions of wound: 11 cm x 2 cm x 3 5 cm    Description of wound: On inspection of the wound today, clean   There was no necrotic or nonviable tissue requiring debridement  [place] Approximately > 75% of the wound base is now pink and healthy and there is granulation tissue  The periwound skin remains clean and intact and unremarkable  VAC dressing application:  The periwound skin was cleaned and dried  tegadrm dressings, 1 black foam, 0 white foamwere cut to size of the wound and placed into the wound on top of a piece of adaptic  The dressings were then covered with VAC drape  The track pad was then placed over the base of black foam  The VAC was then set to 125 mmHg low Continuous suction  The patient tolerated the procedure well and there were no complications  The patient did not require any excisional debridement during today's dressing change  VAC settings:  125 mmHg  Continuous    Wound Images:  Haiku not working, no image available today    Additional Notes: The [facility] sticker placed over the dressing per protocol  The next [facility] dressing change will be planned for Monday, 2/20/23  The [facility] paperwork was completed and give to the case management staff to arrange home VAC therapy  Med Student was present for the dressing change  This dressing change took greater than 35 minutes to complete  Patient tolerated procedure well  Ostomy bag also changed since it was laying over the tegaderm and came loose

## 2023-02-17 NOTE — OCCUPATIONAL THERAPY NOTE
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 ([facility] )    Essential hypertension    Strangulated ventral hernia    GERD (gastroesophageal reflux disease)    History of right breast cancer    History of right mastectomy    Moderate protein-calorie malnutrition ([facility] [facility] )       02/17/23 1116   OT Last Visit   OT Visit Date 02/17/23   Note Type   Note Type Treatment   Pain Assessment   Pain Assessment Tool 0-10   Restrictions/Precautions   Weight Bearing Precautions Per Order No   Other Precautions Chair Alarm;Cognitive; Bed Alarm;Multiple lines;Telemetry; Fall Risk;Pain  (iliostomy, PICC line L brachial)   Lifestyle   Autonomy I adls and mobility - i iadls   Reciprocal Relationships limited support available per pt   Service to Others retired   [address] sedentary - enjoys watching TV and reading   ADL   Where Assessed Chair   Grooming Assistance 4  Minimal Assistance   Grooming Deficit Setup;Verbal cueing; Increased time to complete   Grooming Comments Pt requires cues to initiate appropriate sequence to brush her teeth, cues to attend to task and cues to terminate task (takes over 5 min brushing her teeth)  UB Dressing Assistance 2  Maximal Assistance   LB Dressing Assistance 1  Total Assistance   LB Dressing Deficit Don/doff R sock; Don/doff L sock   Functional Standing Tolerance   Time 2 min x2   Activity Static standing with Quoc and standby of 2nd person for safety   Comments use of rw with cues for safe hand placement/positioning   Bed Mobility   Supine to Sit 3  Moderate assistance   Additional items Assist x 2; Increased time required;Verbal cues;LE management   Additional Comments Pt requires assist with b/l LE and trunk mgmt, and frequent cues to attend to task and sequence steps appropriately   Transfers   Sit to Stand 2  Maximal assistance   Additional items Assist x 2; Increased time required;Verbal cues   Stand to Sit 2  Maximal assistance   Additional items Assist x 2; Increased time required;Verbal cues   Stand pivot 2  Maximal assistance   Additional items Assist x 2; Increased time required;Verbal cues   Additional Comments STS fluctuates between maxAx2 and modAx2  Use of rw and requires VCs and tactile cues for appropriate positioning  R lateral lean noted seated and in stance  Pt left seated in recliner with all needs within reach, L UE elevated on pillow and chair alarm activated   Subjective   Subjective "That was hard"   Cognition   Overall Cognitive Status Impaired   Arousal/Participation Alert; Responsive; Cooperative   Attention Attends with cues to redirect   Orientation Level Oriented X4   Memory Decreased recall of precautions;Decreased short term memory;Decreased recall of recent events   Following Commands Follows one step commands with increased time or repetition   Comments Requires significantly inc time for processing, repetition of cues and at times tactile cues to attend to directions   Activity Tolerance   Activity Tolerance Patient limited by fatigue   Medical Staff Made Aware PT Ev   Assessment   Assessment Pt is seen for OT treatment session this AM including interventions to: increase independence with functional transfers with all prevention strategies, increase activity tolerance and endurance, improve independence with self care tasks, increase static standing tolerance in preparation for completing self care tasks in stance  In comparison to previous sessions, pt demonstrating significantly inc endurance and standing tolerance; now able to stand for ~2 min 2x with Quoc  Pt requires cues for sequencing and termination of task to complete oral care    Pt is to continue to benefit from skilled occupational therapy to maximize functioning and independence with daily activities  OT to continue to recommend STR upon d/c    Plan   Treatment Interventions ADL retraining;UE strengthening/ROM; Functional transfer training; Endurance training;Cognitive reorientation;Patient/family training;Equipment evaluation/education; Neuromuscular reeducation; Compensatory technique education;Continued evaluation; Activityengagement   Goal Expiration Date 02/25/23   OT Treatment Day 2   OT Frequency 2-3x/wk   Recommendation   Recommendation Geriatric Consult   OT Discharge Recommendation Post acute rehabilitation services   AM-PAC Daily Activity Inpatient   Lower Body Dressing 1   Bathing 2   Toileting 1   Upper Body Dressing 2   Grooming 3   Eating 3   Daily Activity Raw Score 12   Daily Activity Standardized Score (Calc for Raw Score >=11) 30 6       Alesia Montero, MOT, OTR/L, [address]

## 2023-02-17 NOTE — PLAN OF CARE
Problem: OCCUPATIONAL THERAPY ADULT  Goal: Performs self-care activities at highest level of function for planned discharge setting  See evaluation for individualized goals  Description: Treatment Interventions: ADL retraining, Functional transfer training, UE strengthening/ROM, Endurance training, Patient/family training, Equipment evaluation/education, Compensatory technique education, Activityengagement          See flowsheet documentation for full assessment, interventions and recommendations  Outcome: Progressing  Note: Limitation: Decreased ADL status, Decreased endurance, Decreased self-care trans, Decreased high-level ADLs  Prognosis: Good  Assessment: Pt is seen for OT treatment session this AM including interventions to: increase independence with functional transfers with all prevention strategies, increase activity tolerance and endurance, improve independence with self care tasks, increase static standing tolerance in preparation for completing self care tasks in stance  In comparison to previous sessions, pt demonstrating significantly inc endurance and standing tolerance; now able to stand for ~2 min 2x with Qu[place]  Pt requires cues for sequencing and termination of task to complete oral care  Pt is to continue to benefit from skilled occupational therapy to maximize functioning and independence with daily activities  OT to continue to recommend STR upon d/c    Recommendation: Geriatric Consult  OT Discharge Recommendation: Post acute rehabilitation services

## 2023-02-17 NOTE — PROGRESS NOTES
Progress Note -    Twan Dumont 69 y o  female MRN: [id number]  Unit/Bed#: [facility] 925-62 Encounter: [id number]    Ashutosh Kirby is a 69 y o  female with PMH colon cancer s/p lap right hemicolectomy 2/2 ascending colon mass (7/15) p/w incarcerated ventral hernia  S/p ex lap end ileostomy, L colectomy, reduction of ventral hernia on 2/9  Post op course c/b PE on heparin gtt  AVSS/afebrile  Speech/VBS: puree/nectar thin liqs  Stoma pink and functional   Wound vac: minimal   UO: 1 3 L  PLAN:  - VAC change today  - liver MRI today  - plan to transition to [address] on discharge   - PT/OOB/ambulate  - continue hep gtt  - will consider discontinuing abx today (POD 7/source control)  Subjective:   - no new complaints this morning  Objective:     Vitals: Temp:  [97 3 °F (36 3 °C)-97 7 °F (36 5 °C)] 97 3 °F (36 3 °C)  HR:  [] 89  Resp:  [16-20] 20  BP: (118-129)/(66-73) 124/70  Body mass index is 26 52 kg/m²  I/O       02/15 0701  02/16 0700 02/16 0701  02/17 0700 02/17 0701  02/18 0700    P  O   240     I V  (mL/kg) 856 1 (13 9)      Blood       IV Piggyback 1020      Total Intake(mL/kg) 1876 1 (30 5) 240 (3 9)     Urine (mL/kg/hr) 1450 (1) 2040 (1 4)     Drains 0      Stool 400 600     Total Output 1850 2640     Net +26 1 -2400            Unmeasured Urine Occurrence  1 x           Physical Exam:  GEN: chronically ill looking  HEENT: atraumatic  CV: RRR  Lung: Normal effort on room air  Ab: Soft, NT/ND  Wound vac holding suction  Extrem: No CCE  Neuro: A+Ox3    Lab, Imaging and other studies:   I have personally reviewed pertinent reports    , CBC with diff:   Lab Results   Component Value Date    WBC 13 28 (H) 02/17/2023    HGB 9 5 (L) 02/17/2023    HCT 30 7 (L) 02/17/2023    MCV 92 02/17/2023     02/17/2023    MCH 28 4 02/17/2023    MCHC 30 9 (L) 02/17/2023    RDW 17 3 (H) 02/17/2023    MPV 10 0 02/17/2023   , BMP/CMP:   Lab Results   Component Value Date    SODIUM 133 (L) 02/17/2023 K 3 4 (L) 02/17/2023     02/17/2023    CO2 24 02/17/2023    BUN 4 (L) 02/17/2023    CREATININE 0 40 (L) 02/17/2023    CALCIUM 8 1 (L) 02/17/2023    EGFR 100 02/17/2023     VTE Pharmacologic Prophylaxis: Heparin gtt    VTE Mechanical Prophylaxis: sequential compression device

## 2023-02-17 NOTE — PLAN OF CARE
Problem: MOBILITY - ADULT  Goal: Maintain or return to baseline ADL function  Description: INTERVENTIONS:  -  Assess patient's ability to carry out ADLs; assess patient's baseline for ADL function and identify physical deficits which impact ability to perform ADLs (bathing, care of mouth/teeth, toileting, grooming, dressing, etc )  - Assess/evaluate cause of self-care deficits   - Assess range of motion  - Assess patient's mobility; develop plan if impaired  - Assess patient's need for assistive devices and provide as appropriate  - Encourage maximum independence but intervene and supervise when necessary  - Involve family in performance of ADLs  - Assess for home care needs following discharge   - Consider OT consult to assist with ADL evaluation and planning for discharge  - Provide patient education as appropriate  Outcome: Progressing  Goal: Maintains/Returns to pre admission functional level  Description: INTERVENTIONS:  - Perform BMAT or MOVE assessment daily    - Set and communicate daily mobility goal to care team and patient/family/caregiver     - Collaborate with rehabilitation services on mobility goals if consulted  - Record patient progress and toleration of activity level   Outcome: Progressing     Problem: GASTROINTESTINAL - ADULT  Goal: Maintains or returns to baseline bowel function  Description: INTERVENTIONS:  - Assess bowel function  - Encourage oral fluids to ensure adequate hydration  - Administer IV fluids if ordered to ensure adequate hydration  - Administer ordered medications as needed  - Encourage mobilization and activity  - Consider nutritional services referral to assist patient with adequate nutrition and appropriate food choices  Outcome: Progressing  Goal: Maintains adequate nutritional intake  Description: INTERVENTIONS:  - Monitor percentage of each meal consumed  - Identify factors contributing to decreased intake, treat as appropriate  - Assist with meals as needed  - Monitor I&O, weight, and lab values if indicated  - Obtain nutrition services referral as needed  Outcome: Progressing  Goal: Oral mucous membranes remain intact  Description: INTERVENTIONS  - Assess oral mucosa and hygiene practices  - Implement preventative oral hygiene regimen  - Implement oral medicated treatments as ordered  - Initiate Nutrition services referral as needed  Outcome: Progressing     Problem: GENITOURINARY - ADULT  Goal: Maintains or returns to baseline urinary function  Description: INTERVENTIONS:  - Assess urinary function  - Encourage oral fluids to ensure adequate hydration if ordered  - Administer IV fluids as ordered to ensure adequate hydration  - Administer ordered medications as needed  - Offer frequent toileting  - Follow urinary retention protocol if ordered  Outcome: Progressing     Problem: SKIN/TISSUE INTEGRITY - ADULT  Goal: Skin Integrity remains intact(Skin Breakdown Prevention)  Description: Assess:  -Perform Jose assessment every  -Clean and moisturize skin every  -Inspect skin when repositioning, toileting, and assisting with ADLS  -Assess extremities for adequate circulation and sensation     Bed Management:  -Have minimal linens on bed & keep smooth, unwrinkled  -Change linens as needed when moist or perspiring  -Avoid sitting or lying in one position for more than  2 hours while in bed    Toileting:  -Offer bedside commode    Activity:  -Encourage activity and walks on unit  -Encourage or provide ROM exercises   -Turn and reposition patient every 2 Hours  -Use appropriate equipment to lift or move patient in bed    Skin Care:  -Avoid use of baby powder, tape, friction and shearing, hot water or constrictive clothing  -Relieve pressure over bony prominences using wedges  -Do not massage red bony areas    Outcome: Progressing     Problem: Prexisting or High Potential for Compromised Skin Integrity  Goal: Skin integrity is maintained or improved  Description: INTERVENTIONS:  - Identify patients at risk for skin breakdown  - Assess and monitor skin integrity  - Assess and monitor nutrition and hydration status  - Monitor labs   - Assess for incontinence   - Turn and reposition patient  - Assist with mobility/ambulation  - Relieve pressure over bony prominences  - Avoid friction and shearing  - Provide appropriate hygiene as needed including keeping skin clean and dry  - Evaluate need for skin moisturizer/barrier cream  - Collaborate with interdisciplinary team   - Patient/family teaching  - Consider wound care consult   Outcome: Progressing     Problem: PAIN - ADULT  Goal: Verbalizes/displays adequate comfort level or baseline comfort level  Description: Interventions:  - Encourage patient to monitor pain and request assistance  - Assess pain using appropriate pain scale  - Administer analgesics based on type and severity of pain and evaluate response  - Implement non-pharmacological measures as appropriate and evaluate response  - Consider cultural and social influences on pain and pain management  - Notify physician/advanced practitioner if interventions unsuccessful or patient reports new pain  Outcome: Progressing     Problem: INFECTION - ADULT  Goal: Absence or prevention of progression during hospitalization  Description: INTERVENTIONS:  - Assess and monitor for signs and symptoms of infection  - Monitor lab/diagnostic results  - Monitor all insertion sites, i e  indwelling lines, tubes, and drains  - Monitor endotracheal if appropriate and nasal secretions for changes in amount and color  - [place] appropriate cooling/warming therapies per order  - Administer medications as ordered  - Instruct and encourage patient and family to use good hand hygiene technique  - Identify and instruct in appropriate isolation precautions for identified infection/condition  Outcome: Progressing  Goal: Absence of fever/infection during neutropenic period  Description: INTERVENTIONS:  - Monitor WBC    Outcome: Progressing     Problem: SAFETY ADULT  Goal: Maintain or return to baseline ADL function  Description: INTERVENTIONS:  -  Assess patient's ability to carry out ADLs; assess patient's baseline for ADL function and identify physical deficits which impact ability to perform ADLs (bathing, care of mouth/teeth, toileting, grooming, dressing, etc )  - Assess/evaluate cause of self-care deficits   - Assess range of motion  - Assess patient's mobility; develop plan if impaired  - Assess patient's need for assistive devices and provide as appropriate  - Encourage maximum independence but intervene and supervise when necessary  - Involve family in performance of ADLs  - Assess for home care needs following discharge   - Consider OT consult to assist with ADL evaluation and planning for discharge  - Provide patient education as appropriate  Outcome: Progressing  Goal: Maintains/Returns to pre admission functional level  Description: INTERVENTIONS:  - Perform BMAT or MOVE assessment daily    - Set and communicate daily mobility goal to care team and patient/family/caregiver     - Collaborate with rehabilitation services on mobility goals if consulted  - Record patient progress and toleration of activity level   Outcome: Progressing  Goal: Patient will remain free of falls  Description: INTERVENTIONS:  - Educate patient/family on patient safety including physical limitations  - Instruct patient to call for assistance with activity   - Consult OT/PT to assist with strengthening/mobility   - Keep Call bell within reach  - Keep bed low and locked with side rails adjusted as appropriate  - Keep care items and personal belongings within reach  - Initiate and maintain comfort rounds  - Make Fall Risk Sign visible to staff  - Offer Toileting every 2 Hours, in advance of need  - Initiate/Maintain bed alarm  - Obtain necessary fall risk management equipment: bed/chair alarm  - Apply yellow socks and bracelet for high fall risk patients  - Consider moving patient to room near nurses station  Outcome: Progressing     Problem: DISCHARGE PLANNING  Goal: Discharge to home or other facility with appropriate resources  Description: INTERVENTIONS:  - Identify barriers to discharge w/patient and caregiver  - Arrange for needed discharge resources and transportation as appropriate  - Identify discharge learning needs (meds, wound care, etc )  - Arrange for interpretive services to assist at discharge as needed  - Refer to Case Management Department for coordinating discharge planning if the patient needs post-hospital services based on physician/advanced practitioner order or complex needs related to functional status, cognitive ability, or social support system  Outcome: Progressing     Problem: Knowledge Deficit  Goal: Patient/family/caregiver demonstrates understanding of disease process, treatment plan, medications, and discharge instructions  Description: Complete learning assessment and assess knowledge base    Interventions:  - Provide teaching at level of understanding  - Provide teaching via preferred learning methods  Outcome: Progressing

## 2023-02-17 NOTE — PLAN OF CARE
Problem: PHYSICAL THERAPY ADULT  Goal: Performs mobility at highest level of function for planned discharge setting  See evaluation for individualized goals  Description: Treatment/Interventions: Functional transfer training, LE strengthening/ROM, Endurance training, Patient/family training, Bed mobility, Gait training, Spoke to nursing, Spoke to case management, OT          See flowsheet documentation for full assessment, interventions and recommendations  Outcome: Progressing  Note: Prognosis: Fair  Problem List: Decreased endurance, Decreased mobility, Impaired balance, Pain  Assessment: Pt seen for PT treatment session this date  Therapy session focused on bed mobility, functional transfers,in order to improve overall mobility and independence  Pt requires mod assist x 2 for bed mobility, increased tolerance with sitting this date  Pt completes sit pivot transfer from bed to chair with assist x 2  Pt performs 2 STS transfers with max assist X 2, standing tolerance ~ 2 mins each stand  Pt able to perform lateral weight shifts with 2nd stand with assist    Pt making steady progress toward goals  Pt was left in recliner at the end of PT session with all needs in reach, alarm on   Pt would benefit from continued PT services while in hospital to address remaining limitations  The patient's AM-PAC Basic Mobility Inpatient Short Form Raw Score is 10  A Raw score of less than or equal to 16 suggests the patient may benefit from discharge to post-acute rehabilitation services  Please also refer to the recommendation of the Physical Therapist for safe discharge planning  Barriers to Discharge: Decreased caregiver support, Inaccessible home environment     PT Discharge Recommendation: Post acute rehabilitation services    See flowsheet documentation for full assessment

## 2023-02-17 NOTE — PLAN OF CARE
Problem: GASTROINTESTINAL - ADULT  Goal: Maintains or returns to baseline bowel function  Description: INTERVENTIONS:  - Assess bowel function  - Encourage oral fluids to ensure adequate hydration  - Administer IV fluids if ordered to ensure adequate hydration  - Administer ordered medications as needed  - Encourage mobilization and activity  - Consider nutritional services referral to assist patient with adequate nutrition and appropriate food choices  Outcome: Progressing  Goal: Maintains adequate nutritional intake  Description: INTERVENTIONS:  - Monitor percentage of each meal consumed  - Identify factors contributing to decreased intake, treat as appropriate  - Assist with meals as needed  - Monitor I&O, weight, and lab values if indicated  - Obtain nutrition services referral as needed  Outcome: Progressing  Goal: Oral mucous membranes remain intact  Description: INTERVENTIONS  - Assess oral mucosa and hygiene practices  - Implement preventative oral hygiene regimen  - Implement oral medicated treatments as ordered  - Initiate Nutrition services referral as needed  Outcome: Progressing     Problem: GENITOURINARY - ADULT  Goal: Maintains or returns to baseline urinary function  Description: INTERVENTIONS:  - Assess urinary function  - Encourage oral fluids to ensure adequate hydration if ordered  - Administer IV fluids as ordered to ensure adequate hydration  - Administer ordered medications as needed  - Offer frequent toileting  - Follow urinary retention protocol if ordered  Outcome: Progressing     Problem: PAIN - ADULT  Goal: Verbalizes/displays adequate comfort level or baseline comfort level  Description: Interventions:  - Encourage patient to monitor pain and request assistance  - Assess pain using appropriate pain scale  - Administer analgesics based on type and severity of pain and evaluate response  - Implement non-pharmacological measures as appropriate and evaluate response  - Consider cultural and social influences on pain and pain management  - Notify physician/advanced practitioner if interventions unsuccessful or patient reports new pain  Outcome: Progressing     Problem: INFECTION - ADULT  Goal: Absence or prevention of progression during hospitalization  Description: INTERVENTIONS:  - Assess and monitor for signs and symptoms of infection  - Monitor lab/diagnostic results  - Monitor all insertion sites, i e  indwelling lines, tubes, and drains  - Monitor endotracheal if appropriate and nasal secretions for changes in amount and color  - [place] appropriate cooling/warming therapies per order  - Administer medications as ordered  - Instruct and encourage patient and family to use good hand hygiene technique  - Identify and instruct in appropriate isolation precautions for identified infection/condition  Outcome: Progressing  Goal: Absence of fever/infection during neutropenic period  Description: INTERVENTIONS:  - Monitor WBC    Outcome: Progressing     Problem: DISCHARGE PLANNING  Goal: Discharge to home or other facility with appropriate resources  Description: INTERVENTIONS:  - Identify barriers to discharge w/patient and caregiver  - Arrange for needed discharge resources and transportation as appropriate  - Identify discharge learning needs (meds, wound care, etc )  - Arrange for interpretive services to assist at discharge as needed  - Refer to Case Management Department for coordinating discharge planning if the patient needs post-hospital services based on physician/advanced practitioner order or complex needs related to functional status, cognitive ability, or social support system  Outcome: Progressing     Problem: Knowledge Deficit  Goal: Patient/family/caregiver demonstrates understanding of disease process, treatment plan, medications, and discharge instructions  Description: Complete learning assessment and assess knowledge base    Interventions:  - Provide teaching at level of understanding  - Provide teaching via preferred learning methods  Outcome: Progressing     Problem: Nutrition/Hydration-ADULT  Goal: Nutrient/Hydration intake appropriate for improving, restoring or maintaining nutritional needs  Description: Monitor and assess patient's nutrition/hydration status for malnutrition  Collaborate with interdisciplinary team and initiate plan and interventions as ordered  Monitor patient's weight and dietary intake as ordered or per policy  Utilize nutrition screening tool and intervene as necessary  Determine patient's food preferences and provide high-protein, high-caloric foods as appropriate       INTERVENTIONS:  - Monitor oral intake, urinary output, labs, and treatment plans  - Assess nutrition and hydration status and recommend course of action  - Evaluate amount of meals eaten  - Assist patient with eating if necessary   - Allow adequate time for meals  - Recommend/ encourage appropriate diets, oral nutritional supplements, and vitamin/mineral supplements  - Order, calculate, and assess calorie counts as needed  - Recommend, monitor, and adjust tube feedings and TPN/PPN based on assessed needs  - Assess need for intravenous fluids  - Provide specific nutrition/hydration education as appropriate  - Include patient/family/caregiver in decisions related to nutrition  Outcome: Progressing

## 2023-02-18 LAB
ANION GAP SERPL CALCULATED.3IONS-SCNC: 7 MMOL/L (ref 4–13)
APTT PPP: 31 SECONDS (ref 23–37)
BUN SERPL-MCNC: 7 MG/DL (ref 5–25)
CALCIUM SERPL-MCNC: 8.5 MG/DL (ref 8.3–10.1)
CHLORIDE SERPL-SCNC: 102 MMOL/L (ref 96–108)
CO2 SERPL-SCNC: 24 MMOL/L (ref 21–32)
CREAT SERPL-MCNC: 0.45 MG/DL (ref 0.6–1.3)
ERYTHROCYTE [DISTWIDTH] IN BLOOD BY AUTOMATED COUNT: 17.5 % (ref 11.6–15.1)
GFR SERPL CREATININE-BSD FRML MDRD: 96 ML/MIN/1.73SQ M
GLUCOSE SERPL-MCNC: 128 MG/DL (ref 65–140)
GLUCOSE SERPL-MCNC: 134 MG/DL (ref 65–140)
GLUCOSE SERPL-MCNC: 175 MG/DL (ref 65–140)
GLUCOSE SERPL-MCNC: 203 MG/DL (ref 65–140)
GLUCOSE SERPL-MCNC: 209 MG/DL (ref 65–140)
HCT VFR BLD AUTO: 25.8 % (ref 34.8–46.1)
HGB BLD-MCNC: 7.8 G/DL (ref 11.5–15.4)
MCH RBC QN AUTO: 28.4 PG (ref 26.8–34.3)
MCHC RBC AUTO-ENTMCNC: 30.2 G/DL (ref 31.4–37.4)
MCV RBC AUTO: 94 FL (ref 82–98)
NRBC BLD AUTO-RTO: 0 /100 WBCS
PLATELET # BLD AUTO: 248 THOUSANDS/UL (ref 149–390)
PMV BLD AUTO: 9.9 FL (ref 8.9–12.7)
POTASSIUM SERPL-SCNC: 3.6 MMOL/L (ref 3.5–5.3)
RBC # BLD AUTO: 2.75 MILLION/UL (ref 3.81–5.12)
SODIUM SERPL-SCNC: 133 MMOL/L (ref 135–147)
WBC # BLD AUTO: 13.02 THOUSAND/UL (ref 4.31–10.16)

## 2023-02-18 RX ADMIN — INSULIN LISPRO 1 UNITS: 100 INJECTION, SOLUTION INTRAVENOUS; SUBCUTANEOUS at 15:57

## 2023-02-18 RX ADMIN — PIPERACILLIN SODIUM AND TAZOBACTAM SODIUM 3.38 G: 36; 4.5 INJECTION, POWDER, LYOPHILIZED, FOR SOLUTION INTRAVENOUS at 22:44

## 2023-02-18 RX ADMIN — BIMATOPROST 1 DROP: 0.1 SOLUTION/ DROPS OPHTHALMIC at 21:12

## 2023-02-18 RX ADMIN — PIPERACILLIN SODIUM AND TAZOBACTAM SODIUM 3.38 G: 36; 4.5 INJECTION, POWDER, LYOPHILIZED, FOR SOLUTION INTRAVENOUS at 09:40

## 2023-02-18 RX ADMIN — PIPERACILLIN SODIUM AND TAZOBACTAM SODIUM 3.38 G: 36; 4.5 INJECTION, POWDER, LYOPHILIZED, FOR SOLUTION INTRAVENOUS at 15:57

## 2023-02-18 RX ADMIN — Medication 1 TABLET: at 09:34

## 2023-02-18 RX ADMIN — INSULIN LISPRO 2 UNITS: 100 INJECTION, SOLUTION INTRAVENOUS; SUBCUTANEOUS at 21:12

## 2023-02-18 RX ADMIN — PANTOPRAZOLE SODIUM 40 MG: 40 INJECTION, POWDER, FOR SOLUTION INTRAVENOUS at 05:00

## 2023-02-18 RX ADMIN — RIVAROXABAN 10 MG: 10 TABLET, FILM COATED ORAL at 09:34

## 2023-02-18 RX ADMIN — DEXTROSE, SODIUM CHLORIDE, AND POTASSIUM CHLORIDE 50 ML/HR: 5; .45; .15 INJECTION INTRAVENOUS at 09:48

## 2023-02-18 RX ADMIN — PIPERACILLIN SODIUM AND TAZOBACTAM SODIUM 3.38 G: 36; 4.5 INJECTION, POWDER, LYOPHILIZED, FOR SOLUTION INTRAVENOUS at 00:04

## 2023-02-18 RX ADMIN — THIAMINE HCL TAB 100 MG 100 MG: 100 TAB at 09:34

## 2023-02-18 NOTE — PROGRESS NOTES
Progress Note -    Kylie De La Cruz 69 y o  female MRN: [id number]  [facility]counter: [id number]    Андрей Boone is a 69 y o  female with PMH colon cancer s/p lap right hemicolectomy 2/2 ascending colon mass (7/15) p/w incarcerated ventral hernia  S/p ex lap end ileostomy, L colectomy, reduction of ventral hernia on 2/9  Post op course c/b PE on heparin gtt now transitioned to Xarelto  PLAN:  - continue diet  - VAC change Mon 2/20  - f/u liver MRI read  - continue Xarelto  - PT/OOB/ambulate!!    Subjective:   ALAN  Started Xarelto and stopped heparin yesterday  Ate her full breakfast and lunch but nothing for dinner d/t being full  Ostomy functioning  Wound vac in place  Objective:     Vitals: Temp:  [97 3 °F (36 3 °C)-97 7 °F (36 5 °C)] 97 3 °F (36 3 °C)  HR:  [] 87  Resp:  [16-20] 18  BP: (111-127)/(70-82) 111/70  Body mass index is 26 52 kg/m²  I/O       02/15 0701  02/16 0700 02/16 0701  02/17 0700 02/17 0701  02/18 0700    P  O   240     I V  (mL/kg) 856 1 (13 9)      Blood       IV Piggyback 1020      Total Intake(mL/kg) 1876 1 (30 5) 240 (3 9)     Urine (mL/kg/hr) 1450 (1) 2040 (1 4)     Drains 0      Stool 400 600     Total Output 1850 2640     Net +26 1 -2400            Unmeasured Urine Occurrence  1 x           Physical Exam:  Gen: Lying comfortably in bed, no acute distress  CV: Regular rate and rhythm  Pulm: Breathing comfortably on RA, no respiratory distress  Abd: Soft, nondistended, nontender to palpation, wound vac in place  Ext: Warm and dry, mild edema  Neuro: Alert and oriented      Lab, Imaging and other studies:   I have personally reviewed pertinent reports    , CBC with diff:   Lab Results   Component Value Date    WBC 13 02 (H) 02/18/2023    HGB 7 8 (L) 02/18/2023    HCT 25 8 (L) 02/18/2023    MCV 94 02/18/2023     02/18/2023    MCH 28 4 02/18/2023    MCHC 30 2 (L) 02/18/2023    RDW 17 5 (H) 02/18/2023    MPV 9 9 02/18/2023    NRBC 0 02/18/2023 , BMP/CMP:   No results found for: SODIUM, K, CL, CO2, ANIONGAP, BUN, CREATININE, GLUCOSE, CALCIUM, AST, ALT, ALKPHOS, PROT, BILITOT, EGFR  VTE Pharmacologic Prophylaxis: Heparin gtt    VTE Mechanical Prophylaxis: sequential compression device

## 2023-02-18 NOTE — PLAN OF CARE
Problem: MOBILITY - ADULT  Goal: Maintain or return to baseline ADL function  Description: INTERVENTIONS:  -  Assess patient's ability to carry out ADLs; assess patient's baseline for ADL function and identify physical deficits which impact ability to perform ADLs (bathing, care of mouth/teeth, toileting, grooming, dressing, etc )  - Assess/evaluate cause of self-care deficits   - Assess range of motion  - Assess patient's mobility; develop plan if impaired  - Assess patient's need for assistive devices and provide as appropriate  - Encourage maximum independence but intervene and supervise when necessary  - Involve family in performance of ADLs  - Assess for home care needs following discharge   - Consider OT consult to assist with ADL evaluation and planning for discharge  - Provide patient education as appropriate  Outcome: Progressing  Goal: Maintains/Returns to pre admission functional level  Description: INTERVENTIONS:  - Perform BMAT or MOVE assessment daily    - Set and communicate daily mobility goal to care team and patient/family/caregiver     - Collaborate with rehabilitation services on mobility goals if consulted  - Record patient progress and toleration of activity level   Outcome: Progressing     Problem: GASTROINTESTINAL - ADULT  Goal: Maintains or returns to baseline bowel function  Description: INTERVENTIONS:  - Assess bowel function  - Encourage oral fluids to ensure adequate hydration  - Administer IV fluids if ordered to ensure adequate hydration  - Administer ordered medications as needed  - Encourage mobilization and activity  - Consider nutritional services referral to assist patient with adequate nutrition and appropriate food choices  Outcome: Progressing  Goal: Maintains adequate nutritional intake  Description: INTERVENTIONS:  - Monitor percentage of each meal consumed  - Identify factors contributing to decreased intake, treat as appropriate  - Assist with meals as needed  - Monitor I&O, weight, and lab values if indicated  - Obtain nutrition services referral as needed  Outcome: Progressing  Goal: Oral mucous membranes remain intact  Description: INTERVENTIONS  - Assess oral mucosa and hygiene practices  - Implement preventative oral hygiene regimen  - Implement oral medicated treatments as ordered  - Initiate Nutrition services referral as needed  Outcome: Progressing     Problem: GENITOURINARY - ADULT  Goal: Maintains or returns to baseline urinary function  Description: INTERVENTIONS:  - Assess urinary function  - Encourage oral fluids to ensure adequate hydration if ordered  - Administer IV fluids as ordered to ensure adequate hydration  - Administer ordered medications as needed  - Offer frequent toileting  - Follow urinary retention protocol if ordered  Outcome: Progressing     Problem: SKIN/TISSUE INTEGRITY - ADULT  Goal: Skin Integrity remains intact(Skin Breakdown Prevention)  Description: Assess:  -Perform Jose assessment every  -Clean and moisturize skin every  -Inspect skin when repositioning, toileting, and assisting with ADLS  -Assess extremities for adequate circulation and sensation     Bed Management:  -Have minimal linens on bed & keep smooth, unwrinkled  -Change linens as needed when moist or perspiring  -Avoid sitting or lying in one position for more than  2 hours while in bed    Toileting:  -Offer bedside commode    Activity:  -Encourage activity and walks on unit  -Encourage or provide ROM exercises   -Turn and reposition patient every 2 Hours  -Use appropriate equipment to lift or move patient in bed    Skin Care:  -Avoid use of baby powder, tape, friction and shearing, hot water or constrictive clothing  -Relieve pressure over bony prominences using wedges  -Do not massage red bony areas    Outcome: Progressing     Problem: Prexisting or High Potential for Compromised Skin Integrity  Goal: Skin integrity is maintained or improved  Description: INTERVENTIONS:  - Identify patients at risk for skin breakdown  - Assess and monitor skin integrity  - Assess and monitor nutrition and hydration status  - Monitor labs   - Assess for incontinence   - Turn and reposition patient  - Assist with mobility/ambulation  - Relieve pressure over bony prominences  - Avoid friction and shearing  - Provide appropriate hygiene as needed including keeping skin clean and dry  - Evaluate need for skin moisturizer/barrier cream  - Collaborate with interdisciplinary team   - Patient/family teaching  - Consider wound care consult   Outcome: Progressing     Problem: PAIN - ADULT  Goal: Verbalizes/displays adequate comfort level or baseline comfort level  Description: Interventions:  - Encourage patient to monitor pain and request assistance  - Assess pain using appropriate pain scale  - Administer analgesics based on type and severity of pain and evaluate response  - Implement non-pharmacological measures as appropriate and evaluate response  - Consider cultural and social influences on pain and pain management  - Notify physician/advanced practitioner if interventions unsuccessful or patient reports new pain  Outcome: Progressing     Problem: INFECTION - ADULT  Goal: Absence or prevention of progression during hospitalization  Description: INTERVENTIONS:  - Assess and monitor for signs and symptoms of infection  - Monitor lab/diagnostic results  - Monitor all insertion sites, i e  indwelling lines, tubes, and drains  - Monitor endotracheal if appropriate and nasal secretions for changes in amount and color  - [place] appropriate cooling/warming therapies per order  - Administer medications as ordered  - Instruct and encourage patient and family to use good hand hygiene technique  - Identify and instruct in appropriate isolation precautions for identified infection/condition  Outcome: Progressing  Goal: Absence of fever/infection during neutropenic period  Description: INTERVENTIONS:  - Monitor WBC    Outcome: Progressing     Problem: SAFETY ADULT  Goal: Maintain or return to baseline ADL function  Description: INTERVENTIONS:  -  Assess patient's ability to carry out ADLs; assess patient's baseline for ADL function and identify physical deficits which impact ability to perform ADLs (bathing, care of mouth/teeth, toileting, grooming, dressing, etc )  - Assess/evaluate cause of self-care deficits   - Assess range of motion  - Assess patient's mobility; develop plan if impaired  - Assess patient's need for assistive devices and provide as appropriate  - Encourage maximum independence but intervene and supervise when necessary  - Involve family in performance of ADLs  - Assess for home care needs following discharge   - Consider OT consult to assist with ADL evaluation and planning for discharge  - Provide patient education as appropriate  Outcome: Progressing  Goal: Maintains/Returns to pre admission functional level  Description: INTERVENTIONS:  - Perform BMAT or MOVE assessment daily    - Set and communicate daily mobility goal to care team and patient/family/caregiver     - Collaborate with rehabilitation services on mobility goals if consulted  - Record patient progress and toleration of activity level   Outcome: Progressing  Goal: Patient will remain free of falls  Description: INTERVENTIONS:  - Educate patient/family on patient safety including physical limitations  - Instruct patient to call for assistance with activity   - Consult OT/PT to assist with strengthening/mobility   - Keep Call bell within reach  - Keep bed low and locked with side rails adjusted as appropriate  - Keep care items and personal belongings within reach  - Initiate and maintain comfort rounds  - Make Fall Risk Sign visible to staff  - Offer Toileting every 2 Hours, in advance of need  - Initiate/Maintain bed alarm  - Obtain necessary fall risk management equipment: bed/chair alarm  - Apply yellow socks and bracelet for high fall risk patients  - Consider moving patient to room near nurses station  Outcome: Progressing     Problem: DISCHARGE PLANNING  Goal: Discharge to home or other facility with appropriate resources  Description: INTERVENTIONS:  - Identify barriers to discharge w/patient and caregiver  - Arrange for needed discharge resources and transportation as appropriate  - Identify discharge learning needs (meds, wound care, etc )  - Arrange for interpretive services to assist at discharge as needed  - Refer to Case Management Department for coordinating discharge planning if the patient needs post-hospital services based on physician/advanced practitioner order or complex needs related to functional status, cognitive ability, or social support system  Outcome: Progressing     Problem: Knowledge Deficit  Goal: Patient/family/caregiver demonstrates understanding of disease process, treatment plan, medications, and discharge instructions  Description: Complete learning assessment and assess knowledge base  Interventions:  - Provide teaching at level of understanding  - Provide teaching via preferred learning methods  Outcome: Progressing     Problem: Nutrition/Hydration-ADULT  Goal: Nutrient/Hydration intake appropriate for improving, restoring or maintaining nutritional needs  Description: Monitor and assess patient's nutrition/hydration status for malnutrition  Collaborate with interdisciplinary team and initiate plan and interventions as ordered  Monitor patient's weight and dietary intake as ordered or per policy  Utilize nutrition screening tool and intervene as necessary  Determine patient's food preferences and provide high-protein, high-caloric foods as appropriate       INTERVENTIONS:  - Monitor oral intake, urinary output, labs, and treatment plans  - Assess nutrition and hydration status and recommend course of action  - Evaluate amount of meals eaten  - Assist patient with eating if necessary   - Allow adequate time for meals  - Recommend/ encourage appropriate diets, oral nutritional supplements, and vitamin/mineral supplements  - Order, calculate, and assess calorie counts as needed  - Recommend, monitor, and adjust tube feedings and TPN/PPN based on assessed needs  - Assess need for intravenous fluids  - Provide specific nutrition/hydration education as appropriate  - Include patient/family/caregiver in decisions related to nutrition  Outcome: Progressing

## 2023-02-19 LAB
ANION GAP SERPL CALCULATED.3IONS-SCNC: 8 MMOL/L (ref 4–13)
BASOPHILS # BLD AUTO: 0.03 THOUSANDS/ÂΜL (ref 0–0.1)
BASOPHILS NFR BLD AUTO: 0 % (ref 0–1)
BUN SERPL-MCNC: 7 MG/DL (ref 5–25)
CALCIUM SERPL-MCNC: 8.4 MG/DL (ref 8.3–10.1)
CHLORIDE SERPL-SCNC: 101 MMOL/L (ref 96–108)
CO2 SERPL-SCNC: 24 MMOL/L (ref 21–32)
CREAT SERPL-MCNC: 0.48 MG/DL (ref 0.6–1.3)
EOSINOPHIL # BLD AUTO: 0.21 THOUSAND/ÂΜL (ref 0–0.61)
EOSINOPHIL NFR BLD AUTO: 2 % (ref 0–6)
ERYTHROCYTE [DISTWIDTH] IN BLOOD BY AUTOMATED COUNT: 17.3 % (ref 11.6–15.1)
GFR SERPL CREATININE-BSD FRML MDRD: 94 ML/MIN/1.73SQ M
GLUCOSE SERPL-MCNC: 137 MG/DL (ref 65–140)
GLUCOSE SERPL-MCNC: 139 MG/DL (ref 65–140)
GLUCOSE SERPL-MCNC: 173 MG/DL (ref 65–140)
GLUCOSE SERPL-MCNC: 198 MG/DL (ref 65–140)
GLUCOSE SERPL-MCNC: 231 MG/DL (ref 65–140)
HCT VFR BLD AUTO: 24.3 % (ref 34.8–46.1)
HGB BLD-MCNC: 7.7 G/DL (ref 11.5–15.4)
IMM GRANULOCYTES # BLD AUTO: 0.19 THOUSAND/UL (ref 0–0.2)
IMM GRANULOCYTES NFR BLD AUTO: 2 % (ref 0–2)
LYMPHOCYTES # BLD AUTO: 0.72 THOUSANDS/ÂΜL (ref 0.6–4.47)
LYMPHOCYTES NFR BLD AUTO: 6 % (ref 14–44)
MAGNESIUM SERPL-MCNC: 2.1 MG/DL (ref 1.6–2.6)
MCH RBC QN AUTO: 29.1 PG (ref 26.8–34.3)
MCHC RBC AUTO-ENTMCNC: 31.7 G/DL (ref 31.4–37.4)
MCV RBC AUTO: 92 FL (ref 82–98)
MONOCYTES # BLD AUTO: 1.38 THOUSAND/ÂΜL (ref 0.17–1.22)
MONOCYTES NFR BLD AUTO: 11 % (ref 4–12)
NEUTROPHILS # BLD AUTO: 10.19 THOUSANDS/ÂΜL (ref 1.85–7.62)
NEUTS SEG NFR BLD AUTO: 79 % (ref 43–75)
NRBC BLD AUTO-RTO: 0 /100 WBCS
PHOSPHATE SERPL-MCNC: 3 MG/DL (ref 2.3–4.1)
PLATELET # BLD AUTO: 292 THOUSANDS/UL (ref 149–390)
PMV BLD AUTO: 9.2 FL (ref 8.9–12.7)
POTASSIUM SERPL-SCNC: 3.6 MMOL/L (ref 3.5–5.3)
RBC # BLD AUTO: 2.65 MILLION/UL (ref 3.81–5.12)
SODIUM SERPL-SCNC: 133 MMOL/L (ref 135–147)
WBC # BLD AUTO: 12.72 THOUSAND/UL (ref 4.31–10.16)

## 2023-02-19 RX ADMIN — INSULIN LISPRO 1 UNITS: 100 INJECTION, SOLUTION INTRAVENOUS; SUBCUTANEOUS at 21:11

## 2023-02-19 RX ADMIN — Medication 1 TABLET: at 09:12

## 2023-02-19 RX ADMIN — PIPERACILLIN SODIUM AND TAZOBACTAM SODIUM 3.38 G: 36; 4.5 INJECTION, POWDER, LYOPHILIZED, FOR SOLUTION INTRAVENOUS at 15:10

## 2023-02-19 RX ADMIN — PANTOPRAZOLE SODIUM 40 MG: 40 INJECTION, POWDER, FOR SOLUTION INTRAVENOUS at 05:11

## 2023-02-19 RX ADMIN — INSULIN LISPRO 3 UNITS: 100 INJECTION, SOLUTION INTRAVENOUS; SUBCUTANEOUS at 13:26

## 2023-02-19 RX ADMIN — BIMATOPROST 1 DROP: 0.1 SOLUTION/ DROPS OPHTHALMIC at 21:11

## 2023-02-19 RX ADMIN — RIVAROXABAN 10 MG: 10 TABLET, FILM COATED ORAL at 09:12

## 2023-02-19 RX ADMIN — DEXTROSE, SODIUM CHLORIDE, AND POTASSIUM CHLORIDE 50 ML/HR: 5; .45; .15 INJECTION INTRAVENOUS at 08:03

## 2023-02-19 RX ADMIN — PIPERACILLIN SODIUM AND TAZOBACTAM SODIUM 3.38 G: 36; 4.5 INJECTION, POWDER, LYOPHILIZED, FOR SOLUTION INTRAVENOUS at 08:03

## 2023-02-19 RX ADMIN — THIAMINE HCL TAB 100 MG 100 MG: 100 TAB at 09:12

## 2023-02-19 RX ADMIN — INSULIN LISPRO 2 UNITS: 100 INJECTION, SOLUTION INTRAVENOUS; SUBCUTANEOUS at 17:17

## 2023-02-19 NOTE — PLAN OF CARE
Problem: MOBILITY - ADULT  Goal: Maintain or return to baseline ADL function  Description: INTERVENTIONS:  -  Assess patient's ability to carry out ADLs; assess patient's baseline for ADL function and identify physical deficits which impact ability to perform ADLs (bathing, care of mouth/teeth, toileting, grooming, dressing, etc )  - Assess/evaluate cause of self-care deficits   - Assess range of motion  - Assess patient's mobility; develop plan if impaired  - Assess patient's need for assistive devices and provide as appropriate  - Encourage maximum independence but intervene and supervise when necessary  - Involve family in performance of ADLs  - Assess for home care needs following discharge   - Consider OT consult to assist with ADL evaluation and planning for discharge  - Provide patient education as appropriate  Outcome: Progressing  Goal: Maintains/Returns to pre admission functional level  Description: INTERVENTIONS:  - Perform BMAT or MOVE assessment daily    - Set and communicate daily mobility goal to care team and patient/family/caregiver     - Collaborate with rehabilitation services on mobility goals if consulted  - Record patient progress and toleration of activity level   Outcome: Progressing     Problem: GASTROINTESTINAL - ADULT  Goal: Maintains or returns to baseline bowel function  Description: INTERVENTIONS:  - Assess bowel function  - Encourage oral fluids to ensure adequate hydration  - Administer IV fluids if ordered to ensure adequate hydration  - Administer ordered medications as needed  - Encourage mobilization and activity  - Consider nutritional services referral to assist patient with adequate nutrition and appropriate food choices  Outcome: Progressing  Goal: Maintains adequate nutritional intake  Description: INTERVENTIONS:  - Monitor percentage of each meal consumed  - Identify factors contributing to decreased intake, treat as appropriate  - Assist with meals as needed  - Monitor I&O, weight, and lab values if indicated  - Obtain nutrition services referral as needed  Outcome: Progressing  Goal: Oral mucous membranes remain intact  Description: INTERVENTIONS  - Assess oral mucosa and hygiene practices  - Implement preventative oral hygiene regimen  - Implement oral medicated treatments as ordered  - Initiate Nutrition services referral as needed  Outcome: Progressing     Problem: GENITOURINARY - ADULT  Goal: Maintains or returns to baseline urinary function  Description: INTERVENTIONS:  - Assess urinary function  - Encourage oral fluids to ensure adequate hydration if ordered  - Administer IV fluids as ordered to ensure adequate hydration  - Administer ordered medications as needed  - Offer frequent toileting  - Follow urinary retention protocol if ordered  Outcome: Progressing     Problem: SKIN/TISSUE INTEGRITY - ADULT  Goal: Skin Integrity remains intact(Skin Breakdown Prevention)  Description: Assess:  -Perform Jose assessment every  -Clean and moisturize skin every  -Inspect skin when repositioning, toileting, and assisting with ADLS  -Assess extremities for adequate circulation and sensation     Bed Management:  -Have minimal linens on bed & keep smooth, unwrinkled  -Change linens as needed when moist or perspiring  -Avoid sitting or lying in one position for more than  2 hours while in bed    Toileting:  -Offer bedside commode    Activity:  -Encourage activity and walks on unit  -Encourage or provide ROM exercises   -Turn and reposition patient every 2 Hours  -Use appropriate equipment to lift or move patient in bed    Skin Care:  -Avoid use of baby powder, tape, friction and shearing, hot water or constrictive clothing  -Relieve pressure over bony prominences using wedges  -Do not massage red bony areas    Outcome: Progressing     Problem: Prexisting or High Potential for Compromised Skin Integrity  Goal: Skin integrity is maintained or improved  Description: INTERVENTIONS:  - Identify patients at risk for skin breakdown  - Assess and monitor skin integrity  - Assess and monitor nutrition and hydration status  - Monitor labs   - Assess for incontinence   - Turn and reposition patient  - Assist with mobility/ambulation  - Relieve pressure over bony prominences  - Avoid friction and shearing  - Provide appropriate hygiene as needed including keeping skin clean and dry  - Evaluate need for skin moisturizer/barrier cream  - Collaborate with interdisciplinary team   - Patient/family teaching  - Consider wound care consult   Outcome: Progressing     Problem: PAIN - ADULT  Goal: Verbalizes/displays adequate comfort level or baseline comfort level  Description: Interventions:  - Encourage patient to monitor pain and request assistance  - Assess pain using appropriate pain scale  - Administer analgesics based on type and severity of pain and evaluate response  - Implement non-pharmacological measures as appropriate and evaluate response  - Consider cultural and social influences on pain and pain management  - Notify physician/advanced practitioner if interventions unsuccessful or patient reports new pain  Outcome: Progressing     Problem: INFECTION - ADULT  Goal: Absence or prevention of progression during hospitalization  Description: INTERVENTIONS:  - Assess and monitor for signs and symptoms of infection  - Monitor lab/diagnostic results  - Monitor all insertion sites, i e  indwelling lines, tubes, and drains  - Monitor endotracheal if appropriate and nasal secretions for changes in amount and color  - [place] appropriate cooling/warming therapies per order  - Administer medications as ordered  - Instruct and encourage patient and family to use good hand hygiene technique  - Identify and instruct in appropriate isolation precautions for identified infection/condition  Outcome: Progressing  Goal: Absence of fever/infection during neutropenic period  Description: INTERVENTIONS:  - Monitor WBC    Outcome: Progressing     Problem: SAFETY ADULT  Goal: Maintain or return to baseline ADL function  Description: INTERVENTIONS:  -  Assess patient's ability to carry out ADLs; assess patient's baseline for ADL function and identify physical deficits which impact ability to perform ADLs (bathing, care of mouth/teeth, toileting, grooming, dressing, etc )  - Assess/evaluate cause of self-care deficits   - Assess range of motion  - Assess patient's mobility; develop plan if impaired  - Assess patient's need for assistive devices and provide as appropriate  - Encourage maximum independence but intervene and supervise when necessary  - Involve family in performance of ADLs  - Assess for home care needs following discharge   - Consider OT consult to assist with ADL evaluation and planning for discharge  - Provide patient education as appropriate  Outcome: Progressing  Goal: Maintains/Returns to pre admission functional level  Description: INTERVENTIONS:  - Perform BMAT or MOVE assessment daily    - Set and communicate daily mobility goal to care team and patient/family/caregiver     - Collaborate with rehabilitation services on mobility goals if consulted  - Record patient progress and toleration of activity level   Outcome: Progressing  Goal: Patient will remain free of falls  Description: INTERVENTIONS:  - Educate patient/family on patient safety including physical limitations  - Instruct patient to call for assistance with activity   - Consult OT/PT to assist with strengthening/mobility   - Keep Call bell within reach  - Keep bed low and locked with side rails adjusted as appropriate  - Keep care items and personal belongings within reach  - Initiate and maintain comfort rounds  - Make Fall Risk Sign visible to staff  - Offer Toileting every 2 Hours, in advance of need  - Initiate/Maintain bed balarm  - Obtain necessary fall risk management equipment: bed/chair alarm  - Apply yellow socks and bracelet for high fall risk patients  - Consider moving patient to room near nurses station  Outcome: Progressing     Problem: DISCHARGE PLANNING  Goal: Discharge to home or other facility with appropriate resources  Description: INTERVENTIONS:  - Identify barriers to discharge w/patient and caregiver  - Arrange for needed discharge resources and transportation as appropriate  - Identify discharge learning needs (meds, wound care, etc )  - Arrange for interpretive services to assist at discharge as needed  - Refer to Case Management Department for coordinating discharge planning if the patient needs post-hospital services based on physician/advanced practitioner order or complex needs related to functional status, cognitive ability, or social support system  Outcome: Progressing     Problem: Nutrition/Hydration-ADULT  Goal: Nutrient/Hydration intake appropriate for improving, restoring or maintaining nutritional needs  Description: Monitor and assess patient's nutrition/hydration status for malnutrition  Collaborate with interdisciplinary team and initiate plan and interventions as ordered  Monitor patient's weight and dietary intake as ordered or per policy  Utilize nutrition screening tool and intervene as necessary  Determine patient's food preferences and provide high-protein, high-caloric foods as appropriate       INTERVENTIONS:  - Monitor oral intake, urinary output, labs, and treatment plans  - Assess nutrition and hydration status and recommend course of action  - Evaluate amount of meals eaten  - Assist patient with eating if necessary   - Allow adequate time for meals  - Recommend/ encourage appropriate diets, oral nutritional supplements, and vitamin/mineral supplements  - Order, calculate, and assess calorie counts as needed  - Recommend, monitor, and adjust tube feedings and TPN/PPN based on assessed needs  - Assess need for intravenous fluids  - Provide specific nutrition/hydration education as appropriate  - Include patient/family/caregiver in decisions related to nutrition  Outcome: Progressing

## 2023-02-19 NOTE — PROGRESS NOTES
Progress Note - General Surgery  Gustavo Díaz 69 y o  female MRN: [id number]  Unit/Bed#: [facility] 997-76 Encounter: [id number]    Marilia Monahan is a 69 y o  female with PMH colon cancer s/p lap right hemicolectomy 2/2 ascending colon mass (7/15) p/w incarcerated ventral hernia  S/p ex lap end ileostomy, L colectomy, reduction of ventral hernia on 2/9  Post op course c/b PE on heparin gtt now transitioned to Xarelto  Afebrile, VSS  End ileostomy: 925 cc pink healthy bud  Midline wound vac:0 cc recorded    PLAN:  - Dysphagia 1/necter thick liquids  - VAC change Mon 2/20  - f/u liver MRI read  - continue Xarelto  - Dispo planning    Subjective:   No acute events overnight  Objective:     Vitals: Temp:  [97 3 °F (36 3 °C)-97 7 °F (36 5 °C)] 97 7 °F (36 5 °C)  HR:  [85-99] 91  Resp:  [14-18] 14  BP: (106-111)/(67-70) 106/67  Body mass index is 26 52 kg/m²  I/O       02/15 0701  02/16 0700 02/16 0701  02/17 0700 02/17 0701  02/18 0700    P  O   240     I V  (mL/kg) 856 1 (13 9)      Blood       IV Piggyback 1020      Total Intake(mL/kg) 1876 1 (30 5) 240 (3 9)     Urine (mL/kg/hr) 1450 (1) 2040 (1 4)     Drains 0      Stool 400 600     Total Output 1850 2640     Net +26 1 -2400            Unmeasured Urine Occurrence  1 x           Physical Exam  Constitutional:       General: She is not in acute distress  Appearance: She is well-developed  She is not ill-appearing, toxic-appearing or diaphoretic  HENT:      Head: Normocephalic and atraumatic  Eyes:      Extraocular Movements: Extraocular movements intact  Cardiovascular:      Rate and Rhythm: Normal rate  Pulmonary:      Effort: Pulmonary effort is normal  No respiratory distress  Abdominal:      General: There is no distension  Palpations: Abdomen is soft  There is no mass  Tenderness: There is no abdominal tenderness  There is no guarding or rebound        Comments:   End ileostomy  Midline wound vac   Skin:     General: Skin is warm and dry  Neurological:      Mental Status: She is alert and oriented to person, place, and time  Psychiatric:         Mood and Affect: Mood normal          Behavior: Behavior normal            Lab, Imaging and other studies:   I have personally reviewed pertinent reports  , CBC with diff:   Lab Results   Component Value Date    WBC 13 02 (H) 02/18/2023    HGB 7 8 (L) 02/18/2023    HCT 25 8 (L) 02/18/2023    MCV 94 02/18/2023     02/18/2023    MCH 28 4 02/18/2023    MCHC 30 2 (L) 02/18/2023    RDW 17 5 (H) 02/18/2023    MPV 9 9 02/18/2023    NRBC 0 02/18/2023   , BMP/CMP:   Lab Results   Component Value Date    SODIUM 133 (L) 02/18/2023    K 3 6 02/18/2023     02/18/2023    CO2 24 02/18/2023    BUN 7 02/18/2023    CREATININE 0 45 (L) 02/18/2023    CALCIUM 8 5 02/18/2023    EGFR 96 02/18/2023     VTE Pharmacologic Prophylaxis: Heparin gtt    VTE Mechanical Prophylaxis: sequential compression device

## 2023-02-20 LAB
BASOPHILS # BLD AUTO: 0.03 THOUSANDS/ÂΜL (ref 0–0.1)
BASOPHILS NFR BLD AUTO: 0 % (ref 0–1)
EOSINOPHIL # BLD AUTO: 0.16 THOUSAND/ÂΜL (ref 0–0.61)
EOSINOPHIL NFR BLD AUTO: 2 % (ref 0–6)
ERYTHROCYTE [DISTWIDTH] IN BLOOD BY AUTOMATED COUNT: 17.5 % (ref 11.6–15.1)
GLUCOSE SERPL-MCNC: 127 MG/DL (ref 65–140)
GLUCOSE SERPL-MCNC: 152 MG/DL (ref 65–140)
GLUCOSE SERPL-MCNC: 154 MG/DL (ref 65–140)
GLUCOSE SERPL-MCNC: 162 MG/DL (ref 65–140)
GLUCOSE SERPL-MCNC: 178 MG/DL (ref 65–140)
HCT VFR BLD AUTO: 24.6 % (ref 34.8–46.1)
HGB BLD-MCNC: 7.5 G/DL (ref 11.5–15.4)
IMM GRANULOCYTES # BLD AUTO: 0.11 THOUSAND/UL (ref 0–0.2)
IMM GRANULOCYTES NFR BLD AUTO: 1 % (ref 0–2)
LYMPHOCYTES # BLD AUTO: 0.62 THOUSANDS/ÂΜL (ref 0.6–4.47)
LYMPHOCYTES NFR BLD AUTO: 6 % (ref 14–44)
MCH RBC QN AUTO: 28.6 PG (ref 26.8–34.3)
MCHC RBC AUTO-ENTMCNC: 30.5 G/DL (ref 31.4–37.4)
MCV RBC AUTO: 94 FL (ref 82–98)
MONOCYTES # BLD AUTO: 1.05 THOUSAND/ÂΜL (ref 0.17–1.22)
MONOCYTES NFR BLD AUTO: 10 % (ref 4–12)
NEUTROPHILS # BLD AUTO: 8.17 THOUSANDS/ÂΜL (ref 1.85–7.62)
NEUTS SEG NFR BLD AUTO: 81 % (ref 43–75)
NRBC BLD AUTO-RTO: 0 /100 WBCS
PLATELET # BLD AUTO: 299 THOUSANDS/UL (ref 149–390)
PMV BLD AUTO: 9 FL (ref 8.9–12.7)
RBC # BLD AUTO: 2.62 MILLION/UL (ref 3.81–5.12)
WBC # BLD AUTO: 10.14 THOUSAND/UL (ref 4.31–10.16)

## 2023-02-20 PROCEDURE — 2W03X6Z CHANGE PRESSURE DRESSING ON ABDOMINAL WALL: ICD-10-PCS

## 2023-02-20 RX ADMIN — Medication 1 TABLET: at 09:13

## 2023-02-20 RX ADMIN — DEXTROSE, SODIUM CHLORIDE, AND POTASSIUM CHLORIDE 50 ML/HR: 5; .45; .15 INJECTION INTRAVENOUS at 04:58

## 2023-02-20 RX ADMIN — INSULIN LISPRO 1 UNITS: 100 INJECTION, SOLUTION INTRAVENOUS; SUBCUTANEOUS at 11:11

## 2023-02-20 RX ADMIN — RIVAROXABAN 10 MG: 10 TABLET, FILM COATED ORAL at 09:13

## 2023-02-20 RX ADMIN — BIMATOPROST 1 DROP: 0.1 SOLUTION/ DROPS OPHTHALMIC at 21:21

## 2023-02-20 RX ADMIN — INSULIN LISPRO 1 UNITS: 100 INJECTION, SOLUTION INTRAVENOUS; SUBCUTANEOUS at 21:21

## 2023-02-20 RX ADMIN — INSULIN LISPRO 1 UNITS: 100 INJECTION, SOLUTION INTRAVENOUS; SUBCUTANEOUS at 17:01

## 2023-02-20 RX ADMIN — PANTOPRAZOLE SODIUM 40 MG: 40 INJECTION, POWDER, FOR SOLUTION INTRAVENOUS at 05:00

## 2023-02-20 RX ADMIN — THIAMINE HCL TAB 100 MG 100 MG: 100 TAB at 09:13

## 2023-02-20 NOTE — DISCHARGE INSTR - OTHER ORDERS
Ostomy Care:  -Stoma Measurement: 1 75 inches (Measure stoma weekly for next 6-8 weeks- stoma will decrease in size due to decrease in swelling)      -Appliance Used During Bag Change: Convatec One Piece Pouch  -Accessories Used: 3M No Sting Skin Protectant, 2 inch jorge ring     *Can shower with pouch on or off  Make sure to dry off pouch after shower  Bag Change Steps:  1  Peel back pouch using push-pull method, may use non-alcohol adhesive remover  Remove pouch from top to bottom  2  Use warm water only to cleanse skin around the stoma (bolivar-stomal skin)  3  Make sure all adhesive residue is removed and skin is dry and not oily  4  Measure stoma size using measuring guide and trace correct measurements onto back of pouch  5  Then cut or mold the backing of pouch out to correct shape/size  Off set pouch so it sits away from midline wound vac  6  Use 3M no sting barrier film to prep the skin around the stoma  Apply 2 inch jorge to midline crease near wound vac  7  Place pouch over stoma and onto skin  8  Use warmth of hand to apply gentle pressure to help backing of pouch to adhere well to skin  TIPS:  Empty pouch when 1/3 -1/2 full  Change pouch every 4-5 days or if signs of leaking

## 2023-02-20 NOTE — QUICK NOTE
Acute Care Surgery  Bedside V A C  Procedure Note     A timeout was performed with the patient's nurse, Aurea Mari, prior to beginning the dressing change  The nurse remained present to confirm the correct dressing counts on removal of the VAC dressing and was debriefed at the completion of the dressing change      Location of wound: midline     Dressings and Foam removed:  1 adaptic  Dressings  1 Black Foam        Dimensions of wound: 10 5 cm x 3 cm x 2 cm     Description of wound: On inspection of the wound today, 2/20/23  There was no necrotic or nonviable tissue requiring debridement  Approximately  of the wound base is now pink and healthy and there is granulation tissue  The periwound skin remains clean and intact and unremarkable      VAC dressing application:  The periwound skin was cleaned and dried  Adaptic dressins, 1 black foam, 1 white foam was  cut to size of the wound and placed into the wound  The dressings were then covered with VAC drape    The track pad was then placed over the base of black foam  The VAC was then set to 125 mmHg low Continuous suction  The patient tolerated the procedure well and there were no complications  The patient did not require any excisional debridement during today's dressing change      VAC settings:  125 mmHg  Continuous     Wound Images:Haiku unavailable     Additional Notes: The [facility] sticker placed over the dressing per protocol  The next [facility] dressing change will be planned for 2/22/23  The [facility] paperwork was completed and give to the case management staff to arrange home [facility] therapy    Nurse, Aurea Mari  was present for the dressing change      This dressing change took greater than 20 minutes to complete

## 2023-02-20 NOTE — CASE MANAGEMENT
Case Management Discharge Planning Note    Patient name Lynne Sadler  Location [facility] 811/[facility] 315-07 MRN [id number]  : 1945 Date 2023       Current Admission Date: 2023  Current Admission Diagnosis:Perforated bowel [facility])   Patient Active Problem List    Diagnosis Date Noted   • Moderate protein-calorie malnutrition ([facility] [address] ) 02/15/2023   • Lower abdominal pain 2023   • Perforated bowel ([facility] ) 2023   • Strangulated ventral hernia 2023   • GERD (gastroesophageal reflux disease) 2023   • History of right breast cancer 2023   • History of right mastectomy 2023   • Iron deficiency anemia, unspecified 2022   • History of DVT (deep vein thrombosis) 2022   • Thyroid disorder 2022   • Osteoporosis due to aromatase inhibitor 2022   • History of diabetes mellitus 2022   • Anxiety about health 2021   • Diabetic ulcer of toe of left foot associated with type 2 diabetes mellitus, limited to breakdown of skin ([facility] ) 2021   • Abnormal tumor markers 2020   • Essential hypertension 10/20/2020   • D-dimer, elevated 2019   • Type 2 diabetes mellitus without complications ([facility] )    • Other osteoporosis without current pathological fracture 2018   • 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 2018   • Personal history of other malignant neoplasm of large intestine 2018   • Malignant neoplasm of right breast in female, estrogen receptor positive ([facility] ) 2018   • Malignant neoplasm of ascending colon ([facility] ) 2018   • Chronic deep vein thrombosis (DVT) of distal vein of left lower extremity ([facility] ) 2018   • Thyroid nodule 2018      LOS (days): 11  Geometric Mean LOS (GMLOS) (days): 9 80  Days to GMLOS:-1 4     OBJECTIVE:  Risk of Unplanned Readmission Score: 15 15         Current admission status: Inpatient   Preferred Pharmacy:   [address] [place] [address] #[id number] [address] [place] [address]  Phone: [phone] Fax: [address], [place] [address] [place] [address] [facility] [address] [place] [address] [address]  Phone: [phone] Fax: [phone]    Primary Care Provider: Alpa Bonilla DO    Primary Insurance: Anmol Orantes [facility] REP  Secondary Insurance:     DISCHARGE DETAILS:            Other Referral/Resources/Interventions Provided:  Referral Comments: Pt medically stable for DC, per Trauma report  CM messaged [facility] in [place], inquiring into bed availability  Doris later responded, informing CM that beds are avaialble in both [place] and [address]  CM updated pt, who said she prefers the [address] location  CM messaged Doris, notifying them of pt's decision and requested NPI information for insurance authorization  No response received by the end of Monday  CM will follow and submit for auth when NPI is receive and updated PT/OT notes are obtained

## 2023-02-20 NOTE — PLAN OF CARE
Problem: GASTROINTESTINAL - ADULT  Goal: Maintains or returns to baseline bowel function  Description: INTERVENTIONS:  - Assess bowel function  - Encourage oral fluids to ensure adequate hydration  - Administer IV fluids if ordered to ensure adequate hydration  - Administer ordered medications as needed  - Encourage mobilization and activity  - Consider nutritional services referral to assist patient with adequate nutrition and appropriate food choices  2/20/2023 1405 by Jeaneth Alejandre RN  Outcome: Progressing  2/20/2023 1405 by Jeaneth Alejandre RN  Outcome: Progressing  Goal: Maintains adequate nutritional intake  Description: INTERVENTIONS:  - Monitor percentage of each meal consumed  - Identify factors contributing to decreased intake, treat as appropriate  - Assist with meals as needed  - Monitor I&O, weight, and lab values if indicated  - Obtain nutrition services referral as needed  2/20/2023 1405 by Jeaneth Alejandre RN  Outcome: Progressing  2/20/2023 1405 by Jeaneth Alejandre RN  Outcome: Progressing  Goal: Oral mucous membranes remain intact  Description: INTERVENTIONS  - Assess oral mucosa and hygiene practices  - Implement preventative oral hygiene regimen  - Implement oral medicated treatments as ordered  - Initiate Nutrition services referral as needed  2/20/2023 1405 by Jeaneth Alejandre RN  Outcome: Progressing  2/20/2023 1405 by Jeaneth Alejandre RN  Outcome: Progressing     Problem: SKIN/TISSUE INTEGRITY - ADULT  Goal: Skin Integrity remains intact(Skin Breakdown Prevention)  Description: Assess:  -Perform Jose assessment every  -Clean and moisturize skin every  -Inspect skin when repositioning, toileting, and assisting with ADLS  -Assess extremities for adequate circulation and sensation     Bed Management:  -Have minimal linens on bed & keep smooth, unwrinkled  -Change linens as needed when moist or perspiring  -Avoid sitting or lying in one position for more than  2 hours while in bed    Toileting:  -Offer bedside commode    Activity:  -Encourage activity and walks on unit  -Encourage or provide ROM exercises   -Turn and reposition patient every 2 Hours  -Use appropriate equipment to lift or move patient in bed    Skin Care:  -Avoid use of baby powder, tape, friction and shearing, hot water or constrictive clothing  -Relieve pressure over bony prominences using wedges  -Do not massage red bony areas    2/20/2023 1405 by Jess Guo RN  Outcome: Progressing  2/20/2023 1405 by Jess Guo RN  Outcome: Progressing     Problem: Knowledge Deficit  Goal: Patient/family/caregiver demonstrates understanding of disease process, treatment plan, medications, and discharge instructions  Description: Complete learning assessment and assess knowledge base  Interventions:  - Provide teaching at level of understanding  - Provide teaching via preferred learning methods  2/20/2023 1405 by Jess Guo RN  Outcome: Progressing  2/20/2023 1405 by Jess Guo RN  Outcome: Progressing     Problem: Nutrition/Hydration-ADULT  Goal: Nutrient/Hydration intake appropriate for improving, restoring or maintaining nutritional needs  Description: Monitor and assess patient's nutrition/hydration status for malnutrition  Collaborate with interdisciplinary team and initiate plan and interventions as ordered  Monitor patient's weight and dietary intake as ordered or per policy  Utilize nutrition screening tool and intervene as necessary  Determine patient's food preferences and provide high-protein, high-caloric foods as appropriate       INTERVENTIONS:  - Monitor oral intake, urinary output, labs, and treatment plans  - Assess nutrition and hydration status and recommend course of action  - Evaluate amount of meals eaten  - Assist patient with eating if necessary   - Allow adequate time for meals  - Recommend/ encourage appropriate diets, oral nutritional supplements, and vitamin/mineral supplements  - Order, calculate, and assess calorie counts as needed  - Recommend, monitor, and adjust tube feedings and TPN/PPN based on assessed needs  - Assess need for intravenous fluids  - Provide specific nutrition/hydration education as appropriate  - Include patient/family/caregiver in decisions related to nutrition  2/20/2023 1405 by Eulas Essex, RN  Outcome: Progressing  2/20/2023 1405 by Eulas Essex, RN  Outcome: Progressing

## 2023-02-20 NOTE — PROGRESS NOTES
Progress Note - General Surgery  Myra Ybarra 69 y o  female MRN: [id number]  Unit/Bed#: [facility] 401-06 Encounter: [id number]    Roxana Lamar is a 69 y o  female with PMH colon cancer s/p lap right hemicolectomy 2/2 ascending colon mass (7/15) p/w incarcerated ventral hernia  S/p ex lap end ileostomy, L colectomy, reduction of ventral hernia on 2/9  Post op course c/b PE on heparin gtt now transitioned to Xarelto  PLAN:  - Dysphagia 1/thin liquids per SLP  - VAC change today   - continue Xarelto  - Dispo planning, possibly out to [place] later today    Subjective:   No acute events in past 24 hours  Patient has no new concerns this morning  She is tolerating her diet without nausea  Passing gas having bowel movements and urinating on her own  Objective:     Vitals: Temp:  [97 5 °F (36 4 °C)-98 1 °F (36 7 °C)] 97 5 °F (36 4 °C)  HR:  [92-99] 92  Resp:  [16-20] 17  BP: ()/(64-73) 99/64  Body mass index is 26 52 kg/m²  I/O       02/15 0701  02/16 0700 02/16 0701  02/17 0700 02/17 0701  02/18 0700    P  O   240     I V  (mL/kg) 856 1 (13 9)      Blood       IV Piggyback 1020      Total Intake(mL/kg) 1876 1 (30 5) 240 (3 9)     Urine (mL/kg/hr) 1450 (1) 2040 (1 4)     Drains 0      Stool 400 600     Total Output 1850 2640     Net +26 1 -2400            Unmeasured Urine Occurrence  1 x           Physical Exam  Vitals reviewed  Constitutional:       General: She is not in acute distress  Appearance: She is not ill-appearing  Cardiovascular:      Rate and Rhythm: Normal rate and regular rhythm  Pulmonary:      Effort: Pulmonary effort is normal  No respiratory distress  Abdominal:      General: There is no distension  Palpations: Abdomen is soft  Tenderness: There is no abdominal tenderness  There is no guarding or rebound        Comments: Ileostomy appears healthy, ostomy appliance over top, wound VAC applied to midline incisional wound, holding good pressure, serosanguineous output   Skin:     General: Skin is warm and dry  Coloration: Skin is not jaundiced or pale  Neurological:      Mental Status: She is alert  Lab, Imaging and other studies:   I have personally reviewed pertinent reports  , CBC with diff:   Lab Results   Component Value Date    WBC 10 14 02/20/2023    HGB 7 5 (L) 02/20/2023    HCT 24 6 (L) 02/20/2023    MCV 94 02/20/2023     02/20/2023    MCH 28 6 02/20/2023    MCHC 30 5 (L) 02/20/2023    RDW 17 5 (H) 02/20/2023    MPV 9 0 02/20/2023    NRBC 0 02/20/2023   , BMP/CMP:   No results found for: SODIUM, K, CL, CO2, ANIONGAP, BUN, CREATININE, GLUCOSE, CALCIUM, AST, ALT, ALKPHOS, PROT, BILITOT, EGFR  VTE Pharmacologic Prophylaxis: Heparin gtt    VTE Mechanical Prophylaxis: sequential compression device

## 2023-02-20 NOTE — QUICK NOTE
Ostomy bag also changed while changing the vac  Wound care aware  Bag intact and functional PAtient tolerated well

## 2023-02-21 LAB
ERYTHROCYTE [DISTWIDTH] IN BLOOD BY AUTOMATED COUNT: 17.6 % (ref 11.6–15.1)
GLUCOSE SERPL-MCNC: 147 MG/DL (ref 65–140)
GLUCOSE SERPL-MCNC: 203 MG/DL (ref 65–140)
GLUCOSE SERPL-MCNC: 219 MG/DL (ref 65–140)
GLUCOSE SERPL-MCNC: 235 MG/DL (ref 65–140)
HCT VFR BLD AUTO: 25.6 % (ref 34.8–46.1)
HGB BLD-MCNC: 8 G/DL (ref 11.5–15.4)
MCH RBC QN AUTO: 29.1 PG (ref 26.8–34.3)
MCHC RBC AUTO-ENTMCNC: 31.3 G/DL (ref 31.4–37.4)
MCV RBC AUTO: 93 FL (ref 82–98)
PLATELET # BLD AUTO: 348 THOUSANDS/UL (ref 149–390)
PMV BLD AUTO: 9 FL (ref 8.9–12.7)
RBC # BLD AUTO: 2.75 MILLION/UL (ref 3.81–5.12)
WBC # BLD AUTO: 9.1 THOUSAND/UL (ref 4.31–10.16)

## 2023-02-21 RX ADMIN — INSULIN LISPRO 2 UNITS: 100 INJECTION, SOLUTION INTRAVENOUS; SUBCUTANEOUS at 17:39

## 2023-02-21 RX ADMIN — BIMATOPROST 1 DROP: 0.1 SOLUTION/ DROPS OPHTHALMIC at 22:46

## 2023-02-21 RX ADMIN — RIVAROXABAN 10 MG: 10 TABLET, FILM COATED ORAL at 08:21

## 2023-02-21 RX ADMIN — THIAMINE HCL TAB 100 MG 100 MG: 100 TAB at 08:21

## 2023-02-21 RX ADMIN — PANTOPRAZOLE SODIUM 40 MG: 40 INJECTION, POWDER, FOR SOLUTION INTRAVENOUS at 05:43

## 2023-02-21 RX ADMIN — Medication 1 TABLET: at 08:21

## 2023-02-21 RX ADMIN — INSULIN LISPRO 3 UNITS: 100 INJECTION, SOLUTION INTRAVENOUS; SUBCUTANEOUS at 12:37

## 2023-02-21 RX ADMIN — INSULIN LISPRO 2 UNITS: 100 INJECTION, SOLUTION INTRAVENOUS; SUBCUTANEOUS at 22:46

## 2023-02-21 NOTE — PLAN OF CARE
Problem: PHYSICAL THERAPY ADULT  Goal: Performs mobility at highest level of function for planned discharge setting  See evaluation for individualized goals  Description: Treatment/Interventions: Functional transfer training, LE strengthening/ROM, Endurance training, Patient/family training, Bed mobility, Gait training, Spoke to nursing, Spoke to case management, OT          See flowsheet documentation for full assessment, interventions and recommendations  Outcome: Progressing  Note: Prognosis: Fair  Problem List: Decreased endurance, Decreased mobility  Assessment: Pt seen for PT treatment session this date  Therapy session focused on bed mobility, functional transfers in order to improve overall mobility and independence  Pt requires Mod A X 2 for bed mobility, maintains sitting EOB  ~ 5 mins with min assist progressing to CG assist   Pt performs LE therapeutic exercise with cueing  Pt assisted from bed to chair with sit pivot sit with max assist X 2  Pt then completes additional STS with RW and max assist X 2, stands ~2 mins, able to weight shift this date  Pt demonstrates improved activity tolerance and mobility level  PT focused on balance training with hygiene care in sitting EOB  Pt making steady progress toward goals  Pt was left in recliner at the end of PT session with all needs in reach  Pt would benefit from continued PT services while in hospital to address remaining limitations  The patient's AM-PAC Basic Mobility Inpatient Short Form Raw Score is 10  A Raw score of less than or equal to 16 suggests the patient may benefit from discharge to post-acute rehabilitation services  Please also refer to the recommendation of the Physical Therapist for safe discharge planning  Barriers to Discharge: Inaccessible home environment, Decreased caregiver support     PT Discharge Recommendation: Post acute rehabilitation services    See flowsheet documentation for full assessment

## 2023-02-21 NOTE — PLAN OF CARE
Problem: OCCUPATIONAL THERAPY ADULT  Goal: Performs self-care activities at highest level of function for planned discharge setting  See evaluation for individualized goals  Description: Treatment Interventions: ADL retraining, Functional transfer training, UE strengthening/ROM, Endurance training, Patient/family training, Equipment evaluation/education, Compensatory technique education, Activityengagement          See flowsheet documentation for full assessment, interventions and recommendations  Outcome: Progressing  Note: Limitation: Decreased ADL status, Decreased endurance, Decreased self-care trans, Decreased high-level ADLs  Prognosis: Good  Assessment: Pt greeted bedside for OT treatment on 2/21/2023 focusing on maximizing independence with ADLs  Pt completes bed mobility with mod Ax2 and able to sit on EOB to complete ADLs with S-min A with trunk control  Pt mod A with UB bathing, max A with UB dressing, and total assist with LB dressing  Limitations that impact functional performance include decreased ADL status, decreased UE ROM, decreased UE strength, decreased safe judgement during ADLs, decreased cognition, decreased endurance, decreased self care transfers, decreased high level ADLs and pain  Occupational performance areas to address ADL retraining, functional transfer training, UE strengthening/ROM, endurance training, cognitive reorientation, Pt/caregiver education, equipment evaluation/education, compensatory technique education, energy conservation and activity engagement   Pt would benefit from continued skilled OT services while in hospital to maximize independence with ADLs  Will continue to follow Pt's goals and progress  Pt would benefit from post acute rehabilitation services upon DC to maximize safety and independence with ADLs and functional tasks of choice    Recommendation: Geriatric Consult  OT Discharge Recommendation: Post acute rehabilitation services

## 2023-02-21 NOTE — CASE MANAGEMENT
[facility] 51 received request for authorization from Care Manager    Authorization request for: SNF  [facility]: [facility] NPI: [id number]  Facility MD: Drew Valiente    NPI: [id number]  Authorization initiated by contacting insurance: Kenrick Ayala  Via: [employer]  Pending Reference #:[id number]   Clinicals submitted via: Attachment via [employer]

## 2023-02-21 NOTE — PROGRESS NOTES
Progress Note - General Surgery  Jaison Olmedo 69 y o  female MRN: [id number]  Unit/Bed#: [facility] 880-45 Encounter: [id number]    Brendan Marcos is a 69 y o  female with PMH colon cancer s/p lap right hemicolectomy 2/2 ascending colon mass (7/15) p/w incarcerated ventral hernia  S/p ex lap end ileostomy, L colectomy, reduction of ventral hernia on 2/9  Post op course c/b PE on heparin gtt now transitioned to Xarelto  PLAN:  - Dysphagia 1/thin liquids per SLP  -Follow-up morning labs, off antibiotics since 2/19  -VAC changes on Monday Wednesday Friday schedule  - continue Xarelto  - Dispo planning, patient accepted at [facility], possibly out later today    Subjective:   No acute events in past 24 hours  No new concerns this morning  Dean Lozano does note that she is tired this morning  Did not eat much yesterday  Objective:     Vitals: Temp:  [97 5 °F (36 4 °C)] 97 5 °F (36 4 °C)  HR:  [90-92] 90  Resp:  [16-19] 19  BP: ()/(62-65) 99/62  Body mass index is 26 52 kg/m²  I/O       02/15 0701  02/16 0700 02/16 0701  02/17 0700 02/17 0701  02/18 0700    P  O   240     I V  (mL/kg) 856 1 (13 9)      Blood       IV Piggyback 1020      Total Intake(mL/kg) 1876 1 (30 5) 240 (3 9)     Urine (mL/kg/hr) 1450 (1) 2040 (1 4)     Drains 0      Stool 400 600     Total Output 1850 2640     Net +26 1 -2400            Unmeasured Urine Occurrence  1 x           Physical Exam  Vitals reviewed  Constitutional:       General: She is not in acute distress  Appearance: She is not ill-appearing  Cardiovascular:      Rate and Rhythm: Normal rate and regular rhythm  Pulmonary:      Effort: Pulmonary effort is normal  No respiratory distress  Abdominal:      General: There is no distension  Palpations: Abdomen is soft  Tenderness: There is no abdominal tenderness        Comments: Wound VAC in place holding good suction, ostomy in place, stoma appears healthy with stool and gas in bag   Skin: General: Skin is warm and dry  Coloration: Skin is not jaundiced or pale  Neurological:      Mental Status: She is alert and oriented to person, place, and time  Lab, Imaging and other studies:   I have personally reviewed pertinent reports  , CBC with diff:   Lab Results   Component Value Date    WBC 10 14 02/20/2023    HGB 7 5 (L) 02/20/2023    HCT 24 6 (L) 02/20/2023    MCV 94 02/20/2023     02/20/2023    MCH 28 6 02/20/2023    MCHC 30 5 (L) 02/20/2023    RDW 17 5 (H) 02/20/2023    MPV 9 0 02/20/2023    NRBC 0 02/20/2023   , BMP/CMP:   No results found for: SODIUM, K, CL, CO2, ANIONGAP, BUN, CREATININE, GLUCOSE, CALCIUM, AST, ALT, ALKPHOS, PROT, BILITOT, EGFR  VTE Pharmacologic Prophylaxis: Heparin gtt    VTE Mechanical Prophylaxis: sequential compression device

## 2023-02-21 NOTE — OCCUPATIONAL THERAPY NOTE
Occupational Therapy Progress Note     Patient Name: Nick Hightower  BSGPG'U Date: 2/21/2023  Problem List  Principal Problem:    Perforated bowel ([facility] )  Active Problems:    Personal history of other malignant neoplasm of large intestine    Type 2 diabetes mellitus without complications ([facility] )    Essential hypertension    Strangulated ventral hernia    GERD (gastroesophageal reflux disease)    History of right breast cancer    History of right mastectomy    Moderate protein-calorie malnutrition ([facility] )          02/21/23 0933   OT Last Visit   OT Visit Date 02/21/23   Note Type   Note Type Treatment for insurance authorization   Pain Assessment   Pain Assessment Tool 0-10   Pain Score No Pain   Restrictions/Precautions   Weight Bearing Precautions Per Order No   Other Precautions Chair Alarm; Bed Alarm; Fall Risk;Pain;Multiple lines  (wound vac abdomen, colostomy)   Lifestyle   Autonomy I adls and mobility - i iadls   Reciprocal Relationships limited support available per pt   Service to Others retired   Intrinsic Gratification sedentary - enjoys watching TV and reading   ADL   Where Assessed Edge of bed   Grooming Assistance 5  Supervision/Setup   Grooming Deficit Setup; Wash/dry hands   Grooming Comments Pt stated she performed oral care by herself this AM    UB Bathing Assistance 3  Moderate Assistance   UB Bathing Deficit Setup; Increased time to complete;Supervision/safety   UB Dressing Assistance 2  Maximal Assistance   UB Dressing Deficit Setup;Supervision/safety; Thread RUE; Thread LUE; Increased time to complete; Fasteners   LB Dressing Assistance 1  Total Assistance   LB Dressing Deficit Don/doff L sock; Setup;Don/doff R sock   Bed Mobility   Supine to Sit 3  Moderate assistance   Additional items Assist x 2; Increased time required;Verbal cues;LE management   Sit to Supine Unable to assess   Additional Comments Pt greeted supine in bed  Pt able to sit on EOB x5 min with S-Min A with sitting on EOB     Transfers   Sit to Stand 2  Maximal assistance   Additional items Assist x 2; Increased time required;Verbal cues   Stand to Sit 2  Maximal assistance   Additional items Assist x 2; Increased time required;Verbal cues   Sit pivot 2  Maximal assistance   Additional items Assist x 2; Increased time required;Verbal cues   Additional Comments Pt performs functional sit pivot transfer from EOB to drop arm recliner chair  Pt completes functional STS transfers with max AX2 with RW  Cognition   Overall Cognitive Status Impaired   Arousal/Participation Alert; Cooperative   Attention Attends with cues to redirect   Orientation Level Oriented X4   Memory Decreased recall of precautions;Decreased short term memory;Decreased recall of recent events   Following Commands Follows one step commands with increased time or repetition   Comments Pt pleasant and cooperative during OT session  Activity Tolerance   Activity Tolerance Patient limited by fatigue   Medical Staff Made Aware RN cleared/updated  Assessment   Assessment Pt greeted bedside for OT treatment on 2/21/2023 focusing on maximizing independence with ADLs  Pt completes bed mobility with mod Ax2 and able to sit on EOB to complete ADLs with S-min A with trunk control  Pt mod A with UB bathing, max A with UB dressing, and total assist with LB dressing  Limitations that impact functional performance include decreased ADL status, decreased UE ROM, decreased UE strength, decreased safe judgement during ADLs, decreased cognition, decreased endurance, decreased self care transfers, decreased high level ADLs and pain  Occupational performance areas to address ADL retraining, functional transfer training, UE strengthening/ROM, endurance training, cognitive reorientation, Pt/caregiver education, equipment evaluation/education, compensatory technique education, energy conservation and activity engagement    Pt would benefit from continued skilled OT services while in hospital to maximize independence with ADLs  Will continue to follow Pt's goals and progress  Pt would benefit from post acute rehabilitation services upon DC to maximize safety and independence with ADLs and functional tasks of choice  Plan   Treatment Interventions ADL retraining;UE strengthening/ROM; Functional transfer training;Patient/family training;Cognitive reorientation; Endurance training;Equipment evaluation/education; Compensatory technique education; Activityengagement; Energy conservation   Goal Expiration Date 02/25/23   OT Treatment Day 1   OT Frequency 2-3x/wk   Recommendation   OT Discharge Recommendation Post acute rehabilitation services   Additional Comments  The patient's raw score on the AM-PAC Daily Activity Inpatient Short Form is 12  A raw score of less than 19 suggests the patient may benefit from discharge to post-acute rehabilitation services  Please refer to the recommendation of the Occupational Therapist for safe discharge planning  AM-PAC Daily Activity Inpatient   Lower Body Dressing 1   Bathing 2   Toileting 1   Upper Body Dressing 2   Grooming 3   Eating 3   Daily Activity Raw Score 12   Daily Activity Standardized Score (Calc for Raw Score >=11) 30 6   AM-PAC Applied Cognition Inpatient   Following a Speech/Presentation 3   Understanding Ordinary Conversation 4   Taking Medications 3   Remembering Where Things Are Placed or Put Away 3   Remembering List of 4-5 Errands 3   Taking Care of Complicated Tasks 2   Applied Cognition Raw Score 18   Applied Cognition Standardized Score 38 07   End of Consult   Education Provided Yes   Patient Position at End of Consult Bedside chair;Bed/Chair alarm activated; All needs within reach   Nurse Communication Nurse aware of consult       Roberto Carlos Butler MS, OTR/L

## 2023-02-21 NOTE — SPEECH THERAPY NOTE
Speech Language/Pathology    Speech/Language Pathology Progress Note    Patient Name: Darline Oppenheim  [id number] Date: 2/21/2023     Problem List  Principal Problem:    Perforated bowel ([facility] )  Active Problems:    Personal history of other malignant neoplasm of large intestine    Type 2 diabetes mellitus without complications ([facility] )    Essential hypertension    Strangulated ventral hernia    GERD (gastroesophageal reflux disease)    History of right breast cancer    History of right mastectomy    Moderate protein-calorie malnutrition ([facility] )       Past Medical History  Past Medical History:   Diagnosis Date   • Acute embolism and thrombosis of deep vein of lower extremity (HCC)    • Adenocarcinoma of colon, [facility]'s A ([facility] )    • Breast cancer ([facility] ) 2012    Right Breast    • Cancer ([facility] )    • Diabetes mellitus ([facility] )    • DVT (deep venous thrombosis) (HCC)    • GERD (gastroesophageal reflux disease)    • Hypertension    • Pes planus         Past Surgical History  Past Surgical History:   Procedure Laterality Date   • COLONOSCOPY     • HERNIA REPAIR N/A 2/9/2023    Procedure: EXPLORATORY LAPAROTOMY; ABDOMINAL WALL DEBRIDEMENT; REDUCTION VENTRAL HERNIA; LEFT COLON RESECTION; CREATION ILEOSTOMY; WASHOUT;  Surgeon: Percy Gandhi DO;  Location: [facility];  Service: General   • HYSTERECTOMY      complete @ age 29   • IR PORT REMOVAL  9/12/2018   • MASTECTOMY Right 2012   • WI COLONOSCOPY FLX DX W/COLLJ SPEC WHEN PFRMD N/A 8/23/2016    Procedure: COLONOSCOPY;  Surgeon: Eduardo Olson MD;  Location: [facility]; Service: Colorectal   • WI COLONOSCOPY FLX DX W/COLLJ SPEC WHEN PFRMD N/A 9/5/2017    Procedure: COLONOSCOPY;  Surgeon: Eduardo Olson MD;  Location: [facility]; Service: Colorectal   • WI ESOPHAGOGASTRODUODENOSCOPY TRANSORAL DIAGNOSTIC N/A 9/5/2017    Procedure: ESOPHAGOGASTRODUODENOSCOPY (EGD); Surgeon: Rhea Barrera DO;  Location: [facility];   Service: Gastroenterology         Subjective:  "If its ok with you I would like the real food again"  Current Diet: puree, thin   Objective:  Pt seen for dysphagia f/u- pt agreeable to trials of upgraded material   Offered short bread cookies to begin  Pt able to trial 3 small from the pack  Adequate mastication, able to fully clear the material well without residue  Alternated with sips thin-swallows suspected prompt w/o any overt wet vocal quality  She then asked for pretzels, able to tolerate 1/2 the pack in my presence  Mult swallows w/o any overt c/o material sticking        Assessment:  Pt w/ improved oral manipulation and tolerance of the solid material   Appropriate for regular w/thin     Plan/Recommendations:  Upgrade to regular w/ thin  Will f/u with full tray as able

## 2023-02-21 NOTE — PLAN OF CARE
Problem: MOBILITY - ADULT  Goal: Maintain or return to baseline ADL function  Description: INTERVENTIONS:  -  Assess patient's ability to carry out ADLs; assess patient's baseline for ADL function and identify physical deficits which impact ability to perform ADLs (bathing, care of mouth/teeth, toileting, grooming, dressing, etc )  - Assess/evaluate cause of self-care deficits   - Assess range of motion  - Assess patient's mobility; develop plan if impaired  - Assess patient's need for assistive devices and provide as appropriate  - Encourage maximum independence but intervene and supervise when necessary  - Involve family in performance of ADLs  - Assess for home care needs following discharge   - Consider OT consult to assist with ADL evaluation and planning for discharge  - Provide patient education as appropriate  Outcome: Progressing  Goal: Maintains/Returns to pre admission functional level  Description: INTERVENTIONS:  - Perform BMAT or MOVE assessment daily    - Set and communicate daily mobility goal to care team and patient/family/caregiver     - Collaborate with rehabilitation services on mobility goals if consulted  - Record patient progress and toleration of activity level   Outcome: Progressing     Problem: GASTROINTESTINAL - ADULT  Goal: Maintains or returns to baseline bowel function  Description: INTERVENTIONS:  - Assess bowel function  - Encourage oral fluids to ensure adequate hydration  - Administer IV fluids if ordered to ensure adequate hydration  - Administer ordered medications as needed  - Encourage mobilization and activity  - Consider nutritional services referral to assist patient with adequate nutrition and appropriate food choices  Outcome: Progressing  Goal: Maintains adequate nutritional intake  Description: INTERVENTIONS:  - Monitor percentage of each meal consumed  - Identify factors contributing to decreased intake, treat as appropriate  - Assist with meals as needed  - Monitor I&O, weight, and lab values if indicated  - Obtain nutrition services referral as needed  Outcome: Progressing  Goal: Oral mucous membranes remain intact  Description: INTERVENTIONS  - Assess oral mucosa and hygiene practices  - Implement preventative oral hygiene regimen  - Implement oral medicated treatments as ordered  - Initiate Nutrition services referral as needed  Outcome: Progressing     Problem: GENITOURINARY - ADULT  Goal: Maintains or returns to baseline urinary function  Description: INTERVENTIONS:  - Assess urinary function  - Encourage oral fluids to ensure adequate hydration if ordered  - Administer IV fluids as ordered to ensure adequate hydration  - Administer ordered medications as needed  - Offer frequent toileting  - Follow urinary retention protocol if ordered  Outcome: Progressing     Problem: SKIN/TISSUE INTEGRITY - ADULT  Goal: Skin Integrity remains intact(Skin Breakdown Prevention)  Description: Assess:  -Perform Jose assessment every  -Clean and moisturize skin every  -Inspect skin when repositioning, toileting, and assisting with ADLS  -Assess extremities for adequate circulation and sensation     Bed Management:  -Have minimal linens on bed & keep smooth, unwrinkled  -Change linens as needed when moist or perspiring  -Avoid sitting or lying in one position for more than  2 hours while in bed    Toileting:  -Offer bedside commode    Activity:  -Encourage activity and walks on unit  -Encourage or provide ROM exercises   -Turn and reposition patient every 2 Hours  -Use appropriate equipment to lift or move patient in bed    Skin Care:  -Avoid use of baby powder, tape, friction and shearing, hot water or constrictive clothing  -Relieve pressure over bony prominences using wedges  -Do not massage red bony areas    Outcome: Progressing     Problem: PAIN - ADULT  Goal: Verbalizes/displays adequate comfort level or baseline comfort level  Description: Interventions:  - Encourage patient to monitor pain and request assistance  - Assess pain using appropriate pain scale  - Administer analgesics based on type and severity of pain and evaluate response  - Implement non-pharmacological measures as appropriate and evaluate response  - Consider cultural and social influences on pain and pain management  - Notify physician/advanced practitioner if interventions unsuccessful or patient reports new pain  Outcome: Progressing     Problem: Nutrition/Hydration-ADULT  Goal: Nutrient/Hydration intake appropriate for improving, restoring or maintaining nutritional needs  Description: Monitor and assess patient's nutrition/hydration status for malnutrition  Collaborate with interdisciplinary team and initiate plan and interventions as ordered  Monitor patient's weight and dietary intake as ordered or per policy  Utilize nutrition screening tool and intervene as necessary  Determine patient's food preferences and provide high-protein, high-caloric foods as appropriate       INTERVENTIONS:  - Monitor oral intake, urinary output, labs, and treatment plans  - Assess nutrition and hydration status and recommend course of action  - Evaluate amount of meals eaten  - Assist patient with eating if necessary   - Allow adequate time for meals  - Recommend/ encourage appropriate diets, oral nutritional supplements, and vitamin/mineral supplements  - Order, calculate, and assess calorie counts as needed  - Recommend, monitor, and adjust tube feedings and TPN/PPN based on assessed needs  - Assess need for intravenous fluids  - Provide specific nutrition/hydration education as appropriate  - Include patient/family/caregiver in decisions related to nutrition  Outcome: Progressing

## 2023-02-21 NOTE — PHYSICAL THERAPY NOTE
PHYSICAL THERAPY NOTE          Patient Name: nIna Montgomery  EPUNP'T Date: 2/21/2023 02/21/23 0932   PT Last Visit   PT Visit Date 02/21/23   Note Type   Note Type Treatment   Education   Education Provided Yes   End of Consult   Patient Position at End of Consult Bedside chair; All needs within reach;Bed/Chair alarm activated   Pain Assessment   Pain Assessment Tool 0-10   Pain Score No Pain   Restrictions/Precautions   Weight Bearing Precautions Per Order No   Other Precautions Chair Alarm; Bed Alarm;Multiple lines; Fall Risk  (wound vac)   General   Chart Reviewed Yes   Cognition   Arousal/Participation Alert; Responsive   Attention Attends with cues to redirect   Following Commands Follows one step commands with increased time or repetition   Comments Pt pleasant during session and motivated to participate  Bed Mobility   Supine to Sit 3  Moderate assistance   Additional items Assist x 2;HOB elevated; Bedrails; Increased time required;LE management;Verbal cues   Sit to Supine Unable to assess   Additional Comments Pt noted to be in bed upon arrival  Pt then assisted to recliner   Transfers   Sit to Stand 2  Maximal assistance   Additional items Assist x 2; Increased time required;Verbal cues   Stand to Sit 2  Maximal assistance   Additional items Assist x 2; Increased time required;Verbal cues   Sit pivot 2  Maximal assistance   Additional items Assist x 2; Increased time required;Verbal cues   Additional Comments with RW   Balance   Static Sitting Fair   Dynamic Sitting Fair -   Static Standing Poor +   Dynamic Standing Poor   Endurance Deficit   Endurance Deficit Yes   Activity Tolerance   Activity Tolerance Patient limited by fatigue   Medical Staff Made Aware OT [id number]   Nurse Made Aware RN cleared pt for therapy   Exercises   Ankle Pumps 15 reps   Assessment   Prognosis Fair   Problem List Decreased endurance;Decreased mobility Assessment Pt seen for PT treatment session this date  Therapy session focused on bed mobility, functional transfers in order to improve overall mobility and independence  Pt requires Mod A X 2 for bed mobility, maintains sitting EOB  ~ 5 mins with min assist progressing to CG assist   Pt performs LE therapeutic exercise with cueing  Pt assisted from bed to chair with sit pivot sit with max assist X 2  Pt then completes additional STS with RW and max assist X 2, stands ~2 mins, able to weight shift this date  Pt demonstrates improved activity tolerance and mobility level  PT focused on balance training with hygiene care in sitting EOB  Pt making steady progress toward goals  Pt was left in recliner at the end of PT session with all needs in reach  Pt would benefit from continued PT services while in hospital to address remaining limitations  The patient's AM-PAC Basic Mobility Inpatient Short Form Raw Score is 10  A Raw score of less than or equal to 16 suggests the patient may benefit from discharge to post-acute rehabilitation services  Please also refer to the recommendation of the Physical Therapist for safe discharge planning  Barriers to Discharge Inaccessible home environment;Decreased caregiver support   Goals   Patient Goals to get better   STG Expiration Date 02/25/23   Plan   Treatment/Interventions Functional transfer training; Therapeutic exercise;Gait training   Progress Progressing toward goals   PT Frequency 2-3x/wk   Recommendation   PT Discharge Recommendation Post acute rehabilitation services   AM-PAC Basic Mobility Inpatient   Turning in Flat Bed Without Bedrails 2   Lying on Back to Sitting on Edge of Flat Bed Without Bedrails 2   Moving Bed to Chair 2   Standing Up From Chair Using Arms 2   Walk in Room 1   Climb 3-5 Stairs With Railing 1   Basic Mobility Inpatient Raw Score 10   Turning Head Towards Sound 3   Follow Simple Instructions 3   Low Function Basic Mobility Raw Score 16   Low Function Basic Mobility Standardized Score 25 72   Highest Level Of Mobility   -HLM Goal 4: Move to chair/commode   -HLM Achieved 4: Move to chair/commode   Education   Education Provided Mobility training   Patient Reinforcement needed;Demonstrates verbal understanding     Yoan Flaherty PT DPT

## 2023-02-21 NOTE — QUICK NOTE
Nurse-Patient-Provider rounds were completed with the patient's nurse today, Gely De Los Santos  We discussed the plan is to have PT and OT re-evaluate today, awaiting authorization for rehab  We reviewed all of the invasive devices/lines/telemetry orders   - None  DVT Prophylaxis:  - Xaraleto    Pain Assessment / Plan:  - Continue current analgesic regimen  Mobility Assessment / Plan:  - Activity as tolerated   - PT/OT    Goals / Barriers for discharge:  - awaiting authorization for Rehab  Case management following; case and discharge needs discussed  All questions and concerns were addressed  I spent greater than 5 minutes reviewing the plan with the patient and the nurse, and coordinating care for the day      BALTAZAR Reid  2/21/2023

## 2023-02-21 NOTE — QUICK NOTE
Nurse-Patient-Provider rounds were completed with the patient's nurse today, Samuel Vargas  We discussed the plan is to change vac tomorrow and have wound care present for ostomy teaching  Have PT and OT re-evaluate and update notes  We reviewed all of the invasive devices/lines/telemetry orders   - PICC line      DVT Prophylaxis:  - Xaraleto    Pain Assessment / Plan:  - Continue current analgesic regimen       Mobility Assessment / Plan:  - Activity as tolerated  Goals / Barriers for discharge:  Awaiting rehab authorization  Case management following; case and discharge needs discussed  All questions and concerns were addressed  I spent greater than 7 minutes reviewing the plan with the patient and the nurse, and coordinating care for the day      BALTAZAR Menon  2/21/2023

## 2023-02-21 NOTE — PLAN OF CARE
Problem: MOBILITY - ADULT  Goal: Maintain or return to baseline ADL function  Description: INTERVENTIONS:  -  Assess patient's ability to carry out ADLs; assess patient's baseline for ADL function and identify physical deficits which impact ability to perform ADLs (bathing, care of mouth/teeth, toileting, grooming, dressing, etc )  - Assess/evaluate cause of self-care deficits   - Assess range of motion  - Assess patient's mobility; develop plan if impaired  - Assess patient's need for assistive devices and provide as appropriate  - Encourage maximum independence but intervene and supervise when necessary  - Involve family in performance of ADLs  - Assess for home care needs following discharge   - Consider OT consult to assist with ADL evaluation and planning for discharge  - Provide patient education as appropriate  Outcome: Progressing  Goal: Maintains/Returns to pre admission functional level  Description: INTERVENTIONS:  - Perform BMAT or MOVE assessment daily    - Set and communicate daily mobility goal to care team and patient/family/caregiver     - Collaborate with rehabilitation services on mobility goals if consulted  - Record patient progress and toleration of activity level   Outcome: Progressing     Problem: GASTROINTESTINAL - ADULT  Goal: Maintains or returns to baseline bowel function  Description: INTERVENTIONS:  - Assess bowel function  - Encourage oral fluids to ensure adequate hydration  - Administer IV fluids if ordered to ensure adequate hydration  - Administer ordered medications as needed  - Encourage mobilization and activity  - Consider nutritional services referral to assist patient with adequate nutrition and appropriate food choices  Outcome: Progressing  Goal: Maintains adequate nutritional intake  Description: INTERVENTIONS:  - Monitor percentage of each meal consumed  - Identify factors contributing to decreased intake, treat as appropriate  - Assist with meals as needed  - Monitor I&O, weight, and lab values if indicated  - Obtain nutrition services referral as needed  Outcome: Progressing  Goal: Oral mucous membranes remain intact  Description: INTERVENTIONS  - Assess oral mucosa and hygiene practices  - Implement preventative oral hygiene regimen  - Implement oral medicated treatments as ordered  - Initiate Nutrition services referral as needed  Outcome: Progressing     Problem: GENITOURINARY - ADULT  Goal: Maintains or returns to baseline urinary function  Description: INTERVENTIONS:  - Assess urinary function  - Encourage oral fluids to ensure adequate hydration if ordered  - Administer IV fluids as ordered to ensure adequate hydration  - Administer ordered medications as needed  - Offer frequent toileting  - Follow urinary retention protocol if ordered  Outcome: Progressing     Problem: SKIN/TISSUE INTEGRITY - ADULT  Goal: Skin Integrity remains intact(Skin Breakdown Prevention)  Description: Assess:  -Perform Jose assessment every  -Clean and moisturize skin every  -Inspect skin when repositioning, toileting, and assisting with ADLS  -Assess extremities for adequate circulation and sensation     Bed Management:  -Have minimal linens on bed & keep smooth, unwrinkled  -Change linens as needed when moist or perspiring  -Avoid sitting or lying in one position for more than  2 hours while in bed    Toileting:  -Offer bedside commode    Activity:  -Encourage activity and walks on unit  -Encourage or provide ROM exercises   -Turn and reposition patient every 2 Hours  -Use appropriate equipment to lift or move patient in bed    Skin Care:  -Avoid use of baby powder, tape, friction and shearing, hot water or constrictive clothing  -Relieve pressure over bony prominences using wedges  -Do not massage red bony areas    Outcome: Progressing     Problem: Prexisting or High Potential for Compromised Skin Integrity  Goal: Skin integrity is maintained or improved  Description: INTERVENTIONS:  - Identify patients at risk for skin breakdown  - Assess and monitor skin integrity  - Assess and monitor nutrition and hydration status  - Monitor labs   - Assess for incontinence   - Turn and reposition patient  - Assist with mobility/ambulation  - Relieve pressure over bony prominences  - Avoid friction and shearing  - Provide appropriate hygiene as needed including keeping skin clean and dry  - Evaluate need for skin moisturizer/barrier cream  - Collaborate with interdisciplinary team   - Patient/family teaching  - Consider wound care consult   Outcome: Progressing     Problem: PAIN - ADULT  Goal: Verbalizes/displays adequate comfort level or baseline comfort level  Description: Interventions:  - Encourage patient to monitor pain and request assistance  - Assess pain using appropriate pain scale  - Administer analgesics based on type and severity of pain and evaluate response  - Implement non-pharmacological measures as appropriate and evaluate response  - Consider cultural and social influences on pain and pain management  - Notify physician/advanced practitioner if interventions unsuccessful or patient reports new pain  Outcome: Progressing     Problem: INFECTION - ADULT  Goal: Absence or prevention of progression during hospitalization  Description: INTERVENTIONS:  - Assess and monitor for signs and symptoms of infection  - Monitor lab/diagnostic results  - Monitor all insertion sites, i e  indwelling lines, tubes, and drains  - Monitor endotracheal if appropriate and nasal secretions for changes in amount and color  - [place] appropriate cooling/warming therapies per order  - Administer medications as ordered  - Instruct and encourage patient and family to use good hand hygiene technique  - Identify and instruct in appropriate isolation precautions for identified infection/condition  Outcome: Progressing  Goal: Absence of fever/infection during neutropenic period  Description: INTERVENTIONS:  - Monitor WBC    Outcome: Progressing     Problem: SAFETY ADULT  Goal: Maintain or return to baseline ADL function  Description: INTERVENTIONS:  -  Assess patient's ability to carry out ADLs; assess patient's baseline for ADL function and identify physical deficits which impact ability to perform ADLs (bathing, care of mouth/teeth, toileting, grooming, dressing, etc )  - Assess/evaluate cause of self-care deficits   - Assess range of motion  - Assess patient's mobility; develop plan if impaired  - Assess patient's need for assistive devices and provide as appropriate  - Encourage maximum independence but intervene and supervise when necessary  - Involve family in performance of ADLs  - Assess for home care needs following discharge   - Consider OT consult to assist with ADL evaluation and planning for discharge  - Provide patient education as appropriate  Outcome: Progressing  Goal: Maintains/Returns to pre admission functional level  Description: INTERVENTIONS:  - Perform BMAT or MOVE assessment daily    - Set and communicate daily mobility goal to care team and patient/family/caregiver     - Collaborate with rehabilitation services on mobility goals if consulted  - Record patient progress and toleration of activity level   Outcome: Progressing  Goal: Patient will remain free of falls  Description: INTERVENTIONS:  - Educate patient/family on patient safety including physical limitations  - Instruct patient to call for assistance with activity   - Consult OT/PT to assist with strengthening/mobility   - Keep Call bell within reach  - Keep bed low and locked with side rails adjusted as appropriate  - Keep care items and personal belongings within reach  - Initiate and maintain comfort rounds  - Make Fall Risk Sign visible to staff  - Apply yellow socks and bracelet for high fall risk patients  - Consider moving patient to room near nurses station  Outcome: Progressing     Problem: DISCHARGE PLANNING  Goal: Discharge to home or other facility with appropriate resources  Description: INTERVENTIONS:  - Identify barriers to discharge w/patient and caregiver  - Arrange for needed discharge resources and transportation as appropriate  - Identify discharge learning needs (meds, wound care, etc )  - Arrange for interpretive services to assist at discharge as needed  - Refer to Case Management Department for coordinating discharge planning if the patient needs post-hospital services based on physician/advanced practitioner order or complex needs related to functional status, cognitive ability, or social support system  Outcome: Progressing     Problem: Knowledge Deficit  Goal: Patient/family/caregiver demonstrates understanding of disease process, treatment plan, medications, and discharge instructions  Description: Complete learning assessment and assess knowledge base  Interventions:  - Provide teaching at level of understanding  - Provide teaching via preferred learning methods  Outcome: Progressing     Problem: Nutrition/Hydration-ADULT  Goal: Nutrient/Hydration intake appropriate for improving, restoring or maintaining nutritional needs  Description: Monitor and assess patient's nutrition/hydration status for malnutrition  Collaborate with interdisciplinary team and initiate plan and interventions as ordered  Monitor patient's weight and dietary intake as ordered or per policy  Utilize nutrition screening tool and intervene as necessary  Determine patient's food preferences and provide high-protein, high-caloric foods as appropriate       INTERVENTIONS:  - Monitor oral intake, urinary output, labs, and treatment plans  - Assess nutrition and hydration status and recommend course of action  - Evaluate amount of meals eaten  - Assist patient with eating if necessary   - Allow adequate time for meals  - Recommend/ encourage appropriate diets, oral nutritional supplements, and vitamin/mineral supplements  - Order, calculate, and assess calorie counts as needed  - Recommend, monitor, and adjust tube feedings and TPN/PPN based on assessed needs  - Assess need for intravenous fluids  - Provide specific nutrition/hydration education as appropriate  - Include patient/family/caregiver in decisions related to nutrition  Outcome: Progressing

## 2023-02-22 LAB
ANION GAP SERPL CALCULATED.3IONS-SCNC: 4 MMOL/L (ref 4–13)
BASOPHILS # BLD AUTO: 0.02 THOUSANDS/ÂΜL (ref 0–0.1)
BASOPHILS NFR BLD AUTO: 0 % (ref 0–1)
BUN SERPL-MCNC: 6 MG/DL (ref 5–25)
CALCIUM SERPL-MCNC: 8.7 MG/DL (ref 8.3–10.1)
CHLORIDE SERPL-SCNC: 99 MMOL/L (ref 96–108)
CO2 SERPL-SCNC: 28 MMOL/L (ref 21–32)
CREAT SERPL-MCNC: 0.41 MG/DL (ref 0.6–1.3)
EOSINOPHIL # BLD AUTO: 0.11 THOUSAND/ÂΜL (ref 0–0.61)
EOSINOPHIL NFR BLD AUTO: 2 % (ref 0–6)
ERYTHROCYTE [DISTWIDTH] IN BLOOD BY AUTOMATED COUNT: 17.5 % (ref 11.6–15.1)
GFR SERPL CREATININE-BSD FRML MDRD: 99 ML/MIN/1.73SQ M
GLUCOSE SERPL-MCNC: 143 MG/DL (ref 65–140)
GLUCOSE SERPL-MCNC: 162 MG/DL (ref 65–140)
GLUCOSE SERPL-MCNC: 180 MG/DL (ref 65–140)
GLUCOSE SERPL-MCNC: 195 MG/DL (ref 65–140)
GLUCOSE SERPL-MCNC: 233 MG/DL (ref 65–140)
HCT VFR BLD AUTO: 25.2 % (ref 34.8–46.1)
HGB BLD-MCNC: 7.8 G/DL (ref 11.5–15.4)
IMM GRANULOCYTES # BLD AUTO: 0.05 THOUSAND/UL (ref 0–0.2)
IMM GRANULOCYTES NFR BLD AUTO: 1 % (ref 0–2)
LYMPHOCYTES # BLD AUTO: 0.52 THOUSANDS/ÂΜL (ref 0.6–4.47)
LYMPHOCYTES NFR BLD AUTO: 8 % (ref 14–44)
MCH RBC QN AUTO: 29 PG (ref 26.8–34.3)
MCHC RBC AUTO-ENTMCNC: 31 G/DL (ref 31.4–37.4)
MCV RBC AUTO: 94 FL (ref 82–98)
MONOCYTES # BLD AUTO: 0.82 THOUSAND/ÂΜL (ref 0.17–1.22)
MONOCYTES NFR BLD AUTO: 12 % (ref 4–12)
NEUTROPHILS # BLD AUTO: 5.18 THOUSANDS/ÂΜL (ref 1.85–7.62)
NEUTS SEG NFR BLD AUTO: 77 % (ref 43–75)
NRBC BLD AUTO-RTO: 0 /100 WBCS
PLATELET # BLD AUTO: 376 THOUSANDS/UL (ref 149–390)
PMV BLD AUTO: 9 FL (ref 8.9–12.7)
POTASSIUM SERPL-SCNC: 4.9 MMOL/L (ref 3.5–5.3)
RBC # BLD AUTO: 2.69 MILLION/UL (ref 3.81–5.12)
SODIUM SERPL-SCNC: 131 MMOL/L (ref 135–147)
WBC # BLD AUTO: 6.7 THOUSAND/UL (ref 4.31–10.16)

## 2023-02-22 RX ORDER — PANTOPRAZOLE SODIUM 40 MG/1
40 TABLET, DELAYED RELEASE ORAL
Status: DISCONTINUED | OUTPATIENT
Start: 2023-02-23 | End: 2023-02-23 | Stop reason: HOSPADM

## 2023-02-22 RX ADMIN — INSULIN LISPRO 3 UNITS: 100 INJECTION, SOLUTION INTRAVENOUS; SUBCUTANEOUS at 17:23

## 2023-02-22 RX ADMIN — THIAMINE HCL TAB 100 MG 100 MG: 100 TAB at 08:26

## 2023-02-22 RX ADMIN — BIMATOPROST 1 DROP: 0.1 SOLUTION/ DROPS OPHTHALMIC at 22:13

## 2023-02-22 RX ADMIN — RIVAROXABAN 10 MG: 10 TABLET, FILM COATED ORAL at 08:26

## 2023-02-22 RX ADMIN — PANTOPRAZOLE SODIUM 40 MG: 40 INJECTION, POWDER, FOR SOLUTION INTRAVENOUS at 06:23

## 2023-02-22 RX ADMIN — INSULIN LISPRO 1 UNITS: 100 INJECTION, SOLUTION INTRAVENOUS; SUBCUTANEOUS at 11:56

## 2023-02-22 RX ADMIN — INSULIN LISPRO 2 UNITS: 100 INJECTION, SOLUTION INTRAVENOUS; SUBCUTANEOUS at 22:13

## 2023-02-22 RX ADMIN — Medication 1 TABLET: at 08:26

## 2023-02-22 NOTE — SPEECH THERAPY NOTE
Speech Language/Pathology    Speech/Language Pathology Progress Note    Patient Name: Randee Jansen  QRNHC'A Date: 2/22/2023     Problem List  Principal Problem:    Perforated bowel [facility])  Active Problems:    Personal history of other malignant neoplasm of large intestine    Type 2 diabetes mellitus without complications ([facility] [address] )    Essential hypertension    Strangulated ventral hernia    GERD (gastroesophageal reflux disease)    History of right breast cancer    History of right mastectomy    Moderate protein-calorie malnutrition ([facility] )       Past Medical History  Past Medical History:   Diagnosis Date   • Acute embolism and thrombosis of deep vein of lower extremity (HCC)    • Adenocarcinoma of colon, Duke's A ([facility] [address] )    • Breast cancer ([facility] [facility] ) 2012    Right Breast    • Cancer ([facility] [address] )    • Diabetes mellitus ([facility] )    • DVT (deep venous thrombosis) (HCC)    • GERD (gastroesophageal reflux disease)    • Hypertension    • Pes planus         Past Surgical History  Past Surgical History:   Procedure Laterality Date   • COLONOSCOPY     • HERNIA REPAIR N/A 2/9/2023    Procedure: EXPLORATORY LAPAROTOMY; ABDOMINAL WALL DEBRIDEMENT; REDUCTION VENTRAL HERNIA; LEFT COLON RESECTION; CREATION ILEOSTOMY; WASHOUT;  Surgeon: Brittany Mari DO;  Location: [facility]IN [facility];  Service: General   • HYSTERECTOMY      complete @ age 29   • IR PORT REMOVAL  9/12/2018   • MASTECTOMY Right 2012   • MO COLONOSCOPY FLX DX W/COLLJ SPEC WHEN PFRMD N/A 8/23/2016    Procedure: COLONOSCOPY;  Surgeon: Chalino Ariza MD;  Location: [facility]; Service: Colorectal   • MO COLONOSCOPY FLX DX W/COLLJ SPEC WHEN PFRMD N/A 9/5/2017    Procedure: COLONOSCOPY;  Surgeon: Chalino Ariza MD;  Location: [facility]; Service: Colorectal   • MO ESOPHAGOGASTRODUODENOSCOPY TRANSORAL DIAGNOSTIC N/A 9/5/2017    Procedure: ESOPHAGOGASTRODUODENOSCOPY (EGD); Surgeon: Silvia Heredia DO;  Location: [facility];   Service: Gastroenterology         Subjective:  "I got roast beef last night, I thought it was meatloaf  I don't really like roast beef"  Current diet: regular w/ thin   Objective:  Pt self feeding morning meal- eggs, toast, banana, orange juice, coffee  Adequate bite and mastication of the material today  Mildly slow but adequate transfers w/ full oral clearing  Mult swallows per each bolus  No overt coughing or throat clearing  Full tolerance of 8 oz thin today w/o issues  Education offered again for the pt to alternate bites w/ sips  Assessment:  Pt is able to tolerate regular w/ thin without overt s/s aspiration       Plan/Recommendations:  Regular w/ thin  No f/u needed at this time  Alternate bites/sips  Adequate position for all po

## 2023-02-22 NOTE — PLAN OF CARE
Problem: GASTROINTESTINAL - ADULT  Goal: Maintains or returns to baseline bowel function  Description: INTERVENTIONS:  - Assess bowel function  - Encourage oral fluids to ensure adequate hydration  - Administer IV fluids if ordered to ensure adequate hydration  - Administer ordered medications as needed  - Encourage mobilization and activity  - Consider nutritional services referral to assist patient with adequate nutrition and appropriate food choices  Outcome: Progressing  Goal: Maintains adequate nutritional intake  Description: INTERVENTIONS:  - Monitor percentage of each meal consumed  - Identify factors contributing to decreased intake, treat as appropriate  - Assist with meals as needed  - Monitor I&O, weight, and lab values if indicated  - Obtain nutrition services referral as needed  Outcome: Progressing  Goal: Oral mucous membranes remain intact  Description: INTERVENTIONS  - Assess oral mucosa and hygiene practices  - Implement preventative oral hygiene regimen  - Implement oral medicated treatments as ordered  - Initiate Nutrition services referral as needed  Outcome: Progressing     Problem: GENITOURINARY - ADULT  Goal: Maintains or returns to baseline urinary function  Description: INTERVENTIONS:  - Assess urinary function  - Encourage oral fluids to ensure adequate hydration if ordered  - Administer IV fluids as ordered to ensure adequate hydration  - Administer ordered medications as needed  - Offer frequent toileting  - Follow urinary retention protocol if ordered  Outcome: Progressing     Problem: PAIN - ADULT  Goal: Verbalizes/displays adequate comfort level or baseline comfort level  Description: Interventions:  - Encourage patient to monitor pain and request assistance  - Assess pain using appropriate pain scale  - Administer analgesics based on type and severity of pain and evaluate response  - Implement non-pharmacological measures as appropriate and evaluate response  - Consider cultural and social influences on pain and pain management  - Notify physician/advanced practitioner if interventions unsuccessful or patient reports new pain  Outcome: Progressing     Problem: INFECTION - ADULT  Goal: Absence or prevention of progression during hospitalization  Description: INTERVENTIONS:  - Assess and monitor for signs and symptoms of infection  - Monitor lab/diagnostic results  - Monitor all insertion sites, i e  indwelling lines, tubes, and drains  - Monitor endotracheal if appropriate and nasal secretions for changes in amount and color  - [place] appropriate cooling/warming therapies per order  - Administer medications as ordered  - Instruct and encourage patient and family to use good hand hygiene technique  - Identify and instruct in appropriate isolation precautions for identified infection/condition  Outcome: Progressing  Goal: Absence of fever/infection during neutropenic period  Description: INTERVENTIONS:  - Monitor WBC    Outcome: Progressing     Problem: Nutrition/Hydration-ADULT  Goal: Nutrient/Hydration intake appropriate for improving, restoring or maintaining nutritional needs  Description: Monitor and assess patient's nutrition/hydration status for malnutrition  Collaborate with interdisciplinary team and initiate plan and interventions as ordered  Monitor patient's weight and dietary intake as ordered or per policy  Utilize nutrition screening tool and intervene as necessary  Determine patient's food preferences and provide high-protein, high-caloric foods as appropriate       INTERVENTIONS:  - Monitor oral intake, urinary output, labs, and treatment plans  - Assess nutrition and hydration status and recommend course of action  - Evaluate amount of meals eaten  - Assist patient with eating if necessary   - Allow adequate time for meals  - Recommend/ encourage appropriate diets, oral nutritional supplements, and vitamin/mineral supplements  - Order, calculate, and assess calorie counts as needed  - Recommend, monitor, and adjust tube feedings and TPN/PPN based on assessed needs  - Assess need for intravenous fluids  - Provide specific nutrition/hydration education as appropriate  - Include patient/family/caregiver in decisions related to nutrition  Outcome: Progressing

## 2023-02-22 NOTE — WOUND OSTOMY CARE
Progress Note- Ostomy  Milan Hiss 69 y o  female  [id number]  [facility] [address]-[facility] 811-01    Assessment:  Patient seen today for ostomy teaching  Patient underwent ileostomy formation on 2/9/23  Patient stated that she is unsure if she will be able to perform ostomy teaching sessions and states that she will have visiting nurses at home for assistance with pouch changes and wound vac changes  Patient was educated on importance of understanding ostomy care - patient stated understanding but stated that she would not be able to perform steps  Patient did not watch ostomy pouch change  Topics reviewed with patient: normal & abdormal stoma appearance, how and when to empty the pouch (1/3- 1/2 full) to prevent pulling/lifting of the barrier, frequency of changing of the pouch (every 3-4 days on a schedule), bolivar-stomal skin care, how to measure the stoma/ cut the wafer to the correct size, how to close the pouch  Step-by-step pouch change done using convatec one piece pouch  Demonstrated how to cut one piece barrier, and how to apply it to the skin using gentle pressure / warmth of the hands  Skin prep applied to bolivar-stomal skin, rationale explained to patient  Good seal obtained  Stoma appearance:       Stoma: Covered with yellow slough tissue, round, budded stoma with center os, peristomal skin is intact  Stoma attached to skin junction, no separation noted  Moderate amount of brown liquid effluent in bag  Stoma located near midline abdominal wound vac  STOMA MEASUREMENT: 1 75 inches      Ostomy Care:  -Stoma Measurement: 1 75 inches (Measure stoma weekly for next 6-8 weeks- stoma will decrease in size due to decrease in swelling)     -Appliance Used During Bag Change: Convatec One Piece Pouch  -Accessories Used: 3M No Sting Skin Protectant, 2 inch jorge ring    *Can shower with pouch on or off  Make sure to dry off pouch after shower  Bag Change Steps:  1   Peel back pouch using push-pull method, may use non-alcohol adhesive remover  Remove pouch from top to bottom  2  Use warm water only to cleanse skin around the stoma (bolivar-stomal skin)  3  Make sure all adhesive residue is removed and skin is dry and not oily  4  Measure stoma size using measuring guide and trace correct measurements onto back of pouch  5  Then cut or mold the backing of pouch out to correct shape/size  Off set pouch so it sits away from midline wound vac  6  Use 3M no sting barrier film to prep the skin around the stoma  Apply 2 inch jorge to midline crease near wound vac  7  Place pouch over stoma and onto skin  8  Use warmth of hand to apply gentle pressure to help backing of pouch to adhere well to skin  TIPS:  Empty pouch when 1/3 -1/2 full  Change pouch every 4-5 days or if signs of leaking  Crusting as needed for moist, red, open skin around the stoma: (Done prior to pouch application)   Apply stoma powder to excoriation, move excess powder away with hand  Use no sting barrier to pat area to form "crust"  Repeat X2 before placing new ostomy pouch     Orders listed below and wound care will continue to follow, call or tiger text with questions  Bedside nurse updated of findings and orders  Flowsheets below with pictures and measurements        Kimmie Montemayor RN, BSN

## 2023-02-22 NOTE — CASE MANAGEMENT
Edson Duckworth has received approved authorization from insurance:     Called in by Rep: Timoteo Wilcox received for: SNF  Facility: [facility] #: [id number]  Start of Care:02/22/2023  Next Review Date:02/27/2023  Care Coordinator: Bennett LY#:   Care Manager notified: Irene Cook

## 2023-02-22 NOTE — PROGRESS NOTES
Progress Note - General Surgery  Maddie Carlos 69 y o  female MRN: [id number]  Unit/Bed#: [facility] 755-32 Encounter: [id number]    Diogo Talbot is a 69 y o  female with PMH colon cancer s/p lap right hemicolectomy 2/2 ascending colon mass (7/15) p/w incarcerated ventral hernia  S/p ex lap end ileostomy, L colectomy, reduction of ventral hernia on 2/9  Post op course c/b PE on heparin gtt now transitioned to Xarelto  Medically stable for discharge, pending insurance authorization    PLAN:  -Continue regular diet, thin liquids, supplements  -VAC changed today (MW schedule)  -Continue Xarelto  -Disposition planning, accepted to [employer], pending insurance authorization  -Medically stable to discharge when approval received    Subjective:   No acute events in past 24 hours  She reports that she is tired and stiff this morning  No new concerns  Good morning      Objective:     Vitals: Temp:  [97 2 °F (36 2 °C)-97 7 °F (36 5 °C)] 97 2 °F (36 2 °C)  HR:  [87-94] 87  Resp:  [14-18] 14  BP: (101-117)/(59-70) 115/70  Body mass index is 26 52 kg/m²  I/O       02/15 0701  02/16 0700 02/16 0701  02/17 0700 02/17 0701  02/18 0700    P  O   240     I V  (mL/kg) 856 1 (13 9)      Blood       IV Piggyback 1020      Total Intake(mL/kg) 1876 1 (30 5) 240 (3 9)     Urine (mL/kg/hr) 1450 (1) 2040 (1 4)     Drains 0      Stool 400 600     Total Output 1850 2640     Net +26 1 -2400            Unmeasured Urine Occurrence  1 x           Physical Exam  Vitals reviewed  Constitutional:       General: She is not in acute distress  Appearance: She is underweight  She is not ill-appearing  Cardiovascular:      Rate and Rhythm: Normal rate and regular rhythm  Pulmonary:      Effort: Pulmonary effort is normal  No respiratory distress  Abdominal:      General: There is no distension  Palpations: Abdomen is soft  Tenderness: There is no abdominal tenderness        Comments: Ostomy in place with stool and gas in bag, wound vac on midline wound holding suction with minimal SS output   Skin:     General: Skin is warm and dry  Coloration: Skin is not jaundiced or pale  Neurological:      Mental Status: She is alert and oriented to person, place, and time  Lab, Imaging and other studies:   I have personally reviewed pertinent reports  , CBC with diff:   Lab Results   Component Value Date    WBC 6 70 02/22/2023    HGB 7 8 (L) 02/22/2023    HCT 25 2 (L) 02/22/2023    MCV 94 02/22/2023     02/22/2023    MCH 29 0 02/22/2023    MCHC 31 0 (L) 02/22/2023    RDW 17 5 (H) 02/22/2023    MPV 9 0 02/22/2023    NRBC 0 02/22/2023   , BMP/CMP:   Lab Results   Component Value Date    SODIUM 131 (L) 02/22/2023    K 4 9 02/22/2023    CL 99 02/22/2023    CO2 28 02/22/2023    BUN 6 02/22/2023    CREATININE 0 41 (L) 02/22/2023    CALCIUM 8 7 02/22/2023    EGFR 99 02/22/2023     VTE Pharmacologic Prophylaxis: Heparin gtt    VTE Mechanical Prophylaxis: sequential compression device

## 2023-02-22 NOTE — PROGRESS NOTES
The pantoprazole has / have been converted to [facility] for Adults as approved by the Pharmacy and Therapeutics Committee  The patient met all eligible criteria:  3 Age = 25years old   2) Received at least one dose of the IV form   3) Receiving at least one other scheduled oral/enteral medication   4) Tolerating an oral/enteral diet   and did not have any exclusions:   1) Critical care patient   2) Active GI bleed (IF assessing H2RAs or PPIs)   3) Continuous tube feeding (IF assessing cipro, doxycycline, levofloxacin, minocycline, rifampin, or voriconazole)   4) Receiving PO vancomycin (IF assessing metronidazole)   5) Persistent nausea and/or vomiting   6) Ileus or gastrointestinal obstruction   7) Dina/nasogastric tube set for continuous suction   8) Specific order not to automatically convert to PO (in the order's comments or if discussed in the most recent Infectious Disease or primary team's progress notes)      Laurie Santos, AngiD

## 2023-02-22 NOTE — PLAN OF CARE
Problem: MOBILITY - ADULT  Goal: Maintain or return to baseline ADL function  Description: INTERVENTIONS:  -  Assess patient's ability to carry out ADLs; assess patient's baseline for ADL function and identify physical deficits which impact ability to perform ADLs (bathing, care of mouth/teeth, toileting, grooming, dressing, etc )  - Assess/evaluate cause of self-care deficits   - Assess range of motion  - Assess patient's mobility; develop plan if impaired  - Assess patient's need for assistive devices and provide as appropriate  - Encourage maximum independence but intervene and supervise when necessary  - Involve family in performance of ADLs  - Assess for home care needs following discharge   - Consider OT consult to assist with ADL evaluation and planning for discharge  - Provide patient education as appropriate  Outcome: Progressing     Problem: MOBILITY - ADULT  Goal: Maintains/Returns to pre admission functional level  Description: INTERVENTIONS:  - Perform BMAT or MOVE assessment daily    - Set and communicate daily mobility goal to care team and patient/family/caregiver     - Collaborate with rehabilitation services on mobility goals if consulted  - Record patient progress and toleration of activity level   Outcome: Progressing     Problem: GASTROINTESTINAL - ADULT  Goal: Maintains or returns to baseline bowel function  Description: INTERVENTIONS:  - Assess bowel function  - Encourage oral fluids to ensure adequate hydration  - Administer IV fluids if ordered to ensure adequate hydration  - Administer ordered medications as needed  - Encourage mobilization and activity  - Consider nutritional services referral to assist patient with adequate nutrition and appropriate food choices  Outcome: Progressing

## 2023-02-22 NOTE — PROGRESS NOTES
Progress Note - General Surgery   Pratik Steward 69 y o  female MRN: [id number]  Unit/Bed#: [facility] 811-01 Encounter: [id number]    Assessment:  69 y  o  female with PMH colon cancer s/p lap right hemicolectomy 2/2 ascending colon mass (7/15) p/w incarcerated ventral hernia  S/p ex lap end ileostomy, L colectomy, reduction of ventral hernia on 2/9  Post op course c/b PE on heparin gtt now transitioned to Xarelto  Medically stable for discharge, pending insurance authorization    Vitals normal on room air  UOP 650cc    No new labs  WBC 6 70 yesterday  Hb 7 8 yesterday  Cr 0 41 yesterday    Plan:  Regular diet as tolerated, supplements  Wound VAC, change MWF  Xarelto for recent PE  Dispo planning, pending insurance auth    Subjective/Objective     Subjective: No acute events overnight, tolerating regular diet without nausea or vomiting, pain controlled but states she is "stiff", having stoma output    Objective:    Blood pressure 108/66, pulse 93, temperature 97 7 °F (36 5 °C), resp  rate 17, height 5' (1 524 m), weight 61 6 kg (135 lb 12 9 oz), SpO2 98 %, not currently breastfeeding  ,Body mass index is 26 52 kg/m²        Intake/Output Summary (Last 24 hours) at 2/23/2023 0559  Last data filed at 2/23/2023 0501  Gross per 24 hour   Intake --   Output 880 ml   Net -880 ml       Invasive Devices     Peripherally Inserted Central Catheter Line  Duration           PICC Line 02/10/23 Left Brachial 12 days          Drain  Duration           Ileostomy Standard (ap Calderón) Umbilicus 13 days                Physical Exam:  Gen:    NAD  CV:      warm, well-perfused  Lungs: No respiratory distress  Abd:     soft, NT/ND, stoma with stool output, midline VAC with serosanguinous output  Ext:      no CCE  Neuro: A&Ox3

## 2023-02-22 NOTE — RESTORATIVE TECHNICIAN NOTE
Restorative Technician Note      Patient Name: Julienne Wallace     Restorative Tech Visit Date: 02/22/23  Note Type: Mobility  Patient Position Upon Consult: Supine  Activity Performed: Transferred; Stood  Assistive Device: Other (Comment) (Ax2 drop arm to chair; Ax2 to stand with RW to adjust linens when in chair)  Education Provided: Yes  Patient Position at End of Consult: Bedside chair;  All needs within reach    Madison JUAREZT, Restorative Technician

## 2023-02-22 NOTE — CASE MANAGEMENT
Case Management Discharge Planning Note    Patient name Annamaria Shah  Location [facility] 811/[facility] 121-62 MRN [id number]  : 1945 Date 2023       Current Admission Date: 2023  Current Admission Diagnosis:Perforated bowel [facility])   Patient Active Problem List    Diagnosis Date Noted   • Moderate protein-calorie malnutrition ([facility] [address] ) 02/15/2023   • Lower abdominal pain 2023   • Perforated bowel ([facility] ) 2023   • Strangulated ventral hernia 2023   • GERD (gastroesophageal reflux disease) 2023   • History of right breast cancer 2023   • History of right mastectomy 2023   • Iron deficiency anemia, unspecified 2022   • History of DVT (deep vein thrombosis) 2022   • Thyroid disorder 2022   • Osteoporosis due to aromatase inhibitor 2022   • History of diabetes mellitus 2022   • Anxiety about health 2021   • Diabetic ulcer of toe of left foot associated with type 2 diabetes mellitus, limited to breakdown of skin ([facility] ) 2021   • Abnormal tumor markers 2020   • Essential hypertension 10/20/2020   • D-dimer, elevated 2019   • Type 2 diabetes mellitus without complications ([facility] )    • Other osteoporosis without current pathological fracture 2018   • 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 2018   • Personal history of other malignant neoplasm of large intestine 2018   • Malignant neoplasm of right breast in female, estrogen receptor positive ([facility] ) 2018   • Malignant neoplasm of ascending colon ([facility] ) 2018   • Chronic deep vein thrombosis (DVT) of distal vein of left lower extremity ([facility] ) 2018   • Thyroid nodule 2018      LOS (days): 13  Geometric Mean LOS (GMLOS) (days): 9 80  Days to GMLOS:-3 3     OBJECTIVE:  Risk of Unplanned Readmission Score: 14 23         Current admission status: Inpatient   Preferred Pharmacy:   [address] [place] [address] [id number] [address] [place] Elizalde [place] [address]  Phone: [phone] Fax: [address] [place], [facility] [address] [facility] [address] [address] [facility] [address] [address]  Phone: [phone] Fax: [phone]    Primary Care Provider: Marianne Castellanos DO    Primary Insurance: [facility]  Secondary Insurance:     DISCHARGE DETAILS:                                                                                                               [address] [place] [address] Number: [id number] ([facility] 2021)

## 2023-02-23 VITALS
TEMPERATURE: 97.7 F | BODY MASS INDEX: 26.66 KG/M2 | HEIGHT: 60 IN | SYSTOLIC BLOOD PRESSURE: 96 MMHG | DIASTOLIC BLOOD PRESSURE: 64 MMHG | RESPIRATION RATE: 16 BRPM | HEART RATE: 91 BPM | WEIGHT: 135.8 LBS | OXYGEN SATURATION: 98 %

## 2023-02-23 PROBLEM — K43.6 INCARCERATED VENTRAL HERNIA: Status: ACTIVE | Noted: 2023-02-23

## 2023-02-23 LAB
GLUCOSE SERPL-MCNC: 138 MG/DL (ref 65–140)
GLUCOSE SERPL-MCNC: 205 MG/DL (ref 65–140)
GLUCOSE SERPL-MCNC: 236 MG/DL (ref 65–140)

## 2023-02-23 RX ORDER — INSULIN LISPRO 100 [IU]/ML
1-6 INJECTION, SOLUTION INTRAVENOUS; SUBCUTANEOUS
Refills: 0 | Status: SHIPPED
Start: 2023-02-23

## 2023-02-23 RX ORDER — PANTOPRAZOLE SODIUM 40 MG/1
40 TABLET, DELAYED RELEASE ORAL
Refills: 0 | Status: SHIPPED
Start: 2023-02-24

## 2023-02-23 RX ADMIN — PANTOPRAZOLE SODIUM 40 MG: 40 TABLET, DELAYED RELEASE ORAL at 05:31

## 2023-02-23 RX ADMIN — THIAMINE HCL TAB 100 MG 100 MG: 100 TAB at 09:19

## 2023-02-23 RX ADMIN — RIVAROXABAN 10 MG: 10 TABLET, FILM COATED ORAL at 09:19

## 2023-02-23 RX ADMIN — INSULIN LISPRO 2 UNITS: 100 INJECTION, SOLUTION INTRAVENOUS; SUBCUTANEOUS at 12:05

## 2023-02-23 NOTE — PHYSICAL THERAPY NOTE
Physical Therapy Progress Note     02/23/23 1505   PT Last Visit   PT Visit Date 02/23/23   Note Type   Note Type Treatment   Pain Assessment   Pain Assessment Tool 0-10   Pain Score No Pain   Pain Onset/Description   (Stiffness, no pain )   Hospital Pain Intervention(s) Repositioned; Emotional support; Ambulation/increased activity   Restrictions/Precautions   Other Precautions Pain; Fall Risk;Multiple lines   Subjective   Subjective The patient notes that she feels stiff, but she is agreeable to get back to bed for a while  Bed Mobility   Sit to Supine 3  Moderate assistance   Additional items Assist x 2; Increased time required;Verbal cues;LE management   Transfers   Sit to Stand 3  Moderate assistance   Additional items Assist x 2; Increased time required   Stand to Sit 3  Moderate assistance   Additional items Assist x 2; Increased time required;Verbal cues   Stand pivot 2  Maximal assistance   Additional items Assist x 2; Increased time required;Verbal cues   Balance   Static Sitting Fair   Dynamic Sitting Fair -   Static Standing Poor +   Dynamic Standing Poor -   Ambulatory Poor -   Activity Tolerance   Activity Tolerance Patient limited by fatigue;Patient tolerated treatment well   Nurse Made Aware YOGESH Eldridge  Exercises   Knee AROM Long Arc Quad Sitting;10 reps;AAROM; Bilateral   Ankle Pumps Sitting;10 reps;Bilateral;AROM   Assessment   Prognosis Fair   Problem List Decreased endurance;Decreased mobility   Assessment The patient was initially fatigued, but after a few minutes she became readily alert and interactive  She was expressive about her situation, but she was agreeable to return to bed as she had been out in the chair for over four hours  The patient was educated about the importance of mobilizing several times with staff in order to preserve her skin integrity as well as progress her mobility She was notably stiff, but this gradually improved with therapeutic exercise   She remains limited due to decreased strength, balance, and activity tolerance  The patient was in bed with her heels and LUE propped on pillows as well as all needs within reach  Barriers to Discharge Inaccessible home environment;Decreased caregiver support   Goals   Patient Goals To get better  STG Expiration Date 02/25/23   PT Treatment Day 3   Plan   Treatment/Interventions Functional transfer training;LE strengthening/ROM; Therapeutic exercise; Endurance training;Patient/family training;Bed mobility;Elevations   Progress Slow progress, decreased activity tolerance   PT Frequency 2-3x/wk   Recommendation   PT Discharge Recommendation Post acute rehabilitation services   AM-PAC Basic Mobility Inpatient   Turning in Flat Bed Without Bedrails 2   Lying on Back to Sitting on Edge of Flat Bed Without Bedrails 2   Moving Bed to Chair 2   Standing Up From Chair Using Arms 2   Walk in Room 1   Climb 3-5 Stairs With Railing 1   Basic Mobility Inpatient Raw Score 10   Turning Head Towards Sound 3   Follow Simple Instructions 4   Low Function Basic Mobility Raw Score 17   Low Function Basic Mobility Standardized Score 27 46   Highest Level Of Mobility   -HL Goal 4: Move to chair/commode   -HLM Achieved 5: Stand (1 or more minutes)         An AM-PAC Basic Mobility raw score less than 16 suggests the patient may benefit from discharge to post-acute rehab services      [facility], PTA

## 2023-02-23 NOTE — CASE MANAGEMENT
Case Management Discharge Planning Note    Patient name Everardo Coles  [facility] MRN [id number]  : 1945 Date 2023       Current Admission Date: 2023  Current Admission Diagnosis:Perforated bowel [facility])   Patient Active Problem List    Diagnosis Date Noted   • Moderate protein-calorie malnutrition ([facility] [address] [facility] ) 02/15/2023   • Lower abdominal pain 2023   • Perforated bowel ([facility] ) 2023   • Strangulated ventral hernia 2023   • GERD (gastroesophageal reflux disease) 2023   • History of right breast cancer 2023   • History of right mastectomy 2023   • Iron deficiency anemia, unspecified 2022   • History of DVT (deep vein thrombosis) 2022   • Thyroid disorder 2022   • Osteoporosis due to aromatase inhibitor 2022   • History of diabetes mellitus 2022   • Anxiety about health 2021   • Diabetic ulcer of toe of left foot associated with type 2 diabetes mellitus, limited to breakdown of skin ([facility] ) 2021   • Abnormal tumor markers 2020   • Essential hypertension 10/20/2020   • D-dimer, elevated 2019   • Type 2 diabetes mellitus without complications ([facility] ) 16/10/2801   • Other osteoporosis without current pathological fracture 2018   • 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 2018   • Personal history of other malignant neoplasm of large intestine 2018   • Malignant neoplasm of right breast in female, estrogen receptor positive ([facility] ) 2018   • Malignant neoplasm of ascending colon ([facility] ) 2018   • Chronic deep vein thrombosis (DVT) of distal vein of left lower extremity ([facility] ) 2018   • Thyroid nodule 2018      LOS (days): 14  Geometric Mean LOS (GMLOS) (days): 9 80  Days to GMLOS:-4 4     OBJECTIVE:  Risk of Unplanned Readmission Score: 14 33         Current admission status: Inpatient   Preferred Pharmacy:   [address] [place] [address] #[id number] Nico Turner [address] [place] [address]  Phone: [phone] Fax: [address], [place] [address] [place] [address] [place] [address] [place] [address] [address] [address]  Phone: [phone] Fax: [phone]    Primary Care Provider: Lynn Baum DO    Primary Insurance: [employer] [address] REP  Secondary Insurance:     DISCHARGE DETAILS:             IMM Given (Date):: 02/23/23  IMM Given to[de-identified] Patient     IMM reviewed with patient, patient agrees with discharge determination              Accepting Facility Name, [facility] [id number]) [facility]  Receiving Facility/Agency Phone Number: [phone]  Facility/Agency Fax Number: [phone]

## 2023-02-23 NOTE — QUICK NOTE
Nurse-Patient-Provider rounds were completed with the patient's nurse today, Autumn and [address]  We discussed the plan is to continue the low fiber/low residue regular diet with Ensure and Magic cup supplements  May saline lock IV today  Continue to monitor BMP with hyponatremia noted to be slightly worsened on most recent evaluation  Continue anticoagulation with Xarelto for recent diagnosis of PE  Continue local wound care with wound VAC to left abdominal wound with dressing changes on Mondays, Wednesdays and Fridays; next dressing change on 2/24/2023  Continue stoma care and teaching  We reviewed all of the invasive devices/lines/telemetry orders   - None  DVT Prophylaxis:  - Anticoagulated with Xarelto  Pain Assessment / Plan:  - Continue current analgesic regimen  Mobility Assessment / Plan:  - Activity as tolerated with assistance  - Continue PT and OT evaluation and treatment as indicated  Goals / Barriers for discharge:  - Patient remains medically appropriate for discharge, but continues to await insurance authorization for placement at postacute care facility for rehab  - Case management following; case and discharge needs discussed  All questions and concerns were addressed  I spent greater than 20 minutes reviewing the plan with the patient and the nurse, and coordinating care for the day      Tian Urban PA-C  2/23/2023 09:15 AM

## 2023-02-23 NOTE — ACP (ADVANCE CARE PLANNING)
Called SNF spoke sindi Vivar the supervisor to give report  She said she was under the impression that the wound vac was discontinued  There was not an order for the wound vac to be dc' in the chart  I reached out to Red team, spoke with Dr Davidson Mcclendon re no dc order for pt to facility, said he was working on it  Supervisor from facility called back said that she spoke to admissions at her facility and there was an auth days ago for the vac approval, she was not aware of  I said we are going through with the dc on our end and I will obtain a w/d dsg order for when vac is not in place

## 2023-02-23 NOTE — DISCHARGE SUMMARY
{thea ip specialty discharge summaries:74514} progress in her recovery and was evaluated by SLP and her diet advanced as able  PT and OT evaluations were obtained with recommendations for acute rehabilitation services  She was ultimately accepted for rehab at [facility] in [place] and discharged on 2/23  Significant Findings, Care, Treatment and Services Provided:  2/10 CTA PE: Segmental filling defects within the left upper lobe and right lower lobe pulmonary arteries compatible with pulmonary emboli  Measured RV/LV ratio is within normal limits at less than 0 9  Trace bilateral pleural effusions  2/17 MR Liver: Increased hepatic parenchymal out of phase signal compared with in phase signal indicating iron deposition  Enhancing segment 8 hepatic dome focus measuring 12 mm #12/261  Enhancing 7 mm segment 3 focus # 12/290   Enhancing 15 mm segment 5 focus #12/290  These are suspicious for metastases  Unchanged 14 mm segment 7 lesion #14/32 stable since 2019 in keeping with a benign hemangioma  Scattered hepatic cysts  The hepatic veins and portal veins are patent  Complications: Post-op PE    Discharge Diagnosis: incarcerated ventral hernia s/p reduction and repair    Medical Problems     Resolved Problems  Date Reviewed: 2/9/2023   None         Condition at Discharge: stable         Discharge instructions/Information to patient and family:   See after visit summary for information provided to patient and family  Provisions for Follow-Up Care:  See after visit summary for information related to follow-up care and any pertinent home health orders  PCP: Samanta Crowe DO    Disposition: See After Visit Summary for discharge disposition information  Planned Readmission: No      Discharge Statement   I spent 30 minutes discharging the patient  This time was spent on the day of discharge  I had direct contact with the patient on the day of discharge  Additional documentation is required if more than 30 minutes were spent on discharge  Discharge Medications:  See after visit summary for reconciled discharge medications provided to patient and family

## 2023-02-23 NOTE — PLAN OF CARE
Problem: MOBILITY - ADULT  Goal: Maintain or return to baseline ADL function  Description: INTERVENTIONS:  -  Assess patient's ability to carry out ADLs; assess patient's baseline for ADL function and identify physical deficits which impact ability to perform ADLs (bathing, care of mouth/teeth, toileting, grooming, dressing, etc )  - Assess/evaluate cause of self-care deficits   - Assess range of motion  - Assess patient's mobility; develop plan if impaired  - Assess patient's need for assistive devices and provide as appropriate  - Encourage maximum independence but intervene and supervise when necessary  - Involve family in performance of ADLs  - Assess for home care needs following discharge   - Consider OT consult to assist with ADL evaluation and planning for discharge  - Provide patient education as appropriate  Outcome: Progressing  Goal: Maintains/Returns to pre admission functional level  Description: INTERVENTIONS:  - Perform BMAT or MOVE assessment daily    - Set and communicate daily mobility goal to care team and patient/family/caregiver     - Collaborate with rehabilitation services on mobility goals if consulted  - Record patient progress and toleration of activity level   Outcome: Progressing     Problem: GASTROINTESTINAL - ADULT  Goal: Maintains or returns to baseline bowel function  Description: INTERVENTIONS:  - Assess bowel function  - Encourage oral fluids to ensure adequate hydration  - Administer IV fluids if ordered to ensure adequate hydration  - Administer ordered medications as needed  - Encourage mobilization and activity  - Consider nutritional services referral to assist patient with adequate nutrition and appropriate food choices  Outcome: Progressing  Goal: Maintains adequate nutritional intake  Description: INTERVENTIONS:  - Monitor percentage of each meal consumed  - Identify factors contributing to decreased intake, treat as appropriate  - Assist with meals as needed  - Monitor I&O, weight, and lab values if indicated  - Obtain nutrition services referral as needed  Outcome: Progressing  Goal: Oral mucous membranes remain intact  Description: INTERVENTIONS  - Assess oral mucosa and hygiene practices  - Implement preventative oral hygiene regimen  - Implement oral medicated treatments as ordered  - Initiate Nutrition services referral as needed  Outcome: Progressing     Problem: GENITOURINARY - ADULT  Goal: Maintains or returns to baseline urinary function  Description: INTERVENTIONS:  - Assess urinary function  - Encourage oral fluids to ensure adequate hydration if ordered  - Administer IV fluids as ordered to ensure adequate hydration  - Administer ordered medications as needed  - Offer frequent toileting  - Follow urinary retention protocol if ordered  Outcome: Progressing     Problem: SKIN/TISSUE INTEGRITY - ADULT  Goal: Skin Integrity remains intact(Skin Breakdown Prevention)  Description: Assess:  -Perform Jose assessment every  -Clean and moisturize skin every  -Inspect skin when repositioning, toileting, and assisting with ADLS  -Assess extremities for adequate circulation and sensation     Bed Management:  -Have minimal linens on bed & keep smooth, unwrinkled  -Change linens as needed when moist or perspiring  -Avoid sitting or lying in one position for more than  2 hours while in bed    Toileting:  -Offer bedside commode    Activity:  -Encourage activity and walks on unit  -Encourage or provide ROM exercises   -Turn and reposition patient every 2 Hours  -Use appropriate equipment to lift or move patient in bed    Skin Care:  -Avoid use of baby powder, tape, friction and shearing, hot water or constrictive clothing  -Relieve pressure over bony prominences using wedges  -Do not massage red bony areas    Outcome: Progressing     Problem: Prexisting or High Potential for Compromised Skin Integrity  Goal: Skin integrity is maintained or improved  Description: INTERVENTIONS:  - Identify patients at risk for skin breakdown  - Assess and monitor skin integrity  - Assess and monitor nutrition and hydration status  - Monitor labs   - Assess for incontinence   - Turn and reposition patient  - Assist with mobility/ambulation  - Relieve pressure over bony prominences  - Avoid friction and shearing  - Provide appropriate hygiene as needed including keeping skin clean and dry  - Evaluate need for skin moisturizer/barrier cream  - Collaborate with interdisciplinary team   - Patient/family teaching  - Consider wound care consult   Outcome: Progressing     Problem: PAIN - ADULT  Goal: Verbalizes/displays adequate comfort level or baseline comfort level  Description: Interventions:  - Encourage patient to monitor pain and request assistance  - Assess pain using appropriate pain scale  - Administer analgesics based on type and severity of pain and evaluate response  - Implement non-pharmacological measures as appropriate and evaluate response  - Consider cultural and social influences on pain and pain management  - Notify physician/advanced practitioner if interventions unsuccessful or patient reports new pain  Outcome: Progressing     Problem: INFECTION - ADULT  Goal: Absence or prevention of progression during hospitalization  Description: INTERVENTIONS:  - Assess and monitor for signs and symptoms of infection  - Monitor lab/diagnostic results  - Monitor all insertion sites, i e  indwelling lines, tubes, and drains  - Monitor endotracheal if appropriate and nasal secretions for changes in amount and color  - [place] appropriate cooling/warming therapies per order  - Administer medications as ordered  - Instruct and encourage patient and family to use good hand hygiene technique  - Identify and instruct in appropriate isolation precautions for identified infection/condition  Outcome: Progressing  Goal: Absence of fever/infection during neutropenic period  Description: INTERVENTIONS:  - Monitor WBC    Outcome: Progressing     Problem: SAFETY ADULT  Goal: Maintain or return to baseline ADL function  Description: INTERVENTIONS:  -  Assess patient's ability to carry out ADLs; assess patient's baseline for ADL function and identify physical deficits which impact ability to perform ADLs (bathing, care of mouth/teeth, toileting, grooming, dressing, etc )  - Assess/evaluate cause of self-care deficits   - Assess range of motion  - Assess patient's mobility; develop plan if impaired  - Assess patient's need for assistive devices and provide as appropriate  - Encourage maximum independence but intervene and supervise when necessary  - Involve family in performance of ADLs  - Assess for home care needs following discharge   - Consider OT consult to assist with ADL evaluation and planning for discharge  - Provide patient education as appropriate  Outcome: Progressing  Goal: Maintains/Returns to pre admission functional level  Description: INTERVENTIONS:  - Perform BMAT or MOVE assessment daily    - Set and communicate daily mobility goal to care team and patient/family/caregiver     - Collaborate with rehabilitation services on mobility goals if consulted  - Record patient progress and toleration of activity level   Outcome: Progressing  Goal: Patient will remain free of falls  Description: INTERVENTIONS:  - Educate patient/family on patient safety including physical limitations  - Instruct patient to call for assistance with activity   - Consult OT/PT to assist with strengthening/mobility   - Keep Call bell within reach  - Keep bed low and locked with side rails adjusted as appropriate  - Keep care items and personal belongings within reach  - Initiate and maintain comfort rounds  - Apply yellow socks and bracelet for high fall risk patients  - Consider moving patient to room near nurses station  Outcome: Progressing     Problem: DISCHARGE PLANNING  Goal: Discharge to home or other facility with appropriate resources  Description: INTERVENTIONS:  - Identify barriers to discharge w/patient and caregiver  - Arrange for needed discharge resources and transportation as appropriate  - Identify discharge learning needs (meds, wound care, etc )  - Arrange for interpretive services to assist at discharge as needed  - Refer to Case Management Department for coordinating discharge planning if the patient needs post-hospital services based on physician/advanced practitioner order or complex needs related to functional status, cognitive ability, or social support system  Outcome: Progressing     Problem: Knowledge Deficit  Goal: Patient/family/caregiver demonstrates understanding of disease process, treatment plan, medications, and discharge instructions  Description: Complete learning assessment and assess knowledge base  Interventions:  - Provide teaching at level of understanding  - Provide teaching via preferred learning methods  Outcome: Progressing     Problem: Nutrition/Hydration-ADULT  Goal: Nutrient/Hydration intake appropriate for improving, restoring or maintaining nutritional needs  Description: Monitor and assess patient's nutrition/hydration status for malnutrition  Collaborate with interdisciplinary team and initiate plan and interventions as ordered  Monitor patient's weight and dietary intake as ordered or per policy  Utilize nutrition screening tool and intervene as necessary  Determine patient's food preferences and provide high-protein, high-caloric foods as appropriate       INTERVENTIONS:  - Monitor oral intake, urinary output, labs, and treatment plans  - Assess nutrition and hydration status and recommend course of action  - Evaluate amount of meals eaten  - Assist patient with eating if necessary   - Allow adequate time for meals  - Recommend/ encourage appropriate diets, oral nutritional supplements, and vitamin/mineral supplements  - Order, calculate, and assess calorie counts as needed  - Recommend, monitor, and adjust tube feedings and TPN/PPN based on assessed needs  - Assess need for intravenous fluids  - Provide specific nutrition/hydration education as appropriate  - Include patient/family/caregiver in decisions related to nutrition  Outcome: Progressing

## 2023-02-23 NOTE — RESTORATIVE TECHNICIAN NOTE
Restorative Technician Note      Patient Name: Doris Shirley     [facility] Tech Visit Date: 02/23/23  Note Type: Mobility  Patient Position Upon Consult: Bedside chair  Activity Performed: Dangled; Stood; Other (Comment) (Performed sit to stands (Ax1-2) for BLE strengthening and transfer traning; attempted weight shifting)  Assistive Device: Standard walker  Education Provided: Yes  Patient Position at End of Consult: Bedside chair;  All needs within reach; Bed/Chair alarm activated    Shelia Alexis  DPT, Restorative Technician

## 2023-02-24 ENCOUNTER — TRANSITIONAL CARE MANAGEMENT (OUTPATIENT)
Dept: FAMILY MEDICINE CLINIC | Facility: CLINIC | Age: 78
End: 2023-02-24

## 2023-02-27 ENCOUNTER — PATIENT OUTREACH (OUTPATIENT)
Dept: FAMILY MEDICINE CLINIC | Facility: CLINIC | Age: 78
End: 2023-02-27

## 2023-02-27 NOTE — PROGRESS NOTES
Chart reviewed  Patient discharged from inpatient hospitalization to [facility]  Will close referral to Outpatient Care Management at this time  Team to remain available by referral post discharge as needed

## 2023-02-28 ENCOUNTER — TELEPHONE (OUTPATIENT)
Dept: FAMILY MEDICINE CLINIC | Facility: CLINIC | Age: 78
End: 2023-02-28

## 2023-02-28 NOTE — LETTER
February 28, 2023      Dear Caty Polo,     We are reviewing your chart and  it  shows that we have no record of you having 5 Wishes  This is an important document where you can designate a family member, friend or significant other to make medical decision for you when you can't, medical treatment you want and you don't want, what you want you love ones to know when it comes to your medical condition  Attached with this letter is 5 Wishes form, kindly fill this form out and bring this to your appointment or mail to our office at your convenience  If you have any questions or concerns, fell free to contact our office        Sincerely,        Dr Xenia Gandhi

## 2023-03-01 ENCOUNTER — TELEPHONE (OUTPATIENT)
Dept: FAMILY MEDICINE CLINIC | Facility: CLINIC | Age: 78
End: 2023-03-01

## 2023-03-01 NOTE — TELEPHONE ENCOUNTER
Patient called stated is the Mindy Hobbs call her back because she have some questions about some surgery/[phone] she is in a  nursing home

## 2023-03-07 ENCOUNTER — TELEPHONE (OUTPATIENT)
Dept: HEMATOLOGY ONCOLOGY | Facility: CLINIC | Age: 78
End: 2023-03-07

## 2023-03-07 NOTE — TELEPHONE ENCOUNTER
Attempted to contact patient to inform her of lab work, phone kept ringing, unable to leave message, patient does not have additional contacts

## 2023-03-10 ENCOUNTER — OFFICE VISIT (OUTPATIENT)
Dept: HEMATOLOGY ONCOLOGY | Facility: CLINIC | Age: 78
End: 2023-03-10

## 2023-03-10 VITALS
DIASTOLIC BLOOD PRESSURE: 78 MMHG | HEIGHT: 60 IN | RESPIRATION RATE: 17 BRPM | BODY MASS INDEX: 26.52 KG/M2 | OXYGEN SATURATION: 97 % | HEART RATE: 63 BPM | SYSTOLIC BLOOD PRESSURE: 122 MMHG

## 2023-03-10 DIAGNOSIS — Z86.39 HISTORY OF DIABETES MELLITUS: ICD-10-CM

## 2023-03-10 DIAGNOSIS — E11.8 TYPE 2 DIABETES MELLITUS WITH COMPLICATION, WITHOUT LONG-TERM CURRENT USE OF INSULIN (HCC): ICD-10-CM

## 2023-03-10 DIAGNOSIS — C50.911 MALIGNANT NEOPLASM OF RIGHT BREAST IN FEMALE, ESTROGEN RECEPTOR POSITIVE, UNSPECIFIED SITE OF BREAST (HCC): ICD-10-CM

## 2023-03-10 DIAGNOSIS — L08.9 CHRONIC WOUND INFECTION OF ABDOMEN, INITIAL ENCOUNTER: ICD-10-CM

## 2023-03-10 DIAGNOSIS — Z17.0 MALIGNANT NEOPLASM OF RIGHT BREAST IN FEMALE, ESTROGEN RECEPTOR POSITIVE, UNSPECIFIED SITE OF BREAST (HCC): ICD-10-CM

## 2023-03-10 DIAGNOSIS — E11.9 TYPE 2 DIABETES MELLITUS WITHOUT COMPLICATION, UNSPECIFIED WHETHER LONG TERM INSULIN USE (HCC): ICD-10-CM

## 2023-03-10 DIAGNOSIS — E04.1 THYROID NODULE: ICD-10-CM

## 2023-03-10 DIAGNOSIS — R97.8 ABNORMAL TUMOR MARKERS: ICD-10-CM

## 2023-03-10 DIAGNOSIS — C18.9 MALIGNANT NEOPLASM OF COLON, UNSPECIFIED PART OF COLON (HCC): Primary | ICD-10-CM

## 2023-03-10 DIAGNOSIS — E44.0 MODERATE PROTEIN-CALORIE MALNUTRITION (HCC): ICD-10-CM

## 2023-03-10 DIAGNOSIS — D50.9 IRON DEFICIENCY ANEMIA, UNSPECIFIED IRON DEFICIENCY ANEMIA TYPE: ICD-10-CM

## 2023-03-10 DIAGNOSIS — S31.109A CHRONIC WOUND INFECTION OF ABDOMEN, INITIAL ENCOUNTER: ICD-10-CM

## 2023-03-10 DIAGNOSIS — Z86.718 HISTORY OF DVT (DEEP VEIN THROMBOSIS): ICD-10-CM

## 2023-03-10 NOTE — PROGRESS NOTES
HPI:   Follow-up visit for recurrent colon cancer and metastatic disease to liver  Patient had presented with incarcerated ventral hernia and patient underwent an exploratory laparotomy, reduction of ventral hernia, left colectomy, and creation of an end ileostomy  Post-op she was taken to the ICU for close monitoring and resuscitation  On 2/10 she was found to have a PE of JOANNA and RLL arteries and started on a heparin infusion  Daily dressing changes were provided  A PICC line was placed for access to provide IV medications and access for blood draws  She was transferred out of the ICU on 2/11  Antibiotics were continued through 2/19  She was transitioned to Xarelto on 2/17    See path report below  Component    Case Report   Surgical Pathology Report                         Case: [id number]                                    Authorizing Provider: Eric Nunes DO         Collected:           02/09/2023 1602               Ordering Location:     [facility]      Received:            02/09/2023 [facility]                                      Hospital Operating Room                                                       Pathologist:           Duran Tucker MD                                                                     Specimens:   A) - Abdominal, Hernia Sac                                                                           B) - Colon, Left Colectomy                                                                 Addendum   RESULTS OF IMMUNOHISTOCHEMICAL ANALYSIS FOR MISMATCH REPAIR PROTEIN LOSS     INTERPRETATION: NO LOSS OF NUCLEAR EXPRESSION OF MMR PROTEINS: LOW PROBABILITY OF MSI-H      Note: Background non-neoplastic tissue and/or internal control with intact nuclear expression       RESULTS:  Antibody          Clone               Description                           Results  MLH1               M1                   Mismatch repair protein       Intact nuclear expression  MSH2              [id number]      Mismatch repair protein       Intact nuclear expression  JOS6              64                    Mismatch repair protein       Intact nuclear expression  III0              [id number]         Mismatch repair protein       Intact nuclear expression     Comment:   A positive control for each antibody have been reviewed and accepted  [facility] Specimen ID: [id number]  Evaluator: Pastor Taiwo MD       These tests were developed and their performance characteristics determined by [facility]  Mikie Krishna may not be cleared or approved by the [place]  Food and Drug Administration   The FDA determined that such clearance or approval is not necessary   These tests are used for clinical purposes  Mikie Krishna should not be regarded as investigational or for research   This laboratory has been approved by [place] [address], designated as a high-complexity laboratory and is qualified to perform these tests      Patients whose tumors demonstrate lack of expression of one or more DNA mismatch repair proteins might be at risk for Hadley Syndrome  This cancer susceptibility syndrome greatly increases the risk of synchronous and/or metachronous cancers in the affected patients and their family members  Normal expression of all proteins does not completely rule out familial cancer predisposition      The [facility] Hadley Syndrome Surveillance Program Task Forces recommends that all patients with lack of expression of one or more DNA mismatch repair proteins and those with concerning personal or family history should undergo thorough evaluation, counseling and possibly genetic testing  Addendum electronically signed by Tomasa Figueroa MD on 2/17/2023 at  5:17 PM   Final Diagnosis   A  Hernia sac, hernia repair:  -   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      B   Segment of colon with portion of ileum, omentum and abdominal wall, left colectomy with en bloc omentectomy and abdominal wall resection:  -   Invasive adenocarcinoma of transverse colon, moderately differentiated, grossly perforated and invades fibroadipose tissue of abdominal wall (pT4b)  -   Second focus of invasive adenocarcinoma of transverse colon, moderately differentiated, invades into muscularis propria (pT3)  -   Terminal ileum and omentum are uninvolved  -   MMR (MLH1, MSH2, MSH6 and PMS2) studies on B18 by immunohistochemistry are pending     -   See comment and synoptic report      Comment:   Immunohistochemical stains on B18 show the tumor cells are positive for CK20, CDX2, SATB2, CK7 (patchy), PAX8 (patchy), DPC4, and are negative for TTF-1, GATA3, ER and synaptophysin        Select slides (B18, IHC) are reviewed by Dr Cait Bender who concurs with the diagnosis      Diagnosis is communicated via SnapAppointments to Dr Arun Campuzano on 2/15/2023 at 1532       Interpretation performed at [facility], [address] [facility]    Electronically signed by Ronny Gardner MD on 2/15/2023 at  4:02 PM   Note       Patient has previous history of right breast cancer  She has history of colon cancer  She has history of DVT leg  She has iron deficiency anemia  In 2012 she was diagnosed to have stage 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  She had right mastectomy, and lymph node dissection   There were 9 positive lymph nodes  She had Adriamycin + Cytoxan chemotherapy followed by Taxotere and after that radiation therapy  Since November/December 2012 she has been on Femara and she will take Femara until December 2022  Patient has osteoporosis and she has been on vitamin-D  and Prolia   Has problem swallowing calcium tablet  No problem with her teeth for Prolia  In 2016 she was found to have benign clustered calcifications in left breast and had a biopsy and that was benign   Dr Santiago Lee takes care of her mammography       In December 2012 patient developed DVT right leg and she was started on Xarelto  She had GI bleeding in 2015  Xarelto was stopped  On colonoscopy she was found to have stage I right colon cancer  On 7/22/15 she underwent right hemicolectomy  Pathological diagnosis right colon cancer, stage I, T2 N0 MX, grade 2, No lymphovascular invasion and no perineural invasion  She did not require adjuvant therapy for colon cancer  History of thyroid nodule  She had biopsy of thyroid nodule in June 2014 and she states that was negative for cancer  She gets thyroid ultrasound yearly for surveillance     2/9/2023[de-identified]  EXPLORATORY LAPAROTOMY; ABDOMINAL WALL DEBRIDEMENT; REDUCTION VENTRAL HERNIA; LEFT COLON RESECTION; CREATION ILEOSTOMY; WASHOUT (Abdomen)    Patient is in a nursing home  She still has wound VAC  Activity is limited from bed to chair  She has generalized weakness and tiredness and shortness of breath  She has ileostomy bag  She has lost weight              Current Outpatient Medications:   •  amLODIPine (NORVASC) 2 5 mg tablet, Take 1 tablet (2 5 mg total) by mouth daily, Disp: 30 tablet, Rfl: 5  •  ascorbic acid (VITAMIN C) 500 MG tablet, Take 1,000 mg by mouth daily , Disp: , Rfl:   •  benazepril-hydrochlorthiazide (LOTENSIN HCT) 20-12 5 MG per tablet, take 1 tablet by mouth once daily, Disp: 90 tablet, Rfl: 5  •  bimatoprost (LUMIGAN) 0 01 % ophthalmic drops, Administer 1 drop to both eyes daily at bedtime , Disp: , Rfl:   •  calcium citrate (CALCITRATE) 950 MG tablet, Take 1 tablet by mouth in the morning , Disp: , Rfl:   •  Cholecalciferol (VITAMIN D-3 PO), Take 1 capsule by mouth 3 (three) times a day , Disp: , Rfl:   •  Cyanocobalamin (VITAMIN B 12 PO), Take 1 tablet by mouth daily  , Disp: , Rfl:   •  Diclofenac Sodium (VOLTAREN) 1 %, Apply 2 g topically 4 (four) times a day, Disp: , Rfl:   •  insulin lispro (HumaLOG) 100 units/mL injection, Inject 1-6 Units under the skin 4 (four) times a day (before meals and at bedtime), Disp: , Rfl: 0  •  pantoprazole (PROTONIX) 40 mg tablet, Take 1 tablet (40 mg total) by mouth daily in the early morning Do not start before February 24, 2023 , Disp: , Rfl: 0  •  rivaroxaban (Xarelto) 10 mg tablet, Take 1 tablet (10 mg total) by mouth daily, Disp: 30 tablet, Rfl: 0  •  vitamin E 100 UNIT capsule, Take 100 Units by mouth daily , Disp: , Rfl:     No Known Allergies    Oncology History Overview Note    In 2012 patient was diagnosed to have Hormone receptor positive, HER-2 negative stage IIIB cancer in right breast  She had right mastectomy and lymph node dissection   9 lymph nodes showed metastatic disease  Patient was given Adriamycin + Cytoxan chemotherapy followed by Taxotere and after that radiation therapy  Since November/December 2012 she has been on Femara     She will be on Femara for a total of 10 years  On 7/22/15 she underwent right hemicolectomy  Pathological diagnosis right colon cancer, stage I, T2 N0 MX, grade 2,no lymphovascular invasion and no perineural invasion  She did not require adjuvant therapy for colon cancer  She has been running slightly high CEA and that is being monitored  Malignant neoplasm of right breast in female, estrogen receptor positive ([facility] [address] )   6/21/2018 Initial Diagnosis    Malignant neoplasm of right breast in female, estrogen receptor positive [facility])      Surgery     2012- right mastectomy   2015 - right hemicolectomy      Radiation     [id number]: Radiation to right chest wall and lymph nodes      Chemotherapy     2012 -Adriamycin plus Cytoxan followed by Taxotere Followed by Femara  She will have Femara for a total of 10 years  ROS:  03/10/23 Reviewed 12 systems: See symptoms in HPI:    Presently no other neurological , cardiac, pulmonary, GI and  symptoms other than listed in HPI     Other symptoms are in HPI  No other symptoms like fevers, chills, bleeding, bone pains, skin rash,  night sweats, numbness, claudication   Has  anxiety  No swelling of the ankles and no swollen glands  Not unusually sensitive to heat or cold  /78 (BP Location: Right arm, Patient Position: Sitting, Cuff Size: Adult)   Pulse 63   Resp 17   Ht 5' (1 524 m)   LMP  (LMP Unknown)   SpO2 97%   BMI 26 52 kg/m²     Physical Exam:  Patient is alert and oriented  Patient is not in distress  Vital signs are above  She is in a wheelchair  There is no icterus , no oral thrush, no palpable neck mass,  lung fields clear to percussion and auscultation, regular heart rate, abdomen soft and non tender , ileostomy bag and wound VAC,  no edema of ankles, no calf tenderness, no focal neurological deficit, generalized weakness no skin rash, no palpable lymphadenopathy in the neck and axillary areas,  no clubbing  Patient is anxious  Performance status 3  No lymphedema     Has kyphosis    IMAGING:  IMPRESSION:     No nodule meets current ACR criteria for requiring biopsy but followup ultrasound is recommended in 1 year             Reference: ACR Thyroid Imaging, Reporting and Data System (TI-RADS): White Paper of the [employer]  J AM Carlo Radiol 8721;37:888-248  (additional recommendations based on American Thyroid Association 2015 guidelines )        Workstation performed: [id number]      Imaging    US thyroid (Order: [id number]) - 6/17/2020  ASSESSMENT/BI-RADS CATEGORY:  Left: 2 - Benign  Overall: 2 - Benign     RECOMMENDATION:       - Routine screening mammogram in 1 year for the left breast      Workstation ID: [id number]          Imaging    Mammo screening left w 3d & cad (Order: [id number]) - 10/19/2021    RESULTS:      LUMBAR SPINE L2-L4 (L1 vertebra excluded from analysis due to local structural abnormalities or artifact):    BMD  0 774  gm/cm2   T-score -2 8    These values are artifactually elevated due to the presence of scoliosis with spondylosis      LEFT  TOTAL HIP:   BMD:  0 688  gm/cm2    T-score:  -2 1     LEFT  FEMORAL NECK:   BMD:  0 628  gm/cm2   T score: -2 0      RIGHT FOREARM:    33% RADIUS BMD:  0 490  gm/cm2  T-score:  -3 3         IMPRESSION:     1  Osteoporosis  [Based on the lumbar spine and right radius]       IMPRESSION:  Enlarged heterogeneous left thyroid lobe and thyroid isthmus  Grossly stable if not smaller peripherally densely calcified left thyroid isthmus nodule, this has been stable for more than 5 years  Asymmetrically bulky left thyroid lobe with prominent interpolar region which I do not believe is a true nodule  Followup ultrasound is recommended in 1 year  Smaller nodules which do not meet current ACR criteria for requiring biopsy      Reference: ACR Thyroid Imaging, Reporting and Data System (TI-RADS): White Paper of [employer]  J AM Carlo Radiol 5192;26:311-630  (additional recommendations based on American Thyroid Association 2015 guidelines )        Workstation performed: [id number]          Imaging    US thyroid (Order: [id number]) - 7/14/  IMPRESSION:     1  No findings for hypermetabolic malignancy  2   Diffuse thyroid gland activity suggests thyroiditis      Workstation performed: [id number]          Imaging    NM PET CT skull base to mid thigh (Order: [id number]) - 7/14/2021    IMPRESSION:     Diffusely heterogeneous thyroid gland, and left isthmus nodule, without significant change  There are no findings requiring fine-needle aspiration           Reference: ACR Thyroid Imaging, Reporting and Data System (TI-RADS): White Paper of the [employer]   J AM Carlo Radiol 3394;89:089-219  (additional recommendations based on American Thyroid Association 2015 guidelines )        Workstation performed: [id number]        Imaging    US thyroid (Order: [id number]) - 7/26/2022  ASSESSMENT/BI-RADS CATEGORY:  Left: 0 - Incomplete: Needs Additional Imaging Evaluation  Overall: 0 - Incomplete: Needs Additional Imaging Evaluation     RECOMMENDATION:       - Diagnostic mammogram at the current time for the left breast        - Ultrasound at the current time for the left breast      Workstation ID: [id number]        Imaging    Mammo screening left w 3d & cad (Order: [id number]) - 10/21/2022    IMPRESSION:     3 hepatic lesions as described above suspicious for metastases    The smaller suspicious lesions seen on the CT are obscured by motion artifact on postcontrast imaging      The study was marked in EPIC for immediate notification                  Workstation performed: [id number]        Imaging    MRI abdomen w wo contrast (Order: [id number]) - 2/17/2023    LABS:    Results for orders placed or performed during the hospital encounter of 02/09/23   CBC and differential   Result Value Ref Range    WBC 11 28 (H) 4 31 - 10 16 Thousand/uL    RBC 3 64 (L) 3 81 - 5 12 Million/uL    Hemoglobin 10 1 (L) 11 5 - 15 4 g/dL    Hematocrit 32 8 (L) 34 8 - 46 1 %    MCV 90 82 - 98 fL    MCH 27 7 26 8 - 34 3 pg    MCHC 30 8 (L) 31 4 - 37 4 g/dL    RDW 16 3 (H) 11 6 - 15 1 %    MPV 8 6 (L) 8 9 - 12 7 fL    Platelets 832 (H) 778 - 390 Thousands/uL    nRBC 0 /100 WBCs    Neutrophils Relative 78 (H) 43 - 75 %    Immat GRANS % 2 0 - 2 %    Lymphocytes Relative 6 (L) 14 - 44 %    Monocytes Relative 14 (H) 4 - 12 %    Eosinophils Relative 0 0 - 6 %    Basophils Relative 0 0 - 1 %    Neutrophils Absolute 8 78 (H) 1 85 - 7 62 Thousands/µL    Immature Grans Absolute 0 22 (H) 0 00 - 0 20 Thousand/uL    Lymphocytes Absolute 0 63 0 60 - 4 47 Thousands/µL    Monocytes Absolute 1 60 (H) 0 17 - 1 22 Thousand/µL    Eosinophils Absolute 0 01 0 00 - 0 61 Thousand/µL    Basophils Absolute 0 04 0 00 - 0 10 Thousands/µL   Basic metabolic panel   Result Value Ref Range    Sodium 130 (L) 135 - 147 mmol/L    Potassium 3 1 (L) 3 5 - 5 3 mmol/L    Chloride 94 (L) 96 - 108 mmol/L    CO2 25 21 - 32 mmol/L    ANION GAP 11 4 - 13 mmol/L    BUN 20 5 - 25 mg/dL    Creatinine 0 50 (L) 0 60 - 1 30 mg/dL    Glucose 120 65 - 140 mg/dL    Calcium 9 1 8 3 - 10 1 mg/dL    eGFR 93 ml/min/1 73sq m   CBC and differential   Result Value Ref Range    WBC 16 97 (H) 4 31 - 10 16 Thousand/uL    RBC 3 18 (L) 3 81 - 5 12 Million/uL    Hemoglobin 9 0 (L) 11 5 - 15 4 g/dL    Hematocrit 29 4 (L) 34 8 - 46 1 %    MCV 93 82 - 98 fL    MCH 28 3 26 8 - 34 3 pg    MCHC 30 6 (L) 31 4 - 37 4 g/dL    RDW 16 4 (H) 11 6 - 15 1 %    MPV 8 6 (L) 8 9 - 12 7 fL    Platelets 904 (H) 492 - 390 Thousands/uL   Comprehensive metabolic panel   Result Value Ref Range    Sodium 133 (L) 135 - 147 mmol/L    Potassium 4 1 3 5 - 5 3 mmol/L    Chloride 102 96 - 108 mmol/L    CO2 18 (L) 21 - 32 mmol/L    ANION GAP 13 4 - 13 mmol/L    BUN 15 5 - 25 mg/dL    Creatinine 0 37 (L) 0 60 - 1 30 mg/dL    Glucose 179 (H) 65 - 140 mg/dL    Calcium 7 6 (L) 8 3 - 10 1 mg/dL    Corrected Calcium 8 7 8 3 - 10 1 mg/dL    AST 11 5 - 45 U/L    ALT 7 (L) 12 - 78 U/L    Alkaline Phosphatase 72 46 - 116 U/L    Total Protein 5 6 (L) 6 4 - 8 4 g/dL    Albumin 2 6 (L) 3 5 - 5 0 g/dL    Total Bilirubin 0 63 0 20 - 1 00 mg/dL    eGFR 103 ml/min/1 73sq m   Lactic acid, plasma   Result Value Ref Range    LACTIC ACID 1 7 0 5 - 2 0 mmol/L   Blood gas, arterial   Result Value Ref Range    pH, Arterial 7 341 (L) 7 350 - 7 450    pCO2, Arterial 30 7 (L) 36 0 - 44 0 mm Hg    pO2, Arterial 113 0 75 0 - 129 0 mm Hg    HCO3, Arterial 16 2 (L) 22 0 - 28 0 mmol/L    Base Excess, Arterial -8 5 mmol/L    O2 Content, Arterial 13 9 (L) 16 0 - 23 0 mL/dL    O2 HGB,Arterial  96 3 94 0 - 97 0 %    SOURCE Radial, Left     CARMEN TEST Yes     Nasal Cannula 4    Magnesium   Result Value Ref Range    Magnesium 2 1 1 6 - 2 6 mg/dL   Phosphorus   Result Value Ref Range    Phosphorus 2 4 2 3 - 4 1 mg/dL   CBC   Result Value Ref Range    WBC 17 28 (H) 4 31 - 10 16 Thousand/uL    RBC 2 98 (L) 3 81 - 5 12 Million/uL    Hemoglobin 8 2 (L) 11 5 - 15 4 g/dL    Hematocrit 27 2 (L) 34 8 - 46 1 %    MCV 91 82 - 98 fL    MCH 27 5 26 8 - 34 3 pg    MCHC 30 1 (L) 31 4 - 37 4 g/dL    RDW 16 6 (H) 11 6 - 15 1 % Platelets 917 (H) 262 - 390 Thousands/uL    MPV 8 8 (L) 8 9 - 12 7 fL   APTT   Result Value Ref Range    PTT 22 (L) 23 - 37 seconds   Protime-INR   Result Value Ref Range    Protime 16 9 (H) 11 6 - 14 5 seconds    INR 1 35 (H) 0 84 - 3 66   Basic metabolic panel   Result Value Ref Range    Sodium 135 135 - 147 mmol/L    Potassium 3 6 3 5 - 5 3 mmol/L    Chloride 104 96 - 108 mmol/L    CO2 20 (L) 21 - 32 mmol/L    ANION GAP 11 4 - 13 mmol/L    BUN 11 5 - 25 mg/dL    Creatinine 0 41 (L) 0 60 - 1 30 mg/dL    Glucose 234 (H) 65 - 140 mg/dL    Calcium 7 0 (L) 8 3 - 10 1 mg/dL    eGFR 99 ml/min/1 73sq m   Magnesium   Result Value Ref Range    Magnesium 2 2 1 6 - 2 6 mg/dL   Phosphorus   Result Value Ref Range    Phosphorus 3 6 2 3 - 4 1 mg/dL   Calcium, ionized   Result Value Ref Range    Calcium, Ionized 0 93 (L) 1 12 - 1 32 mmol/L   High Sensitivity Troponin I Random   Result Value Ref Range    HS TnI random 15 8 - 18 ng/L   Blood gas, arterial   Result Value Ref Range    pH, Arterial 7 433 7 350 - 7 450    pCO2, Arterial 35 1 (L) 36 0 - 44 0 mm Hg    pO2, Arterial 67 7 (L) 75 0 - 129 0 mm Hg    HCO3, Arterial 22 9 22 0 - 28 0 mmol/L    Base Excess, Arterial -1 1 mmol/L    O2 Content, Arterial 10 4 (L) 16 0 - 23 0 mL/dL    O2 HGB,Arterial  91 5 (L) 94 0 - 97 0 %    SOURCE Radial, Left     CARMEN TEST Yes     ROOM AIR FIO2 21 %   APTT   Result Value Ref Range    PTT 49 (H) 23 - 37 seconds   Calcium, ionized   Result Value Ref Range    Calcium, Ionized 1 08 (L) 1 12 - 1 32 mmol/L   CBC   Result Value Ref Range    WBC 15 79 (H) 4 31 - 10 16 Thousand/uL    RBC 2 39 (L) 3 81 - 5 12 Million/uL    Hemoglobin 6 8 (LL) 11 5 - 15 4 g/dL    Hematocrit 22 0 (L) 34 8 - 46 1 %    MCV 92 82 - 98 fL    MCH 28 5 26 8 - 34 3 pg    MCHC 30 9 (L) 31 4 - 37 4 g/dL    RDW 16 8 (H) 11 6 - 15 1 %    Platelets 587 821 - 409 Thousands/uL    MPV 8 5 (L) 8 9 - 12 7 fL   Blood gas, arterial   Result Value Ref Range    pH, Arterial 7 429 7 350 - 7 450    pCO2, Arterial 35 2 (L) 36 0 - 44 0 mm Hg    pO2, Arterial 71 6 (L) 75 0 - 129 0 mm Hg    HCO3, Arterial 22 8 22 0 - 28 0 mmol/L    Base Excess, Arterial -1 3 mmol/L    O2 Content, Arterial 9 7 (L) 16 0 - 23 0 mL/dL    O2 HGB,Arterial  90 7 (L) 94 0 - 97 0 %    SOURCE Line, Arterial     ROOM AIR FIO2 21 %   Lactic acid, plasma   Result Value Ref Range    LACTIC ACID 0 9 0 5 - 2 0 mmol/L   Basic metabolic panel   Result Value Ref Range    Sodium 134 (L) 135 - 147 mmol/L    Potassium 3 2 (L) 3 5 - 5 3 mmol/L    Chloride 104 96 - 108 mmol/L    CO2 22 21 - 32 mmol/L    ANION GAP 8 4 - 13 mmol/L    BUN 7 5 - 25 mg/dL    Creatinine 0 39 (L) 0 60 - 1 30 mg/dL    Glucose 144 (H) 65 - 140 mg/dL    Calcium 7 9 (L) 8 3 - 10 1 mg/dL    eGFR 101 ml/min/1 73sq m   APTT   Result Value Ref Range    PTT 56 (H) 23 - 37 seconds   Hemoglobin and hematocrit, blood   Result Value Ref Range    Hemoglobin 7 6 (L) 11 5 - 15 4 g/dL    Hematocrit 24 2 (L) 34 8 - 46 1 %   APTT   Result Value Ref Range    PTT 79 (H) 23 - 37 seconds   Basic metabolic panel   Result Value Ref Range    Sodium 139 135 - 147 mmol/L    Potassium 3 6 3 5 - 5 3 mmol/L    Chloride 109 (H) 96 - 108 mmol/L    CO2 20 (L) 21 - 32 mmol/L    ANION GAP 10 4 - 13 mmol/L    BUN 7 5 - 25 mg/dL    Creatinine 0 29 (L) 0 60 - 1 30 mg/dL    Glucose 156 (H) 65 - 140 mg/dL    Calcium 8 0 (L) 8 3 - 10 1 mg/dL    eGFR 112 ml/min/1 73sq m   CBC   Result Value Ref Range    WBC 16 63 (H) 4 31 - 10 16 Thousand/uL    RBC 2 60 (L) 3 81 - 5 12 Million/uL    Hemoglobin 7 3 (L) 11 5 - 15 4 g/dL    Hematocrit 24 0 (L) 34 8 - 46 1 %    MCV 92 82 - 98 fL    MCH 28 1 26 8 - 34 3 pg    MCHC 30 4 (L) 31 4 - 37 4 g/dL    RDW 16 8 (H) 11 6 - 15 1 %    Platelets 134 693 - 182 Thousands/uL    MPV 9 0 8 9 - 12 7 fL   Calcium, ionized   Result Value Ref Range    Calcium, Ionized 1 09 (L) 1 12 - 1 32 mmol/L   APTT   Result Value Ref Range    PTT 78 (H) 23 - 37 seconds   CBC and differential   Result Value Ref Range    WBC 18 92 (H) 4 31 - 10 16 Thousand/uL    RBC 2 95 (L) 3 81 - 5 12 Million/uL    Hemoglobin 8 2 (L) 11 5 - 15 4 g/dL    Hematocrit 26 9 (L) 34 8 - 46 1 %    MCV 91 82 - 98 fL    MCH 27 8 26 8 - 34 3 pg    MCHC 30 5 (L) 31 4 - 37 4 g/dL    RDW 17 1 (H) 11 6 - 15 1 %    MPV 9 2 8 9 - 12 7 fL    Platelets 612 460 - 671 Thousands/uL   Basic metabolic panel   Result Value Ref Range    Sodium 137 135 - 147 mmol/L    Potassium 3 0 (L) 3 5 - 5 3 mmol/L    Chloride 106 96 - 108 mmol/L    CO2 27 21 - 32 mmol/L    ANION GAP 4 4 - 13 mmol/L    BUN 5 5 - 25 mg/dL    Creatinine 0 31 (L) 0 60 - 1 30 mg/dL    Glucose 211 (H) 65 - 140 mg/dL    Calcium 8 0 (L) 8 3 - 10 1 mg/dL    eGFR 109 ml/min/1 73sq m   Phosphorus   Result Value Ref Range    Phosphorus 0 8 (LL) 2 3 - 4 1 mg/dL   Magnesium   Result Value Ref Range    Magnesium 2 3 1 6 - 2 6 mg/dL   Calcium, ionized   Result Value Ref Range    Calcium, Ionized 1 04 (L) 1 12 - 1 32 mmol/L   APTT   Result Value Ref Range    PTT 64 (H) 23 - 37 seconds   Phosphorus   Result Value Ref Range    Phosphorus 0 8 (LL) 2 3 - 4 1 mg/dL   Phosphorus   Result Value Ref Range    Phosphorus 3 1 2 3 - 4 1 mg/dL   Basic metabolic panel   Result Value Ref Range    Sodium 136 135 - 147 mmol/L    Potassium 4 0 3 5 - 5 3 mmol/L    Chloride 105 96 - 108 mmol/L    CO2 27 21 - 32 mmol/L    ANION GAP 4 4 - 13 mmol/L    BUN 5 5 - 25 mg/dL    Creatinine 0 28 (L) 0 60 - 1 30 mg/dL    Glucose 143 (H) 65 - 140 mg/dL    Calcium 7 6 (L) 8 3 - 10 1 mg/dL    eGFR 113 ml/min/1 73sq m   Basic metabolic panel   Result Value Ref Range    Sodium 136 135 - 147 mmol/L    Potassium 3 7 3 5 - 5 3 mmol/L    Chloride 103 96 - 108 mmol/L    CO2 24 21 - 32 mmol/L    ANION GAP 9 4 - 13 mmol/L    BUN 10 5 - 25 mg/dL    Creatinine 0 36 (L) 0 60 - 1 30 mg/dL    Glucose 152 (H) 65 - 140 mg/dL    Calcium 8 1 (L) 8 3 - 10 1 mg/dL    eGFR 104 ml/min/1 73sq m   CBC and differential   Result Value Ref Range    WBC 17 84 (H) 4 31 - 10 16 Thousand/uL    RBC 2 67 (L) 3 81 - 5 12 Million/uL    Hemoglobin 7 6 (L) 11 5 - 15 4 g/dL    Hematocrit 24 1 (L) 34 8 - 46 1 %    MCV 90 82 - 98 fL    MCH 28 5 26 8 - 34 3 pg    MCHC 31 5 31 4 - 37 4 g/dL    RDW 16 9 (H) 11 6 - 15 1 %    MPV 9 1 8 9 - 12 7 fL    Platelets 640 272 - 601 Thousands/uL   Magnesium   Result Value Ref Range    Magnesium 2 3 1 6 - 2 6 mg/dL   Phosphorus   Result Value Ref Range    Phosphorus 2 2 (L) 2 3 - 4 1 mg/dL   APTT   Result Value Ref Range    PTT 65 (H) 23 - 37 seconds   APTT   Result Value Ref Range    PTT 78 (H) 23 - 37 seconds   CBC and differential   Result Value Ref Range    WBC 15 58 (H) 4 31 - 10 16 Thousand/uL    RBC 2 19 (L) 3 81 - 5 12 Million/uL    Hemoglobin 6 3 (LL) 11 5 - 15 4 g/dL    Hematocrit 20 7 (L) 34 8 - 46 1 %    MCV 92 82 - 98 fL    MCH 28 3 26 8 - 34 3 pg    MCHC 30 8 (L) 31 4 - 37 4 g/dL    RDW 17 0 (H) 11 6 - 15 1 %    MPV 9 7 8 9 - 12 7 fL    Platelets [phone] - 652 Thousands/uL    nRBC 1 /100 WBCs   Basic metabolic panel   Result Value Ref Range    Sodium 136 135 - 147 mmol/L    Potassium 3 8 3 5 - 5 3 mmol/L    Chloride 105 96 - 108 mmol/L    CO2 24 21 - 32 mmol/L    ANION GAP 7 4 - 13 mmol/L    BUN 11 5 - 25 mg/dL    Creatinine 0 40 (L) 0 60 - 1 30 mg/dL    Glucose 156 (H) 65 - 140 mg/dL    Calcium 7 8 (L) 8 3 - 10 1 mg/dL    eGFR 100 ml/min/1 73sq m   Magnesium   Result Value Ref Range    Magnesium 2 2 1 6 - 2 6 mg/dL   Phosphorus   Result Value Ref Range    Phosphorus 1 8 (L) 2 3 - 4 1 mg/dL   CBC and differential   Result Value Ref Range    WBC 20 41 (H) 4 31 - 10 16 Thousand/uL    RBC 2 95 (L) 3 81 - 5 12 Million/uL    Hemoglobin 8 5 (L) 11 5 - 15 4 g/dL    Hematocrit 26 4 (L) 34 8 - 46 1 %    MCV 90 82 - 98 fL    MCH 28 8 26 8 - 34 3 pg    MCHC 32 2 31 4 - 37 4 g/dL    RDW 16 1 (H) 11 6 - 15 1 %    MPV 9 7 8 9 - 12 7 fL    Platelets [phone] Thousands/uL   Basic metabolic panel   Result Value Ref Range    Sodium 132 (L) 135 - 147 mmol/L    Potassium 3 1 (L) 3 5 - 5 3 mmol/L    Chloride 100 96 - 108 mmol/L    CO2 26 21 - 32 mmol/L    ANION GAP 6 4 - 13 mmol/L    BUN 5 5 - 25 mg/dL    Creatinine 0 37 (L) 0 60 - 1 30 mg/dL    Glucose 148 (H) 65 - 140 mg/dL    Calcium 8 0 (L) 8 3 - 10 1 mg/dL    eGFR 103 ml/min/1 73sq m   APTT   Result Value Ref Range    PTT 67 (H) 23 - 37 seconds   APTT   Result Value Ref Range    PTT 62 (H) 23 - 37 seconds   Basic metabolic panel   Result Value Ref Range    Sodium 134 (L) 135 - 147 mmol/L    Potassium 3 4 (L) 3 5 - 5 3 mmol/L    Chloride 102 96 - 108 mmol/L    CO2 27 21 - 32 mmol/L    ANION GAP 5 4 - 13 mmol/L    BUN 3 (L) 5 - 25 mg/dL    Creatinine 0 37 (L) 0 60 - 1 30 mg/dL    Glucose 144 (H) 65 - 140 mg/dL    Calcium 8 0 (L) 8 3 - 10 1 mg/dL    eGFR 103 ml/min/1 73sq m   CBC   Result Value Ref Range    WBC 17 43 (H) 4 31 - 10 16 Thousand/uL    RBC 2 89 (L) 3 81 - 5 12 Million/uL    Hemoglobin 8 1 (L) 11 5 - 15 4 g/dL    Hematocrit 26 1 (L) 34 8 - 46 1 %    MCV 90 82 - 98 fL    MCH 28 0 26 8 - 34 3 pg    MCHC 31 0 (L) 31 4 - 37 4 g/dL    RDW 16 5 (H) 11 6 - 15 1 %    Platelets [phone] - 003 Thousands/uL    MPV 9 8 8 9 - 12 7 fL   Magnesium   Result Value Ref Range    Magnesium 2 0 1 6 - 2 6 mg/dL   Phosphorus   Result Value Ref Range    Phosphorus 2 6 2 3 - 4 1 mg/dL   Urinalysis with microscopic   Result Value Ref Range    Color, UA Light Yellow     Clarity, UA Clear     Specific [place], UA 1 013 1 003 - 1 030    pH, UA 7 0 4 5, 5 0, 5 5, 6 0, 6 5, 7 0, 7 5, 8 0    Leukocytes, UA Negative Negative    Nitrite, UA Negative Negative    Protein, UA Negative Negative mg/dl    Glucose, UA Negative Negative mg/dl    Ketones, UA Negative Negative mg/dl    Urobilinogen, UA <2 0 <2 0 mg/dl mg/dl    Bilirubin, UA Negative Negative    Occult Blood, UA Negative Negative    RBC, UA 1-2 None Seen, 1-2 /hpf    WBC, UA 1-2 None Seen, 1-2 /hpf    Epithelial Cells Occasional None Seen, Occasional /hpf    Bacteria, UA None Seen None Seen, Occasional /hpf   CBC and differential   Result Value Ref Range    WBC 13 28 (H) 4 31 - 10 16 Thousand/uL    RBC 3 34 (L) 3 81 - 5 12 Million/uL    Hemoglobin 9 5 (L) 11 5 - 15 4 g/dL    Hematocrit 30 7 (L) 34 8 - 46 1 %    MCV 92 82 - 98 fL    MCH 28 4 26 8 - 34 3 pg    MCHC 30 9 (L) 31 4 - 37 4 g/dL    RDW 17 3 (H) 11 6 - 15 1 %    MPV 10 0 8 9 - 12 7 fL    Platelets [phone] - 185 Thousands/uL   Basic metabolic panel   Result Value Ref Range    Sodium 133 (L) 135 - 147 mmol/L    Potassium 3 4 (L) 3 5 - 5 3 mmol/L    Chloride 101 96 - 108 mmol/L    CO2 24 21 - 32 mmol/L    ANION GAP 8 4 - 13 mmol/L    BUN 4 (L) 5 - 25 mg/dL    Creatinine 0 40 (L) 0 60 - 1 30 mg/dL    Glucose 157 (H) 65 - 140 mg/dL    Calcium 8 1 (L) 8 3 - 10 1 mg/dL    eGFR 100 ml/min/1 73sq m   APTT   Result Value Ref Range    PTT 67 (H) 23 - 37 seconds   APTT   Result Value Ref Range    PTT 31 23 - 37 seconds   CBC and differential   Result Value Ref Range    WBC 13 02 (H) 4 31 - 10 16 Thousand/uL    RBC 2 75 (L) 3 81 - 5 12 Million/uL    Hemoglobin 7 8 (L) 11 5 - 15 4 g/dL    Hematocrit 25 8 (L) 34 8 - 46 1 %    MCV 94 82 - 98 fL    MCH 28 4 26 8 - 34 3 pg    MCHC 30 2 (L) 31 4 - 37 4 g/dL    RDW 17 5 (H) 11 6 - 15 1 %    MPV 9 9 8 9 - 12 7 fL    Platelets [phone] - 945 Thousands/uL    nRBC 0 /100 WBCs   Basic metabolic panel   Result Value Ref Range    Sodium 133 (L) 135 - 147 mmol/L    Potassium 3 6 3 5 - 5 3 mmol/L    Chloride 102 96 - 108 mmol/L    CO2 24 21 - 32 mmol/L    ANION GAP 7 4 - 13 mmol/L    BUN 7 5 - 25 mg/dL    Creatinine 0 45 (L) 0 60 - 1 30 mg/dL    Glucose 134 65 - 140 mg/dL    Calcium 8 5 8 3 - 10 1 mg/dL    eGFR 96 ml/min/1 73sq m   Basic metabolic panel   Result Value Ref Range    Sodium 133 (L) 135 - 147 mmol/L    Potassium 3 6 3 5 - 5 3 mmol/L    Chloride 101 96 - 108 mmol/L    CO2 24 21 - 32 mmol/L    ANION GAP 8 4 - 13 mmol/L    BUN 7 5 - 25 mg/dL    Creatinine 0 48 (L) 0 60 - 1 30 mg/dL    Glucose 139 65 - 140 mg/dL    Calcium 8 4 8 3 - 10 1 mg/dL    eGFR 94 ml/min/1 73sq m   CBC and differential   Result Value Ref Range    WBC 12 72 (H) 4 31 - 10 16 Thousand/uL    RBC 2 65 (L) 3 81 - 5 12 Million/uL    Hemoglobin 7 7 (L) 11 5 - 15 4 g/dL    Hematocrit 24 3 (L) 34 8 - 46 1 %    MCV 92 82 - 98 fL    MCH 29 1 26 8 - 34 3 pg    MCHC 31 7 31 4 - 37 4 g/dL    RDW 17 3 (H) 11 6 - 15 1 %    MPV 9 2 8 9 - 12 7 fL    Platelets [phone] - 451 Thousands/uL    nRBC 0 /100 WBCs    Neutrophils Relative 79 (H) 43 - 75 %    Immat GRANS % 2 0 - 2 %    Lymphocytes Relative 6 (L) 14 - 44 %    Monocytes Relative 11 4 - 12 %    Eosinophils Relative 2 0 - 6 %    Basophils Relative 0 0 - 1 %    Neutrophils Absolute 10 19 (H) 1 85 - 7 62 Thousands/µL    Immature Grans Absolute 0 19 0 00 - 0 20 Thousand/uL    Lymphocytes Absolute 0 72 0 60 - 4 47 Thousands/µL    Monocytes Absolute 1 38 (H) 0 17 - 1 22 Thousand/µL    Eosinophils Absolute 0 21 0 00 - 0 61 Thousand/µL    Basophils Absolute 0 03 0 00 - 0 10 Thousands/µL   Phosphorus   Result Value Ref Range    Phosphorus 3 0 2 3 - 4 1 mg/dL   Magnesium   Result Value Ref Range    Magnesium 2 1 1 6 - 2 6 mg/dL   CBC and differential   Result Value Ref Range    WBC 10 14 4 31 - 10 16 Thousand/uL    RBC 2 62 (L) 3 81 - 5 12 Million/uL    Hemoglobin 7 5 (L) 11 5 - 15 4 g/dL    Hematocrit 24 6 (L) 34 8 - 46 1 %    MCV 94 82 - 98 fL    MCH 28 6 26 8 - 34 3 pg    MCHC 30 5 (L) 31 4 - 37 4 g/dL    RDW 17 5 (H) 11 6 - 15 1 %    MPV 9 0 8 9 - 12 7 fL    Platelets 436 528 - 606 Thousands/uL    nRBC 0 /100 WBCs    Neutrophils Relative 81 (H) 43 - 75 %    Immat GRANS % 1 0 - 2 %    Lymphocytes Relative 6 (L) 14 - 44 %    Monocytes Relative 10 4 - 12 %    Eosinophils Relative 2 0 - 6 %    Basophils Relative 0 0 - 1 %    Neutrophils Absolute 8 17 (H) 1 85 - 7 62 Thousands/µL    Immature Grans Absolute 0 11 0 00 - 0 20 Thousand/uL    Lymphocytes Absolute 0 62 0 60 - 4 47 Thousands/µL    Monocytes Absolute 1 05 0 17 - 1 22 Thousand/µL    Eosinophils Absolute 0 16 0 00 - 0 61 Thousand/µL    Basophils Absolute 0 03 0 00 - 0 10 Thousands/µL   CBC and Platelet   Result Value Ref Range    WBC 9 10 4 31 - 10 16 Thousand/uL    RBC 2 75 (L) 3 81 - 5 12 Million/uL    Hemoglobin 8 0 (L) 11 5 - 15 4 g/dL    Hematocrit 25 6 (L) 34 8 - 46 1 %    MCV 93 82 - 98 fL    MCH 29 1 26 8 - 34 3 pg    MCHC 31 3 (L) 31 4 - 37 4 g/dL    RDW 17 6 (H) 11 6 - 15 1 %    Platelets 273 604 - 742 Thousands/uL    MPV 9 0 8 9 - 12 7 fL   CBC and differential   Result Value Ref Range    WBC 6 70 4 31 - 10 16 Thousand/uL    RBC 2 69 (L) 3 81 - 5 12 Million/uL    Hemoglobin 7 8 (L) 11 5 - 15 4 g/dL    Hematocrit 25 2 (L) 34 8 - 46 1 %    MCV 94 82 - 98 fL    MCH 29 0 26 8 - 34 3 pg    MCHC 31 0 (L) 31 4 - 37 4 g/dL    RDW 17 5 (H) 11 6 - 15 1 %    MPV 9 0 8 9 - 12 7 fL    Platelets 249 529 - 893 Thousands/uL    nRBC 0 /100 WBCs    Neutrophils Relative 77 (H) 43 - 75 %    Immat GRANS % 1 0 - 2 %    Lymphocytes Relative 8 (L) 14 - 44 %    Monocytes Relative 12 4 - 12 %    Eosinophils Relative 2 0 - 6 %    Basophils Relative 0 0 - 1 %    Neutrophils Absolute 5 18 1 85 - 7 62 Thousands/µL    Immature Grans Absolute 0 05 0 00 - 0 20 Thousand/uL    Lymphocytes Absolute 0 52 (L) 0 60 - 4 47 Thousands/µL    Monocytes Absolute 0 82 0 17 - 1 22 Thousand/µL    Eosinophils Absolute 0 11 0 00 - 0 61 Thousand/µL    Basophils Absolute 0 02 0 00 - 0 10 Thousands/µL   Basic metabolic panel   Result Value Ref Range    Sodium 131 (L) 135 - 147 mmol/L    Potassium 4 9 3 5 - 5 3 mmol/L    Chloride 99 96 - 108 mmol/L    CO2 28 21 - 32 mmol/L    ANION GAP 4 4 - 13 mmol/L    BUN 6 5 - 25 mg/dL    Creatinine 0 41 (L) 0 60 - 1 30 mg/dL    Glucose 162 (H) 65 - 140 mg/dL    Calcium 8 7 8 3 - 10 1 mg/dL    eGFR 99 ml/min/1 73sq m   Echo complete w/ contrast if indicated   Result Value Ref Range    LA size 3 9 cm    Triscuspid Valve Regurgitation Peak Gradient 30 0 mmHg Tricuspid valve peak regurgitation velocity 2 75 m/s    LVPWd 1 00 cm    Left Atrium Area-systolic Apical Two Chamber 18 6 cm2    Left Atrium Area-systolic Four Chamber 74 8 cm2    MV E' Tissue Velocity Septal 8 cm/s    Tricuspid annular plane systolic excursion 4 09 cm    TR Peak George 2 8 m/s    IVSd 5 04 cm    LV DIASTOLIC VOLUME (MOD BIPLANE) 2D 44 mL    LEFT VENTRICLE SYSTOLIC VOLUME (MOD BIPLANE) 2D 20 mL    Left ventricular stroke volume (2D) 24 00 mL    A4C EF 58 %    LA length (A2C) 5 50 cm    LVIDd 3 30 cm    IVS 1 cm    LVIDS 2 40 cm    FS 27 28 - 44 %    Asc Ao 3 5 cm    Ao root 3 60 cm    RVID d 3 1 cm    MV valve area p 1/2 method 2 53 cm2    E wave deceleration time 301 ms    MV Peak E George 83 cm/s    MV Peak A George 1 16 m/s    RAA A4C 11 4 cm2    MV stenosis pressure 1/2 time 87 ms    LVSV, 2D 24 mL    LV EF 60    Type and screen   Result Value Ref Range    ABO Grouping O     Rh Factor Negative     Antibody Screen Negative     Specimen Expiration Date 20230212    Prepare Leukoreduced RBC: 1 Units   Result Value Ref Range    Unit Product Code O7136J98     Unit Number [id number]     Unit ABO O     Unit RH NEG     Crossmatch Compatible     Unit Dispense Status Presumed Trans     Unit Product Volume 350 mL   Type and screen   Result Value Ref Range    ABO Grouping O     Rh Factor Negative     Antibody Screen Negative     Specimen Expiration Date 20230217    Prepare Leukoreduced RBC: 1 Units   Result Value Ref Range    Unit Product Code J0369W46     Unit Number [id number]     Unit ABO O     Unit [facility] NEG     Crossmatch Compatible     Unit Dispense Status Presumed Trans     Unit Product Volume 350 ml   Tissue Exam   Result Value Ref Range    Case Report       Surgical Pathology Report                         Case: [id number]                                   Authorizing Provider:  Harpreet Pérez DO         Collected:           02/09/2023 1602              Ordering Location:     [address] [place] [address] Received:            02/09/2023 [facility] Operating Room                                                      Pathologist:           Javon Andrade MD                                                                    Specimens:   A) - Abdominal, Hernia Sac                                                                          B) - Colon, Left Colectomy                                                                 Addendum       RESULTS OF IMMUNOHISTOCHEMICAL ANALYSIS FOR MISMATCH REPAIR PROTEIN LOSS    INTERPRETATION: NO LOSS OF NUCLEAR EXPRESSION OF MMR PROTEINS: LOW PROBABILITY OF MSI-H  Note: Background non-neoplastic tissue and/or internal control with intact nuclear expression  RESULTS:  Antibody          Clone               Description                           Results  MLH1               M1                   Mismatch repair protein       Intact nuclear expression  MSH2              [id number]      Mismatch repair protein       Intact nuclear expression  MSH6              40                    Mismatch repair protein       Intact nuclear expression  PMS2              [id number]         Mismatch repair protein       Intact nuclear expression     Comment:   A positive control for each antibody have been reviewed and accepted  [facility] Specimen ID: [id number]  Evaluator: Wilmer Salazar MD      These tests were developed and their performance characteristics determined by [facility]  They may not be cleared or approved by the [place]  Food and Drug Administration  The FDA determined that such clearance or approval is not necessary  These tests are used for clinical purposes  They should not be regarded as investigational or for research  This laboratory has been approved by [place], designated as a high-complexity laboratory and is qualified to perform these tests      Patients whose tumors demonstrate lack of expression of one or more DNA mismatch repair proteins might be at risk for Hadley Syndrome  This cancer susceptibility syndrome greatly increases the risk of synchronous and/or metachronous cancers in the affected patients and their family members  Normal expression of all proteins does not completely rule out familial cancer predisposition  The [place] [employer] Task Forces recommends that all patients with lack of expression of one or more DNA mismatch repair proteins and those with concerning personal or family history should undergo thorough evaluation, counseling and possibly genetic testing  Final Diagnosis       A  Hernia sac, hernia repair:  -   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  B  Segment of colon with portion of ileum, omentum and abdominal wall, left colectomy with en bloc omentectomy and abdominal wall resection:  -   Invasive adenocarcinoma of transverse colon, moderately differentiated, grossly perforated and invades fibroadipose tissue of abdominal wall (pT4b)  -   Second focus of invasive adenocarcinoma of transverse colon, moderately differentiated, invades into muscularis propria (pT3)  -   Terminal ileum and omentum are uninvolved  -   MMR (MLH1, MSH2, MSH6 and PMS2) studies on B18 by immunohistochemistry are pending     -   See comment and synoptic report  Comment:   Immunohistochemical stains on B18 show the tumor cells are positive for CK20, CDX2, SATB2, CK7 (patchy), PAX8 (patchy), DPC4, and are negative for TTF-1, GATA3, ER and synaptophysin  Select slides (B18, IHC) are reviewed by Dr Wilfrido Hanson who concurs with the diagnosis  Diagnosis is communicated via "[facility] to Dr Jesi Lobo on 2/15/2023 at         Interpretation performed at [facility], [address]       Note       BEST TUMOR BLOCK(S) FOR FUTURE STUDIES:  B16      Additional Information       All reported additional testing was performed with appropriately reactive controls  These tests were developed and their performance characteristics determined by [facility] or appropriate performing facility, though some tests may be performed on tissues which have not been validated for performance characteristics (such as staining performed on alcohol exposed cell blocks and decalcified tissues)  Results should be interpreted with caution and in the context of the patients’ clinical condition  These tests may not be cleared or approved by the [place]  Food and Drug Administration, though the FDA has determined that such clearance or approval is not necessary  These tests are used for clinical purposes and they should not be regarded as investigational or for research  This laboratory has been approved by [place] [address], designated as a high-complexity laboratory and is qualified to perform these tests           Synoptic Checklist       COLON AND RECTUM: Resection, Including Transanal Disk Excision of Rectal Neoplasms   COLON AND RECTUM: RESECTION - All Specimens   8th Edition - Protocol posted: 6/22/2022      SPECIMEN      Procedure:    Left colectomy with en bloc omentectomy and abdominal wall resection       TUMOR      Tumor Site:    Transverse colon: Per operative note        Histologic Type:    Adenocarcinoma       Histologic Grade:    G2, moderately differentiated       Tumor Size:    Greatest dimension (Centimeters): 5 4 cm      Multiple Primary Sites:    Present: Second focus of invasive adenocarcinoma of transverse colon, moderately differentiated, invading into muscularis propria       Tumor Extent:    Directly invades or adheres to adjacent structure(s): Hernia sac and fibroadipose tissue of abdominal wall       Macroscopic Tumor Perforation:    Present       Lymphovascular Invasion:    Small vessel       Perineural Invasion:    Not identified       Number of Tumor Buds:    5 per 'hotspot' field      Tumor Bud Score:    Intermediate (5-9)       Type of Polyp in which Invasive Carcinoma Arose:    Tubular adenoma       Treatment Effect:    No known presurgical therapy       MARGINS      Margin Status for Invasive Carcinoma: All margins negative for invasive carcinoma         Closest Margin(s) to Invasive Carcinoma:    Peripheral abdominal wall margin         Distance from Invasive Carcinoma to Closest Margin:    2 3 cm      Margin Status for Non-Invasive Tumor: All margins negative for high-grade dysplasia / intramucosal carcinoma and low-grade dysplasia       REGIONAL LYMPH NODES      Regional Lymph Node Status:            :    Tumor present in regional lymph node(s)           Number of Lymph Nodes with Tumor:    5         Number of Lymph Nodes Examined:    9       Tumor Deposits:    Present         Number of Tumor Deposits:    6       Regional Lymph Node Comment:    Only 9 lymph nodes are identified despite the extensive search and examination of mesenteric fat       PATHOLOGIC STAGE CLASSIFICATION (pTNM, AJCC 8th Edition)      Reporting of pT, pN, and (when applicable) pM categories is based on information available to the pathologist at the time the report is issued  As per the AJCC (Chapter 1, 8th Ed ) it is the managing physician's responsibility to establish the final pathologic stage based upon all pertinent information, including but potentially not limited to this pathology report  TNM Descriptors:    m (multiple primary tumors)       pT Category:    pT4b       pN Category:    pN2a       ADDITIONAL FINDINGS      Additional Findings:    Acute serositis          Comment(s):    MMR (MLH1, MSH2, MSH6 and PMS2) studies on B18 by immunohistochemistry are pending       Gross Description           A  The specimen is received in formalin, labeled with the patient's name and hospital number, and is designated " hernia sac"  The specimen consists of a 17 0 x 8 7 x 1 5 cm portion of tan-purple and glistening rubbery fibromembranous tissue    Sectioning reveals tan fibrous cut surfaces containing multiple white nodules, grossly consistent with tumor, measuring up to 2 6 cm in size  Representative sections of the tumor are submitted in 2 cassettes  A  The specimen is received in formalin, labeled with the patient's name and hospital number, and is designated " left colectomy"  The specimen consists of a portion of ileum and colon measuring 31 5 cm in length by 4 0 cm in average diameter  There is an abundant amount of attached pericolonic fat  Also, there is an 18 5 x 12 1 x 1 7 cm blanket of tan-yellow lobulated omental fat attached to the transverse colon  The serosa is tan-gray, adhesion-covered and glistening  There is a 12 0 x 9 0 x 5 5 cm portion of abdominal wall taken en bloc with the colon segment  The abdominal wall soft tissue margin is inked blue and the peripheral abdominal wall margin is inked green  The specimen is opened to reveal brown fecal material   An intact and well healed ileocolonic anastomosis is identified 4 5 cm from the ileal stapled end of resection and 17 5 cm away from the opposing stapled end of resection  There is a 5 4 x 5 0 x 1 5 cm (LxWxH) tan polypoid centrally necrotic mass located in the colon, 10 cm from the ileal stapled end of resection and 7 6 cm away from the opposing stapled end of resection  A 1 0 cm transmural defect is located within the mass, and the mass grossly appears to perforate into the abdominal wall  The en bloc portion of abdominal wall is disrupted and the inner lining contains foul-smelling necrotic tissue and fecal matter which appears cauterized  The mass invades into the bowel wall, abdominal wall and surrounding pericolonic fat  The mass is 0 1 cm away from the serosa, 2 3 cm away from the peripheral abdominal wall margin, 6 0 cm away from the mesenteric margin, 7 0 cm away from the vascular pedicle and is 3 0 cm from the abdominal wall soft tissue margin    Sectioning of the abdominal wall reveals dense white fibrous tissue which comes to within 0 2 cm away from the abdominal wall soft tissue margin  It cannot be determined grossly whether the dense white fibrous tissue is tumor or reactionary fibrous tissue  In addition, there is a 2 0 x 2 0 x 0 2 cm tan polypoid area adjacent to/involving the anastomosis, 4 5 cm away from the ileal stapled end of resection and 15 5 cm away from the opposing stapled end of resection  The polypoid area is 5 7 cm away from the mass  It abuts the bowel wall but does not grossly appear to invade into it  The uninvolved mucosa of the ileum is tan, glistening, edematous and contains typical folds  The uninvolved mucosa of the colon is tan, glistening, focally flattened and focally contains folds  The colon appears dilated and measures 11 3 cm in maximal circumference  The wall ranges from 0 1-0 2 cm in thickness  The omentum is sectioned revealing grossly unremarkable cut surfaces  16 possible tan lymph nodes are identified in the surrounding pericolonic fat, ranging in size from 0 1 to 1 1 cm in maximal dimension  Also received separately in the container is a 13 5 x 9 0 x 1 9 cm portion of tan-yellow lobulated omentum  Sectioning reveals grossly unremarkable cut surfaces  Gross photographs are taken  Representative sections are submitted as follows:    1: Ileal stapled end of resection, en face  2: Opposing stapled end of resection, en face  3: Mesenteric margin, en face, inked black  4: Vascular pedicle closest to mass, en face  5-10: Peripheral abdominal wall margin, inked green, shaved  11: Additional vascular pedicle, en face  12: Mass to re-inked peripheral abdominal wall margin  13-15: Mass and perforation  16: Mass invading into abdominal wall  17: Mass invading into surrounding pericolonic fat  18-20: Mass and serosa  21:  Mass and uninvolved mucosa  22-27: Split sections of mass to abdominal wall soft tissue margin  28-33: Fibrous tissue to abdominal wall soft tissue margin  34: Sections on both sides of the anastomosis  35-37: Polypoid area adjacent to/involving the anastomosis site, entirely submitted  38: Uninvolved ileum and uninvolved colon  39: Omentum  40: Separate portion of omentum found in container  41-42: Additional sections of fibrous tissue to abdominal wall soft tissue margin  43-44: Each cassette contains 4 possible lymph nodes, entirely submitted  45-52: Each cassette contains 1 possible lymph node, bisected, entirely submitted  53-62: Additional sections of mesenteric fat    Note: The estimated total formalin fixation time based upon information provided by the submitting clinician and the standard processing schedule is under 72 hours  [place]    Per Dr Ingrid Kussmaul, it appeared that the cancer extended into the hernia sac, everything from the colon, hernia sac, and abdominal wall were adherent together at one common point at the abdominal wall  Clinical Information       Per EMR,   History of right breast cancer    Per op note,   Preop Diagnosis:  Incarcerated ventral hernia [K43 6]     Post-Op Diagnosis Codes:     * Incarcerated ventral hernia [K43 6]   Colonic (transverse colon) mass  Perforated Colonic mass  Localized Necrotizing Soft tissue infection of the abdominal wall      Procedure(s):  EXPLORATORY LAPAROTOMY; ABDOMINAL WALL DEBRIDEMENT; REDUCTION VENTRAL HERNIA; LEFT COLON RESECTION; CREATION ILEOSTOMY; WASHOUT    Operative Indications:  Incarcerated ventral hernia [K43 6]      Operative Findings:  1  Large Incarcerated Ventral Hernia in the left lower quadrant  2  Hernia contained a loop of transverse colon with a mass that had perforated into the abdominal wall (see image below)  3  Localized necrotizing soft tissue infection of surrounding abdominal wall  4  Erythema of the overlying LLQ skin without obvious features of gangrene or non-viability  5  Final wound defect measured; 18 cm x 20 cm x 8 cm    6  Prior ileo-colic anastomosis  noted to be intact     POCT Blood Gas (CG8+)   Result Value Ref Range    pH, Art i-STAT 7 331 (L) 7 350 - 7 450    pCO2, Art i-STAT 31 7 (L) 36 0 - 44 0 mm HG    pO2, ART i-STAT 94 0 75 0 - 129 0 mm HG    BE, i-STAT -8 (L) -2 - 3 mmol/L    HCO3, Art i-STAT 16 7 (LL) 22 0 - 28 0 mmol/L    CO2, i-STAT 18 (L) 21 - 32 mmol/L    O2 Sat, i-STAT 97 (H) 60 - 85 %    SODIUM, I-STAT 133 (L) 136 - 145 mmol/l    Potassium, i-STAT 3 6 3 5 - 5 3 mmol/L    Calcium, Ionized i-STAT 1 07 (L) 1 12 - 1 32 mmol/L    Hct, i-STAT 30 (L) 34 8 - 46 1 %    Hgb, i-STAT 10 2 (L) 11 5 - 15 4 g/dl    Glucose, i-STAT 185 (H) 65 - 140 mg/dl    Specimen Type ARTERIAL    Fingerstick Glucose (POCT)   Result Value Ref Range    POC Glucose 199 (H) 65 - 140 mg/dl   Fingerstick Glucose (POCT)   Result Value Ref Range    POC Glucose 177 (H) 65 - 140 mg/dl   Fingerstick Glucose (POCT)   Result Value Ref Range    POC Glucose 225 (H) 65 - 140 mg/dl   Fingerstick Glucose (POCT)   Result Value Ref Range    POC Glucose 183 (H) 65 - 140 mg/dl   Fingerstick Glucose (POCT)   Result Value Ref Range    POC Glucose 147 (H) 65 - 140 mg/dl   Fingerstick Glucose (POCT)   Result Value Ref Range    POC Glucose 181 (H) 65 - 140 mg/dl   Fingerstick Glucose (POCT)   Result Value Ref Range    POC Glucose 147 (H) 65 - 140 mg/dl   Fingerstick Glucose (POCT)   Result Value Ref Range    POC Glucose 218 (H) 65 - 140 mg/dl   Fingerstick Glucose (POCT)   Result Value Ref Range    POC Glucose 185 (H) 65 - 140 mg/dl   Fingerstick Glucose (POCT)   Result Value Ref Range    POC Glucose 191 (H) 65 - 140 mg/dl   Fingerstick Glucose (POCT)   Result Value Ref Range    POC Glucose 211 (H) 65 - 140 mg/dl   Fingerstick Glucose (POCT)   Result Value Ref Range    POC Glucose 156 (H) 65 - 140 mg/dl   Fingerstick Glucose (POCT)   Result Value Ref Range    POC Glucose 158 (H) 65 - 140 mg/dl   Fingerstick Glucose (POCT)   Result Value Ref Range    POC Glucose 160 (H) 65 - 140 mg/dl   Fingerstick Glucose (POCT)   Result Value Ref Range    POC Glucose 148 (H) 65 - 140 mg/dl   Fingerstick Glucose (POCT)   Result Value Ref Range    POC Glucose 161 (H) 65 - 140 mg/dl   Fingerstick Glucose (POCT)   Result Value Ref Range    POC Glucose 190 (H) 65 - 140 mg/dl   Fingerstick Glucose (POCT)   Result Value Ref Range    POC Glucose 202 (H) 65 - 140 mg/dl   Fingerstick Glucose (POCT)   Result Value Ref Range    POC Glucose 182 (H) 65 - 140 mg/dl   Fingerstick Glucose (POCT)   Result Value Ref Range    POC Glucose 181 (H) 65 - 140 mg/dl   Fingerstick Glucose (POCT)   Result Value Ref Range    POC Glucose 176 (H) 65 - 140 mg/dl   Fingerstick Glucose (POCT)   Result Value Ref Range    POC Glucose 151 (H) 65 - 140 mg/dl   Fingerstick Glucose (POCT)   Result Value Ref Range    POC Glucose 123 65 - 140 mg/dl   Fingerstick Glucose (POCT)   Result Value Ref Range    POC Glucose 166 (H) 65 - 140 mg/dl   Fingerstick Glucose (POCT)   Result Value Ref Range    POC Glucose 141 (H) 65 - 140 mg/dl   Fingerstick Glucose (POCT)   Result Value Ref Range    POC Glucose 234 (H) 65 - 140 mg/dl   Fingerstick Glucose (POCT)   Result Value Ref Range    POC Glucose 201 (H) 65 - 140 mg/dl   Fingerstick Glucose (POCT)   Result Value Ref Range    POC Glucose 168 (H) 65 - 140 mg/dl   Fingerstick Glucose (POCT)   Result Value Ref Range    POC Glucose 245 (H) 65 - 140 mg/dl   Fingerstick Glucose (POCT)   Result Value Ref Range    POC Glucose 202 (H) 65 - 140 mg/dl   Fingerstick Glucose (POCT)   Result Value Ref Range    POC Glucose 166 (H) 65 - 140 mg/dl   Fingerstick Glucose (POCT)   Result Value Ref Range    POC Glucose 180 (H) 65 - 140 mg/dl   Fingerstick Glucose (POCT)   Result Value Ref Range    POC Glucose 143 (H) 65 - 140 mg/dl   Fingerstick Glucose (POCT)   Result Value Ref Range    POC Glucose 195 (H) 65 - 140 mg/dl   Fingerstick Glucose (POCT)   Result Value Ref Range    POC Glucose 196 (H) 65 - 140 mg/dl Fingerstick Glucose (POCT)   Result Value Ref Range    POC Glucose 206 (H) 65 - 140 mg/dl   Fingerstick Glucose (POCT)   Result Value Ref Range    POC Glucose 128 65 - 140 mg/dl   Fingerstick Glucose (POCT)   Result Value Ref Range    POC Glucose 175 (H) 65 - 140 mg/dl   Fingerstick Glucose (POCT)   Result Value Ref Range    POC Glucose 203 (H) 65 - 140 mg/dl   Fingerstick Glucose (POCT)   Result Value Ref Range    POC Glucose 209 (H) 65 - 140 mg/dl   Fingerstick Glucose (POCT)   Result Value Ref Range    POC Glucose 137 65 - 140 mg/dl   Fingerstick Glucose (POCT)   Result Value Ref Range    POC Glucose 231 (H) 65 - 140 mg/dl   Fingerstick Glucose (POCT)   Result Value Ref Range    POC Glucose 198 (H) 65 - 140 mg/dl   Fingerstick Glucose (POCT)   Result Value Ref Range    POC Glucose 173 (H) 65 - 140 mg/dl   Fingerstick Glucose (POCT)   Result Value Ref Range    POC Glucose 127 65 - 140 mg/dl   Fingerstick Glucose (POCT)   Result Value Ref Range    POC Glucose 178 (H) 65 - 140 mg/dl   Fingerstick Glucose (POCT)   Result Value Ref Range    POC Glucose 154 (H) 65 - 140 mg/dl   Fingerstick Glucose (POCT)   Result Value Ref Range    POC Glucose 152 (H) 65 - 140 mg/dl   Fingerstick Glucose (POCT)   Result Value Ref Range    POC Glucose 162 (H) 65 - 140 mg/dl   Fingerstick Glucose (POCT)   Result Value Ref Range    POC Glucose 147 (H) 65 - 140 mg/dl   Fingerstick Glucose (POCT)   Result Value Ref Range    POC Glucose 235 (H) 65 - 140 mg/dl   Fingerstick Glucose (POCT)   Result Value Ref Range    POC Glucose 203 (H) 65 - 140 mg/dl   Fingerstick Glucose (POCT)   Result Value Ref Range    POC Glucose 219 (H) 65 - 140 mg/dl   Fingerstick Glucose (POCT)   Result Value Ref Range    POC Glucose 143 (H) 65 - 140 mg/dl   Fingerstick Glucose (POCT)   Result Value Ref Range    POC Glucose 180 (H) 65 - 140 mg/dl   Fingerstick Glucose (POCT)   Result Value Ref Range    POC Glucose 233 (H) 65 - 140 mg/dl   Fingerstick Glucose (POCT) Result Value Ref Range    POC Glucose 195 (H) 65 - 140 mg/dl   Fingerstick Glucose (POCT)   Result Value Ref Range    POC Glucose 138 65 - 140 mg/dl   Fingerstick Glucose (POCT)   Result Value Ref Range    POC Glucose 205 (H) 65 - 140 mg/dl   Fingerstick Glucose (POCT)   Result Value Ref Range    POC Glucose 236 (H) 65 - 140 mg/dl   Manual Differential(PHLEBS Do Not Order)   Result Value Ref Range    Segmented % 60 43 - 75 %    Bands % 24 (H) 0 - 8 %    Lymphocytes % 4 (L) 14 - 44 %    Monocytes % 7 4 - 12 %    Eosinophils, % 0 0 - 6 %    Basophils % 0 0 - 1 %    Metamyelocytes% 4 (H) 0 - 1 %    Myelocytes % 1 0 - 1 %    Absolute Neutrophils 14 25 (H) 1 85 - 7 62 Thousand/uL    Lymphocytes Absolute 0 68 0 60 - 4 47 Thousand/uL    Monocytes Absolute 1 19 0 00 - 1 22 Thousand/uL    Eosinophils Absolute 0 00 0 00 - 0 40 Thousand/uL    Basophils Absolute 0 00 0 00 - 0 10 Thousand/uL    Total Counted      Anisocytosis Present     Tobias Cells Present     Platelet Estimate Increased (A) Adequate    Clumped Platelets Present    Manual Differential(PHLEBS Do Not Order)   Result Value Ref Range    Segmented % 87 (H) 43 - 75 %    Bands % 4 0 - 8 %    Lymphocytes % 1 (L) 14 - 44 %    Monocytes % 6 4 - 12 %    Eosinophils, % 0 0 - 6 %    Basophils % 0 0 - 1 %    Myelocytes % 2 (H) 0 - 1 %    Absolute Neutrophils 17 22 (H) 1 85 - 7 62 Thousand/uL    Lymphocytes Absolute 0 19 (L) 0 60 - 4 47 Thousand/uL    Monocytes Absolute 1 14 0 00 - 1 22 Thousand/uL    Eosinophils Absolute 0 00 0 00 - 0 40 Thousand/uL    Basophils Absolute 0 00 0 00 - 0 10 Thousand/uL    Total Counted      RBC Morphology Present     Hypochromia Present     Platelet Estimate Adequate Adequate   Manual Differential(PHLEBS Do Not Order)   Result Value Ref Range    Segmented % 84 (H) 43 - 75 %    Lymphocytes % 3 (L) 14 - 44 %    Monocytes % 4 4 - 12 %    Eosinophils, % 4 0 - 6 %    Basophils % 1 0 - 1 %    Myelocytes % 2 (H) 0 - 1 %    Atypical Lymphocytes % 2 (H) <=0 %    Absolute Neutrophils 11 16 (H) 1 85 - 7 62 Thousand/uL    Lymphocytes Absolute 0 40 (L) 0 60 - 4 47 Thousand/uL    Monocytes Absolute 0 53 0 00 - 1 22 Thousand/uL    Eosinophils Absolute 0 53 (H) 0 00 - 0 40 Thousand/uL    Basophils Absolute 0 13 (H) 0 00 - 0 10 Thousand/uL    Total Counted      RBC Morphology Present     Anisocytosis Present     Ovalocytes Present     Poikilocytes Present     Polychromasia Present     Platelet Estimate Adequate Adequate     *Note: Due to a large number of results and/or encounters for the requested time period, some results have not been displayed  A complete set of results can be found in Results Review  Labs, Imaging, & Other studies:   All pertinent labs and imaging studies were personally reviewed        Reviewed test results and discussed with patient  Assessment and plan:        Follow-up visit for recurrent colon cancer and metastatic disease to liver  Patient had presented with incarcerated ventral hernia and patient underwent an exploratory laparotomy, reduction of ventral hernia, left colectomy, and creation of an end ileostomy  Post-op she was taken to the ICU for close monitoring and resuscitation  On 2/10 she was found to have a PE of JOANNA and RLL arteries and started on a heparin infusion  Daily dressing changes were provided  A PICC line was placed for access to provide IV medications and access for blood draws  She was transferred out of the ICU on 2/11  Antibiotics were continued through 2/19  She was transitioned to Xarelto on 2/17    See path report below  Component    Case Report   Surgical Pathology Report                         Case: [id number]                                    Authorizing Provider: Kristal Ingram DO         Collected:           02/09/2023 1602               Ordering Location:     [address] [place] [address]      Received:            02/09/2023 2205                                      [facility] Operating Room                                                       Pathologist:           Kimberly Arriaza MD                                                                     Specimens:   A) - Abdominal, Hernia Sac                                                                           B) - Colon, Left Colectomy                                                                 Addendum   RESULTS OF IMMUNOHISTOCHEMICAL ANALYSIS FOR MISMATCH REPAIR PROTEIN LOSS     INTERPRETATION: NO LOSS OF NUCLEAR EXPRESSION OF MMR PROTEINS: LOW PROBABILITY OF MSI-H      Note: Background non-neoplastic tissue and/or internal control with intact nuclear expression       RESULTS:  Antibody          Clone               Description                           Results  MLH1               M1                   Mismatch repair protein       Intact nuclear expression  MSH2              [id number]      Mismatch repair protein       Intact nuclear expression  IXI2              61                    Mismatch repair protein       Intact nuclear expression  OZB7              [id number]         Mismatch repair protein       Intact nuclear expression     Comment:   A positive control for each antibody have been reviewed and accepted  [facility] Specimen ID: [id number]  Evaluator: Angi Queen MD       These tests were developed and their performance characteristics determined by [facility]  [facility] may not be cleared or approved by the [place]  Food and Drug Administration   The FDA determined that such clearance or approval is not necessary   These tests are used for clinical purposes  [facility] should not be regarded as investigational or for research   This laboratory has been approved by CLIA 88, designated as a high-complexity laboratory and is qualified to perform these tests      Patients whose tumors demonstrate lack of expression of one or more DNA mismatch repair proteins might be at risk for Hadley Syndrome   This cancer susceptibility syndrome greatly increases the risk of synchronous and/or metachronous cancers in the affected patients and their family members  Normal expression of all proteins does not completely rule out familial cancer predisposition      The [facility]'s Hadley Syndrome Surveillance Program Task Forces recommends that all patients with lack of expression of one or more DNA mismatch repair proteins and those with concerning personal or family history should undergo thorough evaluation, counseling and possibly genetic testing  Addendum electronically signed by Jf Duke MD on 2/17/2023 at  5:17 PM   Final Diagnosis   A  Hernia sac, hernia repair:  -   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      B  Segment of colon with portion of ileum, omentum and abdominal wall, left colectomy with en bloc omentectomy and abdominal wall resection:  -   Invasive adenocarcinoma of transverse colon, moderately differentiated, grossly perforated and invades fibroadipose tissue of abdominal wall (pT4b)  -   Second focus of invasive adenocarcinoma of transverse colon, moderately differentiated, invades into muscularis propria (pT3)  -   Terminal ileum and omentum are uninvolved  -   MMR (MLH1, MSH2, MSH6 and PMS2) studies on B18 by immunohistochemistry are pending     -   See comment and synoptic report      Comment:   Immunohistochemical stains on B18 show the tumor cells are positive for CK20, CDX2, SATB2, CK7 (patchy), PAX8 (patchy), DPC4, and are negative for TTF-1, GATA3, ER and synaptophysin        Select slides (B18, IHC) are reviewed by Dr Estrellita Ahcarya who concurs with the diagnosis      Diagnosis is communicated via P[facility] to Dr Dina Lake on 2/15/2023 at 1532       Interpretation performed at [address]    Electronically signed by Jf Duke MD on 2/15/2023 at  4:02 PM   Note       Patient has previous history of right breast cancer  She has history of colon cancer  She has history of DVT leg    She has iron deficiency anemia  In 2012 she was diagnosed to have stage 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  She had right mastectomy, and lymph node dissection   There were 9 positive lymph nodes  She had Adriamycin + Cytoxan chemotherapy followed by Taxotere and after that radiation therapy  Since November/December 2012 she has been on Femara and she will take Femara until December 2022  Patient has osteoporosis and she has been on vitamin-D  and Prolia   Has problem swallowing calcium tablet  No problem with her teeth for Prolia  In 2016 she was found to have benign clustered calcifications in left breast and had a biopsy and that was benign  Dr Carlos Morales takes care of her mammography       In December 2012 patient developed DVT right leg and she was started on Xarelto  She had GI bleeding in 2015  Xarelto was stopped  On colonoscopy she was found to have stage I right colon cancer  On 7/22/15 she underwent right hemicolectomy  Pathological diagnosis right colon cancer, stage I, T2 N0 MX, grade 2, No lymphovascular invasion and no perineural invasion  She did not require adjuvant therapy for colon cancer  History of thyroid nodule  She had biopsy of thyroid nodule in June 2014 and she states that was negative for cancer  She gets thyroid ultrasound yearly for surveillance     2/9/2023[de-identified]  EXPLORATORY LAPAROTOMY; ABDOMINAL WALL DEBRIDEMENT; REDUCTION VENTRAL HERNIA; LEFT COLON RESECTION; CREATION ILEOSTOMY; WASHOUT (Abdomen)    Patient is in a nursing home  She still has wound VAC  Activity is limited from bed to chair  She has generalized weakness and tiredness and shortness of breath  She has ileostomy bag  She has lost weight         Physical examination and test results are as recorded and discussed  Reviewed very complicated hospital records  Patient has recurrent colon cancer and is status post total colectomy and has ileostomy and wound VAC    Has metastatic disease to liver  Nonnie Senters History of pulmonary embolism and DVT  Previous history of breast cancer and colon cancer  Has poor performance status  She is not sure if she will take any more chemotherapy  She has to recover from her present medical condition  To send new colon cancer specimen to [facility] for NGS   All discussed in detail   Questions answered  Diet and activities per  at [facility] facility  Nutritional supplements  Patient to avoid falls and trauma  Goal will be prolongation of survival   Patient needs assistance in her care   Discussed precautions against coronavirus and flu and other infections  She will continue to follow with her primary physician and other consultants  See diagnoses, orders and instructions below  1  Malignant neoplasm of colon, unspecified part of colon ([address] ) and liver metastasis    - CBC and differential; Future  - Comprehensive metabolic panel; Future  - CEA; Future    2  Chronic wound infection of abdomen, initial encounter      3  Iron deficiency anemia, unspecified iron deficiency anemia type    4  Malignant neoplasm of right breast in female, estrogen receptor positive, unspecified site of breast ([facility] [address] )      5  Abnormal tumor markers      6  History of DVT (deep vein thrombosis) and pulmonary embolism      7  Thyroid nodule      8  History of diabetes mellitus      9  Type 2 diabetes mellitus without complication, unspecified whether long term insulin use ([facility] [address] )    10  Moderate protein-calorie malnutrition ([facility] [address] )      11  Type 2 diabetes mellitus with complication, without long-term current use of insulin ([facility] [address] )    Blood work prior to next visit in 1 month            I used a dictation device to dictate this note and there could be mistakes in my note and for that patient may contact my office                                  I used dictation device to dictate this note and there could be mistakes in my note                     Patient voiced understanding and agrees      Counseling / Coordination of Care      Provided counseling and support

## 2023-03-17 ENCOUNTER — TELEPHONE (OUTPATIENT)
Dept: FAMILY MEDICINE CLINIC | Facility: CLINIC | Age: 78
End: 2023-03-17

## 2023-03-17 NOTE — TELEPHONE ENCOUNTER
Patient os requesting a phone call from patient - currently in nursing home, but wants to talk to Dr Jeb Nam   [facility] in [place] [address] Lidia Verma    Please call patient at

## 2023-03-20 ENCOUNTER — OFFICE VISIT (OUTPATIENT)
Dept: SURGERY | Facility: CLINIC | Age: 78
End: 2023-03-20

## 2023-03-20 VITALS
OXYGEN SATURATION: 96 % | DIASTOLIC BLOOD PRESSURE: 76 MMHG | HEART RATE: 66 BPM | SYSTOLIC BLOOD PRESSURE: 101 MMHG | RESPIRATION RATE: 14 BRPM | TEMPERATURE: 97.7 F

## 2023-03-20 DIAGNOSIS — Z93.2 S/P ILEOSTOMY (HCC): Primary | ICD-10-CM

## 2023-03-20 DIAGNOSIS — K63.1 PERFORATED BOWEL (HCC): ICD-10-CM

## 2023-03-20 PROBLEM — C18.9 ADENOCARCINOMA OF COLON (HCC): Status: ACTIVE | Noted: 2023-03-20

## 2023-03-20 NOTE — ASSESSMENT & PLAN NOTE
67yo F with h/o colon cancer s/p R colectomy, with recent complex hospitalization for strangulated ventral hernia containing perforated transverse colon mass c/b abdominal wall NSTI, s/p exploratory laparotomy, left colectomy, end ileostomy, abdominal wall debridement  Pathology ultimately demonstrated adenocarcinoma of colon       · Will place referral to wound care given ongoing leakage from around ileostomy site -- stool leakage likely also affecting healing of midline wound  · Follow-up with colorectal surgery  · Ongoing medical oncology follow-up

## 2023-03-20 NOTE — PROGRESS NOTES
Office Visit - General Surgery  Gómez Castillo MRN: [id number]  Encounter: [id number]    Assessment and Plan  Problem List Items Addressed This Visit        Digestive    Perforated bowel ([facility] [address] )       Other    S/P ileostomy ([facility] [address] ) - Primary     67yo F with h/o colon cancer s/p R colectomy, with recent complex hospitalization for strangulated ventral hernia containing perforated transverse colon mass c/b abdominal wall NSTI, s/p exploratory laparotomy, left colectomy, end ileostomy, abdominal wall debridement  Pathology ultimately demonstrated adenocarcinoma of colon  · Will place referral to wound care given ongoing leakage from around ileostomy site -- stool leakage likely also affecting healing of midline wound  · Follow-up with colorectal surgery  · Ongoing medical oncology follow-up         Relevant Orders    Ambulatory Referral to Wound Care       Chief Complaint:  Gómez Castillo is a 69 y o  female who presents for Post-op (P/o exp lap)    Subjective  Patient admitted at [facility] 2/9/2023 - 2/23/2023 for incarcerated ventral hernia containing perforated transverse colon with abdominal wall NSTI  She underwent exploratory laparotomy, partial colectomy, end ileostomy, abdominal wall debridement on 2/9/2023  Pathology ultimately demonstrated adenocarcinoma  Hospital course was complicated by acute PE  She returns to clinic today for routine follow-up  Her main complaint is of leakage from around ostomy appliance that is limiting her ability to perform physical therapy  Ostomy has been functioning normally      Past Medical History:   Diagnosis Date   • Acute embolism and thrombosis of deep vein of lower extremity (HCC)    • Adenocarcinoma of colon, [facility] ([facility] [address] )    • Breast cancer ([facility] [address] ) 2012    Right Breast    • Cancer ([facility] [address] )    • Diabetes mellitus ([facility] [address] )    • DVT (deep venous thrombosis) (HCC)    • GERD (gastroesophageal reflux disease)    • Hypertension    • Pes planus        Past Surgical History:   Procedure Laterality Date   • COLONOSCOPY     • HERNIA REPAIR N/A 2/9/2023    Procedure: EXPLORATORY LAPAROTOMY; ABDOMINAL WALL DEBRIDEMENT; REDUCTION VENTRAL HERNIA; LEFT COLON RESECTION; CREATION ILEOSTOMY; WASHOUT;  Surgeon: Frances Titus DO;  Location: [facility];  Service: General   • HYSTERECTOMY      complete @ age 29   • IR PORT REMOVAL  9/12/2018   • MASTECTOMY Right 2012   • MO COLONOSCOPY FLX DX W/COLLJ SPEC WHEN PFRMD N/A 8/23/2016    Procedure: COLONOSCOPY;  Surgeon: Katarina Odell MD;  Location: [facility]; Service: Colorectal   • MO COLONOSCOPY FLX DX W/COLLJ SPEC WHEN PFRMD N/A 9/5/2017    Procedure: COLONOSCOPY;  Surgeon: Katarina Odell MD;  Location: [facility]; Service: Colorectal   • MO ESOPHAGOGASTRODUODENOSCOPY TRANSORAL DIAGNOSTIC N/A 9/5/2017    Procedure: ESOPHAGOGASTRODUODENOSCOPY (EGD); Surgeon: Giovanna Mandel DO;  Location: [facility]; Service: Gastroenterology       Family History   Problem Relation Age of Onset   • Other Mother         chronic bronchitis   • Brain cancer Father    • Prostate cancer Paternal Grandfather 79   • Pancreatic cancer Maternal Aunt 39   • Breast cancer Neg Hx        Social History     Tobacco Use   • Smoking status: Never   • Smokeless tobacco: Never   Vaping Use   • Vaping Use: Never used   Substance Use Topics   • Alcohol use: Not Currently   • Drug use: No        Medications  Current Outpatient Medications on File Prior to Visit   Medication Sig Dispense Refill   • amLODIPine (NORVASC) 2 5 mg tablet Take 1 tablet (2 5 mg total) by mouth daily 30 tablet 5   • ascorbic acid (VITAMIN C) 500 MG tablet Take 1,000 mg by mouth daily      • benazepril-hydrochlorthiazide (LOTENSIN HCT) 20-12 5 MG per tablet take 1 tablet by mouth once daily 90 tablet 5   • bimatoprost (LUMIGAN) 0 01 % ophthalmic drops Administer 1 drop to both eyes daily at bedtime      • calcium citrate (CALCITRATE) 950 MG tablet Take 1 tablet by mouth in the morning       • Cholecalciferol (VITAMIN D-3 PO) Take 1 capsule by mouth 3 (three) times a day      • Cyanocobalamin (VITAMIN B 12 PO) Take 1 tablet by mouth daily  • Diclofenac Sodium (VOLTAREN) 1 % Apply 2 g topically 4 (four) times a day     • insulin lispro (HumaLOG) 100 units/mL injection Inject 1-6 Units under the skin 4 (four) times a day (before meals and at bedtime)  0   • pantoprazole (PROTONIX) 40 mg tablet Take 1 tablet (40 mg total) by mouth daily in the early morning Do not start before February 24, 2023   0   • rivaroxaban (Xarelto) 10 mg tablet Take 1 tablet (10 mg total) by mouth daily 30 tablet 0   • vitamin E 100 UNIT capsule Take 100 Units by mouth daily        No current facility-administered medications on file prior to visit  Allergies  No Known Allergies    Review of Systems   All other systems reviewed and are negative        Objective  Vitals:    03/20/23 1045   BP: 101/76   Pulse: 66   Resp: 14   Temp: 97 7 °F (36 5 °C)   SpO2: 96%       Physical Exam   Gen:  NAD  HENT: MMM  CV:  RRR  Lungs: nl effort  Abd:  soft, NT/ND   stoma pink/healthy, brown stool in appliance   midline wound poor healing with excessive granulation tissue in mid-portion, no fluctuance or e/o drainable abscess, no significant periwound erythema  Ext:  no CCE  Skin:  no rashes  Neuro: A&Ox3

## 2023-03-27 ENCOUNTER — HOSPITAL ENCOUNTER (OUTPATIENT)
Dept: INFUSION CENTER | Facility: HOSPITAL | Age: 78
End: 2023-03-27
Attending: INTERNAL MEDICINE

## 2023-03-28 ENCOUNTER — TELEPHONE (OUTPATIENT)
Age: 78
End: 2023-03-28

## 2023-03-28 NOTE — TELEPHONE ENCOUNTER
Tried to call [employer] @ [phone] to schedule 5/5/23 office visit with Dr Browning Profit  Phone # states out of service  [address] [facility] @ [phone], the  states they have new phones & must be having problems, she will try to get message to [address] [place] [address] for them to call our office to schedule 5/5 appt, gave our contact info

## 2023-03-29 ENCOUNTER — OFFICE VISIT (OUTPATIENT)
Dept: WOUND CARE | Facility: HOSPITAL | Age: 78
End: 2023-03-29

## 2023-03-29 VITALS
TEMPERATURE: 96.9 F | BODY MASS INDEX: 25.52 KG/M2 | HEIGHT: 60 IN | SYSTOLIC BLOOD PRESSURE: 139 MMHG | WEIGHT: 130 LBS | RESPIRATION RATE: 12 BRPM | HEART RATE: 97 BPM | DIASTOLIC BLOOD PRESSURE: 71 MMHG

## 2023-03-29 DIAGNOSIS — T81.89XA NON-HEALING SURGICAL WOUND, INITIAL ENCOUNTER: Primary | ICD-10-CM

## 2023-03-29 NOTE — PATIENT INSTRUCTIONS
Orders Placed This Encounter   Procedures    Wound cleansing and dressings     Wound location : Abdomen  Change dressing daily and as needed for dislodged  Sponge bathe only, do not shower at this time  Cleanse the wound with NSS, pat dry  Apply Alginate Ag to the wound  Cover with guaze  Secure with medifix tape    Silver Nitrate was used today for one time  You may see grey-color at the wound      Ostomy: Change the bag every 3-4 days and as PRN for leakage  Stoma adhesive powder to the skin around the ostomy  Apply non-sting skin prep  Then, apply another layer of stoma adhesive powder  Finish with another layer of non sting skin prep   Apply strip paste to crease at 2 o'clock  DO not use 2 piece of pouch  Use one piece flat pouch    Stoma measurement: 1 inch x 1 25 inch    This was applied today at the [facility]  If you have any questions for the ostomy nurse, call [phone]       Standing Status:   Future     Standing Expiration Date:   3/29/2024

## 2023-03-29 NOTE — PROGRESS NOTES
"Patient ID: Jose Alberto Osei is a 69 y o  female Date of Birth 1945     Chief Complaint  Chief Complaint   Patient presents with   • New Patient Visit     Abdomen wound; ostomy leakage       Allergies  Patient has no known allergies  Assessment:    No problem-specific Assessment & Plan notes found for this encounter  Diagnoses and all orders for this visit:    Non-healing surgical wound, initial encounter  -     Cancel: Wound cleansing and dressings; Future  -     Wound cleansing and dressings; Future    Other orders  -     Chemical Caut Of A Wound                    Chemical Caut Of A Wound     Date/Time 3/29/2023 9:24 AM     Performed by  Duran Leggett DO     Authorized by Duran Leggett DO       Associated wounds:   Wound 03/29/23 Abdomen Medial   Universal Protocol   Consent: Verbal consent obtained  Risks and benefits: risks, benefits and alternatives were discussed  Consent given by: patient  Time out: Immediately prior to procedure a \"time out\" was called to verify the correct patient, procedure, equipment, support staff and site/side marked as required    Timeout called at: 3/29/2023 9:24 AM   Patient understanding: patient states understanding of the procedure being performed  Patient identity confirmed: verbally with patient        Local anesthesia used: yes     Anesthesia   Local anesthesia used: yes  Local Anesthetic: topical anesthetic     Sedation   Patient sedated: no        Specimen: no    Culture: no   Procedure Details   Procedure Notes: 1 stick used  Patient tolerance: patient tolerated the procedure well with no immediate complications             Plan:  Initial evaluation  Patient evaluated today with the ostomy nurse team who made recommendations regarding ostomy and stoma care  Nonhealing surgical wound demonstrated some hypergranulation tissue today which was cauterized as above with silver nitrate  Wound management with silver alginate, see wound orders below  Never leave " wound open to air  Do not submerge any body water such as bathtub, hot tub, swimming pool, etc   Follow-up in 3 weeks or call sooner with questions or concerns    Wound 03/29/23 Abdomen Medial (Active)   Wound Image Images linked 03/29/23 0915   Wound Description Hypergranulation;Pink 03/29/23 0923   Isis-wound Assessment Excoriated;Scar Tissue 03/29/23 0923   Wound Length (cm) 1 1 cm 03/29/23 0923   Wound Width (cm) 0 8 cm 03/29/23 0923   Wound Depth (cm) 0 1 cm 03/29/23 0923   Wound Surface Area (cm^2) 0 88 cm^2 03/29/23 0923   Wound Volume (cm^3) 0 088 cm^3 03/29/23 0923   Calculated Wound Volume (cm^3) 0 09 cm^3 03/29/23 0923   Drainage Amount Moderate 03/29/23 0923   Non-staged Wound Description Full thickness 03/29/23 0923   Patient Tolerance Tolerated well 03/29/23 0923   Dressing Status Intact 03/29/23 0923       Wound 02/09/23 Abdomen N/A (Active)   Date First Assessed/Time First Assessed: 02/09/23 1717   Location: Abdomen  Wound Location Orientation: N/A  Wound Description (Comments): New Surgical Incision noted (x1 Midline Abdomen) -- SKIN OPEN (PACKING IN PLACE); Dressings: 4x4, ABD;New Stoma S  Wound 02/16/23 MASD Buttocks (Active)   Date First Assessed/Time First Assessed: 02/16/23 2245   Pre-Existing Wound: No  Primary Wound Type: MASD  Location: Buttocks       Wound 03/29/23 Abdomen Medial (Active)   Date First Assessed/Time First Assessed: 03/29/23 0912   Location: Abdomen  Wound Location Orientation: Medial       Subjective:        Patient presents for initial evaluation of a nonhealing surgical wound of the abdomen  Patient has a history of colon cancer and right colectomy in 2015, recently admitted from 2/9/2023 to 2/23/2023 for incarcerated ventral hernia  She underwent an exploratory laparotomy, reduction of ventral hernia, left colectomy, and creation of an end ileostomy on 2/9/2023    She has been having issues with stool leakage around the ileostomy site as well as ongoing nonhealing surgical wound of the abdomen  Dry dressing has been used on the surgical wound  The following portions of the patient's history were reviewed and updated as appropriate:   She  has a past medical history of Acute embolism and thrombosis of deep vein of lower extremity ([facility] ), Adenocarcinoma of colon, [facility] A ([facility] ), Breast cancer ([place] [address] ) (2012), Cancer ([place] [address] ), Diabetes mellitus ([facility] ), DVT (deep venous thrombosis) ([place] [address] ), GERD (gastroesophageal reflux disease), Hypertension, and Pes planus    She   Patient Active Problem List    Diagnosis Date Noted   • Adenocarcinoma of colon ([place] [address] ) 03/20/2023   • S/P ileostomy ([place] [address] ) 03/20/2023   • Incarcerated ventral hernia 02/23/2023   • Moderate protein-calorie malnutrition ([place] [address] ) 02/15/2023   • Lower abdominal pain 02/09/2023   • Perforated bowel ([place] [address] ) 02/09/2023   • Strangulated ventral hernia 02/09/2023   • GERD (gastroesophageal reflux disease) 02/09/2023   • History of right breast cancer 02/09/2023   • History of right mastectomy 02/09/2023   • Iron deficiency anemia, unspecified 12/23/2022   • History of DVT (deep vein thrombosis) 05/23/2022   • Thyroid disorder 05/23/2022   • 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   • History of diabetes mellitus 05/23/2022   • Anxiety about health 09/07/2021   • Diabetic ulcer of toe of left foot associated with type 2 diabetes mellitus, limited to breakdown of skin ([place] [address] ) 06/22/2021   • Abnormal tumor markers 11/16/2020   • Essential hypertension 10/20/2020   • D-dimer, elevated 11/04/2019   • Type 2 diabetes mellitus without complications ([place] [address] ) 49/65/4719   • Other osteoporosis without current pathological fracture 12/31/2018   • 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 [address] ) 03/19/2018   • Thyroid nodule 03/19/2018     She  reports that she has never smoked  She has never used smokeless tobacco  She reports that she does not currently use alcohol  She reports that she does not use drugs  Current Outpatient Medications   Medication Sig Dispense Refill   • amLODIPine (NORVASC) 2 5 mg tablet Take 1 tablet (2 5 mg total) by mouth daily 30 tablet 5   • ascorbic acid (VITAMIN C) 500 MG tablet Take 1,000 mg by mouth daily      • benazepril-hydrochlorthiazide (LOTENSIN HCT) 20-12 5 MG per tablet take 1 tablet by mouth once daily 90 tablet 5   • bimatoprost (LUMIGAN) 0 01 % ophthalmic drops Administer 1 drop to both eyes daily at bedtime      • calcium citrate (CALCITRATE) 950 MG tablet Take 1 tablet by mouth in the morning  • Cholecalciferol (VITAMIN D-3 PO) Take 1 capsule by mouth 3 (three) times a day      • Cyanocobalamin (VITAMIN B 12 PO) Take 1 tablet by mouth daily  • Diclofenac Sodium (VOLTAREN) 1 % Apply 2 g topically 4 (four) times a day     • insulin lispro (HumaLOG) 100 units/mL injection Inject 1-6 Units under the skin 4 (four) times a day (before meals and at bedtime)  0   • pantoprazole (PROTONIX) 40 mg tablet Take 1 tablet (40 mg total) by mouth daily in the early morning Do not start before February 24, 2023   0   • rivaroxaban (Xarelto) 10 mg tablet Take 1 tablet (10 mg total) by mouth daily 30 tablet 0   • vitamin E 100 UNIT capsule Take 100 Units by mouth daily        No current facility-administered medications for this visit  She has No Known Allergies       Review of Systems   Constitutional: Negative for chills and fever  HENT: Negative for congestion and sneezing  Respiratory: Negative for cough  Musculoskeletal: Positive for gait problem  Skin: Positive for wound  Psychiatric/Behavioral: Negative for agitation           Objective:       Wound 03/29/23 Abdomen Medial (Active) Wound Image Images linked 03/29/23 0915   Wound Description Hypergranulation;Pink 03/29/23 0923   Isis-wound Assessment Excoriated;Scar Tissue 03/29/23 0923   Wound Length (cm) 1 1 cm 03/29/23 0923   Wound Width (cm) 0 8 cm 03/29/23 0923   Wound Depth (cm) 0 1 cm 03/29/23 0923   Wound Surface Area (cm^2) 0 88 cm^2 03/29/23 0923   Wound Volume (cm^3) 0 088 cm^3 03/29/23 0923   Calculated Wound Volume (cm^3) 0 09 cm^3 03/29/23 0923   Drainage Amount Moderate 03/29/23 0923   Non-staged Wound Description Full thickness 03/29/23 0923   Patient Tolerance Tolerated well 03/29/23 0923   Dressing Status Intact 03/29/23 0923       /71   Pulse 97   Temp (!) 96 9 °F (36 1 °C)   Resp 12   Ht 5' (1 524 m)   Wt 59 kg (130 lb)   LMP  (LMP Unknown)   BMI 25 39 kg/m²     Physical Exam  Vitals reviewed  Constitutional:       General: She is not in acute distress  Appearance: Normal appearance  She is not ill-appearing, toxic-appearing or diaphoretic  HENT:      Head: Normocephalic and atraumatic  Right Ear: External ear normal       Left Ear: External ear normal    Eyes:      Conjunctiva/sclera: Conjunctivae normal    Pulmonary:      Effort: Pulmonary effort is normal  No respiratory distress  Musculoskeletal:      Cervical back: Neck supple  Skin:     Comments: See wound assessment   Neurological:      Mental Status: She is alert        Gait: Gait abnormal    Psychiatric:         Mood and Affect: Mood normal          Behavior: Behavior normal            Wound 02/09/23 Abdomen N/A (Active)       Wound 02/16/23 MASD Buttocks (Active)       Wound 03/29/23 Abdomen Medial (Active)   Wound Image    03/29/23 0915   Wound Description Hypergranulation;Pink 03/29/23 0923   Isis-wound Assessment Excoriated;Scar Tissue 03/29/23 0923   Wound Length (cm) 1 1 cm 03/29/23 0923   Wound Width (cm) 0 8 cm 03/29/23 0923   Wound Depth (cm) 0 1 cm 03/29/23 0923   Wound Surface Area (cm^2) 0 88 cm^2 03/29/23 0923   Wound Volume (cm^3) 0 088 cm^3 03/29/23 0923   Calculated Wound Volume (cm^3) 0 09 cm^3 03/29/23 0923   Drainage Amount Moderate 03/29/23 0923   Non-staged Wound Description Full thickness 03/29/23 0923   Patient Tolerance Tolerated well 03/29/23 0923   Dressing Status Intact 03/29/23 0923                       Wound Instructions:  Orders Placed This Encounter   Procedures   • Wound cleansing and dressings     Wound location : Abdomen  Change dressing daily and as needed for dislodged  Sponge bathe only, do not shower at this time  Cleanse the wound with NSS, pat dry  Apply Alginate Ag to the wound  Cover with guaze  Secure with medifix tape    Silver Nitrate was used today for one time  You may see grey-color at the wound      Ostomy: Change the bag every 3-4 days and as PRN for leakage  Stoma adhesive powder to the skin around the ostomy  Apply non-sting skin prep  Then, apply another layer of stoma adhesive powder  Finish with another layer of non sting skin prep   Apply strip paste to crease at 2 o'clock  DO not use 2 piece of pouch  Use one piece flat pouch    Stoma measurement: 1 inch x 1 25 inch    This was applied today at the [facility]  If you have any questions for the ostomy nurse, call [phone]  Standing Status:   Future     Standing Expiration Date:   3/29/2024   • Chemical Caut Of A Wound     This order was created via procedure documentation        Diagnosis ICD-10-CM Associated Orders   1   Non-healing surgical wound, initial encounter  [id number] Wound cleansing and dressings

## 2023-04-24 ENCOUNTER — TELEPHONE (OUTPATIENT)
Dept: HEMATOLOGY ONCOLOGY | Facility: CLINIC | Age: 78
End: 2023-04-24

## 2023-04-24 NOTE — TELEPHONE ENCOUNTER
Appointment Confirmation   Who are you speaking with? Emilie Johnson from [facility]   If it is not the patient, are they listed on an active communication consent form? N/A   Which provider is the appointment scheduled with? Dr Brooklyn Salas   When is the appointment scheduled? Please list date and time 05/18/23 11:40AM   At which location is the appointment scheduled to take place? [place]   Did caller verbalize understanding of appointment details?  Yes

## 2023-04-25 ENCOUNTER — LAB REQUISITION (OUTPATIENT)
Dept: LAB | Facility: HOSPITAL | Age: 78
End: 2023-04-25

## 2023-04-25 DIAGNOSIS — C18.2 MALIGNANT NEOPLASM OF ASCENDING COLON (HCC): ICD-10-CM

## 2023-05-03 NOTE — PROGRESS NOTES
Diagnoses and all orders for this visit:    Perforated bowel ([address] )       Perforated bowel [facility])  Patient presents today for follow-up  Patient was seen in the office last approxi-2 months ago  At that point in time she was in relative extremis and was sent to the hospital emergently  She was diagnosed with an incarcerated ventral hernia that contained a perforated colon cancer  This was treated with exploratory laparotomy, hernia repair, colectomy with end ileostomy  Postoperative course was complicated by pulmonary embolus  She remains in the nursing home at present  Patient has significant physical deconditioning at present  Her midline wound appears to have healed  Her stoma is flush with the skin  She notes she has had some difficult time pouching this  I do not believe she would tolerate any kind of surgical procedure for this perceivable future to entertain taking down her stoma  She has a lot of social concerns given her need for ongoing care in the nursing home  I have encouraged her to reach out to  for assistance in longer term care  Unfortunately from a surgical point of view I do not think I have anything to offer her at present  I will plan on seeing her back in approximately 6 months for reevaluation  She will also undergo reevaluation by oncology in the future but it is unclear if her performance status will allow any consideration of adjuvant chemotherapy  HPI    Alix Miguel is here today for post op evaluation  Patient states she has no pain, drainage or leaking  Continues to have problems with bag leakage  Daily bowel movement in bag, some formed and some liquid  Poor appetite  Patient is status post exploratory laparotomy; abdominal wall debridement; reduction ventral hernia; left colon resection; creation of ileostomy on 2/9/2023 with Dr Skylar Frey, Dr Meño Garcia and Dr Yaakov Bo  Surgical pathology:  A   Hernia sac, hernia repair:  - 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      B  Segment of colon with portion of ileum, omentum and abdominal wall, left colectomy with en bloc omentectomy and abdominal wall resection:  -   Invasive adenocarcinoma of transverse colon, moderately differentiated, grossly perforated and invades fibroadipose tissue of abdominal wall (pT4b)  -   Second focus of invasive adenocarcinoma of transverse colon, moderately differentiated, invades into muscularis propria (pT3)  -   Terminal ileum and omentum are uninvolved  -   MMR (MLH1, MSH2, MSH6 and PMS2) studies on B18 by immunohistochemistry are pending     -   See comment and synoptic report  CT chest abdomen and pelvis on 2/15/2023   1     Small to moderate abdominopelvic ascites with peritoneal thickening in the pelvis, most consistent with peritonitis  No well-organized intra-abdominal abscess identified      2  Nonspecific enteritis      3  Wound dehiscence of the open midline abdominal wound with subcutaneous gas extending into the left anterior abdominal wall      4   Small to moderate bilateral pleural effusions, increased since 2/10/2023      5   Possible cystitis  Recommend correlation with urinalysis and patient's signs/symptoms      6  Nondependent gas within the urinary bladder  Recommend correlation with recent instrumentation     7   Multiple liver lesions, similar to prior study 2/9/2023 but new since CT 5/2/2019, suspicious for hepatic metastases  Recommend further evaluation with contrast-enhanced MRI abdomen      8   Left breast skin thickening, nonspecific, and multiple left breast nodules  Recommend further evaluation with diagnostic mammography  MRI abdomen on 2/17/2023   3 hepatic lesions as described above suspicious for metastases  The smaller suspicious lesions seen on the CT are obscured by motion artifact on postcontrast imaging        Past Medical History:   Diagnosis Date    Acute embolism and thrombosis of deep vein of lower extremity (HCC)     [facility]en[facility]     Breast cancer ([facility] ) 2012    Right Breast     Cancer [facility] )     [facility]     DVT (deep venous thrombosis) (HCC)     GERD (gastroesophageal reflux disease)     Hypertension     Pes planus      Past Surgical History:   Procedure Laterality Date    COLONOSCOPY      HERNIA REPAIR N/A 2/9/2023    Procedure: EXPLORATORY LAPAROTOMY; ABDOMINAL WALL DEBRIDEMENT; REDUCTION VENTRAL HERNIA; LEFT COLON RESECTION; CREATION ILEOSTOMY; WASHOUT;  Surgeon: Heather Reyes DO;  [facility];  Service: General    HYSTERECTOMY      complete @ age 29    IR PORT REMOVAL  9/12/2018    MASTECTOMY Right 2012    [place] COLONOSCOPY FLX DX W/COLLJ SPEC WHEN PFRMD N/A 8/23/2016    Procedure: COLONOSCOPY;  Surgeon: Julianne Austin MD;  [facility]; Service: Colorectal    WA COLONOSCOPY FLX DX W/COLLJ SPEC WHEN PFRMD N/A 9/5/2017    Procedure: COLONOSCOPY;  Surgeon: Julianne Austin MD;  [facility]; Service: Colorectal    WA ESOPHAGOGASTRODUODENOSCOPY TRANSORAL DIAGNOSTIC N/A 9/5/2017    Procedure: ESOPHAGOGASTRODUODENOSCOPY (EGD); Surgeon: Reyna Woods DO;  [facility];   Service: Gastroenterology       Current Outpatient Medications:     amLODIPine (NORVASC) 2 5 mg tablet, Take 1 tablet (2 5 mg total) by mouth daily, Disp: 30 tablet, Rfl: 5    ascorbic acid (VITAMIN C) 500 MG tablet, Take 1,000 mg by mouth daily , Disp: , Rfl:     benazepril-hydrochlorthiazide (LOTENSIN HCT) 20-12 5 MG per tablet, take 1 tablet by mouth once daily, Disp: 90 tablet, Rfl: 5    bimatoprost (LUMIGAN) 0 01 % ophthalmic drops, Administer 1 drop to both eyes daily at bedtime , Disp: , Rfl:     calcium citrate (CALCITRATE) 950 MG tablet, Take 1 tablet by mouth in the morning , Disp: , Rfl:     Cholecalciferol (VITAMIN D-3 PO), Take 1 capsule by mouth 3 (three) times a day , Disp: , Rfl:     Cyanocobalamin (VITAMIN B 12 PO), Take 1 tablet by mouth daily  , Disp: , Rfl:     Diclofenac Sodium (VOLTAREN) 1 %, Apply 2 g topically 4 (four) times a day, Disp: , Rfl:     insulin lispro (HumaLOG) 100 units/mL injection, Inject 1-6 Units under the skin 4 (four) times a day (before meals and at bedtime), Disp: , Rfl: 0    metFORMIN (GLUCOPHAGE) 500 mg tablet, Take 500 mg by mouth 2 (two) times a day with meals, Disp: , Rfl:     miconazole (MICOTIN) 2 % powder, Apply topically in the morning Apply to groin topically and under left breast topically every day and evening shift for red wash with soap and water, pat dry and then apply powder , Disp: , Rfl:     pantoprazole (PROTONIX) 40 mg tablet, Take 1 tablet (40 mg total) by mouth daily in the early morning Do not start before February 24, 2023 , Disp: , Rfl: 0    rivaroxaban (Xarelto) 10 mg tablet, Take 1 tablet (10 mg total) by mouth daily, Disp: 30 tablet, Rfl: 0    vitamin E 100 UNIT capsule, Take 100 Units by mouth daily , Disp: , Rfl:   Allergies as of 05/05/2023    (No Known Allergies)     Review of Systems  There were no vitals filed for this visit  Physical Exam  Constitutional:       Appearance: She is ill-appearing  HENT:      Head: Normocephalic and atraumatic  Comments: alopecia  Eyes:      Extraocular Movements: Extraocular movements intact  Pupils: Pupils are equal, round, and reactive to light  Abdominal:      General: Abdomen is flat  Palpations: Abdomen is soft

## 2023-05-05 ENCOUNTER — OFFICE VISIT (OUTPATIENT)
Age: 78
End: 2023-05-05

## 2023-05-05 VITALS — HEIGHT: 60 IN | BODY MASS INDEX: 25.39 KG/M2

## 2023-05-05 DIAGNOSIS — K63.1 PERFORATED BOWEL (HCC): Primary | ICD-10-CM

## 2023-05-05 NOTE — LETTER
May 5, 2023     [facility]  [address] [facility] [address]  [address] [address]    Patient: Buddy Meigs   YOB: 1945   Date of Visit: 5/5/2023       Dear Dr Marvin Lewis:    Thank you for referring Chevy Rico to me for evaluation  Below are my notes for this consultation  If you have questions, please do not hesitate to call me  I look forward to following your patient along with you  Sincerely,        Prosper Rogel MD        CC: No Recipients  Prosper Rogel MD  5/5/2023 10:57 AM  Sign when Signing Visit  Diagnoses and all orders for this visit:    Perforated bowel [facility])       Perforated bowel [facility])  Patient presents today for follow-up  Patient was seen in the office last approxi-2 months ago  At that point in time she was in relative extremis and was sent to the hospital emergently  She was diagnosed with an incarcerated ventral hernia that contained a perforated colon cancer  This was treated with exploratory laparotomy, hernia repair, colectomy with end ileostomy  Postoperative course was complicated by pulmonary embolus  She remains in the nursing home at present  Patient has significant physical deconditioning at present  Her midline wound appears to have healed  Her stoma is flush with the skin  She notes she has had some difficult time pouching this  I do not believe she would tolerate any kind of surgical procedure for this perceivable future to entertain taking down her stoma  She has a lot of social concerns given her need for ongoing care in the nursing home  I have encouraged her to reach out to  for assistance in longer term care  Unfortunately from a surgical point of view I do not think I have anything to offer her at present  I will plan on seeing her back in approximately 6 months for reevaluation    She will also undergo reevaluation by oncology in the future but it is unclear if her performance status will allow any consideration of adjuvant chemotherapy  HPI    Chad Polo is here today for post op evaluation  Patient states she has no pain, drainage or leaking  Continues to have problems with bag leakage  Daily bowel movement in bag, some formed and some liquid  Poor appetite  Patient is status post exploratory laparotomy; abdominal wall debridement; reduction ventral hernia; left colon resection; creation of ileostomy on 2/9/2023 with Dr Edythe Bence, Dr Nabil Wilkerson and Dr Corinne Sales  Surgical pathology:  A  Hernia sac, hernia repair:  -   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      B  Segment of colon with portion of ileum, omentum and abdominal wall, left colectomy with en bloc omentectomy and abdominal wall resection:  -   Invasive adenocarcinoma of transverse colon, moderately differentiated, grossly perforated and invades fibroadipose tissue of abdominal wall (pT4b)  -   Second focus of invasive adenocarcinoma of transverse colon, moderately differentiated, invades into muscularis propria (pT3)  -   Terminal ileum and omentum are uninvolved  -   MMR (MLH1, MSH2, MSH6 and PMS2) studies on B18 by immunohistochemistry are pending     -   See comment and synoptic report  CT chest abdomen and pelvis on 2/15/2023   1     Small to moderate abdominopelvic ascites with peritoneal thickening in the pelvis, most consistent with peritonitis  No well-organized intra-abdominal abscess identified      2  Nonspecific enteritis      3  Wound dehiscence of the open midline abdominal wound with subcutaneous gas extending into the left anterior abdominal wall      4   Small to moderate bilateral pleural effusions, increased since 2/10/2023      5   Possible cystitis  Recommend correlation with urinalysis and patient's signs/symptoms      6  Nondependent gas within the urinary bladder    Recommend correlation with recent instrumentation     7   Multiple liver lesions, similar to prior study 2/9/2023 but new since CT 5/2/2019, suspicious for hepatic metastases  Recommend further evaluation with contrast-enhanced MRI abdomen      8   Left breast skin thickening, nonspecific, and multiple left breast nodules  Recommend further evaluation with diagnostic mammography  MRI abdomen on 2/17/2023   3 hepatic lesions as described above suspicious for metastases  The smaller suspicious lesions seen on the CT are obscured by motion artifact on postcontrast imaging  Past Medical History:   Diagnosis Date    Acute embolism and thrombosis of deep vein of lower extremity (HCC)     [facility] [facility]     Breast cancer ([facility] ) 2012    Right Breast     Cancer [facility] )     Diabetes mellitus ([facility] [facility] )     DVT (deep venous thrombosis) (HCC)     GERD (gastroesophageal reflux disease)     Hypertension     Pes planus      Past Surgical History:   Procedure Laterality Date    COLONOSCOPY      HERNIA REPAIR N/A 2/9/2023    Procedure: EXPLORATORY LAPAROTOMY; ABDOMINAL WALL DEBRIDEMENT; REDUCTION VENTRAL HERNIA; LEFT COLON RESECTION; CREATION ILEOSTOMY; WASHOUT;  Surgeon: Umang Delacruz DO;  [facility];  Service: General    HYSTERECTOMY      complete @ age 29    IR PORT REMOVAL  9/12/2018    MASTECTOMY Right 2012    [place] COLONOSCOPY FLX DX W/COLLJ SPEC WHEN PFRMD N/A 8/23/2016    Procedure: COLONOSCOPY;  Surgeon: Romayne Code, MD;  [facility]; Service: Colorectal    MS COLONOSCOPY FLX DX W/COLLJ SPEC WHEN PFRMD N/A 9/5/2017    Procedure: COLONOSCOPY;  Surgeon: Romayne Code, MD;  [facility]; Service: Colorectal    MS ESOPHAGOGASTRODUODENOSCOPY TRANSORAL DIAGNOSTIC N/A 9/5/2017    Procedure: ESOPHAGOGASTRODUODENOSCOPY (EGD); Surgeon: Madeleine Sampson DO;  [facility];   Service: Gastroenterology       Current Outpatient Medications:     amLODIPine (NORVASC) 2 5 mg tablet, Take 1 tablet (2 5 mg total) by mouth daily, Disp: 30 tablet, Rfl: 5    ascorbic acid (VITAMIN C) 500 MG tablet, Take 1,000 mg by mouth daily , Disp: , Rfl:     benazepril-hydrochlorthiazide (LOTENSIN HCT) 20-12 5 MG per tablet, take 1 tablet by mouth once daily, Disp: 90 tablet, Rfl: 5    bimatoprost (LUMIGAN) 0 01 % ophthalmic drops, Administer 1 drop to both eyes daily at bedtime , Disp: , Rfl:     calcium citrate (CALCITRATE) 950 MG tablet, Take 1 tablet by mouth in the morning , Disp: , Rfl:     Cholecalciferol (VITAMIN D-3 PO), Take 1 capsule by mouth 3 (three) times a day , Disp: , Rfl:     Cyanocobalamin (VITAMIN B 12 PO), Take 1 tablet by mouth daily  , Disp: , Rfl:     Diclofenac Sodium (VOLTAREN) 1 %, Apply 2 g topically 4 (four) times a day, Disp: , Rfl:     insulin lispro (HumaLOG) 100 units/mL injection, Inject 1-6 Units under the skin 4 (four) times a day (before meals and at bedtime), Disp: , Rfl: 0    metFORMIN (GLUCOPHAGE) 500 mg tablet, Take 500 mg by mouth 2 (two) times a day with meals, Disp: , Rfl:     miconazole (MICOTIN) 2 % powder, Apply topically in the morning Apply to groin topically and under left breast topically every day and evening shift for red wash with soap and water, pat dry and then apply powder , Disp: , Rfl:     pantoprazole (PROTONIX) 40 mg tablet, Take 1 tablet (40 mg total) by mouth daily in the early morning Do not start before February 24, 2023 , Disp: , Rfl: 0    rivaroxaban (Xarelto) 10 mg tablet, Take 1 tablet (10 mg total) by mouth daily, Disp: 30 tablet, Rfl: 0    vitamin E 100 UNIT capsule, Take 100 Units by mouth daily , Disp: , Rfl:   Allergies as of 05/05/2023    (No Known Allergies)     Review of Systems  There were no vitals filed for this visit  Physical Exam  Constitutional:       Appearance: She is ill-appearing  HENT:      Head: Normocephalic and atraumatic  Comments: alopecia  Eyes:      Extraocular Movements: Extraocular movements intact  Pupils: Pupils are equal, round, and reactive to light     Abdominal: General: Abdomen is flat  Palpations: Abdomen is soft

## 2023-05-05 NOTE — ASSESSMENT & PLAN NOTE
Patient presents today for follow-up  Patient was seen in the office last approxi-2 months ago  At that point in time she was in relative extremis and was sent to the hospital emergently  She was diagnosed with an incarcerated ventral hernia that contained a perforated colon cancer  This was treated with exploratory laparotomy, hernia repair, colectomy with end ileostomy  Postoperative course was complicated by pulmonary embolus  She remains in the nursing home at present  Patient has significant physical deconditioning at present  Her midline wound appears to have healed  Her stoma is flush with the skin  She notes she has had some difficult time pouching this  I do not believe she would tolerate any kind of surgical procedure for this perceivable future to entertain taking down her stoma  She has a lot of social concerns given her need for ongoing care in the nursing home  I have encouraged her to reach out to  for assistance in longer term care  Unfortunately from a surgical point of view I do not think I have anything to offer her at present  I will plan on seeing her back in approximately 6 months for reevaluation  She will also undergo reevaluation by oncology in the future but it is unclear if her performance status will allow any consideration of adjuvant chemotherapy

## 2023-05-14 ENCOUNTER — HOSPITAL ENCOUNTER (EMERGENCY)
Facility: HOSPITAL | Age: 78
Discharge: HOME | End: 2023-05-14
Attending: EMERGENCY MEDICINE

## 2023-05-14 VITALS
SYSTOLIC BLOOD PRESSURE: 128 MMHG | RESPIRATION RATE: 18 BRPM | HEART RATE: 99 BPM | TEMPERATURE: 97.7 F | DIASTOLIC BLOOD PRESSURE: 75 MMHG | OXYGEN SATURATION: 99 %

## 2023-05-14 DIAGNOSIS — Z93.9 HISTORY OF CREATION OF OSTOMY (HCC): Primary | ICD-10-CM

## 2023-05-14 DIAGNOSIS — E87.1 HYPONATREMIA: ICD-10-CM

## 2023-05-14 LAB
ALBUMIN SERPL BCP-MCNC: 3.9 G/DL (ref 3.5–5)
ALP SERPL-CCNC: 73 U/L (ref 46–116)
ALT SERPL W P-5'-P-CCNC: 13 U/L (ref 12–78)
ANION GAP SERPL CALCULATED.3IONS-SCNC: 5 MMOL/L (ref 4–13)
AST SERPL W P-5'-P-CCNC: 11 U/L (ref 5–45)
BASOPHILS # BLD AUTO: 0.03 THOUSANDS/ÂΜL (ref 0–0.1)
BASOPHILS NFR BLD AUTO: 0 % (ref 0–1)
BILIRUB SERPL-MCNC: 0.2 MG/DL (ref 0.2–1)
BUN SERPL-MCNC: 45 MG/DL (ref 5–25)
CALCIUM SERPL-MCNC: 11.9 MG/DL (ref 8.3–10.1)
CHLORIDE SERPL-SCNC: 98 MMOL/L (ref 96–108)
CO2 SERPL-SCNC: 23 MMOL/L (ref 21–32)
CREAT SERPL-MCNC: 0.85 MG/DL (ref 0.6–1.3)
EOSINOPHIL # BLD AUTO: 0.13 THOUSAND/ÂΜL (ref 0–0.61)
EOSINOPHIL NFR BLD AUTO: 2 % (ref 0–6)
ERYTHROCYTE [DISTWIDTH] IN BLOOD BY AUTOMATED COUNT: 15.4 % (ref 11.6–15.1)
GFR SERPL CREATININE-BSD FRML MDRD: 66 ML/MIN/1.73SQ M
GLUCOSE SERPL-MCNC: 116 MG/DL (ref 65–140)
HCT VFR BLD AUTO: 29.8 % (ref 34.8–46.1)
HGB BLD-MCNC: 10 G/DL (ref 11.5–15.4)
IMM GRANULOCYTES # BLD AUTO: 0.06 THOUSAND/UL (ref 0–0.2)
IMM GRANULOCYTES NFR BLD AUTO: 1 % (ref 0–2)
LYMPHOCYTES # BLD AUTO: 0.96 THOUSANDS/ÂΜL (ref 0.6–4.47)
LYMPHOCYTES NFR BLD AUTO: 11 % (ref 14–44)
MCH RBC QN AUTO: 31 PG (ref 26.8–34.3)
MCHC RBC AUTO-ENTMCNC: 33.6 G/DL (ref 31.4–37.4)
MCV RBC AUTO: 92 FL (ref 82–98)
MONOCYTES # BLD AUTO: 0.86 THOUSAND/ÂΜL (ref 0.17–1.22)
MONOCYTES NFR BLD AUTO: 10 % (ref 4–12)
NEUTROPHILS # BLD AUTO: 6.43 THOUSANDS/ÂΜL (ref 1.85–7.62)
NEUTS SEG NFR BLD AUTO: 76 % (ref 43–75)
NRBC BLD AUTO-RTO: 0 /100 WBCS
PLATELET # BLD AUTO: 291 THOUSANDS/UL (ref 149–390)
PMV BLD AUTO: 9.4 FL (ref 8.9–12.7)
POTASSIUM SERPL-SCNC: 5 MMOL/L (ref 3.5–5.3)
PROT SERPL-MCNC: 7.9 G/DL (ref 6.4–8.4)
RBC # BLD AUTO: 3.23 MILLION/UL (ref 3.81–5.12)
SODIUM SERPL-SCNC: 126 MMOL/L (ref 135–147)
WBC # BLD AUTO: 8.47 THOUSAND/UL (ref 4.31–10.16)

## 2023-05-14 NOTE — ED ATTENDING ATTESTATION
Final Diagnoses:     1  History of creation of ostomy ([facility] [address] )    2  Hyponatremia      ED Course as of 05/14/23 0654   Sun May 14, 2023   0539 Hemoglobin(!): 10 0  Better than before  0654 No melena/hematochezia produced here  Will Burley Chancy, Lennis Goodell MD, saw and evaluated the patient  All available labs and X-rays were ordered by me or the resident and have been reviewed by myself  I discussed the patient with the resident / non-physician and agree with the resident's / non-physician practitioner's findings and plan as documented in the resident's / non-physician practicitioner's note, except where noted  At this point, I agree with the current assessment done in the ED  I was present during key portions of all procedures performed unless otherwise stated  Nursing Triage:     Chief Complaint   Patient presents with   • Medical Problem     PT brought in by EMS from nursing facility  Nurses at facility called bc they found blood in her colostomy bag  PT has 8 year history of colon cancer  HPI:   This is a 69 y o  female presenting for evaluation of blood into her colostomy bag  She has no complaints  No f/ch/n/v/cp/sob  No new weakness, it's chronic / unchanged  No LH  [place], so brought in  Changed the bag and sent in for evaluation  ASSESSMENT + PLAN:   CBC for anemia  BMP for electrolytes  There's no blood in the stool currently  Will wait for stool to be produced  If bloody appearance ? talk to surgery  If no bloody appearance AND normal labs ? DC, RTER precautions  Physical:   Pertinent: colostomy present  No blood noted  No melena/hematochezia  Looks like liquid stool present  Colostomy has brown stool present, liquid component to it  Non-tender abdomen  No distention    General: VS reviewed  Appears in NAD  awake, alert  cachexic appearance  Appears stated age  Speaking normally in full sentences  Head: Normocephalic, atraumatic  Eyes: EOM-I   No diplopia  No hyphema  No subconjunctival hemorrhages  Symmetrical lids  ENT: Atraumatic external nose and ears  MMM  No malocclusion  No stridor  Normal phonation  No drooling  Normal swallowing  Neck: No JVD  CV: No pallor noted  HR < 100  Capillary refill <2 seconds  Lungs:   No tachypnea  No respiratory distress  Abd: soft nt nd no rebound/guarding  MSK:   FROM spontaneously  Skin: Dry, intact  Neuro: Awake, alert, GCS15, CN II-XII grossly intact  Motor grossly intact  Psychiatric/Behavioral: interacting normally; appropriate mood/affect   Exam: deferred    Vitals:    05/14/23 0242 05/14/23 0248   BP: 128/75    Pulse: 99    Resp: 18    Temp:  97 7 °F (36 5 °C)   TempSrc:  Oral   SpO2: 99%      - There are no obvious limitations to social determinants of care  - Nursing note reviewed  - Vitals reviewed  - Orders placed by myself and/or advanced practitioner / resident     - Previous chart was reviewed  - No language barrier    - History obtained from patient  - There are no limitations to the history obtained:     Past Medical:    has a past medical history of Acute embolism and thrombosis of deep vein of lower extremity ([facility] [address] ), Adenocarcinoma of colon, [facility] A ([facility] [address] ), Breast cancer ([facility] [address] ) (2012), Cancer ([facility] [address] ), Diabetes mellitus ([facility] [address] ), DVT (deep venous thrombosis) ([facility] [address] ), GERD (gastroesophageal reflux disease), Hypertension, and Pes planus  Past Surgical:    has a past surgical history that includes pr colonoscopy flx dx w/collj spec when pfrmd (N/A, 8/23/2016); pr colonoscopy flx dx w/collj spec when pfrmd (N/A, 9/5/2017); pr esophagogastroduodenoscopy transoral diagnostic (N/A, 9/5/2017); IR port removal (9/12/2018); Colonoscopy; Hysterectomy; Mastectomy (Right, 2012); and Hernia repair (N/A, 2/9/2023)      Social:     Social History     Substance and Sexual Activity   Alcohol Use Not Currently     Social History     Tobacco Use   Smoking Status Never   Smokeless Tobacco Never Social History     Substance and Sexual Activity   Drug Use No       Echo:   No results found for this or any previous visit  No results found for this or any previous visit  Cath:    No results found for this or any previous visit        Code Status: Prior  Advance Directive and Living Will:      Power of :    POLST:    Medications - No data to display  No orders to display     Orders Placed This Encounter   Procedures   • POC Cardiac US   • CBC and differential   • Comprehensive metabolic panel   • Insert peripheral IV     Labs Reviewed   CBC AND DIFFERENTIAL - Abnormal       Result Value Ref Range Status    WBC 8 47  4 31 - 10 16 Thousand/uL Final    RBC 3 23 (*) 3 81 - 5 12 Million/uL Final    Hemoglobin 10 0 (*) 11 5 - 15 4 g/dL Final    Hematocrit 29 8 (*) 34 8 - 46 1 % Final    MCV 92  82 - 98 fL Final    MCH 31 0  26 8 - 34 3 pg Final    MCHC 33 6  31 4 - 37 4 g/dL Final    RDW 15 4 (*) 11 6 - 15 1 % Final    MPV 9 4  8 9 - 12 7 fL Final    Platelets 983  910 - 390 Thousands/uL Final    nRBC 0  /100 WBCs Final    Neutrophils Relative 76 (*) 43 - 75 % Final    Immat GRANS % 1  0 - 2 % Final    Lymphocytes Relative 11 (*) 14 - 44 % Final    Monocytes Relative 10  4 - 12 % Final    Eosinophils Relative 2  0 - 6 % Final    Basophils Relative 0  0 - 1 % Final    Neutrophils Absolute 6 43  1 85 - 7 62 Thousands/µL Final    Immature Grans Absolute 0 06  0 00 - 0 20 Thousand/uL Final    Lymphocytes Absolute 0 96  0 60 - 4 47 Thousands/µL Final    Monocytes Absolute 0 86  0 17 - 1 22 Thousand/µL Final    Eosinophils Absolute 0 13  0 00 - 0 61 Thousand/µL Final    Basophils Absolute 0 03  0 00 - 0 10 Thousands/µL Final   COMPREHENSIVE METABOLIC PANEL - Abnormal    Sodium 126 (*) 135 - 147 mmol/L Final    Potassium 5 0  3 5 - 5 3 mmol/L Final    Chloride 98  96 - 108 mmol/L Final    CO2 23  21 - 32 mmol/L Final    ANION GAP 5  4 - 13 mmol/L Final    BUN 45 (*) 5 - 25 mg/dL Final    Creatinine 0 85 0 60 - 1 30 mg/dL Final    Comment: Standardized to IDMS reference method    Glucose 116  65 - 140 mg/dL Final    Comment: If the patient is fasting, the ADA then defines impaired fasting glucose as > 100 mg/dL and diabetes as > or equal to 123 mg/dL  Specimen collection should occur prior to Sulfasalazine administration due to the potential for falsely depressed results  Specimen collection should occur prior to Sulfapyridine administration due to the potential for falsely elevated results  Calcium 11 9 (*) 8 3 - 10 1 mg/dL Final    AST 11  5 - 45 U/L Final    Comment: Specimen collection should occur prior to Sulfasalazine administration due to the potential for falsely depressed results  ALT 13  12 - 78 U/L Final    Comment: Specimen collection should occur prior to Sulfasalazine and/or Sulfapyridine administration due to the potential for falsely depressed results  Alkaline Phosphatase 73  46 - 116 U/L Final    Total Protein 7 9  6 4 - 8 4 g/dL Final    Albumin 3 9  3 5 - 5 0 g/dL Final    Total Bilirubin 0 20  0 20 - 1 00 mg/dL Final    Comment: Use of this assay is not recommended for patients undergoing treatment with eltrombopag due to the potential for falsely elevated results      eGFR 66  ml/min/1 73sq m Final    Narrative:     [place] guidelines for Chronic Kidney Disease (CKD):   •  Stage 1 with normal or high GFR (GFR > 90 mL/min/1 73 square meters)  •  Stage 2 Mild CKD (GFR = 60-89 mL/min/1 73 square meters)  •  Stage 3A Moderate CKD (GFR = 45-59 mL/min/1 73 square meters)  •  Stage 3B Moderate CKD (GFR = 30-44 mL/min/1 73 square meters)  •  Stage 4 Severe CKD (GFR = 15-29 mL/min/1 73 square meters)  •  Stage 5 End Stage CKD (GFR <15 mL/min/1 73 square meters)  Note: GFR calculation is accurate only with a steady state creatinine     Time reflects when diagnosis was documented in both MDM as applicable and the Disposition within this note     Time User Action Codes Description Comment    5/14/2023  6:15 AM Celestine Cantu Add [Z93 9] History of creation of ostomy [facility])     5/14/2023  6:15 AM Celestine Cantu Add [E87 1] Hyponatremia       ED Disposition     ED Disposition   Discharge    Condition   Stable    Date/Time   Sun May 14, 2023  6:15 AM    Comment   Catarina Momin discharge to home/self care  Follow-up Information     Follow up With Specialties Details Why [address], DO Family Medicine   [address] [facility]  [place] [address]  [phone]          Patient's Medications   Discharge Prescriptions    No medications on file     No discharge procedures on file  Prior to Admission Medications   Prescriptions Last Dose Informant Patient Reported? Taking? Cholecalciferol (VITAMIN D-3 PO)   Yes No   Sig: Take 1 capsule by mouth 3 (three) times a day    Cyanocobalamin (VITAMIN B 12 PO)   Yes No   Sig: Take 1 tablet by mouth daily  Diclofenac Sodium (VOLTAREN) 1 %   No No   Sig: Apply 2 g topically 4 (four) times a day   amLODIPine (NORVASC) 2 5 mg tablet   No No   Sig: Take 1 tablet (2 5 mg total) by mouth daily   ascorbic acid (VITAMIN C) 500 MG tablet   Yes No   Sig: Take 1,000 mg by mouth daily    benazepril-hydrochlorthiazide (LOTENSIN HCT) 20-12 5 MG per tablet   No No   Sig: take 1 tablet by mouth once daily   bimatoprost (LUMIGAN) 0 01 % ophthalmic drops   Yes No   Sig: Administer 1 drop to both eyes daily at bedtime    calcium citrate (CALCITRATE) 950 MG tablet   Yes No   Sig: Take 1 tablet by mouth in the morning     insulin lispro (HumaLOG) 100 units/mL injection   No No   Sig: Inject 1-6 Units under the skin 4 (four) times a day (before meals and at bedtime)   metFORMIN (GLUCOPHAGE) 500 mg tablet   Yes No   Sig: Take 500 mg by mouth 2 (two) times a day with meals   miconazole (MICOTIN) 2 % powder   Yes No   Sig: Apply topically in the morning Apply to groin topically and under left breast topically every day and evening "shift for red wash with soap and water, pat dry and then apply powder  pantoprazole (PROTONIX) 40 mg tablet   No No   Sig: Take 1 tablet (40 mg total) by mouth daily in the early morning Do not start before February 24, 2023  rivaroxaban (Xarelto) 10 mg tablet   No No   Sig: Take 1 tablet (10 mg total) by mouth daily   vitamin E 100 UNIT capsule   Yes No   Sig: Take 100 Units by mouth daily       Facility-Administered Medications: None                        Portions of the record may have been created with voice recognition software  Occasional wrong word or \"sound a like\" substitutions may have occurred due to the inherent limitations of voice recognition software  Read the chart carefully and recognize, using context, where substitutions have occurred      Electronically signed by:  Ike Mixon    "

## 2023-05-14 NOTE — DISCHARGE INSTRUCTIONS
You were seen and evaluated in the emergency department for blood in urostomy bag  There is been no bloody output into the ostomy bag during your ED course  Abdomen is soft nontender  Hemoglobin level is normal this indicates that there is unlikely bleeding at this time  Your sodium level in your blood is low, not critically low, it appears to be chronic  Please follow-up with your PCP within 48 hours  Clear for discharge back home    If your symptoms worsen or persist please return to the emergency department for further evaluation and management

## 2023-05-14 NOTE — ED PROCEDURE NOTE
Procedure  US Guided Peripheral IV    Date/Time: 5/14/2023 5:30 AM  Performed by: Yonatan Barger DO  Authorized by: Yonatan Barger DO     Patient location:  ED  Performed by:  Resident  Other Assisting Provider: No    Indications:     Indications: difficulty obtaining IV access    Procedure details:     Location:  Left arm    Catheter size:  20 gauge    Number of attempts:  1    Successful placement: yes    Post-procedure details:     Post-procedure:  Dressing applied    Assessment: free fluid flow and no signs of infiltration      Post-procedure complications: none      Patient tolerance of procedure:   Tolerated well, no immediate complications                     Lisa Camacho [facility]  05/14/23 5161

## 2023-05-14 NOTE — ED PROVIDER NOTES
History  Chief Complaint   Patient presents with   • Medical Problem     PT brought in by EMS from nursing facility  Nurses at facility called bc they found blood in her colostomy bag  PT has 8 year history of colon cancer  Patient is a 67-year-old female past medical history breast cancer in remission, colon cancer status post colectomy, DVT on Xarelto presenting via EMS from nursing facility for evaluation of blood in her colostomy bag  Patient reports that she was having her bag changed out when the nursing staff noticed that there was some blood in her colostomy bag  This prompted them to send the patient to the emergency department for evaluation  They changed the bag  Currently there is no blood just some scant amount of liquidy stool in the colostomy bag  Patient is asymptomatic denying any complaints no lightheadedness syncope chest pain shortness of breath abdominal pain nausea vomiting or any other complaints at this time  Prior to Admission Medications   Prescriptions Last Dose Informant Patient Reported? Taking? Cholecalciferol (VITAMIN D-3 PO)   Yes No   Sig: Take 1 capsule by mouth 3 (three) times a day    Cyanocobalamin (VITAMIN B 12 PO)   Yes No   Sig: Take 1 tablet by mouth daily  Diclofenac Sodium (VOLTAREN) 1 %   No No   Sig: Apply 2 g topically 4 (four) times a day   amLODIPine (NORVASC) 2 5 mg tablet   No No   Sig: Take 1 tablet (2 5 mg total) by mouth daily   ascorbic acid (VITAMIN C) 500 MG tablet   Yes No   Sig: Take 1,000 mg by mouth daily    benazepril-hydrochlorthiazide (LOTENSIN HCT) 20-12 5 MG per tablet   No No   Sig: take 1 tablet by mouth once daily   bimatoprost (LUMIGAN) 0 01 % ophthalmic drops   Yes No   Sig: Administer 1 drop to both eyes daily at bedtime    calcium citrate (CALCITRATE) 950 MG tablet   Yes No   Sig: Take 1 tablet by mouth in the morning     insulin lispro (HumaLOG) 100 units/mL injection   No No   Sig: Inject 1-6 Units under the skin 4 (four) times a day (before meals and at bedtime)   metFORMIN (GLUCOPHAGE) 500 mg tablet   Yes No   Sig: Take 500 mg by mouth 2 (two) times a day with meals   miconazole (MICOTIN) 2 % powder   Yes No   Sig: Apply topically in the morning Apply to groin topically and under left breast topically every day and evening shift for red wash with soap and water, pat dry and then apply powder  pantoprazole (PROTONIX) 40 mg tablet   No No   Sig: Take 1 tablet (40 mg total) by mouth daily in the early morning Do not start before February 24, 2023  rivaroxaban (Xarelto) 10 mg tablet   No No   Sig: Take 1 tablet (10 mg total) by mouth daily   vitamin E 100 UNIT capsule   Yes No   Sig: Take 100 Units by mouth daily       Facility-Administered Medications: None       Past Medical History:   Diagnosis Date   • Acute embolism and thrombosis of deep vein of lower extremity (HCC)    • Adenocarcinoma of colon, [facility] ([facility] )    • Breast cancer ([facility] ) 2012    Right Breast    • Cancer ([facility] )    • Diabetes mellitus ([facility] )    • DVT (deep venous thrombosis) (HCC)    • GERD (gastroesophageal reflux disease)    • Hypertension    • Pes planus        Past Surgical History:   Procedure Laterality Date   • COLONOSCOPY     • HERNIA REPAIR N/A 2/9/2023    Procedure: EXPLORATORY LAPAROTOMY; ABDOMINAL WALL DEBRIDEMENT; REDUCTION VENTRAL HERNIA; LEFT COLON RESECTION; CREATION ILEOSTOMY; WASHOUT;  Surgeon: Adrienne Sims DO;  Location: [facility];  Service: General   • HYSTERECTOMY      complete @ age 29   • IR PORT REMOVAL  9/12/2018   • MASTECTOMY Right 2012   • NH COLONOSCOPY FLX DX W/COLLJ SPEC WHEN PFRMD N/A 8/23/2016    Procedure: COLONOSCOPY;  Surgeon: Luda Bell MD;  Location: [facility]; Service: Colorectal   • NH COLONOSCOPY FLX DX W/COLLJ SPEC WHEN PFRMD N/A 9/5/2017    Procedure: COLONOSCOPY;  Surgeon: Luda Bell MD;  Location: [facility];   Service: Colorectal   • NH ESOPHAGOGASTRODUODENOSCOPY TRANSORAL DIAGNOSTIC N/A 9/5/2017 Procedure: ESOPHAGOGASTRODUODENOSCOPY (EGD); Surgeon: Yun Bojorquez DO;  Location: [facility]; Service: Gastroenterology       Family History   Problem Relation Age of Onset   • Other Mother         chronic bronchitis   • Brain cancer Father    • Prostate cancer Paternal Grandfather 79   • Pancreatic cancer Maternal Aunt 39   • Breast cancer Neg Hx      I have reviewed and agree with the history as documented  E-Cigarette/Vaping   • E-Cigarette Use Never User      E-Cigarette/Vaping Substances   • Nicotine No    • THC No    • CBD No    • Flavoring No    • Other No    • Unknown No      Social History     Tobacco Use   • Smoking status: Never   • Smokeless tobacco: Never   Vaping Use   • Vaping Use: Never used   Substance Use Topics   • Alcohol use: Not Currently   • Drug use: No        Review of Systems   Constitutional: Negative for chills and fever  Respiratory: Negative for shortness of breath  Cardiovascular: Negative for chest pain  Gastrointestinal: Negative for abdominal pain, nausea and vomiting  Musculoskeletal: Negative for back pain  Neurological: Negative for syncope and light-headedness  All other systems reviewed and are negative  Physical Exam  ED Triage Vitals   Temperature Pulse Respirations Blood Pressure SpO2   05/14/23 0248 05/14/23 0242 05/14/23 0242 05/14/23 0242 05/14/23 0242   97 7 °F (36 5 °C) 99 18 128/75 99 %      Temp Source Heart Rate Source Patient Position - Orthostatic VS BP Location FiO2 (%)   05/14/23 0248 -- -- -- --   Oral          Pain Score       05/14/23 0242       No Pain             Orthostatic Vital Signs  Vitals:    05/14/23 0242   BP: 128/75   Pulse: 99       Physical Exam  Vitals and nursing note reviewed  Constitutional:       General: She is awake  She is not in acute distress  Appearance: She is cachectic  She is ill-appearing (Chronically ill-appearing)  HENT:      Head: Normocephalic and atraumatic        Mouth/Throat:      Mouth: Mucous membranes are moist    Eyes:      Extraocular Movements: Extraocular movements intact  Cardiovascular:      Rate and Rhythm: Normal rate and regular rhythm  Pulmonary:      Effort: Pulmonary effort is normal       Breath sounds: Normal breath sounds  Abdominal:      General: The ostomy site is clean  Palpations: Abdomen is soft  Tenderness: There is no abdominal tenderness  There is no guarding or rebound  Musculoskeletal:         General: Normal range of motion  Cervical back: Normal range of motion  Skin:     General: Skin is warm and dry  Neurological:      General: No focal deficit present  Mental Status: She is alert and oriented to person, place, and time           ED Medications  Medications - No data to display    Diagnostic Studies  Results Reviewed     Procedure Component Value Units Date/Time    Comprehensive metabolic panel [[id number]]  (Abnormal) Collected: 05/14/23 0501    Lab Status: Final result Specimen: Blood from Arm, Left Updated: 05/14/23 0544     Sodium 126 mmol/L      Potassium 5 0 mmol/L      Chloride 98 mmol/L      CO2 23 mmol/L      ANION GAP 5 mmol/L      BUN 45 mg/dL      Creatinine 0 85 mg/dL      Glucose 116 mg/dL      Calcium 11 9 mg/dL      AST 11 U/L      ALT 13 U/L      Alkaline Phosphatase 73 U/L      Total Protein 7 9 g/dL      Albumin 3 9 g/dL      Total Bilirubin 0 20 mg/dL      eGFR 66 ml/min/1 73sq m     Narrative:      [place] guidelines for Chronic Kidney Disease (CKD):   •  Stage 1 with normal or high GFR (GFR > 90 mL/min/1 73 square meters)  •  Stage 2 Mild CKD (GFR = 60-89 mL/min/1 73 square meters)  •  Stage 3A Moderate CKD (GFR = 45-59 mL/min/1 73 square meters)  •  Stage 3B Moderate CKD (GFR = 30-44 mL/min/1 73 square meters)  •  Stage 4 Severe CKD (GFR = 15-29 mL/min/1 73 square meters)  •  Stage 5 End Stage CKD (GFR <15 mL/min/1 73 square meters)  Note: GFR calculation is accurate only with a steady state creatinine    CBC and differential [[id number]]  (Abnormal) Collected: 05/14/23 0501    Lab Status: Final result Specimen: Blood from Arm, Left Updated: 05/14/23 0519     WBC 8 47 Thousand/uL      RBC 3 23 Million/uL      Hemoglobin 10 0 g/dL      Hematocrit 29 8 %      MCV 92 fL      MCH 31 0 pg      MCHC 33 6 g/dL      RDW 15 4 %      MPV 9 4 fL      Platelets 611 Thousands/uL      nRBC 0 /100 WBCs      Neutrophils Relative 76 %      Immat GRANS % 1 %      Lymphocytes Relative 11 %      Monocytes Relative 10 %      Eosinophils Relative 2 %      Basophils Relative 0 %      Neutrophils Absolute 6 43 Thousands/µL      Immature Grans Absolute 0 06 Thousand/uL      Lymphocytes Absolute 0 96 Thousands/µL      Monocytes Absolute 0 86 Thousand/µL      Eosinophils Absolute 0 13 Thousand/µL      Basophils Absolute 0 03 Thousands/µL                  No orders to display         Procedures  Procedures      ED Course  ED Course as of 05/14/23 2052   Sun May 14, 2023   0539 Hemoglobin(!): 10 0  Up from 7 8 x2 mo ago  No blood in ostomy bag on re-evaluation  CMP pending   5282 Sodium(!): 126  Pt appears chronically hyponatremic per lab review - pt asymptomatic currently  Above 125  Medical Decision Making  Patient is an 59-year-old female past medical history notable for colon cancer status post colectomy with ostomy bag  Sent in from nursing facility for evaluation of blood in the ostomy bag  No bleeding currently noted  Patient is asymptomatic  Has benign abdominal exam   No hemodynamic instability good cap refill  Will check CBC BMP  If not anemic or change in abdominal exam no need for imaging  Will monitor and reassess  Patient is not anemic lab work otherwise notable for hyponatremia however this appears to be chronic in nature  Patient is again asymptomatic      Patient's remained hemodynamically stable during his entire ED course and is cleared for discharge with outpatient follow-up with her PCP  Return precautions given    Amount and/or Complexity of Data Reviewed  Labs: ordered  Decision-making details documented in ED Course  Disposition  Final diagnoses:   History of creation of ostomy [facility])   Hyponatremia     Time reflects when diagnosis was documented in both MDM as applicable and the Disposition within this note     Time User Action Codes Description Comment    5/14/2023  6:15 AM Malon Lemon Add [Z93 9] History of creation of ostomy [facility])     5/14/2023  6:15 AM Malon Lemon Add [E87 1] Hyponatremia       ED Disposition     ED Disposition   Discharge    Condition   Stable    Date/Time   Sun May 14, 2023  6:15 AM    Comment   Pedro Cruz discharge to home/self care  Follow-up Information     Follow up With Specialties Details Why [address], DO Family Medicine   [address] [place] [address]  [place] [address]  [phone]            Discharge Medication List as of 5/14/2023  7:40 AM      CONTINUE these medications which have NOT CHANGED    Details   amLODIPine (NORVASC) 2 5 mg tablet Take 1 tablet (2 5 mg total) by mouth daily, Starting Tue 12/20/2022, Normal      ascorbic acid (VITAMIN C) 500 MG tablet Take 1,000 mg by mouth daily , Historical Med      benazepril-hydrochlorthiazide (LOTENSIN HCT) 20-12 5 MG per tablet take 1 tablet by mouth once daily, Normal      bimatoprost (LUMIGAN) 0 01 % ophthalmic drops Administer 1 drop to both eyes daily at bedtime , Historical Med      calcium citrate (CALCITRATE) 950 MG tablet Take 1 tablet by mouth in the morning , Historical Med      Cholecalciferol (VITAMIN D-3 PO) Take 1 capsule by mouth 3 (three) times a day , Historical Med      Cyanocobalamin (VITAMIN B 12 PO) Take 1 tablet by mouth daily  , Historical Med      Diclofenac Sodium (VOLTAREN) 1 % Apply 2 g topically 4 (four) times a day, Starting Mon 12/6/2021, No Print      insulin lispro (HumaLOG) 100 units/mL injection Inject 1-6 Units under the skin 4 (four) times a day (before meals and at bedtime), Starting Thu 2/23/2023, No Print      metFORMIN (GLUCOPHAGE) 500 mg tablet Take 500 mg by mouth 2 (two) times a day with meals, Historical Med      miconazole (MICOTIN) 2 % powder Apply topically in the morning Apply to groin topically and under left breast topically every day and evening shift for red wash with soap and water, pat dry and then apply powder , Historical Med      pantoprazole (PROTONIX) 40 mg tablet Take 1 tablet (40 mg total) by mouth daily in the early morning Do not start before February 24, 2023 , Starting Fri 2/24/2023, No Print      rivaroxaban (Xarelto) 10 mg tablet Take 1 tablet (10 mg total) by mouth daily, Starting Wed 2/15/2023, Print      vitamin E 100 UNIT capsule Take 100 Units by mouth daily , Historical Med           No discharge procedures on file  PDMP Review     None           ED Provider  Attending physically available and evaluated Kiera Perez I managed the patient along with the ED Attending      Electronically Signed by         Raul Parada DO  05/14/23 3629

## 2023-05-18 ENCOUNTER — OFFICE VISIT (OUTPATIENT)
Dept: HEMATOLOGY ONCOLOGY | Facility: CLINIC | Age: 78
End: 2023-05-18

## 2023-05-18 ENCOUNTER — TELEPHONE (OUTPATIENT)
Dept: HEMATOLOGY ONCOLOGY | Facility: CLINIC | Age: 78
End: 2023-05-18

## 2023-05-18 VITALS — SYSTOLIC BLOOD PRESSURE: 120 MMHG | DIASTOLIC BLOOD PRESSURE: 74 MMHG | OXYGEN SATURATION: 95 % | HEART RATE: 68 BPM

## 2023-05-18 DIAGNOSIS — D50.9 IRON DEFICIENCY ANEMIA, UNSPECIFIED IRON DEFICIENCY ANEMIA TYPE: ICD-10-CM

## 2023-05-18 DIAGNOSIS — Z86.39 HISTORY OF DIABETES MELLITUS: ICD-10-CM

## 2023-05-18 DIAGNOSIS — L08.9 CHRONIC WOUND INFECTION OF ABDOMEN, INITIAL ENCOUNTER: ICD-10-CM

## 2023-05-18 DIAGNOSIS — C78.7 METASTASIS TO LIVER (HCC): ICD-10-CM

## 2023-05-18 DIAGNOSIS — Z17.0 MALIGNANT NEOPLASM OF RIGHT BREAST IN FEMALE, ESTROGEN RECEPTOR POSITIVE, UNSPECIFIED SITE OF BREAST (HCC): ICD-10-CM

## 2023-05-18 DIAGNOSIS — R97.8 ABNORMAL TUMOR MARKERS: ICD-10-CM

## 2023-05-18 DIAGNOSIS — Z86.718 HISTORY OF DVT (DEEP VEIN THROMBOSIS): ICD-10-CM

## 2023-05-18 DIAGNOSIS — C18.2 MALIGNANT NEOPLASM OF ASCENDING COLON (HCC): Primary | ICD-10-CM

## 2023-05-18 DIAGNOSIS — S31.109A CHRONIC WOUND INFECTION OF ABDOMEN, INITIAL ENCOUNTER: ICD-10-CM

## 2023-05-18 DIAGNOSIS — C50.911 MALIGNANT NEOPLASM OF RIGHT BREAST IN FEMALE, ESTROGEN RECEPTOR POSITIVE, UNSPECIFIED SITE OF BREAST (HCC): ICD-10-CM

## 2023-05-18 DIAGNOSIS — E04.1 THYROID NODULE: ICD-10-CM

## 2023-05-18 NOTE — TELEPHONE ENCOUNTER
Appointment Confirmation   Who are you speaking with? primo dewitt    If it is not the patient, are they listed on an active communication consent form? N/A   Which provider is the appointment scheduled with? Dr Hank Pinon   When is the appointment scheduled? Please list date and time 05/18/2023 @11:40AM    At which location is the appointment scheduled to take place? [place]   Did caller verbalize understanding of appointment details?  Yes

## 2023-05-22 ENCOUNTER — TELEPHONE (OUTPATIENT)
Dept: HEMATOLOGY ONCOLOGY | Facility: CLINIC | Age: 78
End: 2023-05-22

## 2023-05-22 NOTE — PROGRESS NOTES
HPI:   Follow-up visit for colon cancer with metastatic disease to liver and patient had surgery  on 2/9/2023 when patient had presented with incarcerated ventral hernia  Patient underwent an exploratory laparotomy, reduction of ventral hernia, left colectomy, and creation of an end ileostomy  Post-op she was taken to the ICU for close monitoring and resuscitation  On 2/10 she was found to have a PE of JOANNA and RLL arteries and started on a heparin infusion  A PICC line was placed for access to provide IV medications and access for blood draws  She was transferred out of the ICU on 2/11  Antibiotics were continued through 2/19  She was transitioned to Xarelto on 2/17    See path report below  Component    Case Report   Surgical Pathology Report                         Case: [id number]                                    Authorizing Provider: Felicity Hammond DO         Collected:           02/09/2023 1602               Ordering Location:     [facility]      Received:            02/09/2023 [address] [place] [address] Operating Room                                                       Pathologist:           Rachel Mabry MD                                                                     Specimens:   A) - Abdominal, Hernia Sac                                                                           B) - Colon, Left Colectomy                                                                 Addendum   RESULTS OF IMMUNOHISTOCHEMICAL ANALYSIS FOR MISMATCH REPAIR PROTEIN LOSS     INTERPRETATION: NO LOSS OF NUCLEAR EXPRESSION OF MMR PROTEINS: LOW PROBABILITY OF MSI-H      Note: Background non-neoplastic tissue and/or internal control with intact nuclear expression       RESULTS:  Antibody          Clone               Description                           Results  MLH1               M1                   Mismatch repair protein       Intact nuclear expression  MSH2              [id number]      Mismatch repair protein       Intact nuclear expression  UDT6              41                    Mismatch repair protein       Intact nuclear expression  FOA2              [id number]         Mismatch repair protein       Intact nuclear expression     Comment:   A positive control for each antibody have been reviewed and accepted  [facility] Specimen ID: [id number]  Evaluator: Maude Amezquita MD       These tests were developed and their performance characteristics determined by [facility]  Freenriqueta Ad may not be cleared or approved by the [place]  Food and Drug Administration   The FDA determined that such clearance or approval is not necessary   These tests are used for clinical purposes  Freada Ad should not be regarded as investigational or for research   This laboratory has been approved by [place] [address], designated as a high-complexity laboratory and is qualified to perform these tests      Patients whose tumors demonstrate lack of expression of one or more DNA mismatch repair proteins might be at risk for Hadley Syndrome  This cancer susceptibility syndrome greatly increases the risk of synchronous and/or metachronous cancers in the affected patients and their family members  Normal expression of all proteins does not completely rule out familial cancer predisposition      The 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      B   Segment of colon with portion of ileum, omentum and abdominal wall, left colectomy with en bloc omentectomy and abdominal wall resection:  -   Invasive adenocarcinoma of transverse colon, moderately differentiated, grossly perforated and invades fibroadipose tissue of abdominal wall (pT4b)  -   Second focus of invasive adenocarcinoma of transverse colon, moderately differentiated, invades into muscularis propria (pT3)  -   Terminal ileum and omentum are uninvolved  -   MMR (MLH1, MSH2, MSH6 and PMS2) studies on B18 by immunohistochemistry are pending     -   See comment and synoptic report      Comment:   Immunohistochemical stains on B18 show the tumor cells are positive for CK20, CDX2, SATB2, CK7 (patchy), PAX8 (patchy), DPC4, and are negative for TTF-1, GATA3, ER and synaptophysin        Select slides (B18, IHC) are reviewed by Dr Mich Lynne who concurs with the diagnosis      Diagnosis is communicated via Appoet to Dr Jelly Garza on 2/15/2023 at 1532       Interpretation performed at [facility], [address] [place] [address]    Electronically signed by Mack Kay MD on 2/15/2023 at  4:02 PM   Note      Patient is still recovering from surgery  The ileostomy is functioning  Abdominal wall wound is healing  With  further improvement she would like to try systemic therapy for metastatic colon cancer to liver  Performance status is still low and activity is limited to wheelchair  Patient has previous history of right breast cancer  She has history of colon cancer  She has history of DVT leg  In 2012 she was diagnosed to have stage 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  She had right mastectomy, and lymph node dissection   There were 9 positive lymph nodes  She had Adriamycin + Cytoxan chemotherapy followed by Taxotere and after that radiation therapy  Since November/December 2012 she has been on Femara and she will take Femara until December 2022  Patient has osteoporosis and she has been on vitamin-D  and Prolia   Has problem swallowing calcium tablet    No problem with her teeth for Prolia  In 2016 she was found to have benign clustered calcifications in left breast and had a biopsy and that was benign  Dr Rafael Macdonald takes care of her mammography       In December 2012 patient developed DVT right leg and she was started on Xarelto  She had GI bleeding in 2015  Xarelto was stopped  On colonoscopy she was found to have stage I right colon cancer  On 7/22/15 she underwent right hemicolectomy  Pathological diagnosis right colon cancer, stage I, T2 N0 MX, grade 2, No lymphovascular invasion and no perineural invasion  She did not require adjuvant therapy for colon cancer  History of thyroid nodule  She had biopsy of thyroid nodule in June 2014 and she states that was negative for cancer  She gets thyroid ultrasound  for surveillance     2/9/2023[de-identified]  EXPLORATORY LAPAROTOMY; ABDOMINAL WALL DEBRIDEMENT; REDUCTION VENTRAL HERNIA; LEFT COLON RESECTION; CREATION ILEOSTOMY; WASHOUT (Abdomen)    Patient is in a nursing home  Activity is limited from bed to chair but she is getting physical therapy  She states she is getting stronger  She has generalized weakness and tiredness and shortness of breath  She has ileostomy bag    She has lost weight              Current Outpatient Medications:   •  amLODIPine (NORVASC) 2 5 mg tablet, Take 1 tablet (2 5 mg total) by mouth daily, Disp: 30 tablet, Rfl: 5  •  ascorbic acid (VITAMIN C) 500 MG tablet, Take 1,000 mg by mouth daily , Disp: , Rfl:   •  benazepril-hydrochlorthiazide (LOTENSIN HCT) 20-12 5 MG per tablet, take 1 tablet by mouth once daily, Disp: 90 tablet, Rfl: 5  •  bimatoprost (LUMIGAN) 0 01 % ophthalmic drops, Administer 1 drop to both eyes daily at bedtime , Disp: , Rfl:   •  calcium citrate (CALCITRATE) 950 MG tablet, Take 1 tablet by mouth in the morning , Disp: , Rfl:   •  Cholecalciferol (VITAMIN D-3 PO), Take 1 capsule by mouth 3 (three) times a day , Disp: , Rfl:   •  Cyanocobalamin (VITAMIN B 12 PO), Take 1 tablet by mouth daily , Disp: , Rfl:   •  Diclofenac Sodium (VOLTAREN) 1 %, Apply 2 g topically 4 (four) times a day, Disp: , Rfl:   •  insulin lispro (HumaLOG) 100 units/mL injection, Inject 1-6 Units under the skin 4 (four) times a day (before meals and at bedtime), Disp: , Rfl: 0  •  metFORMIN (GLUCOPHAGE) 500 mg tablet, Take 500 mg by mouth 2 (two) times a day with meals, Disp: , Rfl:   •  miconazole (MICOTIN) 2 % powder, Apply topically in the morning Apply to groin topically and under left breast topically every day and evening shift for red wash with soap and water, pat dry and then apply powder , Disp: , Rfl:   •  pantoprazole (PROTONIX) 40 mg tablet, Take 1 tablet (40 mg total) by mouth daily in the early morning Do not start before February 24, 2023 , Disp: , Rfl: 0  •  rivaroxaban (Xarelto) 10 mg tablet, Take 1 tablet (10 mg total) by mouth daily, Disp: 30 tablet, Rfl: 0  •  vitamin E 100 UNIT capsule, Take 100 Units by mouth daily , Disp: , Rfl:     No Known Allergies    Oncology History Overview Note    In 2012 patient was diagnosed to have Hormone receptor positive, HER-2 negative stage IIIB cancer in right breast  She had right mastectomy and lymph node dissection   9 lymph nodes showed metastatic disease  Patient was given Adriamycin + Cytoxan chemotherapy followed by Taxotere and after that radiation therapy  Since November/December 2012 she has been on Femara     She will be on Femara for a total of 10 years  On 7/22/15 she underwent right hemicolectomy  Pathological diagnosis right colon cancer, stage I, T2 N0 MX, grade 2,no lymphovascular invasion and no perineural invasion  She did not require adjuvant therapy for colon cancer  She has been running slightly high CEA and that is being monitored       Malignant neoplasm of right breast in female, estrogen receptor positive ([facility] [address] )   6/21/2018 Initial Diagnosis    Malignant neoplasm of right breast in female, estrogen receptor positive [facility])      Surgery     2012- right mastectomy   2015 - right hemicolectomy      Radiation     [id number]: Radiation to right chest wall and lymph nodes      Chemotherapy     2012 -Adriamycin plus Cytoxan followed by Taxotere Followed by Femara  She will have Femara for a total of 10 years  ROS:   Reviewed 12 systems: See symptoms in HPI:    Presently no other neurological , cardiac, pulmonary, GI and  symptoms other than listed in HPI  Other symptoms are in HPI  No symptoms like fevers, chills, bleeding, bone pains, skin rash,  night sweats, numbness, claudication   Has  anxiety  No swelling of the ankles and no swollen glands  Not unusually sensitive to heat or cold  /74 (BP Location: Left arm, Patient Position: Sitting, Cuff Size: Adult)   Pulse 68   LMP  (LMP Unknown)   SpO2 95%     Physical Exam:  Patient is in a wheelchair  Patient is alert and oriented  Patient not in distress  Vitals are above  No icterus  No oral thrush  No palpable neck mass  Lung fields are clear to percussion and auscultation  Heart rate is regular  There is dressing on the abdominal wound  Functioning colostomy  There is no edema of the ankles  There is no calf tenderness  There is no focal neurological deficit  She has generalized weakness  There is no skin rash, no palpable lymphadenopathy in the neck and axillary areas,  no clubbing  Patient is anxious  Performance status 3  No lymphedema  IMAGING:  IMPRESSION:     No nodule meets current ACR criteria for requiring biopsy but followup ultrasound is recommended in 1 year             Reference: ACR Thyroid Imaging, Reporting and Data System (TI-RADS): White Paper of the [employer]   J AM Carlo Radiol 8674;31:528-795  (additional recommendations based on American Thyroid Association 2015 guidelines )        Workstation performed: [id number]      Imaging    US thyroid (Order: [id number]) - 6/17/2020  ASSESSMENT/BI-RADS CATEGORY:  Left: 2 - Benign  Overall: 2 - Benign     RECOMMENDATION:       - Routine screening mammogram in 1 year for the left breast      Workstation ID: [id number]          Imaging    Mammo screening left w 3d & cad (Order: [id number]) - 10/19/2021    RESULTS:      LUMBAR SPINE L2-L4 (L1 vertebra excluded from analysis due to local structural abnormalities or artifact): BMD  0 774  gm/cm2   T-score -2 8    These values are artifactually elevated due to the presence of scoliosis with spondylosis      LEFT  TOTAL HIP:   BMD:  0 688  gm/cm2    T-score:  -2 1     LEFT  FEMORAL NECK:   BMD:  0 628  gm/cm2   T score: -2 0      RIGHT  FOREARM:    33% RADIUS BMD:  0 490  gm/cm2  T-score:  -3 3         IMPRESSION:     1  Osteoporosis  [Based on the lumbar spine and right radius]       IMPRESSION:  Enlarged heterogeneous left thyroid lobe and thyroid isthmus  Grossly stable if not smaller peripherally densely calcified left thyroid isthmus nodule, this has been stable for more than 5 years  Asymmetrically bulky left thyroid lobe with prominent interpolar region which I do not believe is a true nodule  Followup ultrasound is recommended in 1 year  Smaller nodules which do not meet current ACR criteria for requiring biopsy      Reference: ACR Thyroid Imaging, Reporting and Data System (TI-RADS): White Paper of the [employer]  J AM Carlo Radiol 8790;83:978-448  (additional recommendations based on American Thyroid Association 2015 guidelines )        Workstation performed: [id number]          Imaging    US thyroid (Order: [id number]) - 7/14/  IMPRESSION:     1  No findings for hypermetabolic malignancy  2   Diffuse thyroid gland activity suggests thyroiditis      Workstation performed: [id number]          Imaging    NM PET CT skull base to mid thigh (Order: [id number]) - 7/14/2021    IMPRESSION:     Diffusely heterogeneous thyroid gland, and left isthmus nodule, without significant change   There are no findings requiring fine-needle aspiration           Reference: ACR Thyroid Imaging, Reporting and Data System (TI-RADS): White Paper of the [employer]  J AM Carlo Radiol 8617;86:298-194  (additional recommendations based on American Thyroid Association 2015 guidelines )        Workstation performed: [id number]        Imaging    US thyroid (Order: [id number]) - 7/26/2022  ASSESSMENT/BI-RADS CATEGORY:  Left: 0 - Incomplete: Needs Additional Imaging Evaluation  Overall: 0 - Incomplete: Needs Additional Imaging Evaluation     RECOMMENDATION:       - Diagnostic mammogram at the current time for the left breast        - Ultrasound at the current time for the left breast      Workstation ID: [id number]        Imaging    Mammo screening left w 3d & cad (Order: [id number]) - 10/21/2022    IMPRESSION:     3 hepatic lesions as described above suspicious for metastases    The smaller suspicious lesions seen on the CT are obscured by motion artifact on postcontrast imaging      The study was marked in EPIC for immediate notification                  Workstation performed: [id number]        Imaging    MRI abdomen w wo contrast (Order: [id number]) - 2/17/2023    LABS:    Results for orders placed or performed during the hospital encounter of 05/14/23   CBC and differential   Result Value Ref Range    WBC 8 47 4 31 - 10 16 Thousand/uL    RBC 3 23 (L) 3 81 - 5 12 Million/uL    Hemoglobin 10 0 (L) 11 5 - 15 4 g/dL    Hematocrit 29 8 (L) 34 8 - 46 1 %    MCV 92 82 - 98 fL    MCH 31 0 26 8 - 34 3 pg    MCHC 33 6 31 4 - 37 4 g/dL    RDW 15 4 (H) 11 6 - 15 1 %    MPV 9 4 8 9 - 12 7 fL    Platelets 918 645 - 465 Thousands/uL    nRBC 0 /100 WBCs    Neutrophils Relative 76 (H) 43 - 75 %    Immat GRANS % 1 0 - 2 %    Lymphocytes Relative 11 (L) 14 - 44 %    Monocytes Relative 10 4 - 12 %    Eosinophils Relative 2 0 - 6 %    Basophils Relative 0 0 - 1 %    Neutrophils Absolute 6 43 1 85 - 7 62 Thousands/µL    Immature Grans Absolute 0 06 0 00 - 0 20 Thousand/uL    Lymphocytes Absolute 0 96 0 60 - 4 47 Thousands/µL    Monocytes Absolute 0 86 0 17 - 1 22 Thousand/µL    Eosinophils Absolute 0 13 0 00 - 0 61 Thousand/µL    Basophils Absolute 0 03 0 00 - 0 10 Thousands/µL   Comprehensive metabolic panel   Result Value Ref Range    Sodium 126 (L) 135 - 147 mmol/L    Potassium 5 0 3 5 - 5 3 mmol/L    Chloride 98 96 - 108 mmol/L    CO2 23 21 - 32 mmol/L    ANION GAP 5 4 - 13 mmol/L    BUN 45 (H) 5 - 25 mg/dL    Creatinine 0 85 0 60 - 1 30 mg/dL    Glucose 116 65 - 140 mg/dL    Calcium 11 9 (H) 8 3 - 10 1 mg/dL    AST 11 5 - 45 U/L    ALT 13 12 - 78 U/L    Alkaline Phosphatase 73 46 - 116 U/L    Total Protein 7 9 6 4 - 8 4 g/dL    Albumin 3 9 3 5 - 5 0 g/dL    Total Bilirubin 0 20 0 20 - 1 00 mg/dL    eGFR 66 ml/min/1 73sq m     *Note: Due to a large number of results and/or encounters for the requested time period, some results have not been displayed  A complete set of results can be found in Results Review  Labs, Imaging, & Other studies:   All pertinent labs and imaging studies were personally reviewed        Reviewed test results and discussed with patient  Assessment and plan:    Follow-up visit for colon cancer with metastatic disease to liver and patient had surgery  on 2/9/2023 when patient had presented with incarcerated ventral hernia  Patient underwent an exploratory laparotomy, reduction of ventral hernia, left colectomy, and creation of an end ileostomy  Post-op she was taken to the ICU for close monitoring and resuscitation  On 2/10 she was found to have a PE of JOANNA and RLL arteries and started on a heparin infusion  A PICC line was placed for access to provide IV medications and access for blood draws  She was transferred out of the ICU on 2/11  Antibiotics were continued through 2/19  She was transitioned to Xarelto on 2/17    See path report below    Component    Case Report   Surgical Pathology Report                         Case: [id number]                                    Authorizing Provider: Beatrice Price DO         Collected:           02/09/2023 1602               Ordering Location:     [address]      Received:            02/09/2023 2208                                      Hospital Operating Room                                                       Pathologist:           Magda Hanson MD                                                                     Specimens:   A) - Abdominal, Hernia Sac                                                                           B) - Colon, Left Colectomy                                                                 Addendum   RESULTS OF IMMUNOHISTOCHEMICAL ANALYSIS FOR MISMATCH REPAIR PROTEIN LOSS     INTERPRETATION: NO LOSS OF NUCLEAR EXPRESSION OF MMR PROTEINS: LOW PROBABILITY OF MSI-H      Note: Background non-neoplastic tissue and/or internal control with intact nuclear expression       RESULTS:  Antibody          Clone               Description                           Results  MLH1               M1                   Mismatch repair protein       Intact nuclear expression  MSH2              [id number]      Mismatch repair protein       Intact nuclear expression  ZJA7              04                    Mismatch repair protein       Intact nuclear expression  TRA6              [id number]         Mismatch repair protein       Intact nuclear expression     Comment:   A positive control for each antibody have been reviewed and accepted  GenPath Specimen ID: [id number]  Evaluator: Bairon Mclain MD       These tests were developed and their performance characteristics determined by [facility]  Kerwin Roper may not be cleared or approved by the [place]   Food and Drug Administration   The FDA determined that such clearance or approval is not necessary   These tests are used for clinical purposes  Harabimbola Roper should not be regarded as investigational or for research   This laboratory has been approved by [facility], designated as a high-complexity laboratory and is qualified to perform these tests      Patients whose tumors demonstrate lack of expression of one or more DNA mismatch repair proteins might be at risk for Hadley Syndrome  This cancer susceptibility syndrome greatly increases the risk of synchronous and/or metachronous cancers in the affected patients and their family members  Normal expression of all proteins does not completely rule out familial cancer predisposition      The [facility] Hadley Syndrome Surveillance Program Task Forces recommends that all patients with lack of expression of one or more DNA mismatch repair proteins and those with concerning personal or family history should undergo thorough evaluation, counseling and possibly genetic testing  Addendum electronically signed by Stuart Correia MD on 2/17/2023 at  5:17 PM   Final Diagnosis   A  Hernia sac, hernia repair:  -   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      B  Segment of colon with portion of ileum, omentum and abdominal wall, left colectomy with en bloc omentectomy and abdominal wall resection:  -   Invasive adenocarcinoma of transverse colon, moderately differentiated, grossly perforated and invades fibroadipose tissue of abdominal wall (pT4b)  -   Second focus of invasive adenocarcinoma of transverse colon, moderately differentiated, invades into muscularis propria (pT3)  -   Terminal ileum and omentum are uninvolved  -   MMR (MLH1, MSH2, MSH6 and PMS2) studies on B18 by immunohistochemistry are pending     -   See comment and synoptic report      Comment:   Immunohistochemical stains on B18 show the tumor cells are positive for CK20, CDX2, SATB2, CK7 (patchy), PAX8 (patchy), DPC4, and are negative for TTF-1, GATA3, ER and synaptophysin        Select slides (B18, IHC) are reviewed by Dr Alonso Joseph who concurs with the diagnosis      Diagnosis is communicated via [place] to Dr Aisha Rivera on 2/15/2023 at 1532       Interpretation performed at [facility], [address] [facility]    Electronically signed by Jeni Faustin MD on 2/15/2023 at  4:02 PM   Note       Patient is still recovering from surgery  The ileostomy is functioning  Abdominal wall wound is healing  With  further improvement she would like to try systemic therapy for metastatic colon cancer to liver  Performance status is still low and activity is limited to wheelchair  Patient has previous history of right breast cancer  She has history of colon cancer  She has history of DVT leg  In 2012 she was diagnosed to have stage 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  She had right mastectomy, and lymph node dissection   There were 9 positive lymph nodes  She had Adriamycin + Cytoxan chemotherapy followed by Taxotere and after that radiation therapy  Since November/December 2012 she has been on Femara and she will take Femara until December 2022  Patient has osteoporosis and she has been on vitamin-D  and Prolia   Has problem swallowing calcium tablet  No problem with her teeth for Prolia  In 2016 she was found to have benign clustered calcifications in left breast and had a biopsy and that was benign  Dr Matteo García takes care of her mammography       In December 2012 patient developed DVT right leg and she was started on Xarelto  She had GI bleeding in 2015  Xarelto was stopped  On colonoscopy she was found to have stage I right colon cancer  On 7/22/15 she underwent right hemicolectomy  Pathological diagnosis right colon cancer, stage I, T2 N0 MX, grade 2, No lymphovascular invasion and no perineural invasion  She did not require adjuvant therapy for colon cancer  History of thyroid nodule  She had biopsy of thyroid nodule in June 2014 and she states that was negative for cancer  She gets thyroid ultrasound  for surveillance          2/9/2023[de-identified]  EXPLORATORY LAPAROTOMY; ABDOMINAL WALL DEBRIDEMENT; REDUCTION VENTRAL HERNIA; LEFT COLON RESECTION; CREATION ILEOSTOMY; WASHOUT (Abdomen)    Patient is in a nursing home  Activity is limited from bed to chair but she is getting physical therapy  She states she is getting stronger  She has generalized weakness and tiredness and shortness of breath  She has ileostomy bag  She has lost weight       Physical examination and test results are as recorded and discussed  Patient has recurrent colon cancer and is status post total colectomy and ileostomy  No more wound VAC  Has metastatic disease to liver  Tonya Baker History of pulmonary embolism and DVT  Previous history of breast cancer and colon cancer  Has poor performance status  Performance is improving  She leaning towards taking chemotherapy  Report has come back from [place] and [place]-yue and NRAS are wild type and she could benefit from cetuximab and panitumumab     She has to recover from her present medical condition first and she is slowly recovering     All discussed in detail   Questions answered  Diet and activities per Dr at [facility] care [facility]  Nutritional supplements  Patient to avoid falls and trauma  Goal will be prolongation of survival   Patient needs assistance in her care   Discussed precautions against coronavirus and flu and other infections  She will continue to follow with her primary physician and other consultants  See diagnoses, orders and instructions below  1  Malignant neoplasm of ascending colon [facility])    - Ambulatory referral to Interventional Radiology; Future    2  [facility] ([facility] )      3  Malignant neoplasm of right breast in female, estrogen receptor positive, unspecified site of breast ([facility] )      4  Chronic wound infection of abdomen, initial encounter      5  Abnormal tumor markers      6  Iron deficiency anemia, unspecified iron deficiency anemia type      7  History of DVT (deep vein thrombosis)      8  Thyroid nodule      9   History of diabetes mellitus            Requested Port-A-Cath  Follow-up in 5 weeks  I used a dictation device to dictate this note and there could be mistakes in my note and for that patient may contact my office                                         Patient voiced understanding and agrees      Counseling / Coordination of Care      Provided counseling and support

## 2023-05-22 NOTE — TELEPHONE ENCOUNTER
Confirmed with patient appt  On 6/19/23 with Dr Favio Fay  Patient is being charged at the Home that she is staying and she is not fully healed

## 2023-05-25 LAB — SCAN RESULT: NORMAL

## 2023-05-30 ENCOUNTER — TRANSCRIBE ORDERS (OUTPATIENT)
Dept: GASTROENTEROLOGY | Facility: CLINIC | Age: 78
End: 2023-05-30

## 2023-06-05 ENCOUNTER — TELEPHONE (OUTPATIENT)
Dept: RADIOLOGY | Facility: HOSPITAL | Age: 78
End: 2023-06-05

## 2023-06-05 RX ORDER — SODIUM CHLORIDE 9 MG/ML
75 INJECTION, SOLUTION INTRAVENOUS CONTINUOUS
OUTPATIENT
Start: 2023-06-05

## 2023-06-05 RX ORDER — CEFAZOLIN SODIUM 1 G/50ML
1000 SOLUTION INTRAVENOUS ONCE
OUTPATIENT
Start: 2023-06-05 | End: 2023-06-05

## 2023-06-05 NOTE — PRE-PROCEDURE INSTRUCTIONS
Pre-procedure Instructions for Interventional Radiology  Darling Zuleta [address]  [address] [phone]    You are scheduled for a/an [facility] Placement  On 0930  Your tentative arrival time is 0930  Short stay will notify you the day before your procedure with the exact arrival time and the location to arrive  To prepare for your procedure:  1  Please arrange for someone to drive you home after the procedure and stay with you until the next morning if you are instructed to do so  This is typically for patients receiving some type of sedative or anesthetic for the procedure  2  DO NOT EAT OR DRINK ANYTHING after midnight on the evening before your procedure including candy & gum   3  ONLY SIPS OF WATER with your medications are allowed on the morning of your procedure  4  TAKE ALL OF YOUR REGULAR MEDICATIONS THE MORNING OF YOUR PROCEDURE with sips of water! We may call you to stop some of your blood sugar, blood pressure and blood thinning medications depending on the procedure  Please take all of these medications unless we instruct you to stop them  5  If you have an allergy to x-ray dye, please contact Interventional Radiology for an x-ray dye preparation which usually consists of an oral steroid and Benadryl  The day of your procedure:  1  Bring a list of the medications you take at home  2  Bring medications you take for breathing problems (such as inhalers), medications for chest pain, or both  3  Bring a case for your glasses or contacts  4  Bring your insurance card and a form of photo ID   5  Please leave all valuables such as credit cards and jewelry at home  6  Report to the registration desk in the main lobby at the [address] [facility], Entrance [address]  Ask to be directed to [facility]  7  While your procedure is being performed, your family may wait in the Radiology Waiting Room on the 1st floor in Radiology    if they need to leave, they may provide a number to be called following the procedure  8  Be prepared to stay overnight just in case  Sometimes procedures will indicate the need for further observation or treatment  9  If you are scheduled for a follow-up visit with the Interventional Radiologist after your procedure, you will be called with a date and time  Special Instructions (Medications to stop taking before your procedure etc ):  Hold AM HumaLog 6/12/23  Above reviewed with Ihsan Duque at [facility]

## 2023-06-12 ENCOUNTER — TELEPHONE (OUTPATIENT)
Dept: GASTROENTEROLOGY | Facility: CLINIC | Age: 78
End: 2023-06-12

## 2023-06-12 ENCOUNTER — TELEPHONE (OUTPATIENT)
Dept: HEMATOLOGY ONCOLOGY | Facility: CLINIC | Age: 78
End: 2023-06-12

## 2023-06-12 NOTE — TELEPHONE ENCOUNTER
Patient Call    Who are you speaking with? Patient    If it is not the patient, are they listed on an active communication consent form? N/A   What is the reason for this call? Patient calling to see how many chemo treatments she will need   Does this require a call back? yes   If a call back is required, please list best call back number [phone]   If a call back is required, advise that a message will be forwarded to their care team and someone will return their call as soon as possible  Did you relay this information to the patient?  yes

## 2023-06-12 NOTE — TELEPHONE ENCOUNTER
Patient Call    Who are you speaking with? center   If it is not the patient, are they listed on an active communication consent form? yes   What is the reason for this call? Is port placement needed for this appmt? Does this require a call back? yes   If a call back is required, please list best call back number [phone]   If a call back is required, advise that a message will be forwarded to their care team and someone will return their call as soon as possible  Did you relay this information to the patient?  yes

## 2023-06-12 NOTE — TELEPHONE ENCOUNTER
Returned call to [address] left for her to call me back  Port is not required but would be best for patient since she has poor venous access  There are also no set number of treatments planned    Patient will continue on treatments as long as she is tolerating and benefiting from infusions - patient has metastatic disease

## 2023-06-12 NOTE — TELEPHONE ENCOUNTER
Patients GI provider:  Dr Oneida Reddy    Number to return call: [phone]    Reason for call: Pt wants a GI consult - pt 69 yrs- procedure 4/3/23 cancelled as per pt due to no transportation  Last OV was scheduled in [facility] - she did not want to go all the way up there  Requested change to [facility] instead  Patient:  Will be arriving by [facility] [employer]     Scheduled procedure/appointment date if applicable: Appt 9/85/71

## 2023-06-13 ENCOUNTER — TELEPHONE (OUTPATIENT)
Dept: HEMATOLOGY ONCOLOGY | Facility: CLINIC | Age: 78
End: 2023-06-13

## 2023-06-13 NOTE — TELEPHONE ENCOUNTER
Appointment Confirmation   Who are you speaking with? Patient   If it is not the patient, are they listed on an active communication consent form? N/A   Which provider is the appointment scheduled with? Dr John Brown   When is the appointment scheduled? Please list date and time 6/19/23 11:20AM   At which location is the appointment scheduled to take place? [place]   Did caller verbalize understanding of appointment details? Yes     Patient would also like to inform Dr John Brown that her port placement was rescheduled due to issues with transportation

## 2023-06-19 ENCOUNTER — TELEPHONE (OUTPATIENT)
Dept: GYNECOLOGIC ONCOLOGY | Facility: CLINIC | Age: 78
End: 2023-06-19

## 2023-06-19 ENCOUNTER — TELEPHONE (OUTPATIENT)
Dept: HEMATOLOGY ONCOLOGY | Facility: CLINIC | Age: 78
End: 2023-06-19

## 2023-06-19 ENCOUNTER — OFFICE VISIT (OUTPATIENT)
Dept: HEMATOLOGY ONCOLOGY | Facility: CLINIC | Age: 78
End: 2023-06-19
Payer: COMMERCIAL

## 2023-06-19 VITALS
DIASTOLIC BLOOD PRESSURE: 68 MMHG | OXYGEN SATURATION: 98 % | SYSTOLIC BLOOD PRESSURE: 102 MMHG | HEART RATE: 112 BPM | RESPIRATION RATE: 17 BRPM | TEMPERATURE: 96 F

## 2023-06-19 DIAGNOSIS — C18.9 ADENOCARCINOMA OF COLON (HCC): ICD-10-CM

## 2023-06-19 DIAGNOSIS — C78.7 METASTASIS TO LIVER (HCC): Primary | ICD-10-CM

## 2023-06-19 DIAGNOSIS — D64.9 ANEMIA, UNSPECIFIED TYPE: ICD-10-CM

## 2023-06-19 DIAGNOSIS — E04.1 THYROID NODULE: ICD-10-CM

## 2023-06-19 DIAGNOSIS — E44.0 MODERATE PROTEIN-CALORIE MALNUTRITION (HCC): ICD-10-CM

## 2023-06-19 DIAGNOSIS — C50.911 MALIGNANT NEOPLASM OF RIGHT BREAST IN FEMALE, ESTROGEN RECEPTOR POSITIVE, UNSPECIFIED SITE OF BREAST (HCC): ICD-10-CM

## 2023-06-19 DIAGNOSIS — C18.2 MALIGNANT NEOPLASM OF ASCENDING COLON (HCC): Primary | ICD-10-CM

## 2023-06-19 DIAGNOSIS — R97.8 ABNORMAL TUMOR MARKERS: ICD-10-CM

## 2023-06-19 DIAGNOSIS — Z86.39 HISTORY OF DIABETES MELLITUS: ICD-10-CM

## 2023-06-19 DIAGNOSIS — Z86.718 HISTORY OF DVT (DEEP VEIN THROMBOSIS): ICD-10-CM

## 2023-06-19 DIAGNOSIS — Z17.0 MALIGNANT NEOPLASM OF RIGHT BREAST IN FEMALE, ESTROGEN RECEPTOR POSITIVE, UNSPECIFIED SITE OF BREAST (HCC): ICD-10-CM

## 2023-06-19 DIAGNOSIS — C78.7 METASTASIS TO LIVER (HCC): ICD-10-CM

## 2023-06-19 PROCEDURE — 99215 OFFICE O/P EST HI 40 MIN: CPT | Performed by: INTERNAL MEDICINE

## 2023-06-19 RX ORDER — SITAGLIPTIN 25 MG/1
TABLET, FILM COATED ORAL
COMMUNITY
Start: 2023-06-07

## 2023-06-19 RX ORDER — CALCITONIN SALMON 200 [IU]/.09ML
SPRAY, METERED NASAL
COMMUNITY
Start: 2023-05-15

## 2023-06-19 NOTE — TELEPHONE ENCOUNTER
Spoke with Modesta Perry on the [facility] Bridge unit at [facility], requested fax number to send over lab scripts for patient

## 2023-06-19 NOTE — PATIENT INSTRUCTIONS
Informed consent for Xeloda and Vectibix  Xeloda dose is 1500 mg twice a day, 1 week on and 1 week off  Vectibix once every 2 weeks with hydration and magnesium  Follow-up in 5 weeks  Vectibix to start after insertion of Port-A-Cath and she is getting port inserted on 6/28/2023    MRI abdomen in 1 month

## 2023-06-19 NOTE — TELEPHONE ENCOUNTER
Spoke with Jaquan Calvo at [facility] and confirmed all appts  Per Jaquan Calvo if any changes need to be made please call [phone] Etx [phone]

## 2023-06-19 NOTE — TELEPHONE ENCOUNTER
Please schedule patient for treatment at [place], patients any date/time she resides at [facility] and [address] [place] [address]  Please call [phone] to coordinate, She uses [facility]   Patient will need 5wk follow up with Dr Carlos A Welch

## 2023-06-19 NOTE — H&P (VIEW-ONLY)
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  She had right mastectomy, and lymph node dissection   There were 9 positive lymph nodes  She had Adriamycin + Cytoxan chemotherapy followed by Taxotere and after that radiation therapy  Since November/December 2012 she has been on Femara and she will take Femara until December 2022  Patient has osteoporosis and she has been on vitamin-D  and Prolia   Has problem swallowing calcium tablet  No problem with her teeth for Prolia  In 2016 she was found to have benign clustered calcifications in left breast and had a biopsy and that was benign  Dr Jazz Lopez takes care of her mammography       In December 2012 patient developed DVT right leg and she was started on Xarelto  She had GI bleeding in 2015  Xarelto was stopped  On colonoscopy she was found to have stage I right colon cancer  On 7/22/15 she underwent right hemicolectomy  Pathological diagnosis right colon cancer, stage I, T2 N0 MX, grade 2, No lymphovascular invasion and no perineural invasion  She did not require adjuvant therapy for colon cancer  On 2/9/2023  patient  presented with incarcerated ventral hernia  Patient underwent an exploratory laparotomy, reduction of ventral hernia, left colectomy, and creation of an end ileostomy  Post-op she was taken to the ICU for close monitoring and resuscitation  On 2/10 she was found to have a PE of JOANNA and RLL arteries and started on a heparin infusion  A PICC line was placed for access to provide IV medications and access for blood draws  She was transferred out of the ICU on 2/11  Antibiotics were continued through 2/19  She was transitioned to Xarelto on 2/17  See path report below  Component    Case Report   Surgical Pathology Report                         Case: [id number]                                    Authorizing Provider: Amanda Villatoro DO         Collected:           02/09/2023 1602               Ordering Location:     [facility]      Received:            02/09/2023 2209                                      Hospital Operating Room                                                       Pathologist:           Rodrick Dickson MD                                                                     Specimens:   A) - Abdominal, Hernia Sac                                                                           B) - Colon, Left Colectomy                                                                 Addendum   RESULTS OF IMMUNOHISTOCHEMICAL ANALYSIS FOR MISMATCH REPAIR PROTEIN LOSS     INTERPRETATION: NO LOSS OF NUCLEAR EXPRESSION OF MMR PROTEINS: LOW PROBABILITY OF MSI-H      Note: Background non-neoplastic tissue and/or internal control with intact nuclear expression       RESULTS:  Antibody          Clone               Description                           Results  MLH1               M1                   Mismatch repair protein       Intact nuclear expression  MSH2              [id number]      Mismatch repair protein       Intact nuclear expression  RFU8              15                    Mismatch repair protein       Intact nuclear expression  VJQ1              [id number]         Mismatch repair protein       Intact nuclear expression     Comment:   A positive control for each antibody have been reviewed and accepted  [facility] Specimen ID: [id number]  Evaluator: Rhett Early MD       These tests were developed and their performance characteristics determined by [facility]  [place] may not be cleared or approved by the [place]   Food and Drug Administration   The FDA determined that such clearance or approval is not necessary   These tests are used for clinical purposes  [place] should not be regarded as investigational or for research   This laboratory has been approved by [place], designated as a high-complexity laboratory and is qualified to perform these tests      Patients whose tumors demonstrate lack of expression of one or more DNA mismatch repair proteins might be at risk for Hadley Syndrome  This cancer susceptibility syndrome greatly increases the risk of synchronous and/or metachronous cancers in the affected patients and their family members  Normal expression of all proteins does not completely rule out familial cancer predisposition      The [facility] Hadley Syndrome Surveillance Program Task Forces recommends that all patients with lack of expression of one or more DNA mismatch repair proteins and those with concerning personal or family history should undergo thorough evaluation, counseling and possibly genetic testing  Addendum electronically signed by Floyd Blair MD on 2/17/2023 at  5:17 PM   Final Diagnosis   A  Hernia sac, hernia repair:  -   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      B  Segment of colon with portion of ileum, omentum and abdominal wall, left colectomy with en bloc omentectomy and abdominal wall resection:  -   Invasive adenocarcinoma of transverse colon, moderately differentiated, grossly perforated and invades fibroadipose tissue of abdominal wall (pT4b)  -   Second focus of invasive adenocarcinoma of transverse colon, moderately differentiated, invades into muscularis propria (pT3)  -   Terminal ileum and omentum are uninvolved    -   MMR (MLH1, MSH2, MSH6 and PMS2) studies on B18 by immunohistochemistry are pending     -   See comment and synoptic report      Comment:   Immunohistochemical stains on B18 show the tumor cells are positive for CK20, CDX2, SATB2, CK7 (patchy), PAX8 (patchy), DPC4, and are negative for TTF-1, GATA3, ER and synaptophysin        Select slides (B18, IHC) are reviewed by Dr Hanford Butt Valentino Reus who concurs with the diagnosis      Diagnosis is communicated via [employer] to Dr Nelson Vogt on 2/15/2023 at 1532       Interpretation performed at [facility], [address] [place] [address]    Electronically signed by Debbie Dudley MD on 2/15/2023 at  4:02 PM   Note         Patient has previous history of right breast cancer  She has history of colon cancer  She has history of DVT leg  I  History of thyroid nodule  She had biopsy of thyroid nodule in June 2014 and she states that was negative for cancer  She gets thyroid ultrasound  for surveillance     2/9/2023[de-identified]  EXPLORATORY LAPAROTOMY; ABDOMINAL WALL DEBRIDEMENT; REDUCTION VENTRAL HERNIA; LEFT COLON RESECTION; CREATION ILEOSTOMY; WASHOUT (Abdomen)    Patient is in a nursing home  She states she is getting stronger  She has generalized weakness and tiredness and shortness of breath  She has ileostomy bag  She has lost weight              Current Outpatient Medications:   •  amLODIPine (NORVASC) 2 5 mg tablet, Take 1 tablet (2 5 mg total) by mouth daily, Disp: 30 tablet, Rfl: 5  •  ascorbic acid (VITAMIN C) 500 MG tablet, Take 1,000 mg by mouth daily , Disp: , Rfl:   •  benazepril-hydrochlorthiazide (LOTENSIN HCT) 20-12 5 MG per tablet, take 1 tablet by mouth once daily, Disp: 90 tablet, Rfl: 5  •  bimatoprost (LUMIGAN) 0 01 % ophthalmic drops, Administer 1 drop to both eyes daily at bedtime , Disp: , Rfl:   •  calcitonin, salmon, (MIACALCIN) 200 units/act nasal spray, , Disp: , Rfl:   •  calcium citrate (CALCITRATE) 950 MG tablet, Take 1 tablet by mouth in the morning , Disp: , Rfl:   •  Cholecalciferol (VITAMIN D-3 PO), Take 1 capsule by mouth 3 (three) times a day , Disp: , Rfl:   •  Cyanocobalamin (VITAMIN B 12 PO), Take 1 tablet by mouth daily  , Disp: , Rfl:   •  Diclofenac Sodium (VOLTAREN) 1 %, Apply 2 g topically 4 (four) times a day, Disp: , Rfl:   •  insulin lispro (HumaLOG) 100 units/mL injection, Inject 1-6 Units under the skin 4 (four) times a day (before meals and at bedtime), Disp: , Rfl: 0  •  Januvia 25 MG tablet, , Disp: , Rfl:   •  metFORMIN (GLUCOPHAGE) 500 mg tablet, Take 500 mg by mouth 2 (two) times a day with meals, Disp: , Rfl:   •  miconazole (MICOTIN) 2 % powder, Apply topically in the morning Apply to groin topically and under left breast topically every day and evening shift for red wash with soap and water, pat dry and then apply powder , Disp: , Rfl:   •  pantoprazole (PROTONIX) 40 mg tablet, Take 1 tablet (40 mg total) by mouth daily in the early morning Do not start before February 24, 2023 , Disp: , Rfl: 0  •  rivaroxaban (Xarelto) 10 mg tablet, Take 1 tablet (10 mg total) by mouth daily, Disp: 30 tablet, Rfl: 0  •  vitamin E 100 UNIT capsule, Take 100 Units by mouth daily , Disp: , Rfl:     No Known Allergies    Oncology History Overview Note    In 2012 patient was diagnosed to have Hormone receptor positive, HER-2 negative stage IIIB cancer in right breast  She had right mastectomy and lymph node dissection   9 lymph nodes showed metastatic disease  Patient was given Adriamycin + Cytoxan chemotherapy followed by Taxotere and after that radiation therapy  Since November/December 2012 she has been on Femara     She will be on Femara for a total of 10 years  On 7/22/15 she underwent right hemicolectomy  Pathological diagnosis right colon cancer, stage I, T2 N0 MX, grade 2,no lymphovascular invasion and no perineural invasion  She did not require adjuvant therapy for colon cancer  She has been running slightly high CEA and that is being monitored       Malignant neoplasm of right breast in female, estrogen receptor positive ([facility] [address] )   6/21/2018 Initial Diagnosis    Malignant neoplasm of right breast in female, estrogen receptor positive [facility])      Surgery     2012- right mastectomy   2015 - right hemicolectomy      Radiation     [id number]: Radiation to right chest wall and lymph nodes      Chemotherapy     2012 -Adriamycin plus Cytoxan followed by Taxotere Followed by Femara  She will have Femara for a total of 10 years  ROS:   Reviewed 12 systems: See symptoms in HPI:    Presently no other neurological , cardiac, pulmonary, GI and  symptoms other than listed in HPI  Other symptoms are in HPI  No  fevers, chills, bleeding, bone pains, skin rash,  night sweats, numbness, claudication   Has  anxiety  No swelling of the ankles and no swollen glands  Not unusually sensitive to heat or cold  /68 (BP Location: Left arm, Patient Position: Sitting, Cuff Size: Adult)   Pulse (!) 112   Temp (!) 96 °F (35 6 °C) (Temporal)   Resp 17   LMP  (LMP Unknown)   SpO2 98%     Physical Exam:  Patient is in a wheelchair  Alert and oriented and not in distress  Vitals are above  Heart rate 104 when I checked  There is no icterus  There is no oral thrush  There is no palpable neck mass  Clear lung fields  Regular heart rate  Abdomen soft and nontender  Functioning ileostomy  No edema of the ankles  No calf tenderness  She can move all 4 extremities  Has generalized weakness  No skin rash  Lymph nodes are not palpable in the neck and axillary areas  No clubbing  Patient is anxious  Performance status 3 on ECOG scale  No lymphedema  IMAGING:  IMPRESSION:     No nodule meets current ACR criteria for requiring biopsy but followup ultrasound is recommended in 1 year             Reference: ACR Thyroid Imaging, Reporting and Data System (TI-RADS): White Paper of the [employer]   J AM Carlo Radiol 1706;85:127-923  (additional recommendations based on American Thyroid Association 2015 guidelines )        Workstation performed: [id number]      Imaging    US thyroid (Order: [id number]) - 6/17/2020  ASSESSMENT/BI-RADS CATEGORY:  Left: 2 - Benign  Overall: 2 - Benign     RECOMMENDATION:       - Routine screening mammogram in 1 year for the left breast      Workstation ID: [id number]          Imaging    Mammo screening left w 3d & cad (Order: [id number]) - 10/19/2021    RESULTS:      LUMBAR SPINE L2-L4 (L1 vertebra excluded from analysis due to local structural abnormalities or artifact): BMD  0 774  gm/cm2   T-score -2 8    These values are artifactually elevated due to the presence of scoliosis with spondylosis      LEFT  TOTAL HIP:   BMD:  0 688  gm/cm2    T-score:  -2 1     LEFT  FEMORAL NECK:   BMD:  0 628  gm/cm2   T score: -2 0      RIGHT  FOREARM:    33% RADIUS BMD:  0 490  gm/cm2  T-score:  -3 3         IMPRESSION:     1  Osteoporosis  [Based on the lumbar spine and right radius]       IMPRESSION:  Enlarged heterogeneous left thyroid lobe and thyroid isthmus  Grossly stable if not smaller peripherally densely calcified left thyroid isthmus nodule, this has been stable for more than 5 years  Asymmetrically bulky left thyroid lobe with prominent interpolar region which I do not believe is a true nodule  Followup ultrasound is recommended in 1 year  Smaller nodules which do not meet current ACR criteria for requiring biopsy      Reference: ACR Thyroid Imaging, Reporting and Data System (TI-RADS): White Paper of the [employer]  J AM Carlo Radiol 3330;75:419-573  (additional recommendations based on American Thyroid Association 2015 guidelines )        Workstation performed: [id number]          Imaging    US thyroid (Order: [id number]) - 7/14/  IMPRESSION:     1  No findings for hypermetabolic malignancy  2   Diffuse thyroid gland activity suggests thyroiditis      Workstation performed: [id number]          Imaging    NM PET CT skull base to mid thigh (Order: [id number]) - 7/14/2021    IMPRESSION:     Diffusely heterogeneous thyroid gland, and left isthmus nodule, without significant change   There are no findings requiring fine-needle aspiration           Reference: ACR Thyroid Imaging, Reporting and Data System (TI-RADS): White Paper of the [employer]  J AM Carlo Radiol 3888;46:332-098  (additional recommendations based on American Thyroid Association 2015 guidelines )        Workstation performed: [id number]        Imaging    US thyroid (Order: [id number]) - 7/26/2022  ASSESSMENT/BI-RADS CATEGORY:  Left: 0 - Incomplete: Needs Additional Imaging Evaluation  Overall: 0 - Incomplete: Needs Additional Imaging Evaluation     RECOMMENDATION:       - Diagnostic mammogram at the current time for the left breast        - Ultrasound at the current time for the left breast      Workstation ID: [id number]        Imaging    Mammo screening left w 3d & cad (Order: [id number]) - 10/21/2022    IMPRESSION:     3 hepatic lesions as described above suspicious for metastases    The smaller suspicious lesions seen on the CT are obscured by motion artifact on postcontrast imaging      The study was marked in EPIC for immediate notification                  Workstation performed: [id number]        Imaging    MRI abdomen w wo contrast (Order: [id number]) - 2/17/2023    LABS:    Results for orders placed or performed during the hospital encounter of 05/14/23   CBC and differential   Result Value Ref Range    WBC 8 47 4 31 - 10 16 Thousand/uL    RBC 3 23 (L) 3 81 - 5 12 Million/uL    Hemoglobin 10 0 (L) 11 5 - 15 4 g/dL    Hematocrit 29 8 (L) 34 8 - 46 1 %    MCV 92 82 - 98 fL    MCH 31 0 26 8 - 34 3 pg    MCHC 33 6 31 4 - 37 4 g/dL    RDW 15 4 (H) 11 6 - 15 1 %    MPV 9 4 8 9 - 12 7 fL    Platelets 130 280 - 952 Thousands/uL    nRBC 0 /100 WBCs    Neutrophils Relative 76 (H) 43 - 75 %    Immat GRANS % 1 0 - 2 %    Lymphocytes Relative 11 (L) 14 - 44 %    Monocytes Relative 10 4 - 12 %    Eosinophils Relative 2 0 - 6 %    Basophils Relative 0 0 - 1 %    Neutrophils Absolute 6 43 1 85 - 7 62 Thousands/µL    Immature Grans Absolute 0 06 0 00 - 0 20 Thousand/uL Lymphocytes Absolute 0 96 0 60 - 4 47 Thousands/µL    Monocytes Absolute 0 86 0 17 - 1 22 Thousand/µL    Eosinophils Absolute 0 13 0 00 - 0 61 Thousand/µL    Basophils Absolute 0 03 0 00 - 0 10 Thousands/µL   Comprehensive metabolic panel   Result Value Ref Range    Sodium 126 (L) 135 - 147 mmol/L    Potassium 5 0 3 5 - 5 3 mmol/L    Chloride 98 96 - 108 mmol/L    CO2 23 21 - 32 mmol/L    ANION GAP 5 4 - 13 mmol/L    BUN 45 (H) 5 - 25 mg/dL    Creatinine 0 85 0 60 - 1 30 mg/dL    Glucose 116 65 - 140 mg/dL    Calcium 11 9 (H) 8 3 - 10 1 mg/dL    AST 11 5 - 45 U/L    ALT 13 12 - 78 U/L    Alkaline Phosphatase 73 46 - 116 U/L    Total Protein 7 9 6 4 - 8 4 g/dL    Albumin 3 9 3 5 - 5 0 g/dL    Total Bilirubin 0 20 0 20 - 1 00 mg/dL    eGFR 66 ml/min/1 73sq m     *Note: Due to a large number of results and/or encounters for the requested time period, some results have not been displayed  A complete set of results can be found in Results Review  Labs, Imaging, & Other studies:   All pertinent labs and imaging studies were personally reviewed        Reviewed test results and discussed with patient  Assessment and plan: Follow-up visit for colon cancer, breast cancer and DVT leg and PE  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  Abdominal wound has finally healed  Ileostomy is working  Occasionally loose stool in the ileostomy bag  Is in a wheelchair  She walks little bit with a walker and assistant at rehabilitation [facility]  n 2012 she was diagnosed to have stage 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  She had right mastectomy, and lymph node dissection   There were 9 positive lymph nodes  She had Adriamycin + Cytoxan chemotherapy followed by Taxotere and after that radiation therapy  Since November/December 2012 she has been on Femara and she will take Femara until December 2022    Patient has osteoporosis and she has been on vitamin-D  and Prolia   Has problem swallowing calcium tablet  No problem with her teeth for Prolia  In 2016 she was found to have benign clustered calcifications in left breast and had a biopsy and that was benign  Dr Jazz Lopez takes care of her mammography       In December 2012 patient developed DVT right leg and she was started on Xarelto  She had GI bleeding in 2015  Xarelto was stopped  On colonoscopy she was found to have stage I right colon cancer  On 7/22/15 she underwent right hemicolectomy  Pathological diagnosis right colon cancer, stage I, T2 N0 MX, grade 2, No lymphovascular invasion and no perineural invasion  She did not require adjuvant therapy for colon cancer  On 2/9/2023  patient  presented with incarcerated ventral hernia  Patient underwent an exploratory laparotomy, reduction of ventral hernia, left colectomy, and creation of an end ileostomy  Post-op she was taken to the ICU for close monitoring and resuscitation  On 2/10 she was found to have a PE of JOANNA and RLL arteries and started on a heparin infusion  A PICC line was placed for access to provide IV medications and access for blood draws  She was transferred out of the ICU on 2/11  Antibiotics were continued through 2/19  She was transitioned to Xarelto on 2/17    See path report below  Component    Case Report   Surgical Pathology Report                         Case: [id number]                                    Authorizing Provider: Javier Wood DO         Collected:           02/09/2023 1602               Ordering Location:     [facility]      Received:            02/09/2023 2204                                      Hospital Operating Room                                                       Pathologist:           Junito Dutton MD                                                                     Specimens:   A) - Abdominal, Hernia Sac                                                                           B) - Colon, Left Colectomy                                                                 Addendum   RESULTS OF IMMUNOHISTOCHEMICAL ANALYSIS FOR MISMATCH REPAIR PROTEIN LOSS     INTERPRETATION: NO LOSS OF NUCLEAR EXPRESSION OF MMR PROTEINS: LOW PROBABILITY OF MSI-H      Note: Background non-neoplastic tissue and/or internal control with intact nuclear expression       RESULTS:  Antibody          Clone               Description                           Results  MLH1               M1                   Mismatch repair protein       Intact nuclear expression  MSH2              [id number]      Mismatch repair protein       Intact nuclear expression  CKY3              97                    Mismatch repair protein       Intact nuclear expression  UXE9              [id number]         Mismatch repair protein       Intact nuclear expression     Comment:   A positive control for each antibody have been reviewed and accepted  [facility] Specimen ID: [id number]  Evaluator: Shanika Rodriguez MD       These tests were developed and their performance characteristics determined by [facility]  [facility] [place] may not be cleared or approved by the [place]  Food and Drug Administration   The FDA determined that such clearance or approval is not necessary   These tests are used for clinical purposes  [facility] [place] should not be regarded as investigational or for research   This laboratory has been approved by [place] [address], designated as a high-complexity laboratory and is qualified to perform these tests      Patients whose tumors demonstrate lack of expression of one or more DNA mismatch repair proteins might be at risk for Hadley Syndrome  This cancer susceptibility syndrome greatly increases the risk of synchronous and/or metachronous cancers in the affected patients and their family members   Normal expression of all proteins does not completely rule out familial cancer predisposition      The [facility] Hadley Syndrome Surveillance Program Task Forces recommends that all patients with lack of expression of one or more DNA mismatch repair proteins and those with concerning personal or family history should undergo thorough evaluation, counseling and possibly genetic testing  Addendum electronically signed by Ramesh Macias MD on 2/17/2023 at  5:17 PM   Final Diagnosis   A  Hernia sac, hernia repair:  -   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      B  Segment of colon with portion of ileum, omentum and abdominal wall, left colectomy with en bloc omentectomy and abdominal wall resection:  -   Invasive adenocarcinoma of transverse colon, moderately differentiated, grossly perforated and invades fibroadipose tissue of abdominal wall (pT4b)  -   Second focus of invasive adenocarcinoma of transverse colon, moderately differentiated, invades into muscularis propria (pT3)  -   Terminal ileum and omentum are uninvolved  -   MMR (MLH1, MSH2, MSH6 and PMS2) studies on B18 by immunohistochemistry are pending     -   See comment and synoptic report      Comment:   Immunohistochemical stains on B18 show the tumor cells are positive for CK20, CDX2, SATB2, CK7 (patchy), PAX8 (patchy), DPC4, and are negative for TTF-1, GATA3, ER and synaptophysin        Select slides (B18, IHC) are reviewed by Dr Maximino Apley Creed Sawyer who concurs with the diagnosis      Diagnosis is communicated via [employer] to Dr Darian Daley on 2/15/2023 at 1532       Interpretation performed at [facility], [address] [place] [address]    Electronically signed by Ramesh Macias MD on 2/15/2023 at  4:02 PM   Note         Patient has previous history of right breast cancer  She has history of colon cancer  She has history of DVT leg  I  History of thyroid nodule  She had biopsy of thyroid nodule in June 2014 and she states that was negative for cancer  She gets thyroid ultrasound  for surveillance          2/9/2023[de-identified]  EXPLORATORY LAPAROTOMY; ABDOMINAL WALL DEBRIDEMENT; REDUCTION VENTRAL HERNIA; LEFT COLON RESECTION; CREATION ILEOSTOMY; WASHOUT (Abdomen)    Patient is in a nursing home  She states she is getting stronger  She has generalized weakness and tiredness and shortness of breath  She has ileostomy bag  She has lost weight       Physical examination and test results are as recorded and discussed  Patient has recurrent colon cancer and is status post total colectomy and ileostomy  Has metastatic disease to liver  K-yue and N-yue wild-type  Patient wants to try chemotherapy  We discussed FOLFOX or FOLFIRI with Erbitux or Vectibix  Not a candidate for Avastin at this time because of recent surgery and wound problem even though she has recovered from surgery and open wound  After some discussion the decision was to try Xeloda plus Vectibix  Xeloda will be 1 week on and 1 week off  This was the shared decision, a compromise because of patient's other comorbid condition and poor performance status  Patient received printed and verbal information on these medications and she signed informed consent for systemic cancer therapy and blood consent  History of pulmonary embolism and DVT  Previous history of breast cancer and colon cancer     All discussed in detail   Questions answered  Diet and activities per Dr at [facility] care [facility]  Nutritional supplements  Patient to avoid falls and trauma  Goal will be prolongation of survival   Plan is as above, Xeloda and Vectibix  Patient needs assistance in her care   Discussed precautions against coronavirus and flu and other infections  She will continue to follow with her primary physician and other consultants  See diagnoses, orders and instructions below    1  Malignant neoplasm of ascending colon (HCC)    - CBC and differential; Standing  - Comprehensive metabolic panel; Standing  - Magnesium; Standing  - CBC and differential  - Comprehensive metabolic panel  - Magnesium  - CEA; Standing  - CEA  - MRI abdomen wo contrast; Future    2  Metastasis to liver (HCC)    - CBC and differential; Standing  - Comprehensive metabolic panel; Standing  - Magnesium; Standing  - CBC and differential  - Comprehensive metabolic panel  - Magnesium  - MRI abdomen wo contrast; Future    3  Malignant neoplasm of right breast in female, estrogen receptor positive, unspecified site of breast ([facility] [address] )      4  Abnormal tumor markers    - CEA; Standing  - CEA  - MRI abdomen wo contrast; Future    5  Thyroid nodule      6  History of DVT (deep vein thrombosis)      7  History of diabetes mellitus      8  Moderate protein[facility]calorie malnutrition [facility]      9  Anemia, unspecified type          Informed consent for Xeloda and Vectibix  Xeloda dose is 1500 mg twice a day, 1 week on and 1 week off  Vectibix once every 2 weeks with hydration and magnesium  Follow-up in 5 weeks  Vectibix to start after insertion of Port-A-Cath and she is getting port inserted on 6/28/2023  MRI abdomen in 1 month    I used a dictation device to dictate this note and there could be mistakes in my note and for that patient may contact my office  Patient voiced understanding and agrees      Counseling / Coordination of Care      Provided counseling and support

## 2023-06-19 NOTE — PROGRESS NOTES
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  She had right mastectomy, and lymph node dissection   There were 9 positive lymph nodes  She had Adriamycin + Cytoxan chemotherapy followed by Taxotere and after that radiation therapy  Since November/December 2012 she has been on Femara and she will take Femara until December 2022  Patient has osteoporosis and she has been on vitamin-D  and Prolia   Has problem swallowing calcium tablet  No problem with her teeth for Prolia  In 2016 she was found to have benign clustered calcifications in left breast and had a biopsy and that was benign  Dr Adeola Franz takes care of her mammography       In December 2012 patient developed DVT right leg and she was started on Xarelto  She had GI bleeding in 2015  Xarelto was stopped  On colonoscopy she was found to have stage I right colon cancer  On 7/22/15 she underwent right hemicolectomy  Pathological diagnosis right colon cancer, stage I, T2 N0 MX, grade 2, No lymphovascular invasion and no perineural invasion  She did not require adjuvant therapy for colon cancer  On 2/9/2023  patient  presented with incarcerated ventral hernia  Patient underwent an exploratory laparotomy, reduction of ventral hernia, left colectomy, and creation of an end ileostomy  Post-op she was taken to the ICU for close monitoring and resuscitation  On 2/10 she was found to have a PE of JOANNA and RLL arteries and started on a heparin infusion  A PICC line was placed for access to provide IV medications and access for blood draws  She was transferred out of the ICU on 2/11  Antibiotics were continued through 2/19  She was transitioned to Xarelto on 2/17  See path report below  Component    Case Report   Surgical Pathology Report                         Case: [id number]                                    Authorizing Provider: Lisa Abreu DO         Collected:           02/09/2023 1602               Ordering Location:     [facility]      Received:            02/09/2023 2206                                      Hospital Operating Room                                                       Pathologist:           Ritchie Lefort, MD                                                                     Specimens:   A) - Abdominal, Hernia Sac                                                                           B) - Colon, Left Colectomy                                                                 Addendum   RESULTS OF IMMUNOHISTOCHEMICAL ANALYSIS FOR MISMATCH REPAIR PROTEIN LOSS     INTERPRETATION: NO LOSS OF NUCLEAR EXPRESSION OF MMR PROTEINS: LOW PROBABILITY OF MSI-H      Note: Background non-neoplastic tissue and/or internal control with intact nuclear expression       RESULTS:  Antibody          Clone               Description                           Results  MLH1               M1                   Mismatch repair protein       Intact nuclear expression  MSH2              [id number]      Mismatch repair protein       Intact nuclear expression  EZV9              65                    Mismatch repair protein       Intact nuclear expression  UFW2              [id number]         Mismatch repair protein       Intact nuclear expression     Comment:   A positive control for each antibody have been reviewed and accepted  [employer] Specimen ID: [id number]  Evaluator: Oma Reeves MD       These tests were developed and their performance characteristics determined by [facility]  [place] may not be cleared or approved by the [place]   Food and Drug Administration   The FDA determined that such clearance or approval is not necessary   These tests are used for clinical purposes  [place] should not be regarded as investigational or for research   This laboratory has been approved by [place], designated as a high-complexity laboratory and is qualified to perform these tests      Patients whose tumors demonstrate lack of expression of one or more DNA mismatch repair proteins might be at risk for Hadley Syndrome  This cancer susceptibility syndrome greatly increases the risk of synchronous and/or metachronous cancers in the affected patients and their family members  Normal expression of all proteins does not completely rule out familial cancer predisposition      The [facility] Hadley Syndrome Surveillance Program Task Forces recommends that all patients with lack of expression of one or more DNA mismatch repair proteins and those with concerning personal or family history should undergo thorough evaluation, counseling and possibly genetic testing  Addendum electronically signed by Dallas Alexander MD on 2/17/2023 at  5:17 PM   Final Diagnosis   A  Hernia sac, hernia repair:  -   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      B  Segment of colon with portion of ileum, omentum and abdominal wall, left colectomy with en bloc omentectomy and abdominal wall resection:  -   Invasive adenocarcinoma of transverse colon, moderately differentiated, grossly perforated and invades fibroadipose tissue of abdominal wall (pT4b)  -   Second focus of invasive adenocarcinoma of transverse colon, moderately differentiated, invades into muscularis propria (pT3)  -   Terminal ileum and omentum are uninvolved    -   MMR (MLH1, MSH2, MSH6 and PMS2) studies on B18 by immunohistochemistry are pending     -   See comment and synoptic report      Comment:   Immunohistochemical stains on B18 show the tumor cells are positive for CK20, CDX2, SATB2, CK7 (patchy), PAX8 (patchy), DPC4, and are negative for TTF-1, GATA3, ER and synaptophysin        Select slides (B18, IHC) are reviewed by Dr Anne-Marie Hoang who concurs with the diagnosis      Diagnosis is communicated via [employer] to Dr Dennis Clarke on 2/15/2023 at 1532       Interpretation performed at [facility], [address] [place] [address]    Electronically signed by Rodrick Dickson MD on 2/15/2023 at  4:02 PM   Note         Patient has previous history of right breast cancer  She has history of colon cancer  She has history of DVT leg  I  History of thyroid nodule  She had biopsy of thyroid nodule in June 2014 and she states that was negative for cancer  She gets thyroid ultrasound  for surveillance     2/9/2023[de-identified]  EXPLORATORY LAPAROTOMY; ABDOMINAL WALL DEBRIDEMENT; REDUCTION VENTRAL HERNIA; LEFT COLON RESECTION; CREATION ILEOSTOMY; WASHOUT (Abdomen)    Patient is in a nursing home  She states she is getting stronger  She has generalized weakness and tiredness and shortness of breath  She has ileostomy bag  She has lost weight              Current Outpatient Medications:   •  amLODIPine (NORVASC) 2 5 mg tablet, Take 1 tablet (2 5 mg total) by mouth daily, Disp: 30 tablet, Rfl: 5  •  ascorbic acid (VITAMIN C) 500 MG tablet, Take 1,000 mg by mouth daily , Disp: , Rfl:   •  benazepril-hydrochlorthiazide (LOTENSIN HCT) 20-12 5 MG per tablet, take 1 tablet by mouth once daily, Disp: 90 tablet, Rfl: 5  •  bimatoprost (LUMIGAN) 0 01 % ophthalmic drops, Administer 1 drop to both eyes daily at bedtime , Disp: , Rfl:   •  calcitonin, salmon, (MIACALCIN) 200 units/act nasal spray, , Disp: , Rfl:   •  calcium citrate (CALCITRATE) 950 MG tablet, Take 1 tablet by mouth in the morning , Disp: , Rfl:   •  Cholecalciferol (VITAMIN D-3 PO), Take 1 capsule by mouth 3 (three) times a day , Disp: , Rfl:   •  Cyanocobalamin (VITAMIN B 12 PO), Take 1 tablet by mouth daily  , Disp: , Rfl:   •  Diclofenac Sodium (VOLTAREN) 1 %, Apply 2 g topically 4 (four) times a day, Disp: , Rfl:   •  insulin lispro (HumaLOG) 100 units/mL injection, Inject 1-6 Units under the skin 4 (four) times a day (before meals and at bedtime), Disp: , Rfl: 0  •  Januvia 25 MG tablet, , Disp: , Rfl:   •  metFORMIN (GLUCOPHAGE) 500 mg tablet, Take 500 mg by mouth 2 (two) times a day with meals, Disp: , Rfl:   •  miconazole (MICOTIN) 2 % powder, Apply topically in the morning Apply to groin topically and under left breast topically every day and evening shift for red wash with soap and water, pat dry and then apply powder , Disp: , Rfl:   •  pantoprazole (PROTONIX) 40 mg tablet, Take 1 tablet (40 mg total) by mouth daily in the early morning Do not start before February 24, 2023 , Disp: , Rfl: 0  •  rivaroxaban (Xarelto) 10 mg tablet, Take 1 tablet (10 mg total) by mouth daily, Disp: 30 tablet, Rfl: 0  •  vitamin E 100 UNIT capsule, Take 100 Units by mouth daily , Disp: , Rfl:     No Known Allergies    Oncology History Overview Note    In 2012 patient was diagnosed to have Hormone receptor positive, HER-2 negative stage IIIB cancer in right breast  She had right mastectomy and lymph node dissection   9 lymph nodes showed metastatic disease  Patient was given Adriamycin + Cytoxan chemotherapy followed by Taxotere and after that radiation therapy  Since November/December 2012 she has been on Femara     She will be on Femara for a total of 10 years  On 7/22/15 she underwent right hemicolectomy  Pathological diagnosis right colon cancer, stage I, T2 N0 MX, grade 2,no lymphovascular invasion and no perineural invasion  She did not require adjuvant therapy for colon cancer  She has been running slightly high CEA and that is being monitored       Malignant neoplasm of right breast in female, estrogen receptor positive ([facility] [address] )   6/21/2018 Initial Diagnosis    Malignant neoplasm of right breast in female, estrogen receptor positive [facility])      Surgery     2012- right mastectomy   2015 - right hemicolectomy      Radiation     [id number]: Radiation to right chest wall and lymph nodes      Chemotherapy     2012 -Adriamycin plus Cytoxan followed by Taxotere Followed by Femara  She will have Femara for a total of 10 years  ROS:   Reviewed 12 systems: See symptoms in HPI:    Presently no other neurological , cardiac, pulmonary, GI and  symptoms other than listed in HPI  Other symptoms are in HPI  No  fevers, chills, bleeding, bone pains, skin rash,  night sweats, numbness, claudication   Has  anxiety  No swelling of the ankles and no swollen glands  Not unusually sensitive to heat or cold  /68 (BP Location: Left arm, Patient Position: Sitting, Cuff Size: Adult)   Pulse (!) 112   Temp (!) 96 °F (35 6 °C) (Temporal)   Resp 17   LMP  (LMP Unknown)   SpO2 98%     Physical Exam:  Patient is in a wheelchair  Alert and oriented and not in distress  Vitals are above  Heart rate 104 when I checked  There is no icterus  There is no oral thrush  There is no palpable neck mass  Clear lung fields  Regular heart rate  Abdomen soft and nontender  Functioning ileostomy  No edema of the ankles  No calf tenderness  She can move all 4 extremities  Has generalized weakness  No skin rash  Lymph nodes are not palpable in the neck and axillary areas  No clubbing  Patient is anxious  Performance status 3 on ECOG scale  No lymphedema  IMAGING:  IMPRESSION:     No nodule meets current ACR criteria for requiring biopsy but followup ultrasound is recommended in 1 year             Reference: ACR Thyroid Imaging, Reporting and Data System (TI-RADS): White Paper of the [employer]   J AM Carlo Radiol 4551;43:420-728  (additional recommendations based on American Thyroid Association 2015 guidelines )        Workstation performed: [id number]      Imaging    US thyroid (Order: [id number]) - 6/17/2020  ASSESSMENT/BI-RADS CATEGORY:  Left: 2 - Benign  Overall: 2 - Benign     RECOMMENDATION:       - Routine screening mammogram in 1 year for the left breast      Workstation ID: [id number]          Imaging    Mammo screening left w 3d & cad (Order: [id number]) - 10/19/2021    RESULTS:      LUMBAR SPINE L2-L4 (L1 vertebra excluded from analysis due to local structural abnormalities or artifact): BMD  0 774  gm/cm2   T-score -2 8    These values are artifactually elevated due to the presence of scoliosis with spondylosis      LEFT  TOTAL HIP:   BMD:  0 688  gm/cm2    T-score:  -2 1     LEFT  FEMORAL NECK:   BMD:  0 628  gm/cm2   T score: -2 0      RIGHT  FOREARM:    33% RADIUS BMD:  0 490  gm/cm2  T-score:  -3 3         IMPRESSION:     1  Osteoporosis  [Based on the lumbar spine and right radius]       IMPRESSION:  Enlarged heterogeneous left thyroid lobe and thyroid isthmus  Grossly stable if not smaller peripherally densely calcified left thyroid isthmus nodule, this has been stable for more than 5 years  Asymmetrically bulky left thyroid lobe with prominent interpolar region which I do not believe is a true nodule  Followup ultrasound is recommended in 1 year  Smaller nodules which do not meet current ACR criteria for requiring biopsy      Reference: ACR Thyroid Imaging, Reporting and Data System (TI-RADS): White Paper of the [employer]  J AM Carlo Radiol 2193;06:107-145  (additional recommendations based on American Thyroid Association 2015 guidelines )        Workstation performed: [id number]          Imaging    US thyroid (Order: [id number]) - 7/14/  IMPRESSION:     1  No findings for hypermetabolic malignancy  2   Diffuse thyroid gland activity suggests thyroiditis      Workstation performed: [id number]          Imaging    NM PET CT skull base to mid thigh (Order: [id number]) - 7/14/2021    IMPRESSION:     Diffusely heterogeneous thyroid gland, and left isthmus nodule, without significant change   There are no findings requiring fine-needle aspiration           Reference: ACR Thyroid Imaging, Reporting and Data System (TI-RADS): White Paper of the [employer]  J AM Carlo Radiol 8100;28:674-535  (additional recommendations based on American Thyroid Association 2015 guidelines )        Workstation performed: [id number]        Imaging    US thyroid (Order: [id number]) - 7/26/2022  ASSESSMENT/BI-RADS CATEGORY:  Left: 0 - Incomplete: Needs Additional Imaging Evaluation  Overall: 0 - Incomplete: Needs Additional Imaging Evaluation     RECOMMENDATION:       - Diagnostic mammogram at the current time for the left breast        - Ultrasound at the current time for the left breast      Workstation ID: [id number]        Imaging    Mammo screening left w 3d & cad (Order: [id number]) - 10/21/2022    IMPRESSION:     3 hepatic lesions as described above suspicious for metastases    The smaller suspicious lesions seen on the CT are obscured by motion artifact on postcontrast imaging      The study was marked in EPIC for immediate notification                  Workstation performed: [id number]        Imaging    MRI abdomen w wo contrast (Order: [id number]) - 2/17/2023    LABS:    Results for orders placed or performed during the hospital encounter of 05/14/23   CBC and differential   Result Value Ref Range    WBC 8 47 4 31 - 10 16 Thousand/uL    RBC 3 23 (L) 3 81 - 5 12 Million/uL    Hemoglobin 10 0 (L) 11 5 - 15 4 g/dL    Hematocrit 29 8 (L) 34 8 - 46 1 %    MCV 92 82 - 98 fL    MCH 31 0 26 8 - 34 3 pg    MCHC 33 6 31 4 - 37 4 g/dL    RDW 15 4 (H) 11 6 - 15 1 %    MPV 9 4 8 9 - 12 7 fL    Platelets 380 281 - 167 Thousands/uL    nRBC 0 /100 WBCs    Neutrophils Relative 76 (H) 43 - 75 %    Immat GRANS % 1 0 - 2 %    Lymphocytes Relative 11 (L) 14 - 44 %    Monocytes Relative 10 4 - 12 %    Eosinophils Relative 2 0 - 6 %    Basophils Relative 0 0 - 1 %    Neutrophils Absolute 6 43 1 85 - 7 62 Thousands/µL    Immature Grans Absolute 0 06 0 00 - 0 20 Thousand/uL Lymphocytes Absolute 0 96 0 60 - 4 47 Thousands/µL    Monocytes Absolute 0 86 0 17 - 1 22 Thousand/µL    Eosinophils Absolute 0 13 0 00 - 0 61 Thousand/µL    Basophils Absolute 0 03 0 00 - 0 10 Thousands/µL   Comprehensive metabolic panel   Result Value Ref Range    Sodium 126 (L) 135 - 147 mmol/L    Potassium 5 0 3 5 - 5 3 mmol/L    Chloride 98 96 - 108 mmol/L    CO2 23 21 - 32 mmol/L    ANION GAP 5 4 - 13 mmol/L    BUN 45 (H) 5 - 25 mg/dL    Creatinine 0 85 0 60 - 1 30 mg/dL    Glucose 116 65 - 140 mg/dL    Calcium 11 9 (H) 8 3 - 10 1 mg/dL    AST 11 5 - 45 U/L    ALT 13 12 - 78 U/L    Alkaline Phosphatase 73 46 - 116 U/L    Total Protein 7 9 6 4 - 8 4 g/dL    Albumin 3 9 3 5 - 5 0 g/dL    Total Bilirubin 0 20 0 20 - 1 00 mg/dL    eGFR 66 ml/min/1 73sq m     *Note: Due to a large number of results and/or encounters for the requested time period, some results have not been displayed  A complete set of results can be found in Results Review  Labs, Imaging, & Other studies:   All pertinent labs and imaging studies were personally reviewed        Reviewed test results and discussed with patient  Assessment and plan: Follow-up visit for colon cancer, breast cancer and DVT leg and PE  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  Abdominal wound has finally healed  Ileostomy is working  Occasionally loose stool in the ileostomy bag  Is in a wheelchair  She walks little bit with a walker and assistant at rehabilitation [place]  n 2012 she was diagnosed to have stage 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  She had right mastectomy, and lymph node dissection   There were 9 positive lymph nodes  She had Adriamycin + Cytoxan chemotherapy followed by Taxotere and after that radiation therapy  Since November/December 2012 she has been on Femara and she will take Femara until December 2022    Patient has osteoporosis and she has been on vitamin-D  and Prolia   Has problem swallowing calcium tablet  No problem with her teeth for Prolia  In 2016 she was found to have benign clustered calcifications in left breast and had a biopsy and that was benign  Dr Mindi Sandoval takes care of her mammography       In December 2012 patient developed DVT right leg and she was started on Xarelto  She had GI bleeding in 2015  Xarelto was stopped  On colonoscopy she was found to have stage I right colon cancer  On 7/22/15 she underwent right hemicolectomy  Pathological diagnosis right colon cancer, stage I, T2 N0 MX, grade 2, No lymphovascular invasion and no perineural invasion  She did not require adjuvant therapy for colon cancer  On 2/9/2023  patient  presented with incarcerated ventral hernia  Patient underwent an exploratory laparotomy, reduction of ventral hernia, left colectomy, and creation of an end ileostomy  Post-op she was taken to the ICU for close monitoring and resuscitation  On 2/10 she was found to have a PE of JOANNA and RLL arteries and started on a heparin infusion  A PICC line was placed for access to provide IV medications and access for blood draws  She was transferred out of the ICU on 2/11  Antibiotics were continued through 2/19  She was transitioned to Xarelto on 2/17    See path report below  Component    Case Report   Surgical Pathology Report                         Case: [id number]                                    Authorizing Provider: Velasquez Garrison DO         Collected:           02/09/2023 1602               Ordering Location:     [facility]      Received:            02/09/2023 2204                                      Hospital Operating Room                                                       Pathologist:           Thea Jacobson MD                                                                     Specimens:   A) - Abdominal, Hernia Sac                                                                           B) - Colon, Left Colectomy                                                                 Addendum   RESULTS OF IMMUNOHISTOCHEMICAL ANALYSIS FOR MISMATCH REPAIR PROTEIN LOSS     INTERPRETATION: NO LOSS OF NUCLEAR EXPRESSION OF MMR PROTEINS: LOW PROBABILITY OF MSI-H      Note: Background non-neoplastic tissue and/or internal control with intact nuclear expression       RESULTS:  Antibody          Clone               Description                           Results  MLH1               M1                   Mismatch repair protein       Intact nuclear expression  MSH2              [id number]      Mismatch repair protein       Intact nuclear expression  YGX3              50                    Mismatch repair protein       Intact nuclear expression  TLS2              [id number]         Mismatch repair protein       Intact nuclear expression     Comment:   A positive control for each antibody have been reviewed and accepted  [facility] Specimen ID: [id number]  Evaluator: Shanika Rodriguez MD       These tests were developed and their performance characteristics determined by [facility]  [place] may not be cleared or approved by the [place]  Food and Drug Administration   The FDA determined that such clearance or approval is not necessary   These tests are used for clinical purposes  [place] should not be regarded as investigational or for research   This laboratory has been approved by [facility], designated as a high-complexity laboratory and is qualified to perform these tests      Patients whose tumors demonstrate lack of expression of one or more DNA mismatch repair proteins might be at risk for Hadley Syndrome  This cancer susceptibility syndrome greatly increases the risk of synchronous and/or metachronous cancers in the affected patients and their family members   Normal expression of all proteins does not completely rule out familial cancer predisposition      The [facility] Hadley Syndrome Surveillance Program Task Forces recommends that all patients with lack of expression of one or more DNA mismatch repair proteins and those with concerning personal or family history should undergo thorough evaluation, counseling and possibly genetic testing  Addendum electronically signed by Louis Nuñez MD on 2/17/2023 at  5:17 PM   Final Diagnosis   A  Hernia sac, hernia repair:  -   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      B  Segment of colon with portion of ileum, omentum and abdominal wall, left colectomy with en bloc omentectomy and abdominal wall resection:  -   Invasive adenocarcinoma of transverse colon, moderately differentiated, grossly perforated and invades fibroadipose tissue of abdominal wall (pT4b)  -   Second focus of invasive adenocarcinoma of transverse colon, moderately differentiated, invades into muscularis propria (pT3)  -   Terminal ileum and omentum are uninvolved  -   MMR (MLH1, MSH2, MSH6 and PMS2) studies on B18 by immunohistochemistry are pending     -   See comment and synoptic report      Comment:   Immunohistochemical stains on B18 show the tumor cells are positive for CK20, CDX2, SATB2, CK7 (patchy), PAX8 (patchy), DPC4, and are negative for TTF-1, GATA3, ER and synaptophysin        Select slides (B18, IHC) are reviewed by Dr Chato Corbett who concurs with the diagnosis      Diagnosis is communicated via [employer] to Dr Dixie Purvis on 2/15/2023 at 1532       Interpretation performed at [facility], [address] [place] [address]    Electronically signed by Louis Nuñez MD on 2/15/2023 at  4:02 PM   Note         Patient has previous history of right breast cancer  She has history of colon cancer  She has history of DVT leg  I  History of thyroid nodule  She had biopsy of thyroid nodule in June 2014 and she states that was negative for cancer  She gets thyroid ultrasound  for surveillance          2/9/2023[de-identified]  EXPLORATORY LAPAROTOMY; ABDOMINAL WALL DEBRIDEMENT; REDUCTION VENTRAL HERNIA; LEFT COLON RESECTION; CREATION ILEOSTOMY; WASHOUT (Abdomen)    Patient is in a nursing home  She states she is getting stronger  She has generalized weakness and tiredness and shortness of breath  She has ileostomy bag  She has lost weight       Physical examination and test results are as recorded and discussed  Patient has recurrent colon cancer and is status post total colectomy and ileostomy  Has metastatic disease to liver  K-yue and N-yue wild-type  Patient wants to try chemotherapy  We discussed FOLFOX or FOLFIRI with Erbitux or Vectibix  Not a candidate for Avastin at this time because of recent surgery and wound problem even though she has recovered from surgery and open wound  After some discussion the decision was to try Xeloda plus Vectibix  Xeloda will be 1 week on and 1 week off  This was the shared decision, a compromise because of patient's other comorbid condition and poor performance status  Patient received printed and verbal information on these medications and she signed informed consent for systemic cancer therapy and blood consent  History of pulmonary embolism and DVT  Previous history of breast cancer and colon cancer     All discussed in detail   Questions answered  Diet and activities per Dr at [facility] care [facility]  Nutritional supplements  Patient to avoid falls and trauma  Goal will be prolongation of survival   Plan is as above, Xeloda and Vectibix  Patient needs assistance in her care   Discussed precautions against coronavirus and flu and other infections  She will continue to follow with her primary physician and other consultants  See diagnoses, orders and instructions below    1  Malignant neoplasm of ascending colon (HCC)    - CBC and differential; Standing  - Comprehensive metabolic panel; Standing  - Magnesium; Standing  - CBC and differential  - Comprehensive metabolic panel  - Magnesium  - CEA; Standing  - CEA  - MRI abdomen wo contrast; Future    2  Metastasis to liver (HCC)    - CBC and differential; Standing  - Comprehensive metabolic panel; Standing  - Magnesium; Standing  - CBC and differential  - Comprehensive metabolic panel  - Magnesium  - MRI abdomen wo contrast; Future    3  Malignant neoplasm of right breast in female, estrogen receptor positive, unspecified site of breast ([facility] [address] )      4  Abnormal tumor markers    - CEA; Standing  - CEA  - MRI abdomen wo contrast; Future    5  Thyroid nodule      6  History of DVT (deep vein thrombosis)      7  History of diabetes mellitus      8  Moderate protein[facility]calorie malnutrition ([facility] )      9  Anemia, unspecified type          Informed consent for Xeloda and Vectibix  Xeloda dose is 1500 mg twice a day, 1 week on and 1 week off  Vectibix once every 2 weeks with hydration and magnesium  Follow-up in 5 weeks  Vectibix to start after insertion of Port-A-Cath and she is getting port inserted on 6/28/2023  MRI abdomen in 1 month    I used a dictation device to dictate this note and there could be mistakes in my note and for that patient may contact my office  Patient voiced understanding and agrees      Counseling / Coordination of Care      Provided counseling and support

## 2023-06-20 ENCOUNTER — DOCUMENTATION (OUTPATIENT)
Dept: HEMATOLOGY ONCOLOGY | Facility: CLINIC | Age: 78
End: 2023-06-20

## 2023-06-20 ENCOUNTER — TELEPHONE (OUTPATIENT)
Dept: HEMATOLOGY ONCOLOGY | Facility: CLINIC | Age: 78
End: 2023-06-20

## 2023-06-20 NOTE — TELEPHONE ENCOUNTER
Returned telephone call spoke with Drea Morales  She was questioning the Xeloda rx and where it will be filled  Explained Message sent to our finance dept and they reach out to her insurance  Drea  is requesting a phone call when the Xeloda and infusion appts and are all set up  Which our office will do when we have all the information, should be sometime next week

## 2023-06-20 NOTE — TELEPHONE ENCOUNTER
Patient Call    Who are you speaking with? [place]    If it is not the patient, are they listed on an active communication consent form? N/A   What is the reason for this call? [facility] where Dr Samuel Mccabe was gonna send the Xeloda   Does this require a call back? Yes   If a call back is required, please list best call back number [phone]    If a call back is required, advise that a message will be forwarded to their care team and someone will return their call as soon as possible  Did you relay this information to the patient?  Yes

## 2023-06-20 NOTE — PROGRESS NOTES
6/19/23 Reviewed Xeloda and Vectibix handout with Pt and gave her a copy of the handouts  Also gave her a copy of the handouts for the [facility] healthcare staff  Dr Roula Arita completed the snf form and I put both that form and the 2 handouts in the envelope to give to the snf staff  Instructed possible side effects and importance of reporting any symptoms  email sent to Oral chemo finance dept  Gave Pt chemotherapy and You and the Eating Hints booklets

## 2023-06-21 ENCOUNTER — TELEPHONE (OUTPATIENT)
Dept: RADIOLOGY | Facility: HOSPITAL | Age: 78
End: 2023-06-21

## 2023-06-21 ENCOUNTER — TELEPHONE (OUTPATIENT)
Dept: HEMATOLOGY ONCOLOGY | Facility: CLINIC | Age: 78
End: 2023-06-21

## 2023-06-21 DIAGNOSIS — C18.2 MALIGNANT NEOPLASM OF ASCENDING COLON (HCC): Primary | ICD-10-CM

## 2023-06-21 RX ORDER — CAPECITABINE 500 MG/1
1500 TABLET, FILM COATED ORAL 2 TIMES DAILY
Qty: 84 TABLET | Refills: 11 | Status: SHIPPED | OUTPATIENT
Start: 2023-06-21

## 2023-06-21 NOTE — TELEPHONE ENCOUNTER
Spoke with [employer] and confirmed pt is likely a long term resident, although this is not set in stone  Was provided the phone number for [facility] which is the pharmacy that supplies their meds  Per pharmacist Nitesh they can provide Capecitabine 500mg tablets  Requested script be faxed to them at [phone]

## 2023-06-21 NOTE — PRE-PROCEDURE INSTRUCTIONS
Pre-procedure Instructions for Interventional Radiology  [address]  [place] [address] [phone]    You are scheduled for a/an [facility] Placement  On Wednesday 6/28/23  Your tentative arrival time is 1200  Short stay will notify you the day before your procedure with the exact arrival time and the location to arrive  To prepare for your procedure:  1  Please arrange for someone to drive you home after the procedure and stay with you until the next morning if you are instructed to do so  This is typically for patients receiving some type of sedative or anesthetic for the procedure  2  DO NOT EAT OR DRINK ANYTHING after midnight on the evening before your procedure including candy & gum   3  ONLY SIPS OF WATER with your medications are allowed on the morning of your procedure  4  TAKE ALL OF YOUR REGULAR MEDICATIONS THE MORNING OF YOUR PROCEDURE with sips of water! We may call you to stop some of your blood sugar, blood pressure and blood thinning medications depending on the procedure  Please take all of these medications unless we instruct you to stop them  5  If you have an allergy to x-ray dye, please contact Interventional Radiology for an x-ray dye preparation which usually consists of an oral steroid and Benadryl  The day of your procedure:  1  Bring a list of the medications you take at home  2  Bring medications you take for breathing problems (such as inhalers), medications for chest pain, or both  3  Bring a case for your glasses or contacts  4  Bring your insurance card and a form of photo ID   5  Please leave all valuables such as credit cards and jewelry at home  6  Report to the registration desk in [facility] main [facility]  Ask to be directed to [facility]  7  While your procedure is being performed, your family may wait in the Radiology Waiting Room on the 1st floor in Radiology    if they need to leave, they may provide a number to be called following the procedure  8  Be prepared to stay overnight just in case  Sometimes procedures will indicate the need for further observation or treatment  9  If you are scheduled for a follow-up visit with the Interventional Radiologist after your procedure, you will be called with a date and time  Special Instructions (Medications to stop taking before your procedure etc ):  Above reviewed with Drea Morales from [facility]  Patient coming by Anson Pozo

## 2023-06-21 NOTE — TELEPHONE ENCOUNTER
[facility] [phone] regarding new start Xeloda  Phone rang multiple times with no option for voicemail  Will try again later

## 2023-06-21 NOTE — TELEPHONE ENCOUNTER
[address] [facility] and requested to speak with Radha Mcpherson (she called yesterday and spoke with office nurse Luis Fernando Pineda regarding Xeloda)  Radha Mcpherson was not available so my call was forwarded to the director of nursing  Left voicemail with Xeloda info - start date, pharmacy and dosing  Provided my direct line for any questions or if additional info is needed

## 2023-06-27 ENCOUNTER — TELEPHONE (OUTPATIENT)
Dept: HEMATOLOGY ONCOLOGY | Facility: CLINIC | Age: 78
End: 2023-06-27

## 2023-06-27 PROBLEM — Z95.828 PORT-A-CATH IN PLACE: Status: ACTIVE | Noted: 2023-06-27

## 2023-06-27 NOTE — TELEPHONE ENCOUNTER
6/27 Called back and was transferred to voicemail  Left message offering to answer any questions/concerns regarding pt's Xeloda  Provided my direct line and requested call back

## 2023-06-27 NOTE — TELEPHONE ENCOUNTER
6/27 [address] [place] [address] at the request of [phone] [facility] RN  Was asked to call back later as they were dealing with an emergency

## 2023-06-28 ENCOUNTER — HOSPITAL ENCOUNTER (OUTPATIENT)
Dept: RADIOLOGY | Facility: HOSPITAL | Age: 78
Discharge: HOME | End: 2023-06-28
Attending: INTERNAL MEDICINE
Payer: COMMERCIAL

## 2023-06-28 VITALS
HEIGHT: 60 IN | WEIGHT: 125 LBS | OXYGEN SATURATION: 96 % | RESPIRATION RATE: 16 BRPM | TEMPERATURE: 97.3 F | HEART RATE: 92 BPM | DIASTOLIC BLOOD PRESSURE: 52 MMHG | SYSTOLIC BLOOD PRESSURE: 91 MMHG | BODY MASS INDEX: 24.54 KG/M2

## 2023-06-28 DIAGNOSIS — C18.2 MALIGNANT NEOPLASM OF ASCENDING COLON (HCC): ICD-10-CM

## 2023-06-28 PROCEDURE — C1788 PORT, INDWELLING, IMP: HCPCS

## 2023-06-28 PROCEDURE — 99152 MOD SED SAME PHYS/QHP 5/>YRS: CPT

## 2023-06-28 PROCEDURE — 99153 MOD SED SAME PHYS/QHP EA: CPT

## 2023-06-28 PROCEDURE — 36561 INSERT TUNNELED CV CATH: CPT

## 2023-06-28 PROCEDURE — C1769 GUIDE WIRE: HCPCS

## 2023-06-28 PROCEDURE — C1894 INTRO/SHEATH, NON-LASER: HCPCS

## 2023-06-28 PROCEDURE — 76937 US GUIDE VASCULAR ACCESS: CPT

## 2023-06-28 RX ORDER — FENTANYL CITRATE 50 UG/ML
INJECTION, SOLUTION INTRAMUSCULAR; INTRAVENOUS AS NEEDED
Status: COMPLETED | OUTPATIENT
Start: 2023-06-28 | End: 2023-06-28

## 2023-06-28 RX ORDER — MAGNESIUM SULFATE HEPTAHYDRATE 40 MG/ML
4 INJECTION, SOLUTION INTRAVENOUS ONCE AS NEEDED
OUTPATIENT
Start: 2023-07-07

## 2023-06-28 RX ORDER — LOPERAMIDE HYDROCHLORIDE 2 MG/1
2 TABLET ORAL 2 TIMES DAILY
Status: ON HOLD | COMMUNITY

## 2023-06-28 RX ORDER — SODIUM CHLORIDE 9 MG/ML
75 INJECTION, SOLUTION INTRAVENOUS CONTINUOUS
Status: DISCONTINUED | OUTPATIENT
Start: 2023-06-28 | End: 2023-06-29 | Stop reason: HOSPADM

## 2023-06-28 RX ORDER — CEFAZOLIN SODIUM 1 G/50ML
1000 SOLUTION INTRAVENOUS ONCE
Status: COMPLETED | OUTPATIENT
Start: 2023-06-28 | End: 2023-06-28

## 2023-06-28 RX ORDER — LIDOCAINE HYDROCHLORIDE AND EPINEPHRINE 10; 10 MG/ML; UG/ML
INJECTION, SOLUTION INFILTRATION; PERINEURAL AS NEEDED
Status: COMPLETED | OUTPATIENT
Start: 2023-06-28 | End: 2023-06-28

## 2023-06-28 RX ORDER — ACETAMINOPHEN 325 MG/1
975 TABLET ORAL EVERY 8 HOURS
Status: ON HOLD | COMMUNITY

## 2023-06-28 RX ORDER — MIDAZOLAM HYDROCHLORIDE 2 MG/2ML
INJECTION, SOLUTION INTRAMUSCULAR; INTRAVENOUS AS NEEDED
Status: COMPLETED | OUTPATIENT
Start: 2023-06-28 | End: 2023-06-28

## 2023-06-28 RX ORDER — MAGNESIUM SULFATE HEPTAHYDRATE 40 MG/ML
2 INJECTION, SOLUTION INTRAVENOUS ONCE AS NEEDED
Status: CANCELLED | OUTPATIENT
Start: 2023-07-07

## 2023-06-28 RX ORDER — SODIUM CHLORIDE 1 G/1
1 TABLET ORAL 2 TIMES DAILY
Status: ON HOLD | COMMUNITY

## 2023-06-28 RX ORDER — SODIUM CHLORIDE 9 MG/ML
20 INJECTION, SOLUTION INTRAVENOUS ONCE
OUTPATIENT
Start: 2023-07-07

## 2023-06-28 RX ADMIN — SODIUM CHLORIDE 75 ML/HR: 0.9 INJECTION, SOLUTION INTRAVENOUS at 11:17

## 2023-06-28 RX ADMIN — FENTANYL CITRATE 25 MCG: 50 INJECTION, SOLUTION INTRAMUSCULAR; INTRAVENOUS at 14:01

## 2023-06-28 RX ADMIN — CEFAZOLIN SODIUM 1000 MG: 1 SOLUTION INTRAVENOUS at 13:42

## 2023-06-28 RX ADMIN — LIDOCAINE HYDROCHLORIDE,EPINEPHRINE BITARTRATE 20 ML: 10; .01 INJECTION, SOLUTION INFILTRATION; PERINEURAL at 14:19

## 2023-06-28 RX ADMIN — MIDAZOLAM 0.5 MG: 1 INJECTION INTRAMUSCULAR; INTRAVENOUS at 14:01

## 2023-06-28 RX ADMIN — FENTANYL CITRATE 25 MCG: 50 INJECTION, SOLUTION INTRAMUSCULAR; INTRAVENOUS at 14:21

## 2023-06-28 NOTE — INTERVAL H&P NOTE
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

## 2023-06-28 NOTE — SEDATION DOCUMENTATION
Port Placement performed by Dr Stephenie Jacobs  Patient tolerated well and vss throughout procedure   IR Procedure Bedrest Start Time is 1176  Report called to primary RN

## 2023-06-28 NOTE — BRIEF OP NOTE (RAD/CATH)
INTERVENTIONAL RADIOLOGY PROCEDURE NOTE    Date: 6/28/2023    Procedure:   Procedure Summary     Date: 06/28/23 Room / Location: [address] [place] [address] Interventional Radiology    Anesthesia Start:  Anesthesia Stop:     Procedure: IR PORT PLACEMENT Diagnosis:       Malignant neoplasm of ascending colon ([address] )      (malignant neoplasm of ascending colon )    Scheduled Providers:  Responsible Provider:     Anesthesia Type: Not recorded ASA Status: Not recorded          Preoperative diagnosis:   1  Malignant neoplasm of ascending colon (HCC)         Postoperative diagnosis: Same  Surgeon: Felicia Way MD     Assistant: None  No qualified resident was available  Blood loss: 0    Specimens: 0     Findings: R chest port placement    Ancef given    Complications: None immediate      Anesthesia: conscious sedation

## 2023-06-28 NOTE — DISCHARGE INSTRUCTIONS
Implanted Venous Access Port     WHAT YOU NEED TO KNOW:   An implanted venous access port is a device used to give treatments and take blood  It may also be called a central venous access device (CVAD)  The port is a small container that is placed under your skin, usually in your upper chest  The port is attached to a catheter that enters a large vein  DISCHARGE INSTRUCTIONS:   Resume your normal diet  Small sips of flat soda will help with mild nausea  Prevent an infection:   Wash your hands often  Use soap and water  Clean your hands before and after you care for your port  Remind everyone who cares for your port to wash their hands  Check your skin for infection every day  Look for redness, swelling, or fluid oozing from the port site  Care for your port:   1  You may shower beginning 48 hours after procedure  2  Change dressing if it becomes wet  3  Remove dressing after 24 hours  Leave glue in place  4  It is normal for some bruising to occur  5  Use Tylenol for pain  6  Limit use of arm on the side that your port was placed  Lift nothing heavier than 5 pounds for 1 week, and then gradually increase activity as tolerated  7  DO NOT apply ointment, lotion or cream to port site until incision is healed  Allow glue to fall off  DO NOT attempt to peel glue from skin even it it begins to flake  8, After the port incision is healed you may swim, bathe  Notify the Interventional Radiologist if you have any of the followin  Fever above 101 F    2  Increased redness or swelling after 1st day  3  Increased pain after 1st day  4  Any sign of infection (drainage from port site, skin separation, hot to touch)  5  Persistent nausea or vomiting  Contact Interventional Radiology at [phone] [facility] PATIENTS: Contact Interventional Radiology at [phone]) ([address] [facility] [address]: Contact Interventional Radiology at [phone])              Moderate Sedation   WHAT YOU NEED TO KNOW:   Moderate sedation, or conscious sedation, is medicine used during procedures to help you feel relaxed and calm  You will be awake and able to follow directions without anxiety or pain  You will remember little to none of the procedure  You may feel tired, weak, or unsteady on your feet after you get sedation  You may also have trouble concentrating or short-term memory loss  These symptoms should go away in 24 hours or less  DISCHARGE INSTRUCTIONS:   Call 911 or have someone else call for any of the following: You have sudden trouble breathing  You cannot be woken  Seek care immediately if:   You have a severe headache or dizziness  Your heart is beating faster than usual   Contact your healthcare provider if:   You have a fever  You have nausea or are vomiting for more than 8 hours after the procedure  Your skin is itchy, swollen, or you have a rash  You have questions or concerns about your condition or care  Self-care:   Have someone stay with you for 24 hours  This person can drive you to errands and help you do things around the house  This person can also watch for problems  Rest and do quiet activities for 24 hours  Do not exercise, ride a bike, or play sports  Stand up slowly to prevent dizziness and falls  Take short walks around the house with another person  Slowly return to your usual activities the next day  Do not drive or use dangerous machines or tools for 24 hours  You may injure yourself or others  Examples include a lawnmower, saw, or drill  Do not return to work for 24 hours if you use dangerous machines or tools for work  Do not make important decisions for 24 hours  For example, do not sign important papers or invest money  Drink liquids as directed  Liquids help flush the sedation medicine out of your body  Ask how much liquid to drink each day and which liquids are best for you        Eat small, frequent meals to prevent nausea and vomiting  Start with clear liquids such as juice or broth  If you do not vomit after clear liquids, you can eat your usual foods  Do not drink alcohol or take medicines that make you drowsy  This includes medicines that help you sleep and anxiety medicines  Ask your healthcare provider if it is safe for you to take pain medicine  Follow up with your healthcare provider as directed: Write down your questions so you remember to ask them during your visits  © 2017 [address] Information is for End User's use only and may not be sold, redistributed or otherwise used for commercial purposes  All illustrations and images included in CareNotes® are the copyrighted property of [employer]  or [employer]  The above information is an  only  It is not intended as medical advice for individual conditions or treatments  Talk to your doctor, nurse or pharmacist before following any medical regimen to see if it is safe and effective for you

## 2023-07-03 ENCOUNTER — HOSPITAL ENCOUNTER (OUTPATIENT)
Dept: INFUSION CENTER | Facility: HOSPITAL | Age: 78
Discharge: HOME | End: 2023-07-03
Payer: COMMERCIAL

## 2023-07-03 VITALS — TEMPERATURE: 97 F

## 2023-07-03 DIAGNOSIS — Z17.0 MALIGNANT NEOPLASM OF RIGHT BREAST IN FEMALE, ESTROGEN RECEPTOR POSITIVE, UNSPECIFIED SITE OF BREAST (HCC): ICD-10-CM

## 2023-07-03 DIAGNOSIS — C50.911 MALIGNANT NEOPLASM OF RIGHT BREAST IN FEMALE, ESTROGEN RECEPTOR POSITIVE, UNSPECIFIED SITE OF BREAST (HCC): ICD-10-CM

## 2023-07-03 DIAGNOSIS — C18.9 ADENOCARCINOMA OF COLON (HCC): ICD-10-CM

## 2023-07-03 DIAGNOSIS — C78.7 METASTASIS TO LIVER (HCC): ICD-10-CM

## 2023-07-03 DIAGNOSIS — C18.2 MALIGNANT NEOPLASM OF ASCENDING COLON (HCC): Primary | ICD-10-CM

## 2023-07-03 DIAGNOSIS — R97.8 ABNORMAL TUMOR MARKERS: ICD-10-CM

## 2023-07-03 DIAGNOSIS — Z95.828 PORT-A-CATH IN PLACE: ICD-10-CM

## 2023-07-03 DIAGNOSIS — M81.8 OTHER OSTEOPOROSIS WITHOUT CURRENT PATHOLOGICAL FRACTURE: ICD-10-CM

## 2023-07-03 DIAGNOSIS — Z85.038 PERSONAL HISTORY OF OTHER MALIGNANT NEOPLASM OF LARGE INTESTINE: ICD-10-CM

## 2023-07-03 DIAGNOSIS — M85.80 OSTEOPENIA DUE TO CANCER THERAPY: ICD-10-CM

## 2023-07-03 LAB
ALBUMIN SERPL BCP-MCNC: 3.6 G/DL (ref 3.5–5)
ALP SERPL-CCNC: 79 U/L (ref 46–116)
ALT SERPL W P-5'-P-CCNC: 14 U/L (ref 12–78)
ANION GAP SERPL CALCULATED.3IONS-SCNC: 7 MMOL/L
AST SERPL W P-5'-P-CCNC: 16 U/L (ref 5–45)
BASOPHILS # BLD AUTO: 0.03 THOUSANDS/ÂΜL (ref 0–0.1)
BASOPHILS NFR BLD AUTO: 0 % (ref 0–1)
BILIRUB SERPL-MCNC: 0.24 MG/DL (ref 0.2–1)
BUN SERPL-MCNC: 32 MG/DL (ref 5–25)
CALCIUM SERPL-MCNC: 10.5 MG/DL (ref 8.3–10.1)
CEA SERPL-MCNC: 19.7 NG/ML (ref 0–3)
CHLORIDE SERPL-SCNC: 102 MMOL/L (ref 96–108)
CO2 SERPL-SCNC: 22 MMOL/L (ref 21–32)
CREAT SERPL-MCNC: 0.94 MG/DL (ref 0.6–1.3)
EOSINOPHIL # BLD AUTO: 0.26 THOUSAND/ÂΜL (ref 0–0.61)
EOSINOPHIL NFR BLD AUTO: 4 % (ref 0–6)
ERYTHROCYTE [DISTWIDTH] IN BLOOD BY AUTOMATED COUNT: 14.9 % (ref 11.6–15.1)
GFR SERPL CREATININE-BSD FRML MDRD: 58 ML/MIN/1.73SQ M
GLUCOSE SERPL-MCNC: 128 MG/DL (ref 65–140)
HCT VFR BLD AUTO: 31.3 % (ref 34.8–46.1)
HGB BLD-MCNC: 10.2 G/DL (ref 11.5–15.4)
IMM GRANULOCYTES # BLD AUTO: 0.04 THOUSAND/UL (ref 0–0.2)
IMM GRANULOCYTES NFR BLD AUTO: 1 % (ref 0–2)
LYMPHOCYTES # BLD AUTO: 1.07 THOUSANDS/ÂΜL (ref 0.6–4.47)
LYMPHOCYTES NFR BLD AUTO: 15 % (ref 14–44)
MAGNESIUM SERPL-MCNC: 1.8 MG/DL (ref 1.6–2.6)
MCH RBC QN AUTO: 32.4 PG (ref 26.8–34.3)
MCHC RBC AUTO-ENTMCNC: 32.6 G/DL (ref 31.4–37.4)
MCV RBC AUTO: 99 FL (ref 82–98)
MONOCYTES # BLD AUTO: 1.11 THOUSAND/ÂΜL (ref 0.17–1.22)
MONOCYTES NFR BLD AUTO: 15 % (ref 4–12)
NEUTROPHILS # BLD AUTO: 4.81 THOUSANDS/ÂΜL (ref 1.85–7.62)
NEUTS SEG NFR BLD AUTO: 65 % (ref 43–75)
NRBC BLD AUTO-RTO: 0 /100 WBCS
PLATELET # BLD AUTO: 258 THOUSANDS/UL (ref 149–390)
PMV BLD AUTO: 9.3 FL (ref 8.9–12.7)
POTASSIUM SERPL-SCNC: 5 MMOL/L (ref 3.5–5.3)
PROT SERPL-MCNC: 7.5 G/DL (ref 6.4–8.4)
RBC # BLD AUTO: 3.15 MILLION/UL (ref 3.81–5.12)
SODIUM SERPL-SCNC: 131 MMOL/L (ref 135–147)
WBC # BLD AUTO: 7.32 THOUSAND/UL (ref 4.31–10.16)

## 2023-07-03 PROCEDURE — 85025 COMPLETE CBC W/AUTO DIFF WBC: CPT | Performed by: INTERNAL MEDICINE

## 2023-07-03 PROCEDURE — 80053 COMPREHEN METABOLIC PANEL: CPT | Performed by: INTERNAL MEDICINE

## 2023-07-03 PROCEDURE — 83735 ASSAY OF MAGNESIUM: CPT | Performed by: INTERNAL MEDICINE

## 2023-07-03 PROCEDURE — 82378 CARCINOEMBRYONIC ANTIGEN: CPT

## 2023-07-03 NOTE — PROGRESS NOTES
Pt expressed concerns that she didn't get her prolia injection last March due to getting a colostomy. Pt stated that she is supposed to get that inj. Messages sent to ordering provider's office to ask about getting injection this week with her chemo.

## 2023-07-07 ENCOUNTER — APPOINTMENT (EMERGENCY)
Dept: RADIOLOGY | Facility: HOSPITAL | Age: 78
DRG: 314 | End: 2023-07-07
Payer: COMMERCIAL

## 2023-07-07 ENCOUNTER — HOSPITAL ENCOUNTER (INPATIENT)
Facility: HOSPITAL | Age: 78
LOS: 6 days | Discharge: NURSING, REHAB OR LONG-TERM CARE | DRG: 314 | End: 2023-07-13
Attending: EMERGENCY MEDICINE | Admitting: EMERGENCY MEDICINE
Payer: COMMERCIAL

## 2023-07-07 ENCOUNTER — TELEPHONE (OUTPATIENT)
Dept: HEMATOLOGY ONCOLOGY | Facility: CLINIC | Age: 78
End: 2023-07-07

## 2023-07-07 ENCOUNTER — HOSPITAL ENCOUNTER (OUTPATIENT)
Dept: INFUSION CENTER | Facility: HOSPITAL | Age: 78
End: 2023-07-07
Attending: INTERNAL MEDICINE
Payer: COMMERCIAL

## 2023-07-07 VITALS
TEMPERATURE: 97 F | HEIGHT: 60 IN | HEART RATE: 71 BPM | SYSTOLIC BLOOD PRESSURE: 64 MMHG | BODY MASS INDEX: 25.45 KG/M2 | WEIGHT: 129.63 LBS | DIASTOLIC BLOOD PRESSURE: 36 MMHG | RESPIRATION RATE: 18 BRPM

## 2023-07-07 DIAGNOSIS — I95.9 HYPOTENSION: Primary | ICD-10-CM

## 2023-07-07 DIAGNOSIS — C78.7 METASTASIS TO LIVER (HCC): ICD-10-CM

## 2023-07-07 DIAGNOSIS — C18.9 ADENOCARCINOMA OF COLON (HCC): Primary | ICD-10-CM

## 2023-07-07 DIAGNOSIS — C18.9 ADENOCARCINOMA OF COLON (HCC): ICD-10-CM

## 2023-07-07 LAB
2HR DELTA HS TROPONIN: 1 NG/L
4HR DELTA HS TROPONIN: 1 NG/L
ALBUMIN SERPL BCP-MCNC: 3.8 G/DL (ref 3.5–5)
ALP SERPL-CCNC: 75 U/L (ref 46–116)
ALT SERPL W P-5'-P-CCNC: 13 U/L (ref 12–78)
ANION GAP SERPL CALCULATED.3IONS-SCNC: 10 MMOL/L
APTT PPP: 35 SECONDS (ref 23–37)
AST SERPL W P-5'-P-CCNC: 13 U/L (ref 5–45)
BASOPHILS # BLD AUTO: 0.03 THOUSANDS/ÂΜL (ref 0–0.1)
BASOPHILS NFR BLD AUTO: 1 % (ref 0–1)
BILIRUB SERPL-MCNC: 0.23 MG/DL (ref 0.2–1)
BILIRUB UR QL STRIP: NEGATIVE
BNP SERPL-MCNC: 56 PG/ML (ref 0–100)
BUN SERPL-MCNC: 34 MG/DL (ref 5–25)
CALCIUM SERPL-MCNC: 10.5 MG/DL (ref 8.3–10.1)
CARDIAC TROPONIN I PNL SERPL HS: 4 NG/L
CARDIAC TROPONIN I PNL SERPL HS: 5 NG/L
CARDIAC TROPONIN I PNL SERPL HS: 5 NG/L
CHLORIDE SERPL-SCNC: 101 MMOL/L (ref 96–108)
CLARITY UR: CLEAR
CO2 SERPL-SCNC: 22 MMOL/L (ref 21–32)
COLOR UR: COLORLESS
CREAT SERPL-MCNC: 0.96 MG/DL (ref 0.6–1.3)
EOSINOPHIL # BLD AUTO: 0.03 THOUSAND/ÂΜL (ref 0–0.61)
EOSINOPHIL NFR BLD AUTO: 1 % (ref 0–6)
ERYTHROCYTE [DISTWIDTH] IN BLOOD BY AUTOMATED COUNT: 14.6 % (ref 11.6–15.1)
GFR SERPL CREATININE-BSD FRML MDRD: 57 ML/MIN/1.73SQ M
GLUCOSE SERPL-MCNC: 110 MG/DL (ref 65–140)
GLUCOSE SERPL-MCNC: 132 MG/DL (ref 65–140)
GLUCOSE UR STRIP-MCNC: NEGATIVE MG/DL
HCT VFR BLD AUTO: 30.5 % (ref 34.8–46.1)
HGB BLD-MCNC: 10 G/DL (ref 11.5–15.4)
HGB UR QL STRIP.AUTO: NEGATIVE
IMM GRANULOCYTES # BLD AUTO: 0.02 THOUSAND/UL (ref 0–0.2)
IMM GRANULOCYTES NFR BLD AUTO: 0 % (ref 0–2)
INR PPP: 1.92 (ref 0.84–1.19)
KETONES UR STRIP-MCNC: NEGATIVE MG/DL
LACTATE SERPL-SCNC: 1.1 MMOL/L (ref 0.5–2)
LACTATE SERPL-SCNC: 2.6 MMOL/L (ref 0.5–2)
LACTATE SERPL-SCNC: 2.6 MMOL/L (ref 0.5–2)
LEUKOCYTE ESTERASE UR QL STRIP: NEGATIVE
LYMPHOCYTES # BLD AUTO: 0.87 THOUSANDS/ÂΜL (ref 0.6–4.47)
LYMPHOCYTES NFR BLD AUTO: 16 % (ref 14–44)
MAGNESIUM SERPL-MCNC: 2.3 MG/DL (ref 1.6–2.6)
MCH RBC QN AUTO: 32.4 PG (ref 26.8–34.3)
MCHC RBC AUTO-ENTMCNC: 32.8 G/DL (ref 31.4–37.4)
MCV RBC AUTO: 99 FL (ref 82–98)
MONOCYTES # BLD AUTO: 1 THOUSAND/ÂΜL (ref 0.17–1.22)
MONOCYTES NFR BLD AUTO: 18 % (ref 4–12)
NEUTROPHILS # BLD AUTO: 3.51 THOUSANDS/ÂΜL (ref 1.85–7.62)
NEUTS SEG NFR BLD AUTO: 64 % (ref 43–75)
NITRITE UR QL STRIP: NEGATIVE
NRBC BLD AUTO-RTO: 0 /100 WBCS
PH UR STRIP.AUTO: 5 [PH]
PHOSPHATE SERPL-MCNC: 3.2 MG/DL (ref 2.3–4.1)
PLATELET # BLD AUTO: 209 THOUSANDS/UL (ref 149–390)
PMV BLD AUTO: 8.8 FL (ref 8.9–12.7)
POTASSIUM SERPL-SCNC: 4.8 MMOL/L (ref 3.5–5.3)
PROT SERPL-MCNC: 7.8 G/DL (ref 6.4–8.4)
PROT UR STRIP-MCNC: NEGATIVE MG/DL
PROTHROMBIN TIME: 22.2 SECONDS (ref 11.6–14.5)
RBC # BLD AUTO: 3.09 MILLION/UL (ref 3.81–5.12)
SODIUM SERPL-SCNC: 133 MMOL/L (ref 135–147)
SP GR UR STRIP.AUTO: 1.01 (ref 1–1.03)
UROBILINOGEN UR STRIP-ACNC: <2 MG/DL
WBC # BLD AUTO: 5.46 THOUSAND/UL (ref 4.31–10.16)

## 2023-07-07 PROCEDURE — 83880 ASSAY OF NATRIURETIC PEPTIDE: CPT

## 2023-07-07 PROCEDURE — 96366 THER/PROPH/DIAG IV INF ADDON: CPT

## 2023-07-07 PROCEDURE — 96365 THER/PROPH/DIAG IV INF INIT: CPT

## 2023-07-07 PROCEDURE — 71260 CT THORAX DX C+: CPT

## 2023-07-07 PROCEDURE — 74177 CT ABD & PELVIS W/CONTRAST: CPT

## 2023-07-07 PROCEDURE — 99285 EMERGENCY DEPT VISIT HI MDM: CPT

## 2023-07-07 PROCEDURE — 83605 ASSAY OF LACTIC ACID: CPT

## 2023-07-07 PROCEDURE — 71045 X-RAY EXAM CHEST 1 VIEW: CPT

## 2023-07-07 PROCEDURE — 85025 COMPLETE CBC W/AUTO DIFF WBC: CPT

## 2023-07-07 PROCEDURE — 84100 ASSAY OF PHOSPHORUS: CPT

## 2023-07-07 PROCEDURE — 96361 HYDRATE IV INFUSION ADD-ON: CPT

## 2023-07-07 PROCEDURE — 85610 PROTHROMBIN TIME: CPT

## 2023-07-07 PROCEDURE — 85730 THROMBOPLASTIN TIME PARTIAL: CPT

## 2023-07-07 PROCEDURE — 80053 COMPREHEN METABOLIC PANEL: CPT

## 2023-07-07 PROCEDURE — 84484 ASSAY OF TROPONIN QUANT: CPT

## 2023-07-07 PROCEDURE — 93005 ELECTROCARDIOGRAM TRACING: CPT

## 2023-07-07 PROCEDURE — 81003 URINALYSIS AUTO W/O SCOPE: CPT

## 2023-07-07 PROCEDURE — 83735 ASSAY OF MAGNESIUM: CPT

## 2023-07-07 PROCEDURE — G1004 CDSM NDSC: HCPCS

## 2023-07-07 PROCEDURE — 36415 COLL VENOUS BLD VENIPUNCTURE: CPT

## 2023-07-07 PROCEDURE — 82948 REAGENT STRIP/BLOOD GLUCOSE: CPT

## 2023-07-07 PROCEDURE — 87040 BLOOD CULTURE FOR BACTERIA: CPT

## 2023-07-07 PROCEDURE — 99291 CRITICAL CARE FIRST HOUR: CPT | Performed by: EMERGENCY MEDICINE

## 2023-07-07 RX ORDER — LOPERAMIDE HYDROCHLORIDE 2 MG/1
2 CAPSULE ORAL
Status: DISCONTINUED | OUTPATIENT
Start: 2023-07-08 | End: 2023-07-13 | Stop reason: HOSPADM

## 2023-07-07 RX ORDER — INSULIN LISPRO 100 [IU]/ML
1-5 INJECTION, SOLUTION INTRAVENOUS; SUBCUTANEOUS
Status: DISCONTINUED | OUTPATIENT
Start: 2023-07-08 | End: 2023-07-09

## 2023-07-07 RX ORDER — SODIUM CHLORIDE, SODIUM LACTATE, POTASSIUM CHLORIDE, CALCIUM CHLORIDE 600; 310; 30; 20 MG/100ML; MG/100ML; MG/100ML; MG/100ML
100 INJECTION, SOLUTION INTRAVENOUS CONTINUOUS
Status: DISCONTINUED | OUTPATIENT
Start: 2023-07-07 | End: 2023-07-08

## 2023-07-07 RX ORDER — ALBUMIN, HUMAN INJ 5% 5 %
12.5 SOLUTION INTRAVENOUS ONCE
Status: COMPLETED | OUTPATIENT
Start: 2023-07-07 | End: 2023-07-08

## 2023-07-07 RX ORDER — CHLORHEXIDINE GLUCONATE 0.12 MG/ML
15 RINSE ORAL EVERY 12 HOURS SCHEDULED
Status: DISCONTINUED | OUTPATIENT
Start: 2023-07-07 | End: 2023-07-13 | Stop reason: HOSPADM

## 2023-07-07 RX ORDER — PANTOPRAZOLE SODIUM 40 MG/1
40 TABLET, DELAYED RELEASE ORAL
Status: DISCONTINUED | OUTPATIENT
Start: 2023-07-08 | End: 2023-07-13 | Stop reason: HOSPADM

## 2023-07-07 RX ORDER — MAGNESIUM SULFATE HEPTAHYDRATE 40 MG/ML
2 INJECTION, SOLUTION INTRAVENOUS ONCE AS NEEDED
Status: DISCONTINUED | OUTPATIENT
Start: 2023-07-07 | End: 2023-07-10 | Stop reason: HOSPADM

## 2023-07-07 RX ORDER — ENOXAPARIN SODIUM 100 MG/ML
40 INJECTION SUBCUTANEOUS
Status: DISCONTINUED | OUTPATIENT
Start: 2023-07-08 | End: 2023-07-08

## 2023-07-07 RX ORDER — SODIUM CHLORIDE, SODIUM LACTATE, POTASSIUM CHLORIDE, CALCIUM CHLORIDE 600; 310; 30; 20 MG/100ML; MG/100ML; MG/100ML; MG/100ML
1000 INJECTION, SOLUTION INTRAVENOUS CONTINUOUS
Status: DISCONTINUED | OUTPATIENT
Start: 2023-07-07 | End: 2023-07-07

## 2023-07-07 RX ADMIN — IOHEXOL 100 ML: 350 INJECTION, SOLUTION INTRAVENOUS at 20:16

## 2023-07-07 RX ADMIN — MAGNESIUM SULFATE HEPTAHYDRATE 2 G: 40 INJECTION, SOLUTION INTRAVENOUS at 11:54

## 2023-07-07 RX ADMIN — SODIUM CHLORIDE, SODIUM LACTATE, POTASSIUM CHLORIDE, AND CALCIUM CHLORIDE 1000 ML: .6; .31; .03; .02 INJECTION, SOLUTION INTRAVENOUS at 17:19

## 2023-07-07 RX ADMIN — SODIUM CHLORIDE, SODIUM LACTATE, POTASSIUM CHLORIDE, AND CALCIUM CHLORIDE 1000 ML: .6; .31; .03; .02 INJECTION, SOLUTION INTRAVENOUS at 19:53

## 2023-07-07 RX ADMIN — ALBUMIN (HUMAN) 12.5 G: 12.5 INJECTION, SOLUTION INTRAVENOUS at 23:44

## 2023-07-07 RX ADMIN — CHLORHEXIDINE GLUCONATE 15 ML: 1.2 SOLUTION ORAL at 22:20

## 2023-07-07 RX ADMIN — SODIUM CHLORIDE, SODIUM LACTATE, POTASSIUM CHLORIDE, AND CALCIUM CHLORIDE 100 ML/HR: .6; .31; .03; .02 INJECTION, SOLUTION INTRAVENOUS at 20:29

## 2023-07-07 RX ADMIN — SODIUM CHLORIDE, SODIUM LACTATE, POTASSIUM CHLORIDE, AND CALCIUM CHLORIDE 1000 ML: .6; .31; .03; .02 INJECTION, SOLUTION INTRAVENOUS at 15:40

## 2023-07-07 RX ADMIN — SODIUM CHLORIDE, SODIUM LACTATE, POTASSIUM CHLORIDE, AND CALCIUM CHLORIDE 700 ML: .6; .31; .03; .02 INJECTION, SOLUTION INTRAVENOUS at 14:35

## 2023-07-07 RX ADMIN — HYDROCORTISONE SODIUM SUCCINATE 100 MG: 100 INJECTION, POWDER, FOR SOLUTION INTRAMUSCULAR; INTRAVENOUS at 22:21

## 2023-07-07 NOTE — ED PROVIDER NOTES
History  Chief Complaint   Patient presents with   • Hypotension     From infusion center for low bp (07P systolic)  Asymptomatic, only able to give 2g mag at the infusion center     67-year-old female past medical history of breast cancer in remission, colon cancer, colectomy/colostomy bag, DVT presenting for hypotension. Patient was at infusion center today to get chemotherapy, after receiving the pretreatment magnesium infusion and noted to be hypotensive. Patient sent immediately to the ED. Patient denies any symptoms, denies headache dizziness lightheadedness chest pain shortness of breath abdominal pain nausea vomiting fever chills. Patient denies any current allergies. Prior to Admission Medications   Prescriptions Last Dose Informant Patient Reported? Taking? BANANA FLAKES PO   Yes No   Sig: Take 1 Units by mouth 2 (two) times a day   Cholecalciferol (VITAMIN D-3 PO)  Self Yes No   Sig: Take 1 capsule by mouth 3 (three) times a day    Cyanocobalamin (VITAMIN B 12 PO)  Self Yes No   Sig: Take 1 tablet by mouth daily. Diclofenac Sodium (VOLTAREN) 1 %  Self No No   Sig: Apply 2 g topically 4 (four) times a day   Januvia 25 MG tablet   Yes No   acetaminophen (TYLENOL) 325 mg tablet   Yes No   Sig: Take 975 mg by mouth every 8 (eight) hours   amLODIPine (NORVASC) 2.5 mg tablet  Self No No   Sig: Take 1 tablet (2.5 mg total) by mouth daily   ascorbic acid (VITAMIN C) 500 MG tablet  Self Yes No   Sig: Take 1,000 mg by mouth daily    benazepril-hydrochlorthiazide (LOTENSIN HCT) 20-12.5 MG per tablet  Self No No   Sig: take 1 tablet by mouth once daily   bimatoprost (LUMIGAN) 0.01 % ophthalmic drops  Self Yes No   Sig: Administer 1 drop to both eyes daily at bedtime    calcitonin, salmon, (MIACALCIN) 200 units/act nasal spray   Yes No   calcium citrate (CALCITRATE) 950 MG tablet  Self Yes No   Sig: Take 1 tablet by mouth in the morning.    capecitabine (XELODA) 500 MG tablet   No No   Sig: Take 3 tablets (1,500 mg total) by mouth 2 (two) times a day 1 week on followed by 1 week off starting 7/7/23   insulin lispro (HumaLOG) 100 units/mL injection   No No   Sig: Inject 1-6 Units under the skin 4 (four) times a day (before meals and at bedtime)   loperamide (IMODIUM A-D) 2 MG tablet   Yes No   Sig: Take 2 mg by mouth 2 (two) times a day   metFORMIN (GLUCOPHAGE) 500 mg tablet   Yes No   Sig: Take 500 mg by mouth 2 (two) times a day with meals   miconazole (MICOTIN) 2 % powder   Yes No   Sig: Apply topically in the morning Apply to groin topically and under left breast topically every day and evening shift for red wash with soap and water, pat dry and then apply powder. pantoprazole (PROTONIX) 40 mg tablet   No No   Sig: Take 1 tablet (40 mg total) by mouth daily in the early morning Do not start before February 24, 2023.    rivaroxaban (Xarelto) 10 mg tablet   No No   Sig: Take 1 tablet (10 mg total) by mouth daily   sodium chloride 1 g tablet   Yes No   Sig: Take 1 g by mouth 2 (two) times a day   vitamin E 100 UNIT capsule  Self Yes No   Sig: Take 100 Units by mouth daily       Facility-Administered Medications: None       Past Medical History:   Diagnosis Date   • Acute embolism and thrombosis of deep vein of lower extremity (HCC)    • Adenocarcinoma of colon, [facility] (HCC)    • Breast cancer ([address]) 2012    Right Breast    • Cancer (HCC)    • Diabetes mellitus ([address])    • DVT (deep venous thrombosis) (HCC)    • GERD (gastroesophageal reflux disease)    • Hypertension    • Pes planus        Past Surgical History:   Procedure Laterality Date   • COLONOSCOPY     • HERNIA REPAIR N/A 2/9/2023    Procedure: EXPLORATORY LAPAROTOMY; ABDOMINAL WALL DEBRIDEMENT; REDUCTION VENTRAL HERNIA; LEFT COLON RESECTION; CREATION ILEOSTOMY; WASHOUT;  Surgeon: Traci Fink DO;  Location: [facility];  Service: General   • HYSTERECTOMY      complete @ age 29   • IR PORT PLACEMENT  6/28/2023   • IR PORT REMOVAL  9/12/2018   • MASTECTOMY Right 2012   • DE COLONOSCOPY FLX DX W/COLLJ SPEC WHEN PFRMD N/A 8/23/2016    Procedure: COLONOSCOPY;  Surgeon: Katya Dover MD;  Location: [facility]; Service: Colorectal   • DE COLONOSCOPY FLX DX W/COLLJ SPEC WHEN PFRMD N/A 9/5/2017    Procedure: COLONOSCOPY;  Surgeon: Katya Dover MD;  Location: [facility]; Service: Colorectal   • DE ESOPHAGOGASTRODUODENOSCOPY TRANSORAL DIAGNOSTIC N/A 9/5/2017    Procedure: ESOPHAGOGASTRODUODENOSCOPY (EGD); Surgeon: Samuel Smith DO;  Location: [facility]; Service: Gastroenterology       Family History   Problem Relation Age of Onset   • Other Mother         chronic bronchitis   • Brain cancer Father    • Prostate cancer Paternal Grandfather 79   • Pancreatic cancer Maternal Aunt 39   • Breast cancer Neg Hx      I have reviewed and agree with the history as documented. E-Cigarette/Vaping   • E-Cigarette Use Never User      E-Cigarette/Vaping Substances   • Nicotine No    • THC No    • CBD No    • Flavoring No    • Other No    • Unknown No      Social History     Tobacco Use   • Smoking status: Never   • Smokeless tobacco: Never   Vaping Use   • Vaping Use: Never used   Substance Use Topics   • Alcohol use: Not Currently   • Drug use: No        Review of Systems   All other systems reviewed and are negative.       Physical Exam  ED Triage Vitals   Temperature Pulse Respirations Blood Pressure SpO2   07/07/23 1404 07/07/23 1334 07/07/23 1334 07/07/23 1334 07/07/23 1334   97.8 °F (36.6 °C) 87 18 101/55 100 %      Temp Source Heart Rate Source Patient Position - Orthostatic VS BP Location FiO2 (%)   07/07/23 1404 07/07/23 1334 07/07/23 1345 07/07/23 1345 --   Oral Monitor Sitting Left arm       Pain Score       07/07/23 1334       No Pain             Orthostatic Vital Signs  Vitals:    07/07/23 1345 07/07/23 1400 07/07/23 1415 07/07/23 1430   BP: 97/54 (!) 63/43 (!) 79/51 (!) 72/39   Pulse: 96 92 88 86   Patient Position - Orthostatic VS: Sitting Sitting Sitting Sitting       Physical Exam  Constitutional:       Appearance: Normal appearance. HENT:      Mouth/Throat:      Mouth: Mucous membranes are moist.   Eyes:      Extraocular Movements: Extraocular movements intact. Conjunctiva/sclera: Conjunctivae normal.      Pupils: Pupils are equal, round, and reactive to light. Cardiovascular:      Rate and Rhythm: Normal rate and regular rhythm. Pulses: Normal pulses. Heart sounds: Normal heart sounds. Pulmonary:      Effort: Pulmonary effort is normal.      Breath sounds: Normal breath sounds. Abdominal:      General: Abdomen is flat. Comments: Colostomy site clean dry and intact   Musculoskeletal:         General: Normal range of motion. Cervical back: Normal range of motion. Skin:     General: Skin is warm. Capillary Refill: Capillary refill takes less than 2 seconds. Comments: PICC line on right chest wall clean dry and intact   Neurological:      General: No focal deficit present. Mental Status: She is alert.          ED Medications  Medications   lactated ringers infusion 1,000 mL (has no administration in time range)   lactated ringers bolus 700 mL (700 mL Intravenous New Bag 7/7/23 1435)       Diagnostic Studies  Results Reviewed     Procedure Component Value Units Date/Time    Protime-INR [[id number]]  (Abnormal) Collected: 07/07/23 1415    Lab Status: Final result Specimen: Blood from Arm, Left Updated: 07/07/23 1452     Protime 22.2 seconds      INR 1.92    APTT [[id number]]  (Normal) Collected: 07/07/23 1415    Lab Status: Final result Specimen: Blood from Arm, Left Updated: 07/07/23 1452     PTT 35 seconds     CBC and differential [[id number]]  (Abnormal) Collected: 07/07/23 1415    Lab Status: Final result Specimen: Blood from Arm, Left Updated: 07/07/23 1429     WBC 5.46 Thousand/uL      RBC 3.09 Million/uL      Hemoglobin 10.0 g/dL      Hematocrit 30.5 %      MCV 99 fL      MCH 32.4 pg      MCHC 32.8 g/dL      RDW 14.6 %      MPV 8.8 fL      Platelets 734 Thousands/uL      nRBC 0 /100 WBCs      Neutrophils Relative 64 %      Immat GRANS % 0 %      Lymphocytes Relative 16 %      Monocytes Relative 18 %      Eosinophils Relative 1 %      Basophils Relative 1 %      Neutrophils Absolute 3.51 Thousands/µL      Immature Grans Absolute 0.02 Thousand/uL      Lymphocytes Absolute 0.87 Thousands/µL      Monocytes Absolute 1.00 Thousand/µL      Eosinophils Absolute 0.03 Thousand/µL      Basophils Absolute 0.03 Thousands/µL     Lactic acid [[id number]] Collected: 07/07/23 1415    Lab Status: In process Specimen: Blood from Arm, Left Updated: 07/07/23 1420    Comprehensive metabolic panel [[id number]] Collected: 07/07/23 1415    Lab Status: In process Specimen: Blood from Arm, Left Updated: 07/07/23 1420    Blood culture #2 [[id number]] Collected: 07/07/23 1415    Lab Status: In process Specimen: Blood from Arm, Left Updated: 07/07/23 1420    Procalcitonin [[id number]] Collected: 07/07/23 1415    Lab Status: No result Specimen: Blood from Arm, Left     Blood culture #1 [[id number]]     Lab Status: No result Specimen: Blood     UA w Reflex to Microscopic w Reflex to Culture [[id number]]     Lab Status: No result Specimen: Urine                  XR chest 1 view portable    (Results Pending)         Procedures  Procedures      ED Course  ED Course as of 07/08/23 1641   Fri Jul 07, 2023   1836 Patient still is feeling normal.  Denies any headache dizziness lightheadedness. Patient blood pressure waxing and waning but mentation never changes from stable. SBIRT 22yo+    Flowsheet Row Most Recent Value   Initial Alcohol Screen: US AUDIT-C     1. How often do you have a drink containing alcohol? 0 Filed at: 07/07/2023 1335   2. How many drinks containing alcohol do you have on a typical day you are drinking? 0 Filed at: 07/07/2023 1335   3a. Male UNDER 65: How often do you have five or more drinks on one occasion?  0 Filed at: 07/07/2023 1333   3b. FEMALE Any Age, or MALE 65+: How often do you have 4 or more drinks on one occassion? 0 Filed at: 07/07/2023 1335   Audit-C Score 0 Filed at: 07/07/2023 1332   INOCENCIA: How many times in the past year have you. .. Used an illegal drug or used a prescription medication for non-medical reasons? Never Filed at: 07/07/2023 1332                Medical Decision Making  Hypotension possible due to infection, could be afebrile infection given chemotherapy treatment. Also possible to be hypovolemic hypotension. However less likely given physical exam.  Could be infection given patient is immunocompromised. However patient is afebrile and well-appearing. Considered a bleed however patient is maintaining perfusion and states mentating appropriately with blood pressure no tachycardia noted. Considered intracranial process however patient is mentating appropriately no signs of increased ICP. Amount and/or Complexity of Data Reviewed  Labs: ordered. Radiology: ordered. Risk  Prescription drug management. Disposition  Final diagnoses:   None     ED Disposition     None      Follow-up Information    None         Patient's Medications   Discharge Prescriptions    No medications on file     No discharge procedures on file. PDMP Review     None           ED Provider  Attending physically available and evaluated Jaycob Hayes. I managed the patient along with the ED Attending.     Electronically Signed by         Fermin Purvis MD  07/08/23 4731

## 2023-07-07 NOTE — PROGRESS NOTES
Pt arrived to infusion center with bp 87/53. Pt was asymptomatic. Per Army Shiva RN proceed with 2g magnesium infusion and recheck bp when finished. Magnesium infused and bp rechecked at 64/36. Pt denied dizziness or lightheadedness, however pt reports feeling very weak. Notified SAJAN Hayward, and per Dr. Mitzi Jennings, send patient to ER for further evaluation. Placed CHG dressing on port and took pt to ER with pt's personal wheelchair. Report given to ER nurse.

## 2023-07-07 NOTE — TELEPHONE ENCOUNTER
Call Transfer   Who are you speaking with? Patient   If it is not the patient, are they listed on an active communication consent form? N/A   Who is the patients HemOnc/SurgOnc provider? Margareth Neal PA-C   What is the reason for this call? Patient calling in to schedule her Prolia shot. Person/Department that the call was transferred to? Time that call was transferred? [facility] @ 8:59AM    Your call will be transferred now. If you receive a voicemail, please leave a detailed message and a member of the team will return your call as soon as possible. Did you relay this information to the caller?   Yes

## 2023-07-07 NOTE — ED NOTES
BP noted to be 64/35 - RN to bedside to assess patient. Dr. Casie Isabel and Dr. Porras Retort to bedside. IVF bolus initiated per provider. Pt remains A+O x4, denies complaints.       Luisito Thomas RN  07/07/23 1319

## 2023-07-08 LAB
ALBUMIN SERPL BCP-MCNC: 3.4 G/DL (ref 3.5–5)
ALP SERPL-CCNC: 65 U/L (ref 46–116)
ALT SERPL W P-5'-P-CCNC: 10 U/L (ref 12–78)
ANION GAP SERPL CALCULATED.3IONS-SCNC: 6 MMOL/L
AST SERPL W P-5'-P-CCNC: 13 U/L (ref 5–45)
ATRIAL RATE: 78 BPM
ATRIAL RATE: 93 BPM
BASOPHILS # BLD AUTO: 0.01 THOUSANDS/ÂΜL (ref 0–0.1)
BASOPHILS NFR BLD AUTO: 0 % (ref 0–1)
BILIRUB SERPL-MCNC: 0.21 MG/DL (ref 0.2–1)
BUN SERPL-MCNC: 20 MG/DL (ref 5–25)
CALCIUM ALBUM COR SERPL-MCNC: 10 MG/DL (ref 8.3–10.1)
CALCIUM SERPL-MCNC: 9.5 MG/DL (ref 8.3–10.1)
CHLORIDE SERPL-SCNC: 106 MMOL/L (ref 96–108)
CO2 SERPL-SCNC: 24 MMOL/L (ref 21–32)
CORTIS AM PEAK SERPL-MCNC: 12.4 UG/DL (ref 6.7–22.6)
CREAT SERPL-MCNC: 0.62 MG/DL (ref 0.6–1.3)
EOSINOPHIL # BLD AUTO: 0 THOUSAND/ÂΜL (ref 0–0.61)
EOSINOPHIL NFR BLD AUTO: 0 % (ref 0–6)
ERYTHROCYTE [DISTWIDTH] IN BLOOD BY AUTOMATED COUNT: 14.5 % (ref 11.6–15.1)
GFR SERPL CREATININE-BSD FRML MDRD: 87 ML/MIN/1.73SQ M
GLUCOSE SERPL-MCNC: 165 MG/DL (ref 65–140)
GLUCOSE SERPL-MCNC: 177 MG/DL (ref 65–140)
GLUCOSE SERPL-MCNC: 200 MG/DL (ref 65–140)
HCT VFR BLD AUTO: 25.4 % (ref 34.8–46.1)
HGB BLD-MCNC: 8.8 G/DL (ref 11.5–15.4)
IMM GRANULOCYTES # BLD AUTO: 0.02 THOUSAND/UL (ref 0–0.2)
IMM GRANULOCYTES NFR BLD AUTO: 1 % (ref 0–2)
LYMPHOCYTES # BLD AUTO: 0.38 THOUSANDS/ÂΜL (ref 0.6–4.47)
LYMPHOCYTES NFR BLD AUTO: 9 % (ref 14–44)
MAGNESIUM SERPL-MCNC: 2 MG/DL (ref 1.6–2.6)
MCH RBC QN AUTO: 33.8 PG (ref 26.8–34.3)
MCHC RBC AUTO-ENTMCNC: 34.6 G/DL (ref 31.4–37.4)
MCV RBC AUTO: 98 FL (ref 82–98)
MONOCYTES # BLD AUTO: 0.38 THOUSAND/ÂΜL (ref 0.17–1.22)
MONOCYTES NFR BLD AUTO: 9 % (ref 4–12)
NEUTROPHILS # BLD AUTO: 3.53 THOUSANDS/ÂΜL (ref 1.85–7.62)
NEUTS SEG NFR BLD AUTO: 81 % (ref 43–75)
NRBC BLD AUTO-RTO: 0 /100 WBCS
P AXIS: 45 DEGREES
P AXIS: 68 DEGREES
PHOSPHATE SERPL-MCNC: 2.9 MG/DL (ref 2.3–4.1)
PLATELET # BLD AUTO: 185 THOUSANDS/UL (ref 149–390)
PMV BLD AUTO: 9 FL (ref 8.9–12.7)
POTASSIUM SERPL-SCNC: 3.8 MMOL/L (ref 3.5–5.3)
PR INTERVAL: 158 MS
PR INTERVAL: 166 MS
PROT SERPL-MCNC: 6.7 G/DL (ref 6.4–8.4)
QRS AXIS: -11 DEGREES
QRS AXIS: -24 DEGREES
QRSD INTERVAL: 106 MS
QRSD INTERVAL: 108 MS
QT INTERVAL: 352 MS
QT INTERVAL: 378 MS
QTC INTERVAL: 430 MS
QTC INTERVAL: 437 MS
RBC # BLD AUTO: 2.6 MILLION/UL (ref 3.81–5.12)
SODIUM SERPL-SCNC: 136 MMOL/L (ref 135–147)
T WAVE AXIS: -2 DEGREES
T WAVE AXIS: 46 DEGREES
VENTRICULAR RATE: 78 BPM
VENTRICULAR RATE: 93 BPM
WBC # BLD AUTO: 4.32 THOUSAND/UL (ref 4.31–10.16)

## 2023-07-08 PROCEDURE — NC001 PR NO CHARGE: Performed by: EMERGENCY MEDICINE

## 2023-07-08 PROCEDURE — 82948 REAGENT STRIP/BLOOD GLUCOSE: CPT

## 2023-07-08 PROCEDURE — 84100 ASSAY OF PHOSPHORUS: CPT

## 2023-07-08 PROCEDURE — 36620 INSERTION CATHETER ARTERY: CPT | Performed by: EMERGENCY MEDICINE

## 2023-07-08 PROCEDURE — 82533 TOTAL CORTISOL: CPT | Performed by: INTERNAL MEDICINE

## 2023-07-08 PROCEDURE — 85025 COMPLETE CBC W/AUTO DIFF WBC: CPT

## 2023-07-08 PROCEDURE — 83735 ASSAY OF MAGNESIUM: CPT

## 2023-07-08 PROCEDURE — 80053 COMPREHEN METABOLIC PANEL: CPT

## 2023-07-08 PROCEDURE — 4A133B1 MONITORING OF ARTERIAL PRESSURE, PERIPHERAL, PERCUTANEOUS APPROACH: ICD-10-PCS | Performed by: EMERGENCY MEDICINE

## 2023-07-08 PROCEDURE — 03HY32Z INSERTION OF MONITORING DEVICE INTO UPPER ARTERY, PERCUTANEOUS APPROACH: ICD-10-PCS | Performed by: EMERGENCY MEDICINE

## 2023-07-08 PROCEDURE — 99291 CRITICAL CARE FIRST HOUR: CPT | Performed by: INTERNAL MEDICINE

## 2023-07-08 PROCEDURE — 93010 ELECTROCARDIOGRAM REPORT: CPT | Performed by: INTERNAL MEDICINE

## 2023-07-08 RX ORDER — LIDOCAINE 50 MG/G
1 PATCH TOPICAL DAILY
Status: DISCONTINUED | OUTPATIENT
Start: 2023-07-08 | End: 2023-07-13 | Stop reason: HOSPADM

## 2023-07-08 RX ORDER — NOREPINEPHRINE BITARTRATE 1 MG/ML
INJECTION, SOLUTION INTRAVENOUS
Status: DISPENSED
Start: 2023-07-08 | End: 2023-07-08

## 2023-07-08 RX ORDER — LIDOCAINE HYDROCHLORIDE 10 MG/ML
INJECTION, SOLUTION EPIDURAL; INFILTRATION; INTRACAUDAL; PERINEURAL
Status: DISPENSED
Start: 2023-07-08 | End: 2023-07-08

## 2023-07-08 RX ORDER — ALBUMIN, HUMAN INJ 5% 5 %
25 SOLUTION INTRAVENOUS ONCE
Status: COMPLETED | OUTPATIENT
Start: 2023-07-08 | End: 2023-07-08

## 2023-07-08 RX ADMIN — NOREPINEPHRINE BITARTRATE 4 MCG/MIN: 1 SOLUTION INTRAVENOUS at 01:47

## 2023-07-08 RX ADMIN — SODIUM CHLORIDE, SODIUM LACTATE, POTASSIUM CHLORIDE, AND CALCIUM CHLORIDE 100 ML/HR: .6; .31; .03; .02 INJECTION, SOLUTION INTRAVENOUS at 08:20

## 2023-07-08 RX ADMIN — NOREPINEPHRINE BITARTRATE 2 MCG/MIN: 1 SOLUTION INTRAVENOUS at 01:01

## 2023-07-08 RX ADMIN — BIMATOPROST 1 DROP: 0.1 SOLUTION/ DROPS OPHTHALMIC at 21:47

## 2023-07-08 RX ADMIN — CHLORHEXIDINE GLUCONATE 15 ML: 1.2 SOLUTION ORAL at 08:06

## 2023-07-08 RX ADMIN — LIDOCAINE 5% 1 PATCH: 700 PATCH TOPICAL at 16:10

## 2023-07-08 RX ADMIN — LOPERAMIDE HYDROCHLORIDE 2 MG: 2 CAPSULE ORAL at 17:46

## 2023-07-08 RX ADMIN — ENOXAPARIN SODIUM 40 MG: 40 INJECTION SUBCUTANEOUS at 08:06

## 2023-07-08 RX ADMIN — LOPERAMIDE HYDROCHLORIDE 2 MG: 2 CAPSULE ORAL at 07:03

## 2023-07-08 RX ADMIN — PANTOPRAZOLE SODIUM 40 MG: 40 TABLET, DELAYED RELEASE ORAL at 05:08

## 2023-07-08 RX ADMIN — ALBUMIN (HUMAN) 25 G: 12.5 INJECTION, SOLUTION INTRAVENOUS at 09:53

## 2023-07-08 RX ADMIN — Medication 1 PACKET: at 17:46

## 2023-07-08 RX ADMIN — INSULIN LISPRO 1 UNITS: 100 INJECTION, SOLUTION INTRAVENOUS; SUBCUTANEOUS at 16:10

## 2023-07-08 RX ADMIN — INSULIN LISPRO 1 UNITS: 100 INJECTION, SOLUTION INTRAVENOUS; SUBCUTANEOUS at 07:03

## 2023-07-08 NOTE — PLAN OF CARE
Problem: MOBILITY - ADULT  Goal: Maintain or return to baseline ADL function  Description: INTERVENTIONS:  -  Assess patient's ability to carry out ADLs; assess patient's baseline for ADL function and identify physical deficits which impact ability to perform ADLs (bathing, care of mouth/teeth, toileting, grooming, dressing, etc.)  - Assess/evaluate cause of self-care deficits   - Assess range of motion  - Assess patient's mobility; develop plan if impaired  - Assess patient's need for assistive devices and provide as appropriate  - Encourage maximum independence but intervene and supervise when necessary  - Involve family in performance of ADLs  - Assess for home care needs following discharge   - Consider OT consult to assist with ADL evaluation and planning for discharge  - Provide patient education as appropriate  Outcome: Progressing  Goal: Maintains/Returns to pre admission functional level  Description: INTERVENTIONS:  - Perform BMAT or MOVE assessment daily.   - Set and communicate daily mobility goal to care team and patient/family/caregiver. - Collaborate with rehabilitation services on mobility goals if consulted  - Perform Range of Motion  times a day. - Reposition patient every  hours.   - Dangle patient  times a day  - Stand patient  times a day  - Ambulate patient  times a day  - Out of bed to chair  times a day   - Out of bed for meals times a day  - Out of bed for toileting  - Record patient progress and toleration of activity level   Outcome: Progressing     Problem: PAIN - ADULT  Goal: Verbalizes/displays adequate comfort level or baseline comfort level  Description: Interventions:  - Encourage patient to monitor pain and request assistance  - Assess pain using appropriate pain scale  - Administer analgesics based on type and severity of pain and evaluate response  - Implement non-pharmacological measures as appropriate and evaluate response  - Consider cultural and social influences on pain and pain management  - Notify physician/advanced practitioner if interventions unsuccessful or patient reports new pain  Outcome: Progressing     Problem: INFECTION - ADULT  Goal: Absence or prevention of progression during hospitalization  Description: INTERVENTIONS:  - Assess and monitor for signs and symptoms of infection  - Monitor lab/diagnostic results  - Monitor all insertion sites, i.e. indwelling lines, tubes, and drains  - Monitor endotracheal if appropriate and nasal secretions for changes in amount and color  - [place] appropriate cooling/warming therapies per order  - Administer medications as ordered  - Instruct and encourage patient and family to use good hand hygiene technique  - Identify and instruct in appropriate isolation precautions for identified infection/condition  Outcome: Progressing  Goal: Absence of fever/infection during neutropenic period  Description: INTERVENTIONS:  - Monitor WBC    Outcome: Progressing     Problem: DISCHARGE PLANNING  Goal: Discharge to home or other facility with appropriate resources  Description: INTERVENTIONS:  - Identify barriers to discharge w/patient and caregiver  - Arrange for needed discharge resources and transportation as appropriate  - Identify discharge learning needs (meds, wound care, etc.)  - Arrange for interpretive services to assist at discharge as needed  - Refer to Case Management Department for coordinating discharge planning if the patient needs post-hospital services based on physician/advanced practitioner order or complex needs related to functional status, cognitive ability, or social support system  Outcome: Progressing     Problem: Knowledge Deficit  Goal: Patient/family/caregiver demonstrates understanding of disease process, treatment plan, medications, and discharge instructions  Description: Complete learning assessment and assess knowledge base.   Interventions:  - Provide teaching at level of understanding  - Provide teaching via preferred learning methods  Outcome: Progressing

## 2023-07-08 NOTE — PROGRESS NOTES
[facility]  Progress Note: Critical Care  Name: Kateryna Chou 69 y.o. female I MRN: [id number]  Unit/Bed#: MICU 02 I Date of Admission: 7/7/2023   Date of Service: 7/8/2023 I Hospital Day: 1    Assessment/Plan   Neuro:   • Diagnosis: No active issues  ? Plan: CAM-ICU  ? Neurochecks  ? Monitor sleep-wake cycle        CV:   • Diagnosis: Hypotension  ? Plan: Monitor MAP above 65  ? Monitor with a line  ? Started on levophed  ? Lactic trended down        Pulm:  • Diagnosis: New pulmonary metastatic disease  ? Plan: CT CAP 7/7: New pulm metastatic disease  ? Maintain O2 saturation above 92, currently not requiring oxygen  ? Given a dose of hydrocortisone in the ED        GI:   • Diagnosis: History of colon cancer with colostomy  ? Plan: Continue home Protonix  ? Continue home loperamide        :   • Diagnosis: No active issues  ? Plan: Monitor I's/O  ? Trend creatinine        F/E/N:    • F: LR @ 100  • E: maintain electrolytes, replete as needed  • N:  Regular diet        Heme/Onc:   • Diagnosis: History of colon cancer with mets  ? Plan: CT shows new pulmonary metastatic disease  ? We will hold patient's immunotherapy Xeloda  ? Lovenox DVT prophylaxis        Endo:   • Diagnosis: History of diabetes  ? Plan: SSI  ? Monitor blood glucose        ID:   • Diagnosis: No active issues  ? Plan: Blood cultures pending  ? Trend fever and WBC        MSK/Skin:   • Diagnosis: No active issues  ? Plan: Frequent repositioning  ? PT OT when able        Disposition: Critical care      ICU Core Measures     A: Assess, Prevent, and Manage Pain · Has pain been assessed? Yes  · Need for changes to pain regimen? No   B: Both SAT/SAT  · N/A   C: Choice of Sedation · RASS Goal: 0 Alert and Calm  · Need for changes to sedation or analgesia regimen? No   D: Delirium · CAM-ICU: Negative   E: Early Mobility  · Plan for early mobility? Yes   F: Family Engagement · Plan for family engagement today?  Yes Review of Invasive Devices:        Radha Plan: Keep arterial line for hemodynamic monitoring    Prophylaxis:  VTE VTE covered by:  enoxaparin, Subcutaneous       Stress Ulcer  covered bypantoprazole (PROTONIX) EC tablet 40 mg [[id number]]          Subjective   HPI/24hr events: Patient became hypotensive after fluid boluses. Started on levophed. A line was placed. Patient still remains asymptomatic. Review of Systems   Constitutional: Positive for fatigue. All other systems reviewed and are negative. Objective                            Vitals I/O      Most Recent Min/Max in 24hrs   Temp 98 °F (36.7 °C) Temp  Min: 97 °F (36.1 °C)  Max: 98 °F (36.7 °C)   Pulse 98 Pulse  Min: 71  Max: 102   Resp (!) 24 Resp  Min: 7  Max: 27   BP (!) 100/49 BP  Min: 63/43  Max: 115/50   O2 Sat 98 % SpO2  Min: 96 %  Max: 100 %      Intake/Output Summary (Last 24 hours) at 7/8/2023 0355  Last data filed at 7/8/2023 0227  Gross per 24 hour   Intake 2563.54 ml   Output 650 ml   Net 1913.54 ml         Diet Regular; Regular House     Invasive Monitoring Physical exam   Arterial Line  [place] /72  Arterial Line BP  Min: 134/68  Max: 136/72   MAP 98 mmHg  Arterial Line MAP (mmHg)  Min: 0 mmHg  Max: 98 mmHg    Physical Exam  Vitals reviewed. Constitutional:       Appearance: Normal appearance. HENT:      Head: Normocephalic and atraumatic. Nose: Nose normal.      Mouth/Throat:      Mouth: Mucous membranes are moist.      Pharynx: Oropharynx is clear. Eyes:      Extraocular Movements: Extraocular movements intact. Conjunctiva/sclera: Conjunctivae normal.   Cardiovascular:      Rate and Rhythm: Normal rate and regular rhythm. Pulses: Normal pulses. Heart sounds: Normal heart sounds. Comments: Right chest wall port  Pulmonary:      Effort: Pulmonary effort is normal.      Breath sounds: Normal breath sounds. Abdominal:      General: Bowel sounds are normal.      Palpations: Abdomen is soft. Tenderness: There is no abdominal tenderness. Comments: Colostomy bag in place   Musculoskeletal:         General: Normal range of motion. Cervical back: Normal range of motion. Skin:     General: Skin is warm and dry. Neurological:      General: No focal deficit present. Mental Status: She is alert and oriented to person, place, and time. Mental status is at baseline. Diagnostic Studies      EKG: NSR. St elevations in lead I  Imaging:  I have personally reviewed pertinent reports.        Medications:  Scheduled PRN   chlorhexidine, 15 mL, Q12H LUCINA  enoxaparin, 40 mg, Q24H LUCINA  insulin lispro, 1-5 Units, TID AC  lidocaine (PF), ,   loperamide, 2 mg, TID With Meals  norepinephrine, ,   pantoprazole, 40 mg, Early Morning      lidocaine (PF), ,   norepinephrine, ,        Continuous    lactated ringers, 100 mL/hr, Last Rate: 100 mL/hr (07/07/23 2029)  norepinephrine, 1-30 mcg/min, Last Rate: 2 mcg/min (07/08/23 0227)         Labs:    CBC    Recent Labs     07/07/23  1415   WBC 5.46   HGB 10.0*   HCT 30.5*        BMP    Recent Labs     07/07/23  1415   SODIUM 133*   K 4.8      CO2 22   AGAP 10   BUN 34*   CREATININE 0.96   CALCIUM 10.5*       Coags    Recent Labs     07/07/23  1415   INR 1.92*   PTT 35        Additional Electrolytes  Recent Labs     07/07/23  2207   MG 2.3   PHOS 3.2          Blood Gas    No recent results  No recent results LFTs  Recent Labs     07/07/23  1415   ALT 13   AST 13   ALKPHOS 75   ALB 3.8   TBILI 0.23       Infectious  No recent results  Glucose  Recent Labs     07/07/23  1415   GLUC 132                   Cami Charles MD

## 2023-07-08 NOTE — PLAN OF CARE
Problem: INFECTION - ADULT  Goal: Absence or prevention of progression during hospitalization  Description: INTERVENTIONS:  - Assess and monitor for signs and symptoms of infection  - Monitor lab/diagnostic results  - Monitor all insertion sites, i.e. indwelling lines, tubes, and drains  - Monitor endotracheal if appropriate and nasal secretions for changes in amount and color  - [place] appropriate cooling/warming therapies per order  - Administer medications as ordered  - Instruct and encourage patient and family to use good hand hygiene technique  - Identify and instruct in appropriate isolation precautions for identified infection/condition  7/8/2023 1818 by Seema Belcher RN  Outcome: Progressing  7/8/2023 1818 by Seema Belcher RN  Outcome: Progressing  Goal: Absence of fever/infection during neutropenic period  Description: INTERVENTIONS:  - Monitor WBC    7/8/2023 1818 by Seema Belcher RN  Outcome: Progressing  7/8/2023 1818 by Seema Belcher RN  Outcome: Progressing

## 2023-07-08 NOTE — PROCEDURES
Arterial Line Insertion    Date/Time: 7/8/2023 2:04 AM    Performed by: Cami Charles MD  Authorized by: Cami Cahrles MD    Patient location:  ICU  Other Assisting Provider: Yes (comment) (Dr. Shamika Springer)    Consent:     Consent obtained:  Verbal    Consent given by:  Patient    Risks discussed:  Bleeding, infection, ischemia, pain and repeat procedure  Universal protocol:     Patient identity confirmed:  Verbally with patient  Indications:     Indications: hemodynamic monitoring and continuous blood pressure monitoring    Anesthesia (see MAR for exact dosages): Anesthesia method:  Local infiltration    Local anesthetic:  Lidocaine 1% w/o epi  Procedure details:     Location / Tip of Catheter:  Radial    Laterality:  Left    Valente's test performed: yes      Needle gauge:  22 G    Placement technique:  Percutaneous and ultrasound guided    Ultrasound image availability:  Not saved    Sterile ultrasound techniques: Sterile gel and sterile probe covers were used      Number of attempts:  3    Successful placement: yes      Transducer: waveform confirmed    Post-procedure details:     Post-procedure:  Sterile dressing applied and sutured    Patient tolerance of procedure:   Tolerated well, no immediate complications

## 2023-07-08 NOTE — H&P
[address] [place] [address]  H&P: Critical Care  Name: Mina Connelly 69 y.o. female I MRN: [id number]  Unit/Bed#: ED 12 I Date of Admission: 7/7/2023   Date of Service: 7/7/2023 I Hospital Day: 0      Assessment/Plan   Neuro:   · Diagnosis: No active issues  · Plan: CAM-ICU  · Neurochecks  · Monitor sleep-wake cycle      CV:   · Diagnosis: Hypotension  · Plan: Monitor MAP above 65  · We will start pressors if patient needs further fluids, received 2 L in the ED  · Trend lactic      Pulm:  · Diagnosis: New pulmonary metastatic disease  · Plan: CT CAP 7/7: New pulm metastatic disease  · Maintain O2 saturation above 92, currently not requiring oxygen  · Given a dose of hydrocortisone in the ED      GI:   · Diagnosis: History of colon cancer with colostomy  · Plan: Continue home Protonix  · Continue home loperamide      :   · Diagnosis: No active issues  · Plan: Monitor I's/O  · Trend creatinine      F/E/N:    · F: LR @ 100  · E: maintain electrolytes, replete as needed  · N:  Regular diet      Heme/Onc:   · Diagnosis: History of colon cancer with mets  · Plan: CT shows new pulmonary metastatic disease  · We will hold patient's immunotherapy Xeloda  · Lovenox DVT prophylaxis      Endo:   · Diagnosis: History of diabetes  · Plan: SSI  · Monitor blood glucose      ID:   · Diagnosis: No active issues  · Plan: Blood cultures pending  · Trend fever and WBC      MSK/Skin:   · Diagnosis: No active issues  · Plan: Frequent repositioning  · PT OT when able      Disposition: Critical care       History of Present Illness     HPI: Mina Connelly is a 69 y.o. With PMHx of breast cancer n remission, colon cancer, colectomy with colostomy bag, DVT who presents with hypotension. Patient reports that she was at an infusion center earlier today for chemotherapy. After receiving the pretreatment magnesium infusion, she was found to be hypotensive to 10N systolic and sent to the ED.   Patient notes having multiple episodes of low blood pressure since the colostomy in February 2023 and attributes them to the decreased appetite post colostomy. Patient also notes feeling weak for the last couple of weeks. She denies increase in colostomy output. She denies fever, chills, recent illness, lightheadedness. In the ED, she was hypotensive at 101/55. CMP, UA, CXR  were unremarkable, lactic acid was elevated at 2.6. CBC showed normal leukocytes and hemoglobin of 10. PT and PTT were elevated. She was administered 1.7 liters of lactated Ringer's, however, hypotension persisted with systolic in the 83W. She is being admitted to the MICU for possible need for pressors. History obtained from chart review and the patient. Review of Systems   Constitutional: Positive for fatigue. All other systems reviewed and are negative. Historical Information   Past Medical History:  No date: Acute embolism and thrombosis of deep vein of lower   extremity (HCC)  No date: Adenocarcinoma of colon, [facility] ([address])  2012: Breast cancer ([address])      Comment:  Right Breast   No date: Cancer ([address])  No date: Diabetes mellitus ([address])  No date: DVT (deep venous thrombosis) ([address])  No date: GERD (gastroesophageal reflux disease)  No date: Hypertension  No date: Pes planus Past Surgical History:  No date: COLONOSCOPY  2/9/2023: HERNIA REPAIR; N/A      Comment:  Procedure: EXPLORATORY LAPAROTOMY; ABDOMINAL WALL                DEBRIDEMENT; REDUCTION VENTRAL HERNIA; LEFT COLON                RESECTION; CREATION ILEOSTOMY; WASHOUT;  Surgeon: Osvaldo Crocker DO;  Location: [facility];  Service: General  No date: HYSTERECTOMY      Comment:  complete @ age 29  6/28/2023: IR PORT PLACEMENT  9/12/2018: IR PORT REMOVAL  2012: MASTECTOMY; Right  8/23/2016: [place] COLONOSCOPY FLX DX W/COLLJ SPEC WHEN PFRMD; N/A      Comment:  Procedure: COLONOSCOPY;  Surgeon: Demarco Phillips MD;                 Location: [facility];   Service: Colorectal  9/5/2017: [place] COLONOSCOPY FLX DX W/COLLJ SPEC WHEN PFRMD; N/A      Comment:  Procedure: COLONOSCOPY;  Surgeon: Demarco Phillips MD;                 Location: [facility]; Service: Colorectal  9/5/2017: NC ESOPHAGOGASTRODUODENOSCOPY TRANSORAL DIAGNOSTIC; N/A      Comment:  Procedure: ESOPHAGOGASTRODUODENOSCOPY (EGD); Surgeon:                Oliver Parmar DO;  Location: [facility]; Service:                Gastroenterology   Current Outpatient Medications   Medication Instructions   • acetaminophen (TYLENOL) 975 mg, Oral, Every 8 hours   • amLODIPine (NORVASC) 2.5 mg, Oral, Daily   • ascorbic acid (VITAMIN C) 1,000 mg, Oral, Daily   • BANANA FLAKES PO 1 Units, Oral, 2 times daily   • benazepril-hydrochlorthiazide (LOTENSIN HCT) 20-12.5 MG per tablet take 1 tablet by mouth once daily   • bimatoprost (LUMIGAN) 0.01 % ophthalmic drops 1 drop, Both Eyes, Daily at bedtime   • calcitonin, salmon, (MIACALCIN) 200 units/act nasal spray No dose, route, or frequency recorded. • calcium citrate (CALCITRATE) 950 MG tablet 1 tablet, Oral, Daily   • capecitabine (XELODA) 1,500 mg, Oral, 2 times daily, 1 week on followed by 1 week off starting 7/7/23   • Cholecalciferol (VITAMIN D-3 PO) 1 capsule, Oral, 3 times daily   • Cyanocobalamin (VITAMIN B 12 PO) 1 tablet, Oral, Daily   • Diclofenac Sodium (VOLTAREN) 2 g, Topical, 4 times daily   • insulin lispro (HUMALOG) 1-6 Units, Subcutaneous, 4 times daily (before meals and at bedtime)   • Januvia 25 MG tablet No dose, route, or frequency recorded. • loperamide (IMODIUM A-D) 2 mg, Oral, 2 times daily   • metFORMIN (GLUCOPHAGE) 500 mg, Oral, 2 times daily with meals   • miconazole (MICOTIN) 2 % powder Topical, Daily, Apply to groin topically and under left breast topically every day and evening shift for red wash with soap and water, pat dry and then apply powder.    • pantoprazole (PROTONIX) 40 mg, Oral, Daily (early morning)   • rivaroxaban (XARELTO) 10 mg, Oral, Daily   • sodium chloride 1 g, Oral, 2 times daily   • vitamin E 100 Units, Oral, Daily    No Known Allergies   Social History     Tobacco Use   • Smoking status: Never   • Smokeless tobacco: Never   Vaping Use   • Vaping Use: Never used   Substance Use Topics   • Alcohol use: Not Currently   • Drug use: No    Family History   Problem Relation Age of Onset   • Other Mother         chronic bronchitis   • Brain cancer Father    • Prostate cancer Paternal Grandfather 79   • Pancreatic cancer Maternal Aunt 39   • Breast cancer Neg Hx           Objective                            Vitals I/O      Most Recent Min/Max in 24hrs   Temp 97.8 °F (36.6 °C) Temp  Min: 97 °F (36.1 °C)  Max: 97.8 °F (36.6 °C)   Pulse 98 Pulse  Min: 71  Max: 98   Resp 15 Resp  Min: 15  Max: 24   BP (!) 89/50 BP  Min: 63/43  Max: 114/60   O2 Sat 96 % SpO2  Min: 96 %  Max: 100 %      Intake/Output Summary (Last 24 hours) at 7/7/2023 2123  Last data filed at 7/7/2023 1717  Gross per 24 hour   Intake 1700 ml   Output --   Net 1700 ml         Diet Regular; Regular House     Invasive Monitoring Physical exam    Physical Exam  Vitals reviewed. Constitutional:       Appearance: Normal appearance. HENT:      Head: Normocephalic and atraumatic. Nose: Nose normal.      Mouth/Throat:      Mouth: Mucous membranes are moist.      Pharynx: Oropharynx is clear. Eyes:      Extraocular Movements: Extraocular movements intact. Conjunctiva/sclera: Conjunctivae normal.   Cardiovascular:      Rate and Rhythm: Normal rate and regular rhythm. Pulses: Normal pulses. Heart sounds: Normal heart sounds. Pulmonary:      Effort: Pulmonary effort is normal.      Breath sounds: Normal breath sounds. Comments: Right chest wall port, C/D/I  Abdominal:      General: Bowel sounds are normal.      Palpations: Abdomen is soft. Tenderness: There is no abdominal tenderness. Comments: Colostomy bag C/D/I. Filled with green stool.     Musculoskeletal:         General: Normal range of motion. Cervical back: Normal range of motion. Skin:     General: Skin is warm and dry. Neurological:      General: No focal deficit present. Mental Status: She is alert and oriented to person, place, and time. Mental status is at baseline. Diagnostic Studies      EKG: EKG: normal sinus rhythm. Mild st elevations in leads I, II with no recipricoal changes. Imaging: New pulmonary metastatic disease.     Progression of patient's hepatic metastatic disease.     Left breast skin thickening and several left breast nodules are again seen.    I have personally reviewed pertinent reports.        Medications:  Scheduled PRN   chlorhexidine, 15 mL, Q12H [address]  [START ON 7/8/2023] enoxaparin, 40 mg, Q24H LUCINA  hydrocortisone sodium succinate, 100 mg, Once  [START ON 7/8/2023] insulin lispro, 1-5 Units, TID AC  [START ON 7/8/2023] loperamide, 2 mg, TID With Meals  [START ON 7/8/2023] pantoprazole, 40 mg, Early Morning          Continuous    lactated ringers, 100 mL/hr, Last Rate: 100 mL/hr (07/07/23 2029)         Labs:    CBC    Recent Labs     07/07/23  1415   WBC 5.46   HGB 10.0*   HCT 30.5*        BMP    Recent Labs     07/07/23  1415   SODIUM 133*   K 4.8      CO2 22   AGAP 10   BUN 34*   CREATININE 0.96   CALCIUM 10.5*       Coags    Recent Labs     07/07/23  1415   INR 1.92*   PTT 35        Additional Electrolytes  No recent results       Blood Gas    No recent results  No recent results LFTs  Recent Labs     07/07/23  1415   ALT 13   AST 13   ALKPHOS 75   ALB 3.8   TBILI 0.23       Infectious  No recent results  Glucose  Recent Labs     07/07/23  1415   GLUC 132                 Evan Johnson MD

## 2023-07-09 PROBLEM — I95.9 HYPOTENSION: Status: ACTIVE | Noted: 2023-07-09

## 2023-07-09 PROBLEM — R57.9 SHOCK (HCC): Status: ACTIVE | Noted: 2023-07-09

## 2023-07-09 LAB
ALBUMIN SERPL BCP-MCNC: 3.6 G/DL (ref 3.5–5)
ALP SERPL-CCNC: 55 U/L (ref 46–116)
ALT SERPL W P-5'-P-CCNC: 10 U/L (ref 12–78)
ANION GAP SERPL CALCULATED.3IONS-SCNC: 6 MMOL/L
AST SERPL W P-5'-P-CCNC: 17 U/L (ref 5–45)
BASOPHILS # BLD AUTO: 0.02 THOUSANDS/ÂΜL (ref 0–0.1)
BASOPHILS NFR BLD AUTO: 1 % (ref 0–1)
BILIRUB SERPL-MCNC: 0.32 MG/DL (ref 0.2–1)
BUN SERPL-MCNC: 10 MG/DL (ref 5–25)
CALCIUM SERPL-MCNC: 9.6 MG/DL (ref 8.3–10.1)
CHLORIDE SERPL-SCNC: 108 MMOL/L (ref 96–108)
CO2 SERPL-SCNC: 25 MMOL/L (ref 21–32)
CORTIS AM PEAK SERPL-MCNC: 8.4 UG/DL (ref 6.7–22.6)
CORTIS SERPL-MCNC: 27.7 UG/DL
CORTIS SERPL-MCNC: 31.1 UG/DL
CREAT SERPL-MCNC: 0.54 MG/DL (ref 0.6–1.3)
EOSINOPHIL # BLD AUTO: 0.03 THOUSAND/ÂΜL (ref 0–0.61)
EOSINOPHIL NFR BLD AUTO: 1 % (ref 0–6)
ERYTHROCYTE [DISTWIDTH] IN BLOOD BY AUTOMATED COUNT: 14.3 % (ref 11.6–15.1)
FERRITIN SERPL-MCNC: 292 NG/ML (ref 11–307)
GFR SERPL CREATININE-BSD FRML MDRD: 91 ML/MIN/1.73SQ M
GLUCOSE SERPL-MCNC: 115 MG/DL (ref 65–140)
GLUCOSE SERPL-MCNC: 125 MG/DL (ref 65–140)
GLUCOSE SERPL-MCNC: 130 MG/DL (ref 65–140)
GLUCOSE SERPL-MCNC: 159 MG/DL (ref 65–140)
GLUCOSE SERPL-MCNC: 176 MG/DL (ref 65–140)
GLUCOSE SERPL-MCNC: 236 MG/DL (ref 65–140)
HCT VFR BLD AUTO: 24.9 % (ref 34.8–46.1)
HGB BLD-MCNC: 8.4 G/DL (ref 11.5–15.4)
IMM GRANULOCYTES # BLD AUTO: 0.02 THOUSAND/UL (ref 0–0.2)
IMM GRANULOCYTES NFR BLD AUTO: 1 % (ref 0–2)
IRON SATN MFR SERPL: 9 % (ref 15–50)
IRON SERPL-MCNC: 24 UG/DL (ref 50–170)
LYMPHOCYTES # BLD AUTO: 1.05 THOUSANDS/ÂΜL (ref 0.6–4.47)
LYMPHOCYTES NFR BLD AUTO: 24 % (ref 14–44)
MAGNESIUM SERPL-MCNC: 2 MG/DL (ref 1.6–2.6)
MCH RBC QN AUTO: 32.8 PG (ref 26.8–34.3)
MCHC RBC AUTO-ENTMCNC: 33.7 G/DL (ref 31.4–37.4)
MCV RBC AUTO: 97 FL (ref 82–98)
MONOCYTES # BLD AUTO: 0.87 THOUSAND/ÂΜL (ref 0.17–1.22)
MONOCYTES NFR BLD AUTO: 20 % (ref 4–12)
NEUTROPHILS # BLD AUTO: 2.34 THOUSANDS/ÂΜL (ref 1.85–7.62)
NEUTS SEG NFR BLD AUTO: 53 % (ref 43–75)
NRBC BLD AUTO-RTO: 0 /100 WBCS
PHOSPHATE SERPL-MCNC: 2.6 MG/DL (ref 2.3–4.1)
PLATELET # BLD AUTO: 198 THOUSANDS/UL (ref 149–390)
PMV BLD AUTO: 9.2 FL (ref 8.9–12.7)
POTASSIUM SERPL-SCNC: 3.3 MMOL/L (ref 3.5–5.3)
PROT SERPL-MCNC: 6.3 G/DL (ref 6.4–8.4)
RBC # BLD AUTO: 2.56 MILLION/UL (ref 3.81–5.12)
SODIUM SERPL-SCNC: 139 MMOL/L (ref 135–147)
TIBC SERPL-MCNC: 264 UG/DL (ref 250–450)
WBC # BLD AUTO: 4.33 THOUSAND/UL (ref 4.31–10.16)

## 2023-07-09 PROCEDURE — 84100 ASSAY OF PHOSPHORUS: CPT

## 2023-07-09 PROCEDURE — 99291 CRITICAL CARE FIRST HOUR: CPT | Performed by: INTERNAL MEDICINE

## 2023-07-09 PROCEDURE — 82533 TOTAL CORTISOL: CPT | Performed by: INTERNAL MEDICINE

## 2023-07-09 PROCEDURE — 83735 ASSAY OF MAGNESIUM: CPT

## 2023-07-09 PROCEDURE — 85025 COMPLETE CBC W/AUTO DIFF WBC: CPT

## 2023-07-09 PROCEDURE — 82533 TOTAL CORTISOL: CPT | Performed by: STUDENT IN AN ORGANIZED HEALTH CARE EDUCATION/TRAINING PROGRAM

## 2023-07-09 PROCEDURE — 82024 ASSAY OF ACTH: CPT | Performed by: STUDENT IN AN ORGANIZED HEALTH CARE EDUCATION/TRAINING PROGRAM

## 2023-07-09 PROCEDURE — 80053 COMPREHEN METABOLIC PANEL: CPT

## 2023-07-09 PROCEDURE — 82728 ASSAY OF FERRITIN: CPT

## 2023-07-09 PROCEDURE — 82948 REAGENT STRIP/BLOOD GLUCOSE: CPT

## 2023-07-09 PROCEDURE — 83550 IRON BINDING TEST: CPT

## 2023-07-09 PROCEDURE — 83540 ASSAY OF IRON: CPT

## 2023-07-09 RX ORDER — MIDODRINE HYDROCHLORIDE 5 MG/1
5 TABLET ORAL
Status: DISCONTINUED | OUTPATIENT
Start: 2023-07-09 | End: 2023-07-13 | Stop reason: HOSPADM

## 2023-07-09 RX ORDER — SODIUM CHLORIDE, SODIUM GLUCONATE, SODIUM ACETATE, POTASSIUM CHLORIDE, MAGNESIUM CHLORIDE, SODIUM PHOSPHATE, DIBASIC, AND POTASSIUM PHOSPHATE .53; .5; .37; .037; .03; .012; .00082 G/100ML; G/100ML; G/100ML; G/100ML; G/100ML; G/100ML; G/100ML
500 INJECTION, SOLUTION INTRAVENOUS ONCE
Status: COMPLETED | OUTPATIENT
Start: 2023-07-09 | End: 2023-07-09

## 2023-07-09 RX ORDER — POTASSIUM CHLORIDE 20MEQ/15ML
40 LIQUID (ML) ORAL 2 TIMES DAILY
Status: COMPLETED | OUTPATIENT
Start: 2023-07-09 | End: 2023-07-09

## 2023-07-09 RX ORDER — CALCITONIN SALMON 200 [IU]/.09ML
1 SPRAY, METERED NASAL DAILY
Status: DISCONTINUED | OUTPATIENT
Start: 2023-07-09 | End: 2023-07-13 | Stop reason: HOSPADM

## 2023-07-09 RX ORDER — COSYNTROPIN 0.25 MG/ML
0.25 INJECTION, POWDER, FOR SOLUTION INTRAMUSCULAR; INTRAVENOUS ONCE
Status: COMPLETED | OUTPATIENT
Start: 2023-07-09 | End: 2023-07-09

## 2023-07-09 RX ORDER — MIDODRINE HYDROCHLORIDE 5 MG/1
5 TABLET ORAL
Status: DISCONTINUED | OUTPATIENT
Start: 2023-07-09 | End: 2023-07-09

## 2023-07-09 RX ORDER — SODIUM CHLORIDE, SODIUM GLUCONATE, SODIUM ACETATE, POTASSIUM CHLORIDE, MAGNESIUM CHLORIDE, SODIUM PHOSPHATE, DIBASIC, AND POTASSIUM PHOSPHATE .53; .5; .37; .037; .03; .012; .00082 G/100ML; G/100ML; G/100ML; G/100ML; G/100ML; G/100ML; G/100ML
1000 INJECTION, SOLUTION INTRAVENOUS ONCE
Status: COMPLETED | OUTPATIENT
Start: 2023-07-09 | End: 2023-07-09

## 2023-07-09 RX ORDER — INSULIN LISPRO 100 [IU]/ML
1-5 INJECTION, SOLUTION INTRAVENOUS; SUBCUTANEOUS
Status: DISCONTINUED | OUTPATIENT
Start: 2023-07-09 | End: 2023-07-13 | Stop reason: HOSPADM

## 2023-07-09 RX ADMIN — SODIUM CHLORIDE, SODIUM GLUCONATE, SODIUM ACETATE, POTASSIUM CHLORIDE, MAGNESIUM CHLORIDE, SODIUM PHOSPHATE, DIBASIC, AND POTASSIUM PHOSPHATE 1000 ML: .53; .5; .37; .037; .03; .012; .00082 INJECTION, SOLUTION INTRAVENOUS at 15:13

## 2023-07-09 RX ADMIN — BIMATOPROST 1 DROP: 0.1 SOLUTION/ DROPS OPHTHALMIC at 21:32

## 2023-07-09 RX ADMIN — CHLORHEXIDINE GLUCONATE 15 ML: 1.2 SOLUTION ORAL at 08:40

## 2023-07-09 RX ADMIN — COSYNTROPIN 0.25 MG: 0.25 INJECTION, POWDER, LYOPHILIZED, FOR SOLUTION INTRAVENOUS at 10:29

## 2023-07-09 RX ADMIN — Medication 1 PACKET: at 18:22

## 2023-07-09 RX ADMIN — POTASSIUM CHLORIDE 40 MEQ: 1.5 SOLUTION ORAL at 17:13

## 2023-07-09 RX ADMIN — LIDOCAINE 5% 1 PATCH: 700 PATCH TOPICAL at 08:32

## 2023-07-09 RX ADMIN — LOPERAMIDE HYDROCHLORIDE 2 MG: 2 CAPSULE ORAL at 17:13

## 2023-07-09 RX ADMIN — LOPERAMIDE HYDROCHLORIDE 2 MG: 2 CAPSULE ORAL at 12:00

## 2023-07-09 RX ADMIN — RIVAROXABAN 10 MG: 10 TABLET, FILM COATED ORAL at 06:30

## 2023-07-09 RX ADMIN — Medication 1 PACKET: at 08:54

## 2023-07-09 RX ADMIN — LOPERAMIDE HYDROCHLORIDE 2 MG: 2 CAPSULE ORAL at 06:30

## 2023-07-09 RX ADMIN — PANTOPRAZOLE SODIUM 40 MG: 40 TABLET, DELAYED RELEASE ORAL at 06:10

## 2023-07-09 RX ADMIN — CALCITONIN SALMON 1 SPRAY: 200 SPRAY, METERED NASAL at 08:40

## 2023-07-09 RX ADMIN — CHLORHEXIDINE GLUCONATE 15 ML: 1.2 SOLUTION ORAL at 20:35

## 2023-07-09 RX ADMIN — POTASSIUM CHLORIDE 40 MEQ: 1.5 SOLUTION ORAL at 08:40

## 2023-07-09 RX ADMIN — MIDODRINE HYDROCHLORIDE 5 MG: 5 TABLET ORAL at 18:22

## 2023-07-09 RX ADMIN — INSULIN LISPRO 2 UNITS: 100 INJECTION, SOLUTION INTRAVENOUS; SUBCUTANEOUS at 21:46

## 2023-07-09 RX ADMIN — SODIUM CHLORIDE, SODIUM GLUCONATE, SODIUM ACETATE, POTASSIUM CHLORIDE, MAGNESIUM CHLORIDE, SODIUM PHOSPHATE, DIBASIC, AND POTASSIUM PHOSPHATE 500 ML: .53; .5; .37; .037; .03; .012; .00082 INJECTION, SOLUTION INTRAVENOUS at 07:42

## 2023-07-09 RX ADMIN — INSULIN LISPRO 1 UNITS: 100 INJECTION, SOLUTION INTRAVENOUS; SUBCUTANEOUS at 17:14

## 2023-07-09 NOTE — UTILIZATION REVIEW
Initial Clinical Review    Admission: Date/Time/Statement:   Admission Orders (From admission, onward)     Ordered        07/07/23 2003  Inpatient Admission  Once                      Orders Placed This Encounter   Procedures   • Inpatient Admission     Standing Status:   Standing     Number of Occurrences:   1     Order Specific Question:   Level of Care     Answer:   Critical Care [15]     Order Specific Question:   Estimated length of stay     Answer:   More than 2 Midnights     Order Specific Question:   Certification     Answer:   I certify that inpatient services are medically necessary for this patient for a duration of greater than two midnights. See H&P and MD Progress Notes for additional information about the patient's course of treatment. ED Arrival Information     Expected   7/7/2023     Arrival   7/7/2023 13:32    Acuity   Urgent            Means of arrival   Wheelchair    Escorted by   [facility]/ICU    Admission type   Emergency            Arrival complaint   -           Chief Complaint   Patient presents with   • Hypotension     From infusion center for low bp (76S systolic)  Asymptomatic, only able to give 2g mag at the infusion center       Initial Presentation: 69 y.o. female with PMHx of breast cancer in remission, colon cancer, colectomy with colostomy bag, DVT presents to ED from home with hypotension. Pt reports that she was at an infusion center earlier today for chemo and after receiving the pretreatment mag infusion, she was found to be hypotensive to 50F systolic and sent to the ED for further eval and tx. Pt notes having multiple episodes of low BP since her colostomy in February 2023 and attributes them to the decreased appetite post colostomy. Pt also notes feeling weak for the last couple of weeks. She denies increase in colostomy output. In the ED, she was hypotensive at 101/55. Lactic acid 2.6, , BUN 34, CA 10.5. Hgb 10.0, Hct 30.5.   Given LR in ED but hypotension persisted with systolic in the 17F. ADMIT INPATIENT to MICU with SHOCK likely 2/2 HYPOVOLEMIA -- continue IVFs. Consider pressors if pt MAP consistently below 65 or change in mentation. CT C/A/P with no source for infection, new pulmonary mets noted. Considered adrenal insufficiency and pt given one dose of hydrocortisone in ED. Trend lactate. Hold all BP meds, oral hypoglycemics. Accuchecks w/ ssi. Cons carb diet as patsy. Continue home protonix. SCDs, sq lovenox. Date: 7/8   Day 2: Overnight: she received 100cc/hr and 2.7L in the ED, she was off pressors but then restarted earlier this morning. MAP 83mmHg this AM, wean levo off. Stop IVF, give albumin bolus. Bedside cardiac US - EF appears normal without significant valvular heart disease. RV slightly under filled with possible small pericardial effusion. Restart Frederich Billing. Continue diet, accuchecks w/ ssi. Hold home anti-HTN and diuretic. Continue protonix. Restart banana flakes and loperamide. Monitor stool output. Transfer to floor if able to wean off levophed. SCDs.      Date: 7/9   Day 3: Has surpassed a 2nd midnight with active treatments and services, which include   Continued tx and monitoring for hypotension with need for IV pressors (levophed gtt)      ED Triage Vitals   Temperature Pulse Respirations Blood Pressure SpO2   07/07/23 1404 07/07/23 1334 07/07/23 1334 07/07/23 1334 07/07/23 1334   97.8 °F (36.6 °C) 87 18 101/55 100 %      Temp Source Heart Rate Source Patient Position - Orthostatic VS BP Location FiO2 (%)   07/07/23 1404 07/07/23 1334 07/07/23 1345 07/07/23 1345 --   Oral Monitor Sitting Left arm       Pain Score       07/07/23 1334       No Pain          Wt Readings from Last 1 Encounters:   07/07/23 63.6 kg (140 lb 3.4 oz)     Additional Vital Signs:   Date/Time Temp Pulse Resp BP MAP (mmHg) Arterial Line BP MAP SpO2 O2 Device Patient Position - Orthostatic VS   07/09/23 1100 -- 80 16 -- -- 94/46 64 mmHg Abnormal  95 % -- --   07/09/23 1043 -- 84 19 -- -- 98/48 68 mmHg 96 % -- --   07/09/23 1023 -- 84 19 -- -- 86/44 62 mmHg Abnormal   96 % -- --   MAP: titrated levo at 07/09/23 1023   07/09/23 1000 -- 86 19 -- -- 104/54 74 mmHg 97 % -- --     Date/Time Temp Pulse Resp BP MAP (mmHg) Arterial Line BP MAP SpO2 O2 Device Patient Position - Orthostatic VS   07/08/23 2300 -- 86 21 -- -- 98/48 68 mmHg 99 % -- --   07/08/23 2200 -- 80 17 -- -- 102/48 68 mmHg 98 % -- --   07/08/23 1915 -- 96 22 -- -- 82/40 58 mmHg Abnormal  99 % -- --   07/08/23 1800 -- 84 18 -- -- 90/42 62 mmHg Abnormal  99 % -- --   07/08/23 1600 98.2 °F (36.8 °C) 76 16 -- -- 106/50 70 mmHg 97 % None (Room air) --   07/08/23 1100 -- 86 23 Abnormal  -- -- 82/46 60 mmHg Abnormal  97 % -- --   07/08/23 0930 -- 86 -- -- -- 80/42 58 mmHg Abnormal  97 % -- --   07/08/23 0915 -- 100 30 Abnormal  -- -- 98/56 74 mmHg 98 % -- --   07/08/23 0900 -- 94 27 Abnormal  -- -- 96/52 72 mmHg 98 % -- --   07/08/23 0800 98.2 °F (36.8 °C) 110 Abnormal  16 -- -- 128/68 92 mmHg 97 % None (Room air) --   07/08/23 0715 -- 84 -- -- -- 76/42 56 mmHg Abnormal  96 % -- --   07/08/23 0300 -- 90 15 -- -- 92/50 68 mmHg 98 % -- --   07/08/23 0145 -- 98 16 100/49 Abnormal  73 -- 0 mmHg Abnormal  98 % -- --   07/08/23 0130 -- 96 18 98/43 Abnormal  59 Abnormal  -- -- 99 % -- --   07/08/23 0100 -- 100 7 Abnormal  82/41 Abnormal  56 Abnormal  -- -- 97 % -- --   07/08/23 0045 -- 94 13 73/35 Abnormal  43 Abnormal  -- -- 96 % -- --   07/08/23 0000 98 °F (36.7 °C) 102 15 92/36 Abnormal  55 Abnormal  -- -- 97 % None (Room air) Lying   07/07/23 2330 -- 86 15 74/43 Abnormal   53 Abnormal  -- -- 97 % -- --   BP: MD notified - albumin ordered at 07/07/23 2330 07/07/23 2030 -- 98 15 89/50 Abnormal  68 -- -- 96 % None (Room air) Lying   07/07/23 2000 -- 88 20 88/49 Abnormal  63 Abnormal  -- -- 98 % None (Room air) Lying   07/07/23 1415 -- 88 20 79/51 Abnormal  61 Abnormal  -- -- 97 % None (Room air) Sitting   07/07/23 1404 97.8 °F (36.6 °C) -- -- -- -- -- -- -- -- --   07/07/23 1400 -- 92 24 Abnormal  63/43 Abnormal  49 Abnormal  -- -- 98 % None (Room air) Sitting   07/07/23 1334 -- 87 18 101/55 -- -- -- 100 % None (Room air) --     Pertinent Labs/Diagnostic Test Results:   EKG 7/7:  Normal sinus rhythm  ST elevation, consider early repolarization, pericarditis, or injury  When compared with ECG of 20-JUL-2015 06:39,  QT has shortened    EKG 7/7:  Normal sinus rhythm  Moderate voltage criteria for LVH, may be normal variant  ST elevation, consider early repolarization, pericarditis, or injury  Abnormal ECG  When compared with ECG of 07-JUL-2023 14:22, (unconfirmed)  T wave inversion now evident in Inferior leads  Nonspecific T wave abnormality no longer evident in Lateral leads    CT chest abdomen pelvis w contrast   Final Result by Dayanara Britt MD (07/07 2111)      New pulmonary metastatic disease. Progression of patient's hepatic metastatic disease. Left breast skin thickening and several left breast nodules are again seen. Etiology for patient's diarrhea not elucidated on this examination         XR chest 1 view portable   Final Result by [address] [place] [address],  (07/08 2070)      Pulmonary nodules described on subsequent chest CT are not well visualized on this plain film. No pneumothorax.             Results from last 7 days   Lab Units 07/09/23  0620 07/08/23  0508 07/07/23  1415 07/03/23  1514   WBC Thousand/uL 4.33 4.32 5.46 7.32   HEMOGLOBIN g/dL 8.4* 8.8* 10.0* 10.2*   HEMATOCRIT % 24.9* 25.4* 30.5* 31.3*   PLATELETS Thousands/uL 198 185 209 258   NEUTROS ABS Thousands/µL 2.34 3.53 3.51 4.81     Results from last 7 days   Lab Units 07/09/23  0620 07/08/23  0508 07/07/23  2207 07/07/23  1415 07/03/23  1514   SODIUM mmol/L 139 136  --  133* 131*   POTASSIUM mmol/L 3.3* 3.8  --  4.8 5.0   CHLORIDE mmol/L 108 106  --  101 102   CO2 mmol/L 25 24  --  22 22   ANION GAP mmol/L 6 6  --  10 7   BUN mg/dL 10 20  -- 34* 32*   CREATININE mg/dL 0.54* 0.62  --  0.96 0.94   EGFR ml/min/1.73sq m 91 87  --  57 58   CALCIUM mg/dL 9.6 9.5  --  10.5* 10.5*   MAGNESIUM mg/dL 2.0 2.0 2.3  --  1.8   PHOSPHORUS mg/dL 2.6 2.9 3.2  --   --      Results from last 7 days   Lab Units 07/09/23  0620 07/08/23  0508 07/07/23  1415 07/03/23  1514   AST U/L 17 13 13 16   ALT U/L 10* 10* 13 14   ALK PHOS U/L 55 65 75 79   TOTAL PROTEIN g/dL 6.3* 6.7 7.8 7.5   ALBUMIN g/dL 3.6 3.4* 3.8 3.6   TOTAL BILIRUBIN mg/dL 0.32 0.21 0.23 0.24     Results from last 7 days   Lab Units 07/09/23  1105 07/09/23  0735 07/08/23  1609 07/08/23  1100 07/08/23  0701 07/07/23  2217   POC GLUCOSE mg/dl 130 115 159* 200* 165* 110     Results from last 7 days   Lab Units 07/09/23  0620 07/08/23  0508 07/07/23  1415 07/03/23  1514   GLUCOSE RANDOM mg/dL 125 177* 132 128     Results from last 7 days   Lab Units 07/07/23 2207 07/07/23  1949 07/07/23  1722   HS TNI 0HR ng/L  --   --  4   HS TNI 2HR ng/L  --  5  --    HSTNI D2 ng/L  --  1  --    HS TNI 4HR ng/L 5  --   --    HSTNI D4 ng/L 1  --   --      Results from last 7 days   Lab Units 07/07/23  1415   PROTIME seconds 22.2*   INR  1.92*   PTT seconds 35     Results from last 7 days   Lab Units 07/07/23 2207 07/07/23  1722 07/07/23  1415   LACTIC ACID mmol/L 1.1 2.6* 2.6*     Results from last 7 days   Lab Units 07/07/23  1415   BNP pg/mL 56     Results from last 7 days   Lab Units 07/03/23  1514   CEA ng/mL 19.7*     Results from last 7 days   Lab Units 07/07/23  1704   CLARITY UA  Clear   COLOR UA  Colorless   SPEC GRAV UA  1.008   PH UA  5.0   GLUCOSE UA mg/dl Negative   KETONES UA mg/dl Negative   BLOOD UA  Negative   PROTEIN UA mg/dl Negative   NITRITE UA  Negative   BILIRUBIN UA  Negative   UROBILINOGEN UA (BE) mg/dl <2.0   LEUKOCYTES UA  Negative     Results from last 7 days   Lab Units 07/07/23  1454 07/07/23  1415   BLOOD CULTURE  No Growth at 24 hrs. No Growth at 24 hrs.        ED Treatment:   Medication Administration from 07/07/2023 1332 to 07/07/2023 2147       Date/Time Order Dose Route Action     07/07/2023 1953 EDT lactated ringers infusion 1,000 mL 1,000 mL Intravenous New Bag     07/07/2023 1540 EDT lactated ringers infusion 1,000 mL 1,000 mL Intravenous New Bag     07/07/2023 1435 EDT lactated ringers bolus 700 mL 700 mL Intravenous New Bag     07/07/2023 1719 EDT lactated ringers bolus 1,000 mL 1,000 mL Intravenous New Bag     07/07/2023 2029 EDT lactated ringers infusion 100 mL/hr Intravenous New Bag     Past Medical History:   Diagnosis Date   • Acute embolism and thrombosis of deep vein of lower extremity (HCC)    • Adenocarcinoma of colon, [facility] (HCC)    • Breast cancer ([address]) 2012    Right Breast    • Cancer ([address])    • Diabetes mellitus ([address])    • DVT (deep venous thrombosis) ([facility])    • GERD (gastroesophageal reflux disease)    • Hypertension    • Pes planus      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 female  Admission Orders:  Scheduled Medications:  bimatoprost, 1 drop, Both Eyes, HS  calcitonin (salmon), 1 spray, Alternating Nares, Daily  chlorhexidine, 15 mL, Mouth/Throat, Q12H [address]  insulin lispro, 1-5 Units, Subcutaneous, TID AC  lidocaine, 1 patch, Topical, Daily  loperamide, 2 mg, Oral, TID With Meals  pantoprazole, 40 mg, Oral, Early Morning  potassium chloride, 40 mEq, Oral, BID  psyllium, 1 packet, Oral, BID  rivaroxaban, 10 mg, Oral, Daily With Breakfast    Continuous IV Infusions:  norepinephrine, 1-30 mcg/min, Intravenous, Titrated        Network Utilization Review Department  ATTENTION: Please call with any questions or concerns to [phone] and carefully listen to the prompts so that you are directed to the right person.  All voicemails are confidential.  Dolores Nash all requests for admission clinical reviews, approved or denied determinations and any other requests to dedicated fax number below belonging to the [facility] where the patient is receiving treatment.  List of dedicated fax numbers for the Facilities:  [place] DENIALS (Administrative/Medical Necessity) [phone] [address] (Maternity/NICU/Pediatrics) [phone]   [address] [place] [address] [phone]   [facility] [address] [facility] [phone]   [address] [phone]   [id number] [place] [address] [phone]

## 2023-07-09 NOTE — PROGRESS NOTES
[address] [facility]  Progress Note: Critical Care  Name: Caro Rodgers 69 y.o. female I MRN: [id number]  Unit/Bed#: MICU 02 I Date of Admission: 7/7/2023   Date of Service: 7/9/2023 I Hospital Day: 2    Assessment/Plan   Neuro:   • Diagnosis: No active issues  ? Plan: CAM-ICU  ? Neurochecks  ? Monitor sleep-wake cycle        CV:   • Diagnosis: Hypotension, shock of undetermined etiology  ? Plan: Monitor MAP above 65  - Monitor with a line  ? Continue Levophed try to wean  ? Possible trial of midodrine versus steroid        Pulm:  • Diagnosis: New pulmonary metastatic disease  ? Plan: CT CAP 7/7: New pulm metastatic disease  ? Maintain O2 saturation above 92, currently not requiring oxygen  ? Given a dose of hydrocortisone in the ED        GI:   • Diagnosis: History of colon cancer with colostomy  ? Plan: Continue home Protonix  ? Continue home loperamide        :   • Diagnosis: No active issues  ? Plan: Monitor I's/O  ? Trend creatinine        F/E/N:    • F: None  • E: maintain electrolytes, replete as needed  • N:  Regular diet        Heme/Onc:   • Diagnosis: History of colon cancer stage IV with mets. History of breast cancer in remission  ? Plan: CT shows new pulmonary metastatic disease  ? We will hold patient's immunotherapy Xeloda  ? DC DVT prophylaxis as patient on Xarelto  ? Continue Xarelto        Endo:   • Diagnosis: History of diabetes  ? Plan: SSI  ? Monitor blood glucose        ID:   • Diagnosis: No active issues  ? Plan: Blood cultures pending  ? Trend fever and WBC        MSK/Skin:   • Diagnosis: No active issues  ? Plan: Frequent repositioning  ? PT OT when able    Disposition: Critical care    ICU Core Measures     A: Assess, Prevent, and Manage Pain · Has pain been assessed? Yes  · Need for changes to pain regimen? No   B: Both SAT/SAT  · N/A   C: Choice of Sedation · RASS Goal: 0 Alert and Calm  · Need for changes to sedation or analgesia regimen?  Yes   D: Delirium · CAM-ICU: Positive   E: Early Mobility  · Plan for early mobility? Yes   F: Family Engagement · Plan for family engagement today? No       Review of Invasive Devices:      Central access plan: Hemodynamic monitoring  Radha Plan: Keep arterial line for hemodynamic monitoring    Prophylaxis:  VTE VTE covered by:  rivaroxaban, Oral, 10 mg at 07/09/23 0610       Stress Ulcer  covered bypantoprazole (PROTONIX) EC tablet 40 mg [[id number]]          Subjective   HPI/24hr events: No acute events overnight. Patient blood pressure was up and down and required Levophed for time to time. Patient denied shortness of breath, chest pain, headache, nausea, vomiting. Review of Systems   Constitutional: Negative for activity change, appetite change, chills, diaphoresis, fatigue, fever and unexpected weight change. HENT: Negative for congestion, drooling, ear discharge, ear pain, hearing loss, nosebleeds, rhinorrhea, sneezing, sore throat, trouble swallowing and voice change. Eyes: Negative for photophobia, pain, discharge, itching and visual disturbance. Respiratory: Negative for apnea, cough, chest tightness, shortness of breath, wheezing and stridor. Cardiovascular: Negative for chest pain, palpitations and leg swelling. Gastrointestinal: Negative for abdominal distention, anal bleeding, blood in stool, diarrhea, nausea and vomiting. Endocrine: Negative for cold intolerance, heat intolerance, polyphagia and polyuria. Genitourinary: Negative for difficulty urinating, dyspareunia, enuresis, frequency, menstrual problem, urgency and vaginal pain. Musculoskeletal: Negative for arthralgias, back pain, gait problem, joint swelling, myalgias and neck stiffness. Skin: Negative for color change, pallor, rash and wound. Neurological: Negative for dizziness, tremors, seizures, speech difficulty and light-headedness. Hematological: Negative for adenopathy. Does not bruise/bleed easily.    Psychiatric/Behavioral: Negative for behavioral problems, confusion, decreased concentration, dysphoric mood, hallucinations, self-injury, sleep disturbance and suicidal ideas. The patient is not hyperactive. Objective                            Vitals I/O      Most Recent Min/Max in 24hrs   Temp 98.7 °F (37.1 °C) Temp  Min: 98 °F (36.7 °C)  Max: 98.7 °F (37.1 °C)   Pulse 74 Pulse  Min: 74  Max: 110   Resp 17 Resp  Min: 12  Max: 31   BP (!) 84/42 BP  Min: 84/42  Max: 84/42   O2 Sat 94 % SpO2  Min: 94 %  Max: 100 %      Intake/Output Summary (Last 24 hours) at 7/9/2023 0659  Last data filed at 7/9/2023 0501  Gross per 24 hour   Intake 999.91 ml   Output 3475 ml   Net -2475.09 ml         Diet Regular; Regular House     Invasive Monitoring Physical exam   Arterial Line  Radha BP 90/44  Arterial Line BP  Min: 76/42  Max: 132/68   MAP (!) 62 mmHg  Arterial Line MAP (mmHg)  Min: 54 mmHg  Max: 92 mmHg    Physical Exam  Constitutional:       General: She is not in acute distress. Appearance: She is not ill-appearing, toxic-appearing or diaphoretic. HENT:      Head: Normocephalic and atraumatic. Nose: Nose normal. No congestion or rhinorrhea. Mouth/Throat:      Mouth: Mucous membranes are moist.      Pharynx: No oropharyngeal exudate or posterior oropharyngeal erythema. Eyes:      General: No scleral icterus. Right eye: No discharge. Left eye: No discharge. Pupils: Pupils are equal, round, and reactive to light. Neck:      Vascular: No carotid bruit. Cardiovascular:      Rate and Rhythm: Normal rate and regular rhythm. Pulses: Normal pulses. Heart sounds: Normal heart sounds. No murmur heard. No friction rub. No gallop. Pulmonary:      Effort: Pulmonary effort is normal. No respiratory distress. Breath sounds: Normal breath sounds. No stridor. No wheezing, rhonchi or rales. Chest:      Chest wall: No tenderness. Abdominal:      General: Abdomen is flat.  Bowel sounds are normal. There is no distension. Palpations: Abdomen is soft. There is no mass. Tenderness: There is no abdominal tenderness. There is no right CVA tenderness, left CVA tenderness, guarding or rebound. Hernia: No hernia is present. Musculoskeletal:         General: No swelling, tenderness, deformity or signs of injury. Normal range of motion. Cervical back: Normal range of motion. No rigidity or tenderness. Right lower leg: No edema. Left lower leg: No edema. Lymphadenopathy:      Cervical: No cervical adenopathy. Skin:     General: Skin is warm. Coloration: Skin is not jaundiced or pale. Findings: No bruising, erythema, lesion or rash. Neurological:      General: No focal deficit present. Mental Status: She is alert. Cranial Nerves: No cranial nerve deficit. Sensory: No sensory deficit. Motor: No weakness. Psychiatric:         Mood and Affect: Mood normal.         Behavior: Behavior normal.         Thought Content: Thought content normal.         Judgment: Judgment normal.          Diagnostic Studies      EKG: Reviewed  Imaging: Reviewed I have personally reviewed pertinent reports.        Medications:  Scheduled PRN   bimatoprost, 1 drop, HS  calcitonin (salmon), 1 spray, Daily  chlorhexidine, 15 mL, Q12H LUCINA  insulin lispro, 1-5 Units, TID AC  lidocaine, 1 patch, Daily  loperamide, 2 mg, TID With Meals  multi-electrolyte, 500 mL, Once  pantoprazole, 40 mg, Early Morning  psyllium, 1 packet, BID  rivaroxaban, 10 mg, Daily With Breakfast          Continuous    norepinephrine, 1-30 mcg/min, Last Rate: 2 mcg/min (07/09/23 0550)         Labs:    CBC    Recent Labs     07/08/23  0508 07/09/23  0620   WBC 4.32 4.33   HGB 8.8* 8.4*   HCT 25.4* 24.9*    198     BMP    Recent Labs     07/07/23  1415 07/08/23  0508   SODIUM 133* 136   K 4.8 3.8    106   CO2 22 24   AGAP 10 6   BUN 34* 20   CREATININE 0.96 0.62   CALCIUM 10.5* 9.5 Coags    Recent Labs     07/07/23  1415   INR 1.92*   PTT 35        Additional Electrolytes  Recent Labs     07/07/23  2207 07/08/23  0508   MG 2.3 2.0   PHOS 3.2 2.9          Blood Gas    No recent results  No recent results LFTs  Recent Labs     07/07/23  1415 07/08/23  0508   ALT 13 10*   AST 13 13   ALKPHOS 75 65   ALB 3.8 3.4*   TBILI 0.23 0.21       Infectious  No recent results  Glucose  Recent Labs     07/07/23  1415 07/08/23  0508   GLUC 132 177*               Critical Care Time Delivered: Upon my evaluation, this patient had a high probability of imminent or life-threatening deterioration due to Shock, which required my direct attention, intervention, and personal management. I have personally provided 45 minutes of critical care time, exclusive of procedures, teaching, family meetings, and any prior time recorded by providers other than myself.      Chrystal Gentle, DO

## 2023-07-09 NOTE — PLAN OF CARE
Problem: MOBILITY - ADULT  Goal: Maintain or return to baseline ADL function  Description: INTERVENTIONS:  -  Assess patient's ability to carry out ADLs; assess patient's baseline for ADL function and identify physical deficits which impact ability to perform ADLs (bathing, care of mouth/teeth, toileting, grooming, dressing, etc.)  - Assess/evaluate cause of self-care deficits   - Assess range of motion  - Assess patient's mobility; develop plan if impaired  - Assess patient's need for assistive devices and provide as appropriate  - Encourage maximum independence but intervene and supervise when necessary  - Involve family in performance of ADLs  - Assess for home care needs following discharge   - Consider OT consult to assist with ADL evaluation and planning for discharge  - Provide patient education as appropriate  Outcome: Progressing  Goal: Maintains/Returns to pre admission functional level  Description: INTERVENTIONS:  - Perform BMAT or MOVE assessment daily.   - Set and communicate daily mobility goal to care team and patient/family/caregiver.    - Collaborate with rehabilitation services on mobility goals if consulted  - Out of bed for toileting  - Record patient progress and toleration of activity level   Outcome: Progressing     Problem: PAIN - ADULT  Goal: Verbalizes/displays adequate comfort level or baseline comfort level  Description: Interventions:  - Encourage patient to monitor pain and request assistance  - Assess pain using appropriate pain scale  - Administer analgesics based on type and severity of pain and evaluate response  - Implement non-pharmacological measures as appropriate and evaluate response  - Consider cultural and social influences on pain and pain management  - Notify physician/advanced practitioner if interventions unsuccessful or patient reports new pain  Outcome: Progressing     Problem: INFECTION - ADULT  Goal: Absence or prevention of progression during hospitalization  Description: INTERVENTIONS:  - Assess and monitor for signs and symptoms of infection  - Monitor lab/diagnostic results  - Monitor all insertion sites, i.e. indwelling lines, tubes, and drains  - Monitor endotracheal if appropriate and nasal secretions for changes in amount and color  - Towner appropriate cooling/warming therapies per order  - Administer medications as ordered  - Instruct and encourage patient and family to use good hand hygiene technique  - Identify and instruct in appropriate isolation precautions for identified infection/condition  Outcome: Progressing  Goal: Absence of fever/infection during neutropenic period  Description: INTERVENTIONS:  - Monitor WBC    Outcome: Progressing     Problem: DISCHARGE PLANNING  Goal: Discharge to home or other facility with appropriate resources  Description: INTERVENTIONS:  - Identify barriers to discharge w/patient and caregiver  - Arrange for needed discharge resources and transportation as appropriate  - Identify discharge learning needs (meds, wound care, etc.)  - Arrange for interpretive services to assist at discharge as needed  - Refer to Case Management Department for coordinating discharge planning if the patient needs post-hospital services based on physician/advanced practitioner order or complex needs related to functional status, cognitive ability, or social support system  Outcome: Progressing     Problem: Knowledge Deficit  Goal: Patient/family/caregiver demonstrates understanding of disease process, treatment plan, medications, and discharge instructions  Description: Complete learning assessment and assess knowledge base.   Interventions:  - Provide teaching at level of understanding  - Provide teaching via preferred learning methods  Outcome: Progressing     Problem: CARDIOVASCULAR - ADULT  Goal: Maintains optimal cardiac output and hemodynamic stability  Description: INTERVENTIONS:  - Monitor I/O, vital signs and rhythm  - Monitor for S/S and trends of decreased cardiac output  - Administer and titrate ordered vasoactive medications to optimize hemodynamic stability  - Assess quality of pulses, skin color and temperature  - Assess for signs of decreased coronary artery perfusion  - Instruct patient to report change in severity of symptoms  Outcome: Progressing  Goal: Absence of cardiac dysrhythmias or at baseline rhythm  Description: INTERVENTIONS:  - Continuous cardiac monitoring, vital signs, obtain 12 lead EKG if ordered  - Administer antiarrhythmic and heart rate control medications as ordered  - Monitor electrolytes and administer replacement therapy as ordered  Outcome: Progressing     Problem: SKIN/TISSUE INTEGRITY - ADULT  Goal: Skin Integrity remains intact(Skin Breakdown Prevention)  Description: Assess:  -Assess extremities for adequate circulation and sensation     Bed Management:  -Have minimal linens on bed & keep smooth, unwrinkled  -Change linens as needed when moist or perspiring    Toileting:  -Offer bedside commode    Skin Care:  -Avoid use of baby powder, tape, friction and shearing, hot water or constrictive clothing  -Do not massage red bony areas    Outcome: Progressing  Goal: Incision(s), wounds(s) or drain site(s) healing without S/S of infection  Description: INTERVENTIONS  - Assess and document dressing, incision, wound bed, drain sites and surrounding tissue  - Provide patient and family education  Outcome: Progressing  Goal: Pressure injury heals and does not worsen  Description: Interventions:  - Implement low air loss mattress or specialty surface (Criteria met)  - Apply silicone foam dressing  - Consider nutrition services referral as needed  Outcome: Progressing     Problem: Prexisting or High Potential for Compromised Skin Integrity  Goal: Skin integrity is maintained or improved  Description: INTERVENTIONS:  - Identify patients at risk for skin breakdown  - Assess and monitor skin integrity  - Assess and monitor nutrition and hydration status  - Monitor labs   - Assess for incontinence   - Turn and reposition patient  - Assist with mobility/ambulation  - Relieve pressure over bony prominences  - Avoid friction and shearing  - Provide appropriate hygiene as needed including keeping skin clean and dry  - Evaluate need for skin moisturizer/barrier cream  - Collaborate with interdisciplinary team   - Patient/family teaching  - Consider wound care consult   Outcome: Progressing     Problem: Nutrition/Hydration-ADULT  Goal: Nutrient/Hydration intake appropriate for improving, restoring or maintaining nutritional needs  Description: Monitor and assess patient's nutrition/hydration status for malnutrition. Collaborate with interdisciplinary team and initiate plan and interventions as ordered. Monitor patient's weight and dietary intake as ordered or per policy. Utilize nutrition screening tool and intervene as necessary. Determine patient's food preferences and provide high-protein, high-caloric foods as appropriate.      INTERVENTIONS:  - Monitor oral intake, urinary output, labs, and treatment plans  - Assess nutrition and hydration status and recommend course of action  - Evaluate amount of meals eaten  - Assist patient with eating if necessary   - Allow adequate time for meals  - Recommend/ encourage appropriate diets, oral nutritional supplements, and vitamin/mineral supplements  - Order, calculate, and assess calorie counts as needed  - Recommend, monitor, and adjust tube feedings and TPN/PPN based on assessed needs  - Assess need for intravenous fluids  - Provide specific nutrition/hydration education as appropriate  - Include patient/family/caregiver in decisions related to nutrition  Outcome: Progressing

## 2023-07-10 PROBLEM — C18.9 COLON CANCER (HCC): Status: ACTIVE | Noted: 2018-06-21

## 2023-07-10 PROBLEM — I95.9 HYPOTENSION: Status: ACTIVE | Noted: 2023-07-10

## 2023-07-10 PROBLEM — I95.9 HYPOTENSION: Status: RESOLVED | Noted: 2023-07-10 | Resolved: 2023-07-10

## 2023-07-10 PROBLEM — R91.1 PULMONARY NODULE: Status: ACTIVE | Noted: 2023-07-10

## 2023-07-10 LAB
ALBUMIN SERPL BCP-MCNC: 3.3 G/DL (ref 3.5–5)
ALP SERPL-CCNC: 53 U/L (ref 46–116)
ALT SERPL W P-5'-P-CCNC: 10 U/L (ref 12–78)
ANION GAP SERPL CALCULATED.3IONS-SCNC: 7 MMOL/L
AST SERPL W P-5'-P-CCNC: 5 U/L (ref 5–45)
ATRIAL RATE: 73 BPM
BASOPHILS # BLD AUTO: 0.01 THOUSANDS/ÂΜL (ref 0–0.1)
BASOPHILS NFR BLD AUTO: 0 % (ref 0–1)
BILIRUB SERPL-MCNC: 0.18 MG/DL (ref 0.2–1)
BUN SERPL-MCNC: 12 MG/DL (ref 5–25)
CALCIUM ALBUM COR SERPL-MCNC: 10 MG/DL (ref 8.3–10.1)
CALCIUM SERPL-MCNC: 9.4 MG/DL (ref 8.3–10.1)
CHLORIDE SERPL-SCNC: 106 MMOL/L (ref 96–108)
CO2 SERPL-SCNC: 24 MMOL/L (ref 21–32)
CREAT SERPL-MCNC: 0.63 MG/DL (ref 0.6–1.3)
EOSINOPHIL # BLD AUTO: 0.08 THOUSAND/ÂΜL (ref 0–0.61)
EOSINOPHIL NFR BLD AUTO: 2 % (ref 0–6)
ERYTHROCYTE [DISTWIDTH] IN BLOOD BY AUTOMATED COUNT: 14.3 % (ref 11.6–15.1)
GFR SERPL CREATININE-BSD FRML MDRD: 86 ML/MIN/1.73SQ M
GLUCOSE SERPL-MCNC: 117 MG/DL (ref 65–140)
GLUCOSE SERPL-MCNC: 123 MG/DL (ref 65–140)
GLUCOSE SERPL-MCNC: 199 MG/DL (ref 65–140)
GLUCOSE SERPL-MCNC: 216 MG/DL (ref 65–140)
GLUCOSE SERPL-MCNC: 240 MG/DL (ref 65–140)
HCT VFR BLD AUTO: 24.7 % (ref 34.8–46.1)
HGB BLD-MCNC: 7.9 G/DL (ref 11.5–15.4)
IMM GRANULOCYTES # BLD AUTO: 0.01 THOUSAND/UL (ref 0–0.2)
IMM GRANULOCYTES NFR BLD AUTO: 0 % (ref 0–2)
LYMPHOCYTES # BLD AUTO: 1.05 THOUSANDS/ÂΜL (ref 0.6–4.47)
LYMPHOCYTES NFR BLD AUTO: 28 % (ref 14–44)
MAGNESIUM SERPL-MCNC: 1.9 MG/DL (ref 1.6–2.6)
MCH RBC QN AUTO: 32.2 PG (ref 26.8–34.3)
MCHC RBC AUTO-ENTMCNC: 32 G/DL (ref 31.4–37.4)
MCV RBC AUTO: 101 FL (ref 82–98)
MONOCYTES # BLD AUTO: 0.74 THOUSAND/ÂΜL (ref 0.17–1.22)
MONOCYTES NFR BLD AUTO: 19 % (ref 4–12)
NEUTROPHILS # BLD AUTO: 1.92 THOUSANDS/ÂΜL (ref 1.85–7.62)
NEUTS SEG NFR BLD AUTO: 51 % (ref 43–75)
NRBC BLD AUTO-RTO: 0 /100 WBCS
P AXIS: 37 DEGREES
PHOSPHATE SERPL-MCNC: 2.4 MG/DL (ref 2.3–4.1)
PLATELET # BLD AUTO: 168 THOUSANDS/UL (ref 149–390)
PMV BLD AUTO: 8.6 FL (ref 8.9–12.7)
POTASSIUM SERPL-SCNC: 4.1 MMOL/L (ref 3.5–5.3)
PR INTERVAL: 167 MS
PROT SERPL-MCNC: 6.3 G/DL (ref 6.4–8.4)
QRS AXIS: -16 DEGREES
QRSD INTERVAL: 117 MS
QT INTERVAL: 413 MS
QTC INTERVAL: 456 MS
RBC # BLD AUTO: 2.45 MILLION/UL (ref 3.81–5.12)
SODIUM SERPL-SCNC: 137 MMOL/L (ref 135–147)
T WAVE AXIS: -3 DEGREES
VENTRICULAR RATE: 73 BPM
WBC # BLD AUTO: 3.81 THOUSAND/UL (ref 4.31–10.16)

## 2023-07-10 PROCEDURE — NC001 PR NO CHARGE

## 2023-07-10 PROCEDURE — 80053 COMPREHEN METABOLIC PANEL: CPT

## 2023-07-10 PROCEDURE — 85025 COMPLETE CBC W/AUTO DIFF WBC: CPT

## 2023-07-10 PROCEDURE — 84100 ASSAY OF PHOSPHORUS: CPT | Performed by: STUDENT IN AN ORGANIZED HEALTH CARE EDUCATION/TRAINING PROGRAM

## 2023-07-10 PROCEDURE — 93010 ELECTROCARDIOGRAM REPORT: CPT | Performed by: INTERNAL MEDICINE

## 2023-07-10 PROCEDURE — 83735 ASSAY OF MAGNESIUM: CPT

## 2023-07-10 PROCEDURE — 82948 REAGENT STRIP/BLOOD GLUCOSE: CPT

## 2023-07-10 PROCEDURE — 93005 ELECTROCARDIOGRAM TRACING: CPT

## 2023-07-10 PROCEDURE — 99233 SBSQ HOSP IP/OBS HIGH 50: CPT | Performed by: INTERNAL MEDICINE

## 2023-07-10 PROCEDURE — 99222 1ST HOSP IP/OBS MODERATE 55: CPT | Performed by: INTERNAL MEDICINE

## 2023-07-10 RX ORDER — ASCORBIC ACID 500 MG
1000 TABLET ORAL DAILY
Status: DISCONTINUED | OUTPATIENT
Start: 2023-07-10 | End: 2023-07-13 | Stop reason: HOSPADM

## 2023-07-10 RX ORDER — ACETAMINOPHEN 325 MG/1
650 TABLET ORAL EVERY 8 HOURS PRN
Status: DISCONTINUED | OUTPATIENT
Start: 2023-07-10 | End: 2023-07-12

## 2023-07-10 RX ADMIN — MIDODRINE HYDROCHLORIDE 5 MG: 5 TABLET ORAL at 17:00

## 2023-07-10 RX ADMIN — PANTOPRAZOLE SODIUM 40 MG: 40 TABLET, DELAYED RELEASE ORAL at 06:22

## 2023-07-10 RX ADMIN — LOPERAMIDE HYDROCHLORIDE 2 MG: 2 CAPSULE ORAL at 08:07

## 2023-07-10 RX ADMIN — LOPERAMIDE HYDROCHLORIDE 2 MG: 2 CAPSULE ORAL at 11:05

## 2023-07-10 RX ADMIN — MIDODRINE HYDROCHLORIDE 5 MG: 5 TABLET ORAL at 11:05

## 2023-07-10 RX ADMIN — LOPERAMIDE HYDROCHLORIDE 2 MG: 2 CAPSULE ORAL at 17:00

## 2023-07-10 RX ADMIN — CHLORHEXIDINE GLUCONATE 15 ML: 1.2 SOLUTION ORAL at 08:06

## 2023-07-10 RX ADMIN — INSULIN LISPRO 2 UNITS: 100 INJECTION, SOLUTION INTRAVENOUS; SUBCUTANEOUS at 11:10

## 2023-07-10 RX ADMIN — OXYCODONE HYDROCHLORIDE AND ACETAMINOPHEN 1000 MG: 500 TABLET ORAL at 08:07

## 2023-07-10 RX ADMIN — MIDODRINE HYDROCHLORIDE 5 MG: 5 TABLET ORAL at 06:22

## 2023-07-10 RX ADMIN — CALCITONIN SALMON 1 SPRAY: 200 SPRAY, METERED NASAL at 08:08

## 2023-07-10 RX ADMIN — RIVAROXABAN 10 MG: 10 TABLET, FILM COATED ORAL at 08:07

## 2023-07-10 RX ADMIN — INSULIN LISPRO 1 UNITS: 100 INJECTION, SOLUTION INTRAVENOUS; SUBCUTANEOUS at 17:00

## 2023-07-10 RX ADMIN — LIDOCAINE 5% 1 PATCH: 700 PATCH TOPICAL at 08:07

## 2023-07-10 RX ADMIN — Medication 1 PACKET: at 09:06

## 2023-07-10 RX ADMIN — Medication 1 PACKET: at 17:00

## 2023-07-10 NOTE — ASSESSMENT & PLAN NOTE
Osteoporosis  • Plan:  ? Continue home vitamin C and miacalcin   ? Continue lidocaine patch  ? Turn/reposition while in bed frequently   ? PT/OT consult - recs appreciated  ?  OOB with meals

## 2023-07-10 NOTE — ASSESSMENT & PLAN NOTE
Diagnosis: Iron deficiency anemia  Received treatment with venofer in the past  7/09: iron: 24 (low), iron saturation 9 (low); Ferritin and TIBC WDL  Hgb: 7.9 (8.4) (10.0)  • Plan:  ? Consider iron supplement  ?  Daily CBC

## 2023-07-10 NOTE — ASSESSMENT & PLAN NOTE
RESOLVED - Hypotension, shock of undetermined etiology; likely in the setting of hypovolemia 2/2 increased ileostomy output vs adrenal insufficiency  7/07: 1.7L LR bolus total; 12.5g IV albumin  7/08: 25g IV albumin  7/09: 1.5L isolyte total  Levo off since 1705 on 7/09  • Plan:  ? Continue midodrine 5mg TID  ? Discontinued a-line today  ?  Monitor UOP

## 2023-07-10 NOTE — PLAN OF CARE
Problem: MOBILITY - ADULT  Goal: Maintain or return to baseline ADL function  Description: INTERVENTIONS:  -  Assess patient's ability to carry out ADLs; assess patient's baseline for ADL function and identify physical deficits which impact ability to perform ADLs (bathing, care of mouth/teeth, toileting, grooming, dressing, etc.)  - Assess/evaluate cause of self-care deficits   - Assess range of motion  - Assess patient's mobility; develop plan if impaired  - Assess patient's need for assistive devices and provide as appropriate  - Encourage maximum independence but intervene and supervise when necessary  - Involve family in performance of ADLs  - Assess for home care needs following discharge   - Consider OT consult to assist with ADL evaluation and planning for discharge  - Provide patient education as appropriate  Outcome: Progressing  Goal: Maintains/Returns to pre admission functional level  Description: INTERVENTIONS:  - Perform BMAT or MOVE assessment daily.   - Set and communicate daily mobility goal to care team and patient/family/caregiver. - Collaborate with rehabilitation services on mobility goals if consulted  - Perform Range of Motion 3 times a day. - Reposition patient every 2 hours.   - Dangle patient 3 times a day  - Stand patient 3 times a day  - Ambulate patient 3 times a day  - Out of bed to chair 3 times a day   - Out of bed for meals 3 times a day  - Out of bed for toileting  - Record patient progress and toleration of activity level   Outcome: Progressing     Problem: PAIN - ADULT  Goal: Verbalizes/displays adequate comfort level or baseline comfort level  Description: Interventions:  - Encourage patient to monitor pain and request assistance  - Assess pain using appropriate pain scale  - Administer analgesics based on type and severity of pain and evaluate response  - Implement non-pharmacological measures as appropriate and evaluate response  - Consider cultural and social influences on pain and pain management  - Notify physician/advanced practitioner if interventions unsuccessful or patient reports new pain  Outcome: Progressing     Problem: INFECTION - ADULT  Goal: Absence or prevention of progression during hospitalization  Description: INTERVENTIONS:  - Assess and monitor for signs and symptoms of infection  - Monitor lab/diagnostic results  - Monitor all insertion sites, i.e. indwelling lines, tubes, and drains  - Monitor endotracheal if appropriate and nasal secretions for changes in amount and color  - [place] appropriate cooling/warming therapies per order  - Administer medications as ordered  - Instruct and encourage patient and family to use good hand hygiene technique  - Identify and instruct in appropriate isolation precautions for identified infection/condition  Outcome: Progressing  Goal: Absence of fever/infection during neutropenic period  Description: INTERVENTIONS:  - Monitor WBC    Outcome: Progressing     Problem: DISCHARGE PLANNING  Goal: Discharge to home or other facility with appropriate resources  Description: INTERVENTIONS:  - Identify barriers to discharge w/patient and caregiver  - Arrange for needed discharge resources and transportation as appropriate  - Identify discharge learning needs (meds, wound care, etc.)  - Arrange for interpretive services to assist at discharge as needed  - Refer to Case Management Department for coordinating discharge planning if the patient needs post-hospital services based on physician/advanced practitioner order or complex needs related to functional status, cognitive ability, or social support system  Outcome: Progressing     Problem: Knowledge Deficit  Goal: Patient/family/caregiver demonstrates understanding of disease process, treatment plan, medications, and discharge instructions  Description: Complete learning assessment and assess knowledge base.   Interventions:  - Provide teaching at level of understanding  - Provide teaching via preferred learning methods  Outcome: Progressing     Problem: CARDIOVASCULAR - ADULT  Goal: Maintains optimal cardiac output and hemodynamic stability  Description: INTERVENTIONS:  - Monitor I/O, vital signs and rhythm  - Monitor for S/S and trends of decreased cardiac output  - Administer and titrate ordered vasoactive medications to optimize hemodynamic stability  - Assess quality of pulses, skin color and temperature  - Assess for signs of decreased coronary artery perfusion  - Instruct patient to report change in severity of symptoms  Outcome: Progressing  Goal: Absence of cardiac dysrhythmias or at baseline rhythm  Description: INTERVENTIONS:  - Continuous cardiac monitoring, vital signs, obtain 12 lead EKG if ordered  - Administer antiarrhythmic and heart rate control medications as ordered  - Monitor electrolytes and administer replacement therapy as ordered  Outcome: Progressing     Problem: SKIN/TISSUE INTEGRITY - ADULT  Goal: Skin Integrity remains intact(Skin Breakdown Prevention)  Description: Assess:  -Perform Jose assessment every 2 hours  -Clean and moisturize skin as needed  -Inspect skin when repositioning, toileting, and assisting with ADLS  -Assess under medical devices   -Assess extremities for adequate circulation and sensation     Bed Management:  -Have minimal linens on bed & keep smooth, unwrinkled  -Change linens as needed when moist or perspiring  -Avoid sitting or lying in one position for more than 2 hours while in bed  -Keep HOB at 30 degrees     Toileting:  -Offer bedside commode  -Assess for incontinence every 2 hours  -Use incontinent care products after each incontinent episode    Activity:  -Mobilize patient 3 times a day  -Encourage activity and walks on unit  -Encourage or provide ROM exercises   -Turn and reposition patient every 2 Hours  -Use appropriate equipment to lift or move patient in bed  -Instruct/ Assist with weight shifting every 2 hours when out of bed in chair  -Consider limitation of chair time 2 hour intervals    Skin Care:  -Avoid use of baby powder, tape, friction and shearing, hot water or constrictive clothing  -Relieve pressure over bony prominences   -Do not massage red bony areas    Next Steps:  -Teach patient strategies to minimize risks    -Consider consults to  interdisciplinary teams  Outcome: Progressing  Goal: Incision(s), wounds(s) or drain site(s) healing without S/S of infection  Description: INTERVENTIONS  - Assess and document dressing, incision, wound bed, drain sites and surrounding tissue  - Provide patient and family education  - Perform skin care/dressing changes as needed  Outcome: Progressing  Goal: Pressure injury heals and does not worsen  Description: Interventions:  - Implement low air loss mattress or specialty surface (Criteria met)  - Apply silicone foam dressing  - Instruct/assist with weight shifting every 30 minutes when in chair   - Limit chair time to 2 hour intervals  - Use special pressure reducing interventions   - Apply fecal or urinary incontinence containment device   - Perform passive or active ROM   - Turn and reposition patient & offload bony prominences every 2 hours   - Utilize friction reducing device or surface for transfers   - Consider consults to  interdisciplinary teams   - Use incontinent care products after each incontinent episode   - Consider nutrition services referral as needed  Outcome: Progressing     Problem: Prexisting or High Potential for Compromised Skin Integrity  Goal: Skin integrity is maintained or improved  Description: INTERVENTIONS:  - Identify patients at risk for skin breakdown  - Assess and monitor skin integrity  - Assess and monitor nutrition and hydration status  - Monitor labs   - Assess for incontinence   - Turn and reposition patient  - Assist with mobility/ambulation  - Relieve pressure over bony prominences  - Avoid friction and shearing  - Provide appropriate hygiene as needed including keeping skin clean and dry  - Evaluate need for skin moisturizer/barrier cream  - Collaborate with interdisciplinary team   - Patient/family teaching  - Consider wound care consult   Outcome: Progressing     Problem: Nutrition/Hydration-ADULT  Goal: Nutrient/Hydration intake appropriate for improving, restoring or maintaining nutritional needs  Description: Monitor and assess patient's nutrition/hydration status for malnutrition. Collaborate with interdisciplinary team and initiate plan and interventions as ordered. Monitor patient's weight and dietary intake as ordered or per policy. Utilize nutrition screening tool and intervene as necessary. Determine patient's food preferences and provide high-protein, high-caloric foods as appropriate.      INTERVENTIONS:  - Monitor oral intake, urinary output, labs, and treatment plans  - Assess nutrition and hydration status and recommend course of action  - Evaluate amount of meals eaten  - Assist patient with eating if necessary   - Allow adequate time for meals  - Recommend/ encourage appropriate diets, oral nutritional supplements, and vitamin/mineral supplements  - Order, calculate, and assess calorie counts as needed  - Recommend, monitor, and adjust tube feedings and TPN/PPN based on assessed needs  - Assess need for intravenous fluids  - Provide specific nutrition/hydration education as appropriate  - Include patient/family/caregiver in decisions related to nutrition  Outcome: Progressing

## 2023-07-10 NOTE — PROGRESS NOTES
[address] [place] [address]  Progress Note: Critical Care  Name: Cyndie Jeffers 69 y.o. female I MRN: [id number]  Unit/Bed#: MICU 02 I Date of Admission: 7/7/2023   Date of Service: 7/10/2023 I Hospital Day: 3    HPI: 70 yo female with PMHx of stage IIIB ER/SC (+) breast cancer (HER2-) RT breast cancer in 2012 tx by RT R mastectomy/lymph node resection, radiation/chemo, followed by Letrozole completed 12/2022; DVT tx with Xarelto from [id number] complicated by GIB; stage I colon cancer in 2015 s/p R hemicolectomy with recurrence in 02/2023 now stage 4 along with ventral hernia s/p ex lap and L hemicolectomy and creation of end ileostomy complicated by PE and restarted on Xarelto. She presented on 07/07 after being at infusion center for magnesium/1st dose of chemo and was found to be hypotensive. Transferred to ED and admitted to ICU for shock management. Received 1.7L LR, 35.5g IV albumin, and 1.5L isolyte; levo drip dc'ed on 07/09 at 1705; midodrine started on 07/08.       Assessment/Plan   Neuro:   · Diagnosis: No active issues  · Patient denies pain this morning  · Plan:  · CAM-ICU  · Neuro checks  · Monitor sleep-wake cycle  · Monitor and treat pain      CV:   · Diagnosis: Hypotension, shock of undetermined etiology; likely in the setting of hypovolemia 2/2 increased ileostomy output  · 7/07: ; 1.7L LR bolus total; 12.5g IV albumin  · 7/08: 25g IV albumin  · 7/09: 1.5L isolyte total  · Levo off since 1705 on 7/09  · Plan:  · Wean pressors to maintain MAP >65mmHg  · Continue midodrine 5mg TID  · Continue BP monitoring via a-line  · Monitor UOP  · Consider additional fluid bolus      Pulm:  · Diagnosis: New pulmonary metastatic disease  · 7/07: CT CAP showed new pulmonary metastatic disease  · Received dose of hydrocortisone in ED  · Plan:  · Continuous pulse oximetry  · Maintain SpO2 >92%    GI:   · Diagnosis: Hx of colon cancer  · Diagnosis: incarcerated ventral hernia s/p ex lap, left colectomy, and creation of end-ileostomy  · Ileostomy output: 500cc/shift; 650cc/24hrs  · Plan:  · Continue home Protonix  · Continue home loperamide   · Continue metamucil  · Monitor ileostomy output    :   · Diagnosis: No active issues  · UOP: 400cc overnight; 2L/24hrs  · -977cc/24hrs  · Plan:  · Continue external philippe catheter  · Strict I&Os  · Monitor UOP      F/E/N:   F: none  E: Monitor and replace as needed  N: regular house diet with banana flakes, vanilla & berry magic cup supplementations      Heme/Onc:   · Diagnosis: Hx of breast cancer (stage IIIB hormone receptor + and HER-2 +) in remission s/p R mastectomy and lymph node dissection (2012)  · Diagnosis: Hx of colon cancer stage IV with mets s/p R hemicolectomy (2015)  · Diagnosis: hx of DVT  · Plan:  · CT imaging on 7/07 shows new pulmonary metastatic disease  · Will hold home immunotherapy Xeloda  · Continue Xarelto    · Diagnosis: Iron deficiency anemia  · Received treatment with venofer in the past  · 7/09: iron: 24 (low), iron saturation 9 (low); Ferritin and TIBC WDL  · Hgb: 7.9 (8.4) (10.0)  · Plan:  · Consider iron supplement  · Daily CBC      Endo:   · Diagnosis: Hx of diabetes  · Plan:  · SSI  · Monitor BG ACHS  · Monitor and treat hypoglycemia      ID:   · Diagnosis: No active issues  · BCx - [id number]  · Plan:  · Monitor fever curve/trend WBC  · No indication for abx      MSK/Skin:   · Osteoporosis  · Plan:  · Continue home vitamin C and miacalcin   · Pain control as needed  · Monitor for skin breakdown  · Turn/reposition while in bed frequently   · PT/OT when appropriate  · OOB with meals    Disposition: Stepdown Level 1    ICU Core Measures     A: Assess, Prevent, and Manage Pain · Has pain been assessed? Yes  · Need for changes to pain regimen? No   B: Both SAT/SAT  · N/A   C: Choice of Sedation · RASS Goal: 0 Alert and Calm or N/A patient not on sedation  · Need for changes to sedation or analgesia regimen?  NA   D: Delirium · CAM-ICU: Negative   E: Early Mobility  · Plan for early mobility? Yes   F: Family Engagement · Plan for family engagement today? Yes       Review of Invasive Devices:      Central access plan: Medications requiring central line  [place] Plan: Keep arterial line for hemodynamic monitoring    Prophylaxis:  VTE VTE covered by:  rivaroxaban, Oral, 10 mg at 07/09/23 0630       Stress Ulcer  covered bypantoprazole (PROTONIX) EC tablet 40 mg [[id number]]          Subjective   HPI/24hr events:  Overnight the patient remained HD stable, off levo drip since 1705 yesterday, on room air. Patient denies pain this morning. She states she has an appetite and is "excited to eat breakfast" this morning. States she is overall feeling better since being admitted to the hospital.    Review of Systems   Constitutional: Negative. HENT: Negative. Eyes: Negative. Respiratory: Negative. Cardiovascular: Negative. Gastrointestinal: Positive for diarrhea (via ileostomy). Endocrine: Negative. Genitourinary: Negative. Musculoskeletal: Positive for neck pain (chronic). Skin: Negative. Neurological: Negative. Psychiatric/Behavioral: Negative. Objective                            Vitals I/O      Most Recent Min/Max in 24hrs   Temp 97.8 °F (36.6 °C) Temp  Min: 97.8 °F (36.6 °C)  Max: 98.3 °F (36.8 °C)   Pulse 72 Pulse  Min: 72  Max: 104   Resp 16 Resp  Min: 16  Max: 36   /52 BP  Min: 102/52  Max: 102/52   O2 Sat 98 % SpO2  Min: 94 %  Max: 99 %      Intake/Output Summary (Last 24 hours) at 7/10/2023 0711  Last data filed at 7/10/2023 2725  Gross per 24 hour   Intake 1572.34 ml   Output 2700 ml   Net -1127.66 ml         Diet Regular; Regular House     Invasive Monitoring Physical exam   Arterial Line  Radha BP 86/54  Arterial Line BP  Min: 86/54  Max: 124/64   MAP 70 mmHg  Arterial Line MAP (mmHg)  Min: 62 mmHg  Max: 86 mmHg    Physical Exam  Vitals reviewed.    Constitutional:       General: She is not in acute distress. Appearance: She is ill-appearing. HENT:      Head: Normocephalic and atraumatic. Mouth/Throat:      Mouth: Mucous membranes are moist.      Pharynx: Oropharynx is clear. Eyes:      General: No scleral icterus. Extraocular Movements: Extraocular movements intact. Pupils: Pupils are equal, round, and reactive to light. Cardiovascular:      Rate and Rhythm: Normal rate and regular rhythm. Pulses: Normal pulses. Heart sounds: No murmur heard. No friction rub. No gallop. Pulmonary:      Effort: Pulmonary effort is normal.      Breath sounds: Normal breath sounds. No wheezing, rhonchi or rales. Abdominal:      General: Bowel sounds are normal. There is no distension. Palpations: Abdomen is soft. Tenderness: There is no abdominal tenderness. Comments: Ileostomy in place with brown, loose stool present in collection bag. Genitourinary:     Comments: External philippe catheter in place  Musculoskeletal:      Cervical back: Normal range of motion and neck supple. Right lower leg: Edema present. Left lower leg: Edema present. Skin:     General: Skin is warm and dry. Capillary Refill: Capillary refill takes less than 2 seconds. Coloration: Skin is pale. Findings: Bruising present. Comments: B/l feet; dry, flaky skin, chronic ulcers present   Neurological:      Mental Status: She is alert and oriented to person, place, and time.    Psychiatric:         Mood and Affect: Mood normal.         Behavior: Behavior normal.          Diagnostic Studies      EK:  "Normal sinus rhythm  Moderate voltage criteria for LVH, may be normal variant  ST elevation, consider early repolarization, pericarditis, or injury  Abnormal ECG  When compared with ECG of 2023 14:22, (unconfirmed)  T wave inversion now evident in Inferior leads  Nonspecific T wave abnormality no longer evident in Lateral leads  Confirmed by Chula Mahmood ([id number]) on 2023 7:27:19 AM"    Imagin CT CAP:  "IMPRESSION:     New pulmonary metastatic disease.     Progression of patient's hepatic metastatic disease.     Left breast skin thickening and several left breast nodules are again seen.     Etiology for patient's diarrhea not elucidated on this examination."     I have personally reviewed pertinent reports. and I have personally reviewed pertinent films in PACS     Medications:  Scheduled PRN   ascorbic acid, 1,000 mg, Daily  bimatoprost, 1 drop, HS  calcitonin (salmon), 1 spray, Daily  chlorhexidine, 15 mL, Q12H LUCINA  insulin lispro, 1-5 Units, 4x Daily (AC & HS)  lidocaine, 1 patch, Daily  loperamide, 2 mg, TID With Meals  midodrine, 5 mg, TID AC  pantoprazole, 40 mg, Early Morning  psyllium, 1 packet, BID  rivaroxaban, 10 mg, Daily With Breakfast          Continuous          Labs:    CBC    Recent Labs     07/09/23  0620 07/10/23  0458   WBC 4.33 3.81*   HGB 8.4* 7.9*   HCT 24.9* 24.7*    168     BMP    Recent Labs     07/09/23  0620 07/10/23  0458   SODIUM 139 137   K 3.3* 4.1    106   CO2 25 24   AGAP 6 7   BUN 10 12   CREATININE 0.54* 0.63   CALCIUM 9.6 9.4       Coags    No recent results     Additional Electrolytes  Recent Labs     07/09/23  0620 07/10/23  0458   MG 2.0 1.9   PHOS 2.6 2.4          Blood Gas    No recent results  No recent results LFTs  Recent Labs     07/09/23  0620 07/10/23  0458   ALT 10* 10*   AST 17 5   ALKPHOS 55 53   ALB 3.6 3.3*   TBILI 0.32 0.18*       Infectious  No recent results  Glucose  Recent Labs     23  0620 07/10/23  0458   GLUC 125 123           Critical Care Time Delivered: Upon my evaluation, this patient had a high probability of imminent or life-threatening deterioration due to anemia, hypotension, metastastic disease, diarrhea, DM, which required my direct attention, intervention, and personal management.   I have personally provided 25 minutes of critical care time, exclusive of procedures, teaching, family meetings, and any prior time recorded by providers other than myself.      Nohemi Jolly

## 2023-07-10 NOTE — ASSESSMENT & PLAN NOTE
Diagnosis: New suspected pulmonary metastatic disease  Hx of breast cancer s/p mastectomy, chemo/radiation, lymph node dissection (2012); colon cancer s/p hemicolectomy (2015) with evidence of liver mets  7/07: CT CAP showed new pulmonary metastatic disease  • Plan:  ? Maintain SpO2 >92%  ?  Follow up with HemeOnc team regarding prognostication and further workup/treatment if warranted

## 2023-07-10 NOTE — CONSULTS
Consultation - Palliative and Supportive Care   Johan Denis 69 y.o. female [id number]    Patient Active Problem List   Diagnosis   • Chronic deep vein thrombosis (DVT) of distal vein of left lower extremity (HCC)   • Thyroid nodule   • Malignant neoplasm of right breast in female, estrogen receptor positive ([address])   • Malignant neoplasm of ascending colon (HCC)   • Personal history of other malignant neoplasm of large intestine   • 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   • Other osteoporosis without current pathological fracture   • Type 2 diabetes mellitus without complications (HCC)   • D-dimer, elevated   • Essential hypertension   • Abnormal tumor markers   • Diabetic ulcer of toe of left foot associated with type 2 diabetes mellitus, limited to breakdown of skin (HCC)   • Anxiety about health   • History of DVT (deep vein thrombosis)   • Thyroid disorder   • Osteoporosis due to aromatase inhibitor   • History of diabetes mellitus   • Iron deficiency anemia, unspecified   • Lower abdominal pain   • Perforated bowel (HCC)   • Strangulated ventral hernia   • GERD (gastroesophageal reflux disease)   • History of right breast cancer   • History of right mastectomy   • Moderate protein-calorie malnutrition (HCC)   • Incarcerated ventral hernia   • Adenocarcinoma of colon (HCC)   • S/P ileostomy ([address])   • Chronic wound infection of abdomen   • Metastasis to liver [facility])   • Port-A-Cath in place   • Shock [facility])     Active issues specifically addressed today include:   Metastatic adenocarcinoma of the colon  S/p ileostomy  S/p port-a-cath on chemotherapy  Shock    Plan:  1. Symptom management  Well controlled    Will add acetaminophen 650 mg p.o. every 8 hours as needed for mild pain  Continue lidocaine patch 12 on, 12 off for neck pain  Continue loperamide 2 mg p.o. 3 times daily with meals for increased ostomy burden  Continue psyllium 1 packet p.o. twice daily for constipation/bowel irregularity    2.  Goals Treatment-based focus with continuing medical optimization and ultimately chemotherapy. Oncology will likely determine appropriateness of continuing chemo after discharge. Palliative care with establish care with her in the outpatient setting. Code Status: Full code - Level 1   Decisional apparatus:  Patient is competent on my exam today. If competence is lost, patient's substitute decision maker would default to friend Jacqueline Oppenheim. ([phone]) by [place] Act 169. Advance Directive / Living Will / POLST:  Reportedly has physical copy and to be brought in today. I have reviewed the patient's controlled substance dispensing history in the Prescription Drug Monitoring Program in compliance with the [facility] regulations before prescribing any controlled substances. We appreciate the invitation to be involved in this patient's care. We will continue to follow. Please do not hesitate to reach our on call provider through our clinic answering service at [phone] should you have acute symptom control concerns. Jessica Reyes DO  Palliative and Supportive Care  Clinic/Answering Service: [phone]  You can find me on Fannect!        IDENTIFICATION:        Inpatient consult to Palliative Care     Performed by  Jessica Reyes DO   Authorized by Marita Allen MD           Physician Requesting Consult: Sammy Young DO  Reason for Consult / Principal Problem: Goals of care conversation in setting of metastatic colon cancer with no family for medical decision making and unable to tolerate chemotherapy; symptom management    HISTORY OF PRESENT ILLNESS:       Clint Hyde is a 69 y.o. female with PMH of stage IV colon cancer with mets to liver and lung s/p R hemicolectomy with colostomy, hormone receptor and HER-2 + breast cancer s/p R mastectomy and LN dissection in remission, DVT, T2DM, and osteoporosis who [facility] [place] [facility] after being hypotensive during a chemotherapy session (before agent was given, during a magnesium infusion). According to chart review, she had been experiencing multiple episodes of low BP since her colostomy placement in February 2023. Also, she was noted to have decreasing appetite and generalized weakness over the past few weeks. She was admitted to the MICU for need of pressor support. Today's encounter was with palliative  Lenny. Prior to speaking with the patient, palliative care team spoke with her oncologist Dr. Aubree Madrigal MD. He expressed that although further evaluation chemotherapy eligibility may be taking place after hospital stabilization in the outpatient setting, that her current condition is not curable and that her prognosis is overall very poor. And introducing ourselves of the palliative care team, Maico Bustillo emphasized from the start that she is "ready to fight this." That she comes from a family where cancer is very common and that she if fully committed to continue chemotherapy. Although she remarks that her condition is likely not curable, she does believe that it would be treatable overtime with chemotherapy. Her biggest goal is to become stronger with PT so that she can continue with treatment, though she acknowledges that she had a lot of difficulty walking with therapists at her nursing home. She expressed frustration living at her nursing home due to having to share a room with another resident, inconsistent staff care, and infrequent provider visits. In terms of advance care planning, she has no family, but designated one of her friends to be her medical POA through documentation and reports that that person will bring it in for our hospital records. Symptom wise, she denies any discomfort, nausea, vomiting, or increased stool ostomy burden. Her only issue is some mild pain in her neck from chronic osteoarthritis that is partially relieved by lidocaine patches.      Review of Systems   Constitutional: Negative for fever and malaise/fatigue. Eyes: Negative for blurred vision and photophobia. Cardiovascular: Negative for chest pain and leg swelling. Respiratory: Negative for cough and shortness of breath. Skin: Negative for itching and rash. Musculoskeletal: Positive for arthritis and neck pain. Gastrointestinal: Negative for nausea and vomiting. Genitourinary: Negative for bladder incontinence and dysuria. Neurological: Negative for dizziness and headaches. Psychiatric/Behavioral: Negative for altered mental status. The patient is not nervous/anxious. Past Medical History:   Diagnosis Date   • Acute embolism and thrombosis of deep vein of lower extremity (HCC)    • Adenocarcinoma of colon, [facility] ([address])    • Breast cancer ([address]) 2012    Right Breast    • Cancer ([address])    • Diabetes mellitus ([address])    • DVT (deep venous thrombosis) ([address])    • GERD (gastroesophageal reflux disease)    • Hypertension    • Pes planus      Past Surgical History:   Procedure Laterality Date   • COLONOSCOPY     • HERNIA REPAIR N/A 2/9/2023    Procedure: EXPLORATORY LAPAROTOMY; ABDOMINAL WALL DEBRIDEMENT; REDUCTION VENTRAL HERNIA; LEFT COLON RESECTION; CREATION ILEOSTOMY; WASHOUT;  Surgeon: Sultana Andersen DO;  Location: [facility];  Service: General   • HYSTERECTOMY      complete @ age 29   • IR PORT PLACEMENT  6/28/2023   • IR PORT REMOVAL  9/12/2018   • MASTECTOMY Right 2012   • KS COLONOSCOPY FLX DX W/COLLJ SPEC WHEN PFRMD N/A 8/23/2016    Procedure: COLONOSCOPY;  Surgeon: Jena Shields MD;  Location: [facility]; Service: Colorectal   • KS COLONOSCOPY FLX DX W/COLLJ SPEC WHEN PFRMD N/A 9/5/2017    Procedure: COLONOSCOPY;  Surgeon: Jena Shields MD;  Location: [facility]; Service: Colorectal   • KS ESOPHAGOGASTRODUODENOSCOPY TRANSORAL DIAGNOSTIC N/A 9/5/2017    Procedure: ESOPHAGOGASTRODUODENOSCOPY (EGD); Surgeon: Shanna Severino DO;  Location: [facility];   Service: Gastroenterology     Social History Socioeconomic History   • Marital status: Single     Spouse name: Not on file   • Number of children: Not on file   • Years of education: Not on file   • Highest education level: Not on file   Occupational History   • Occupation: retired   Tobacco Use   • Smoking status: Never   • Smokeless tobacco: Never   Vaping Use   • Vaping Use: Never used   Substance and Sexual Activity   • Alcohol use: Not Currently   • Drug use: No   • Sexual activity: Not Currently   Other Topics Concern   • Not on file   Social History Narrative    No advance directives     Social Determinants of Health     Financial Resource Strain: Not on file   Food Insecurity: Not on file   Transportation Needs: No Transportation Needs (2/10/2023)    PRAPARE - Transportation    • Lack of Transportation (Medical): No    • Lack of Transportation (Non-Medical):  No   Physical Activity: Not on file   Stress: Not on file   Social Connections: Not on file   Intimate Partner Violence: Not on file   Housing Stability: Not on file     Family History   Problem Relation Age of Onset   • Other Mother         chronic bronchitis   • Brain cancer Father    • Prostate cancer Paternal Grandfather 79   • Pancreatic cancer Maternal Aunt 45   • Breast cancer Neg Hx        MEDICATIONS / ALLERGIES:    all current active meds have been reviewed, current meds:   Current Facility-Administered Medications   Medication Dose Route Frequency   • ascorbic acid (VITAMIN C) tablet 1,000 mg  1,000 mg Oral Daily   • bimatoprost (LUMIGAN) 0.01 % ophthalmic solution 1 drop  1 drop Both Eyes HS   • calcitonin (salmon) (MIACALCIN) 200 units/act nasal spray 1 spray  1 spray Alternating Nares Daily   • chlorhexidine (PERIDEX) 0.12 % oral rinse 15 mL  15 mL Mouth/Throat Q12H LUCINA   • insulin lispro (HumaLOG) 100 units/mL subcutaneous injection 1-5 Units  1-5 Units Subcutaneous 4x Daily (AC & HS)   • lidocaine (LIDODERM) 5 % patch 1 patch  1 patch Topical Daily   • loperamide (IMODIUM) capsule 2 mg  2 mg Oral TID With Meals   • midodrine (PROAMATINE) tablet 5 mg  5 mg Oral TID AC   • pantoprazole (PROTONIX) EC tablet 40 mg  40 mg Oral Early Morning   • psyllium (METAMUCIL) 1 packet  1 packet Oral BID   • rivaroxaban (XARELTO) tablet 10 mg  10 mg Oral Daily With Breakfast    and PTA meds:   Prior to Admission Medications   Prescriptions Last Dose Informant Patient Reported? Taking? BANANA FLAKES PO   Yes No   Sig: Take 1 Units by mouth 2 (two) times a day   Cholecalciferol (VITAMIN D-3 PO)  Self Yes No   Sig: Take 1 capsule by mouth 3 (three) times a day    Cyanocobalamin (VITAMIN B 12 PO)  Self Yes No   Sig: Take 1 tablet by mouth daily. Diclofenac Sodium (VOLTAREN) 1 %  Self No No   Sig: Apply 2 g topically 4 (four) times a day   Januvia 25 MG tablet   Yes No   acetaminophen (TYLENOL) 325 mg tablet   Yes No   Sig: Take 975 mg by mouth every 8 (eight) hours   amLODIPine (NORVASC) 2.5 mg tablet  Self No No   Sig: Take 1 tablet (2.5 mg total) by mouth daily   ascorbic acid (VITAMIN C) 500 MG tablet  Self Yes No   Sig: Take 1,000 mg by mouth daily    benazepril-hydrochlorthiazide (LOTENSIN HCT) 20-12.5 MG per tablet  Self No No   Sig: take 1 tablet by mouth once daily   bimatoprost (LUMIGAN) 0.01 % ophthalmic drops  Self Yes No   Sig: Administer 1 drop to both eyes daily at bedtime    calcitonin, salmon, (MIACALCIN) 200 units/act nasal spray   Yes No   calcium citrate (CALCITRATE) 950 MG tablet  Self Yes No   Sig: Take 1 tablet by mouth in the morning.    capecitabine (XELODA) 500 MG tablet   No No   Sig: Take 3 tablets (1,500 mg total) by mouth 2 (two) times a day 1 week on followed by 1 week off starting 7/7/23   insulin lispro (HumaLOG) 100 units/mL injection   No No   Sig: Inject 1-6 Units under the skin 4 (four) times a day (before meals and at bedtime)   loperamide (IMODIUM A-D) 2 MG tablet   Yes No   Sig: Take 2 mg by mouth 2 (two) times a day   metFORMIN (GLUCOPHAGE) 500 mg tablet   Yes No Sig: Take 500 mg by mouth 2 (two) times a day with meals   miconazole (MICOTIN) 2 % powder   Yes No   Sig: Apply topically in the morning Apply to groin topically and under left breast topically every day and evening shift for red wash with soap and water, pat dry and then apply powder. pantoprazole (PROTONIX) 40 mg tablet   No No   Sig: Take 1 tablet (40 mg total) by mouth daily in the early morning Do not start before February 24, 2023. rivaroxaban (Xarelto) 10 mg tablet   No No   Sig: Take 1 tablet (10 mg total) by mouth daily   sodium chloride 1 g tablet   Yes No   Sig: Take 1 g by mouth 2 (two) times a day   vitamin E 100 UNIT capsule  Self Yes No   Sig: Take 100 Units by mouth daily       Facility-Administered Medications: None       No Known Allergies    OBJECTIVE:    Physical Exam  Physical Exam  Constitutional:       General: She is awake. Appearance: She is ill-appearing. HENT:      Head: Normocephalic and atraumatic. Right Ear: External ear normal.      Left Ear: External ear normal.      Nose: Nose normal.      Mouth/Throat:      Mouth: Mucous membranes are moist.      Pharynx: Oropharynx is clear. Eyes:      Conjunctiva/sclera: Conjunctivae normal.   Cardiovascular:      Rate and Rhythm: Normal rate and regular rhythm. Pulmonary:      Effort: Pulmonary effort is normal.   Musculoskeletal:      Comments: Chemo port visible in upper left shoulder   Skin:     Coloration: Skin is pale. Skin is not jaundiced. Findings: No bruising or erythema. Neurological:      General: No focal deficit present. Mental Status: She is alert and oriented to person, place, and time. Psychiatric:         Mood and Affect: Mood normal.         Behavior: Behavior normal. Behavior is cooperative. Thought Content: Thought content normal.         Judgment: Judgment normal.         Lab Results:   I have personally reviewed pertinent labs. , CBC:   Lab Results   Component Value Date    WBC 3.81 (L) 07/10/2023    HGB 7.9 (L) 07/10/2023    HCT 24.7 (L) 07/10/2023     (H) 07/10/2023     07/10/2023    RBC 2.45 (L) 07/10/2023    MCH 32.2 07/10/2023    MCHC 32.0 07/10/2023    RDW 14.3 07/10/2023    MPV 8.6 (L) 07/10/2023    NRBC 0 07/10/2023   , CMP:   Lab Results   Component Value Date    SODIUM 137 07/10/2023    K 4.1 07/10/2023     07/10/2023    CO2 24 07/10/2023    BUN 12 07/10/2023    CREATININE 0.63 07/10/2023    CALCIUM 9.4 07/10/2023    AST 5 07/10/2023    ALT 10 (L) 07/10/2023    ALKPHOS 53 07/10/2023    EGFR 86 07/10/2023   , BMP:  Lab Results   Component Value Date    SODIUM 137 07/10/2023    K 4.1 07/10/2023     07/10/2023    CO2 24 07/10/2023    BUN 12 07/10/2023    CREATININE 0.63 07/10/2023    GLUC 123 07/10/2023    CALCIUM 9.4 07/10/2023    AGAP 7 07/10/2023    EGFR 86 07/10/2023   , PT/PTT:No results found for: "PT", "PTT"  Imaging Studies: Reviewed studies  EKG, Pathology, and Other Studies: Reviewed studies    Counseling / Coordination of Care    Total floor / unit time spent today 60 minutes. Greater than 50% of total time was spent with the patient and / or family counseling and / or coordination of care. A description of the counseling / coordination of care: Chart review, patient interview/discussion, psychosocial support, comprehensive symptom evaluation, physical examination, medication evaluation/alteration, advance care/goals-of-care planning, and multidisciplinary collaboration.  Jess Padilla DO  Hospice/Palliative Care Medicine PGY-4  [facility]

## 2023-07-10 NOTE — PROGRESS NOTES
Critical Care Transfer Note - ICU/Stepdown Transfer to [facility] 69 y.o. female MRN: [id number]  [address] [facility]   Unit/Bed#: MICU 16 Encounter: [id number]    Code Status: Level 1 - Full Code    * Shock [facility])  Assessment & Plan  Hypotensive at infusion center. Transferred to ED and admitted to ICU for IV pressors despite fluid resuscitation. Likely in the setting of hypovolemia 2/2 increased volume loss via ileostomy vs adrenal insufficiency  - Levo drip off since 1705 on 07/09  - Radha NM'ed on 7/10    Plan:  - No abx indicated at this time  - Continue midodrine 5mg TID  - Monitor UOP  - Endo consulted - recs appreciated    Hypotension-resolved as of 7/10/2023  Assessment & Plan  RESOLVED - Hypotension, shock of undetermined etiology; likely in the setting of hypovolemia 2/2 increased ileostomy output vs adrenal insufficiency  7/07: 1.7L LR bolus total; 12.5g IV albumin  7/08: 25g IV albumin  7/09: 1.5L isolyte total  Levo off since 1705 on 7/09  • Plan:  ? Continue midodrine 5mg TID  ? Discontinued a-line today  ? Monitor UOP    Pulmonary nodule  Assessment & Plan  Diagnosis: New suspected pulmonary metastatic disease  Hx of breast cancer s/p mastectomy, chemo/radiation, lymph node dissection (2012); colon cancer s/p hemicolectomy (2015) with evidence of liver mets  7/07: CT CAP showed new pulmonary metastatic disease  • Plan:  ? Maintain SpO2 >92%  ? Follow up with HemeOnc team regarding prognostication and further workup/treatment if warranted    Iron deficiency anemia, unspecified  Assessment & Plan  Diagnosis: Iron deficiency anemia  Received treatment with venofer in the past  7/09: iron: 24 (low), iron saturation 9 (low); Ferritin and TIBC WDL  Hgb: 7.9 (8.4) (10.0)  • Plan:  ? Consider iron supplement  ?  Daily CBC      Colon cancer [facility])  Assessment & Plan  Stage I colon cancer in 2015 s/p R hemicolectomy with recurrence in 02/2023 now stage 4 along with ventral hernia s/p ex lap and L hemicolectomy and creation of end ileostomy complicated by PE and restarted on Xarelto. - plans to begin chemo on 7/07 but became hypotensive and was admitted to ICU    Plan:  - Follow up with [facility] team regarding prognostication and [address] [place] [address] team - recs appreciated  - Holding home capecitabine (XELODA) 500 MG tablet at this time    History of diabetes mellitus  Assessment & Plan  Diagnosis: Hx of diabetes  • Plan:  ? SSI  ? Monitor BG ACHS  ? Monitor and treat hypoglycemia      S/P ileostomy ([address])  Assessment & Plan  Hx of colon cancer and incarcerated ventral hernia s/p ex lap, left colectomy, and creation of end-ileostomy  • Ileostomy output: 500cc/shift; 650cc/24hrs  ? Plan:  - Continue home loperamide   - Continue metamucil  - Monitor ileostomy output  - Change ostomy appliance and bag per nursing protocol         Port-A-Cath in place  Assessment & Plan  Currently accessed  Plan:  - Sterile dressing change per protocol or if soiled  - Monitor site for s/s infection    History of DVT (deep vein thrombosis)  Assessment & Plan  Plan:  - Continue home Xarelto  - Monitor for s/s bleeding  - Daily CBC    Diabetic ulcer of toe of left foot associated with type 2 diabetes mellitus, limited to breakdown of skin ([address])  Assessment & Plan  Plan:  - PT/OT consulted  - Consider wound consult  - Monitor for skin breakdown    Other osteoporosis without current pathological fracture  Assessment & Plan  Osteoporosis  • Plan:  ? Continue home vitamin C and miacalcin   ? Continue lidocaine patch  ? Turn/reposition while in bed frequently   ? PT/OT consult - recs appreciated  ? OOB with meals      History of Present Illness:  Per Michelle Bain MD H&P note from 7/07/2023: "Clint Hyde is a 69 y.o. With PMHx of breast cancer n remission, colon cancer, colectomy with colostomy bag, DVT who presents with hypotension.   Patient reports that she was at an infusion center earlier today for chemotherapy. After receiving the pretreatment magnesium infusion, she was found to be hypotensive to 58E systolic and sent to the ED. Patient notes having multiple episodes of low blood pressure since the colostomy in February 2023 and attributes them to the decreased appetite post colostomy. Patient also notes feeling weak for the last couple of weeks. She denies increase in colostomy output. She denies fever, chills, recent illness, lightheadedness. In the ED, she was hypotensive at 101/55. CMP, UA, CXR  were unremarkable, lactic acid was elevated at 2.6. CBC showed normal leukocytes and hemoglobin of 10. PT and PTT were elevated. She was administered 1.7 liters of lactated Ringer's, however, hypotension persisted with systolic in the 83K. She is being admitted to the MICU for possible need for pressors."    Please refer to today's progress note for further clinical details. Mobilization Plan: PT/OT assessment pending  Nutrition Plan: Regular diet with nutritional supplementations    Barriers to discharge:   · PT/OT evaluation  · Pending Palliative Care consultation  · Pending Endocrinology consultation     Consults: IP CONSULT TO CASE MANAGEMENT  IP CONSULT TO ENDOCRINOLOGY  IP CONSULT TO PALLIATIVE CARE    Recommended to review admission imaging for incidental findings and document in discharge navigator: Incidental findings have been documented in discharge navigator. Patient and/or family was informed and they expressed understanding. Discharge Plan: Anticipate discharge in 48-72 hrs to prior assisted or independent living facility. Central access plan: Medications requiring central line    PT Recommendations: Post acute rehabilitation services  OT Recommendations: Post acute rehabilitation services    Patient seen and evaluated by Critical Care today and deemed to be appropriate for transfer to Med Surg. Spoke to Aurelia Renee MD from [facility] to accept transfer.  Critical care can be contacted via [employer] with any questions or concerns.       José Alanis, [address] AP Fellow

## 2023-07-10 NOTE — ASSESSMENT & PLAN NOTE
Stage I colon cancer in 2015 s/p R hemicolectomy with recurrence in 02/2023 now stage 4 along with ventral hernia s/p ex lap and L hemicolectomy and creation of end ileostomy complicated by PE and restarted on Xarelto.    - plans to begin chemo on 7/07 but became hypotensive and was admitted to ICU    Plan:  - Follow up with [facility] team regarding prognostication and [address] [place] [address] team - recs appreciated  - Holding home capecitabine (XELODA) 500 MG tablet at this time

## 2023-07-10 NOTE — ASSESSMENT & PLAN NOTE
Hx of colon cancer and incarcerated ventral hernia s/p ex lap, left colectomy, and creation of end-ileostomy  • Ileostomy output: 500cc/shift; 650cc/24hrs  ?  Plan:  - Continue home loperamide   - Continue metamucil  - Monitor ileostomy output  - Change ostomy appliance and bag per nursing protocol

## 2023-07-10 NOTE — ASSESSMENT & PLAN NOTE
Currently accessed  Plan:  - Sterile dressing change per protocol or if soiled  - Monitor site for s/s infection

## 2023-07-10 NOTE — ASSESSMENT & PLAN NOTE
Hypotensive at infusion center. Transferred to ED and admitted to ICU for IV pressors despite fluid resuscitation.    Likely in the setting of hypovolemia 2/2 increased volume loss via ileostomy vs adrenal insufficiency  - Levo drip off since 1705 on 07/09  - Radha bonilla'ed on 7/10    Plan:  - No abx indicated at this time  - Continue midodrine 5mg TID  - Monitor UOP  - Endo consulted - recs appreciated

## 2023-07-11 LAB
ANION GAP SERPL CALCULATED.3IONS-SCNC: 6 MMOL/L
BASOPHILS # BLD AUTO: 0.02 THOUSANDS/ÂΜL (ref 0–0.1)
BASOPHILS NFR BLD AUTO: 0 % (ref 0–1)
BUN SERPL-MCNC: 10 MG/DL (ref 5–25)
CALCIUM SERPL-MCNC: 9.9 MG/DL (ref 8.3–10.1)
CHLORIDE SERPL-SCNC: 107 MMOL/L (ref 96–108)
CO2 SERPL-SCNC: 26 MMOL/L (ref 21–32)
CREAT SERPL-MCNC: 0.67 MG/DL (ref 0.6–1.3)
EOSINOPHIL # BLD AUTO: 0.28 THOUSAND/ÂΜL (ref 0–0.61)
EOSINOPHIL NFR BLD AUTO: 5 % (ref 0–6)
ERYTHROCYTE [DISTWIDTH] IN BLOOD BY AUTOMATED COUNT: 14.4 % (ref 11.6–15.1)
GFR SERPL CREATININE-BSD FRML MDRD: 85 ML/MIN/1.73SQ M
GLUCOSE SERPL-MCNC: 116 MG/DL (ref 65–140)
GLUCOSE SERPL-MCNC: 116 MG/DL (ref 65–140)
GLUCOSE SERPL-MCNC: 155 MG/DL (ref 65–140)
GLUCOSE SERPL-MCNC: 192 MG/DL (ref 65–140)
GLUCOSE SERPL-MCNC: 210 MG/DL (ref 65–140)
HCT VFR BLD AUTO: 26.7 % (ref 34.8–46.1)
HGB BLD-MCNC: 8.7 G/DL (ref 11.5–15.4)
IMM GRANULOCYTES # BLD AUTO: 0.07 THOUSAND/UL (ref 0–0.2)
IMM GRANULOCYTES NFR BLD AUTO: 1 % (ref 0–2)
LYMPHOCYTES # BLD AUTO: 1.12 THOUSANDS/ÂΜL (ref 0.6–4.47)
LYMPHOCYTES NFR BLD AUTO: 22 % (ref 14–44)
MCH RBC QN AUTO: 33.1 PG (ref 26.8–34.3)
MCHC RBC AUTO-ENTMCNC: 32.6 G/DL (ref 31.4–37.4)
MCV RBC AUTO: 102 FL (ref 82–98)
MONOCYTES # BLD AUTO: 0.77 THOUSAND/ÂΜL (ref 0.17–1.22)
MONOCYTES NFR BLD AUTO: 15 % (ref 4–12)
NEUTROPHILS # BLD AUTO: 2.88 THOUSANDS/ÂΜL (ref 1.85–7.62)
NEUTS SEG NFR BLD AUTO: 57 % (ref 43–75)
NRBC BLD AUTO-RTO: 0 /100 WBCS
PLATELET # BLD AUTO: 205 THOUSANDS/UL (ref 149–390)
PMV BLD AUTO: 8.7 FL (ref 8.9–12.7)
POTASSIUM SERPL-SCNC: 4.2 MMOL/L (ref 3.5–5.3)
RBC # BLD AUTO: 2.63 MILLION/UL (ref 3.81–5.12)
SODIUM SERPL-SCNC: 139 MMOL/L (ref 135–147)
WBC # BLD AUTO: 5.14 THOUSAND/UL (ref 4.31–10.16)

## 2023-07-11 PROCEDURE — 82948 REAGENT STRIP/BLOOD GLUCOSE: CPT

## 2023-07-11 PROCEDURE — 99232 SBSQ HOSP IP/OBS MODERATE 35: CPT | Performed by: PHYSICIAN ASSISTANT

## 2023-07-11 PROCEDURE — 97163 PT EVAL HIGH COMPLEX 45 MIN: CPT

## 2023-07-11 PROCEDURE — 80048 BASIC METABOLIC PNL TOTAL CA: CPT

## 2023-07-11 PROCEDURE — 99232 SBSQ HOSP IP/OBS MODERATE 35: CPT | Performed by: INTERNAL MEDICINE

## 2023-07-11 PROCEDURE — 97167 OT EVAL HIGH COMPLEX 60 MIN: CPT

## 2023-07-11 PROCEDURE — 85025 COMPLETE CBC W/AUTO DIFF WBC: CPT

## 2023-07-11 RX ADMIN — Medication 1 PACKET: at 09:08

## 2023-07-11 RX ADMIN — LOPERAMIDE HYDROCHLORIDE 2 MG: 2 CAPSULE ORAL at 09:08

## 2023-07-11 RX ADMIN — LOPERAMIDE HYDROCHLORIDE 2 MG: 2 CAPSULE ORAL at 17:33

## 2023-07-11 RX ADMIN — INSULIN LISPRO 1 UNITS: 100 INJECTION, SOLUTION INTRAVENOUS; SUBCUTANEOUS at 12:19

## 2023-07-11 RX ADMIN — CALCITONIN SALMON 1 SPRAY: 200 SPRAY, METERED NASAL at 09:09

## 2023-07-11 RX ADMIN — MIDODRINE HYDROCHLORIDE 5 MG: 5 TABLET ORAL at 06:24

## 2023-07-11 RX ADMIN — INSULIN LISPRO 2 UNITS: 100 INJECTION, SOLUTION INTRAVENOUS; SUBCUTANEOUS at 22:03

## 2023-07-11 RX ADMIN — PANTOPRAZOLE SODIUM 40 MG: 40 TABLET, DELAYED RELEASE ORAL at 06:24

## 2023-07-11 RX ADMIN — LIDOCAINE 5% 1 PATCH: 700 PATCH TOPICAL at 09:08

## 2023-07-11 RX ADMIN — MIDODRINE HYDROCHLORIDE 5 MG: 5 TABLET ORAL at 12:32

## 2023-07-11 RX ADMIN — INSULIN LISPRO 1 UNITS: 100 INJECTION, SOLUTION INTRAVENOUS; SUBCUTANEOUS at 17:35

## 2023-07-11 RX ADMIN — CHLORHEXIDINE GLUCONATE 15 ML: 1.2 SOLUTION ORAL at 22:03

## 2023-07-11 RX ADMIN — LOPERAMIDE HYDROCHLORIDE 2 MG: 2 CAPSULE ORAL at 12:32

## 2023-07-11 RX ADMIN — MIDODRINE HYDROCHLORIDE 5 MG: 5 TABLET ORAL at 17:34

## 2023-07-11 RX ADMIN — RIVAROXABAN 10 MG: 10 TABLET, FILM COATED ORAL at 09:08

## 2023-07-11 RX ADMIN — Medication 1 PACKET: at 18:27

## 2023-07-11 RX ADMIN — OXYCODONE HYDROCHLORIDE AND ACETAMINOPHEN 1000 MG: 500 TABLET ORAL at 09:08

## 2023-07-11 RX ADMIN — BIMATOPROST 1 DROP: 0.1 SOLUTION/ DROPS OPHTHALMIC at 22:03

## 2023-07-11 RX ADMIN — CHLORHEXIDINE GLUCONATE 15 ML: 1.2 SOLUTION ORAL at 09:07

## 2023-07-11 NOTE — ASSESSMENT & PLAN NOTE
· Per record review, patient noted to be hypotensive at infusion center and was transferred to the ED and subsequently admitted to ICU for pressors for BP support despite IVF resuscitation  · Unclear etiology, but per ICU records was suspected to be by 2/2 hypovolemia 2/2 increased volume output from ileostomy  · Does not have adrenal insufficiency per Endocrinology  · Levo off since 7/9/23 and A-line was discontinued on 7/10/23 and she was transferred out of ICU  · BP now improved  · Not on abx, no clear infectious source

## 2023-07-11 NOTE — PLAN OF CARE
Problem: OCCUPATIONAL THERAPY ADULT  Goal: Performs self-care activities at highest level of function for planned discharge setting. See evaluation for individualized goals. Description: Treatment Interventions: ADL retraining, Functional transfer training, UE strengthening/ROM, Endurance training, Cognitive reorientation, Patient/family training, Equipment evaluation/education, Compensatory technique education, Energy conservation, Activityengagement          See flowsheet documentation for full assessment, interventions and recommendations. Note: Limitation: Decreased ADL status, Decreased UE ROM, Decreased UE strength, Decreased Safe judgement during ADL, Decreased cognition, Decreased endurance, Decreased high-level ADLs, Decreased self-care trans  Prognosis: Fair  Assessment: Pt is 68 y/o female who presented to [facility] on 7/7/23 from [facility] for low BP. Pt has a diagnosis of Shock. Pt has PMH of Acute embolism and thrombosis of deep vein of lower extremity ([address]), Adenocarcinoma of colon, [facility] ([address]), Breast cancer ([address]), Cancer ([address]), Diabetes mellitus ([address]), DVT (deep venous thrombosis) ([address]), GERD (gastroesophageal reflux disease), Hypertension, and Pes planus. Pt greeted for OT evaluation on 7/11/23. Pt resides in a [facility] with 5 MICHELE. Per CM, pt requires assistance with ADLs and IADLs. Pt is Independent with functional mobility w/o device. Pt is demonstrating the following occupational performance levels: Mod Ax1 for bed mobility, mod Ax2 for sit to stand transfers w/ RW, and mod Ax2 for functional mobility w/ RW at short distance. Pt is S for eating, S for grooming, min A for UB bathing/dressing, mod A for LB bathing/dressing, and max A for toileting.  Pt limitations that are impacting occupational performance are decreased ADL status, decreased UE ROM, decreased UE strength, decreased safe judgement during ADLs, decreased cognition, decreased endurance, decreased self care transfers and decreased high level ADLs. Occupational interventions to be addressed are: ADL retraining, functional transfer training, UE strengthening/ROM, endurance training, cognitive reorientation, Pt/caregiver education, equipment evaluation/education, compensatory technique education, energy conservation and activity engagement . Pt will benefit from skilled OT services while in the hospital to maximize independence with ADLs. Upon d/c, Pt will benefit from post acute rehab services to maximize independence and safety with ADLs.      OT Discharge Recommendation: Post acute rehabilitation services

## 2023-07-11 NOTE — ASSESSMENT & PLAN NOTE
· CT imaging revealed, "new pulmonary metastatic disease" per the results report  · Follow up with Heme/Onc  · Palliative Care following

## 2023-07-11 NOTE — PLAN OF CARE
Problem: MOBILITY - ADULT  Goal: Maintain or return to baseline ADL function  Description: INTERVENTIONS:  -  Assess patient's ability to carry out ADLs; assess patient's baseline for ADL function and identify physical deficits which impact ability to perform ADLs (bathing, care of mouth/teeth, toileting, grooming, dressing, etc.)  - Assess/evaluate cause of self-care deficits   - Assess range of motion  - Assess patient's mobility; develop plan if impaired  - Assess patient's need for assistive devices and provide as appropriate  - Encourage maximum independence but intervene and supervise when necessary  - Involve family in performance of ADLs  - Assess for home care needs following discharge   - Consider OT consult to assist with ADL evaluation and planning for discharge  - Provide patient education as appropriate  Outcome: Progressing  Goal: Maintains/Returns to pre admission functional level  Description: INTERVENTIONS:  - Perform BMAT or MOVE assessment daily.   - Set and communicate daily mobility goal to care team and patient/family/caregiver.    - Collaborate with rehabilitation services on mobility goals if consulted  - Out of bed for toileting  - Record patient progress and toleration of activity level   Outcome: Progressing     Problem: PAIN - ADULT  Goal: Verbalizes/displays adequate comfort level or baseline comfort level  Description: Interventions:  - Encourage patient to monitor pain and request assistance  - Assess pain using appropriate pain scale  - Administer analgesics based on type and severity of pain and evaluate response  - Implement non-pharmacological measures as appropriate and evaluate response  - Consider cultural and social influences on pain and pain management  - Notify physician/advanced practitioner if interventions unsuccessful or patient reports new pain  Outcome: Progressing     Problem: INFECTION - ADULT  Goal: Absence or prevention of progression during hospitalization  Description: INTERVENTIONS:  - Assess and monitor for signs and symptoms of infection  - Monitor lab/diagnostic results  - Monitor all insertion sites, i.e. indwelling lines, tubes, and drains  - Monitor endotracheal if appropriate and nasal secretions for changes in amount and color  - [place] appropriate cooling/warming therapies per order  - Administer medications as ordered  - Instruct and encourage patient and family to use good hand hygiene technique  - Identify and instruct in appropriate isolation precautions for identified infection/condition  Outcome: Progressing  Goal: Absence of fever/infection during neutropenic period  Description: INTERVENTIONS:  - Monitor WBC    Outcome: Progressing     Problem: DISCHARGE PLANNING  Goal: Discharge to home or other facility with appropriate resources  Description: INTERVENTIONS:  - Identify barriers to discharge w/patient and caregiver  - Arrange for needed discharge resources and transportation as appropriate  - Identify discharge learning needs (meds, wound care, etc.)  - Arrange for interpretive services to assist at discharge as needed  - Refer to Case Management Department for coordinating discharge planning if the patient needs post-hospital services based on physician/advanced practitioner order or complex needs related to functional status, cognitive ability, or social support system  Outcome: Progressing     Problem: Knowledge Deficit  Goal: Patient/family/caregiver demonstrates understanding of disease process, treatment plan, medications, and discharge instructions  Description: Complete learning assessment and assess knowledge base.   Interventions:  - Provide teaching at level of understanding  - Provide teaching via preferred learning methods  Outcome: Progressing     Problem: CARDIOVASCULAR - ADULT  Goal: Maintains optimal cardiac output and hemodynamic stability  Description: INTERVENTIONS:  - Monitor I/O, vital signs and rhythm  - Monitor for S/S and trends of decreased cardiac output  - Administer and titrate ordered vasoactive medications to optimize hemodynamic stability  - Assess quality of pulses, skin color and temperature  - Assess for signs of decreased coronary artery perfusion  - Instruct patient to report change in severity of symptoms  Outcome: Progressing  Goal: Absence of cardiac dysrhythmias or at baseline rhythm  Description: INTERVENTIONS:  - Continuous cardiac monitoring, vital signs, obtain 12 lead EKG if ordered  - Administer antiarrhythmic and heart rate control medications as ordered  - Monitor electrolytes and administer replacement therapy as ordered  Outcome: Progressing     Problem: SKIN/TISSUE INTEGRITY - ADULT  Goal: Skin Integrity remains intact(Skin Breakdown Prevention)  Description: Assess:  -Assess extremities for adequate circulation and sensation     Bed Management:  -Have minimal linens on bed & keep smooth, unwrinkled  -Change linens as needed when moist or perspiring  Toileting:  -Offer bedside commode    Activity:    -Encourage activity and walks on unit  -Encourage or provide ROM exercises     Skin Care:  -Avoid use of baby powder, tape, friction and shearing, hot water or constrictive clothing  -Do not massage red bony areas    Next Steps:  Outcome: Progressing  Goal: Incision(s), wounds(s) or drain site(s) healing without S/S of infection  Description: INTERVENTIONS  - Assess and document dressing, incision, wound bed, drain sites and surrounding tissue  - Provide patient and family education    Outcome: Progressing  Goal: Pressure injury heals and does not worsen  Description: Interventions:  - Implement low air loss mattress or specialty surface (Criteria met)  - Apply silicone foam dressing  - Apply fecal or urinary incontinence containment device   - Consider nutrition services referral as needed  Outcome: Progressing     Problem: Prexisting or High Potential for Compromised Skin Integrity  Goal: Skin integrity is maintained or improved  Description: INTERVENTIONS:  - Identify patients at risk for skin breakdown  - Assess and monitor skin integrity  - Assess and monitor nutrition and hydration status  - Monitor labs   - Assess for incontinence   - Turn and reposition patient  - Assist with mobility/ambulation  - Relieve pressure over bony prominences  - Avoid friction and shearing  - Provide appropriate hygiene as needed including keeping skin clean and dry  - Evaluate need for skin moisturizer/barrier cream  - Collaborate with interdisciplinary team   - Patient/family teaching  - Consider wound care consult   Outcome: Progressing     Problem: Nutrition/Hydration-ADULT  Goal: Nutrient/Hydration intake appropriate for improving, restoring or maintaining nutritional needs  Description: Monitor and assess patient's nutrition/hydration status for malnutrition. Collaborate with interdisciplinary team and initiate plan and interventions as ordered. Monitor patient's weight and dietary intake as ordered or per policy. Utilize nutrition screening tool and intervene as necessary. Determine patient's food preferences and provide high-protein, high-caloric foods as appropriate.      INTERVENTIONS:  - Monitor oral intake, urinary output, labs, and treatment plans  - Assess nutrition and hydration status and recommend course of action  - Evaluate amount of meals eaten  - Assist patient with eating if necessary   - Allow adequate time for meals  - Recommend/ encourage appropriate diets, oral nutritional supplements, and vitamin/mineral supplements  - Order, calculate, and assess calorie counts as needed  - Recommend, monitor, and adjust tube feedings and TPN/PPN based on assessed needs  - Assess need for intravenous fluids  - Provide specific nutrition/hydration education as appropriate  - Include patient/family/caregiver in decisions related to nutrition  Outcome: Progressing

## 2023-07-11 NOTE — PHYSICAL THERAPY NOTE
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]  Hypotension [I95.9]    Problem List  Patient Active Problem List   Diagnosis    Chronic deep vein thrombosis (DVT) of distal vein of left lower extremity (HCC)    Thyroid nodule    Malignant neoplasm of right breast in female, estrogen receptor positive ([address])    Colon cancer ([address])    Personal history of other malignant neoplasm of large intestine    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    Other osteoporosis without current pathological fracture    Type 2 diabetes mellitus without complications (HCC)    D-dimer, elevated    Essential hypertension    Abnormal tumor markers    Diabetic ulcer of toe of left foot associated with type 2 diabetes mellitus, limited to breakdown of skin (HCC)    Anxiety about health    History of DVT (deep vein thrombosis)    Thyroid disorder    Osteoporosis due to aromatase inhibitor    History of diabetes mellitus    Iron deficiency anemia, unspecified    Lower abdominal pain    Perforated bowel ([address])    Strangulated ventral hernia    GERD (gastroesophageal reflux disease)    History of right breast cancer    History of right mastectomy    Moderate protein-calorie malnutrition (HCC)    Incarcerated ventral hernia    Adenocarcinoma of colon ([address])    S/P ileostomy ([address])    Chronic wound infection of abdomen    Metastasis to liver ([address])    Port-A-Cath in place    Shock [facility])    Pulmonary nodule       Past Medical History  Past Medical History:   Diagnosis Date    Acute embolism and thrombosis of deep vein of lower extremity ([address])     Adenocarcinoma of colon, [facility] ([address])     Breast cancer ([address]) 2012    Right Breast     Cancer ([address])     Diabetes mellitus ([address])     DVT (deep venous thrombosis) (HCC)     GERD (gastroesophageal reflux disease)     Hypertension     Pes planus        Past Surgical History  Past Surgical History:   Procedure Laterality Date COLONOSCOPY      HERNIA REPAIR N/A 2/9/2023    Procedure: EXPLORATORY LAPAROTOMY; ABDOMINAL WALL DEBRIDEMENT; REDUCTION VENTRAL HERNIA; LEFT COLON RESECTION; CREATION ILEOSTOMY; WASHOUT;  Surgeon: Jonny Tavares DO;  Location: [facility];  Service: General    HYSTERECTOMY      complete @ age 29    IR PORT PLACEMENT  6/28/2023    IR PORT REMOVAL  9/12/2018    MASTECTOMY Right 2012    ID COLONOSCOPY FLX DX W/COLLJ SPEC WHEN PFRMD N/A 8/23/2016    Procedure: COLONOSCOPY;  Surgeon: Jerad Good MD;  Location: [facility]; Service: Colorectal    ID COLONOSCOPY FLX DX W/COLLJ SPEC WHEN PFRMD N/A 9/5/2017    Procedure: COLONOSCOPY;  Surgeon: Jerad Good MD;  Location: [facility]; Service: Colorectal    ID ESOPHAGOGASTRODUODENOSCOPY TRANSORAL DIAGNOSTIC N/A 9/5/2017    Procedure: ESOPHAGOGASTRODUODENOSCOPY (EGD); Surgeon: Froilan Farrell DO;  Location: [facility]; Service: Gastroenterology        07/11/23 1016   PT Last Visit   PT Visit Date 07/11/23   Note Type   Note type Evaluation   Pain Assessment   Pain Assessment Tool FLACC   Pain Score   (denies pain at rest)   Pain Location/Orientation Location: Neck  (+ upper back)   Pain Rating: FLACC (Rest) - Face 0   Pain Rating: FLACC (Rest) - Legs 0   Pain Rating: FLACC (Rest) - Activity 0   Pain Rating: FLACC (Rest) - Cry 0   Pain Rating: FLACC (Rest) - Consolability 0   Score: FLACC (Rest) 0   Pain Rating: FLACC (Activity) - Face 0   Pain Rating: FLACC (Activity) - Legs 0   Pain Rating: FLACC (Activity) - Activity 0   Pain Rating: FLACC (Activity) - Cry 1   Pain Rating: FLACC (Activity) - Consolability 1   Score: FLACC (Activity) 2   Restrictions/Precautions   Weight Bearing Precautions Per Order No   Other Precautions Cognitive; Chair Alarm; Bed Alarm; Fall Risk;Pain  (ileostomy)   Home Living   Type of [address] [place] [address] Dr Two level;Stairs to enter with rails;1/2 bath on main level;Bed/bath upstairs  (5 MICHELE)   Bathroom Shower/Tub Tub/shower unit   [employer] Standard   Home Equipment Walker;Cane   Prior Function   Level of [place]   (see below)   Lives With Alone   IADLs Family/Friend/Other provides transportation  ((-)  at baseline, used [employer] + public transportation / cabs when at home)   Falls in the last 6 months 0   Comments (S)  Pt reported living in the above home set-up + reports being I at baseline w/o use of AD. However upon further investigation, since February pt has been at [facility] w/ decline in function. Pt reports walking w/ assistance + use of RW 1000' at [facility] w/ a W/C follow. Pt reports that she plans to eventually go back home to living alone. Question pt's insight. General   Family/Caregiver Present No   Cognition   Overall Cognitive Status (S)  Impaired   Arousal/Participation Alert   Orientation Level Oriented X4   Comments pleasant + coooperative, decreased insight into deficits   Subjective   Subjective Pt agreeable to mobilize. RLE Assessment   RLE Assessment   (3/5 (anterior tibialis 2+/5))   LLE Assessment   LLE Assessment   (3/5 (anterior tibialis 2+/5))   Coordination   Movements are Fluid and Coordinated 1   Bed Mobility   Supine to Sit 3  Moderate assistance   Additional items Assist x 1;HOB elevated; Increased time required;Verbal cues;LE management   Sit to Supine Unable to assess   Additional Comments Pt greeted in supine. Transfers   Sit to Stand 3  Moderate assistance   Additional items Assist x 2; Increased time required;Verbal cues   Stand to Sit 3  Moderate assistance   Additional items Assist x 2; Increased time required;Verbal cues   Additional Comments RW   Ambulation/Elevation   Gait pattern Excessively slow; Short stride; Foward flexed;Decreased foot clearance;Decreased heel strike; Improper Weight shift   Gait Assistance 3  Moderate assist   Additional items Assist x 2  (+ chair follow)   Assistive Device Rolling walker   Distance 7' + 8'   Balance   Static Sitting Fair   Dynamic Sitting Fair -   Static Standing Poor +   Dynamic Standing Poor   Ambulatory Poor -  (RW)   Endurance Deficit   Endurance Deficit Yes   Endurance Deficit Description weakness, fatigue   Activity Tolerance   Activity Tolerance Patient limited by fatigue   Medical Staff Made Aware OT Venecia Vee   Nurse Made Aware yes - cleared + updated   Assessment   Prognosis Excellent   Problem List Decreased strength;Decreased endurance; Impaired balance;Decreased mobility;Pain   Assessment Pt is 69 y.o. female seen for a high complexity PT evaluation s/p admit to [facility]23. Pt presenting w/ shock. Please see above for other active problem list / PMH. PT now consulted to assess functional mobility and needs for safe d/c planning. Prior to admission, pt was assistx1 for ambulation w/ RW 1000' (w/ W/C follow), admitted from [facility]. Currently pt requires ModAx1 for bed skills; ModAx2 for functional transfers; ModAx2 for ambulation w/ RW. Pt presents functioning below baseline and w/ overall mobility deficits 2* to: decreased LE strength; generalized weakness/deconditioning; decreased endurance; impaired balance; gait deviations; fatigue; impaired safety and judgement; limited insight into current deficits; bed/chair alarms. These impairments place pt at risk for falls. Pt will continue to benefit from skilled PT interventions to address stated impairments; to maximize functional potential; for ongoing pt/ family training; and DME needs. PT is currently recommending rehab. Goals   Patient Goals go back home   STG Expiration Date 07/25/23   Short Term Goal #1 In 14 days pt will complete: 1) Bed mobility skills with S to facilitate safe return to previous living environment. 2) Functional transfers with S to facilitate safe return to previous living environment. 3) Ambulation with ' w/ CGA without LOB for safe ambulation in home/community environment. 4) Improve balance scores by 1 grade to decrease fall risk.  5) Improve LE strength grades by 1 to increase independence w/ all functional mobility, transfers and gait. 6) PT for ongoing pt and family education; DME needs and D/C planning to promote highest level of function in least restrictive environment. PT Treatment Day 0   Plan   Treatment/Interventions Functional transfer training;LE strengthening/ROM; Therapeutic exercise; Endurance training;Patient/family training;Equipment eval/education; Bed mobility;Gait training; Compensatory technique education;Spoke to nursing;Spoke to case management;OT   PT Frequency 2-3x/wk   Recommendation   PT Discharge Recommendation Post acute rehabilitation services   AM-PAC Basic Mobility Inpatient   Turning in Flat Bed Without Bedrails 2   Lying on Back to Sitting on Edge of Flat Bed Without Bedrails 2   Moving Bed to Chair 1   Standing Up From Chair Using Arms 1   Walk in Room 1   Climb 3-5 Stairs With Railing 1   Basic Mobility Inpatient Raw Score 8   Highest Level Of Mobility   JH-HLM Goal 3: Sit at edge of bed   JH-HLM Achieved 6: Walk 10 steps or more   End of Consult   Patient Position at End of Consult Bedside chair;Bed/Chair alarm activated; All needs within reach  (on waffle cushion, BLE elevated, SCD's activated)     Pippa Almeida, PT, DPT

## 2023-07-11 NOTE — CASE MANAGEMENT
Case Management Assessment & Discharge Planning Note    Patient name [place] [facility]  Location [facility] 163-46 MRN [id number]  : 1945 Date 2023       Current Admission Date: 2023  Current Admission Diagnosis:Shock [facility])   Patient Active Problem List    Diagnosis Date Noted   • Pulmonary nodule 07/10/2023   • Shock ([address]) 2023   • Port-A-Cath in place 2023   • Chronic wound infection of abdomen 2023   • Metastasis to liver ([address]) 2023   • Adenocarcinoma of colon ([address]) 2023   • S/P ileostomy ([address]) 2023   • Incarcerated ventral hernia 2023   • Moderate protein-calorie malnutrition ([address]) 02/15/2023   • Lower abdominal pain 2023   • Perforated bowel ([address]) 2023   • Strangulated ventral hernia 2023   • GERD (gastroesophageal reflux disease) 2023   • History of right breast cancer 2023   • History of right mastectomy 2023   • Iron deficiency anemia, unspecified 2022   • History of DVT (deep vein thrombosis) 2022   • Thyroid disorder 2022   • Osteoporosis due to aromatase inhibitor 2022   • History of diabetes mellitus 2022   • Anxiety about health 2021   • Diabetic ulcer of toe of left foot associated with type 2 diabetes mellitus, limited to breakdown of skin ([address]) 2021   • Abnormal tumor markers 2020   • Essential hypertension 10/20/2020   • D-dimer, elevated 2019   • Type 2 diabetes mellitus without complications ([address])    • Other osteoporosis without current pathological fracture 2018   • 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 2018   • Personal history of other malignant neoplasm of large intestine 2018   • Malignant neoplasm of right breast in female, estrogen receptor positive ([address]) 2018   • Colon cancer ([address]) 2018   • Chronic deep vein thrombosis (DVT) of distal vein of left lower extremity ([address]) 2018   • Thyroid nodule 03/19/2018      LOS (days): 4  Geometric Mean LOS (GMLOS) (days):   Days to GMLOS:     OBJECTIVE:    Risk of Unplanned Readmission Score: 25.51         Current admission status: Inpatient       Preferred Pharmacy:   [address], [place] - [address] [place] [address] [facility] [address] [facility] [address]  Phone: [phone] Fax: [phone]    Amari Mirza, [address] [place] [address]  [address]  [address]  [address] [facility] [address]  Phone: [phone] Fax: [phone]    Primary Care Provider: Flora Murdock DO    Primary Insurance: [facility]  Secondary Insurance:     ASSESSMENT:  Gris Proxies    There are no active Health Care Proxies on file.                  Readmission Root Cause  30 Day Readmission: No    Patient Information  Admitted from[de-identified] Facility (currently at [facility])  Mental Status: Alert  During Assessment patient was accompanied by: Not accompanied during assessment  Assessment information provided by[de-identified] Patient  Primary Caregiver: Self  Support Systems: SelfIleana of Residence: [address] [place] [address] do you live in?: [facility]  Type of Current Residence: Facility (currently at [facility] for rehab)  In the last 12 months, was there a time when you were not able to pay the mortgage or rent on time?: No  In the last 12 months, was there a time when you did not have a steady place to sleep or slept in a shelter (including now)?: No  Homeless/housing insecurity resource given?: N/A  Living Arrangements: Lives Alone    Activities of Daily Living Prior to Admission  Functional Status: Assistance  Completes ADLs independently?: No  Level of ADL dependence: Assistance  Ambulates independently?: No  Level of ambulatory dependence: Assistance  Does the patient have a history of Short-Term Rehab?: Yes         Patient Information Continued  Income Source: Pension/half-way  Does patient have prescription coverage?: Yes  Within the past 12 months, you worried that your food would run out before you got the money to buy more.: Never true  Within the past 12 months, the food you bought just didn't last and you didn't have money to get more.: Never true  Food insecurity resource given?: N/A  Does patient receive dialysis treatments?: No  Does patient have a history of substance abuse?: No  Does patient have a history of Mental Health Diagnosis?: No         Means of Transportation  In the past 12 months, has lack of transportation kept you from medical appointments or from getting medications?: No  In the past 12 months, has lack of transportation kept you from meetings, work, or from getting things needed for daily living?: No        DISCHARGE DETAILS:    Discharge planning discussed with[de-identified] patient  Freedom of Choice: Yes  Comments - Freedom of Choice: return referral to [address] [place] [address] contacted family/caregiver?: No- see comments (patient alert & oriented)  Were Treatment Team discharge recommendations reviewed with patient/caregiver?: Yes  Did patient/caregiver verbalize understanding of patient care needs?: Yes  Were patient/caregiver advised of the risks associated with not following Treatment Team discharge recommendations?: Yes    Contacts  Patient Contacts: armani Valle  Relationship to Patient[de-identified] Friend  Contact Method: Phone  Phone Number: [phone]  Reason/Outcome: Emergency Contact              Other Referral/Resources/Interventions Provided:  Interventions: Facility Return         Treatment Team Recommendation: Facility Return  Discharge Destination Plan[de-identified] Facility Return  Transport at Discharge : BLS Ambulance                   Patient/caregiver received discharge checklist.  Content reviewed. Patient/caregiver encouraged to participate in discharge plan of care prior to discharge home.   CM reviewed d/c planning process including the following: identifying help at home, patient preference for d/c planning needs, Discharge Lounge, [facility] Meds to Bed program, availability of treatment team to discuss questions or concerns patient and/or family may have regarding understanding medications and recognizing signs and symptoms once discharged. CM also encouraged patient to follow up with all recommended appointments after discharge. Patient advised of importance for patient and family to participate in managing patient’s medical well being. Additional Comments: Introduced self and role to patient at bedside. Patient stated she came from [facility], and would like to return there to resume rehab and get stronger before returning home. Return referral placed, patient will likely require insurance authorization. CM will continue to follow.

## 2023-07-11 NOTE — CONSULTS
Consultation - Connor Vazquez 69 y.o. female MRN: [id number]    Unit/Bed#: [facility] 811-01 Encounter: [id number]      Assessment/Plan      Assessment:  Hypotension on pressor support with history of metastatic cancer requiring chemoinfusion  This is a 69y.o.-year-old female with history of type 2 diabetes mellitus, breast cancer in remission, colon cancer with metastasis status post hemicolectomy, liver metastasis new pulmonary nodule and left breast nodules who presented from chemoinfusion center on 7/7 with hypotension requiring pressor support. Endocrinology consulted for possible adrenal insufficiency keeping in view hypotension on pressor support. Plan:  Blood pressures running low after Levophed weaned off however map has remained above 65. Reviewed investigations done inpatient. Patient has a.m. cortisol 8 within normal limits, might be inappropriately low for this admission. However sodium has been within normal range, 137 today and patient has had hypokalemia at this admission, 3.3 yesterday which improved to 4.1 today with replacement. This suggests an etiology other than adrenal insufficiency. Furthermore creatinine is normal, corrected calcium is within normal limits and patient received cosyntropin yesterday which had a robust appropriate response in cortisol levels. Reviewed chemotherapy regimen with does not appear to be a cause of adrenal insufficiency. Thus hypotension this admission does not appear to be attributed to adrenal insufficiency. Spoke to critical care team and communicated same. Fludrocortisone may be considered for hypotension management however not due to endocrinological cause. Midodrine/pressure support to be decided per primary team.    Patient seen at bedside and management plan discussed with attending physician. Management plan also communicated to critical care team in person.   We will sign off, please do not hesitate to reach out in case of questions. Consults    CC: Hypotension requiring pressor support, presentation from chemoinfusion center    History of Present Illness     HPI: Ama Johnson is a 69y.o. year old female with history of breast cancer status post resection 2012 with radio and chemotherapy followed by colon cancer with metastasis status post left hemicolectomy in the past.  Recently underwent exploratory lap plus left hemicolectomy and end ileostomy. Also has liver metastasis and new pulmonary nodule as well as left breast nodules. Patient has been on chemotherapy regimens in the past and was scheduled to receive a new regimen. Patient was in chemoinfusion center on 7/7 and after receiving preinfusion magnesium dose for Xeloda immunotherapy dose became hypotensive with SBP in 60s and was brought to ER. Admitted and given isotonic fluids and colloids. Started on Levophed and remained on pressor support until last night. Blood pressures improved and patient was transitioned to midodrine 5 mg 3 times daily and this morning. Keeping in view concern of hypotension despite pressor support, critical team consulted endocrinology to evaluate for possible underlying adrenal insufficiency, differential includes increased ileostomy output. Patient received cosyntropin yesterday with adequate response, a.m. cortisol was 8, patient has had hypokalemia this admission improved with replacement, normal sodium. Of note, as per documentation patient had been experiencing episodes of hypotension since her colostomy placement in February 2023    Review of Systems   10 point ROS performed, negative except stated above.     Historical Information   Past Medical History:   Diagnosis Date   • Acute embolism and thrombosis of deep vein of lower extremity (HCC)    • Adenocarcinoma of colon, [facility] ([address])    • Breast cancer ([address]) 2012    Right Breast    • Cancer ([address])    • Diabetes mellitus ([address])    • DVT (deep venous thrombosis) (HCC)    • GERD (gastroesophageal reflux disease)    • Hypertension    • Pes planus      Past Surgical History:   Procedure Laterality Date   • COLONOSCOPY     • HERNIA REPAIR N/A 2023    Procedure: EXPLORATORY LAPAROTOMY; ABDOMINAL WALL DEBRIDEMENT; REDUCTION VENTRAL HERNIA; LEFT COLON RESECTION; CREATION ILEOSTOMY; WASHOUT;  Surgeon: Maddison Busch DO;  Location: [facility];  Service: General   • HYSTERECTOMY      complete @ age 29   • IR PORT PLACEMENT  2023   • IR PORT REMOVAL  2018   • MASTECTOMY Right 2012   • NE COLONOSCOPY FLX DX W/COLLJ SPEC WHEN PFRMD N/A 2016    Procedure: COLONOSCOPY;  Surgeon: Camila Ku MD;  Location: [facility]; Service: Colorectal   • NE COLONOSCOPY FLX DX W/COLLJ SPEC WHEN PFRMD N/A 2017    Procedure: COLONOSCOPY;  Surgeon: Camila Ku MD;  Location: [facility]; Service: Colorectal   • NE ESOPHAGOGASTRODUODENOSCOPY TRANSORAL DIAGNOSTIC N/A 2017    Procedure: ESOPHAGOGASTRODUODENOSCOPY (EGD); Surgeon: David Horvath DO;  Location: [facility];   Service: Gastroenterology     Social History   Social History     Substance and Sexual Activity   Alcohol Use Not Currently     Social History     Substance and Sexual Activity   Drug Use No     Social History     Tobacco Use   Smoking Status Never   Smokeless Tobacco Never     Family History:   Family History   Problem Relation Age of Onset   • Other Mother         chronic bronchitis   • Brain cancer Father    • Prostate cancer Paternal Grandfather 79   • Pancreatic cancer Maternal Aunt 39   • Breast cancer Neg Hx        Meds/Allergies   Current Facility-Administered Medications   Medication Dose Route Frequency Provider Last Rate Last Admin   • [MAR Hold] acetaminophen (TYLENOL) tablet 650 mg  650 mg Oral Q8H PRN Remington Petties, DO       • [MAR Hold] ascorbic acid (VITAMIN C) tablet 1,000 mg  1,000 mg Oral Daily BALTAZAR Abreu   1,000 mg at 07/10/23 0807   • [MAR Hold] bimatoprost (LUMIGAN) 0.01 % ophthalmic solution 1 drop  1 drop Both Eyes HS Henok Veras DO   1 drop at 07/09/23 2132   • [MAR Hold] calcitonin (salmon) (MIACALCIN) 200 units/act nasal spray 1 spray  1 spray Alternating Nares Daily Caridad Valles MD   1 spray at 07/10/23 0808   • [MAR Hold] chlorhexidine (PERIDEX) 0.12 % oral rinse 15 mL  15 mL Mouth/Throat Q12H [address] [facility] [facility] [address], MD   15 mL at 07/10/23 0806   • [MAR Hold] insulin lispro (HumaLOG) 100 units/mL subcutaneous injection 1-5 Units  1-5 Units Subcutaneous 4x Daily (AC & HS) Min Tonya Brooke MD   1 Units at 07/10/23 1700   • [MAR Hold] lidocaine (LIDODERM) 5 % patch 1 patch  1 patch Topical Daily Henok Veras DO   1 patch at 07/10/23 0807   • [MAR Hold] loperamide (IMODIUM) capsule 2 mg  2 mg Oral TID With Meals Ramses Brooke MD   2 mg at 07/10/23 1700   • [MAR Hold] midodrine (PROAMATINE) tablet 5 mg  5 mg Oral TID AC Caridad Valles MD   5 mg at 07/10/23 1700   • [MAR Hold] pantoprazole (PROTONIX) EC tablet 40 mg  40 mg Oral Early Morning Min Tonya Brooke MD   40 mg at 07/10/23 0622   • [MAR Hold] psyllium (METAMUCIL) 1 packet  1 packet Oral BID Henok Veras DO   1 packet at 07/10/23 1700   • [MAR Hold] rivaroxaban (XARELTO) tablet 10 mg  10 mg Oral Daily With Breakfast Supriya Louis MD   10 mg at 07/10/23 0807     No Known Allergies    Objective   Vitals: Blood pressure 113/64, pulse 89, temperature 98.1 °F (36.7 °C), resp. rate 20, height 5' (1.524 m), weight 63.8 kg (140 lb 10.5 oz), SpO2 94 %, not currently breastfeeding. Intake/Output Summary (Last 24 hours) at 7/10/2023 2113  Last data filed at 7/10/2023 2001  Gross per 24 hour   Intake 1000 ml   Output 2275 ml   Net -1275 ml     Invasive Devices     Central Venous Catheter Line  Duration           Port A Cath 06/28/23 Right Internal jugular 12 days          Peripheral Intravenous Line  Duration           Peripheral IV 07/08/23 Distal;Dorsal (posterior); Right Forearm 2 days          Drain  Duration           Ileostomy Standard Urmila Reid end)  days    External Urinary Catheter 2 days                Physical Exam  HENT:      Head: Normocephalic. Hair is abnormal.      Comments: Complete hair loss from scalp  Eyes:      Comments: No proptosis   Neck:      Comments: No visible swelling  Pulmonary:      Effort: Pulmonary effort is normal.      Breath sounds: Normal breath sounds. Musculoskeletal:      Comments: Moves extremities   Skin:     General: Skin is warm and dry. Neurological:      Mental Status: She is alert and oriented to person, place, and time. Psychiatric:         Mood and Affect: Mood normal.       Physical Exam       The history was obtained from the review of the chart, patient. Lab Results:       Lab Results   Component Value Date    WBC 3.81 (L) 07/10/2023    HGB 7.9 (L) 07/10/2023    HCT 24.7 (L) 07/10/2023     (H) 07/10/2023     07/10/2023     Lab Results   Component Value Date/Time    BUN 12 07/10/2023 04:58 AM    BUN 15 12/29/2015 08:41 AM     12/29/2015 08:41 AM    K 4.1 07/10/2023 04:58 AM    K 4.0 12/29/2015 08:41 AM     07/10/2023 04:58 AM     12/29/2015 08:41 AM    CO2 24 07/10/2023 04:58 AM    CO2 18 (L) 02/09/2023 07:54 PM    CREATININE 0.63 07/10/2023 04:58 AM    CREATININE 0.75 12/29/2015 08:41 AM    AST 5 07/10/2023 04:58 AM    AST 28 12/29/2015 08:41 AM    ALT 10 (L) 07/10/2023 04:58 AM    ALT 42 12/29/2015 08:41 AM    TP 6.3 (L) 07/10/2023 04:58 AM    ALB 3.3 (L) 07/10/2023 04:58 AM    ALB 3.9 12/29/2015 08:41 AM     No results for input(s): "CHOL", "HDL", "LDL", "TRIG", "VLDL" in the last 72 hours. No results found for: "Nasima Moritz", "[id number]"  POC Glucose   Date Value   07/10/2023 199 mg/dl (H)   07/10/2023 240 mg/dl (H)   04/12/2016 146 mg/dl   07/20/2015 176 mg/dL (H)       Imaging Studies: I have personally reviewed pertinent reports. Portions of the record may have been created with voice recognition software.

## 2023-07-11 NOTE — ASSESSMENT & PLAN NOTE
Lab Results   Component Value Date    HGBA1C 6.3 12/20/2022       Recent Labs     07/10/23  2128 07/11/23  0704 07/11/23  1104 07/11/23  1617   POCGLU 216* 116 192* 155*       Blood Sugar Average: Last 72 hrs:  (P) [id number]

## 2023-07-11 NOTE — PLAN OF CARE
Problem: PHYSICAL THERAPY ADULT  Goal: Performs mobility at highest level of function for planned discharge setting. See evaluation for individualized goals. Description: Treatment/Interventions: Functional transfer training, LE strengthening/ROM, Therapeutic exercise, Endurance training, Patient/family training, Equipment eval/education, Bed mobility, Gait training, Compensatory technique education, Spoke to nursing, Spoke to case management, OT          See flowsheet documentation for full assessment, interventions and recommendations. Note: Prognosis: Excellent  Problem List: Decreased strength, Decreased endurance, Impaired balance, Decreased mobility, Pain  Assessment: Pt is 69 y.o. female seen for a high complexity PT evaluation s/p admit to [address] [place] [address] on 7/7/2023. Pt presenting w/ shock. Please see above for other active problem list / PMH. PT now consulted to assess functional mobility and needs for safe d/c planning. Prior to admission, pt was assistx1 for ambulation w/ RW 1000' (w/ W/C follow), admitted from [facility]. Currently pt requires ModAx1 for bed skills; ModAx2 for functional transfers; ModAx2 for ambulation w/ RW. Pt presents functioning below baseline and w/ overall mobility deficits 2* to: decreased LE strength; generalized weakness/deconditioning; decreased endurance; impaired balance; gait deviations; fatigue; impaired safety and judgement; limited insight into current deficits; bed/chair alarms. These impairments place pt at risk for falls. Pt will continue to benefit from skilled PT interventions to address stated impairments; to maximize functional potential; for ongoing pt/ family training; and DME needs. PT is currently recommending rehab. PT Discharge Recommendation: Post acute rehabilitation services    See flowsheet documentation for full assessment.

## 2023-07-11 NOTE — PROGRESS NOTES
Progress note - Palliative and Supportive Care   Tera Gonzalez 69 y.o. female [id number]    Patient Active Problem List   Diagnosis   • Chronic deep vein thrombosis (DVT) of distal vein of left lower extremity (HCC)   • Thyroid nodule   • Malignant neoplasm of right breast in female, estrogen receptor positive ([address])   • Colon cancer (HCC)   • Personal history of other malignant neoplasm of large intestine   • 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   • Other osteoporosis without current pathological fracture   • Type 2 diabetes mellitus without complications (HCC)   • D-dimer, elevated   • Essential hypertension   • Abnormal tumor markers   • Diabetic ulcer of toe of left foot associated with type 2 diabetes mellitus, limited to breakdown of skin (HCC)   • Anxiety about health   • History of DVT (deep vein thrombosis)   • Thyroid disorder   • Osteoporosis due to aromatase inhibitor   • History of diabetes mellitus   • Iron deficiency anemia, unspecified   • Lower abdominal pain   • Perforated bowel (HCC)   • Strangulated ventral hernia   • GERD (gastroesophageal reflux disease)   • History of right breast cancer   • History of right mastectomy   • Moderate protein-calorie malnutrition (HCC)   • Incarcerated ventral hernia   • Adenocarcinoma of colon (HCC)   • S/P ileostomy ([address])   • Chronic wound infection of abdomen   • Metastasis to liver (HCC)   • Port-A-Cath in place   • Shock [facility])   • Pulmonary nodule     Active issues specifically addressed today include:  Metastatic adenocarcinoma of the colon  S/p ileostomy  S/p port-a-cath on chemotherapy  Shock    Plan:  1.  Symptom management  Well controlled     Continue acetaminophen 650 mg p.o. every 8 hours as needed for mild pain  Continue lidocaine patch 12 on, 12 off for neck pain  Continue loperamide 2 mg p.o. 3 times daily with meals for increased ostomy burden  Continue psyllium 1 packet p.o. twice daily for constipation/bowel irregularity  OMT with muscle energy technique to cervical spine performed     2. Goals   Treatment-based focus with continuing medical optimization and ultimately chemotherapy. Oncology will likely determine appropriateness of continuing chemo after discharge. Palliative care with establish care with her in the outpatient setting. Would like to stay in the hospital for inpatient chemotherapy-->will speak to medical oncology on her behalf     Code Status: Full - Level 1   Decisional apparatus:  Patient is competent on my exam today. If competence is lost, patient's substitute decision maker would default to friend Atif Ba. ([phone]) by [place] Act 169. Advance Directive / Living Will / POLST:  Reportedly has a physical copy, but not obtained yet. Interval history:  Patient's reported POA came to visit her yesterday evening, but now ACP documents were brought to put in the chart. Otherwise, she feels well and denies any discomforting symptoms other than some mild neck tightness and pain. Would really like to stay in the hospital longer because she likes it better than her SNF, but realizes that may not be possible. Is still committed to pursing chemotherapy and is "all in" for all treatment modalities. This includes resuscitation and intubation efforts despite all adverse complications including rib fracture, lung injury, and cardiopulmonary compromise (which we discussed today). She is willing to undergo everything and anything for the possibility of more time.      MEDICATIONS / ALLERGIES:     all current active meds have been reviewed, current meds:   Current Facility-Administered Medications   Medication Dose Route Frequency   • acetaminophen (TYLENOL) tablet 650 mg  650 mg Oral Q8H PRN   • ascorbic acid (VITAMIN C) tablet 1,000 mg  1,000 mg Oral Daily   • bimatoprost (LUMIGAN) 0.01 % ophthalmic solution 1 drop  1 drop Both Eyes HS   • calcitonin (salmon) (MIACALCIN) 200 units/act nasal spray 1 spray  1 spray Alternating Nares Daily   • chlorhexidine (PERIDEX) 0.12 % oral rinse 15 mL  15 mL Mouth/Throat Q12H [address]   • insulin lispro (HumaLOG) 100 units/mL subcutaneous injection 1-5 Units  1-5 Units Subcutaneous 4x Daily (AC & HS)   • lidocaine (LIDODERM) 5 % patch 1 patch  1 patch Topical Daily   • loperamide (IMODIUM) capsule 2 mg  2 mg Oral TID With Meals   • midodrine (PROAMATINE) tablet 5 mg  5 mg Oral TID AC   • pantoprazole (PROTONIX) EC tablet 40 mg  40 mg Oral Early Morning   • psyllium (METAMUCIL) 1 packet  1 packet Oral BID   • rivaroxaban (XARELTO) tablet 10 mg  10 mg Oral Daily With Breakfast    and PTA meds:   Prior to Admission Medications   Prescriptions Last Dose Informant Patient Reported? Taking? BANANA FLAKES PO   Yes No   Sig: Take 1 Units by mouth 2 (two) times a day   Cholecalciferol (VITAMIN D-3 PO)  Self Yes No   Sig: Take 1 capsule by mouth 3 (three) times a day    Cyanocobalamin (VITAMIN B 12 PO)  Self Yes No   Sig: Take 1 tablet by mouth daily. Diclofenac Sodium (VOLTAREN) 1 %  Self No No   Sig: Apply 2 g topically 4 (four) times a day   Januvia 25 MG tablet   Yes No   acetaminophen (TYLENOL) 325 mg tablet   Yes No   Sig: Take 975 mg by mouth every 8 (eight) hours   amLODIPine (NORVASC) 2.5 mg tablet  Self No No   Sig: Take 1 tablet (2.5 mg total) by mouth daily   ascorbic acid (VITAMIN C) 500 MG tablet  Self Yes No   Sig: Take 1,000 mg by mouth daily    benazepril-hydrochlorthiazide (LOTENSIN HCT) 20-12.5 MG per tablet  Self No No   Sig: take 1 tablet by mouth once daily   bimatoprost (LUMIGAN) 0.01 % ophthalmic drops  Self Yes No   Sig: Administer 1 drop to both eyes daily at bedtime    calcitonin, salmon, (MIACALCIN) 200 units/act nasal spray   Yes No   calcium citrate (CALCITRATE) 950 MG tablet  Self Yes No   Sig: Take 1 tablet by mouth in the morning.    capecitabine (XELODA) 500 MG tablet   No No   Sig: Take 3 tablets (1,500 mg total) by mouth 2 (two) times a day 1 week on followed by 1 week off starting 7/7/23   insulin lispro (HumaLOG) 100 units/mL injection   No No   Sig: Inject 1-6 Units under the skin 4 (four) times a day (before meals and at bedtime)   loperamide (IMODIUM A-D) 2 MG tablet   Yes No   Sig: Take 2 mg by mouth 2 (two) times a day   metFORMIN (GLUCOPHAGE) 500 mg tablet   Yes No   Sig: Take 500 mg by mouth 2 (two) times a day with meals   miconazole (MICOTIN) 2 % powder   Yes No   Sig: Apply topically in the morning Apply to groin topically and under left breast topically every day and evening shift for red wash with soap and water, pat dry and then apply powder. pantoprazole (PROTONIX) 40 mg tablet   No No   Sig: Take 1 tablet (40 mg total) by mouth daily in the early morning Do not start before February 24, 2023. rivaroxaban (Xarelto) 10 mg tablet   No No   Sig: Take 1 tablet (10 mg total) by mouth daily   sodium chloride 1 g tablet   Yes No   Sig: Take 1 g by mouth 2 (two) times a day   vitamin E 100 UNIT capsule  Self Yes No   Sig: Take 100 Units by mouth daily       Facility-Administered Medications: None       No Known Allergies    OBJECTIVE:    Physical Exam  Physical Exam  Constitutional:       Appearance: She is ill-appearing. HENT:      Head: Normocephalic and atraumatic. Right Ear: External ear normal.      Left Ear: External ear normal.      Nose: Nose normal.      Mouth/Throat:      Mouth: Mucous membranes are moist.      Pharynx: Oropharynx is clear. Eyes:      Conjunctiva/sclera: Conjunctivae normal.   Neck:      Comments: Decreased ROM  Cardiovascular:      Rate and Rhythm: Normal rate and regular rhythm. Heart sounds: Murmur heard. No gallop. Pulmonary:      Effort: Pulmonary effort is normal.      Breath sounds: Decreased air movement present. Decreased breath sounds present. Abdominal:      General: Bowel sounds are normal. There is no distension. Tenderness: There is no abdominal tenderness.    Musculoskeletal:      Cervical back: Tenderness (Right sided neck) present. Right lower leg: No edema. Left lower leg: No edema. Skin:     Coloration: Skin is pale. Skin is not jaundiced. Findings: Bruising (RUE by IV site) present. Neurological:      General: No focal deficit present. Mental Status: She is oriented to person, place, and time. Psychiatric:         Mood and Affect: Mood normal.         Behavior: Behavior normal.         Thought Content: Thought content normal.         Judgment: Judgment normal.         Lab Results:   I have personally reviewed pertinent labs. , CBC:   Lab Results   Component Value Date    WBC 5.14 07/11/2023    HGB 8.7 (L) 07/11/2023    HCT 26.7 (L) 07/11/2023     (H) 07/11/2023     07/11/2023    RBC 2.63 (L) 07/11/2023    MCH 33.1 07/11/2023    MCHC 32.6 07/11/2023    RDW 14.4 07/11/2023    MPV 8.7 (L) 07/11/2023    NRBC 0 07/11/2023   , CMP:   Lab Results   Component Value Date    SODIUM 139 07/11/2023    K 4.2 07/11/2023     07/11/2023    CO2 26 07/11/2023    BUN 10 07/11/2023    CREATININE 0.67 07/11/2023    CALCIUM 9.9 07/11/2023    EGFR 85 07/11/2023   , BMP:  Lab Results   Component Value Date    SODIUM 139 07/11/2023    K 4.2 07/11/2023     07/11/2023    CO2 26 07/11/2023    BUN 10 07/11/2023    CREATININE 0.67 07/11/2023    GLUC 116 07/11/2023    CALCIUM 9.9 07/11/2023    AGAP 6 07/11/2023    EGFR 85 07/11/2023   , PT/PTT:No results found for: "PT", "PTT"  Imaging Studies: Reviewed  EKG, Pathology, and Other Studies: Reviewed    Counseling / Coordination of Care    Total floor / unit time spent today 45 minutes. Greater than 50% of total time was spent with the patient and / or family counseling and / or coordination of care.  A description of the counseling / coordination of care: Chart review, patient interview/discussion, psychosocial support, comprehensive symptom evaluation, physical examination, medication evaluation/alteration, advance care/goals-of-care planning, and multidisciplinary collaboration.      Jessica Reyes,   Hospice/Palliative Care Medicine PGY-4  [facility]

## 2023-07-11 NOTE — ASSESSMENT & PLAN NOTE
· Follows with Heme/Onc, Dr. Nenita Newman, per patient  · Palliative Care following here  · Xeloda on hold

## 2023-07-11 NOTE — PROGRESS NOTES
[address] [facility]  Progress Note  Name: Uyen Ricardo  MRN: [id number]  Unit/Bed#: PPHP 553-89 I Date of Admission: 7/7/2023   Date of Service: 7/11/2023 I Hospital Day: 4    Assessment/Plan   * Shock [facility])  Assessment & Plan  · Per record review, patient noted to be hypotensive at infusion center and was transferred to the ED and subsequently admitted to ICU for pressors for BP support despite IVF resuscitation  · Unclear etiology, but per ICU records was suspected to be by 2/2 hypovolemia 2/2 increased volume output from ileostomy  · Does not have adrenal insufficiency per Endocrinology  · Levo off since 7/9/23 and A-line was discontinued on 7/10/23 and she was transferred out of ICU  · BP now improved  · Not on abx, no clear infectious source     Colon cancer [facility])  Assessment & Plan  · Follows with Heme/Onc, Dr. Patricia Callejas, per patient  · Palliative Care following here  · Xeloda on hold     History of DVT (deep vein thrombosis)  Assessment & Plan  · On Xarelto     Port-A-Cath in place  Assessment & Plan  · Monitor site  · Care per protocol     S/P ileostomy ([address])  Assessment & Plan  · History of colon cancer noted   · Monitor output  · On Imodium    History of diabetes mellitus  Assessment & Plan  · Continue SSI    Diabetic ulcer of toe of left foot associated with type 2 diabetes mellitus, limited to breakdown of skin [facility])  Assessment & Plan  Lab Results   Component Value Date    HGBA1C 6.3 12/20/2022       Recent Labs     07/10/23  2128 07/11/23  0704 07/11/23  1104 07/11/23  1617   POCGLU 216* 116 192* 155*       Blood Sugar Average: Last 72 hrs:  (P) [id number]    Iron deficiency anemia, unspecified  Assessment & Plan  · Hemoglobin stable  · Monitor     Other osteoporosis without current pathological fracture  Assessment & Plan  · On vitamin C and Miacalcin    Pulmonary nodule  Assessment & Plan  · CT imaging revealed, "new pulmonary metastatic disease" per the results report  · Follow up with Heme/Onc  · Palliative Care following            VTE Pharmacologic Prophylaxis:   Xarelto    Patient Centered Rounds: I performed bedside rounds with nursing staff today. Discussions with Specialists or Other Care Team Provider:     Education and Discussions with Family / Patient: Patient declined call to . Total Time Spent on Date of Encounter in care of patient: 25 minutes This time was spent on one or more of the following: performing physical exam; counseling and coordination of care; obtaining or reviewing history; documenting in the medical record; reviewing/ordering tests, medications or procedures; communicating with other healthcare professionals and discussing with patient's family/caregivers. Current Length of Stay: 4 day(s)  Current Patient Status: Inpatient   Certification Statement: The patient will continue to require additional inpatient hospital stay due to monitoring BP, safe d/c planning  Discharge Plan: Anticipate discharge in 24-48 hrs to rehab facility. Code Status: Level 1 - Full Code    Subjective:   Ms. Jeevan Adams denies complaint. She reports feeling better. She reports slightly more output from ostomy than usual but not overly so    Objective:     Vitals:   Temp (24hrs), Av °F (36.7 °C), Min:97.5 °F (36.4 °C), Max:98.4 °F (36.9 °C)    Temp:  [97.5 °F (36.4 °C)-98.4 °F (36.9 °C)] 97.5 °F (36.4 °C)  HR:  [86-93] 86  Resp:  [14-20] 14  BP: (113-128)/(64-74) 125/71  SpO2:  [94 %-97 %] 97 %  Body mass index is 28.63 kg/m². Input and Output Summary (last 24 hours): Intake/Output Summary (Last 24 hours) at 2023 1826  Last data filed at 2023 1601  Gross per 24 hour   Intake 480 ml   Output 1075 ml   Net -595 ml       Physical Exam:   Physical Exam  Vitals reviewed. Constitutional:       Comments: Patient seen sitting in bedside chair, NAD   Cardiovascular:      Rate and Rhythm: Normal rate and regular rhythm.    Pulmonary: Effort: Pulmonary effort is normal. No respiratory distress. Breath sounds: Normal breath sounds. Abdominal:      General: Bowel sounds are normal.      Palpations: Abdomen is soft. Tenderness: There is no abdominal tenderness. Musculoskeletal:      Right lower leg: No edema. Left lower leg: No edema. Skin:     General: Skin is warm. Neurological:      Mental Status: She is alert and oriented to person, place, and time.    Psychiatric:         Mood and Affect: Mood normal.         Behavior: Behavior normal.          Additional Data:     Labs:  Results from last 7 days   Lab Units 07/11/23  0500   WBC Thousand/uL 5.14   HEMOGLOBIN g/dL 8.7*   HEMATOCRIT % 26.7*   PLATELETS Thousands/uL 205   NEUTROS PCT % 57   LYMPHS PCT % 22   MONOS PCT % 15*   EOS PCT % 5     Results from last 7 days   Lab Units 07/11/23  0500 07/10/23  0458   SODIUM mmol/L 139 137   POTASSIUM mmol/L 4.2 4.1   CHLORIDE mmol/L 107 106   CO2 mmol/L 26 24   BUN mg/dL 10 12   CREATININE mg/dL 0.67 0.63   ANION GAP mmol/L 6 7   CALCIUM mg/dL 9.9 9.4   ALBUMIN g/dL  --  3.3*   TOTAL BILIRUBIN mg/dL  --  0.18*   ALK PHOS U/L  --  53   ALT U/L  --  10*   AST U/L  --  5   GLUCOSE RANDOM mg/dL 116 123     Results from last 7 days   Lab Units 07/07/23  1415   INR  1.92*     Results from last 7 days   Lab Units 07/11/23  1617 07/11/23  1104 07/11/23  0704 07/10/23  2128 07/10/23  1614 07/10/23  1108 07/10/23  0804 07/09/23  2113 07/09/23  1649 07/09/23  1105 07/09/23  0735 07/08/23  1609   POC GLUCOSE mg/dl 155* 192* 116 216* 199* 240* 117 236* 176* 130 115 159*         Results from last 7 days   Lab Units 07/07/23  2207 07/07/23  1722 07/07/23  1415   LACTIC ACID mmol/L 1.1 2.6* 2.6*       Lines/Drains:  Invasive Devices     Central Venous Catheter Line  Duration           Port A Cath 06/28/23 Right Internal jugular 13 days          Peripheral Intravenous Line  Duration           Peripheral IV 07/08/23 Distal;Dorsal (posterior); Right Forearm 3 days          Drain  Duration           Ileostomy Standard (Kaitlynn, ap)  days    External Urinary Catheter 3 days                           Imaging: Reviewed radiology reports from this admission including: chest CT scan and abdominal/pelvic CT    Recent Cultures (last 7 days):   Results from last 7 days   Lab Units 07/07/23  1454 07/07/23  1415   BLOOD CULTURE  No Growth After 4 Days. No Growth After 4 Days. Last 24 Hours Medication List:   Current Facility-Administered Medications   Medication Dose Route Frequency Provider Last Rate   • acetaminophen  650 mg Oral Q8H PRN Mitra Roman PA-C     • ascorbic acid  1,000 mg Oral Daily BALTAZAR Abreu     • bimatoprost  1 drop Both Eyes HS BALTAZAR Abreu     • calcitonin (salmon)  1 spray Alternating Nares Daily BALTAZAR Abreu     • chlorhexidine  15 mL Mouth/Throat Q12H [facility] BALTAZAR Abreu     • insulin lispro  1-5 Units Subcutaneous 4x Daily (AC & HS) BALTAZAR Abreu     • lidocaine  1 patch Topical Daily BALTAZAR Abreu     • loperamide  2 mg Oral TID With Meals BALTAZAR Abreu     • midodrine  5 mg Oral TID AC BALTAZAR Abreu     • pantoprazole  40 mg Oral Early Morning BALTAZAR Abreu     • psyllium  1 packet Oral BID BALTAZAR Abreu     • rivaroxaban  10 mg Oral Daily With Breakfast BALTAZAR Abreu          Today, Patient Was Seen By: Stephen Snell PA-C    **Please Note: This note may have been constructed using a voice recognition system. **

## 2023-07-11 NOTE — OCCUPATIONAL THERAPY NOTE
Occupational Therapy Evaluation     Patient Name: Mina Connelly  Today's Date: 7/11/2023  Problem List  Principal Problem:    Shock ([address])  Active Problems:    Colon cancer ([address])    Other osteoporosis without current pathological fracture    Diabetic ulcer of toe of left foot associated with type 2 diabetes mellitus, limited to breakdown of skin ([address])    History of DVT (deep vein thrombosis)    History of diabetes mellitus    Iron deficiency anemia, unspecified    S/P ileostomy ([address])    Port-A-Cath in place    Pulmonary nodule    Past Medical History  Past Medical History:   Diagnosis Date    Acute embolism and thrombosis of deep vein of lower extremity ([address])     Adenocarcinoma of colon, [facility] ([address])     Breast cancer ([address]) 2012    Right Breast     Cancer ([address])     Diabetes mellitus ([address])     DVT (deep venous thrombosis) ([address])     GERD (gastroesophageal reflux disease)     Hypertension     Pes planus      Past Surgical History  Past Surgical History:   Procedure Laterality Date    COLONOSCOPY      HERNIA REPAIR N/A 2/9/2023    Procedure: EXPLORATORY LAPAROTOMY; ABDOMINAL WALL DEBRIDEMENT; REDUCTION VENTRAL HERNIA; LEFT COLON RESECTION; CREATION ILEOSTOMY; WASHOUT;  Surgeon: Elisa Jarrell DO;  Location: [facility];  Service: General    HYSTERECTOMY      complete @ age 29    IR PORT PLACEMENT  6/28/2023    IR PORT REMOVAL  9/12/2018    MASTECTOMY Right 2012    ND COLONOSCOPY FLX DX W/COLLJ SPEC WHEN PFRMD N/A 8/23/2016    Procedure: COLONOSCOPY;  Surgeon: Noman Manjarrez MD;  Location: [facility]; Service: Colorectal    ND COLONOSCOPY FLX DX W/COLLJ SPEC WHEN PFRMD N/A 9/5/2017    Procedure: COLONOSCOPY;  Surgeon: Noman Manjarrez MD;  Location: [facility]; Service: Colorectal    ND ESOPHAGOGASTRODUODENOSCOPY TRANSORAL DIAGNOSTIC N/A 9/5/2017    Procedure: ESOPHAGOGASTRODUODENOSCOPY (EGD); Surgeon: Mary Spence DO;  Location: [facility];   Service: Gastroenterology         07/11/23 1017   OT Last Visit   OT Visit Date 07/11/23   Note Type   Note type Evaluation   Pain Assessment   Pain Assessment Tool 0-10   Pain Score No Pain   Restrictions/Precautions   Weight Bearing Precautions Per Order No   Other Precautions Cognitive; Chair Alarm; Bed Alarm; Fall Risk  (ileostomy)   Home Living   Type of [address] [place] [address] Two level;Stairs to enter with rails;1/2 bath on main level;Bed/bath upstairs  (5 MICHELE)   Bathroom Shower/Tub Tub/shower unit   Bathroom Toilet Standard   Bathroom Equipment   (pt denies DME)   Home Equipment Walker;Cane  (Pt doesn't use PTA)   Additional Comments Pt resides alone in a [address] [facility] with 5 MICHELE. Pt has not been to the house since she entered [facility] for rehab s/p colostomy in February 2023. Prior Function   Level of [place] Independent with functional mobility; Needs assistance with ADLs; Needs assistance with IADLS   Lives With Alone   Receives Help From Other (Comment)  ([facility] staff)   IADLs Family/Friend/Other provides transportation; Family/Friend/Other provides medication management; Family/Friend/Other provides meals   Falls in the last 6 months 0   Vocational Retired   Comments Pt a poor historian- to [facility] with CM. PTA, pt reports requiring assist and GONZALEZ with self care, required Ax1-2 to get OOB, and completes functional mobility Ax2 w/ RW and chair follow. Pt does not drive   Lifestyle   Autonomy Pt a poor historian- to [facility] with CM. PTA, pt reports requiring assist and GONZALEZ with self care, required Ax1-2 to get OOB, and completes functional mobility Ax2 w/ RW and chair follow.  Pt does not drive   Reciprocal Relationships Supportive staff from [facility]   Service to Others Retired   Intrinsic Gratification None stated at the time- will continue to assess   ADL   Where Assessed Chair   Eating Assistance 5  Supervision/Setup   Grooming Assistance 5  Supervision/Setup    [address] 4  Minimal Assistance    [address] 3  Moderate Assistance   [address] 4 Minimal Assistance   LB Dressing Assistance 3  Moderate Assistance   LB Dressing Deficit Don/doff R sock; Don/doff L sock; Increased time to complete;Supervision/safety;Setup   Toileting Assistance  2  Maximal Assistance   Functional Assistance 3  Moderate Assistance   Additional Comments Pt limited in ADL status due to decreased endurance   Bed Mobility   Supine to Sit 3  Moderate assistance   Additional items Assist x 1;HOB elevated; Bedrails; Increased time required;LE management   Sit to Supine Unable to assess   Additional Comments Pt greeted supine in bed upon arrival   Transfers   Sit to Stand 3  Moderate assistance   Additional items Assist x 2; Increased time required   Stand to Sit 3  Moderate assistance   Additional items Assist x 2; Increased time required   Additional Comments w/ RW   Functional Mobility   Functional Mobility 3  Moderate assistance   Additional Comments Pt completed short distance at mod Ax2 w/ SBAx1 for chair follow for functional mobility. Additional items Rolling walker   Balance   Static Sitting Fair   Dynamic Sitting Fair -   Static Standing Poor +   Dynamic Standing Poor   Ambulatory Poor -   Activity Tolerance   Activity Tolerance Patient limited by fatigue   Medical Staff Made Aware Co-eval with Lucas Shepard DPT 2* to pt's medical complexities and decreased endurance   Nurse Made Aware RN cleared and updated   RUE Assessment   RUE Assessment WFL  (Generalized weakness)   LUE Assessment   LUE Assessment WFL  (Generalized weakness)   Hand Function   Gross Motor Coordination Functional   Fine Motor Coordination Functional   Sensation   Light Touch No apparent deficits   Psychosocial   Psychosocial (WDL) WDL   Cognition   Overall Cognitive Status (S)  Impaired   Arousal/Participation Alert; Responsive; Cooperative   Attention Within functional limits   Orientation Level Oriented X4   Memory Decreased recall of recent events;Decreased recall of precautions   Following Commands Follows one step commands without difficulty   Comments Pt pleasant and cooperative t/o session. Pt with limited insight and limited safety awareness. Pt benefits from one step commands. Assessment   Limitation Decreased ADL status; Decreased UE ROM; Decreased UE strength;Decreased Safe judgement during ADL;Decreased cognition;Decreased endurance;Decreased high-level ADLs; Decreased self-care trans   Prognosis Fair   Assessment Pt is 70 y/o female who [facility] from  infusion center for low BP. Pt has a diagnosis of Shock. Pt has PMH of Acute embolism and thrombosis of deep vein of lower extremity ([address]), Adenocarcinoma of colon, [facility] ([address]), Breast cancer ([address]), Cancer ([address]), Diabetes mellitus ([address]), DVT (deep venous thrombosis) ([address]), GERD (gastroesophageal reflux disease), Hypertension, and Pes planus. Pt greeted for OT evaluation on 7/11/23. Pt resides in a [facility] with 5 MICHELE. Per CM, pt requires assistance with ADLs and IADLs. Pt is Independent with functional mobility w/o device. Pt is demonstrating the following occupational performance levels: Mod Ax1 for bed mobility, mod Ax2 for sit to stand transfers w/ RW, and mod Ax2 for functional mobility w/ RW at short distance. Pt is S for eating, S for grooming, min A for UB bathing/dressing, mod A for LB bathing/dressing, and max A for toileting. Pt limitations that are impacting occupational performance are decreased ADL status, decreased UE ROM, decreased UE strength, decreased safe judgement during ADLs, decreased cognition, decreased endurance, decreased self care transfers and decreased high level ADLs. Occupational interventions to be addressed are: ADL retraining, functional transfer training, UE strengthening/ROM, endurance training, cognitive reorientation, Pt/caregiver education, equipment evaluation/education, compensatory technique education, energy conservation and activity engagement .  Pt will benefit from skilled OT services while in the hospital to maximize independence with ADLs. Upon d/c, Pt will benefit from post acute rehab services to maximize independence and safety with ADLs. Goals   Patient Goals to go home   LTG Time Frame 10-14   Long Term Goal #1 See goals listed below   Plan   Treatment Interventions ADL retraining;Functional transfer training;UE strengthening/ROM; Endurance training;Cognitive reorientation;Patient/family training;Equipment evaluation/education; Compensatory technique education; Energy conservation; Activityengagement   Goal Expiration Date 07/25/23   OT Frequency 2-3x/wk   Recommendation   OT Discharge Recommendation Post acute rehabilitation services   Additional Comments  The patient's raw score on the -PAC Daily Activity Inpatient Short Form is 15. A raw score of less than 19 suggests the patient may benefit from discharge to post-acute rehabilitation services. Please refer to the recommendation of the Occupational Therapist for safe discharge planning. AM-PAC Daily Activity Inpatient   Lower Body Dressing 2   Bathing 2   Toileting 2   Upper Body Dressing 3   Grooming 3   Eating 3   Daily Activity Raw Score 15   Daily Activity Standardized Score (Calc for Raw Score >=11) 34.69   AM-PAC Applied Cognition Inpatient   Following a Speech/Presentation 3   Understanding Ordinary Conversation 4   Taking Medications 3   Remembering Where Things Are Placed or Put Away 3   Remembering List of 4-5 Errands 3   Taking Care of Complicated Tasks 3   Applied Cognition Raw Score 19   Applied Cognition Standardized Score 39.77   End of Consult   Education Provided Yes   Patient Position at End of Consult Bedside chair;Bed/Chair alarm activated; All needs within reach   Nurse Communication Nurse aware of consult       Goals:  1. Pt will complete UB ADLs at an independent level. 2. Pt will complete a grooming task at an independent level. 3. Pt will complete LB ADLs at an independent level.    4. Pt will complete functional bed mobility at an independent level. 5. Pt will complete functional transfers at an independent level. 6. Pt will complete toileting at an independent level. 7. Pt will tolerate a dynamic standing task for more than 15 minutes at an independent level to increase safety during ADLs. 8. Pt will increase standing balance to F+ in  order to increase safety during ADLs. 9. Pt will be attentive 100% of the time for on-going functional/formal cognitive assessment to assist with safe d/c planning. 10. Pt will complete functional mobility with AD PRN for item retrieval task at mod I level in order to increase participation with ADLs.      Amilcar Moffett, OTS

## 2023-07-11 NOTE — PROGRESS NOTES
-- Patient: Karolina Sadler  -- MRN: [id number]  -- Aidin Request ID: [id number]  -- Level of care reserved: [address]  -- Partner Reserved: [facility] Skilled [address] [place] [address] And VERONICA JIMENEZ, [address] [place] [address] [phone]  -- Clinical needs requested:  -- Geography searched: 10 miles around [phone]  -- Start of Service:  -- Request sent: 10:40am EDT on 7/11/2023 by Pippa Del Cid  -- Partner reserved: 4:34pm EDT on 7/11/2023 by Pippa Del Cid  -- Choice list shared: 4:34pm EDT on 7/11/2023 by Pippa Del Cid

## 2023-07-12 LAB
ACTH PLAS-MCNC: 13.4 PG/ML (ref 7.2–63.3)
ANION GAP SERPL CALCULATED.3IONS-SCNC: 5 MMOL/L
BACTERIA BLD CULT: NORMAL
BACTERIA BLD CULT: NORMAL
BUN SERPL-MCNC: 8 MG/DL (ref 5–25)
CALCIUM SERPL-MCNC: 10.1 MG/DL (ref 8.3–10.1)
CHLORIDE SERPL-SCNC: 105 MMOL/L (ref 96–108)
CO2 SERPL-SCNC: 28 MMOL/L (ref 21–32)
CREAT SERPL-MCNC: 0.69 MG/DL (ref 0.6–1.3)
ERYTHROCYTE [DISTWIDTH] IN BLOOD BY AUTOMATED COUNT: 14.6 % (ref 11.6–15.1)
GFR SERPL CREATININE-BSD FRML MDRD: 84 ML/MIN/1.73SQ M
GLUCOSE SERPL-MCNC: 101 MG/DL (ref 65–140)
GLUCOSE SERPL-MCNC: 117 MG/DL (ref 65–140)
GLUCOSE SERPL-MCNC: 166 MG/DL (ref 65–140)
GLUCOSE SERPL-MCNC: 213 MG/DL (ref 65–140)
GLUCOSE SERPL-MCNC: 257 MG/DL (ref 65–140)
HCT VFR BLD AUTO: 26.3 % (ref 34.8–46.1)
HGB BLD-MCNC: 8.7 G/DL (ref 11.5–15.4)
MCH RBC QN AUTO: 33.2 PG (ref 26.8–34.3)
MCHC RBC AUTO-ENTMCNC: 33.1 G/DL (ref 31.4–37.4)
MCV RBC AUTO: 100 FL (ref 82–98)
PLATELET # BLD AUTO: 222 THOUSANDS/UL (ref 149–390)
PMV BLD AUTO: 8.8 FL (ref 8.9–12.7)
POTASSIUM SERPL-SCNC: 4.1 MMOL/L (ref 3.5–5.3)
RBC # BLD AUTO: 2.62 MILLION/UL (ref 3.81–5.12)
SODIUM SERPL-SCNC: 138 MMOL/L (ref 135–147)
WBC # BLD AUTO: 6.49 THOUSAND/UL (ref 4.31–10.16)

## 2023-07-12 PROCEDURE — 82948 REAGENT STRIP/BLOOD GLUCOSE: CPT

## 2023-07-12 PROCEDURE — 80048 BASIC METABOLIC PNL TOTAL CA: CPT | Performed by: PHYSICIAN ASSISTANT

## 2023-07-12 PROCEDURE — 97110 THERAPEUTIC EXERCISES: CPT

## 2023-07-12 PROCEDURE — 99232 SBSQ HOSP IP/OBS MODERATE 35: CPT | Performed by: PHYSICIAN ASSISTANT

## 2023-07-12 PROCEDURE — 97530 THERAPEUTIC ACTIVITIES: CPT

## 2023-07-12 PROCEDURE — 99233 SBSQ HOSP IP/OBS HIGH 50: CPT | Performed by: INTERNAL MEDICINE

## 2023-07-12 PROCEDURE — 85027 COMPLETE CBC AUTOMATED: CPT | Performed by: PHYSICIAN ASSISTANT

## 2023-07-12 RX ORDER — DEXAMETHASONE 2 MG/1
1 TABLET ORAL DAILY
Status: DISCONTINUED | OUTPATIENT
Start: 2023-07-13 | End: 2023-07-13 | Stop reason: HOSPADM

## 2023-07-12 RX ORDER — ACETAMINOPHEN 325 MG/1
650 TABLET ORAL EVERY 8 HOURS SCHEDULED
Status: DISCONTINUED | OUTPATIENT
Start: 2023-07-12 | End: 2023-07-13 | Stop reason: HOSPADM

## 2023-07-12 RX ADMIN — ACETAMINOPHEN 650 MG: 325 TABLET, FILM COATED ORAL at 21:46

## 2023-07-12 RX ADMIN — INSULIN LISPRO 1 UNITS: 100 INJECTION, SOLUTION INTRAVENOUS; SUBCUTANEOUS at 17:27

## 2023-07-12 RX ADMIN — OXYCODONE HYDROCHLORIDE AND ACETAMINOPHEN 1000 MG: 500 TABLET ORAL at 08:09

## 2023-07-12 RX ADMIN — LOPERAMIDE HYDROCHLORIDE 2 MG: 2 CAPSULE ORAL at 17:26

## 2023-07-12 RX ADMIN — CHLORHEXIDINE GLUCONATE 15 ML: 1.2 SOLUTION ORAL at 21:46

## 2023-07-12 RX ADMIN — CHLORHEXIDINE GLUCONATE 15 ML: 1.2 SOLUTION ORAL at 08:09

## 2023-07-12 RX ADMIN — INSULIN LISPRO 2 UNITS: 100 INJECTION, SOLUTION INTRAVENOUS; SUBCUTANEOUS at 21:46

## 2023-07-12 RX ADMIN — LOPERAMIDE HYDROCHLORIDE 2 MG: 2 CAPSULE ORAL at 08:09

## 2023-07-12 RX ADMIN — Medication 1 PACKET: at 17:26

## 2023-07-12 RX ADMIN — PANTOPRAZOLE SODIUM 40 MG: 40 TABLET, DELAYED RELEASE ORAL at 06:50

## 2023-07-12 RX ADMIN — Medication 1 PACKET: at 08:09

## 2023-07-12 RX ADMIN — BIMATOPROST 1 DROP: 0.1 SOLUTION/ DROPS OPHTHALMIC at 21:46

## 2023-07-12 RX ADMIN — MIDODRINE HYDROCHLORIDE 5 MG: 5 TABLET ORAL at 17:26

## 2023-07-12 RX ADMIN — LIDOCAINE 5% 1 PATCH: 700 PATCH TOPICAL at 08:09

## 2023-07-12 RX ADMIN — RIVAROXABAN 10 MG: 10 TABLET, FILM COATED ORAL at 08:09

## 2023-07-12 RX ADMIN — INSULIN LISPRO 2 UNITS: 100 INJECTION, SOLUTION INTRAVENOUS; SUBCUTANEOUS at 11:26

## 2023-07-12 RX ADMIN — MIDODRINE HYDROCHLORIDE 5 MG: 5 TABLET ORAL at 06:50

## 2023-07-12 RX ADMIN — MIDODRINE HYDROCHLORIDE 5 MG: 5 TABLET ORAL at 11:25

## 2023-07-12 RX ADMIN — LOPERAMIDE HYDROCHLORIDE 2 MG: 2 CAPSULE ORAL at 11:25

## 2023-07-12 RX ADMIN — CALCITONIN SALMON 1 SPRAY: 200 SPRAY, METERED NASAL at 08:10

## 2023-07-12 NOTE — PLAN OF CARE
Problem: PHYSICAL THERAPY ADULT  Goal: Performs mobility at highest level of function for planned discharge setting. See evaluation for individualized goals. Description: Treatment/Interventions: Functional transfer training, LE strengthening/ROM, Therapeutic exercise, Endurance training, Patient/family training, Equipment eval/education, Bed mobility, Gait training, Compensatory technique education, Spoke to nursing, Spoke to case management, OT          See flowsheet documentation for full assessment, interventions and recommendations. Outcome: Progressing  Note: Prognosis: Fair  Problem List: Decreased strength, Decreased endurance, Impaired balance, Decreased mobility, Pain  Assessment: Patient was received seated in bedside recliner . Patient was agreeable to therapy services today. PT session focused on functional mobility and therapeutic exercises today in order to improve functional mobility and independence. Functional mobility was performed as described above. Pt was eager to participate in therapy today. Pt displayed anxiety about ambulation, but was able to come up to standing with maxA x1 and take several steps forwards and backwards. Pt was then seated and led through several therapeutic exercises. Pt displayed great tolerance to seated exercises. Patient will benefit from continued PT services while in hospital in order to address remaining limitations. The patients AM-PAC Basic Mobility Inpatient Short From Raw Score is 10 . A Raw score of 10 suggests that the patient may benefit from discharge to post acute rehab. PT recommendation at this time is for post acute rehab. Please also refer to the recommendation of the Physical Therapist for safe discharge planning. PT Discharge Recommendation: Post acute rehabilitation services    See flowsheet documentation for full assessment.

## 2023-07-12 NOTE — PROGRESS NOTES
Progress note - Palliative and Supportive Care   Jf Galvan 69 y.o. female [id number]    Patient Active Problem List   Diagnosis   • Chronic deep vein thrombosis (DVT) of distal vein of left lower extremity (HCC)   • Thyroid nodule   • Malignant neoplasm of right breast in female, estrogen receptor positive ([address])   • Colon cancer (HCC)   • Personal history of other malignant neoplasm of large intestine   • 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   • Other osteoporosis without current pathological fracture   • Type 2 diabetes mellitus without complications (HCC)   • D-dimer, elevated   • Essential hypertension   • Abnormal tumor markers   • Diabetic ulcer of toe of left foot associated with type 2 diabetes mellitus, limited to breakdown of skin (HCC)   • Anxiety about health   • History of DVT (deep vein thrombosis)   • Thyroid disorder   • Osteoporosis due to aromatase inhibitor   • History of diabetes mellitus   • Iron deficiency anemia, unspecified   • Lower abdominal pain   • Perforated bowel (HCC)   • Strangulated ventral hernia   • GERD (gastroesophageal reflux disease)   • History of right breast cancer   • History of right mastectomy   • Moderate protein-calorie malnutrition (HCC)   • Incarcerated ventral hernia   • Adenocarcinoma of colon (HCC)   • S/P ileostomy ([address])   • Chronic wound infection of abdomen   • Metastasis to liver (HCC)   • Port-A-Cath in place   • Shock [facility])   • Pulmonary nodule     Active issues specifically addressed today include:   Metastatic adenocarcinoma of the colon  S/p ileostomy  S/p port-a-cath on chemotherapy  Palliative care encounter  Advance care planning    Plan:  1.  Symptom management  Well controlled     Changing to acetaminophen 650 mg p.o. every 8 hours scheduled  Continue lidocaine patch 12 on, 12 off for neck pain  To help with improvement for PT/OT-->trial of dexamethasone 1mg PO QD for 5 days    Continue loperamide 2 mg p.o. 3 times daily with meals for increased ostomy burden  Continue psyllium 1 packet p.o. twice daily for constipation/bowel irregularity     2. Goals   Treatment-based focus with continuing medical optimization and ultimately chemotherapy. Palliative care will establish care with her in the outpatient setting. Would like to stay in the hospital for inpatient chemotherapy-->spoke with oncology team, who wish to evaluate Demarco Franz at an outpatient appointment prior to restarting chemotherapy. They will attempt to establish an appointment closer to date of discharge. 3. Psychosocial support  Supportive listening provided at today's encounter     Code Status: Full - Level 1   Decisional apparatus:  Patient is competent on my exam today. If competence is lost, patient's substitute decision maker would default to friend Jacqueline Oppenheim. ([phone]) by [place] Act 169. Advance Directive / Living Will / POLST:  Physical copies brought in by [facility] AND [facility] and given to Palliative     Interval history:  Demarco Franz seen and examined. Feels well and doesn't have any acute symptomatic complaints or discomfort. Is frustrated by her limitations for physical therapy, often having restricted ROM and pain in her neck and back.      MEDICATIONS / ALLERGIES:     all current active meds have been reviewed, current meds:   Current Facility-Administered Medications   Medication Dose Route Frequency   • acetaminophen (TYLENOL) tablet 650 mg  650 mg Oral Q8H PRN   • ascorbic acid (VITAMIN C) tablet 1,000 mg  1,000 mg Oral Daily   • bimatoprost (LUMIGAN) 0.01 % ophthalmic solution 1 drop  1 drop Both Eyes HS   • calcitonin (salmon) (MIACALCIN) 200 units/act nasal spray 1 spray  1 spray Alternating Nares Daily   • chlorhexidine (PERIDEX) 0.12 % oral rinse 15 mL  15 mL Mouth/Throat Q12H [facility] & long term   • insulin lispro (HumaLOG) 100 units/mL subcutaneous injection 1-5 Units  1-5 Units Subcutaneous 4x Daily (AC & HS)   • lidocaine (LIDODERM) 5 % patch 1 patch  1 patch Topical Daily   • loperamide (IMODIUM) capsule 2 mg  2 mg Oral TID With Meals   • midodrine (PROAMATINE) tablet 5 mg  5 mg Oral TID AC   • pantoprazole (PROTONIX) EC tablet 40 mg  40 mg Oral Early Morning   • psyllium (METAMUCIL) 1 packet  1 packet Oral BID   • rivaroxaban (XARELTO) tablet 10 mg  10 mg Oral Daily With Breakfast    and PTA meds:   Prior to Admission Medications   Prescriptions Last Dose Informant Patient Reported? Taking? BANANA FLAKES PO   Yes No   Sig: Take 1 Units by mouth 2 (two) times a day   Cholecalciferol (VITAMIN D-3 PO)  Self Yes No   Sig: Take 1 capsule by mouth 3 (three) times a day    Cyanocobalamin (VITAMIN B 12 PO)  Self Yes No   Sig: Take 1 tablet by mouth daily. Diclofenac Sodium (VOLTAREN) 1 %  Self No No   Sig: Apply 2 g topically 4 (four) times a day   Januvia 25 MG tablet   Yes No   acetaminophen (TYLENOL) 325 mg tablet   Yes No   Sig: Take 975 mg by mouth every 8 (eight) hours   amLODIPine (NORVASC) 2.5 mg tablet  Self No No   Sig: Take 1 tablet (2.5 mg total) by mouth daily   ascorbic acid (VITAMIN C) 500 MG tablet  Self Yes No   Sig: Take 1,000 mg by mouth daily    benazepril-hydrochlorthiazide (LOTENSIN HCT) 20-12.5 MG per tablet  Self No No   Sig: take 1 tablet by mouth once daily   bimatoprost (LUMIGAN) 0.01 % ophthalmic drops  Self Yes No   Sig: Administer 1 drop to both eyes daily at bedtime    calcitonin, salmon, (MIACALCIN) 200 units/act nasal spray   Yes No   calcium citrate (CALCITRATE) 950 MG tablet  Self Yes No   Sig: Take 1 tablet by mouth in the morning.    capecitabine (XELODA) 500 MG tablet   No No   Sig: Take 3 tablets (1,500 mg total) by mouth 2 (two) times a day 1 week on followed by 1 week off starting 7/7/23   insulin lispro (HumaLOG) 100 units/mL injection   No No   Sig: Inject 1-6 Units under the skin 4 (four) times a day (before meals and at bedtime)   loperamide (IMODIUM A-D) 2 MG tablet   Yes No   Sig: Take 2 mg by mouth 2 (two) times a day   metFORMIN (GLUCOPHAGE) 500 mg tablet   Yes No   Sig: Take 500 mg by mouth 2 (two) times a day with meals   miconazole (MICOTIN) 2 % powder   Yes No   Sig: Apply topically in the morning Apply to groin topically and under left breast topically every day and evening shift for red wash with soap and water, pat dry and then apply powder. pantoprazole (PROTONIX) 40 mg tablet   No No   Sig: Take 1 tablet (40 mg total) by mouth daily in the early morning Do not start before February 24, 2023. rivaroxaban (Xarelto) 10 mg tablet   No No   Sig: Take 1 tablet (10 mg total) by mouth daily   sodium chloride 1 g tablet   Yes No   Sig: Take 1 g by mouth 2 (two) times a day   vitamin E 100 UNIT capsule  Self Yes No   Sig: Take 100 Units by mouth daily       Facility-Administered Medications: None       No Known Allergies    OBJECTIVE:    Physical Exam  Physical Exam  Constitutional:       General: She is not in acute distress. Appearance: She is ill-appearing. HENT:      Head: Normocephalic and atraumatic. Right Ear: External ear normal.      Left Ear: External ear normal.      Nose: Nose normal.      Mouth/Throat:      Pharynx: Oropharynx is clear. Eyes:      Conjunctiva/sclera: Conjunctivae normal.   Pulmonary:      Effort: Pulmonary effort is normal.   Musculoskeletal:      Right lower leg: No edema. Left lower leg: No edema. Skin:     Coloration: Skin is pale. Skin is not jaundiced. Neurological:      General: No focal deficit present. Mental Status: She is alert and oriented to person, place, and time. Psychiatric:         Mood and Affect: Mood normal.         Behavior: Behavior normal.         Thought Content: Thought content normal.         Judgment: Judgment normal.         Lab Results:   I have personally reviewed pertinent labs. , CBC:   Lab Results   Component Value Date    WBC 6.49 07/12/2023    HGB 8.7 (L) 07/12/2023    HCT 26.3 (L) 07/12/2023     (H) 07/12/2023     07/12/2023    RBC 2.62 (L) 07/12/2023 MCH 33.2 07/12/2023    MCHC 33.1 07/12/2023    RDW 14.6 07/12/2023    MPV 8.8 (L) 07/12/2023   , CMP:   Lab Results   Component Value Date    SODIUM 138 07/12/2023    K 4.1 07/12/2023     07/12/2023    CO2 28 07/12/2023    BUN 8 07/12/2023    CREATININE 0.69 07/12/2023    CALCIUM 10.1 07/12/2023    EGFR 84 07/12/2023   , BMP:  Lab Results   Component Value Date    SODIUM 138 07/12/2023    K 4.1 07/12/2023     07/12/2023    CO2 28 07/12/2023    BUN 8 07/12/2023    CREATININE 0.69 07/12/2023    GLUC 117 07/12/2023    CALCIUM 10.1 07/12/2023    AGAP 5 07/12/2023    EGFR 84 07/12/2023   , PT/PTT:No results found for: "PT", "PTT"  Imaging Studies: Reviewed  EKG, Pathology, and Other Studies: Reviewed    Counseling / Coordination of Care    Total floor / unit time spent today 50 minutes. Greater than 50% of total time was spent with the patient and / or family counseling and / or coordination of care. A description of the counseling / coordination of care: Chart review, patient interview/discussion, psychosocial support, comprehensive symptom evaluation, physical examination, medication evaluation/alteration, advance care/goals-of-care planning, and multidisciplinary collaboration.      Sukumar Del Cid, DO  Hospice/Palliative Care Medicine PGY-4  [facility]

## 2023-07-12 NOTE — ASSESSMENT & PLAN NOTE
Lab Results   Component Value Date    HGBA1C 6.3 12/20/2022       Recent Labs     07/11/23  1104 07/11/23  1617 07/11/23 2053 07/12/23  0659   POCGLU 192* 155* 210* 101       Blood Sugar Average: Last 72 hrs:  (P) [id number]

## 2023-07-12 NOTE — PLAN OF CARE
Problem: MOBILITY - ADULT  Goal: Maintain or return to baseline ADL function  Description: INTERVENTIONS:  -  Assess patient's ability to carry out ADLs; assess patient's baseline for ADL function and identify physical deficits which impact ability to perform ADLs (bathing, care of mouth/teeth, toileting, grooming, dressing, etc.)  - Assess/evaluate cause of self-care deficits   - Assess range of motion  - Assess patient's mobility; develop plan if impaired  - Assess patient's need for assistive devices and provide as appropriate  - Encourage maximum independence but intervene and supervise when necessary  - Involve family in performance of ADLs  - Assess for home care needs following discharge   - Consider OT consult to assist with ADL evaluation and planning for discharge  - Provide patient education as appropriate  Outcome: Progressing  Goal: Maintains/Returns to pre admission functional level  Description: INTERVENTIONS:  - Perform BMAT or MOVE assessment daily.   - Set and communicate daily mobility goal to care team and patient/family/caregiver. - Collaborate with rehabilitation services on mobility goals if consulted  - Perform Range of Motion 3 times a day. - Reposition patient every 2 hours.   - Dangle patient 3 times a day  - Stand patient 3 times a day  - Ambulate patient 3 times a day  - Out of bed to chair 3 times a day   - Out of bed for meals 3 times a day  - Out of bed for toileting  - Record patient progress and toleration of activity level   Outcome: Progressing     Problem: INFECTION - ADULT  Goal: Absence or prevention of progression during hospitalization  Description: INTERVENTIONS:  - Assess and monitor for signs and symptoms of infection  - Monitor lab/diagnostic results  - Monitor all insertion sites, i.e. indwelling lines, tubes, and drains  - Monitor endotracheal if appropriate and nasal secretions for changes in amount and color  - [place] appropriate cooling/warming therapies per order  - Administer medications as ordered  - Instruct and encourage patient and family to use good hand hygiene technique  - Identify and instruct in appropriate isolation precautions for identified infection/condition  Outcome: Progressing  Goal: Absence of fever/infection during neutropenic period  Description: INTERVENTIONS:  - Monitor WBC    Outcome: Progressing     Problem: PAIN - ADULT  Goal: Verbalizes/displays adequate comfort level or baseline comfort level  Description: Interventions:  - Encourage patient to monitor pain and request assistance  - Assess pain using appropriate pain scale  - Administer analgesics based on type and severity of pain and evaluate response  - Implement non-pharmacological measures as appropriate and evaluate response  - Consider cultural and social influences on pain and pain management  - Notify physician/advanced practitioner if interventions unsuccessful or patient reports new pain  Outcome: Progressing     Problem: Knowledge Deficit  Goal: Patient/family/caregiver demonstrates understanding of disease process, treatment plan, medications, and discharge instructions  Description: Complete learning assessment and assess knowledge base.   Interventions:  - Provide teaching at level of understanding  - Provide teaching via preferred learning methods  Outcome: Progressing

## 2023-07-12 NOTE — PLAN OF CARE
Problem: MOBILITY - ADULT  Goal: Maintain or return to baseline ADL function  Description: INTERVENTIONS:  -  Assess patient's ability to carry out ADLs; assess patient's baseline for ADL function and identify physical deficits which impact ability to perform ADLs (bathing, care of mouth/teeth, toileting, grooming, dressing, etc.)  - Assess/evaluate cause of self-care deficits   - Assess range of motion  - Assess patient's mobility; develop plan if impaired  - Assess patient's need for assistive devices and provide as appropriate  - Encourage maximum independence but intervene and supervise when necessary  - Involve family in performance of ADLs  - Assess for home care needs following discharge   - Consider OT consult to assist with ADL evaluation and planning for discharge  - Provide patient education as appropriate  Outcome: Progressing  Problem: PAIN - ADULT  Goal: Verbalizes/displays adequate comfort level or baseline comfort level  Description: Interventions:  - Encourage patient to monitor pain and request assistance  - Assess pain using appropriate pain scale  - Administer analgesics based on type and severity of pain and evaluate response  - Implement non-pharmacological measures as appropriate and evaluate response  - Consider cultural and social influences on pain and pain management  - Notify physician/advanced practitioner if interventions unsuccessful or patient reports new pain  Outcome: Progressing     Problem: INFECTION - ADULT  Goal: Absence or prevention of progression during hospitalization  Description: INTERVENTIONS:  - Assess and monitor for signs and symptoms of infection  - Monitor lab/diagnostic results  - Monitor all insertion sites, i.e. indwelling lines, tubes, and drains  - Monitor endotracheal if appropriate and nasal secretions for changes in amount and color  - [place] appropriate cooling/warming therapies per order  - Administer medications as ordered  - Instruct and encourage patient and family to use good hand hygiene technique  - Identify and instruct in appropriate isolation precautions for identified infection/condition  Outcome: Progressing  Goal: Absence of fever/infection during neutropenic period  Description: INTERVENTIONS:  - Monitor WBC    Outcome: Progressing     Problem: DISCHARGE PLANNING  Goal: Discharge to home or other facility with appropriate resources  Description: INTERVENTIONS:  - Identify barriers to discharge w/patient and caregiver  - Arrange for needed discharge resources and transportation as appropriate  - Identify discharge learning needs (meds, wound care, etc.)  - Arrange for interpretive services to assist at discharge as needed  - Refer to Case Management Department for coordinating discharge planning if the patient needs post-hospital services based on physician/advanced practitioner order or complex needs related to functional status, cognitive ability, or social support system  Outcome: Progressing     Problem: Knowledge Deficit  Goal: Patient/family/caregiver demonstrates understanding of disease process, treatment plan, medications, and discharge instructions  Description: Complete learning assessment and assess knowledge base.   Interventions:  - Provide teaching at level of understanding  - Provide teaching via preferred learning methods  Outcome: Progressing     Problem: CARDIOVASCULAR - ADULT  Goal: Maintains optimal cardiac output and hemodynamic stability  Description: INTERVENTIONS:  - Monitor I/O, vital signs and rhythm  - Monitor for S/S and trends of decreased cardiac output  - Administer and titrate ordered vasoactive medications to optimize hemodynamic stability  - Assess quality of pulses, skin color and temperature  - Assess for signs of decreased coronary artery perfusion  - Instruct patient to report change in severity of symptoms  Outcome: Progressing  Goal: Absence of cardiac dysrhythmias or at baseline rhythm  Description: INTERVENTIONS:  - Continuous cardiac monitoring, vital signs, obtain 12 lead EKG if ordered  - Administer antiarrhythmic and heart rate control medications as ordered  - Monitor electrolytes and administer replacement therapy as ordered  Outcome: Progressing     Problem: Prexisting or High Potential for Compromised Skin Integrity  Goal: Skin integrity is maintained or improved  Description: INTERVENTIONS:  - Identify patients at risk for skin breakdown  - Assess and monitor skin integrity  - Assess and monitor nutrition and hydration status  - Monitor labs   - Assess for incontinence   - Turn and reposition patient  - Assist with mobility/ambulation  - Relieve pressure over bony prominences  - Avoid friction and shearing  - Provide appropriate hygiene as needed including keeping skin clean and dry  - Evaluate need for skin moisturizer/barrier cream  - Collaborate with interdisciplinary team   - Patient/family teaching  - Consider wound care consult   Outcome: Progressing     Problem: Nutrition/Hydration-ADULT  Goal: Nutrient/Hydration intake appropriate for improving, restoring or maintaining nutritional needs  Description: Monitor and assess patient's nutrition/hydration status for malnutrition. Collaborate with interdisciplinary team and initiate plan and interventions as ordered. Monitor patient's weight and dietary intake as ordered or per policy. Utilize nutrition screening tool and intervene as necessary. Determine patient's food preferences and provide high-protein, high-caloric foods as appropriate.      INTERVENTIONS:  - Monitor oral intake, urinary output, labs, and treatment plans  - Assess nutrition and hydration status and recommend course of action  - Evaluate amount of meals eaten  - Assist patient with eating if necessary   - Allow adequate time for meals  - Recommend/ encourage appropriate diets, oral nutritional supplements, and vitamin/mineral supplements  - Order, calculate, and assess calorie counts as needed  - Recommend, monitor, and adjust tube feedings and TPN/PPN based on assessed needs  - Assess need for intravenous fluids  - Provide specific nutrition/hydration education as appropriate  - Include patient/family/caregiver in decisions related to nutrition  Outcome: Progressing

## 2023-07-12 NOTE — ASSESSMENT & PLAN NOTE
· Follows with Heme/Onc, Dr. Malinda Isaac, per patient  · Palliative Care following here  · Xeloda on hold

## 2023-07-12 NOTE — CASE MANAGEMENT
Case Management Discharge Planning Note    Patient name Guy Nino  Location [facility] 229-16 MRN [id number]  : 1945 Date 2023       Current Admission Date: 2023  Current Admission Diagnosis:Shock [facility])   Patient Active Problem List    Diagnosis Date Noted   • Pulmonary nodule 07/10/2023   • Shock ([address]) 2023   • Port-A-Cath in place 2023   • Chronic wound infection of abdomen 2023   • Metastasis to liver ([address]) 2023   • Adenocarcinoma of colon ([address]) 2023   • S/P ileostomy ([address]) 2023   • Incarcerated ventral hernia 2023   • Moderate protein-calorie malnutrition ([address]) 02/15/2023   • Lower abdominal pain 2023   • Perforated bowel ([address]) 2023   • Strangulated ventral hernia 2023   • GERD (gastroesophageal reflux disease) 2023   • History of right breast cancer 2023   • History of right mastectomy 2023   • Iron deficiency anemia, unspecified 2022   • History of DVT (deep vein thrombosis) 2022   • Thyroid disorder 2022   • Osteoporosis due to aromatase inhibitor 2022   • History of diabetes mellitus 2022   • Anxiety about health 2021   • Diabetic ulcer of toe of left foot associated with type 2 diabetes mellitus, limited to breakdown of skin ([address]) 2021   • Abnormal tumor markers 2020   • Essential hypertension 10/20/2020   • D-dimer, elevated 2019   • Type 2 diabetes mellitus without complications ([address])    • Other osteoporosis without current pathological fracture 2018   • 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 2018   • Personal history of other malignant neoplasm of large intestine 2018   • Malignant neoplasm of right breast in female, estrogen receptor positive ([address]) 2018   • Colon cancer ([address]) 2018   • Chronic deep vein thrombosis (DVT) of distal vein of left lower extremity ([address]) 2018   • Thyroid nodule 2018      LOS (days): 5  Geometric Mean LOS (GMLOS) (days): 4.90  Days to GMLOS:0.1     OBJECTIVE:  Risk of Unplanned Readmission Score: 25.9         Current admission status: Inpatient   Preferred Pharmacy:   [address] [facility], [place] - [address] [facility] [address] [facility] [address] [facility]  Phone: [phone] Fax: [phone]    Anastasia Pillai, [address] [place] [address]  [address]  [address]  [address] [facility] [address]  Phone: [phone] Fax: [phone]    Primary Care Provider: Rosa Solorio DO    Primary Insurance: Kamla Phelps [facility]  Secondary Insurance:     DISCHARGE DETAILS:               Additional Comments: [address] following for possible d/c within 24-48 hours, has submitted auth to [employer].

## 2023-07-12 NOTE — SOCIAL WORK
LCSW continues to meet regularly with patient to provide emotional support and guidance. Patient continues to request full aggressive treatments if available. We did discuss the potential of a time when she will not have a functional status able to receive treatments. Patient is understanding of the limits of treatment but is very hopeful that at minimum Oncology will allow her to receive chemotherapy 1 day a week. LCSW will relay her request to Oncology. LCSW was able to provide her with a review of hospice if she isn't able to recieve chemotherapy. If hospice is appropriate she would prefer to do at home. Unfortunately, patient requires 24/7 care which would be difficult at home. Patient is tearful about her upcoming discharge. She does not feel that she will receive the same level of care when she returns to the nursing facility. Patient would benefit from outpatient Palliative but this is difficult to manage due to transportation and lack of virtual capabilities.       I have spent 60 minutes with Patient  today in which greater than 50% of this time was spent in counseling/coordination of care     Palliative  will follow-up as requested by patient, family, and primary team.  Please contact with any specific requests

## 2023-07-12 NOTE — PHYSICAL THERAPY NOTE
PHYSICAL THERAPY NOTE          Patient Name: Bonny Felix  HNGJF'H Date: 7/12/2023 07/12/23 1501   PT Last Visit   PT Visit Date 07/12/23   Note Type   Note Type Treatment   Restrictions/Precautions   Weight Bearing Precautions Per Order No   Other Precautions Chair Alarm;Cognitive; Bed Alarm; Fall Risk   General   Chart Reviewed Yes   Response to Previous Treatment Patient with no complaints from previous session. Family/Caregiver Present No   Cognition   Overall Cognitive Status Impaired   Arousal/Participation Alert; Responsive; Cooperative   Attention Within functional limits   Orientation Level Oriented X4   Memory Decreased recall of recent events;Decreased recall of precautions   Following Commands Follows one step commands without difficulty   Subjective   Subjective pt pleasant and cooperative throughout therapy session. pt received seated in bedside recliner   Bed Mobility   Supine to Sit Unable to assess   Sit to Supine Unable to assess   Transfers   Sit to Stand 2  Maximal assistance   Additional items Assist x 1; Increased time required;Verbal cues   Stand to Sit 2  Maximal assistance   Additional items Assist x 1; Increased time required;Verbal cues   Additional Comments transfers w RW   Ambulation/Elevation   Gait pattern Forward Flexion; Retropulsion;Decreased foot clearance;Narrow SYBIL; Improper Weight shift; Poor UE support;R Knee Parker;L Knee Parker; Short stride; Excessively slow;Knees flexed   Gait Assistance 2  Maximal assist   Additional items Assist x 1;Verbal cues; Tactile cues   Assistive Device Rolling walker   Distance 3 steps forwards, 3 steps backwards   Stair Management Assistance Not tested   Balance   Static Sitting Fair   Dynamic Sitting Fair -   Static Standing Poor   Dynamic Standing Poor   Ambulatory Poor -   Endurance Deficit   Endurance Deficit Yes   Endurance Deficit Description generalized weakness, decreased exercise tolerance   Activity Tolerance   Activity Tolerance Patient tolerated treatment well   Nurse Made Aware RN cleared and updated   Exercises   Hip Flexion Sitting;25 reps;AROM; Bilateral   Hip Abduction Sitting;25 reps;AROM; Bilateral   Hip Adduction Sitting;25 reps;AROM; Bilateral   Knee AROM Long Arc Quad Sitting;25 reps;AROM; Bilateral   Ankle Pumps Sitting;25 reps;AROM; Bilateral   Assessment   Prognosis Fair   Problem List Decreased strength;Decreased endurance; Impaired balance;Decreased mobility;Pain   Assessment Patient was received seated in bedside recliner . Patient was agreeable to therapy services today. PT session focused on functional mobility and therapeutic exercises today in order to improve functional mobility and independence. Functional mobility was performed as described above. Pt was eager to participate in therapy today. Pt displayed anxiety about ambulation, but was able to come up to standing with maxA x1 and take several steps forwards and backwards. Pt was then seated and led through several therapeutic exercises. Pt displayed great tolerance to seated exercises. Patient will benefit from continued PT services while in hospital in order to address remaining limitations. The patients AM-PAC Basic Mobility Inpatient Short From Raw Score is 10 . A Raw score of 10 suggests that the patient may benefit from discharge to post acute rehab. PT recommendation at this time is for post acute rehab. Please also refer to the recommendation of the Physical Therapist for safe discharge planning. Goals   Patient Goals to go home   STG Expiration Date 07/25/23   PT Treatment Day 1   Plan   Treatment/Interventions ADL retraining;Functional transfer training;LE strengthening/ROM; Elevations; Therapeutic exercise; Endurance training;Bed mobility;Gait training   Progress Slow progress, decreased activity tolerance   PT Frequency 2-3x/wk   Recommendation   PT Discharge Recommendation Post acute rehabilitation services   AM-PAC Basic Mobility Inpatient   Turning in Flat Bed Without Bedrails 3   Lying on Back to Sitting on Edge of Flat Bed Without Bedrails 3   Moving Bed to Chair 1   Standing Up From Chair Using Arms 1   Walk in Room 1   Climb 3-5 Stairs With Railing 1   Basic Mobility Inpatient Raw Score 10   Highest Level Of Mobility   JH-HLM Goal 4: Move to chair/commode   JH-HLM Achieved 5: Stand (1 or more minutes)   Education   Education Provided Mobility training   Patient Demonstrates acceptance/verbal understanding   End of Consult   Patient Position at End of Consult Bedside chair; All needs within reach       Patricia Bo PT,DPT

## 2023-07-12 NOTE — PROGRESS NOTES
[address] [facility]  Progress Note  Name: Jacklyn Napoles  MRN: [id number]  Unit/Bed#: [facility] 811-01 I Date of Admission: 7/7/2023   Date of Service: 7/12/2023 I Hospital Day: 5    Assessment/Plan   * Shock [facility])  Assessment & Plan  · Per record review, patient noted to be hypotensive at infusion center and was transferred to the ED and subsequently admitted to ICU for pressors for BP support despite IVF resuscitation  · Unclear etiology, but per ICU records was suspected to be by 2/2 hypovolemia 2/2 increased volume output from ileostomy  · Does not have adrenal insufficiency per Endocrinology  · Levo off since 7/9/23 and A-line was discontinued on 7/10/23 and she was transferred out of ICU  · BP now improved  · Not on abx, no clear infectious source     Colon cancer [facility])  Assessment & Plan  · Follows with Heme/Onc, Dr. Axel Hogue, per patient  · Palliative Care following here  · Xeloda on hold     History of DVT (deep vein thrombosis)  Assessment & Plan  · On Xarelto     Port-A-Cath in place  Assessment & Plan  · Monitor site  · Care per protocol     S/P ileostomy ([address])  Assessment & Plan  · History of colon cancer noted   · Monitor output  · On Imodium    History of diabetes mellitus  Assessment & Plan  · Continue SSI    Diabetic ulcer of toe of left foot associated with type 2 diabetes mellitus, limited to breakdown of skin [facility])  Assessment & Plan  Lab Results   Component Value Date    HGBA1C 6.3 12/20/2022       Recent Labs     07/11/23  1104 07/11/23  1617 07/11/23  2053 07/12/23  0659   POCGLU 192* 155* 210* 101       Blood Sugar Average: Last 72 hrs:  (P) [id number]    Iron deficiency anemia, unspecified  Assessment & Plan  · Hemoglobin stable  · Monitor     Other osteoporosis without current pathological fracture  Assessment & Plan  · On vitamin C and Miacalcin    Pulmonary nodule  Assessment & Plan  · CT imaging revealed, "new pulmonary metastatic disease" per the results report  · Follow up with Heme/Onc  · Palliative Care following            VTE Pharmacologic Prophylaxis:   xarelto    Patient Centered Rounds: I performed bedside rounds with nursing staff today. Viky Stewart   Discussions with Specialists or Other Care Team Provider:      Education and Discussions with Family / Patient: Patient declined call to . Total Time Spent on Date of Encounter in care of patient: 25 minutes This time was spent on one or more of the following: performing physical exam; counseling and coordination of care; obtaining or reviewing history; documenting in the medical record; reviewing/ordering tests, medications or procedures; communicating with other healthcare professionals and discussing with patient's family/caregivers. Current Length of Stay: 5 day(s)  Current Patient Status: Inpatient   Certification Statement: The patient will continue to require additional inpatient hospital stay due to monitoring BP, safe discharge planning  Discharge Plan: Anticipate discharge in 24-48 hrs to rehab facility. Code Status: Level 1 - Full Code    Subjective:   Ms. Deepali Ordonez reports chronic arthritis pains. She reports that her ostomy output is at baseline. Otherwise denies complaint     Objective:     Vitals:   Temp (24hrs), Av.6 °F (36.4 °C), Min:97.5 °F (36.4 °C), Max:97.8 °F (36.6 °C)    Temp:  [97.5 °F (36.4 °C)-97.8 °F (36.6 °C)] 97.8 °F (36.6 °C)  HR:  [86-89] 88  Resp:  [17] 17  BP: (123-125)/(69-71) 123/69  SpO2:  [95 %-97 %] 95 %  Body mass index is 26.69 kg/m². Input and Output Summary (last 24 hours): Intake/Output Summary (Last 24 hours) at 2023 0957  Last data filed at 2023 2247  Gross per 24 hour   Intake 240 ml   Output 1125 ml   Net -885 ml       Physical Exam:   Physical Exam  Vitals reviewed.    Constitutional:       Comments: Patient seen sitting in bedside chair, NAD   Cardiovascular:      Rate and Rhythm: Normal rate and regular rhythm. Pulmonary:      Effort: Pulmonary effort is normal. No respiratory distress. Breath sounds: Normal breath sounds. Abdominal:      General: Bowel sounds are normal.      Palpations: Abdomen is soft. Tenderness: There is no abdominal tenderness. Musculoskeletal:      Right lower leg: No edema. Left lower leg: No edema. Skin:     General: Skin is warm. Neurological:      Mental Status: She is alert and oriented to person, place, and time. Psychiatric:         Mood and Affect: Mood normal.         Behavior: Behavior normal.          Additional Data:     Labs:  Results from last 7 days   Lab Units 07/12/23  0516 07/11/23  0500   WBC Thousand/uL 6.49 5.14   HEMOGLOBIN g/dL 8.7* 8.7*   HEMATOCRIT % 26.3* 26.7*   PLATELETS Thousands/uL 222 205   NEUTROS PCT %  --  57   LYMPHS PCT %  --  22   MONOS PCT %  --  15*   EOS PCT %  --  5     Results from last 7 days   Lab Units 07/12/23  0516 07/11/23  0500 07/10/23  0458   SODIUM mmol/L 138   < > 137   POTASSIUM mmol/L 4.1   < > 4.1   CHLORIDE mmol/L 105   < > 106   CO2 mmol/L 28   < > 24   BUN mg/dL 8   < > 12   CREATININE mg/dL 0.69   < > 0.63   ANION GAP mmol/L 5   < > 7   CALCIUM mg/dL 10.1   < > 9.4   ALBUMIN g/dL  --   --  3.3*   TOTAL BILIRUBIN mg/dL  --   --  0.18*   ALK PHOS U/L  --   --  53   ALT U/L  --   --  10*   AST U/L  --   --  5   GLUCOSE RANDOM mg/dL 117   < > 123    < > = values in this interval not displayed.      Results from last 7 days   Lab Units 07/07/23  1415   INR  1.92*     Results from last 7 days   Lab Units 07/12/23  0659 07/11/23  2053 07/11/23  1617 07/11/23  1104 07/11/23  0704 07/10/23  2128 07/10/23  1614 07/10/23  1108 07/10/23  0804 07/09/23  2113 07/09/23  1649 07/09/23  1105   POC GLUCOSE mg/dl 101 210* 155* 192* 116 216* 199* 240* 117 236* 176* 130         Results from last 7 days   Lab Units 07/07/23  2207 07/07/23  1722 07/07/23  1415   LACTIC ACID mmol/L 1.1 2.6* 2.6*       Lines/Drains:  Invasive Devices     Central Venous Catheter Line  Duration           Port A Cath 06/28/23 Right Internal jugular 13 days          Peripheral Intravenous Line  Duration           Peripheral IV 07/08/23 Distal;Dorsal (posterior); Right Forearm 3 days          Drain  Duration           Ileostomy Standard (ap Calderón)  days    External Urinary Catheter 4 days                           Imaging: No pertinent imaging reviewed. Recent Cultures (last 7 days):   Results from last 7 days   Lab Units 07/07/23  1454 07/07/23  1415   BLOOD CULTURE  No Growth After 4 Days. No Growth After 4 Days. Last 24 Hours Medication List:   Current Facility-Administered Medications   Medication Dose Route Frequency Provider Last Rate   • acetaminophen  650 mg Oral Q8H PRN Mitra Roman PA-C     • ascorbic acid  1,000 mg Oral Daily BALTAZAR Abreu     • bimatoprost  1 drop Both Eyes HS BALTAZAR Abreu     • calcitonin (salmon)  1 spray Alternating Nares Daily BALTAZAR Abreu     • chlorhexidine  15 mL Mouth/Throat Q12H [address] BALTAZAR Abreu     • insulin lispro  1-5 Units Subcutaneous 4x Daily (AC & HS) BALTAZAR Abreu     • lidocaine  1 patch Topical Daily BALTAZAR Abreu     • loperamide  2 mg Oral TID With Meals BALTAZAR Abreu     • midodrine  5 mg Oral TID AC BALTAZAR Abreu     • pantoprazole  40 mg Oral Early Morning BALTAZAR Abreu     • psyllium  1 packet Oral BID BALTAZAR Abreu     • rivaroxaban  10 mg Oral Daily With Breakfast BALTAZAR Abreu          Today, Patient Was Seen By: Yasmani Fields PA-C    **Please Note: This note may have been constructed using a voice recognition system. **

## 2023-07-12 NOTE — RESTORATIVE TECHNICIAN NOTE
Restorative Technician Note      Patient Name: Husam Rivera Tech Visit Date: 07/12/23  Note Type: Mobility  Patient Position Upon Consult: Supine  Activity Performed: Transferred; Dangled; Stood  Assistive Device: Standard walker; Other (Comment) (Ax2)  Education Provided: Yes  Patient Position at End of Consult: Bedside chair;  All needs within reach    Breana Mccann  DPT, Restorative Technician

## 2023-07-13 VITALS
WEIGHT: 136 LBS | SYSTOLIC BLOOD PRESSURE: 127 MMHG | BODY MASS INDEX: 26.7 KG/M2 | OXYGEN SATURATION: 100 % | TEMPERATURE: 98.3 F | HEIGHT: 60 IN | RESPIRATION RATE: 18 BRPM | HEART RATE: 107 BPM | DIASTOLIC BLOOD PRESSURE: 86 MMHG

## 2023-07-13 LAB
GLUCOSE SERPL-MCNC: 126 MG/DL (ref 65–140)
GLUCOSE SERPL-MCNC: 199 MG/DL (ref 65–140)
GLUCOSE SERPL-MCNC: 228 MG/DL (ref 65–140)

## 2023-07-13 PROCEDURE — 99239 HOSP IP/OBS DSCHRG MGMT >30: CPT | Performed by: STUDENT IN AN ORGANIZED HEALTH CARE EDUCATION/TRAINING PROGRAM

## 2023-07-13 PROCEDURE — 82948 REAGENT STRIP/BLOOD GLUCOSE: CPT

## 2023-07-13 RX ORDER — MIDODRINE HYDROCHLORIDE 5 MG/1
5 TABLET ORAL
Refills: 0
Start: 2023-07-13

## 2023-07-13 RX ORDER — LOPERAMIDE HYDROCHLORIDE 2 MG/1
2 CAPSULE ORAL
Qty: 30 CAPSULE | Refills: 0
Start: 2023-07-13

## 2023-07-13 RX ORDER — DEXAMETHASONE 1 MG
1 TABLET ORAL DAILY
Qty: 4 TABLET | Refills: 0
Start: 2023-07-14 | End: 2023-07-18

## 2023-07-13 RX ORDER — LIDOCAINE 50 MG/G
1 PATCH TOPICAL DAILY
Refills: 0
Start: 2023-07-14

## 2023-07-13 RX ORDER — ACETAMINOPHEN 325 MG/1
650 TABLET ORAL EVERY 8 HOURS SCHEDULED
Refills: 0
Start: 2023-07-13

## 2023-07-13 RX ORDER — CHLORHEXIDINE GLUCONATE 0.12 MG/ML
15 RINSE ORAL EVERY 12 HOURS SCHEDULED
Qty: 120 ML | Refills: 0 | Status: SHIPPED | OUTPATIENT
Start: 2023-07-13

## 2023-07-13 RX ADMIN — RIVAROXABAN 10 MG: 10 TABLET, FILM COATED ORAL at 08:35

## 2023-07-13 RX ADMIN — INSULIN LISPRO 1 UNITS: 100 INJECTION, SOLUTION INTRAVENOUS; SUBCUTANEOUS at 12:10

## 2023-07-13 RX ADMIN — CHLORHEXIDINE GLUCONATE 15 ML: 1.2 SOLUTION ORAL at 08:35

## 2023-07-13 RX ADMIN — OXYCODONE HYDROCHLORIDE AND ACETAMINOPHEN 1000 MG: 500 TABLET ORAL at 08:35

## 2023-07-13 RX ADMIN — DEXAMETHASONE 1 MG: 2 TABLET ORAL at 08:35

## 2023-07-13 RX ADMIN — PANTOPRAZOLE SODIUM 40 MG: 40 TABLET, DELAYED RELEASE ORAL at 05:41

## 2023-07-13 RX ADMIN — LOPERAMIDE HYDROCHLORIDE 2 MG: 2 CAPSULE ORAL at 08:35

## 2023-07-13 RX ADMIN — MIDODRINE HYDROCHLORIDE 5 MG: 5 TABLET ORAL at 05:41

## 2023-07-13 RX ADMIN — ACETAMINOPHEN 650 MG: 325 TABLET, FILM COATED ORAL at 05:41

## 2023-07-13 RX ADMIN — LIDOCAINE 5% 1 PATCH: 700 PATCH TOPICAL at 08:35

## 2023-07-13 RX ADMIN — MIDODRINE HYDROCHLORIDE 5 MG: 5 TABLET ORAL at 12:10

## 2023-07-13 RX ADMIN — Medication 1 PACKET: at 08:35

## 2023-07-13 RX ADMIN — CALCITONIN SALMON 1 SPRAY: 200 SPRAY, METERED NASAL at 08:37

## 2023-07-13 RX ADMIN — LOPERAMIDE HYDROCHLORIDE 2 MG: 2 CAPSULE ORAL at 12:10

## 2023-07-13 NOTE — ASSESSMENT & PLAN NOTE
· Per record review, patient noted to be hypotensive at infusion center and was transferred to the ED and subsequently admitted to ICU for pressors for BP support despite IVF resuscitation  · Unclear etiology, but per ICU records was suspected to be by 2/2 hypovolemia 2/2 increased volume output from ileostomy  · Does not have adrenal insufficiency per Endocrinology  · Levo off since 7/9/23 and A-line was discontinued on 7/10/23 and she was transferred out of ICU  · BP now improved  · Not on abx, no clear infectious source   · Started on midodrine, continue same until follows with PCP.

## 2023-07-13 NOTE — DISCHARGE SUMMARY
[address] [facility]  Discharge- Luis Silverio 1945, 69 y.o. female MRN: [id number]  Unit/Bed#: [facility] 811-01 Encounter: [id number]  Primary Care Provider: Zhane Hunt DO   Date and time admitted to hospital: 7/7/2023  1:32 PM    * Shock [facility])  Assessment & Plan  · Per record review, patient noted to be hypotensive at infusion center and was transferred to the ED and subsequently admitted to ICU for pressors for BP support despite IVF resuscitation  · Unclear etiology, but per ICU records was suspected to be by 2/2 hypovolemia 2/2 increased volume output from ileostomy  · Does not have adrenal insufficiency per Endocrinology  · Levo off since 7/9/23 and A-line was discontinued on 7/10/23 and she was transferred out of ICU  · BP now improved  · Not on abx, no clear infectious source   · Started on midodrine, continue same until follows with PCP. Pulmonary nodule  Assessment & Plan  · CT imaging revealed, "new pulmonary metastatic disease" per the results report  · Follow up with Heme/Onc  · Palliative Care following     S/P ileostomy ([address])  Assessment & Plan  · History of colon cancer noted   · Monitor output  · On Imodium and Metamucil, output improved since presentation. Iron deficiency anemia, unspecified  Assessment & Plan  · Hemoglobin stable  · Monitor     History of diabetes mellitus  Assessment & Plan  · Patient to resume home regimen on discharge. History of DVT (deep vein thrombosis)  Assessment & Plan  · On Xarelto     Diabetic ulcer of toe of left foot associated with type 2 diabetes mellitus, limited to breakdown of skin [facility])  Assessment & Plan  Lab Results   Component Value Date    HGBA1C 6.3 12/20/2022       Recent Labs     07/12/23  2103 07/13/23  0737 07/13/23  1110 07/13/23  1559   POCGLU 213* 126 199* 228*       Blood Sugar Average: Last 72 hrs:  (P) 634.2983883599365919     Patient to resume home regimen on discharge.     Other osteoporosis without current pathological fracture  Assessment & Plan  · On vitamin C and Miacalcin    Colon cancer [facility])  Assessment & Plan  · Follows with Heme/Onc, Dr. Edison Estrada, per patient  · Palliative Care following here  · Xeloda on hold, resume on discharge. Medical Problems     Resolved Problems  Date Reviewed: 7/13/2023          Resolved    Hypotension 7/10/2023     Resolved by  BALTAZAR Quick    Overview Deleted 7/10/2023 12:55 PM by BALTAZAR Quick                Discharging Physician / Practitioner: Mary Mills MD  PCP: Raiza River DO  Admission Date:   Admission Orders (From admission, onward)     Ordered        07/07/23 2003  Inpatient Admission  Once                      Discharge Date: 07/13/23    Consultations During Hospital Stay:  · Endocrinology. · Palliative care. Procedures Performed:   · none    Significant Findings / Test Results:   CT chest abd and pelvis:   IMPRESSION:     New pulmonary metastatic disease.     Progression of patient's hepatic metastatic disease.     Left breast skin thickening and several left breast nodules are again seen.     Etiology for patient's diarrhea not elucidated on this examination         Test Results Pending at Discharge (will require follow up):   · no     Outpatient Tests Requested:  · no    Complications:  no    Reason for Admission: Hypotension. Hospital Course:   Fabien Peterson is a 69 y.o. female patient who originally presented to the hospital on 7/7/2023 due to hypotension at infusion center. She was initially admitted to ICU needing pressors, has been off pressors since 7/10 and transferred out of ICU. No infectious cause found, hypotension most likely due to increased output from stoma. Patient started on midodrine, with improvement in her blood pressure. She will continue to hold amlodipine and other antihypertensive medications, continue midodrine until she follows with PCP.     Please see above list of diagnoses and related plan for additional information. Condition at Discharge: stable    Discharge Day Visit / Exam:   Subjective: Patient examined at bedside, she knows she is ready for discharge but reported feeling sad as she will not receive same level of care. She understands given no acute needs she will not qualify to stay more in hospital and is agreeable for discharge today. Vitals: Blood Pressure: 127/86 (07/13/23 1517)  Pulse: (!) 107 (07/13/23 1517)  Temperature: 98.3 °F (36.8 °C) (07/13/23 1517)  Temp Source: Oral (07/10/23 1200)  Respirations: 18 (07/13/23 1517)  Height: 5' (152.4 cm) (07/07/23 2200)  Weight - Scale: 61.7 kg (136 lb) (07/13/23 0600)  SpO2: 100 % (07/13/23 1517)  Exam:   Physical Exam  Constitutional:       Appearance: Normal appearance. HENT:      Head: Normocephalic and atraumatic. Mouth/Throat:      Mouth: Mucous membranes are moist.      Pharynx: Oropharynx is clear. Eyes:      Conjunctiva/sclera: Conjunctivae normal.      Pupils: Pupils are equal, round, and reactive to light. Cardiovascular:      Rate and Rhythm: Normal rate and regular rhythm. Pulses: Normal pulses. Heart sounds: Normal heart sounds. Pulmonary:      Effort: Pulmonary effort is normal.      Breath sounds: Normal breath sounds. Abdominal:      General: Abdomen is flat. Bowel sounds are normal.   Musculoskeletal:      Cervical back: Normal range of motion. Skin:     General: Skin is warm and dry. Neurological:      General: No focal deficit present. Mental Status: She is alert and oriented to person, place, and time. Psychiatric:         Mood and Affect: Mood normal.         Behavior: Behavior normal.          Discussion with Family: Updated patient at bedside, she has no one in family has only friends listed as point of contact only in case of emergency. .     Discharge instructions/Information to patient and family:   See after visit summary for information provided to patient and family.       Provisions for Follow-Up Care:  See after visit summary for information related to follow-up care and any pertinent home health orders. Disposition:   Assisted Living Facility at [facility]    Planned Readmission: no     Discharge Statement:  I spent 42 minutes discharging the patient. This time was spent on the day of discharge. I had direct contact with the patient on the day of discharge. Greater than 50% of the total time was spent examining patient, answering all patient questions, arranging and discussing plan of care with patient as well as directly providing post-discharge instructions. Additional time then spent on discharge activities. Discharge Medications:  See after visit summary for reconciled discharge medications provided to patient and/or family.       **Please Note: This note may have been constructed using a voice recognition system**

## 2023-07-13 NOTE — ASSESSMENT & PLAN NOTE
Lab Results   Component Value Date    HGBA1C 6.3 12/20/2022       Recent Labs     07/12/23  2103 07/13/23  0737 07/13/23  1110 07/13/23  1559   POCGLU 213* 126 199* 228*       Blood Sugar Average: Last 72 hrs:  (P) [id number]     Patient to resume home regimen on discharge.

## 2023-07-13 NOTE — WOUND OSTOMY CARE
Progress Note - Ostomy   Korina Garcia 69 y.o. female MRN: [id number]  Unit/Bed#: [facility] 743-62 Encounter: [id number]      Assessment:   Patient admitted due to shock. History of adenocarcinoma of colon, diabetes, GERD, HTN. Wound care nurse consulted for help with ostomy pouching due to leaking. Patient agreeable to assessment, alert and oriented x4, incontinent of bladder, pure-wick in place for urine management, ostomy pouch appliance leaking at 2-3 o'clock during assessment, is OOB to chair, assist of 2 to turn for assessment, on EHOB waffle cushion, heels elevated. Primary RN made aware of assessment. 1. Bilateral sacrum, buttock, hips, elbows, and heels- skin is dry, intact, blanchable. 2. RUQ ileostomy: Stoma is oval in shape, 3 cm top to bottom and 3.5 cm side to side, budded, beefy red/pink in color. Bolivar-stomal skin is dry and intact with creasing noted toward 1-2 o'clock where pouch was leaking on assessment. Small amount of soft brown stool present in ostomy pouch.  -Pouch change completed with a 1 piece convexity Coloplast pouch, good seal obtained. Educated patient on importance of frequent offloading of pressure via turning, repositioning, and weight-shifting. Verbalized understanding of plan of care. Skin care Plan:  1-Protect sacrum w/Allevyn foam, ritu with P, change q3d and PRN, check skin q-shift. 2-Turn/reposition q2h or when medically stable for pressure re-distribution on skin . 3-Elevate heels to offload pressure]  4-Moisturize skin daily with skin nourishing cream  5-Ehob cushion in chair when out of bed. 6-Hydraguard to bilateral heels BID and PRN. Ostomy Care: (Use 1 piece Coloplast Convexity pouch)  1. Peel back pouch using push-pull method, may use non-alcohol adhesive remover(Purple and white package). 2. Use warm water only to cleanse skin around the stoma (bolivar-stomal skin). 3. Make sure all adhesive residue is removed and skin is dry and not oily.   4. Measure stoma size using measuring guide and trace correct measurements onto back of pouch   5. Then cut backing of pouch out to correct shape/size. 6. Place pouch over stoma and onto skin. 7. Use warmth of hand to apply gentle pressure to help backing of pouch to adhere well to skin. 8. Empty pouch when 1/3 full. 9. Change pouch every 3-4 days or sooner if signs of leaking. Wound 07/07/23 Pressure Injury Sacrum (Active)   Wound Image   07/13/23 1509   Wound Description Dry; Intact 07/13/23 1509        Isis-wound Assessment Dry; Intact 07/13/23 1509   Wound Length (cm) 0 cm 07/13/23 1509   Wound Width (cm) 0 cm 07/13/23 1509   Wound Depth (cm) 0 cm 07/13/23 1509   Wound Surface Area (cm^2) 0 cm^2 07/13/23 1509   Wound Volume (cm^3) 0 cm^3 07/13/23 1509   Calculated Wound Volume (cm^3) 0 cm^3 07/13/23 1509     Call or tigertext with any questions  Wound and Ostomy Care will continue to follow    Brett Swift BSN RN CWON  Wound and Ostomy care

## 2023-07-13 NOTE — PLAN OF CARE
Problem: MOBILITY - ADULT  Goal: Maintain or return to baseline ADL function  Description: INTERVENTIONS:  -  Assess patient's ability to carry out ADLs; assess patient's baseline for ADL function and identify physical deficits which impact ability to perform ADLs (bathing, care of mouth/teeth, toileting, grooming, dressing, etc.)  - Assess/evaluate cause of self-care deficits   - Assess range of motion  - Assess patient's mobility; develop plan if impaired  - Assess patient's need for assistive devices and provide as appropriate  - Encourage maximum independence but intervene and supervise when necessary  - Involve family in performance of ADLs  - Assess for home care needs following discharge   - Consider OT consult to assist with ADL evaluation and planning for discharge  - Provide patient education as appropriate  Outcome: Progressing  Goal: Maintains/Returns to pre admission functional level  Description: INTERVENTIONS:  - Perform BMAT or MOVE assessment daily.   - Set and communicate daily mobility goal to care team and patient/family/caregiver. - Collaborate with rehabilitation services on mobility goals if consulted  - Perform Range of Motion 3 times a day. - Reposition patient every 2 hours.   - Dangle patient 3 times a day  - Stand patient 3 times a day  - Ambulate patient 3 times a day  - Out of bed to chair 3 times a day   - Out of bed for meals 3 times a day  - Out of bed for toileting  - Record patient progress and toleration of activity level   Outcome: Progressing     Problem: INFECTION - ADULT  Goal: Absence or prevention of progression during hospitalization  Description: INTERVENTIONS:  - Assess and monitor for signs and symptoms of infection  - Monitor lab/diagnostic results  - Monitor all insertion sites, i.e. indwelling lines, tubes, and drains  - Monitor endotracheal if appropriate and nasal secretions for changes in amount and color  - [place] appropriate cooling/warming therapies per order  - Administer medications as ordered  - Instruct and encourage patient and family to use good hand hygiene technique  - Identify and instruct in appropriate isolation precautions for identified infection/condition  Outcome: Progressing  Goal: Absence of fever/infection during neutropenic period  Description: INTERVENTIONS:  - Monitor WBC    Outcome: Progressing     Problem: DISCHARGE PLANNING  Goal: Discharge to home or other facility with appropriate resources  Description: INTERVENTIONS:  - Identify barriers to discharge w/patient and caregiver  - Arrange for needed discharge resources and transportation as appropriate  - Identify discharge learning needs (meds, wound care, etc.)  - Arrange for interpretive services to assist at discharge as needed  - Refer to Case Management Department for coordinating discharge planning if the patient needs post-hospital services based on physician/advanced practitioner order or complex needs related to functional status, cognitive ability, or social support system  Outcome: Progressing

## 2023-07-13 NOTE — DISCHARGE INSTR - OTHER ORDERS
Skin care Plan:  1-Protect sacrum w/Allevyn foam, ritu with P, change q3d and PRN, check skin q-shift. 2-Turn/reposition q2h or when medically stable for pressure re-distribution on skin . 3-Elevate heels to offload pressure. 4-Moisturize skin daily with skin nourishing cream  5-Ehob cushion in chair when out of bed. 6-Hydraguard to bilateral heels BID and PRN. Ostomy Care: ( Use 1 piece Coloplast Sensura Fady Convexity pouch; would benefit from Sensura Fady belt attachment)  1. Peel back pouch using push-pull method, may use non-alcohol adhesive remover(Purple and white package). 2. Use warm water only to cleanse skin around the stoma (bolivar-stomal skin). 3. Make sure all adhesive residue is removed and skin is dry and not oily. 4. Measure stoma size using measuring guide and trace correct measurements onto back of pouch   5. Then cut backing of pouch out to correct shape/size. 6. Place pouch over stoma and onto skin. 7. Use warmth of hand to apply gentle pressure to help backing of pouch to adhere well to skin. 8. Empty pouch when 1/3 full. 9. Change pouch every 3-4 days or sooner if signs of leaking.

## 2023-07-13 NOTE — PLAN OF CARE
Problem: MOBILITY - ADULT  Goal: Maintain or return to baseline ADL function  Description: INTERVENTIONS:  -  Assess patient's ability to carry out ADLs; assess patient's baseline for ADL function and identify physical deficits which impact ability to perform ADLs (bathing, care of mouth/teeth, toileting, grooming, dressing, etc.)  - Assess/evaluate cause of self-care deficits   - Assess range of motion  - Assess patient's mobility; develop plan if impaired  - Assess patient's need for assistive devices and provide as appropriate  - Encourage maximum independence but intervene and supervise when necessary  - Involve family in performance of ADLs  - Assess for home care needs following discharge   - Consider OT consult to assist with ADL evaluation and planning for discharge  - Provide patient education as appropriate  Outcome: Progressing  Goal: Maintains/Returns to pre admission functional level  Description: INTERVENTIONS:  - Perform BMAT or MOVE assessment daily.   - Set and communicate daily mobility goal to care team and patient/family/caregiver. - Collaborate with rehabilitation services on mobility goals if consulted  - Perform Range of Motion  times a day. - Reposition patient every  hours.   - Dangle patient  times a day  - Stand patient  times a day  - Ambulate patient  times a day  - Out of bed to chair  times a day   - Out of bed for meals  times a day  - Out of bed for toileting  - Record patient progress and toleration of activity level   Outcome: Progressing     Problem: PAIN - ADULT  Goal: Verbalizes/displays adequate comfort level or baseline comfort level  Description: Interventions:  - Encourage patient to monitor pain and request assistance  - Assess pain using appropriate pain scale  - Administer analgesics based on type and severity of pain and evaluate response  - Implement non-pharmacological measures as appropriate and evaluate response  - Consider cultural and social influences on pain and pain management  - Notify physician/advanced practitioner if interventions unsuccessful or patient reports new pain  Outcome: Progressing     Problem: INFECTION - ADULT  Goal: Absence or prevention of progression during hospitalization  Description: INTERVENTIONS:  - Assess and monitor for signs and symptoms of infection  - Monitor lab/diagnostic results  - Monitor all insertion sites, i.e. indwelling lines, tubes, and drains  - Monitor endotracheal if appropriate and nasal secretions for changes in amount and color  - [place] appropriate cooling/warming therapies per order  - Administer medications as ordered  - Instruct and encourage patient and family to use good hand hygiene technique  - Identify and instruct in appropriate isolation precautions for identified infection/condition  Outcome: Progressing  Goal: Absence of fever/infection during neutropenic period  Description: INTERVENTIONS:  - Monitor WBC    Outcome: Progressing     Problem: DISCHARGE PLANNING  Goal: Discharge to home or other facility with appropriate resources  Description: INTERVENTIONS:  - Identify barriers to discharge w/patient and caregiver  - Arrange for needed discharge resources and transportation as appropriate  - Identify discharge learning needs (meds, wound care, etc.)  - Arrange for interpretive services to assist at discharge as needed  - Refer to Case Management Department for coordinating discharge planning if the patient needs post-hospital services based on physician/advanced practitioner order or complex needs related to functional status, cognitive ability, or social support system  Outcome: Progressing     Problem: Knowledge Deficit  Goal: Patient/family/caregiver demonstrates understanding of disease process, treatment plan, medications, and discharge instructions  Description: Complete learning assessment and assess knowledge base.   Interventions:  - Provide teaching at level of understanding  - Provide teaching via preferred learning methods  Outcome: Progressing     Problem: CARDIOVASCULAR - ADULT  Goal: Maintains optimal cardiac output and hemodynamic stability  Description: INTERVENTIONS:  - Monitor I/O, vital signs and rhythm  - Monitor for S/S and trends of decreased cardiac output  - Administer and titrate ordered vasoactive medications to optimize hemodynamic stability  - Assess quality of pulses, skin color and temperature  - Assess for signs of decreased coronary artery perfusion  - Instruct patient to report change in severity of symptoms  Outcome: Progressing  Goal: Absence of cardiac dysrhythmias or at baseline rhythm  Description: INTERVENTIONS:  - Continuous cardiac monitoring, vital signs, obtain 12 lead EKG if ordered  - Administer antiarrhythmic and heart rate control medications as ordered  - Monitor electrolytes and administer replacement therapy as ordered  Outcome: Progressing     Problem: SKIN/TISSUE INTEGRITY - ADULT  Goal: Skin Integrity remains intact(Skin Breakdown Prevention)  Description: Assess:  -Perform Jose assessment every   -Clean and moisturize skin every   -Inspect skin when repositioning, toileting, and assisting with ADLS  -Assess under medical devices such as  every   -Assess extremities for adequate circulation and sensation     Bed Management:  -Have minimal linens on bed & keep smooth, unwrinkled  -Change linens as needed when moist or perspiring  -Avoid sitting or lying in one position for more than  hours while in bed  -Keep HOB at degrees     Toileting:  -Offer bedside commode  -Assess for incontinence every   -Use incontinent care products after each incontinent episode such as     Activity:  -Mobilize patient  times a day  -Encourage activity and walks on unit  -Encourage or provide ROM exercises   -Turn and reposition patient every  Hours  -Use appropriate equipment to lift or move patient in bed  -Instruct/ Assist with weight shifting every  when out of bed in chair  -Consider limitation of chair time  hour intervals    Skin Care:  -Avoid use of baby powder, tape, friction and shearing, hot water or constrictive clothing  -Relieve pressure over bony prominences using   -Do not massage red bony areas    Next Steps:  -Teach patient strategies to minimize risks such as    -Consider consults to  interdisciplinary teams such as   Outcome: Progressing  Goal: Incision(s), wounds(s) or drain site(s) healing without S/S of infection  Description: INTERVENTIONS  - Assess and document dressing, incision, wound bed, drain sites and surrounding tissue  - Provide patient and family education  - Perform skin care/dressing changes every   Outcome: Progressing  Goal: Pressure injury heals and does not worsen  Description: Interventions:  - Implement low air loss mattress or specialty surface (Criteria met)  - Apply silicone foam dressing  - Instruct/assist with weight shifting every  minutes when in chair   - Limit chair time to  hour intervals  - Use special pressure reducing interventions such as  when in chair   - Apply fecal or urinary incontinence containment device   - Perform passive or active ROM every   - Turn and reposition patient & offload bony prominences every  hours   - Utilize friction reducing device or surface for transfers   - Consider consults to  interdisciplinary teams such as   - Use incontinent care products after each incontinent episode such   - Consider nutrition services referral as needed  Outcome: Progressing     Problem: Prexisting or High Potential for Compromised Skin Integrity  Goal: Skin integrity is maintained or improved  Description: INTERVENTIONS:  - Identify patients at risk for skin breakdown  - Assess and monitor skin integrity  - Assess and monitor nutrition and hydration status  - Monitor labs   - Assess for incontinence   - Turn and reposition patient  - Assist with mobility/ambulation  - Relieve pressure over bony prominences  - Avoid friction and shearing  - Provide appropriate hygiene as needed including keeping skin clean and dry  - Evaluate need for skin moisturizer/barrier cream  - Collaborate with interdisciplinary team   - Patient/family teaching  - Consider wound care consult   Outcome: Progressing     Problem: Nutrition/Hydration-ADULT  Goal: Nutrient/Hydration intake appropriate for improving, restoring or maintaining nutritional needs  Description: Monitor and assess patient's nutrition/hydration status for malnutrition. Collaborate with interdisciplinary team and initiate plan and interventions as ordered. Monitor patient's weight and dietary intake as ordered or per policy. Utilize nutrition screening tool and intervene as necessary. Determine patient's food preferences and provide high-protein, high-caloric foods as appropriate.      INTERVENTIONS:  - Monitor oral intake, urinary output, labs, and treatment plans  - Assess nutrition and hydration status and recommend course of action  - Evaluate amount of meals eaten  - Assist patient with eating if necessary   - Allow adequate time for meals  - Recommend/ encourage appropriate diets, oral nutritional supplements, and vitamin/mineral supplements  - Order, calculate, and assess calorie counts as needed  - Recommend, monitor, and adjust tube feedings and TPN/PPN based on assessed needs  - Assess need for intravenous fluids  - Provide specific nutrition/hydration education as appropriate  - Include patient/family/caregiver in decisions related to nutrition  Outcome: Progressing

## 2023-07-13 NOTE — CASE MANAGEMENT
Case Management Discharge Planning Note    Patient name Ama Johnson  Location [facility] 356-48 MRN [id number]  : 1945 Date 2023       Current Admission Date: 2023  Current Admission Diagnosis:Shock [facility])   Patient Active Problem List    Diagnosis Date Noted   • Pulmonary nodule 07/10/2023   • Shock ([address]) 2023   • Port-A-Cath in place 2023   • Chronic wound infection of abdomen 2023   • Metastasis to liver ([address]) 2023   • Adenocarcinoma of colon ([address]) 2023   • S/P ileostomy ([address]) 2023   • Incarcerated ventral hernia 2023   • Moderate protein-calorie malnutrition ([address]) 02/15/2023   • Lower abdominal pain 2023   • Perforated bowel ([address]) 2023   • Strangulated ventral hernia 2023   • GERD (gastroesophageal reflux disease) 2023   • History of right breast cancer 2023   • History of right mastectomy 2023   • Iron deficiency anemia, unspecified 2022   • History of DVT (deep vein thrombosis) 2022   • Thyroid disorder 2022   • Osteoporosis due to aromatase inhibitor 2022   • History of diabetes mellitus 2022   • Anxiety about health 2021   • Diabetic ulcer of toe of left foot associated with type 2 diabetes mellitus, limited to breakdown of skin ([address]) 2021   • Abnormal tumor markers 2020   • Essential hypertension 10/20/2020   • D-dimer, elevated 2019   • Type 2 diabetes mellitus without complications ([address])    • Other osteoporosis without current pathological fracture 2018   • 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 2018   • Personal history of other malignant neoplasm of large intestine 2018   • Malignant neoplasm of right breast in female, estrogen receptor positive ([address]) 2018   • Colon cancer ([address]) 2018   • Chronic deep vein thrombosis (DVT) of distal vein of left lower extremity ([address]) 2018   • Thyroid nodule 2018      LOS (days): 6  Geometric Mean LOS (GMLOS) (days): 4.90  Days to GMLOS:-0.9     OBJECTIVE:  Risk of Unplanned Readmission Score: 25.92         Current admission status: Inpatient   Preferred Pharmacy:   [address] [facility] [place] [address] - [address] [facility] [address] [facility] [address]  Phone: [phone] Fax: [phone]    [address]  [address]  [address] [facility] [address]  Phone: [phone] Fax: [phone]    Primary Care Provider: Jamey Brown DO    Primary Insurance: [facility]  Secondary Insurance:     DISCHARGE DETAILS:                      Treatment Team Recommendation: Facility Return  Discharge Destination Plan[de-identified] Facility Return  Transport at Discharge : [facility]  Dispatcher Contacted: Yes  Number/Name of Dispatcher: [phone]  Transported by [employer] and Unit #): [place], [phone]  ETA of Transport (Date): 07/13/23  ETA of Transport (Time): 1600              IMM Given (Date):: 07/13/23  IMM Given to[de-identified] Patient     Additional Comments: Patient cleared for d/c today back to [facility]. Transportation arranged and confirmed via [address] [place] [address] at 1600. Patient, provider, nursing, and facility aware.     Accepting Facility Name, [address] [place] [address] Street : [facility], [facility]  Receiving Facility/Agency Phone Number: [phone]  Facility/Agency Fax Number: [phone]

## 2023-07-13 NOTE — ASSESSMENT & PLAN NOTE
· History of colon cancer noted   · Monitor output  · On Imodium and Metamucil, output improved since presentation.

## 2023-07-13 NOTE — SOCIAL WORK
LCSW had the pleasure of meeting with patient to continue to provide emotional support and guidance. Patient continues to request to stay hospitalized and to begin treatments. Oncology was able to move her appointment up to 7/21 at 9:40 to discuss potential treatment options.   LCSW will join patient for this appointment     I have spent 30 minutes with Patient  today in which greater than 50% of this time was spent in counseling/coordination of care     Palliative  will follow-up as requested by patient, family, and primary team.  Please contact with any specific requests 2017

## 2023-07-13 NOTE — ASSESSMENT & PLAN NOTE
· Follows with Heme/Onc, Dr. Janelle Mir, per patient  · Palliative Care following here  · Xeloda on hold, resume on discharge.

## 2023-07-14 ENCOUNTER — TELEPHONE (OUTPATIENT)
Dept: HEMATOLOGY ONCOLOGY | Facility: CLINIC | Age: 78
End: 2023-07-14

## 2023-07-14 ENCOUNTER — TRANSITIONAL CARE MANAGEMENT (OUTPATIENT)
Dept: FAMILY MEDICINE CLINIC | Facility: CLINIC | Age: 78
End: 2023-07-14

## 2023-07-14 NOTE — TELEPHONE ENCOUNTER
Spoke with Alex Marin at [facility]. Explained Xeloda should be held until pt sees Dr. Matthias Castellon 7/21. Fax also sent per her request [phone].

## 2023-07-17 ENCOUNTER — TELEPHONE (OUTPATIENT)
Dept: HEMATOLOGY ONCOLOGY | Facility: CLINIC | Age: 78
End: 2023-07-17

## 2023-07-17 ENCOUNTER — TELEPHONE (OUTPATIENT)
Dept: PALLIATIVE MEDICINE | Facility: CLINIC | Age: 78
End: 2023-07-17

## 2023-07-17 ENCOUNTER — HOSPITAL ENCOUNTER (OUTPATIENT)
Dept: RADIOLOGY | Facility: HOSPITAL | Age: 78
Discharge: HOME | End: 2023-07-17
Attending: INTERNAL MEDICINE
Payer: COMMERCIAL

## 2023-07-17 DIAGNOSIS — C18.2 MALIGNANT NEOPLASM OF ASCENDING COLON (HCC): ICD-10-CM

## 2023-07-17 DIAGNOSIS — R97.8 ABNORMAL TUMOR MARKERS: ICD-10-CM

## 2023-07-17 DIAGNOSIS — C78.7 METASTASIS TO LIVER (HCC): ICD-10-CM

## 2023-07-17 PROCEDURE — 74181 MRI ABDOMEN W/O CONTRAST: CPT

## 2023-07-17 PROCEDURE — G1004 CDSM NDSC: HCPCS

## 2023-07-19 ENCOUNTER — HOSPITAL ENCOUNTER (OUTPATIENT)
Dept: INFUSION CENTER | Facility: HOSPITAL | Age: 78
Discharge: HOME | End: 2023-07-19
Payer: COMMERCIAL

## 2023-07-19 DIAGNOSIS — C78.7 METASTASIS TO LIVER (HCC): ICD-10-CM

## 2023-07-19 DIAGNOSIS — C18.9 ADENOCARCINOMA OF COLON (HCC): Primary | ICD-10-CM

## 2023-07-19 DIAGNOSIS — M81.8 OTHER OSTEOPOROSIS WITHOUT CURRENT PATHOLOGICAL FRACTURE: ICD-10-CM

## 2023-07-19 DIAGNOSIS — C50.911 MALIGNANT NEOPLASM OF RIGHT BREAST IN FEMALE, ESTROGEN RECEPTOR POSITIVE, UNSPECIFIED SITE OF BREAST (HCC): ICD-10-CM

## 2023-07-19 DIAGNOSIS — C18.9 MALIGNANT NEOPLASM OF COLON, UNSPECIFIED PART OF COLON (HCC): ICD-10-CM

## 2023-07-19 DIAGNOSIS — Z17.0 MALIGNANT NEOPLASM OF RIGHT BREAST IN FEMALE, ESTROGEN RECEPTOR POSITIVE, UNSPECIFIED SITE OF BREAST (HCC): ICD-10-CM

## 2023-07-19 DIAGNOSIS — Z95.828 PORT-A-CATH IN PLACE: ICD-10-CM

## 2023-07-19 DIAGNOSIS — Z85.038 PERSONAL HISTORY OF OTHER MALIGNANT NEOPLASM OF LARGE INTESTINE: ICD-10-CM

## 2023-07-19 DIAGNOSIS — M85.80 OSTEOPENIA DUE TO CANCER THERAPY: ICD-10-CM

## 2023-07-19 LAB
ALBUMIN SERPL BCP-MCNC: 4 G/DL (ref 3.5–5)
ALP SERPL-CCNC: 89 U/L (ref 46–116)
ALT SERPL W P-5'-P-CCNC: 18 U/L (ref 12–78)
ANION GAP SERPL CALCULATED.3IONS-SCNC: 7 MMOL/L
AST SERPL W P-5'-P-CCNC: 14 U/L (ref 5–45)
BASOPHILS # BLD AUTO: 0.04 THOUSANDS/ÂΜL (ref 0–0.1)
BASOPHILS NFR BLD AUTO: 0 % (ref 0–1)
BILIRUB SERPL-MCNC: 0.41 MG/DL (ref 0.2–1)
BUN SERPL-MCNC: 21 MG/DL (ref 5–25)
CALCIUM SERPL-MCNC: 9.7 MG/DL (ref 8.3–10.1)
CHLORIDE SERPL-SCNC: 101 MMOL/L (ref 96–108)
CO2 SERPL-SCNC: 24 MMOL/L (ref 21–32)
CREAT SERPL-MCNC: 0.77 MG/DL (ref 0.6–1.3)
EOSINOPHIL # BLD AUTO: 0.19 THOUSAND/ÂΜL (ref 0–0.61)
EOSINOPHIL NFR BLD AUTO: 2 % (ref 0–6)
ERYTHROCYTE [DISTWIDTH] IN BLOOD BY AUTOMATED COUNT: 15.9 % (ref 11.6–15.1)
GFR SERPL CREATININE-BSD FRML MDRD: 74 ML/MIN/1.73SQ M
GLUCOSE SERPL-MCNC: 176 MG/DL (ref 65–140)
HCT VFR BLD AUTO: 30.5 % (ref 34.8–46.1)
HGB BLD-MCNC: 10.1 G/DL (ref 11.5–15.4)
IMM GRANULOCYTES # BLD AUTO: 0.11 THOUSAND/UL (ref 0–0.2)
IMM GRANULOCYTES NFR BLD AUTO: 1 % (ref 0–2)
LYMPHOCYTES # BLD AUTO: 1.03 THOUSANDS/ÂΜL (ref 0.6–4.47)
LYMPHOCYTES NFR BLD AUTO: 10 % (ref 14–44)
MAGNESIUM SERPL-MCNC: 2.2 MG/DL (ref 1.6–2.6)
MCH RBC QN AUTO: 33.7 PG (ref 26.8–34.3)
MCHC RBC AUTO-ENTMCNC: 33.1 G/DL (ref 31.4–37.4)
MCV RBC AUTO: 102 FL (ref 82–98)
MONOCYTES # BLD AUTO: 1.18 THOUSAND/ÂΜL (ref 0.17–1.22)
MONOCYTES NFR BLD AUTO: 11 % (ref 4–12)
NEUTROPHILS # BLD AUTO: 8.04 THOUSANDS/ÂΜL (ref 1.85–7.62)
NEUTS SEG NFR BLD AUTO: 76 % (ref 43–75)
NRBC BLD AUTO-RTO: 0 /100 WBCS
PLATELET # BLD AUTO: 287 THOUSANDS/UL (ref 149–390)
PMV BLD AUTO: 8.8 FL (ref 8.9–12.7)
POTASSIUM SERPL-SCNC: 4.4 MMOL/L (ref 3.5–5.3)
PROT SERPL-MCNC: 7.8 G/DL (ref 6.4–8.4)
RBC # BLD AUTO: 3 MILLION/UL (ref 3.81–5.12)
SODIUM SERPL-SCNC: 132 MMOL/L (ref 135–147)
WBC # BLD AUTO: 10.59 THOUSAND/UL (ref 4.31–10.16)

## 2023-07-19 PROCEDURE — 83735 ASSAY OF MAGNESIUM: CPT | Performed by: INTERNAL MEDICINE

## 2023-07-19 PROCEDURE — 85025 COMPLETE CBC W/AUTO DIFF WBC: CPT | Performed by: INTERNAL MEDICINE

## 2023-07-19 PROCEDURE — 80053 COMPREHEN METABOLIC PANEL: CPT | Performed by: INTERNAL MEDICINE

## 2023-07-20 NOTE — TELEPHONE ENCOUNTER
SUPA   I seen patient has a scheduled appt with our  for tomorrow 7/21 and she wasn't scheduled with a provider. I added her for tomorrows schedule with a fellow. I called patient it just rang. I called her  left a brief message stating that she was on our hfu schedule and being that she was scheduled with sw for tomorrow I had added her to see a provider awaiting call back.

## 2023-07-21 ENCOUNTER — TELEPHONE (OUTPATIENT)
Dept: HEMATOLOGY ONCOLOGY | Facility: CLINIC | Age: 78
End: 2023-07-21

## 2023-07-21 ENCOUNTER — HOSPITAL ENCOUNTER (OUTPATIENT)
Dept: INFUSION CENTER | Facility: HOSPITAL | Age: 78
End: 2023-07-21
Attending: INTERNAL MEDICINE
Payer: COMMERCIAL

## 2023-07-21 ENCOUNTER — DOCUMENTATION (OUTPATIENT)
Dept: HEMATOLOGY ONCOLOGY | Facility: CLINIC | Age: 78
End: 2023-07-21

## 2023-07-21 ENCOUNTER — OFFICE VISIT (OUTPATIENT)
Dept: HEMATOLOGY ONCOLOGY | Facility: CLINIC | Age: 78
End: 2023-07-21
Payer: COMMERCIAL

## 2023-07-21 ENCOUNTER — SOCIAL WORK (OUTPATIENT)
Dept: PALLIATIVE MEDICINE | Facility: CLINIC | Age: 78
End: 2023-07-21

## 2023-07-21 VITALS
WEIGHT: 126 LBS | HEART RATE: 106 BPM | OXYGEN SATURATION: 97 % | TEMPERATURE: 98 F | DIASTOLIC BLOOD PRESSURE: 80 MMHG | SYSTOLIC BLOOD PRESSURE: 96 MMHG | RESPIRATION RATE: 17 BRPM | HEIGHT: 60 IN | BODY MASS INDEX: 24.74 KG/M2

## 2023-07-21 VITALS — HEIGHT: 60 IN | WEIGHT: 135.36 LBS | BODY MASS INDEX: 26.58 KG/M2

## 2023-07-21 DIAGNOSIS — C18.9 ADENOCARCINOMA OF COLON (HCC): ICD-10-CM

## 2023-07-21 DIAGNOSIS — C78.7 METASTASIS TO LIVER (HCC): ICD-10-CM

## 2023-07-21 DIAGNOSIS — E04.1 THYROID NODULE: ICD-10-CM

## 2023-07-21 DIAGNOSIS — C18.9 ADENOCARCINOMA OF COLON (HCC): Primary | ICD-10-CM

## 2023-07-21 DIAGNOSIS — D50.9 IRON DEFICIENCY ANEMIA, UNSPECIFIED IRON DEFICIENCY ANEMIA TYPE: ICD-10-CM

## 2023-07-21 DIAGNOSIS — Z86.39 HISTORY OF DIABETES MELLITUS: ICD-10-CM

## 2023-07-21 DIAGNOSIS — C78.7 METASTASIS TO LIVER (HCC): Primary | ICD-10-CM

## 2023-07-21 DIAGNOSIS — C18.2 MALIGNANT NEOPLASM OF ASCENDING COLON (HCC): Primary | ICD-10-CM

## 2023-07-21 DIAGNOSIS — E83.42 HYPOMAGNESEMIA: ICD-10-CM

## 2023-07-21 DIAGNOSIS — C50.911 MALIGNANT NEOPLASM OF RIGHT BREAST IN FEMALE, ESTROGEN RECEPTOR POSITIVE, UNSPECIFIED SITE OF BREAST (HCC): ICD-10-CM

## 2023-07-21 DIAGNOSIS — M81.8 OSTEOPOROSIS DUE TO AROMATASE INHIBITOR: ICD-10-CM

## 2023-07-21 DIAGNOSIS — D64.9 ANEMIA, UNSPECIFIED TYPE: ICD-10-CM

## 2023-07-21 DIAGNOSIS — D50.9 IRON DEFICIENCY ANEMIA, UNSPECIFIED IRON DEFICIENCY ANEMIA TYPE: Primary | ICD-10-CM

## 2023-07-21 DIAGNOSIS — T38.6X5A OSTEOPOROSIS DUE TO AROMATASE INHIBITOR: ICD-10-CM

## 2023-07-21 DIAGNOSIS — R97.8 ABNORMAL TUMOR MARKERS: ICD-10-CM

## 2023-07-21 DIAGNOSIS — M85.80 OSTEOPENIA DUE TO CANCER THERAPY: ICD-10-CM

## 2023-07-21 DIAGNOSIS — Z17.0 MALIGNANT NEOPLASM OF RIGHT BREAST IN FEMALE, ESTROGEN RECEPTOR POSITIVE, UNSPECIFIED SITE OF BREAST (HCC): ICD-10-CM

## 2023-07-21 DIAGNOSIS — Z86.718 HISTORY OF DVT (DEEP VEIN THROMBOSIS): ICD-10-CM

## 2023-07-21 PROCEDURE — 96413 CHEMO IV INFUSION 1 HR: CPT

## 2023-07-21 PROCEDURE — 96367 TX/PROPH/DG ADDL SEQ IV INF: CPT

## 2023-07-21 PROCEDURE — 96372 THER/PROPH/DIAG INJ SC/IM: CPT

## 2023-07-21 PROCEDURE — 99215 OFFICE O/P EST HI 40 MIN: CPT | Performed by: INTERNAL MEDICINE

## 2023-07-21 RX ORDER — MAGNESIUM SULFATE HEPTAHYDRATE 40 MG/ML
2 INJECTION, SOLUTION INTRAVENOUS ONCE AS NEEDED
Status: CANCELLED | OUTPATIENT
Start: 2023-07-21

## 2023-07-21 RX ORDER — MAGNESIUM SULFATE HEPTAHYDRATE 40 MG/ML
4 INJECTION, SOLUTION INTRAVENOUS ONCE AS NEEDED
Status: CANCELLED | OUTPATIENT
Start: 2023-07-21

## 2023-07-21 RX ORDER — SODIUM CHLORIDE 9 MG/ML
20 INJECTION, SOLUTION INTRAVENOUS ONCE
Status: COMPLETED | OUTPATIENT
Start: 2023-07-21 | End: 2023-07-21

## 2023-07-21 RX ORDER — SODIUM CHLORIDE 9 MG/ML
20 INJECTION, SOLUTION INTRAVENOUS ONCE
Status: CANCELLED | OUTPATIENT
Start: 2023-07-21

## 2023-07-21 RX ADMIN — SODIUM CHLORIDE 20 ML/HR: 0.9 INJECTION, SOLUTION INTRAVENOUS at 13:08

## 2023-07-21 RX ADMIN — DIPHENHYDRAMINE HYDROCHLORIDE 25 MG: 50 INJECTION, SOLUTION INTRAMUSCULAR; INTRAVENOUS at 12:26

## 2023-07-21 RX ADMIN — DENOSUMAB 60 MG: 60 INJECTION SUBCUTANEOUS at 14:17

## 2023-07-21 RX ADMIN — PANITUMUMAB 354 MG: 400 SOLUTION INTRAVENOUS at 13:08

## 2023-07-21 NOTE — PROGRESS NOTES
Pt here for prolia and vectibix tolerated well no adverse reactions AVS provided and pt left via WC to [facility].

## 2023-07-21 NOTE — TELEPHONE ENCOUNTER
Follow-up disposition: Return in about 4 weeks (around 8/18/2023). Check out comments: Blood work every 2 weeks, 1 or 2 days prior to getting Vectibix at the infusion center.  Follow-up in 4 weeks. Please schedule treatment and f/u appt. For patient. Thank you!

## 2023-07-21 NOTE — PROGRESS NOTES
YOSIW had the pleasure of joining patient at her Oncology appointment. Patient continues to be optimistic regarding her ability to continue her infusion chemotherapy. Patient is understanding that if she has symptoms related to her treatments or becomes hospitalized that Palliative Medicine should be consulted appropriately. Patient is grateful for the close monitoring by Palliative. She would like to continue outpatient. Benefits from emotional support and continuity. Patient has her second chemotherapy infusion this afternoon.        I have spent 90 minutes with Patient  today in which greater than 50% of this time was spent in counseling/coordination of care     Palliative  will follow-up as requested by patient, family, and primary team.  Please contact with any specific requests

## 2023-07-21 NOTE — PLAN OF CARE
Problem: Potential for Falls  Goal: Patient will remain free of falls  Description: INTERVENTIONS:  - Educate patient/family on patient safety including physical limitations  - Instruct patient to call for assistance with activity   - Consult OT/PT to assist with strengthening/mobility   - Keep Call bell within reach  - Keep bed low and locked with side rails adjusted as appropriate  - Keep care items and personal belongings within reach  - Initiate and maintain comfort rounds  - Make Fall Risk Sign visible to staff  - Apply yellow socks and bracelet for high fall risk patients  - Consider moving patient to room near nurses station  7/21/2023 1218 by Lisa Sharpe RN  Outcome: Progressing

## 2023-07-21 NOTE — PROGRESS NOTES
HPI:.  Patient was in the hospital recently from 7/7/2023 through 7/13/2023. She was in the ICU with septic shock of unclear etiology. Other diagnoses were pulmonary nodule that could be a new pulmonary metastatic disease, ileostomy for recurrent colon cancer, anemia, diabetes mellitus, history of DVT, diabetic ulcer of toe of left foot, osteoporosis and colon cancer. She has recovered from septic shock. She wants to continue on Xeloda, Vectibix and Prolia. Palliative care team is also here in the room with me. Reviewed hospital records. Xeloda and Vectibix were started recently towards the end of June 2023. But now patient wants to continue with these medications. She understands advanced age disease and prognosis is not good. 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 Also history of breast cancer, DVT leg and PE. He took a long time to recover from  perforated transverse colon cancer surgery. Abdominal wound has finally healed. Ileostomy is working. Occasionally loose stool in the ileostomy bag. Patient is in a wheelchair. She walks little bit with a walker and assistant at rehabilitation center. 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 She had right mastectomy, and lymph node dissection . There were 9 positive lymph nodes. She had Adriamycin + Cytoxan chemotherapy followed by Taxotere and after that radiation therapy. Since November/December 2012 she has been on Femara and she will take Femara until December 2022. Patient has osteoporosis and she has been on vitamin-D  and Prolia . Has problem swallowing calcium tablet. No problem with her teeth for Prolia. In 2016 she was found to have benign clustered calcifications in left breast and had a biopsy and that was benign.  Dr. Fabi George takes care of her mammography.      In December 2012 patient developed DVT right leg and she was started on Xarelto. She had GI bleeding in 2015. Xarelto was stopped. On colonoscopy she was found to have stage I right colon cancer. On 7/22/15 she underwent right hemicolectomy. Pathological diagnosis right colon cancer, stage I, T2 N0 MX, grade 2, No lymphovascular invasion and no perineural invasion. She did not require adjuvant therapy for colon cancer. On 2/9/2023  patient  presented with incarcerated ventral hernia. Patient underwent an exploratory laparotomy, reduction of ventral hernia, left colectomy, and creation of an end ileostomy. Post-op she was taken to the ICU for close monitoring and resuscitation. On 2/10 she was found to have a PE of JOANNA and RLL arteries and started on a heparin infusion. A PICC line was placed for access to provide IV medications and access for blood draws. She was transferred out of the ICU on 2/11. Antibiotics were continued through 2/19. She was transitioned to Xarelto on 2/17/23.   See path report below  Component    Case Report   Surgical Pathology Report                         Case: [id number]                                    Authorizing Provider: Jacquline Holter, DO         Collected:           02/09/2023 1602               Ordering Location:     [facility]      Received:            02/09/2023 2208                                      Hospital Operating Room                                                       Pathologist:           Kristi Arevalo MD                                                                     Specimens:   A) - Abdominal, Hernia Sac                                                                           B) - Colon, Left Colectomy                                                                 Addendum   RESULTS OF IMMUNOHISTOCHEMICAL ANALYSIS FOR MISMATCH REPAIR PROTEIN LOSS     INTERPRETATION: NO LOSS OF NUCLEAR EXPRESSION OF MMR PROTEINS: LOW PROBABILITY OF MSI-H.     Note: Background non-neoplastic tissue and/or internal control with intact nuclear expression.      RESULTS:  Antibody          Clone               Description                           Results  MLH1               M1                   Mismatch repair protein       Intact nuclear expression  MSH2              [id number]      Mismatch repair protein       Intact nuclear expression  HVB7              58                    Mismatch repair protein       Intact nuclear expression  FCT3              [id number]         Mismatch repair protein       Intact nuclear expression     Comment:   A positive control for each antibody have been reviewed and accepted. [facility] Specimen ID: [id number]. Evaluator: David Lew MD       These tests were developed and their performance characteristics determined by [facility]. Gerardine Kotyk may not be cleared or approved by the U.S. Food and Drug Administration.  The FDA determined that such clearance or approval is not necessary.  These tests are used for clinical purposes. [facility] Kotyk should not be regarded as investigational or for research.  This laboratory has been approved by [place] [id number], designated as a high-complexity laboratory and is qualified to perform these tests.     Patients whose tumors demonstrate lack of expression of one or more DNA mismatch repair proteins might be at risk for Hadley Syndrome. This cancer susceptibility syndrome greatly increases the risk of synchronous and/or metachronous cancers in the affected patients and their family members. Normal expression of all proteins does not completely rule out familial cancer predisposition.     The [facility] Hadley Syndrome Surveillance Program Task Forces recommends that all patients with lack of expression of one or more DNA mismatch repair proteins and those with concerning personal or family history should undergo thorough evaluation, counseling and possibly genetic testing. Addendum electronically signed by Caterina Mckeon MD on 2/17/2023 at  5:17 PM   Final Diagnosis   A.  Hernia sac, hernia repair:  - 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.     B. Segment of colon with portion of ileum, omentum and abdominal wall, left colectomy with en bloc omentectomy and abdominal wall resection:  -   Invasive adenocarcinoma of transverse colon, moderately differentiated, grossly perforated and invades fibroadipose tissue of abdominal wall (pT4b). -   Second focus of invasive adenocarcinoma of transverse colon, moderately differentiated, invades into muscularis propria (pT3). -   Terminal ileum and omentum are uninvolved. -   MMR (MLH1, MSH2, MSH6 and PMS2) studies on B18 by immunohistochemistry are pending.    -   See comment and synoptic report.     Comment:   Immunohistochemical stains on B18 show the tumor cells are positive for CK20, CDX2, SATB2, CK7 (patchy), PAX8 (patchy), DPC4, and are negative for TTF-1, GATA3, ER and synaptophysin.       Select slides (B18, IHC) are reviewed by Dr. Justice Francisco. Bushra Clayton who concurs with the diagnosis.     Diagnosis is communicated via [employer] to Dr. Francisco Javier Moya on 2/15/2023 at 1532.      Interpretation performed at [facility], [address] [place] [address]    Electronically signed by Renée Mayen MD on 2/15/2023 at  4:02 PM   Note         Patient has previous history of right breast cancer. She has history of colon cancer. She has history of DVT leg. I  History of thyroid nodule. She had biopsy of thyroid nodule in June 2014 and she states that was negative for cancer. She gets thyroid ultrasound  for surveillance. .  2/9/2023[de-identified]  EXPLORATORY LAPAROTOMY; ABDOMINAL WALL DEBRIDEMENT; REDUCTION VENTRAL HERNIA; LEFT COLON RESECTION; CREATION ILEOSTOMY; WASHOUT (Abdomen)    Patient is in a nursing home. Surgical wound has healed. She states she is getting stronger. She has generalized weakness and tiredness and shortness of breath. She has ileostomy bag.   She has lost weight              Current Outpatient Medications:   •  acetaminophen (TYLENOL) 325 mg tablet, Take 2 tablets (650 mg total) by mouth every 8 (eight) hours, Disp: , Rfl: 0  •  ascorbic acid (VITAMIN C) 500 MG tablet, Take 1,000 mg by mouth daily , Disp: , Rfl:   •  BANANA FLAKES PO, Take 1 Units by mouth 2 (two) times a day, Disp: , Rfl:   •  bimatoprost (LUMIGAN) 0.01 % ophthalmic drops, Administer 1 drop to both eyes daily at bedtime , Disp: , Rfl:   •  calcitonin, salmon, (MIACALCIN) 200 units/act nasal spray, , Disp: , Rfl:   •  calcium citrate (CALCITRATE) 950 MG tablet, Take 1 tablet by mouth in the morning., Disp: , Rfl:   •  capecitabine (XELODA) 500 MG tablet, Take 3 tablets (1,500 mg total) by mouth 2 (two) times a day 1 week on followed by 1 week off starting 7/7/23, Disp: 84 tablet, Rfl: 11  •  chlorhexidine (PERIDEX) 0.12 % solution, Apply 15 mL to the mouth or throat every 12 (twelve) hours, Disp: 120 mL, Rfl: 0  •  Cholecalciferol (VITAMIN D-3 PO), Take 1 capsule by mouth 3 (three) times a day , Disp: , Rfl:   •  Cyanocobalamin (VITAMIN B 12 PO), Take 1 tablet by mouth daily. , Disp: , Rfl:   •  Diclofenac Sodium (VOLTAREN) 1 %, Apply 2 g topically 4 (four) times a day, Disp: , Rfl:   •  Januvia 25 MG tablet, , Disp: , Rfl:   •  lidocaine (LIDODERM) 5 %, Apply 1 patch topically over 12 hours daily Remove & Discard patch within 12 hours or as directed by MD Do not start before July 14, 2023., Disp: , Rfl: 0  •  loperamide (IMODIUM) 2 mg capsule, Take 1 capsule (2 mg total) by mouth 3 (three) times a day with meals, Disp: 30 capsule, Rfl: 0  •  metFORMIN (GLUCOPHAGE) 500 mg tablet, Take 500 mg by mouth 2 (two) times a day with meals, Disp: , Rfl:   •  miconazole (MICOTIN) 2 % powder, Apply topically in the morning Apply to groin topically and under left breast topically every day and evening shift for red wash with soap and water, pat dry and then apply powder., Disp: , Rfl:   •  midodrine (PROAMATINE) 5 mg tablet, Take 1 tablet (5 mg total) by mouth 3 (three) times a day before meals, Disp: , Rfl: 0  •  pantoprazole (PROTONIX) 40 mg tablet, Take 1 tablet (40 mg total) by mouth daily in the early morning Do not start before February 24, 2023., Disp: , Rfl: 0  •  psyllium (METAMUCIL) packet, Take 1 packet by mouth 2 (two) times a day, Disp: , Rfl: 0  •  rivaroxaban (Xarelto) 10 mg tablet, Take 1 tablet (10 mg total) by mouth daily, Disp: 30 tablet, Rfl: 0  •  vitamin E 100 UNIT capsule, Take 100 Units by mouth daily , Disp: , Rfl:   •  insulin lispro (HumaLOG) 100 units/mL injection, Inject 1-6 Units under the skin 4 (four) times a day (before meals and at bedtime) (Patient not taking: Reported on 7/21/2023), Disp: , Rfl: 0  No current facility-administered medications for this visit. Facility-Administered Medications Ordered in Other Visits:   •  alteplase (CATHFLO) injection 2 mg, 2 mg, Intracatheter, Q1MIN PRN, Cale Alvarez MD    No Known Allergies    Oncology History Overview Note    In 2012 patient was diagnosed to have Hormone receptor positive, HER-2 negative stage IIIB cancer in right breast. She had right mastectomy and lymph node dissection . 9 lymph nodes showed metastatic disease. Patient was given Adriamycin + Cytoxan chemotherapy followed by Taxotere and after that radiation therapy. Since November/December 2012 she has been on Femara. .  She will be on Femara for a total of 10 years. On 7/22/15 she underwent right hemicolectomy. Pathological diagnosis right colon cancer, stage I, T2 N0 MX, grade 2,no lymphovascular invasion and no perineural invasion. She did not require adjuvant therapy for colon cancer. She has been running slightly high CEA and that is being monitored.      Malignant neoplasm of right breast in female, estrogen receptor positive ([address])   6/21/2018 Initial Diagnosis    Malignant neoplasm of right breast in female, estrogen receptor positive [facility])      Surgery     2012- right mastectomy   2015 - right hemicolectomy      Radiation     [id number]: Radiation to right chest wall and lymph nodes      Chemotherapy     2012 -Adriamycin plus Cytoxan followed by Taxotere Followed by Femara. She will have Femara for a total of 10 years. Adenocarcinoma of colon ([address])   3/20/2023 Initial Diagnosis    Adenocarcinoma of colon ([address])     7/7/2023 -  Chemotherapy    alteplase (CATHFLO), 2 mg, Intracatheter, Every 1 Minute as needed, 1 of 6 cycles  panitumumab (VECTIBIX) IVPB, 6 mg/kg = 354 mg, Intravenous, Once, 1 of 6 cycles     Metastasis to liver ([address])   5/18/2023 Initial Diagnosis    Metastasis to liver ([address])     7/7/2023 -  Chemotherapy    alteplase (CATHFLO), 2 mg, Intracatheter, Every 1 Minute as needed, 1 of 6 cycles  panitumumab (VECTIBIX) IVPB, 6 mg/kg = 354 mg, Intravenous, Once, 1 of 6 cycles         ROS:   Reviewed 12 systems: See symptoms in HPI:    Presently no other neurological , cardiac, pulmonary, GI and  symptoms other than listed in HPI. Other symptoms are in HPI. No symptoms like fevers, chills, bleeding, bone pains, skin rash,  night sweats, numbness, claudication . Has  anxiety. No swelling of the ankles and no swollen glands. Not unusually sensitive to heat or cold. BP 96/80 (BP Location: Left arm, Patient Position: Sitting, Cuff Size: Adult)   Pulse (!) 106   Temp 98 °F (36.7 °C)   Resp 17   Ht 5' (1.524 m)   Wt 57.2 kg (126 lb) Comment: per pt  LMP  (LMP Unknown)   SpO2 97%   BMI 24.61 kg/m²     Physical Exam:  Patient is in a wheelchair. Patient is alert and oriented. Patient not in distress. Vital signs are above. No icterus. No oral thrush. No palpable neck mass. Lung fields are clear to percussion and auscultation. Heart rate is regular. Abdomen is soft and nontender. Functioning colostomy. Patient is in wheelchair. There is no edema of the ankles. No calf tenderness. She can move all 4 extremities. Has generalized weakness. There is no skin rash.   Lymph nodes are not palpable in the neck and axillary areas. There is no clubbing. Patient is anxious. Performance status 3 on ECOG scale. No lymphedema. IMAGING:  IMPRESSION:     No nodule meets current ACR criteria for requiring biopsy but followup ultrasound is recommended in 1 year.            Reference: ACR Thyroid Imaging, Reporting and Data System (TI-RADS): White Paper of the [employer] [facility]. J AM Carlo Radiol 7301;91:482-910. (additional recommendations based on American Thyroid Association 2015 guidelines.)        Workstation performed: [id number]      Imaging    US thyroid (Order: [id number]) - 6/17/2020  ASSESSMENT/BI-RADS CATEGORY:  Left: 2 - Benign  Overall: 2 - Benign     RECOMMENDATION:       - Routine screening mammogram in 1 year for the left breast.     Workstation ID: [id number]          Imaging    Mammo screening left w 3d & cad (Order: [id number]) - 10/19/2021    RESULTS:      LUMBAR SPINE L2-L4 (L1 vertebra excluded from analysis due to local structural abnormalities or artifact): BMD  0.774  gm/cm2   T-score -2.8    These values are artifactually elevated due to the presence of scoliosis with spondylosis.     LEFT  TOTAL HIP:   BMD:  0.688  gm/cm2    T-score:  -2.1     LEFT  FEMORAL NECK:   BMD:  0.628  gm/cm2   T score: -2.0      RIGHT  FOREARM:    33% RADIUS BMD:  0.490  gm/cm2  T-score:  -3.3         IMPRESSION:     1. Osteoporosis. [Based on the lumbar spine and right radius]       IMPRESSION:  Enlarged heterogeneous left thyroid lobe and thyroid isthmus. Grossly stable if not smaller peripherally densely calcified left thyroid isthmus nodule, this has been stable for more than 5 years. Asymmetrically bulky left thyroid lobe with prominent interpolar region which I do not believe is a true nodule. Followup ultrasound is recommended in 1 year. Smaller nodules which do not meet current ACR criteria for requiring biopsy.     Reference: ACR Thyroid Imaging, Reporting and Data System (TI-RADS):  White Paper of the ACR TI-RADS Committee. J AM Carlo Radiol 4466;77:610-373. (additional recommendations based on American Thyroid Association 2015 guidelines.)        Workstation performed: [id number]          Imaging    US thyroid (Order: [id number]) - 7/14/  IMPRESSION:     1. No findings for hypermetabolic malignancy. 2.  Diffuse thyroid gland activity suggests thyroiditis.     Workstation performed: [id number]          Imaging    NM PET CT skull base to mid thigh (Order: [id number]) - 7/14/2021    IMPRESSION:     Diffusely heterogeneous thyroid gland, and left isthmus nodule, without significant change. There are no findings requiring fine-needle aspiration           Reference: ACR Thyroid Imaging, Reporting and Data System (TI-RADS): White Paper of the [email or web address]. J AM Carlo Radiol 6926;81:597-425. (additional recommendations based on American Thyroid Association 2015 guidelines.)        Workstation performed: [id number]        Imaging    US thyroid (Order: [id number]) - 7/26/2022  ASSESSMENT/BI-RADS CATEGORY:  Left: 0 - Incomplete: Needs Additional Imaging Evaluation  Overall: 0 - Incomplete: Needs Additional Imaging Evaluation     RECOMMENDATION:       - Diagnostic mammogram at the current time for the left breast.       - Ultrasound at the current time for the left breast.     Workstation ID: [id number]        Imaging    Mammo screening left w 3d & cad (Order: [id number]) - 10/21/2022    IMPRESSION:     3 hepatic lesions as described above suspicious for metastases.   The smaller suspicious lesions seen on the CT are obscured by motion artifact on postcontrast imaging.     The study was marked in [facility] for immediate notification.                 Workstation performed: [id number]        Imaging    MRI abdomen w wo contrast (Order: [id number]) - 2/17/2023    LABS:    Results for orders placed or performed during the hospital encounter of 07/19/23   CBC and differential   Result Value Ref Range    WBC 10.59 (H) 4.31 - 10.16 Thousand/uL    RBC 3.00 (L) 3.81 - 5.12 Million/uL    Hemoglobin 10.1 (L) 11.5 - 15.4 g/dL    Hematocrit 30.5 (L) 34.8 - 46.1 %     (H) 82 - 98 fL    MCH 33.7 26.8 - 34.3 pg    MCHC 33.1 31.4 - 37.4 g/dL    RDW 15.9 (H) 11.6 - 15.1 %    MPV 8.8 (L) 8.9 - 12.7 fL    Platelets 156 687 - 494 Thousands/uL    nRBC 0 /100 WBCs    Neutrophils Relative 76 (H) 43 - 75 %    Immat GRANS % 1 0 - 2 %    Lymphocytes Relative 10 (L) 14 - 44 %    Monocytes Relative 11 4 - 12 %    Eosinophils Relative 2 0 - 6 %    Basophils Relative 0 0 - 1 %    Neutrophils Absolute 8.04 (H) 1.85 - 7.62 Thousands/µL    Immature Grans Absolute 0.11 0.00 - 0.20 Thousand/uL    Lymphocytes Absolute 1.03 0.60 - 4.47 Thousands/µL    Monocytes Absolute 1.18 0.17 - 1.22 Thousand/µL    Eosinophils Absolute 0.19 0.00 - 0.61 Thousand/µL    Basophils Absolute 0.04 0.00 - 0.10 Thousands/µL   Comprehensive metabolic panel   Result Value Ref Range    Sodium 132 (L) 135 - 147 mmol/L    Potassium 4.4 3.5 - 5.3 mmol/L    Chloride 101 96 - 108 mmol/L    CO2 24 21 - 32 mmol/L    ANION GAP 7 mmol/L    BUN 21 5 - 25 mg/dL    Creatinine 0.77 0.60 - 1.30 mg/dL    Glucose 176 (H) 65 - 140 mg/dL    Calcium 9.7 8.3 - 10.1 mg/dL    AST 14 5 - 45 U/L    ALT 18 12 - 78 U/L    Alkaline Phosphatase 89 46 - 116 U/L    Total Protein 7.8 6.4 - 8.4 g/dL    Albumin 4.0 3.5 - 5.0 g/dL    Total Bilirubin 0.41 0.20 - 1.00 mg/dL    eGFR 74 ml/min/1.73sq m   Magnesium   Result Value Ref Range    Magnesium 2.2 1.6 - 2.6 mg/dL     *Note: Due to a large number of results and/or encounters for the requested time period, some results have not been displayed. A complete set of results can be found in Results Review. Labs, Imaging, & Other studies:   All pertinent labs and imaging studies were personally reviewed      . Reviewed test results and discussed with patient. Assessment and plan:  Patient was in the hospital recently from 7/7/2023 through 7/13/2023.   She was in the ICU with septic shock of unclear etiology. Other diagnoses were pulmonary nodule that could be a new pulmonary metastatic disease, ileostomy for recurrent colon cancer, anemia, diabetes mellitus, history of DVT, diabetic ulcer of toe of left foot, osteoporosis and colon cancer. She has recovered from septic shock. She wants to continue on Xeloda, Vectibix and Prolia. Palliative care team is also here in the room with me. Reviewed hospital records. Xeloda and Vectibix were started recently towards the end of June 2023. But now patient wants to continue with these medications. She understands advanced age disease and prognosis is not good. 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 Also history of breast cancer, DVT leg and PE. He took a long time to recover from  perforated transverse colon cancer surgery. Abdominal wound has finally healed. Ileostomy is working. Occasionally loose stool in the ileostomy bag. Patient is in a wheelchair. She walks little bit with a walker and assistant at rehabilitation center. 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 She had right mastectomy, and lymph node dissection . There were 9 positive lymph nodes. She had Adriamycin + Cytoxan chemotherapy followed by Taxotere and after that radiation therapy. Since November/December 2012 she has been on Femara and she will take Femara until December 2022. Patient has osteoporosis and she has been on vitamin-D  and Prolia . Has problem swallowing calcium tablet. No problem with her teeth for Prolia. In 2016 she was found to have benign clustered calcifications in left breast and had a biopsy and that was benign. Dr. Fide Dias takes care of her mammography.      In December 2012 patient developed DVT right leg and she was started on Xarelto. She had GI bleeding in 2015. Xarelto was stopped.  On colonoscopy she was found to have stage I right colon cancer. On 7/22/15 she underwent right hemicolectomy. Pathological diagnosis right colon cancer, stage I, T2 N0 MX, grade 2, No lymphovascular invasion and no perineural invasion. She did not require adjuvant therapy for colon cancer. On 2/9/2023  patient  presented with incarcerated ventral hernia. Patient underwent an exploratory laparotomy, reduction of ventral hernia, left colectomy, and creation of an end ileostomy. Post-op she was taken to the ICU for close monitoring and resuscitation. On 2/10 she was found to have a PE of JOANNA and RLL arteries and started on a heparin infusion. A PICC line was placed for access to provide IV medications and access for blood draws. She was transferred out of the ICU on 2/11. Antibiotics were continued through 2/19. She was transitioned to Xarelto on 2/17/23. See path report below  Component    Case Report   Surgical Pathology Report                         Case: [id number]                                    Authorizing Provider: Irene Danielson DO         Collected:           02/09/2023 1602               Ordering Location:     [facility]      Received:            02/09/2023 [address] [place] [address] Operating Room                                                       Pathologist:           Lori Rinaldi MD                                                                     Specimens:   A) - Abdominal, Hernia Sac                                                                           B) - Colon, Left Colectomy                                                                 Addendum   RESULTS OF IMMUNOHISTOCHEMICAL ANALYSIS FOR MISMATCH REPAIR PROTEIN LOSS     INTERPRETATION: NO LOSS OF NUCLEAR EXPRESSION OF MMR PROTEINS: LOW PROBABILITY OF MSI-H.     Note: Background non-neoplastic tissue and/or internal control with intact nuclear expression.    RESULTS:  Antibody          Clone               Description                           Results  MLH1               M1                   Mismatch repair protein       Intact nuclear expression  MSH2              [id number]      Mismatch repair protein       Intact nuclear expression  NSY4              29                    Mismatch repair protein       Intact nuclear expression  DPT8              [id number]         Mismatch repair protein       Intact nuclear expression     Comment:   A positive control for each antibody have been reviewed and accepted. [facility] Specimen ID: [id number]. Evaluator: Rebekah Sicard, MD       These tests were developed and their performance characteristics determined by [facility]. Larry Estrada may not be cleared or approved by the U.S. Food and Drug Administration.  The FDA determined that such clearance or approval is not necessary.  These tests are used for clinical purposes. Larry [facility]strada should not be regarded as investigational or for research.  This laboratory has been approved by [facility], designated as a high-complexity laboratory and is qualified to perform these tests.     Patients whose tumors demonstrate lack of expression of one or more DNA mismatch repair proteins might be at risk for Hadley Syndrome. This cancer susceptibility syndrome greatly increases the risk of synchronous and/or metachronous cancers in the affected patients and their family members. Normal expression of all proteins does not completely rule out familial cancer predisposition.     The [facility] Hadley Syndrome Surveillance Program Task Forces recommends that all patients with lack of expression of one or more DNA mismatch repair proteins and those with concerning personal or family history should undergo thorough evaluation, counseling and possibly genetic testing. Addendum electronically signed by Shannon García MD on 2/17/2023 at  5:17 PM   Final Diagnosis   A.  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     B. Segment of colon with portion of ileum, omentum and abdominal wall, left colectomy with en bloc omentectomy and abdominal wall resection:  -   Invasive adenocarcinoma of transverse colon, moderately differentiated, grossly perforated and invades fibroadipose tissue of abdominal wall (pT4b). -   Second focus of invasive adenocarcinoma of transverse colon, moderately differentiated, invades into muscularis propria (pT3). -   Terminal ileum and omentum are uninvolved. -   MMR (MLH1, MSH2, MSH6 and PMS2) studies on B18 by immunohistochemistry are pending.    -   See comment and synoptic report.     Comment:   Immunohistochemical stains on B18 show the tumor cells are positive for CK20, CDX2, SATB2, CK7 (patchy), PAX8 (patchy), DPC4, and are negative for TTF-1, GATA3, ER and synaptophysin.       Select slides (B18, IHC) are reviewed by Dr. Francia Wood. Jae Escobar who concurs with the diagnosis.     Diagnosis is communicated via [employer] to Dr. Shanique Vaughn on 2/15/2023 at 1532.      Interpretation performed at [address]    Electronically signed by Ru Blackburn MD on 2/15/2023 at  4:02 PM   Note         Patient has previous history of right breast cancer. She has history of colon cancer. She has history of DVT leg. I  History of thyroid nodule. She had biopsy of thyroid nodule in June 2014 and she states that was negative for cancer. She gets thyroid ultrasound  for surveillance. .  2/9/2023[de-identified]  EXPLORATORY LAPAROTOMY; ABDOMINAL WALL DEBRIDEMENT; REDUCTION VENTRAL HERNIA; LEFT COLON RESECTION; CREATION ILEOSTOMY; WASHOUT (Abdomen)    Patient is in a nursing home. Surgical wound has healed. She states she is getting stronger. She has generalized weakness and tiredness and shortness of breath. She has ileostomy bag.   She has lost weight              Current Outpatient Medications:   •  acetaminophen (TYLENOL) 325 mg tablet, Take 2 tablets (650 mg total) by mouth every 8 (eight) hours, Disp: , Rfl: 0  •  ascorbic acid (VITAMIN C) 500 MG tablet, Take 1,000 mg by mouth daily , Disp: , Rfl:   •  BANANA FLAKES PO, Take 1 Units by mouth 2 (two) times a day, Disp: , Rfl:   •  bimatoprost (LUMIGAN) 0.01 % ophthalmic drops, Administer 1 drop to both eyes daily at bedtime , Disp: , Rfl:   •  calcitonin, salmon, (MIACALCIN) 200 units/act nasal spray, , Disp: , Rfl:   •  calcium citrate (CALCITRATE) 950 MG tablet, Take 1 tablet by mouth in the morning., Disp: , Rfl:   •  capecitabine (XELODA) 500 MG tablet, Take 3 tablets (1,500 mg total) by mouth 2 (two) times a day 1 week on followed by 1 week off starting 7/7/23, Disp: 84 tablet, Rfl: 11  •  chlorhexidine (PERIDEX) 0.12 % solution, Apply 15 mL to the mouth or throat every 12 (twelve) hours, Disp: 120 mL, Rfl: 0  •  Cholecalciferol (VITAMIN D-3 PO), Take 1 capsule by mouth 3 (three) times a day , Disp: , Rfl:   •  Cyanocobalamin (VITAMIN B 12 PO), Take 1 tablet by mouth daily. , Disp: , Rfl:   •  Diclofenac Sodium (VOLTAREN) 1 %, Apply 2 g topically 4 (four) times a day, Disp: , Rfl:   •  Januvia 25 MG tablet, , Disp: , Rfl:   •  lidocaine (LIDODERM) 5 %, Apply 1 patch topically over 12 hours daily Remove & Discard patch within 12 hours or as directed by MD Do not start before July 14, 2023., Disp: , Rfl: 0  •  loperamide (IMODIUM) 2 mg capsule, Take 1 capsule (2 mg total) by mouth 3 (three) times a day with meals, Disp: 30 capsule, Rfl: 0  •  metFORMIN (GLUCOPHAGE) 500 mg tablet, Take 500 mg by mouth 2 (two) times a day with meals, Disp: , Rfl:   •  miconazole (MICOTIN) 2 % powder, Apply topically in the morning Apply to groin topically and under left breast topically every day and evening shift for red wash with soap and water, pat dry and then apply powder., Disp: , Rfl:   •  midodrine (PROAMATINE) 5 mg tablet, Take 1 tablet (5 mg total) by mouth 3 (three) times a day before meals, Disp: , Rfl: 0  •  pantoprazole (PROTONIX) 40 mg tablet, Take 1 tablet (40 mg total) by mouth daily in the early morning Do not start before February 24, 2023., Disp: , Rfl: 0  •  psyllium (METAMUCIL) packet, Take 1 packet by mouth 2 (two) times a day, Disp: , Rfl: 0  •  rivaroxaban (Xarelto) 10 mg tablet, Take 1 tablet (10 mg total) by mouth daily, Disp: 30 tablet, Rfl: 0  •  vitamin E 100 UNIT capsule, Take 100 Units by mouth daily , Disp: , Rfl:   •  insulin lispro (HumaLOG) 100 units/mL injection, Inject 1-6 Units under the skin 4 (four) times a day (before meals and at bedtime) (Patient not taking: Reported on 7/21/2023), Disp: , Rfl: 0  No current facility-administered medications for this visit. Facility-Administered Medications Ordered in Other Visits:   •  alteplase (CATHFLO) injection 2 mg, 2 mg, Intracatheter, Q1MIN PRN, Caridad Dickey MD    No Known Allergies    Oncology History Overview Note    In 2012 patient was diagnosed to have Hormone receptor positive, HER-2 negative stage IIIB cancer in right breast. She had right mastectomy and lymph node dissection . 9 lymph nodes showed metastatic disease. Patient was given Adriamycin + Cytoxan chemotherapy followed by Taxotere and after that radiation therapy. Since November/December 2012 she has been on Femara. .  She will be on Femara for a total of 10 years. On 7/22/15 she underwent right hemicolectomy. Pathological diagnosis right colon cancer, stage I, T2 N0 MX, grade 2,no lymphovascular invasion and no perineural invasion. She did not require adjuvant therapy for colon cancer. She has been running slightly high CEA and that is being monitored.      Malignant neoplasm of right breast in female, estrogen receptor positive ([address])   6/21/2018 Initial Diagnosis    Malignant neoplasm of right breast in female, estrogen receptor positive [facility])      Surgery     2012- right mastectomy   2015 - right hemicolectomy      Radiation     [id number]: Radiation to right chest wall and lymph nodes      Chemotherapy     2012 -Adriamycin plus Cytoxan followed by Taxotere Followed by Femara. She will have Femara for a total of 10 years. Adenocarcinoma of colon ([address])   3/20/2023 Initial Diagnosis    Adenocarcinoma of colon ([address])     7/7/2023 -  Chemotherapy    alteplase (CATHFLO), 2 mg, Intracatheter, Every 1 Minute as needed, 1 of 6 cycles  panitumumab (VECTIBIX) IVPB, 6 mg/kg = 354 mg, Intravenous, Once, 1 of 6 cycles     Metastasis to liver ([address])   5/18/2023 Initial Diagnosis    Metastasis to liver ([address])     7/7/2023 -  Chemotherapy    alteplase (CATHFLO), 2 mg, Intracatheter, Every 1 Minute as needed, 1 of 6 cycles  panitumumab (VECTIBIX) IVPB, 6 mg/kg = 354 mg, Intravenous, Once, 1 of 6 cycles           .  Physical examination and test results are as recorded and discussed. Patient has recurrent colon cancer and is status post total colectomy and ileostomy. Has metastatic disease to liver and may be lung. K-yue and N-yue wild-type. Patient wanted to try chemotherapy. After some discussion the decision was to try Xeloda plus Vectibix. Xeloda will be 1500 mg twice a day 1 week on and 1 week off. This was the shared decision, a compromise because of patient's other comorbid condition and poor performance status. Patient received printed and verbal information on these medications and she signed informed consent for systemic cancer therapy and blood consent. History of pulmonary embolism and DVT. Previous history of breast cancer and colon cancer.    All discussed in detail.  Questions answered. Diet and activities per at [facility]. Nutritional supplements. Patient to avoid falls and trauma. Goal will be prolongation of survival.  Plan is as above, Xeloda and Vectibix. Patient is also on Prolia. Patient needs assistance in her care . Discussed precautions against coronavirus and flu and other infections.   She will continue to follow with her primary physician and other consultants. See diagnoses, orders and instructions below    1. Malignant neoplasm of ascending colon (HCC)    - CBC and differential; Standing  - Comprehensive metabolic panel; Standing  - CBC and differential  - Comprehensive metabolic panel    2. Metastasis to liver (HCC)    - CBC and differential; Standing  - Comprehensive metabolic panel; Standing  - CBC and differential  - Comprehensive metabolic panel    3. Malignant neoplasm of right breast in female, estrogen receptor positive, unspecified site of breast ([address])      4. Abnormal tumor markers      5. History of DVT (deep vein thrombosis)      6. Thyroid nodule      7. History of diabetes mellitus      8. Hypomagnesemia    - Magnesium; Standing  - Magnesium    9. Anemia, unspecified type    Blood work every 2 weeks, 1 or 2 days prior to getting Vectibix at the infusion center. Follow-up in 4 weeks. I    I used a dictation device to dictate this note and there could be mistakes in my note and for that patient may contact my office. Patient voiced understanding and agrees      Counseling / Coordination of Care  .   Provided counseling and support

## 2023-07-21 NOTE — PATIENT INSTRUCTIONS
Blood work every 2 weeks, 1 or 2 days prior to getting Vectibix at the infusion center. Follow-up in 4 weeks.

## 2023-07-21 NOTE — PROGRESS NOTES
Pt seen in office today. Per Dr. Ian Aguilar note pt okay to begin Xeloda. [employer] notified. [place] already has the script.

## 2023-07-24 ENCOUNTER — TELEPHONE (OUTPATIENT)
Dept: HEMATOLOGY ONCOLOGY | Facility: CLINIC | Age: 78
End: 2023-07-24

## 2023-07-24 NOTE — TELEPHONE ENCOUNTER
Appointment Confirmation   Who are you speaking with? Patient   If it is not the patient, are they listed on an active communication consent form? N/A   Which provider is the appointment scheduled with? [facility]    When is the appointment scheduled? Please list date and time 08/02/2023 @3:30PM    At which location is the appointment scheduled to take place? [place]   Did caller verbalize understanding of appointment details?  Yes

## 2023-07-27 ENCOUNTER — TELEPHONE (OUTPATIENT)
Dept: HEMATOLOGY ONCOLOGY | Facility: CLINIC | Age: 78
End: 2023-07-27

## 2023-07-27 NOTE — TELEPHONE ENCOUNTER
Left voicemail for [facility] [phone] requesting call back with update on how pt is doing on Xeloda. Provided my direct line.

## 2023-07-30 RX ORDER — MAGNESIUM SULFATE HEPTAHYDRATE 40 MG/ML
4 INJECTION, SOLUTION INTRAVENOUS ONCE AS NEEDED
Status: CANCELLED | OUTPATIENT
Start: 2023-08-04

## 2023-07-30 RX ORDER — SODIUM CHLORIDE 9 MG/ML
20 INJECTION, SOLUTION INTRAVENOUS ONCE
Status: CANCELLED | OUTPATIENT
Start: 2023-08-04

## 2023-07-30 RX ORDER — MAGNESIUM SULFATE HEPTAHYDRATE 40 MG/ML
2 INJECTION, SOLUTION INTRAVENOUS ONCE AS NEEDED
Status: CANCELLED | OUTPATIENT
Start: 2023-08-04

## 2023-08-02 ENCOUNTER — HOSPITAL ENCOUNTER (OUTPATIENT)
Dept: INFUSION CENTER | Facility: HOSPITAL | Age: 78
Discharge: HOME | End: 2023-08-02
Payer: COMMERCIAL

## 2023-08-02 DIAGNOSIS — M81.8 OTHER OSTEOPOROSIS WITHOUT CURRENT PATHOLOGICAL FRACTURE: ICD-10-CM

## 2023-08-02 DIAGNOSIS — C18.2 MALIGNANT NEOPLASM OF ASCENDING COLON (HCC): Primary | ICD-10-CM

## 2023-08-02 DIAGNOSIS — C50.911 MALIGNANT NEOPLASM OF RIGHT BREAST IN FEMALE, ESTROGEN RECEPTOR POSITIVE, UNSPECIFIED SITE OF BREAST (HCC): ICD-10-CM

## 2023-08-02 DIAGNOSIS — C18.9 MALIGNANT NEOPLASM OF COLON, UNSPECIFIED PART OF COLON (HCC): ICD-10-CM

## 2023-08-02 DIAGNOSIS — Z17.0 MALIGNANT NEOPLASM OF RIGHT BREAST IN FEMALE, ESTROGEN RECEPTOR POSITIVE, UNSPECIFIED SITE OF BREAST (HCC): ICD-10-CM

## 2023-08-02 DIAGNOSIS — C78.7 METASTASIS TO LIVER (HCC): ICD-10-CM

## 2023-08-02 DIAGNOSIS — M85.80 OSTEOPENIA DUE TO CANCER THERAPY: ICD-10-CM

## 2023-08-02 DIAGNOSIS — Z85.038 PERSONAL HISTORY OF OTHER MALIGNANT NEOPLASM OF LARGE INTESTINE: ICD-10-CM

## 2023-08-02 DIAGNOSIS — Z95.828 PORT-A-CATH IN PLACE: ICD-10-CM

## 2023-08-02 LAB
ALBUMIN SERPL BCP-MCNC: 4.1 G/DL (ref 3.5–5)
ALP SERPL-CCNC: 98 U/L (ref 46–116)
ALT SERPL W P-5'-P-CCNC: 19 U/L (ref 12–78)
ANION GAP SERPL CALCULATED.3IONS-SCNC: 7 MMOL/L
AST SERPL W P-5'-P-CCNC: 16 U/L (ref 5–45)
BASOPHILS # BLD AUTO: 0.04 THOUSANDS/ÂΜL (ref 0–0.1)
BASOPHILS NFR BLD AUTO: 1 % (ref 0–1)
BILIRUB SERPL-MCNC: 0.21 MG/DL (ref 0.2–1)
BUN SERPL-MCNC: 18 MG/DL (ref 5–25)
CALCIUM SERPL-MCNC: 9 MG/DL (ref 8.3–10.1)
CHLORIDE SERPL-SCNC: 106 MMOL/L (ref 96–108)
CO2 SERPL-SCNC: 22 MMOL/L (ref 21–32)
CREAT SERPL-MCNC: 0.74 MG/DL (ref 0.6–1.3)
EOSINOPHIL # BLD AUTO: 0.27 THOUSAND/ÂΜL (ref 0–0.61)
EOSINOPHIL NFR BLD AUTO: 4 % (ref 0–6)
ERYTHROCYTE [DISTWIDTH] IN BLOOD BY AUTOMATED COUNT: 17.2 % (ref 11.6–15.1)
GFR SERPL CREATININE-BSD FRML MDRD: 78 ML/MIN/1.73SQ M
GLUCOSE SERPL-MCNC: 163 MG/DL (ref 65–140)
HCT VFR BLD AUTO: 35.1 % (ref 34.8–46.1)
HGB BLD-MCNC: 11.4 G/DL (ref 11.5–15.4)
IMM GRANULOCYTES # BLD AUTO: 0.05 THOUSAND/UL (ref 0–0.2)
IMM GRANULOCYTES NFR BLD AUTO: 1 % (ref 0–2)
LYMPHOCYTES # BLD AUTO: 1.1 THOUSANDS/ÂΜL (ref 0.6–4.47)
LYMPHOCYTES NFR BLD AUTO: 16 % (ref 14–44)
MAGNESIUM SERPL-MCNC: 2 MG/DL (ref 1.6–2.6)
MCH RBC QN AUTO: 34.4 PG (ref 26.8–34.3)
MCHC RBC AUTO-ENTMCNC: 32.5 G/DL (ref 31.4–37.4)
MCV RBC AUTO: 106 FL (ref 82–98)
MONOCYTES # BLD AUTO: 0.84 THOUSAND/ÂΜL (ref 0.17–1.22)
MONOCYTES NFR BLD AUTO: 12 % (ref 4–12)
NEUTROPHILS # BLD AUTO: 4.57 THOUSANDS/ÂΜL (ref 1.85–7.62)
NEUTS SEG NFR BLD AUTO: 66 % (ref 43–75)
NRBC BLD AUTO-RTO: 0 /100 WBCS
PLATELET # BLD AUTO: 246 THOUSANDS/UL (ref 149–390)
PMV BLD AUTO: 8.8 FL (ref 8.9–12.7)
POTASSIUM SERPL-SCNC: 4.5 MMOL/L (ref 3.5–5.3)
PROT SERPL-MCNC: 8 G/DL (ref 6.4–8.4)
RBC # BLD AUTO: 3.31 MILLION/UL (ref 3.81–5.12)
SODIUM SERPL-SCNC: 135 MMOL/L (ref 135–147)
WBC # BLD AUTO: 6.87 THOUSAND/UL (ref 4.31–10.16)

## 2023-08-02 PROCEDURE — 83735 ASSAY OF MAGNESIUM: CPT

## 2023-08-02 PROCEDURE — 80053 COMPREHEN METABOLIC PANEL: CPT

## 2023-08-02 PROCEDURE — 85025 COMPLETE CBC W/AUTO DIFF WBC: CPT

## 2023-08-04 ENCOUNTER — CLINICAL SUPPORT (OUTPATIENT)
Dept: PALLIATIVE MEDICINE | Facility: CLINIC | Age: 78
End: 2023-08-04
Payer: COMMERCIAL

## 2023-08-04 ENCOUNTER — OFFICE VISIT (OUTPATIENT)
Dept: PALLIATIVE MEDICINE | Facility: CLINIC | Age: 78
End: 2023-08-04
Payer: COMMERCIAL

## 2023-08-04 ENCOUNTER — HOSPITAL ENCOUNTER (OUTPATIENT)
Dept: INFUSION CENTER | Facility: HOSPITAL | Age: 78
End: 2023-08-04
Attending: INTERNAL MEDICINE
Payer: COMMERCIAL

## 2023-08-04 VITALS
WEIGHT: 138.67 LBS | DIASTOLIC BLOOD PRESSURE: 73 MMHG | SYSTOLIC BLOOD PRESSURE: 121 MMHG | HEIGHT: 60 IN | RESPIRATION RATE: 18 BRPM | TEMPERATURE: 97 F | BODY MASS INDEX: 27.22 KG/M2 | HEART RATE: 84 BPM

## 2023-08-04 VITALS
HEART RATE: 100 BPM | OXYGEN SATURATION: 100 % | SYSTOLIC BLOOD PRESSURE: 122 MMHG | HEIGHT: 60 IN | DIASTOLIC BLOOD PRESSURE: 70 MMHG | BODY MASS INDEX: 26.44 KG/M2 | RESPIRATION RATE: 16 BRPM | TEMPERATURE: 97.9 F

## 2023-08-04 DIAGNOSIS — Z17.0 MALIGNANT NEOPLASM OF RIGHT BREAST IN FEMALE, ESTROGEN RECEPTOR POSITIVE, UNSPECIFIED SITE OF BREAST (HCC): ICD-10-CM

## 2023-08-04 DIAGNOSIS — R29.898 NECK TIGHTNESS: ICD-10-CM

## 2023-08-04 DIAGNOSIS — C50.911 MALIGNANT NEOPLASM OF RIGHT BREAST IN FEMALE, ESTROGEN RECEPTOR POSITIVE, UNSPECIFIED SITE OF BREAST (HCC): ICD-10-CM

## 2023-08-04 DIAGNOSIS — F41.9 ANXIETY DISORDER: Primary | ICD-10-CM

## 2023-08-04 DIAGNOSIS — C18.9 ADENOCARCINOMA OF COLON (HCC): Primary | ICD-10-CM

## 2023-08-04 DIAGNOSIS — C78.7 METASTASIS TO LIVER (HCC): ICD-10-CM

## 2023-08-04 DIAGNOSIS — M54.2 NECK PAIN ON RIGHT SIDE: ICD-10-CM

## 2023-08-04 DIAGNOSIS — E83.42 HYPOMAGNESEMIA: ICD-10-CM

## 2023-08-04 DIAGNOSIS — Z93.2 S/P ILEOSTOMY (HCC): ICD-10-CM

## 2023-08-04 DIAGNOSIS — C78.7 METASTASIS TO LIVER (HCC): Primary | ICD-10-CM

## 2023-08-04 DIAGNOSIS — C18.9 ADENOCARCINOMA OF COLON (HCC): ICD-10-CM

## 2023-08-04 PROCEDURE — 99214 OFFICE O/P EST MOD 30 MIN: CPT | Performed by: FAMILY MEDICINE

## 2023-08-04 PROCEDURE — NC001 PR NO CHARGE

## 2023-08-04 RX ORDER — MENTHOL 1.4 %
ADHESIVE PATCH, MEDICATED TOPICAL 3 TIMES DAILY
Qty: 57 G | Refills: 1 | Status: SHIPPED | OUTPATIENT
Start: 2023-08-04

## 2023-08-04 RX ORDER — SODIUM CHLORIDE 9 MG/ML
20 INJECTION, SOLUTION INTRAVENOUS ONCE
Status: COMPLETED | OUTPATIENT
Start: 2023-08-04 | End: 2023-08-04

## 2023-08-04 RX ADMIN — SODIUM CHLORIDE 20 ML/HR: 0.9 INJECTION, SOLUTION INTRAVENOUS at 10:46

## 2023-08-04 RX ADMIN — PANITUMUMAB 354 MG: 400 SOLUTION INTRAVENOUS at 11:26

## 2023-08-04 RX ADMIN — DIPHENHYDRAMINE HYDROCHLORIDE 25 MG: 50 INJECTION, SOLUTION INTRAMUSCULAR; INTRAVENOUS at 10:46

## 2023-08-04 NOTE — PATIENT INSTRUCTIONS
It was so great seeing you today! Just to review from today's appointment:  -Elijah Rohca going to try some different gels and creams for your neck pain/stiffness: We'll try Bengay first (which gives a heat sensation). If you feel like it's not helpful or is not comfortable, then we can try Voltaren gel.  -Be sure that your colostomy has a good seal to prevent irritation and rash. -Be sure to ask Dr. Axel Hogue alternative forms of your oral chemotherapy regimen so it's easier to swallow.  -We'll do more Osteopathic Manipulative Treatment at the next appointment.

## 2023-08-04 NOTE — PROGRESS NOTES
Outpatient Follow-Up - Palliative and Supportive Care   Jay Wheeler 69 y.o. female [id number]    Assessment & Plan  Problem List Items Addressed This Visit        Digestive    Adenocarcinoma of colon [facility]) - Primary    Metastasis to liver ([address])       Other    S/P ileostomy ([address])    Malignant neoplasm of right breast in female, estrogen receptor positive ([address])   Other Visit Diagnoses     Neck pain on right side        Relevant Medications    Menthol-Methyl Salicylate (JACQUES PRINCE GREASELESS) 10-15 % greaseless cream    Diclofenac Sodium (VOLTAREN) 1 %    Neck tightness        Relevant Medications    Menthol-Methyl Salicylate (JACQUES PRINCE GREASELESS) 10-15 % greaseless cream    Diclofenac Sodium (VOLTAREN) 1 %          Medications adjusted this encounter:  Requested Prescriptions     Signed Prescriptions Disp Refills   • Menthol-Methyl Salicylate (JACQUES PRINCE GREASELESS) 10-15 % greaseless cream 57 g 1     Sig: Apply topically 3 (three) times a day Try first line for neck pain and stiffness. • Diclofenac Sodium (VOLTAREN) 1 % 100 g 1     Sig: Apply to neck for pain/stiffness, 4 times a day as needed. Only give if Bengay cream is not effective and has not been given that day. No orders of the defined types were placed in this encounter.     Medications Discontinued During This Encounter   Medication Reason   • lidocaine (LIDODERM) 5 %    • Diclofenac Sodium (VOLTAREN) 1 %        Symptom Management  Neck pain/stiffness  · Osteopathic Manipulative Treatment done at today's visit (myofascial release, muscle energy technique, and counterstrain)  · Will trial Bengay cream (1st line) and Voltaren gel (2nd line)    Psychosocial  · No issues currently, though nursing facility is a source of stress for her overall  · Palliative social workers continue to follow    Goals of care/ ACP  · Very treatment focused without limits  · Wishes to continue IV and PO chemotherapy with Dr. Faheem Collins  · Medical Power of  documentation on file    Follow-up in 3 weeks. Karolina Sadler was seen today for symptoms and planning cares related to above illnesses. I have reviewed the patient's controlled substance dispensing history in the Prescription Drug Monitoring Program in compliance with the [facility] regulations before prescribing any controlled substances. They are invited to continue to follow with us. If there are questions or concerns, please contact us through our clinic/answering service 24 hours a day, seven days a week. Cait Jama DO  [facility] Palliative [facility] Supportive [facility]  [phone]      Visit Information    Accompanied By: No one    Source of History: Self, Medical record    History Limitations: None    Contacts: Medical POA and friend Roula De Leon, [phone]    Chief Complaint  Chief Complaint   Patient presents with   • Follow-up     For cancer and chem related symptoms. Neck pain. History of Present Illness      Karolina Sadler is a 69 y.o. female who presents in follow up of symptoms related to stage IV colon cancer on chemotherapy and neck pain. Pertinent issues include: symptom management, pain, neoplasm related    Karolina Sadler is a 69 y.o. 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 She was first introduced to the Palliative team after being hospitalized in July 2023 after being hypotensive during a chemotherapy session, decreasing appetite, and generalized weakness. Despite her disease being uncurable and an overall poor prognosis, she has been very committed to continuing full cares without limitations. She lives at nursing home, which is frustrating to her and it makes it difficult to organize transportation for appointments.  She has ACP documents in the form of [address] (signed May 30th, 2008 and to [employer]). Since hospitalization, she had an oncology treatment with Dr. Del Ho, in which she expressed desires to continue chemotherapy with capecitabine and  panitumumab. Is to start her first infusion treatment the day of this appointment. At today's visit, Alexis Louis wanted to bring up three different points. That she has been experiencing increasing rash and soreness around her colostomy bag. That she is curious if her Xeloda medication came in any other formulation (she has trouble swallowing pills). That her mobility is still low and she is a full assist with getting out of bed. She denies any discomfort and pain, N/V/D, dizziness, headache, shortness of breath, or insomnia. She does not feel her lidocaine patch has been helping her neck pain and stiffness. Moreover, she does not want any p.o. medications for her pain control. She would prefer to trialing topical treatments. She requests osteopathic manipulation treatment at today's visit. Past medical, surgical, social, and family histories are reviewed and pertinent updates are made. Review of Systems   Constitutional: Positive for malaise/fatigue. Negative for fever. HENT: Negative for congestion and sore throat. Eyes: Negative for pain and visual disturbance. Cardiovascular: Positive for dyspnea on exertion (More symptoms of fatigue). Negative for chest pain. Respiratory: Negative for cough and shortness of breath. Endocrine: Negative for cold intolerance and heat intolerance. Hematologic/Lymphatic: Negative for adenopathy. Does not bruise/bleed easily. Skin: Positive for rash (around ostomy). Negative for itching. Musculoskeletal: Positive for neck pain and stiffness. Negative for arthritis and joint swelling. Gastrointestinal: Negative for nausea and vomiting. Genitourinary: Negative for bladder incontinence and dysuria. Neurological: Positive for weakness.  Negative for dizziness and headaches. Psychiatric/Behavioral: Negative for depression. The patient is not nervous/anxious. Vital Signs    /70 (BP Location: Left arm, Patient Position: Sitting, Cuff Size: Adult)   Pulse 100   Temp 97.9 °F (36.6 °C) (Temporal)   Resp 16   Ht 5' (1.524 m)   LMP  (LMP Unknown)   SpO2 100%   BMI 26.44 kg/m²     Physical Exam and Objective Data  Physical Exam  Constitutional:       General: She is not in acute distress. Appearance: She is well-developed. She is ill-appearing. She is not diaphoretic. Comments: Frail appearing   HENT:      Head: Normocephalic and atraumatic. Mouth/Throat:      Pharynx: No oropharyngeal exudate. Eyes:      General: No scleral icterus. Conjunctiva/sclera: Conjunctivae normal.   Neck:      Comments: Right cervical paraspinal muscles tight and restricted  Cardiovascular:      Rate and Rhythm: Normal rate and regular rhythm. Pulses:           Radial pulses are 1+ on the right side and 1+ on the left side. Heart sounds: Normal heart sounds. No murmur heard. Pulmonary:      Effort: Pulmonary effort is normal.      Breath sounds: Normal breath sounds. Abdominal:      Palpations: Abdomen is soft. Comments: Ostomy site shows a mild amount of leakage   Musculoskeletal:         General: No tenderness. Cervical back: Neck supple. No spinous process tenderness or muscular tenderness. Decreased range of motion. Right lower leg: No edema. Left lower leg: No edema. Skin:     General: Skin is warm. Capillary Refill: Capillary refill takes less than 2 seconds. Coloration: Skin is pale. Findings: Rash (by osteomy site) present. No erythema. Neurological:      Mental Status: She is alert and oriented to person, place, and time. Psychiatric:         Behavior: Behavior normal.         Thought Content: Thought content normal.         Judgment: Judgment normal.           Radiology and Laboratory:   I personally reviewed and interpreted the following results: MRI abdomen on 7/17/23 revealed increased size and number of hepatic metastatic lesions compared to 2/17/23     60 minutes was spent face to face with [place] with greater than 50% of the time spent in counseling or coordination of care including discussions of etiology of diagnosis, pathogenesis of diagnosis, diagnostic results, impression, and recommendations, risks and benefits of treatment, instructions for disease self management, treatment instructions and follow up requirements. All of the patient's or agent's questions were answered during this discussion.

## 2023-08-12 RX ORDER — MAGNESIUM SULFATE HEPTAHYDRATE 40 MG/ML
2 INJECTION, SOLUTION INTRAVENOUS ONCE AS NEEDED
Status: CANCELLED | OUTPATIENT
Start: 2023-08-17

## 2023-08-12 RX ORDER — MAGNESIUM SULFATE HEPTAHYDRATE 40 MG/ML
4 INJECTION, SOLUTION INTRAVENOUS ONCE AS NEEDED
Status: CANCELLED | OUTPATIENT
Start: 2023-08-17

## 2023-08-12 RX ORDER — SODIUM CHLORIDE 9 MG/ML
20 INJECTION, SOLUTION INTRAVENOUS ONCE
Status: CANCELLED | OUTPATIENT
Start: 2023-08-17

## 2023-08-15 ENCOUNTER — TELEPHONE (OUTPATIENT)
Dept: HEMATOLOGY ONCOLOGY | Facility: CLINIC | Age: 78
End: 2023-08-15

## 2023-08-15 NOTE — TELEPHONE ENCOUNTER
Attempted to call Orville Trinidad, unable to leave her a message to complete her labs before her appointment. I called the alternate number and left message for Jesica.

## 2023-08-16 ENCOUNTER — HOSPITAL ENCOUNTER (OUTPATIENT)
Dept: INFUSION CENTER | Facility: HOSPITAL | Age: 78
Discharge: HOME | End: 2023-08-16
Payer: COMMERCIAL

## 2023-08-16 DIAGNOSIS — C78.7 METASTASIS TO LIVER (HCC): ICD-10-CM

## 2023-08-16 DIAGNOSIS — C18.9 MALIGNANT NEOPLASM OF COLON, UNSPECIFIED PART OF COLON (HCC): ICD-10-CM

## 2023-08-16 DIAGNOSIS — Z17.0 MALIGNANT NEOPLASM OF RIGHT BREAST IN FEMALE, ESTROGEN RECEPTOR POSITIVE, UNSPECIFIED SITE OF BREAST (HCC): ICD-10-CM

## 2023-08-16 DIAGNOSIS — C18.9 ADENOCARCINOMA OF COLON (HCC): ICD-10-CM

## 2023-08-16 DIAGNOSIS — M85.80 OSTEOPENIA DUE TO CANCER THERAPY: ICD-10-CM

## 2023-08-16 DIAGNOSIS — M81.8 OTHER OSTEOPOROSIS WITHOUT CURRENT PATHOLOGICAL FRACTURE: ICD-10-CM

## 2023-08-16 DIAGNOSIS — C18.2 MALIGNANT NEOPLASM OF ASCENDING COLON (HCC): ICD-10-CM

## 2023-08-16 DIAGNOSIS — C50.911 MALIGNANT NEOPLASM OF RIGHT BREAST IN FEMALE, ESTROGEN RECEPTOR POSITIVE, UNSPECIFIED SITE OF BREAST (HCC): ICD-10-CM

## 2023-08-16 DIAGNOSIS — Z85.038 PERSONAL HISTORY OF OTHER MALIGNANT NEOPLASM OF LARGE INTESTINE: ICD-10-CM

## 2023-08-16 DIAGNOSIS — Z95.828 PORT-A-CATH IN PLACE: Primary | ICD-10-CM

## 2023-08-16 LAB
ALBUMIN SERPL BCP-MCNC: 3.9 G/DL (ref 3.5–5)
ALP SERPL-CCNC: 109 U/L (ref 46–116)
ALT SERPL W P-5'-P-CCNC: 21 U/L (ref 12–78)
ANION GAP SERPL CALCULATED.3IONS-SCNC: 6 MMOL/L
AST SERPL W P-5'-P-CCNC: 12 U/L (ref 5–45)
BASOPHILS # BLD AUTO: 0.04 THOUSANDS/ÂΜL (ref 0–0.1)
BASOPHILS NFR BLD AUTO: 1 % (ref 0–1)
BILIRUB SERPL-MCNC: 0.27 MG/DL (ref 0.2–1)
BUN SERPL-MCNC: 18 MG/DL (ref 5–25)
CALCIUM SERPL-MCNC: 9.4 MG/DL (ref 8.3–10.1)
CEA SERPL-MCNC: 7.6 NG/ML (ref 0–3)
CHLORIDE SERPL-SCNC: 105 MMOL/L (ref 96–108)
CO2 SERPL-SCNC: 24 MMOL/L (ref 21–32)
CREAT SERPL-MCNC: 0.68 MG/DL (ref 0.6–1.3)
EOSINOPHIL # BLD AUTO: 0.4 THOUSAND/ÂΜL (ref 0–0.61)
EOSINOPHIL NFR BLD AUTO: 5 % (ref 0–6)
ERYTHROCYTE [DISTWIDTH] IN BLOOD BY AUTOMATED COUNT: 16.3 % (ref 11.6–15.1)
GFR SERPL CREATININE-BSD FRML MDRD: 84 ML/MIN/1.73SQ M
GLUCOSE SERPL-MCNC: 130 MG/DL (ref 65–140)
HCT VFR BLD AUTO: 37.4 % (ref 34.8–46.1)
HGB BLD-MCNC: 12.2 G/DL (ref 11.5–15.4)
IMM GRANULOCYTES # BLD AUTO: 0.03 THOUSAND/UL (ref 0–0.2)
IMM GRANULOCYTES NFR BLD AUTO: 0 % (ref 0–2)
LYMPHOCYTES # BLD AUTO: 1.08 THOUSANDS/ÂΜL (ref 0.6–4.47)
LYMPHOCYTES NFR BLD AUTO: 15 % (ref 14–44)
MAGNESIUM SERPL-MCNC: 1.9 MG/DL (ref 1.6–2.6)
MCH RBC QN AUTO: 34.6 PG (ref 26.8–34.3)
MCHC RBC AUTO-ENTMCNC: 32.6 G/DL (ref 31.4–37.4)
MCV RBC AUTO: 106 FL (ref 82–98)
MONOCYTES # BLD AUTO: 1.2 THOUSAND/ÂΜL (ref 0.17–1.22)
MONOCYTES NFR BLD AUTO: 16 % (ref 4–12)
NEUTROPHILS # BLD AUTO: 4.65 THOUSANDS/ÂΜL (ref 1.85–7.62)
NEUTS SEG NFR BLD AUTO: 63 % (ref 43–75)
NRBC BLD AUTO-RTO: 0 /100 WBCS
PLATELET # BLD AUTO: 259 THOUSANDS/UL (ref 149–390)
PMV BLD AUTO: 9.1 FL (ref 8.9–12.7)
POTASSIUM SERPL-SCNC: 4.5 MMOL/L (ref 3.5–5.3)
PROT SERPL-MCNC: 7.9 G/DL (ref 6.4–8.4)
RBC # BLD AUTO: 3.53 MILLION/UL (ref 3.81–5.12)
SODIUM SERPL-SCNC: 135 MMOL/L (ref 135–147)
WBC # BLD AUTO: 7.4 THOUSAND/UL (ref 4.31–10.16)

## 2023-08-16 PROCEDURE — 83735 ASSAY OF MAGNESIUM: CPT | Performed by: INTERNAL MEDICINE

## 2023-08-16 PROCEDURE — 85025 COMPLETE CBC W/AUTO DIFF WBC: CPT | Performed by: INTERNAL MEDICINE

## 2023-08-16 PROCEDURE — 82378 CARCINOEMBRYONIC ANTIGEN: CPT

## 2023-08-16 PROCEDURE — 80053 COMPREHEN METABOLIC PANEL: CPT | Performed by: INTERNAL MEDICINE

## 2023-08-17 ENCOUNTER — OFFICE VISIT (OUTPATIENT)
Dept: HEMATOLOGY ONCOLOGY | Facility: CLINIC | Age: 78
End: 2023-08-17
Payer: COMMERCIAL

## 2023-08-17 ENCOUNTER — HOSPITAL ENCOUNTER (OUTPATIENT)
Dept: INFUSION CENTER | Facility: HOSPITAL | Age: 78
Discharge: HOME | End: 2023-08-17
Attending: INTERNAL MEDICINE
Payer: COMMERCIAL

## 2023-08-17 VITALS
HEIGHT: 60 IN | SYSTOLIC BLOOD PRESSURE: 124 MMHG | WEIGHT: 131 LBS | BODY MASS INDEX: 25.72 KG/M2 | TEMPERATURE: 97 F | HEART RATE: 82 BPM | RESPIRATION RATE: 18 BRPM | DIASTOLIC BLOOD PRESSURE: 68 MMHG

## 2023-08-17 DIAGNOSIS — C78.7 METASTASIS TO LIVER (HCC): ICD-10-CM

## 2023-08-17 DIAGNOSIS — C18.9 ADENOCARCINOMA OF COLON (HCC): Primary | ICD-10-CM

## 2023-08-17 PROCEDURE — 96367 TX/PROPH/DG ADDL SEQ IV INF: CPT

## 2023-08-17 PROCEDURE — 96413 CHEMO IV INFUSION 1 HR: CPT

## 2023-08-17 PROCEDURE — 99214 OFFICE O/P EST MOD 30 MIN: CPT | Performed by: PHYSICIAN ASSISTANT

## 2023-08-17 RX ORDER — SODIUM CHLORIDE 9 MG/ML
20 INJECTION, SOLUTION INTRAVENOUS ONCE
Status: COMPLETED | OUTPATIENT
Start: 2023-08-17 | End: 2023-08-17

## 2023-08-17 RX ORDER — MAGNESIUM SULFATE HEPTAHYDRATE 40 MG/ML
2 INJECTION, SOLUTION INTRAVENOUS ONCE AS NEEDED
Status: DISCONTINUED | OUTPATIENT
Start: 2023-08-17 | End: 2023-08-20 | Stop reason: HOSPADM

## 2023-08-17 RX ADMIN — PANITUMUMAB 354 MG: 400 SOLUTION INTRAVENOUS at 12:42

## 2023-08-17 RX ADMIN — DIPHENHYDRAMINE HYDROCHLORIDE 25 MG: 50 INJECTION, SOLUTION INTRAMUSCULAR; INTRAVENOUS at 11:52

## 2023-08-17 RX ADMIN — MAGNESIUM SULFATE HEPTAHYDRATE 2 G: 40 INJECTION, SOLUTION INTRAVENOUS at 10:37

## 2023-08-17 RX ADMIN — SODIUM CHLORIDE 20 ML/HR: 0.9 INJECTION, SOLUTION INTRAVENOUS at 10:30

## 2023-08-17 NOTE — PROGRESS NOTES
Pt tolerated 2gm Mag and Vectibix today without incident. Pt became more confused toward end of infusion, needing constant re-direction, pt felt that she was at the facility. Pt became tearful regarding how she was feeling and chemo, explained to pt that it was her decision to confine treatment or not. AVS printed for facility.

## 2023-08-22 NOTE — PROGRESS NOTES
Hematology/Oncology Outpatient Follow-up  Any Flynn 69 y.o. female 1945 [id number]    Date:  8/22/2023  Assessment and Plan:  1. Adenocarcinoma of colon ([facility]), 2. Metastasis to liver [facility])  43-year-old female presents for follow-up regarding history of adenocarcinoma of the colon with liver metastases. She is receiving Vectibix 6 mg/kg every 2 weeks. She has no evidence of acneform rash. She has very mild scattered rash on extremities that is very sparse. She is not symptomatic. Continue to monitor. She is also taking Xeloda, 1500 mg twice daily 1 week on followed by 1 week off. Med list was reviewed at facility. This is being given correctly per medication record. Symptomatically patient feels much better after addition of midodrine. Blood pressure is better. Her dizziness is basically resolved. Due to this she is also having a better appetite. Dietary recall she is eating very well. She appears to have more energy and symptomatically improved compared to last visit with hospitalization. Patient still resides at [facility]. She states she still owns her home and plans/hopes to return there eventually. I reviewed with her that her CEA has decreased from 19 in July to most recently 7.6. Hemoglobin has also improved to 12.2. No change in therapy. Follow-up in approximately 1 month. HPI:  Oncology History Overview Note    In 2012 patient was diagnosed to have Hormone receptor positive, HER-2 negative stage IIIB cancer in right breast. She had right mastectomy and lymph node dissection . 9 lymph nodes showed metastatic disease. Patient was given Adriamycin + Cytoxan chemotherapy followed by Taxotere and after that radiation therapy. Since November/December 2012 she has been on Femara. .  She will be on Femara for a total of 10 years. On 7/22/15 she underwent right hemicolectomy.  Pathological diagnosis right colon cancer, stage I, T2 N0 MX, grade 2,no lymphovascular invasion and no perineural invasion. She did not require adjuvant therapy for colon cancer. She has been running slightly high CEA and that is being monitored. Malignant neoplasm of right breast in female, estrogen receptor positive ([address])   6/21/2018 Initial Diagnosis    Malignant neoplasm of right breast in female, estrogen receptor positive [facility])      Surgery     2012- right mastectomy   2015 - right hemicolectomy      Radiation     [id number]: Radiation to right chest wall and lymph nodes      Chemotherapy     2012 -Adriamycin plus Cytoxan followed by Taxotere Followed by Femara. She will have Femara for a total of 10 years. Adenocarcinoma of colon ([address])   3/20/2023 Initial Diagnosis    Adenocarcinoma of colon ([address])     7/21/2023 -  Chemotherapy    alteplase (CATHFLO), 2 mg, Intracatheter, Every 1 Minute as needed, 3 of 6 cycles  panitumumab (VECTIBIX) IVPB, 6 mg/kg = 354 mg, Intravenous, Once, 3 of 6 cycles  Administration: 354 mg (7/21/2023), 354 mg (8/4/2023), 354 mg (8/17/2023)     Metastasis to liver ([address])   5/18/2023 Initial Diagnosis    Metastasis to liver ([address])     7/21/2023 -  Chemotherapy    alteplase (CATHFLO), 2 mg, Intracatheter, Every 1 Minute as needed, 3 of 6 cycles  panitumumab (VECTIBIX) IVPB, 6 mg/kg = 354 mg, Intravenous, Once, 3 of 6 cycles  Administration: 354 mg (7/21/2023), 354 mg (8/4/2023), 354 mg (8/17/2023)       In 2012 she was diagnosed to have stage 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. She had right mastectomy, and lymph node dissection . There were 9 positive lymph nodes. She had Adriamycin + Cytoxan chemotherapy followed by Taxotere and after that radiation therapy. Since November/December 2012 she has been on Femara and she will take Femara until December 2022. Patient has osteoporosis and she has been on vitamin-D  and Prolia . Has problem swallowing calcium tablet. No problem with her teeth for Prolia.     In 2016 she was found to have benign clustered calcifications in left breast and had a biopsy and that was benign. Dr. Trinidad Rush takes care of her mammography.      In December 2012 patient developed DVT right leg and she was started on Xarelto. She had GI bleeding in 2015. Xarelto was stopped. On colonoscopy she was found to have stage I right colon cancer. On 7/22/15 she underwent right hemicolectomy. Pathological diagnosis right colon cancer, stage I, T2 N0 MX, grade 2, No lymphovascular invasion and no perineural invasion. She did not require adjuvant therapy for colon cancer. On 2/9/2023  patient  presented with incarcerated ventral hernia. Patient underwent an exploratory laparotomy, reduction of ventral hernia, left colectomy, and creation of an end ileostomy. Post-op she was taken to the ICU for close monitoring and resuscitation. On 2/10 she was found to have a PE of JOANNA and RLL arteries and started on a heparin infusion. A PICC line was placed for access to provide IV medications and access for blood draws. She was transferred out of the ICU on 2/11. Antibiotics were continued through 2/19. She was transitioned to Xarelto on 2/17/23. Hospital recently from 7/7/2023 through 7/13/2023. She was in the ICU with septic shock of unclear etiology. Other diagnoses were pulmonary nodule that could be a new pulmonary metastatic disease, ileostomy for recurrent colon cancer, anemia, diabetes mellitus, history of DVT, diabetic ulcer of toe of left foot, osteoporosis and colon cancer. She has recovered from septic shock. She wants to continue on Xeloda, Vectibix and Prolia. Interval history:   patient seen in the infusion center while receiving chemotherapy  Still resides at [facility]   States she is walking with walker at times with staff  Appetite is much better since she is no longer dizzy; dizziness resolved with started midodrine     ROS: Review of Systems   Constitutional: Positive for fatigue.  Negative for appetite change, chills, fever and unexpected weight change. Respiratory: Negative for cough and shortness of breath. Cardiovascular: Negative for chest pain, palpitations and leg swelling. Gastrointestinal: Negative for abdominal pain, constipation, diarrhea, nausea and vomiting. Genitourinary: Negative for difficulty urinating, dysuria and hematuria. Musculoskeletal: Negative for arthralgias. Skin: Negative. Neurological: Positive for weakness. Negative for dizziness, light-headedness, numbness and headaches. Hematological: Negative. Psychiatric/Behavioral: Negative. Past Medical History:   Diagnosis Date   • Acute embolism and thrombosis of deep vein of lower extremity (HCC)    • Adenocarcinoma of colon, [facility] ([address])    • Breast cancer ([address]) 2012    Right Breast    • Cancer ([address])    • Diabetes mellitus ([address])    • DVT (deep venous thrombosis) ([address])    • GERD (gastroesophageal reflux disease)    • Hypertension    • Pes planus        Past Surgical History:   Procedure Laterality Date   • COLONOSCOPY     • HERNIA REPAIR N/A 2/9/2023    Procedure: EXPLORATORY LAPAROTOMY; ABDOMINAL WALL DEBRIDEMENT; REDUCTION VENTRAL HERNIA; LEFT COLON RESECTION; CREATION ILEOSTOMY; WASHOUT;  Surgeon: Al Martinez DO;  [facility];  Service: General   • HYSTERECTOMY      complete @ age 29   • IR PORT PLACEMENT  6/28/2023   • IR PORT REMOVAL  9/12/2018   • MASTECTOMY Right 2012   • [place] COLONOSCOPY FLX DX W/COLLJ SPEC WHEN PFRMD N/A 8/23/2016    Procedure: COLONOSCOPY;  Surgeon: Ney Fuentes MD;  Location: [facility]; Service: Colorectal   • [place] COLONOSCOPY FLX DX W/COLLJ SPEC WHEN PFRMD N/A 9/5/2017    Procedure: COLONOSCOPY;  Surgeon: Ney Fuentes MD;  [facility]; Service: Colorectal   • [place] ESOPHAGOGASTRODUODENOSCOPY TRANSORAL DIAGNOSTIC N/A 9/5/2017    Procedure: ESOPHAGOGASTRODUODENOSCOPY (EGD); Surgeon: Louise Whitehead DO;  Location: [facility];   Service: Gastroenterology       Social History Socioeconomic History   • Marital status: Single     Spouse name: Not on file   • Number of children: Not on file   • Years of education: Not on file   • Highest education level: Not on file   Occupational History   • Occupation: retired   Tobacco Use   • Smoking status: Never   • Smokeless tobacco: Never   Vaping Use   • Vaping Use: Never used   Substance and Sexual Activity   • Alcohol use: Not Currently   • Drug use: No   • Sexual activity: Not Currently   Other Topics Concern   • Not on file   Social History Narrative    No advance directives     Social Determinants of Health     Financial Resource Strain: Not on file   Food Insecurity: No Food Insecurity (7/11/2023)    Hunger Vital Sign    • Worried About Running Out of Food in the Last Year: Never true    • Ran Out of Food in the Last Year: Never true   Transportation Needs: No Transportation Needs (7/11/2023)    PRAPARE - Transportation    • Lack of Transportation (Medical): No    • Lack of Transportation (Non-Medical):  No   Physical Activity: Not on file   Stress: Not on file   Social Connections: Not on file   Intimate Partner Violence: Not on file   Housing Stability: Unknown (7/11/2023)    Housing Stability Vital Sign    • Unable to Pay for Housing in the Last Year: No    • Number of Places Lived in the Last Year: Not on file    • Unstable Housing in the Last Year: No       Family History   Problem Relation Age of Onset   • Other Mother         chronic bronchitis   • Brain cancer Father    • Prostate cancer Paternal Grandfather 79   • Pancreatic cancer Maternal Aunt 39   • Breast cancer Neg Hx        No Known Allergies      Current Outpatient Medications:   •  acetaminophen (TYLENOL) 325 mg tablet, Take 2 tablets (650 mg total) by mouth every 8 (eight) hours, Disp: , Rfl: 0  •  ascorbic acid (VITAMIN C) 500 MG tablet, Take 1,000 mg by mouth daily , Disp: , Rfl:   •  BANANA FLAKES PO, Take 1 Units by mouth 2 (two) times a day, Disp: , Rfl:   • bimatoprost (LUMIGAN) 0.01 % ophthalmic drops, Administer 1 drop to both eyes daily at bedtime , Disp: , Rfl:   •  calcitonin, salmon, (MIACALCIN) 200 units/act nasal spray, , Disp: , Rfl:   •  calcium citrate (CALCITRATE) 950 MG tablet, Take 1 tablet by mouth in the morning., Disp: , Rfl:   •  capecitabine (XELODA) 500 MG tablet, Take 3 tablets (1,500 mg total) by mouth 2 (two) times a day 1 week on followed by 1 week off starting 7/7/23, Disp: 84 tablet, Rfl: 11  •  chlorhexidine (PERIDEX) 0.12 % solution, Apply 15 mL to the mouth or throat every 12 (twelve) hours, Disp: 120 mL, Rfl: 0  •  Cholecalciferol (VITAMIN D-3 PO), Take 1 capsule by mouth 3 (three) times a day , Disp: , Rfl:   •  Cyanocobalamin (VITAMIN B 12 PO), Take 1 tablet by mouth daily. , Disp: , Rfl:   •  Diclofenac Sodium (VOLTAREN) 1 %, Apply to neck for pain/stiffness, 4 times a day as needed. Only give if Bengay cream is not effective and has not been given that day., Disp: 100 g, Rfl: 1  •  insulin lispro (HumaLOG) 100 units/mL injection, Inject 1-6 Units under the skin 4 (four) times a day (before meals and at bedtime) (Patient not taking: Reported on 7/21/2023), Disp: , Rfl: 0  •  Januvia 25 MG tablet, , Disp: , Rfl:   •  loperamide (IMODIUM) 2 mg capsule, Take 1 capsule (2 mg total) by mouth 3 (three) times a day with meals, Disp: 30 capsule, Rfl: 0  •  Menthol-Methyl Salicylate (JACQUES PRINCE GREASELESS) 10-15 % greaseless cream, Apply topically 3 (three) times a day Try first line for neck pain and stiffness. , Disp: 57 g, Rfl: 1  •  metFORMIN (GLUCOPHAGE) 500 mg tablet, Take 500 mg by mouth 2 (two) times a day with meals, Disp: , Rfl:   •  miconazole (MICOTIN) 2 % powder, Apply topically in the morning Apply to groin topically and under left breast topically every day and evening shift for red wash with soap and water, pat dry and then apply powder., Disp: , Rfl:   •  midodrine (PROAMATINE) 5 mg tablet, Take 1 tablet (5 mg total) by mouth 3 (three) times a day before meals, Disp: , Rfl: 0  •  pantoprazole (PROTONIX) 40 mg tablet, Take 1 tablet (40 mg total) by mouth daily in the early morning Do not start before February 24, 2023., Disp: , Rfl: 0  •  psyllium (METAMUCIL) packet, Take 1 packet by mouth 2 (two) times a day, Disp: , Rfl: 0  •  rivaroxaban (Xarelto) 10 mg tablet, Take 1 tablet (10 mg total) by mouth daily, Disp: 30 tablet, Rfl: 0  •  vitamin E 100 UNIT capsule, Take 100 Units by mouth daily , Disp: , Rfl:       Physical Exam:  LMP  (LMP Unknown)  please see vitals from infusion center on this encounter date     Physical Exam  Vitals reviewed. Constitutional:       Appearance: She is well-developed. Comments: Chronic ill appearing   Appears better though than last visit   Laying in bed in infusion center receiving chemotherapy      HENT:      Head: Normocephalic and atraumatic. Eyes:      General: No scleral icterus. Conjunctiva/sclera: Conjunctivae normal.   Cardiovascular:      Rate and Rhythm: Normal rate and regular rhythm. Heart sounds: Normal heart sounds. No murmur heard. Pulmonary:      Effort: Pulmonary effort is normal. No respiratory distress. Breath sounds: Normal breath sounds. Abdominal:      Palpations: Abdomen is soft. Tenderness: There is no abdominal tenderness. Musculoskeletal:         General: No tenderness. Normal range of motion. Cervical back: Normal range of motion and neck supple. Lymphadenopathy:      Cervical: No cervical adenopathy. Skin:     General: Skin is warm and dry. Findings: Rash (quarter size on left forearm, non-vesicular, small red spots scattered on arms/legs) present. Neurological:      Mental Status: She is alert and oriented to person, place, and time. Cranial Nerves: No cranial nerve deficit.    Psychiatric:         Mood and Affect: Mood normal.         Behavior: Behavior normal.       Labs:  Lab Results   Component Value Date    WBC 7.40 08/16/2023    HGB 12.2 08/16/2023    HCT 37.4 08/16/2023     (H) 08/16/2023     08/16/2023     I have spent 30 minutes with Patient  today in which greater than 50% of this time was spent in counseling/coordination of care regarding Diagnostic results, Risks and benefits of tx options, Instructions for management, Importance of tx compliance, Impressions, Counseling / Coordination of care, Documenting in the medical record, Reviewing / ordering tests, medicine, procedures   and Obtaining or reviewing history  . Patient voiced understanding and agreement in the above discussion. Aware to contact our office with questions/symptoms in the interim. This note has been generated by voice recognition software system. Therefore, there may be spelling, grammar, and or syntax errors. Please contact if questions arise.

## 2023-08-23 ENCOUNTER — CONSULT (OUTPATIENT)
Dept: GASTROENTEROLOGY | Facility: AMBULARY SURGERY CENTER | Age: 78
End: 2023-08-23
Payer: COMMERCIAL

## 2023-08-23 VITALS
DIASTOLIC BLOOD PRESSURE: 64 MMHG | HEART RATE: 70 BPM | BODY MASS INDEX: 25.72 KG/M2 | SYSTOLIC BLOOD PRESSURE: 118 MMHG | WEIGHT: 131 LBS | HEIGHT: 60 IN

## 2023-08-23 DIAGNOSIS — C78.7 METASTASIS TO LIVER (HCC): ICD-10-CM

## 2023-08-23 DIAGNOSIS — K63.1 PERFORATED BOWEL (HCC): Primary | ICD-10-CM

## 2023-08-23 DIAGNOSIS — C18.9 MALIGNANT NEOPLASM OF COLON, UNSPECIFIED PART OF COLON (HCC): ICD-10-CM

## 2023-08-23 DIAGNOSIS — D50.9 IRON DEFICIENCY ANEMIA, UNSPECIFIED IRON DEFICIENCY ANEMIA TYPE: ICD-10-CM

## 2023-08-23 DIAGNOSIS — Z93.2 S/P ILEOSTOMY (HCC): ICD-10-CM

## 2023-08-23 DIAGNOSIS — C18.9 ADENOCARCINOMA OF COLON (HCC): ICD-10-CM

## 2023-08-23 PROCEDURE — 99214 OFFICE O/P EST MOD 30 MIN: CPT | Performed by: INTERNAL MEDICINE

## 2023-08-23 RX ORDER — SCOLOPAMINE TRANSDERMAL SYSTEM 1 MG/1
PATCH, EXTENDED RELEASE TRANSDERMAL
COMMUNITY
Start: 2023-08-22

## 2023-08-23 RX ORDER — ONDANSETRON 4 MG/1
TABLET, ORALLY DISINTEGRATING ORAL
COMMUNITY
Start: 2023-08-16

## 2023-08-23 RX ORDER — TRIAMCINOLONE ACETONIDE 1 MG/G
CREAM TOPICAL
COMMUNITY
Start: 2023-08-19

## 2023-08-23 NOTE — PROGRESS NOTES
Consultation - [address] [place] [address] [facility]  Miley Mabry 69 y.o. female MRN: [id number]  Unit/Bed#:  Encounter: [id number]        Consults    ASSESSMENT/PLAN:     1. History of iron deficiency anemia-appears to have resolved on the most recent labs, patient was last seen in the beginning of the year prior to her diagnosis of colon cancer with mets to the liver. Patient has had history of colon cancer in 2015 and had bowel resection. She subsequently had recurrent colon cancer with mets to the liver in March of this year and required emergency ex lap, hernia repair and colectomy with end ileostomy. She currently has an ileostomy containing nonbloody/nonmelanotic output. Her hemoglobin has also normalized. She is currently undergoing chemotherapy for metastatic disease. Given her recent hospitalization for septic shock, hypotension episodes, need for anticoagulation for recent PE, I think it would be reasonable to hold off on endoscopic evaluation to identify any other causes of upper GI source of anemia. I discussed this plan with patient and also with Dr. Lee Borden.  -Would recommend close follow-up with oncology/colorectal surgery and palliative care. -Continue with pantoprazole 40 mg on daily basis.  -If patient develops any signs of GI bleed including bloody/tarry output, would recommend referral back to GI.      ______________________________________________________________________    Reason for Consult / Principal Problem: [unfilled]    HPI: Miley Mabry is a 69y.o. year old female with history of breast cancer diagnosed in 2012, colon cancer status post right hemicolectomy with liver mets diagnosed in March 2023, currently on chemotherapy every 2 weeks, previously seen in office by the PA in February for iron deficiency anemia presents for follow-up from [facility].   Patient was diagnosed with incarcerated ventral hernia that contained perforated colon cancer and underwent emergency ex lap, hernia repair, colectomy with end ileostomy in March. She subsequently developed PE and has been in the nursing home since. Most recently, patient was hospitalized again in July with septic shock and required admission to the ICU needing pressors. No infectious etiology was noted however hypotension was thought to be secondary to increased output from the stoma. She was placed on midodrine with improvement in her blood pressures. Patient denies any bloody output from the ileostomy, no black or tarry stools. She denies any symptoms of heartburn, is currently on pantoprazole to that she takes on daily basis. She does report occasional leakage from the ileostomy site and has an appointment with colorectal surgery within the next few months. She is unsure why she is here today. Her most recent hemoglobin is 12.2, platelets are 157. Her CEA is slowly improving to 7.6 from 19.7. Currently on chemotherapy, follows closely with oncology. Review of Systems: The remainder of the review of systems was negative except for the pertinent positives noted in HPI.      Historical Information   Past Medical History:   Diagnosis Date   • Acute embolism and thrombosis of deep vein of lower extremity (HCC)    • Adenocarcinoma of colon, [facility] ([address])    • Breast cancer ([address]) 2012    Right Breast    • Cancer ([address])     Colon   • Diabetes mellitus ([address])    • DVT (deep venous thrombosis) (HCC)    • GERD (gastroesophageal reflux disease)    • Hypertension    • Pes planus      Past Surgical History:   Procedure Laterality Date   • COLONOSCOPY     • HERNIA REPAIR N/A 2/9/2023    Procedure: EXPLORATORY LAPAROTOMY; ABDOMINAL WALL DEBRIDEMENT; REDUCTION VENTRAL HERNIA; LEFT COLON RESECTION; CREATION ILEOSTOMY; WASHOUT;  Surgeon: Ted Sargent DO;  [facility];  Service: General   • HYSTERECTOMY      complete @ age 29   • IR PORT PLACEMENT  6/28/2023   • IR PORT REMOVAL  9/12/2018   • MASTECTOMY Right 2012   • [place] COLONOSCOPY FLX DX W/COLLJ SPEC WHEN PFRMD N/A 8/23/2016    Procedure: COLONOSCOPY;  Surgeon: Elida Gtz MD;  [facility]: [facility]; Service: Colorectal   • [place] COLONOSCOPY FLX DX W/COLLJ SPEC WHEN PFRMD N/A 9/5/2017    Procedure: COLONOSCOPY;  Surgeon: Elida Gtz MD;  [facility]; Service: Colorectal   • [place] ESOPHAGOGASTRODUODENOSCOPY TRANSORAL DIAGNOSTIC N/A 9/5/2017    Procedure: ESOPHAGOGASTRODUODENOSCOPY (EGD); Surgeon: Jaqui Johnston DO;  [facility]; Service: Gastroenterology     Social History   Social History     Substance and Sexual Activity   Alcohol Use Not Currently     Social History     Substance and Sexual Activity   Drug Use No     Social History     Tobacco Use   Smoking Status Never   Smokeless Tobacco Never     Family History   Problem Relation Age of Onset   • Other Mother         chronic bronchitis   • Brain cancer Father    • Prostate cancer Paternal Grandfather 79   • Pancreatic cancer Maternal Aunt 39   • Breast cancer Neg Hx        Meds/Allergies     (Not in a hospital admission)    No current facility-administered medications for this visit. No Known Allergies    Objective     not currently breastfeeding. [unfilled]    PHYSICAL EXAM     GEN: Ill-appearing female seen sitting in chair, alert and oriented x3. HEENT: anicteric, MMM,   CV: RRR, no m/r/g  CHEST: CTA b/l, no WRR  ABD: +BS, soft, ileostomy in place with green/brown output. NEURO: aaox3    Lab Results:   No visits with results within 1 Day(s) from this visit.    Latest known visit with results is:   Hospital Outpatient Visit on 08/16/2023   Component Date Value   • CEA 08/16/2023 7.6 (H)    • WBC 08/16/2023 7.40    • RBC 08/16/2023 3.53 (L)    • Hemoglobin 08/16/2023 12.2    • Hematocrit 08/16/2023 37.4    • MCV 08/16/2023 106 (H)    • MCH 08/16/2023 34.6 (H)    • MCHC 08/16/2023 32.6    • RDW 08/16/2023 16.3 (H)    • MPV 08/16/2023 9.1    • Platelets 29/92/1344 259    • nRBC 08/16/2023 0    • Neutrophils Relative 08/16/2023 63    • Immat GRANS % 08/16/2023 0    • Lymphocytes Relative 08/16/2023 15    • Monocytes Relative 08/16/2023 16 (H)    • Eosinophils Relative 08/16/2023 5    • Basophils Relative 08/16/2023 1    • Neutrophils Absolute 08/16/2023 4.65    • Immature Grans Absolute 08/16/2023 0.03    • Lymphocytes Absolute 08/16/2023 1.08    • Monocytes Absolute 08/16/2023 1.20    • Eosinophils Absolute 08/16/2023 0.40    • Basophils Absolute 08/16/2023 0.04    • Sodium 08/16/2023 135    • Potassium 08/16/2023 4.5    • Chloride 08/16/2023 105    • CO2 08/16/2023 24    • ANION GAP 08/16/2023 6    • BUN 08/16/2023 18    • Creatinine 08/16/2023 0.68    • Glucose 08/16/2023 130    • Calcium 08/16/2023 9.4    • AST 08/16/2023 12    • ALT 08/16/2023 21    • Alkaline Phosphatase 08/16/2023 109    • Total Protein 08/16/2023 7.9    • Albumin 08/16/2023 3.9    • Total Bilirubin 08/16/2023 0.27    • eGFR 08/16/2023 84    • Magnesium 08/16/2023 1.9      Imaging Studies: I have personally reviewed pertinent films in PACS

## 2023-08-24 RX ORDER — SODIUM CHLORIDE 9 MG/ML
20 INJECTION, SOLUTION INTRAVENOUS ONCE
Status: CANCELLED | OUTPATIENT
Start: 2023-09-01

## 2023-08-24 RX ORDER — MAGNESIUM SULFATE HEPTAHYDRATE 40 MG/ML
2 INJECTION, SOLUTION INTRAVENOUS ONCE AS NEEDED
Status: CANCELLED | OUTPATIENT
Start: 2023-09-01

## 2023-08-24 RX ORDER — MAGNESIUM SULFATE HEPTAHYDRATE 40 MG/ML
4 INJECTION, SOLUTION INTRAVENOUS ONCE AS NEEDED
Status: CANCELLED | OUTPATIENT
Start: 2023-09-01

## 2023-08-25 ENCOUNTER — OFFICE VISIT (OUTPATIENT)
Dept: PALLIATIVE MEDICINE | Facility: CLINIC | Age: 78
End: 2023-08-25
Payer: COMMERCIAL

## 2023-08-25 VITALS
SYSTOLIC BLOOD PRESSURE: 120 MMHG | TEMPERATURE: 96.6 F | HEIGHT: 60 IN | DIASTOLIC BLOOD PRESSURE: 62 MMHG | BODY MASS INDEX: 25.58 KG/M2 | RESPIRATION RATE: 16 BRPM

## 2023-08-25 DIAGNOSIS — M54.2 NECK PAIN ON RIGHT SIDE: ICD-10-CM

## 2023-08-25 DIAGNOSIS — Z93.2 S/P ILEOSTOMY (HCC): ICD-10-CM

## 2023-08-25 DIAGNOSIS — C78.7 METASTASIS TO LIVER (HCC): ICD-10-CM

## 2023-08-25 DIAGNOSIS — C18.9 ADENOCARCINOMA OF COLON (HCC): Primary | ICD-10-CM

## 2023-08-25 DIAGNOSIS — Z17.0 MALIGNANT NEOPLASM OF RIGHT BREAST IN FEMALE, ESTROGEN RECEPTOR POSITIVE, UNSPECIFIED SITE OF BREAST (HCC): ICD-10-CM

## 2023-08-25 DIAGNOSIS — R21 FACIAL RASH: ICD-10-CM

## 2023-08-25 DIAGNOSIS — R29.898 NECK TIGHTNESS: ICD-10-CM

## 2023-08-25 DIAGNOSIS — C50.911 MALIGNANT NEOPLASM OF RIGHT BREAST IN FEMALE, ESTROGEN RECEPTOR POSITIVE, UNSPECIFIED SITE OF BREAST (HCC): ICD-10-CM

## 2023-08-25 PROCEDURE — 99214 OFFICE O/P EST MOD 30 MIN: CPT | Performed by: INTERNAL MEDICINE

## 2023-08-25 NOTE — PROGRESS NOTES
Outpatient Follow-Up - Palliative and Supportive Care   Prosper Dick 69 y.o. female [id number]    Assessment & Plan  Problem List Items Addressed This Visit        Digestive    Adenocarcinoma of colon [facility]) - Primary    Metastasis to liver ([address])       Other    S/P ileostomy ([address])    Malignant neoplasm of right breast in female, estrogen receptor positive ([address])   Other Visit Diagnoses     Neck pain on right side        Neck tightness        Facial rash              Medications adjusted this encounter:  Requested Prescriptions      No prescriptions requested or ordered in this encounter     No orders of the defined types were placed in this encounter. There are no discontinued medications. Symptom Management  Neck pain/stiffness  · Osteopathic Manipulative Treatment done at today's visit (thoracic inlet release, myofascial release, muscle energy technique)  · Continue Bengay cream (1st line) and Voltaren gel (2nd line) + tylenol PRN    Rash  Facial around eyes and malar-->xerosis cutis from chemo  · Continue good hygiene with moisturizing + hypoallergenic soft soaps with exfoliation     Psychosocial & Spiritual  · No issues currently, though nursing facility is a source of stress for her overall  · Palliative social workers continue to follow  · Personally spiritual; pursuit of goals and prayers/optimistic thoughts give her strength and gratitude     Goals of care/ ACP  · Very treatment focused without limits  · Ultimately she wants to be able to walk again and go back to the home she owns  · Wishes to continue IV and PO chemotherapy with Dr. Maryann Crocker  · Medical Power of  documentation on file     Follow-up in 3 weeks. Prosper Dick was seen today for symptoms and planning cares related to above illnesses.   I have reviewed the patient's controlled substance dispensing history in the Prescription Drug Monitoring Program in compliance with the [facility] regulations before prescribing any controlled substances. They are invited to continue to follow with us. If there are questions or concerns, please contact us through our clinic/answering service 24 hours a day, seven days a week. Pb Charlton DO  [facility] Palliative and Supportive Care  [phone]      Visit Information    Accompanied By: No one    Source of History: Self, Medical record    History Limitations: None    Contacts: Medical POA and friend Siri Head, [phone]    Chief Complaint  Chief Complaint   Patient presents with   • Follow-up   • Medication Refill       History of Present Illness      Rosa Isela Rodgers is a 69 y.o. female who presents in follow up of symptoms related to stage IV colon cancer s/p colostomy on chemotherapy and neck pain. Pertinent issues include: symptom management, disease process education and discussion of prognosis    Rosa Isela Rodgers is a 69 y.o. 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 She was first introduced to the Palliative team after being hospitalized in July 2023 after being hypotensive during a chemotherapy session, decreasing appetite, and generalized weakness. Despite her disease being uncurable and an overall poor prognosis, she has been very committed to continuing full cares without limitations.      She lives at nursing home, which is frustrating to her and it makes it difficult to organize transportation for appointments. She has ACP documents in the form of Medical Power of Carmichael Offer (signed May 30th, 2008 and to [employer]).      Since hospitalization, she had an oncology treatment with Dr. Jose Dewitt, in which she expressed desires to continue chemotherapy with capecitabine and  panitumumab.  At the last visit, she really only had discomfort in her neck, which we agreed to trial topical treatments. She recently saw Hem/Onc and Gastroenterology, who recommended continued current therapy with no escalation or procedures.     Since the last appointment, she feels well and has no issues other than persistent neck pain and stiffness. She would like to continue osteopathic manipulation, topical treatments, and tylenol. After 3-4 sessions of chemo, she developed a rash with dry skin on her face and around her eyes, but this doesn't cause any pain or itching. Emotionally, allthough she normally thinks positive thoughts and has hopes to return to walking without a wheelchair and to return to the home she owns, she gets discouraged by the lack of improvement in her health despite everything done. She gets strength from the pursuit of her goals and optimistic prayers/thoughts. Past medical, surgical, social, and family histories are reviewed and pertinent updates are made. Review of Systems   Constitutional: Negative for fever and malaise/fatigue. HENT: Negative for congestion and sore throat. Eyes: Negative for pain and visual disturbance. Cardiovascular: Negative for chest pain and dyspnea on exertion. Respiratory: Negative for cough and shortness of breath. Hematologic/Lymphatic: Negative for adenopathy. Does not bruise/bleed easily. Skin: Positive for rash. Negative for itching. Musculoskeletal: Positive for neck pain and stiffness. Negative for arthritis and joint swelling. Ambulatory dysfunction   Genitourinary: Negative for bladder incontinence and dysuria. Neurological: Positive for weakness. Negative for dizziness and headaches. Psychiatric/Behavioral: Negative for depression. The patient does not have insomnia and is not nervous/anxious.           Vital Signs    /62 (BP Location: Left arm, Patient Position: Sitting, Cuff Size: Standard)   Temp (!) 96.6 °F (35.9 °C) (Tympanic)   Resp 16   Ht 5' (1.524 m)   LMP  (LMP Unknown)   BMI 25.58 kg/m² Physical Exam and Objective Data  Physical Exam  Constitutional:       General: She is not in acute distress. Appearance: She is ill-appearing. HENT:      Head: Normocephalic and atraumatic. Right Ear: External ear normal.      Left Ear: External ear normal.      Nose: Nose normal.      Mouth/Throat:      Pharynx: Oropharynx is clear. Eyes:      Conjunctiva/sclera: Conjunctivae normal.   Cardiovascular:      Rate and Rhythm: Normal rate and regular rhythm. Pulses: Normal pulses. Pulmonary:      Effort: Pulmonary effort is normal.   Abdominal:      General: There is no distension. Palpations: Abdomen is soft. Tenderness: There is no abdominal tenderness. Musculoskeletal:      Right lower leg: No edema. Left lower leg: No edema. Skin:     Capillary Refill: Capillary refill takes 2 to 3 seconds. Coloration: Skin is pale. Skin is not jaundiced. Findings: Rash (and skin flaking around eyes and b/l malar regions of face) present. Neurological:      General: No focal deficit present. Mental Status: She is alert and oriented to person, place, and time. Psychiatric:         Mood and Affect: Mood normal.         Behavior: Behavior normal.         Thought Content: Thought content normal.         Judgment: Judgment normal.           Radiology and Laboratory:  I personally reviewed and interpreted the following results:  CMP and CBC and CEA from 8/16-->improvement of hemoglobin to 12.2 and improvement in CEA from 19.7 to 7.6    55 minutes was spent face to face with Agnes Noe with greater than 50% of the time spent in counseling or coordination of care including discussions of etiology of diagnosis, pathogenesis of diagnosis, instructions for disease self management, treatment instructions, follow up requirements and patient and family counseling/involvement in care. All of the patient's or agent's questions were answered during this discussion.

## 2023-08-30 ENCOUNTER — HOSPITAL ENCOUNTER (OUTPATIENT)
Dept: INFUSION CENTER | Facility: HOSPITAL | Age: 78
Discharge: HOME | End: 2023-08-30
Payer: COMMERCIAL

## 2023-08-30 DIAGNOSIS — Z95.828 PORT-A-CATH IN PLACE: Primary | ICD-10-CM

## 2023-08-30 DIAGNOSIS — C78.7 METASTASIS TO LIVER (HCC): ICD-10-CM

## 2023-08-30 DIAGNOSIS — C18.9 MALIGNANT NEOPLASM OF COLON, UNSPECIFIED PART OF COLON (HCC): ICD-10-CM

## 2023-08-30 DIAGNOSIS — M81.8 OTHER OSTEOPOROSIS WITHOUT CURRENT PATHOLOGICAL FRACTURE: ICD-10-CM

## 2023-08-30 DIAGNOSIS — C18.2 MALIGNANT NEOPLASM OF ASCENDING COLON (HCC): ICD-10-CM

## 2023-08-30 DIAGNOSIS — M85.80 OSTEOPENIA DUE TO CANCER THERAPY: ICD-10-CM

## 2023-08-30 DIAGNOSIS — Z17.0 MALIGNANT NEOPLASM OF RIGHT BREAST IN FEMALE, ESTROGEN RECEPTOR POSITIVE, UNSPECIFIED SITE OF BREAST (HCC): ICD-10-CM

## 2023-08-30 DIAGNOSIS — Z85.038 PERSONAL HISTORY OF OTHER MALIGNANT NEOPLASM OF LARGE INTESTINE: ICD-10-CM

## 2023-08-30 DIAGNOSIS — C50.911 MALIGNANT NEOPLASM OF RIGHT BREAST IN FEMALE, ESTROGEN RECEPTOR POSITIVE, UNSPECIFIED SITE OF BREAST (HCC): ICD-10-CM

## 2023-08-30 DIAGNOSIS — L27.0 DRUG-INDUCED SKIN RASH: Primary | ICD-10-CM

## 2023-08-30 LAB
ALBUMIN SERPL BCP-MCNC: 4.3 G/DL (ref 3.5–5)
ALP SERPL-CCNC: 93 U/L (ref 34–104)
ALT SERPL W P-5'-P-CCNC: 11 U/L (ref 7–52)
ANION GAP SERPL CALCULATED.3IONS-SCNC: 9 MMOL/L
AST SERPL W P-5'-P-CCNC: 14 U/L (ref 13–39)
BASOPHILS # BLD AUTO: 0.04 THOUSANDS/ÂΜL (ref 0–0.1)
BASOPHILS NFR BLD AUTO: 0 % (ref 0–1)
BILIRUB SERPL-MCNC: 0.29 MG/DL (ref 0.2–1)
BUN SERPL-MCNC: 25 MG/DL (ref 5–25)
CALCIUM SERPL-MCNC: 9.4 MG/DL (ref 8.4–10.2)
CHLORIDE SERPL-SCNC: 101 MMOL/L (ref 96–108)
CO2 SERPL-SCNC: 22 MMOL/L (ref 21–32)
CREAT SERPL-MCNC: 0.71 MG/DL (ref 0.6–1.3)
EOSINOPHIL # BLD AUTO: 0.62 THOUSAND/ÂΜL (ref 0–0.61)
EOSINOPHIL NFR BLD AUTO: 5 % (ref 0–6)
ERYTHROCYTE [DISTWIDTH] IN BLOOD BY AUTOMATED COUNT: 15.5 % (ref 11.6–15.1)
GFR SERPL CREATININE-BSD FRML MDRD: 82 ML/MIN/1.73SQ M
GLUCOSE SERPL-MCNC: 172 MG/DL (ref 65–140)
HCT VFR BLD AUTO: 38.2 % (ref 34.8–46.1)
HGB BLD-MCNC: 12.3 G/DL (ref 11.5–15.4)
IMM GRANULOCYTES # BLD AUTO: 0.06 THOUSAND/UL (ref 0–0.2)
IMM GRANULOCYTES NFR BLD AUTO: 0 % (ref 0–2)
LYMPHOCYTES # BLD AUTO: 0.91 THOUSANDS/ÂΜL (ref 0.6–4.47)
LYMPHOCYTES NFR BLD AUTO: 7 % (ref 14–44)
MAGNESIUM SERPL-MCNC: 1.7 MG/DL (ref 1.9–2.7)
MCH RBC QN AUTO: 34.2 PG (ref 26.8–34.3)
MCHC RBC AUTO-ENTMCNC: 32.2 G/DL (ref 31.4–37.4)
MCV RBC AUTO: 106 FL (ref 82–98)
MONOCYTES # BLD AUTO: 1.6 THOUSAND/ÂΜL (ref 0.17–1.22)
MONOCYTES NFR BLD AUTO: 12 % (ref 4–12)
NEUTROPHILS # BLD AUTO: 10.44 THOUSANDS/ÂΜL (ref 1.85–7.62)
NEUTS SEG NFR BLD AUTO: 76 % (ref 43–75)
NRBC BLD AUTO-RTO: 0 /100 WBCS
PLATELET # BLD AUTO: 233 THOUSANDS/UL (ref 149–390)
PMV BLD AUTO: 9.5 FL (ref 8.9–12.7)
POTASSIUM SERPL-SCNC: 4.8 MMOL/L (ref 3.5–5.3)
PROT SERPL-MCNC: 8 G/DL (ref 6.4–8.4)
RBC # BLD AUTO: 3.6 MILLION/UL (ref 3.81–5.12)
SODIUM SERPL-SCNC: 132 MMOL/L (ref 135–147)
WBC # BLD AUTO: 13.67 THOUSAND/UL (ref 4.31–10.16)

## 2023-08-30 PROCEDURE — 85025 COMPLETE CBC W/AUTO DIFF WBC: CPT

## 2023-08-30 PROCEDURE — 83735 ASSAY OF MAGNESIUM: CPT

## 2023-08-30 PROCEDURE — 80053 COMPREHEN METABOLIC PANEL: CPT

## 2023-08-30 RX ORDER — MINOCYCLINE HYDROCHLORIDE 100 MG/1
100 TABLET ORAL DAILY
Qty: 30 TABLET | Refills: 0 | Status: SHIPPED | OUTPATIENT
Start: 2023-08-30 | End: 2023-09-29

## 2023-08-30 RX ORDER — CLINDAMYCIN PHOSPHATE 10 MG/G
GEL TOPICAL 2 TIMES DAILY
Qty: 60 G | Refills: 0 | Status: SHIPPED | OUTPATIENT
Start: 2023-08-30

## 2023-08-30 NOTE — PROGRESS NOTES
Central labs drawn and sent. Pt c/o itching that occurs after her infusions. Pt has a mild rash on her upper extremities and face. Pt says it does not bother her at this moment, but it is very itchy the day after tx. Notified the office. Per Dave Pacheco RN, ok to tx on Friday. Prescription sent to pt pharmacy.

## 2023-09-01 ENCOUNTER — HOSPITAL ENCOUNTER (OUTPATIENT)
Dept: INFUSION CENTER | Facility: HOSPITAL | Age: 78
End: 2023-09-01
Attending: INTERNAL MEDICINE
Payer: COMMERCIAL

## 2023-09-01 VITALS
BODY MASS INDEX: 25.71 KG/M2 | WEIGHT: 130.95 LBS | TEMPERATURE: 97 F | SYSTOLIC BLOOD PRESSURE: 97 MMHG | RESPIRATION RATE: 16 BRPM | HEIGHT: 60 IN | HEART RATE: 96 BPM | DIASTOLIC BLOOD PRESSURE: 64 MMHG

## 2023-09-01 DIAGNOSIS — C18.9 ADENOCARCINOMA OF COLON (HCC): ICD-10-CM

## 2023-09-01 DIAGNOSIS — C78.7 METASTASIS TO LIVER (HCC): Primary | ICD-10-CM

## 2023-09-01 PROCEDURE — 96367 TX/PROPH/DG ADDL SEQ IV INF: CPT

## 2023-09-01 PROCEDURE — 96413 CHEMO IV INFUSION 1 HR: CPT

## 2023-09-01 RX ORDER — MAGNESIUM SULFATE HEPTAHYDRATE 40 MG/ML
2 INJECTION, SOLUTION INTRAVENOUS ONCE AS NEEDED
Status: DISCONTINUED | OUTPATIENT
Start: 2023-09-01 | End: 2023-09-04 | Stop reason: HOSPADM

## 2023-09-01 RX ORDER — SODIUM CHLORIDE 9 MG/ML
20 INJECTION, SOLUTION INTRAVENOUS ONCE
Status: COMPLETED | OUTPATIENT
Start: 2023-09-01 | End: 2023-09-01

## 2023-09-01 RX ADMIN — PANITUMUMAB 354 MG: 400 SOLUTION INTRAVENOUS at 11:43

## 2023-09-01 RX ADMIN — DIPHENHYDRAMINE HYDROCHLORIDE 25 MG: 50 INJECTION, SOLUTION INTRAMUSCULAR; INTRAVENOUS at 11:09

## 2023-09-01 RX ADMIN — MAGNESIUM SULFATE HEPTAHYDRATE 2 G: 40 INJECTION, SOLUTION INTRAVENOUS at 10:07

## 2023-09-01 RX ADMIN — SODIUM CHLORIDE 20 ML/HR: 0.9 INJECTION, SOLUTION INTRAVENOUS at 10:03

## 2023-09-01 NOTE — PROGRESS NOTES
Pt received Mg replacement and Vectibix today without complication. AVS provided to pt and transport.

## 2023-09-11 RX ORDER — SODIUM CHLORIDE 9 MG/ML
20 INJECTION, SOLUTION INTRAVENOUS ONCE
Status: CANCELLED | OUTPATIENT
Start: 2023-09-15

## 2023-09-11 RX ORDER — MAGNESIUM SULFATE HEPTAHYDRATE 40 MG/ML
4 INJECTION, SOLUTION INTRAVENOUS ONCE AS NEEDED
Status: CANCELLED | OUTPATIENT
Start: 2023-09-15

## 2023-09-11 RX ORDER — MAGNESIUM SULFATE HEPTAHYDRATE 40 MG/ML
2 INJECTION, SOLUTION INTRAVENOUS ONCE AS NEEDED
Status: CANCELLED | OUTPATIENT
Start: 2023-09-15

## 2023-09-13 ENCOUNTER — HOSPITAL ENCOUNTER (OUTPATIENT)
Dept: INFUSION CENTER | Facility: HOSPITAL | Age: 78
Discharge: HOME | End: 2023-09-13
Payer: COMMERCIAL

## 2023-09-13 DIAGNOSIS — M81.8 OTHER OSTEOPOROSIS WITHOUT CURRENT PATHOLOGICAL FRACTURE: ICD-10-CM

## 2023-09-13 DIAGNOSIS — C18.9 ADENOCARCINOMA OF COLON (HCC): ICD-10-CM

## 2023-09-13 DIAGNOSIS — Z95.828 PORT-A-CATH IN PLACE: ICD-10-CM

## 2023-09-13 DIAGNOSIS — Z17.0 MALIGNANT NEOPLASM OF RIGHT BREAST IN FEMALE, ESTROGEN RECEPTOR POSITIVE, UNSPECIFIED SITE OF BREAST: ICD-10-CM

## 2023-09-13 DIAGNOSIS — C18.2 MALIGNANT NEOPLASM OF ASCENDING COLON (HCC): Primary | ICD-10-CM

## 2023-09-13 DIAGNOSIS — C78.7 METASTASIS TO LIVER (HCC): ICD-10-CM

## 2023-09-13 DIAGNOSIS — C50.911 MALIGNANT NEOPLASM OF RIGHT BREAST IN FEMALE, ESTROGEN RECEPTOR POSITIVE, UNSPECIFIED SITE OF BREAST: ICD-10-CM

## 2023-09-13 DIAGNOSIS — C18.9 MALIGNANT NEOPLASM OF COLON, UNSPECIFIED PART OF COLON (HCC): ICD-10-CM

## 2023-09-13 DIAGNOSIS — M85.80 OSTEOPENIA DUE TO CANCER THERAPY: ICD-10-CM

## 2023-09-13 DIAGNOSIS — Z85.038 PERSONAL HISTORY OF OTHER MALIGNANT NEOPLASM OF LARGE INTESTINE: ICD-10-CM

## 2023-09-13 DIAGNOSIS — R97.8 ABNORMAL TUMOR MARKERS: ICD-10-CM

## 2023-09-13 LAB
ALBUMIN SERPL BCP-MCNC: 4.4 G/DL (ref 3.5–5)
ALP SERPL-CCNC: 88 U/L (ref 34–104)
ALT SERPL W P-5'-P-CCNC: 8 U/L (ref 7–52)
ANION GAP SERPL CALCULATED.3IONS-SCNC: 8 MMOL/L
AST SERPL W P-5'-P-CCNC: 11 U/L (ref 13–39)
BASOPHILS # BLD AUTO: 0.05 THOUSANDS/ÂΜL (ref 0–0.1)
BASOPHILS NFR BLD AUTO: 0 % (ref 0–1)
BILIRUB SERPL-MCNC: 0.19 MG/DL (ref 0.2–1)
BUN SERPL-MCNC: 23 MG/DL (ref 5–25)
CALCIUM SERPL-MCNC: 9.4 MG/DL (ref 8.4–10.2)
CEA SERPL-MCNC: 6.6 NG/ML (ref 0–3)
CHLORIDE SERPL-SCNC: 102 MMOL/L (ref 96–108)
CO2 SERPL-SCNC: 20 MMOL/L (ref 21–32)
CREAT SERPL-MCNC: 0.65 MG/DL (ref 0.6–1.3)
EOSINOPHIL # BLD AUTO: 0.43 THOUSAND/ÂΜL (ref 0–0.61)
EOSINOPHIL NFR BLD AUTO: 4 % (ref 0–6)
ERYTHROCYTE [DISTWIDTH] IN BLOOD BY AUTOMATED COUNT: 15.4 % (ref 11.6–15.1)
GFR SERPL CREATININE-BSD FRML MDRD: 85 ML/MIN/1.73SQ M
GLUCOSE SERPL-MCNC: 202 MG/DL (ref 65–140)
HCT VFR BLD AUTO: 39.9 % (ref 34.8–46.1)
HGB BLD-MCNC: 12.7 G/DL (ref 11.5–15.4)
IMM GRANULOCYTES # BLD AUTO: 0.12 THOUSAND/UL (ref 0–0.2)
IMM GRANULOCYTES NFR BLD AUTO: 1 % (ref 0–2)
LYMPHOCYTES # BLD AUTO: 1.01 THOUSANDS/ÂΜL (ref 0.6–4.47)
LYMPHOCYTES NFR BLD AUTO: 9 % (ref 14–44)
MAGNESIUM SERPL-MCNC: 1.2 MG/DL (ref 1.9–2.7)
MCH RBC QN AUTO: 34 PG (ref 26.8–34.3)
MCHC RBC AUTO-ENTMCNC: 31.8 G/DL (ref 31.4–37.4)
MCV RBC AUTO: 107 FL (ref 82–98)
MONOCYTES # BLD AUTO: 1.31 THOUSAND/ÂΜL (ref 0.17–1.22)
MONOCYTES NFR BLD AUTO: 11 % (ref 4–12)
NEUTROPHILS # BLD AUTO: 8.59 THOUSANDS/ÂΜL (ref 1.85–7.62)
NEUTS SEG NFR BLD AUTO: 75 % (ref 43–75)
NRBC BLD AUTO-RTO: 0 /100 WBCS
PLATELET # BLD AUTO: 326 THOUSANDS/UL (ref 149–390)
PMV BLD AUTO: 9.2 FL (ref 8.9–12.7)
POTASSIUM SERPL-SCNC: 4.9 MMOL/L (ref 3.5–5.3)
PROT SERPL-MCNC: 7.8 G/DL (ref 6.4–8.4)
RBC # BLD AUTO: 3.74 MILLION/UL (ref 3.81–5.12)
SODIUM SERPL-SCNC: 130 MMOL/L (ref 135–147)
WBC # BLD AUTO: 11.51 THOUSAND/UL (ref 4.31–10.16)

## 2023-09-13 PROCEDURE — 82378 CARCINOEMBRYONIC ANTIGEN: CPT

## 2023-09-13 PROCEDURE — 80053 COMPREHEN METABOLIC PANEL: CPT

## 2023-09-13 PROCEDURE — 83735 ASSAY OF MAGNESIUM: CPT

## 2023-09-13 PROCEDURE — 85025 COMPLETE CBC W/AUTO DIFF WBC: CPT

## 2023-09-15 ENCOUNTER — HOSPITAL ENCOUNTER (OUTPATIENT)
Dept: INFUSION CENTER | Facility: HOSPITAL | Age: 78
End: 2023-09-15
Attending: INTERNAL MEDICINE
Payer: COMMERCIAL

## 2023-09-15 ENCOUNTER — OFFICE VISIT (OUTPATIENT)
Dept: PALLIATIVE MEDICINE | Facility: CLINIC | Age: 78
End: 2023-09-15
Payer: COMMERCIAL

## 2023-09-15 VITALS
OXYGEN SATURATION: 93 % | TEMPERATURE: 97.2 F | SYSTOLIC BLOOD PRESSURE: 110 MMHG | HEART RATE: 113 BPM | DIASTOLIC BLOOD PRESSURE: 70 MMHG

## 2023-09-15 VITALS
TEMPERATURE: 97.1 F | BODY MASS INDEX: 25.58 KG/M2 | WEIGHT: 130.95 LBS | SYSTOLIC BLOOD PRESSURE: 127 MMHG | RESPIRATION RATE: 20 BRPM | DIASTOLIC BLOOD PRESSURE: 68 MMHG | HEART RATE: 114 BPM

## 2023-09-15 DIAGNOSIS — Z93.2 S/P ILEOSTOMY (HCC): ICD-10-CM

## 2023-09-15 DIAGNOSIS — C18.9 ADENOCARCINOMA OF COLON (HCC): Primary | ICD-10-CM

## 2023-09-15 DIAGNOSIS — R29.898 NECK TIGHTNESS: ICD-10-CM

## 2023-09-15 DIAGNOSIS — C78.7 METASTASIS TO LIVER (HCC): ICD-10-CM

## 2023-09-15 DIAGNOSIS — R21 FACIAL RASH: ICD-10-CM

## 2023-09-15 DIAGNOSIS — Z17.0 MALIGNANT NEOPLASM OF RIGHT BREAST IN FEMALE, ESTROGEN RECEPTOR POSITIVE, UNSPECIFIED SITE OF BREAST: ICD-10-CM

## 2023-09-15 DIAGNOSIS — C50.911 MALIGNANT NEOPLASM OF RIGHT BREAST IN FEMALE, ESTROGEN RECEPTOR POSITIVE, UNSPECIFIED SITE OF BREAST: ICD-10-CM

## 2023-09-15 DIAGNOSIS — M54.2 NECK PAIN ON RIGHT SIDE: ICD-10-CM

## 2023-09-15 PROCEDURE — 96366 THER/PROPH/DIAG IV INF ADDON: CPT

## 2023-09-15 PROCEDURE — 99214 OFFICE O/P EST MOD 30 MIN: CPT | Performed by: STUDENT IN AN ORGANIZED HEALTH CARE EDUCATION/TRAINING PROGRAM

## 2023-09-15 PROCEDURE — 96413 CHEMO IV INFUSION 1 HR: CPT

## 2023-09-15 PROCEDURE — 96367 TX/PROPH/DG ADDL SEQ IV INF: CPT

## 2023-09-15 RX ORDER — MAGNESIUM SULFATE HEPTAHYDRATE 40 MG/ML
4 INJECTION, SOLUTION INTRAVENOUS ONCE AS NEEDED
Status: DISCONTINUED | OUTPATIENT
Start: 2023-09-15 | End: 2023-09-18 | Stop reason: HOSPADM

## 2023-09-15 RX ORDER — SODIUM CHLORIDE 9 MG/ML
20 INJECTION, SOLUTION INTRAVENOUS ONCE
Status: COMPLETED | OUTPATIENT
Start: 2023-09-15 | End: 2023-09-15

## 2023-09-15 RX ADMIN — PANITUMUMAB 354 MG: 400 SOLUTION INTRAVENOUS at 12:25

## 2023-09-15 RX ADMIN — MAGNESIUM SULFATE HEPTAHYDRATE 4 G: 40 INJECTION, SOLUTION INTRAVENOUS at 09:39

## 2023-09-15 RX ADMIN — DIPHENHYDRAMINE HYDROCHLORIDE 25 MG: 50 INJECTION, SOLUTION INTRAMUSCULAR; INTRAVENOUS at 11:44

## 2023-09-15 RX ADMIN — SODIUM CHLORIDE 20 ML/HR: 0.9 INJECTION, SOLUTION INTRAVENOUS at 09:39

## 2023-09-15 NOTE — PROGRESS NOTES
Outpatient Follow-Up - Palliative and Supportive Care   Alyssa Humphreys 69 y.o. female [id number]    Assessment & Plan  Problem List Items Addressed This Visit        Digestive    Adenocarcinoma of colon [facility]) - Primary    Metastasis to liver ([address])       Other    S/P ileostomy ([address])    Malignant neoplasm of right breast in female, estrogen receptor positive ([address])   Other Visit Diagnoses     Neck pain on right side        Neck tightness        Facial rash              Medications adjusted this encounter:  Requested Prescriptions      No prescriptions requested or ordered in this encounter     No orders of the defined types were placed in this encounter. There are no discontinued medications. Symptom Management  Neck pain/stiffness  · Osteopathic Manipulative Treatment done at today's visit (cervical spine SCM muscle energy technique, myofascial release, counterstrain)  · Continue Bengay cream (1st line) and Voltaren gel (2nd line) + tylenol PRN     Rash  Facial around eyes and malar-->likely xerosis cutis from Panitumumab  · Thick alcohol free emollient; apply BID after washing  · 2nd line-->1% or 2.5% hydrocortisone cream to rash  · 3rd line-->loratidine 10mg QD     Psychosocial & Spiritual  · No issues currently, though nursing facility is a source of stress for her overall  · Palliative social workers continue to follow  · Personally spiritual; pursuit of goals and prayers/optimistic thoughts give her strength and gratitude     Goals of care/ ACP  · Very treatment focused without limits  · Ultimately she wants to be able to walk again and go back to the home she owns  · Wishes to continue IV and PO cancer-related treatment with Dr. Avtar Calix  · Medical Power of  documentation on file     Follow-up in 4 weeks. Alyssa Humphreys was seen today for symptoms and planning cares related to above illnesses.   I have reviewed the patient's controlled substance dispensing history in the Prescription Drug Monitoring Program in compliance with the [facility] regulations before prescribing any controlled substances. They are invited to continue to follow with us. If there are questions or concerns, please contact us through our clinic/answering service 24 hours a day, seven days a week. Jerome Ordoñez DO  [facility] Palliative and Supportive Care  [phone]      Visit Information    Accompanied By: No one    Source of History: Self, Medical record    History Limitations: None    Contacts: Medical POA and friend Cristy Rui [phone]    Chief Complaint  Chief Complaint   Patient presents with   • Follow-up       History of Present Illness      Annabel Lassiter is a 69 y.o. female who presents in follow up of symptoms related to stage IV colon cancer s/p colostomy on MAB treatment, neck pain, and skin rash. Pertinent issues include: symptom management, disease process education and discussion of prognosis    Annabel Lassiter is a 69 y.o. 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 She was first introduced to the Palliative team after being hospitalized in July 2023 after being hypotensive during a chemotherapy session, decreasing appetite, and generalized weakness. Despite her disease being uncurable and an overall poor prognosis, she has been very committed to continuing full cares without limitations.      She lives at nursing home, which is frustrating to her and it makes it difficult to organize transportation for appointments. She has ACP documents in the form of Medical Power of Francisco Javier Nickerson (signed May 30th, 2008 and to [employer]).      Since hospitalization, she had an oncology treatment with Dr. Benny Miles, in which she expressed desires to continue treatment with capecitabine and  panitumumab.  She recently saw Hem/Onc and Gastroenterology, who recommended continued current therapy with no escalation or procedures.     Since the last appointment, she feels well and has no issues other than persistent neck pain and stiffness and rash that she believes is from her cancer treatment. She continues to have a rash with dry skin on her face and around her eyes, but this doesn't cause any pain or itching. She would like to continue osteopathic manipulation, topical treatments, and tylenol. Emotionally, allthough she normally thinks positive thoughts and has hopes to return to walking without a wheelchair and to return to the home she owns, she gets discouraged by the lack of improvement in her health despite everything done. She gets strength from the pursuit of her goals and optimistic prayers/thoughts. Unfortunately, has not been able to continue with physical therapy. Palliative CASSANDRA Wong was present for today's encounter and provided psychosocial support. Past medical, surgical, social, and family histories are reviewed and pertinent updates are made. Review of Systems   Constitutional: Negative for fever and malaise/fatigue. HENT: Negative for congestion and sore throat. Eyes: Negative for pain and visual disturbance. Cardiovascular: Negative for chest pain and dyspnea on exertion. Respiratory: Negative for cough and shortness of breath. Endocrine: Negative for cold intolerance and heat intolerance. Hematologic/Lymphatic: Negative for adenopathy. Does not bruise/bleed easily. Skin: Positive for rash. Negative for itching. Musculoskeletal: Positive for neck pain and stiffness. Negative for arthritis and joint swelling. Genitourinary: Negative for bladder incontinence and dysuria. Neurological: Negative for dizziness and headaches. Psychiatric/Behavioral: Negative for depression. The patient is not nervous/anxious.           Vital Signs    /70 (BP Location: Left arm, Patient Position: Sitting, Cuff Size: Standard)   Pulse (!) 113   Temp (!) 97.2 °F (36.2 °C) (Temporal)   LMP  (LMP Unknown)   SpO2 93%     Physical Exam and Objective Data  Physical Exam  Constitutional:       General: She is not in acute distress. Appearance: She is ill-appearing. HENT:      Head: Normocephalic and atraumatic. Right Ear: External ear normal.      Left Ear: External ear normal.      Nose: Nose normal.      Mouth/Throat:      Pharynx: Oropharynx is clear. Cardiovascular:      Rate and Rhythm: Normal rate and regular rhythm. Pulses:           Radial pulses are 1+ on the right side and 1+ on the left side. Abdominal:      Tenderness: There is no abdominal tenderness. There is no guarding or rebound. Comments: Ostomy present   Musculoskeletal:      Right lower leg: No edema. Left lower leg: No edema. Skin:     General: Skin is cool. Capillary Refill: Capillary refill takes 2 to 3 seconds. Coloration: Skin is pale. Findings: Rash (Dry scaling rash on areas of head, face, neck and b/l UE) present. Neurological:      General: No focal deficit present. Mental Status: She is oriented to person, place, and time. Psychiatric:         Mood and Affect: Mood normal.         Behavior: Behavior normal.         Thought Content:  Thought content normal.         Judgment: Judgment normal.           Radiology and Laboratory:  I personally reviewed and interpreted the following results:   9/13   CEA 6.6  Mag 1.2  CMP-->130 Na, 202 glucose, 20 HCO3  CBC-->11.51 WBC, Abs Neutrophils 8.59    45 minutes was spent face to face with [facility] with greater than 50% of the time spent in counseling or coordination of care including discussions of etiology of diagnosis, diagnostic results, impression, and recommendations, risks and benefits of treatment, instructions for disease self management, treatment instructions, follow up requirements and compliance with treatment regimen. All of the patient's or agent's questions were answered during this discussion.

## 2023-09-24 RX ORDER — MAGNESIUM SULFATE HEPTAHYDRATE 40 MG/ML
4 INJECTION, SOLUTION INTRAVENOUS ONCE AS NEEDED
Status: CANCELLED | OUTPATIENT
Start: 2023-09-29

## 2023-09-24 RX ORDER — SODIUM CHLORIDE 9 MG/ML
20 INJECTION, SOLUTION INTRAVENOUS ONCE
Status: CANCELLED | OUTPATIENT
Start: 2023-09-29

## 2023-09-24 RX ORDER — MAGNESIUM SULFATE HEPTAHYDRATE 40 MG/ML
2 INJECTION, SOLUTION INTRAVENOUS ONCE AS NEEDED
Status: CANCELLED | OUTPATIENT
Start: 2023-09-29

## 2023-09-25 ENCOUNTER — OFFICE VISIT (OUTPATIENT)
Dept: HEMATOLOGY ONCOLOGY | Facility: CLINIC | Age: 78
End: 2023-09-25
Payer: COMMERCIAL

## 2023-09-25 ENCOUNTER — APPOINTMENT (EMERGENCY)
Dept: RADIOLOGY | Facility: HOSPITAL | Age: 78
End: 2023-09-25
Payer: COMMERCIAL

## 2023-09-25 ENCOUNTER — HOSPITAL ENCOUNTER (EMERGENCY)
Facility: HOSPITAL | Age: 78
Discharge: HOME | End: 2023-09-26
Attending: EMERGENCY MEDICINE
Payer: COMMERCIAL

## 2023-09-25 VITALS
RESPIRATION RATE: 17 BRPM | TEMPERATURE: 96.2 F | OXYGEN SATURATION: 100 % | DIASTOLIC BLOOD PRESSURE: 62 MMHG | HEIGHT: 60 IN | SYSTOLIC BLOOD PRESSURE: 108 MMHG | HEART RATE: 90 BPM | BODY MASS INDEX: 25.58 KG/M2

## 2023-09-25 DIAGNOSIS — C78.7 METASTASIS TO LIVER (HCC): Primary | ICD-10-CM

## 2023-09-25 DIAGNOSIS — R41.82 ALTERED MENTAL STATUS, UNSPECIFIED ALTERED MENTAL STATUS TYPE: Primary | ICD-10-CM

## 2023-09-25 DIAGNOSIS — R91.8 PULMONARY NODULES: ICD-10-CM

## 2023-09-25 DIAGNOSIS — C18.9 ADENOCARCINOMA OF COLON (HCC): ICD-10-CM

## 2023-09-25 LAB
ATRIAL RATE: 84 BPM
BASOPHILS # BLD AUTO: 0.03 THOUSANDS/ÂΜL (ref 0–0.1)
BASOPHILS NFR BLD AUTO: 0 % (ref 0–1)
CARDIAC TROPONIN I PNL SERPL HS: 3 NG/L
EOSINOPHIL # BLD AUTO: 0.36 THOUSAND/ÂΜL (ref 0–0.61)
EOSINOPHIL NFR BLD AUTO: 4 % (ref 0–6)
ERYTHROCYTE [DISTWIDTH] IN BLOOD BY AUTOMATED COUNT: 15.2 % (ref 11.6–15.1)
HCT VFR BLD AUTO: 39.9 % (ref 34.8–46.1)
HGB BLD-MCNC: 13.5 G/DL (ref 11.5–15.4)
IMM GRANULOCYTES # BLD AUTO: 0.07 THOUSAND/UL (ref 0–0.2)
IMM GRANULOCYTES NFR BLD AUTO: 1 % (ref 0–2)
LYMPHOCYTES # BLD AUTO: 1.03 THOUSANDS/ÂΜL (ref 0.6–4.47)
LYMPHOCYTES NFR BLD AUTO: 10 % (ref 14–44)
MCH RBC QN AUTO: 34.4 PG (ref 26.8–34.3)
MCHC RBC AUTO-ENTMCNC: 33.8 G/DL (ref 31.4–37.4)
MCV RBC AUTO: 102 FL (ref 82–98)
MONOCYTES # BLD AUTO: 1.07 THOUSAND/ÂΜL (ref 0.17–1.22)
MONOCYTES NFR BLD AUTO: 11 % (ref 4–12)
NEUTROPHILS # BLD AUTO: 7.65 THOUSANDS/ÂΜL (ref 1.85–7.62)
NEUTS SEG NFR BLD AUTO: 74 % (ref 43–75)
NRBC BLD AUTO-RTO: 0 /100 WBCS
P AXIS: 34 DEGREES
PLATELET # BLD AUTO: 275 THOUSANDS/UL (ref 149–390)
PMV BLD AUTO: 9.3 FL (ref 8.9–12.7)
PR INTERVAL: 132 MS
QRS AXIS: -8 DEGREES
QRSD INTERVAL: 108 MS
QT INTERVAL: 372 MS
QTC INTERVAL: 439 MS
RBC # BLD AUTO: 3.93 MILLION/UL (ref 3.81–5.12)
T WAVE AXIS: -20 DEGREES
TSH SERPL DL<=0.05 MIU/L-ACNC: 9.72 UIU/ML (ref 0.45–4.5)
VENTRICULAR RATE: 84 BPM
WBC # BLD AUTO: 10.21 THOUSAND/UL (ref 4.31–10.16)

## 2023-09-25 PROCEDURE — 36415 COLL VENOUS BLD VENIPUNCTURE: CPT

## 2023-09-25 PROCEDURE — 99285 EMERGENCY DEPT VISIT HI MDM: CPT

## 2023-09-25 PROCEDURE — 71045 X-RAY EXAM CHEST 1 VIEW: CPT

## 2023-09-25 PROCEDURE — 99214 OFFICE O/P EST MOD 30 MIN: CPT | Performed by: PHYSICIAN ASSISTANT

## 2023-09-25 PROCEDURE — 84484 ASSAY OF TROPONIN QUANT: CPT

## 2023-09-25 PROCEDURE — 93010 ELECTROCARDIOGRAM REPORT: CPT | Performed by: INTERNAL MEDICINE

## 2023-09-25 PROCEDURE — 84443 ASSAY THYROID STIM HORMONE: CPT

## 2023-09-25 PROCEDURE — 99285 EMERGENCY DEPT VISIT HI MDM: CPT | Performed by: EMERGENCY MEDICINE

## 2023-09-25 PROCEDURE — G1004 CDSM NDSC: HCPCS

## 2023-09-25 PROCEDURE — 93005 ELECTROCARDIOGRAM TRACING: CPT

## 2023-09-25 PROCEDURE — 70450 CT HEAD/BRAIN W/O DYE: CPT

## 2023-09-25 PROCEDURE — 85025 COMPLETE CBC W/AUTO DIFF WBC: CPT

## 2023-09-25 NOTE — PROGRESS NOTES
Hematology/Oncology Outpatient Follow-up  Chase Shelton 69 y.o. female 1945 [id number]    Date:  9/25/2023      Assessment and Plan:  2. Adenocarcinoma of colon ([address]) 1. Metastasis to liver (HCC)   3. Pulmonary nodules  61-year-old female presents for follow-up regarding history of adenocarcinoma of the colon with liver metastases. She is receiving Vectibix 6 mg/kg every 2 weeks. She has acneform rash. Recommend minocycline 100 mg BID.      She is also taking Xeloda, 1500 mg twice daily 1 week on followed by 1 week off. Med list was reviewed at facility. This is being given correctly per medication record. Advised to continue.      I reviewed with her that her CEA continues to decrease. Other labs stable. Repeat imaging next month.      No change in therapy. Follow-up in approximately 1 month with Dr Paloma Ken.    - CT chest abdomen pelvis w contrast; Future    HPI:  Oncology History Overview Note    In 2012 patient was diagnosed to have Hormone receptor positive, HER-2 negative stage IIIB cancer in right breast. She had right mastectomy and lymph node dissection . 9 lymph nodes showed metastatic disease. Patient was given Adriamycin + Cytoxan chemotherapy followed by Taxotere and after that radiation therapy. Since November/December 2012 she has been on Femara. .  She will be on Femara for a total of 10 years. On 7/22/15 she underwent right hemicolectomy. Pathological diagnosis right colon cancer, stage I, T2 N0 MX, grade 2,no lymphovascular invasion and no perineural invasion. She did not require adjuvant therapy for colon cancer. She has been running slightly high CEA and that is being monitored.      Malignant neoplasm of right breast in female, estrogen receptor positive ([address])   6/21/2018 Initial Diagnosis    Malignant neoplasm of right breast in female, estrogen receptor positive [facility])      Surgery     2012- right mastectomy   2015 - right hemicolectomy      Radiation     [id number]: Radiation to right chest wall and lymph nodes      Chemotherapy     2012 -Adriamycin plus Cytoxan followed by Taxotere Followed by Femara. She will have Femara for a total of 10 years. Adenocarcinoma of colon ([address])   3/20/2023 Initial Diagnosis    Adenocarcinoma of colon ([address])     7/21/2023 -  Chemotherapy    alteplase (CATHFLO), 2 mg, Intracatheter, Every 1 Minute as needed, 5 of 11 cycles  panitumumab (VECTIBIX) IVPB, 6 mg/kg = 354 mg, Intravenous, Once, 5 of 11 cycles  Administration: 354 mg (7/21/2023), 354 mg (8/4/2023), 354 mg (8/17/2023), 354 mg (9/1/2023), 354 mg (9/15/2023)     Metastasis to liver ([address])   5/18/2023 Initial Diagnosis    Metastasis to liver ([address])     7/21/2023 -  Chemotherapy    alteplase (CATHFLO), 2 mg, Intracatheter, Every 1 Minute as needed, 5 of 11 cycles  panitumumab (VECTIBIX) IVPB, 6 mg/kg = 354 mg, Intravenous, Once, 5 of 11 cycles  Administration: 354 mg (7/21/2023), 354 mg (8/4/2023), 354 mg (8/17/2023), 354 mg (9/1/2023), 354 mg (9/15/2023)       In 2012 she was diagnosed to have stage 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.  She had right mastectomy, and lymph node dissection .  There were 9 positive lymph nodes. She had Adriamycin + Cytoxan chemotherapy followed by Taxotere and after that radiation therapy. Since November/December 2012 she has been on Femara and she will take Femara until December 2022. Carolynn Sweeney has osteoporosis and she has been on vitamin-D  and Prolia . Has problem swallowing calcium tablet.  No problem with her teeth for Prolia.     In 2016 she was found to have benign clustered calcifications in left breast and had a biopsy and that was benign. Dr. Foreign Pickett takes care of her mammography.      In December 2012 patient developed DVT right leg and she was started on Xarelto. She had GI bleeding in 2015. Xarelto was stopped. On colonoscopy she was found to have stage I right colon cancer.      On 7/22/15 she underwent right hemicolectomy. Pathological diagnosis right colon cancer, stage I, T2 N0 MX, grade 2, No lymphovascular invasion and no perineural invasion. She did not require adjuvant therapy for colon cancer.      On 2/9/2023  patient  presented with incarcerated ventral hernia.  Patient underwent an exploratory laparotomy, reduction of ventral hernia, left colectomy, and creation of an end ileostomy. Post-op she was taken to the ICU for close monitoring and resuscitation. On 2/10 she was found to have a PE of JOANNA and RLL arteries and started on a heparin infusion. A PICC line was placed for access to provide IV medications and access for blood draws. She was transferred out of the ICU on 2/11. Antibiotics were continued through 2/19. She was transitioned to [facility] on 2/17/23.  [address] recently from 7/7/2023 through 7/13/2023. Doris Ortiz was in the ICU with septic shock of unclear etiology.  Other diagnoses were pulmonary nodule that could be a new pulmonary metastatic disease, ileostomy for recurrent colon cancer, anemia, diabetes mellitus, history of DVT, diabetic ulcer of toe of left foot, osteoporosis and colon cancer.  She has recovered from septic shock.  She wants to continue on Xeloda, Vectibix and Prolia. Interval history: still at SNF, not able to do PT/OT anymore due to medicare no longer paying   Rash on body is mild itchy  Appetite fair     ROS: Review of Systems   Constitutional: Positive for fatigue. Respiratory: Negative for cough and shortness of breath. Cardiovascular: Negative for chest pain, palpitations and leg swelling. Gastrointestinal: Negative for abdominal pain and constipation. Has colostomy bag, banana flakes help to make stool harder   Genitourinary: Negative for difficulty urinating, dysuria and hematuria. Musculoskeletal: Negative for arthralgias and myalgias. Skin: Negative. Neurological: Positive for weakness. Hematological: Negative. Psychiatric/Behavioral: Negative.         Past Medical History:   Diagnosis Date   • Acute embolism and thrombosis of deep vein of lower extremity (HCC)    • Adenocarcinoma of colon, [facility] ([address])    • Breast cancer ([address]) 2012    Right Breast    • Cancer ([address])     Colon   • Diabetes mellitus ([address])    • DVT (deep venous thrombosis) ([address])    • GERD (gastroesophageal reflux disease)    • Hypertension    • Pes planus        Past Surgical History:   Procedure Laterality Date   • COLONOSCOPY     • HERNIA REPAIR N/A 2/9/2023    Procedure: EXPLORATORY LAPAROTOMY; ABDOMINAL WALL DEBRIDEMENT; REDUCTION VENTRAL HERNIA; LEFT COLON RESECTION; CREATION ILEOSTOMY; WASHOUT;  Surgeon: Madison Mir DO;  Location: [facility];  Service: General   • HYSTERECTOMY      complete @ age 29   • IR PORT PLACEMENT  6/28/2023   • IR PORT REMOVAL  9/12/2018   • MASTECTOMY Right 2012   • GA COLONOSCOPY FLX DX W/COLLJ SPEC WHEN PFRMD N/A 8/23/2016    Procedure: COLONOSCOPY;  Surgeon: Sandy Botello MD;  Location: [facility]; Service: Colorectal   • GA COLONOSCOPY FLX DX W/COLLJ SPEC WHEN PFRMD N/A 9/5/2017    Procedure: COLONOSCOPY;  Surgeon: Sandy Botello MD;  Location: [facility]; Service: Colorectal   • GA ESOPHAGOGASTRODUODENOSCOPY TRANSORAL DIAGNOSTIC N/A 9/5/2017    Procedure: ESOPHAGOGASTRODUODENOSCOPY (EGD); Surgeon: Anahy Knight DO;  Location: [facility];   Service: Gastroenterology       Social History     Socioeconomic History   • Marital status: Single     Spouse name: Not on file   • Number of children: Not on file   • Years of education: Not on file   • Highest education level: Not on file   Occupational History   • Occupation: retired   Tobacco Use   • Smoking status: Never   • Smokeless tobacco: Never   Vaping Use   • Vaping Use: Never used   Substance and Sexual Activity   • Alcohol use: Not Currently   • Drug use: No   • Sexual activity: Not Currently   Other Topics Concern   • Not on file   Social History Narrative    No advance directives     Social Determinants of Health     Financial Resource Strain: Not on file   Food Insecurity: No Food Insecurity (7/11/2023)    Hunger Vital Sign    • Worried About Running Out of Food in the Last Year: Never true    • Ran Out of Food in the Last Year: Never true   Transportation Needs: No Transportation Needs (7/11/2023)    PRAPARE - Transportation    • Lack of Transportation (Medical): No    • Lack of Transportation (Non-Medical): No   Physical Activity: Not on file   Stress: Not on file   Social Connections: Not on file   Intimate Partner Violence: Not on file   Housing Stability: Unknown (7/11/2023)    Housing Stability Vital Sign    • Unable to Pay for Housing in the Last Year: No    • Number of Places Lived in the Last Year: Not on file    • Unstable Housing in the Last Year: No       Family History   Problem Relation Age of Onset   • Other Mother         chronic bronchitis   • Brain cancer Father    • Prostate cancer Paternal Grandfather 79   • Pancreatic cancer Maternal Aunt 39   • Breast cancer Neg Hx        No Known Allergies      Current Outpatient Medications:   •  acetaminophen (TYLENOL) 325 mg tablet, Take 2 tablets (650 mg total) by mouth every 8 (eight) hours, Disp: , Rfl: 0  •  ascorbic acid (VITAMIN C) 500 MG tablet, Take 1,000 mg by mouth daily , Disp: , Rfl:   •  BANANA FLAKES PO, Take 1 Units by mouth 2 (two) times a day, Disp: , Rfl:   •  calcitonin, salmon, (MIACALCIN) 200 units/act nasal spray, , Disp: , Rfl:   •  calcium citrate (CALCITRATE) 950 MG tablet, Take 1 tablet by mouth in the morning., Disp: , Rfl:   •  capecitabine (XELODA) 500 MG tablet, Take 3 tablets (1,500 mg total) by mouth 2 (two) times a day 1 week on followed by 1 week off starting 7/7/23, Disp: 84 tablet, Rfl: 11  •  chlorhexidine (PERIDEX) 0.12 % solution, Apply 15 mL to the mouth or throat every 12 (twelve) hours, Disp: 120 mL, Rfl: 0  •  Diclofenac Sodium (VOLTAREN) 1 %, Apply to neck for pain/stiffness, 4 times a day as needed.  Only give if Bengay cream is not effective and has not been given that day., Disp: 100 g, Rfl: 1  •  Januvia 25 MG tablet, , Disp: , Rfl:   •  bimatoprost (LUMIGAN) 0.01 % ophthalmic drops, Administer 1 drop to both eyes daily at bedtime , Disp: , Rfl:   •  Cholecalciferol (VITAMIN D-3 PO), Take 1 capsule by mouth 3 (three) times a day  (Patient not taking: Reported on 9/25/2023), Disp: , Rfl:   •  clindamycin (CLINDAGEL) 1 % gel, Apply topically 2 (two) times a day To rash area as needed. (Patient not taking: Reported on 9/25/2023), Disp: 60 g, Rfl: 0  •  Cyanocobalamin (VITAMIN B 12 PO), Take 1 tablet by mouth daily. (Patient not taking: Reported on 9/25/2023), Disp: , Rfl:   •  insulin lispro (HumaLOG) 100 units/mL injection, Inject 1-6 Units under the skin 4 (four) times a day (before meals and at bedtime), Disp: , Rfl: 0  •  loperamide (IMODIUM) 2 mg capsule, Take 1 capsule (2 mg total) by mouth 3 (three) times a day with meals, Disp: 30 capsule, Rfl: 0  •  Menthol-Methyl Salicylate (JACQUES PRINCE GREASELESS) 10-15 % greaseless cream, Apply topically 3 (three) times a day Try first line for neck pain and stiffness. , Disp: 57 g, Rfl: 1  •  metFORMIN (GLUCOPHAGE) 500 mg tablet, Take 500 mg by mouth 2 (two) times a day with meals, Disp: , Rfl:   •  miconazole (MICOTIN) 2 % powder, Apply topically in the morning Apply to groin topically and under left breast topically every day and evening shift for red wash with soap and water, pat dry and then apply powder., Disp: , Rfl:   •  midodrine (PROAMATINE) 5 mg tablet, Take 1 tablet (5 mg total) by mouth 3 (three) times a day before meals, Disp: , Rfl: 0  •  minocycline (DYNACIN) 100 MG tablet, Take 1 tablet (100 mg total) by mouth daily, Disp: 30 tablet, Rfl: 0  •  ondansetron (ZOFRAN-ODT) 4 mg disintegrating tablet, , Disp: , Rfl:   •  pantoprazole (PROTONIX) 40 mg tablet, Take 1 tablet (40 mg total) by mouth daily in the early morning Do not start before February 24, 2023., Disp: , Rfl: 0  •  psyllium (METAMUCIL) packet, Take 1 packet by mouth 2 (two) times a day, Disp: , Rfl: 0  •  rivaroxaban (Xarelto) 10 mg tablet, Take 1 tablet (10 mg total) by mouth daily, Disp: 30 tablet, Rfl: 0  •  scopolamine (TRANSDERM-SCOP) 1 mg/3 days TD 72 hr patch, , Disp: , Rfl:   •  triamcinolone (KENALOG) 0.1 % cream, , Disp: , Rfl:   •  vitamin E 100 UNIT capsule, Take 100 Units by mouth daily , Disp: , Rfl:     Physical Exam:  /62 (BP Location: Left arm, Patient Position: Sitting, Cuff Size: Adult)   Pulse 90   Temp (!) 96.2 °F (35.7 °C)   Resp 17   Ht 5' (1.524 m)   LMP  (LMP Unknown)   SpO2 100%   BMI 25.58 kg/m²     Physical Exam  Constitutional:       General: She is not in acute distress. Appearance: She is well-developed. She is not ill-appearing. HENT:      Head: Normocephalic and atraumatic. Eyes:      General: No scleral icterus. Conjunctiva/sclera: Conjunctivae normal.   Cardiovascular:      Rate and Rhythm: Normal rate and regular rhythm. Heart sounds: Normal heart sounds. No murmur heard. Pulmonary:      Effort: Pulmonary effort is normal. No respiratory distress. Breath sounds: Normal breath sounds. Abdominal:      Palpations: Abdomen is soft. Tenderness: There is no abdominal tenderness. Musculoskeletal:         General: No tenderness. Normal range of motion. Cervical back: Normal range of motion and neck supple. Right lower leg: No edema. Left lower leg: No edema. Lymphadenopathy:      Cervical: No cervical adenopathy. Skin:     General: Skin is warm and dry. Neurological:      Mental Status: She is alert and oriented to person, place, and time. Cranial Nerves: No cranial nerve deficit.    Psychiatric:         Mood and Affect: Mood normal.         Behavior: Behavior normal.       Labs:  Lab Results   Component Value Date    WBC 11.51 (H) 09/13/2023    HGB 12.7 09/13/2023    HCT 39.9 09/13/2023     (H) 09/13/2023     09/13/2023     I have spent 30 minutes with Patient  today in which greater than 50% of this time was spent in counseling/coordination of care regarding Diagnostic results, Risks and benefits of tx options, Instructions for management, Impressions, Counseling / Coordination of care, Documenting in the medical record, Reviewing / ordering tests, medicine, procedures   and Obtaining or reviewing history  . Patient voiced understanding and agreement in the above discussion. Aware to contact our office with questions/symptoms in the interim. This note has been generated by voice recognition software system. Therefore, there may be spelling, grammar, and or syntax errors. Please contact if questions arise.

## 2023-09-26 VITALS
RESPIRATION RATE: 17 BRPM | OXYGEN SATURATION: 99 % | SYSTOLIC BLOOD PRESSURE: 116 MMHG | HEART RATE: 96 BPM | TEMPERATURE: 98.2 F | DIASTOLIC BLOOD PRESSURE: 58 MMHG

## 2023-09-26 LAB
ALBUMIN SERPL BCP-MCNC: 4.7 G/DL (ref 3.5–5)
ALP SERPL-CCNC: 96 U/L (ref 34–104)
ALT SERPL W P-5'-P-CCNC: 10 U/L (ref 7–52)
ANION GAP SERPL CALCULATED.3IONS-SCNC: 11 MMOL/L
AST SERPL W P-5'-P-CCNC: 14 U/L (ref 13–39)
BACTERIA UR QL AUTO: ABNORMAL /HPF
BILIRUB SERPL-MCNC: 0.29 MG/DL (ref 0.2–1)
BILIRUB UR QL STRIP: NEGATIVE
BUN SERPL-MCNC: 19 MG/DL (ref 5–25)
CALCIUM SERPL-MCNC: 9.6 MG/DL (ref 8.4–10.2)
CHLORIDE SERPL-SCNC: 100 MMOL/L (ref 96–108)
CLARITY UR: CLEAR
CO2 SERPL-SCNC: 21 MMOL/L (ref 21–32)
COLOR UR: ABNORMAL
CREAT SERPL-MCNC: 0.52 MG/DL (ref 0.6–1.3)
GFR SERPL CREATININE-BSD FRML MDRD: 92 ML/MIN/1.73SQ M
GLUCOSE SERPL-MCNC: 133 MG/DL (ref 65–140)
GLUCOSE UR STRIP-MCNC: NEGATIVE MG/DL
HGB UR QL STRIP.AUTO: NEGATIVE
KETONES UR STRIP-MCNC: NEGATIVE MG/DL
LEUKOCYTE ESTERASE UR QL STRIP: ABNORMAL
NITRITE UR QL STRIP: NEGATIVE
NON-SQ EPI CELLS URNS QL MICRO: ABNORMAL /HPF
PH UR STRIP.AUTO: 5.5 [PH]
POTASSIUM SERPL-SCNC: 4.5 MMOL/L (ref 3.5–5.3)
PROT SERPL-MCNC: 8.5 G/DL (ref 6.4–8.4)
PROT UR STRIP-MCNC: ABNORMAL MG/DL
RBC #/AREA URNS AUTO: ABNORMAL /HPF
SODIUM SERPL-SCNC: 132 MMOL/L (ref 135–147)
SP GR UR STRIP.AUTO: 1.01 (ref 1–1.03)
UROBILINOGEN UR STRIP-ACNC: <2 MG/DL
WBC #/AREA URNS AUTO: ABNORMAL /HPF

## 2023-09-26 PROCEDURE — 80053 COMPREHEN METABOLIC PANEL: CPT

## 2023-09-26 PROCEDURE — 81001 URINALYSIS AUTO W/SCOPE: CPT

## 2023-09-26 NOTE — ED PROCEDURE NOTE
Procedure  US Guided Peripheral IV    Date/Time: 9/26/2023 5:22 PM    Performed by: Keith Farmer MD  Authorized by: Keith Farmer MD    Patient location:  ED  Performed by:  Resident  Other Assisting Provider: No    Indications:     Indications: difficulty obtaining IV access      Image availability:  Not saved  Procedure details:     Location:  Left antecubital    Catheter size:  20 gauge    Number of attempts:  1    Successful placement: yes    Post-procedure details:     Assessment: free fluid flow and no signs of infiltration      Post-procedure complications: none      Patient tolerance of procedure:   Tolerated well, no immediate complications                     Keith Farmer MD  09/26/23 1728

## 2023-09-26 NOTE — ED NOTES
[facility] requested via roundtrip, pt facility made aware that pt is to return to them.       Danielito Ramon RN  09/26/23 [id number]

## 2023-09-26 NOTE — ED ATTENDING ATTESTATION
Odilia Thomas MD, saw and evaluated the patient. I have discussed the patient with the resident and agree with the resident's findings, Plan of Care, and MDM as documented in the resident's note, except where noted. All available labs and Radiology studies were reviewed. I was present for key portions of any procedure(s) performed by the resident and I was immediately available to provide assistance. At this point I agree with the current assessment done in the Emergency Department. I have conducted an independent evaluation of this patient a history and physical is as follows:    70 yo female with a complicated past medical history including metastatic adenocarcinoma of the colon s/p colonic resection with ostomy, GERD, DM, HTN, hypothyroidism, and prior DVT brought to the ED by EMS from [facility] for evaluation of a mental status change. Medics report that the patient has been much more confused than usual x several days. The patient says she feels "fine" and does not know why she was brought to the ED. No chest pain, shortness of breath, cough, nausea, vomiting, or diaphoresis. (+) Good appetite. No fevers or chills. No recent illnesses. No other identifiable complaints. ROS: per resident physician note    Gen: chronically ill appearing, AA&Ox3  HEENT: PERRL, EOMI  Neck: supple  CV: RRR  Lungs: CTA B/L  Abdomen: soft, NT/ND  Ext: no swelling or deformity  Neuro: 5/5 strength all extremities, sensation grossly intact  Skin: (+) diffuse maculopapular rash    ED Course  The patient is chronically ill but comfortable appearing with stable vital signs and a benign physical examination. EMS reported an altered mental status but she has no appreciable complaints. Unable to obtain further history from nursing facility or family at this time. Will check EKG, CXR, CT head, basic labs, troponin, TSH, and UA. Will continue to monitor in the ED. Disposition per workup and reassessment.       Critical Care Time  Procedures

## 2023-09-26 NOTE — ED PROVIDER NOTES
History  Chief Complaint   Patient presents with   • Altered Mental Status     Ams from [facility] since shift change there. Per ems pt is confused but follows commands. Patient is a 21-year-old female with past medical history of metastatic adenocarcinoma of the colon status post colon resection with placement of ostomy, DVT, hypertension, type 2 diabetes and GERD. She presents via EMS from nursing home for evaluation of altered mental status. Patient unaccompanied to hospital by staff or family members. Unable to obtain history from nursing home or family via phone. On evaluation, patient is unsure why she is at the hospital.  Believes that she is  A&O x3. No complaints at this time. Prior to Admission Medications   Prescriptions Last Dose Informant Patient Reported? Taking? BANANA FLAKES PO  Self Yes No   Sig: Take 1 Units by mouth 2 (two) times a day   Cholecalciferol (VITAMIN D-3 PO)  Self Yes No   Sig: Take 1 capsule by mouth 3 (three) times a day    Patient not taking: Reported on 9/25/2023   Cyanocobalamin (VITAMIN B 12 PO)  Self Yes No   Sig: Take 1 tablet by mouth daily. Patient not taking: Reported on 9/25/2023   Diclofenac Sodium (VOLTAREN) 1 %  Self No No   Sig: Apply to neck for pain/stiffness, 4 times a day as needed. Only give if Bengay cream is not effective and has not been given that day. Januvia 25 MG tablet  Self Yes No   Menthol-Methyl Salicylate (JACQUES PRINCE GREASELESS) 10-15 % greaseless cream  Self No No   Sig: Apply topically 3 (three) times a day Try first line for neck pain and stiffness.    acetaminophen (TYLENOL) 325 mg tablet  Self No No   Sig: Take 2 tablets (650 mg total) by mouth every 8 (eight) hours   ascorbic acid (VITAMIN C) 500 MG tablet  Self Yes No   Sig: Take 1,000 mg by mouth daily    bimatoprost (LUMIGAN) 0.01 % ophthalmic drops  Self Yes No   Sig: Administer 1 drop to both eyes daily at bedtime    calcitonin, salmon, (MIACALCIN) 200 units/act nasal spray Self Yes No   calcium citrate (CALCITRATE) 950 MG tablet  Self Yes No   Sig: Take 1 tablet by mouth in the morning. capecitabine (XELODA) 500 MG tablet  Self No No   Sig: Take 3 tablets (1,500 mg total) by mouth 2 (two) times a day 1 week on followed by 1 week off starting 7/7/23   chlorhexidine (PERIDEX) 0.12 % solution  Self No No   Sig: Apply 15 mL to the mouth or throat every 12 (twelve) hours   clindamycin (CLINDAGEL) 1 % gel   No No   Sig: Apply topically 2 (two) times a day To rash area as needed. Patient not taking: Reported on 9/25/2023   insulin lispro (HumaLOG) 100 units/mL injection  Self No No   Sig: Inject 1-6 Units under the skin 4 (four) times a day (before meals and at bedtime)   loperamide (IMODIUM) 2 mg capsule  Self No No   Sig: Take 1 capsule (2 mg total) by mouth 3 (three) times a day with meals   metFORMIN (GLUCOPHAGE) 500 mg tablet  Self Yes No   Sig: Take 500 mg by mouth 2 (two) times a day with meals   miconazole (MICOTIN) 2 % powder  Self Yes No   Sig: Apply topically in the morning Apply to groin topically and under left breast topically every day and evening shift for red wash with soap and water, pat dry and then apply powder. midodrine (PROAMATINE) 5 mg tablet  Self No No   Sig: Take 1 tablet (5 mg total) by mouth 3 (three) times a day before meals   minocycline (DYNACIN) 100 MG tablet   No No   Sig: Take 1 tablet (100 mg total) by mouth daily   ondansetron (ZOFRAN-ODT) 4 mg disintegrating tablet  Self Yes No   pantoprazole (PROTONIX) 40 mg tablet  Self No No   Sig: Take 1 tablet (40 mg total) by mouth daily in the early morning Do not start before February 24, 2023.    psyllium (METAMUCIL) packet  Self No No   Sig: Take 1 packet by mouth 2 (two) times a day   rivaroxaban (Xarelto) 10 mg tablet  Self No No   Sig: Take 1 tablet (10 mg total) by mouth daily   scopolamine (TRANSDERM-SCOP) 1 mg/3 days TD 72 hr patch  Self Yes No   triamcinolone (KENALOG) 0.1 % cream  Self Yes No vitamin E 100 UNIT capsule  Self Yes No   Sig: Take 100 Units by mouth daily       Facility-Administered Medications: None       Past Medical History:   Diagnosis Date   • Acute embolism and thrombosis of deep vein of lower extremity (HCC)    • Adenocarcinoma of colon, [facility] ([address])    • Breast cancer ([address]) 2012    Right Breast    • Cancer ([address])     Colon   • Diabetes mellitus ([address])    • DVT (deep venous thrombosis) ([address])    • GERD (gastroesophageal reflux disease)    • Hypertension    • Pes planus        Past Surgical History:   Procedure Laterality Date   • COLONOSCOPY     • HERNIA REPAIR N/A 2/9/2023    Procedure: EXPLORATORY LAPAROTOMY; ABDOMINAL WALL DEBRIDEMENT; REDUCTION VENTRAL HERNIA; LEFT COLON RESECTION; CREATION ILEOSTOMY; WASHOUT;  Surgeon: Raine Pacheco DO;  Location: [facility];  Service: General   • HYSTERECTOMY      complete @ age 29   • IR PORT PLACEMENT  6/28/2023   • IR PORT REMOVAL  9/12/2018   • MASTECTOMY Right 2012   • KS COLONOSCOPY FLX DX W/COLLJ SPEC WHEN PFRMD N/A 8/23/2016    Procedure: COLONOSCOPY;  Surgeon: Tootie Meyer MD;  Location: [facility]; Service: Colorectal   • KS COLONOSCOPY FLX DX W/COLLJ SPEC WHEN PFRMD N/A 9/5/2017    Procedure: COLONOSCOPY;  Surgeon: Tootie Meyer MD;  Location: [facility]; Service: Colorectal   • KS ESOPHAGOGASTRODUODENOSCOPY TRANSORAL DIAGNOSTIC N/A 9/5/2017    Procedure: ESOPHAGOGASTRODUODENOSCOPY (EGD); Surgeon: Juan Manuel Baez DO;  Location: [facility]; Service: Gastroenterology       Family History   Problem Relation Age of Onset   • Other Mother         chronic bronchitis   • Brain cancer Father    • Prostate cancer Paternal Grandfather 79   • Pancreatic cancer Maternal Aunt 39   • Breast cancer Neg Hx      I have reviewed and agree with the history as documented.     E-Cigarette/Vaping   • E-Cigarette Use Never User      E-Cigarette/Vaping Substances   • Nicotine No    • THC No    • CBD No    • Flavoring No    • Other No    • Unknown No Social History     Tobacco Use   • Smoking status: Never   • Smokeless tobacco: Never   Vaping Use   • Vaping Use: Never used   Substance Use Topics   • Alcohol use: Not Currently   • Drug use: No        Review of Systems   Constitutional: Negative for chills and fever. Respiratory: Negative for shortness of breath. Cardiovascular: Negative for chest pain. Gastrointestinal: Negative for abdominal pain. Genitourinary: Negative for difficulty urinating. Psychiatric/Behavioral: Positive for confusion. Physical Exam  ED Triage Vitals   Temperature Pulse Respirations Blood Pressure SpO2   09/25/23 1958 09/25/23 1932 09/25/23 1932 09/25/23 1932 09/25/23 1932   98.2 °F (36.8 °C) 74 18 144/64 98 %      Temp Source Heart Rate Source Patient Position - Orthostatic VS BP Location FiO2 (%)   09/25/23 1958 -- -- -- --   Rectal          Pain Score       --                    Orthostatic Vital Signs  Vitals:    09/25/23 2115 09/25/23 2130 09/25/23 2345 09/26/23 0245   BP: 112/55 116/58     Pulse: 80 82 (!) 116 96       Physical Exam  Constitutional:       General: She is not in acute distress. Appearance: She is ill-appearing. She is not toxic-appearing or diaphoretic. HENT:      Head: Normocephalic and atraumatic. Mouth/Throat:      Mouth: Mucous membranes are moist.      Pharynx: Oropharynx is clear. Eyes:      Extraocular Movements: Extraocular movements intact. Pupils: Pupils are equal, round, and reactive to light. Cardiovascular:      Rate and Rhythm: Normal rate and regular rhythm. Heart sounds: Normal heart sounds. Pulmonary:      Effort: Pulmonary effort is normal.      Breath sounds: Normal breath sounds. Abdominal:      General: Bowel sounds are normal. There is no distension. Palpations: Abdomen is soft. Tenderness: There is no abdominal tenderness. Musculoskeletal:         General: No swelling or tenderness. Normal range of motion.       Cervical back: Normal range of motion and neck supple. No rigidity. Lymphadenopathy:      Cervical: No cervical adenopathy. Skin:     General: Skin is warm and dry. Comments: Diffuse scaling and flaking of skin. Maculopapular rash, which patient states is chronic and 2/2 chemotherapy     Neurological:      Mental Status: She is alert and oriented to person, place, and time. GCS: GCS eye subscore is 4. GCS verbal subscore is 5. GCS motor subscore is 6. Sensory: No sensory deficit. Motor: No weakness. Coordination: Coordination normal.   Psychiatric:         Mood and Affect: Mood normal. Mood is not anxious or depressed. Speech: Speech normal.         Behavior: Behavior normal. Behavior is not agitated. Cognition and Memory: Cognition is impaired.          ED Medications  Medications - No data to display    Diagnostic Studies  Results Reviewed     Procedure Component Value Units Date/Time    Comprehensive metabolic panel [[id number]]  (Abnormal) Collected: 09/26/23 0007    Lab Status: Final result Specimen: Blood from Arm, Right Updated: 09/26/23 0033     Sodium 132 mmol/L      Potassium 4.5 mmol/L      Chloride 100 mmol/L      CO2 21 mmol/L      ANION GAP 11 mmol/L      BUN 19 mg/dL      Creatinine 0.52 mg/dL      Glucose 133 mg/dL      Calcium 9.6 mg/dL      AST 14 U/L      ALT 10 U/L      Alkaline Phosphatase 96 U/L      Total Protein 8.5 g/dL      Albumin 4.7 g/dL      Total Bilirubin 0.29 mg/dL      eGFR 92 ml/min/1.73sq m     Narrative:      [facility] guidelines for Chronic Kidney Disease (CKD):   •  Stage 1 with normal or high GFR (GFR > 90 mL/min/1.73 square meters)  •  Stage 2 Mild CKD (GFR = 60-89 mL/min/1.73 square meters)  •  Stage 3A Moderate CKD (GFR = 45-59 mL/min/1.73 square meters)  •  Stage 3B Moderate CKD (GFR = 30-44 mL/min/1.73 square meters)  •  Stage 4 Severe CKD (GFR = 15-29 mL/min/1.73 square meters)  •  Stage 5 End Stage CKD (GFR <15 mL/min/1.73 square meters)  Note: GFR calculation is accurate only with a steady state creatinine    Urine Microscopic [[id number]]  (Abnormal) Collected: 09/26/23 0016    Lab Status: Final result Specimen: Urine, Straight Cath Updated: 09/26/23 0032     RBC, UA None Seen /hpf      WBC, UA 4-10 /hpf      Epithelial Cells Occasional /hpf      Bacteria, UA None Seen /hpf     UA w Reflex to Microscopic w Reflex to Culture [[id number]]  (Abnormal) Collected: 09/26/23 0016    Lab Status: Final result Specimen: Urine, Straight Cath Updated: 09/26/23 0023     Color, UA Light Yellow     Clarity, UA Clear     Specific Gravity, UA 1.015     pH, UA 5.5     Leukocytes, UA Moderate     Nitrite, UA Negative     Protein, UA Trace mg/dl      Glucose, UA Negative mg/dl      Ketones, UA Negative mg/dl      Urobilinogen, UA <2.0 mg/dl      Bilirubin, UA Negative     Occult Blood, UA Negative    TSH [[id number]]  (Abnormal) Collected: 09/25/23 2042    Lab Status: Final result Specimen: Blood from Arm, Right Updated: 09/25/23 2129     TSH 3RD GENERATON 9.716 uIU/mL     HS Troponin 0hr (reflex protocol) [[id number]]  (Normal) Collected: 09/25/23 2042    Lab Status: Final result Specimen: Blood from Arm, Right Updated: 09/25/23 2129     hs TnI 0hr 3 ng/L     CBC and differential [[id number]]  (Abnormal) Collected: 09/25/23 2042    Lab Status: Final result Specimen: Blood from Arm, Right Updated: 09/25/23 2051     WBC 10.21 Thousand/uL      RBC 3.93 Million/uL      Hemoglobin 13.5 g/dL      Hematocrit 39.9 %       fL      MCH 34.4 pg      MCHC 33.8 g/dL      RDW 15.2 %      MPV 9.3 fL      Platelets 048 Thousands/uL      nRBC 0 /100 WBCs      Neutrophils Relative 74 %      Immat GRANS % 1 %      Lymphocytes Relative 10 %      Monocytes Relative 11 %      Eosinophils Relative 4 %      Basophils Relative 0 %      Neutrophils Absolute 7.65 Thousands/µL      Immature Grans Absolute 0.07 Thousand/uL      Lymphocytes Absolute 1.03 Thousands/µL Monocytes Absolute 1.07 Thousand/µL      Eosinophils Absolute 0.36 Thousand/µL      Basophils Absolute 0.03 Thousands/µL                  XR chest 1 view portable   ED Interpretation by Niharika Osuna MD (09/26 0211)   No focal infiltrate      CT head without contrast   Final Result by John Solorio MD (09/25 2204)      No acute intracranial abnormality. Workstation performed: [id number]               Procedures  ECG 12 Lead Documentation Only    Date/Time: 9/26/2023 3:12 AM    Performed by: Velma Campo MD  Authorized by: Velma Campo MD    ECG reviewed by me, the ED Provider: yes    Patient location:  ED  Previous ECG:     Previous ECG:  Compared to current    Comparison ECG info:  July 10 2023    Similarity:  Changes noted  Interpretation:     Interpretation: abnormal    Rate:     ECG rate:  84    ECG rate assessment: normal    Rhythm:     Rhythm: sinus rhythm    Ectopy:     Ectopy: none    ST segments:     ST segments:  Abnormal  T waves:     T waves comment:  Abnormal  Other findings:     Other findings: LVH    Comments:      Concern for lateral ischemia          ED Course                             SBIRT 20yo+    Flowsheet Row Most Recent Value   Initial Alcohol Screen: US AUDIT-C     1. How often do you have a drink containing alcohol? 0 Filed at: 09/25/2023 1949   2. How many drinks containing alcohol do you have on a typical day you are drinking? 0 Filed at: 09/25/2023 1949   3b. FEMALE Any Age, or MALE 65+: How often do you have 4 or more drinks on one occassion? 0 Filed at: 09/25/2023 1949   Audit-C Score 0 Filed at: 09/25/2023 1949   INOCENCIA: How many times in the past year have you. .. Used an illegal drug or used a prescription medication for non-medical reasons?  Never Filed at: 09/25/2023 Bela Baldwin is a 69 y.o. who presents via EMS from nursing home for AMS; unable to obtain history from family, nursing facility. Patient does not know why she was brought here    Vital signs are stable, afebrile   physical exam shows patient is calm, cooperative. A&Ox3. Occasional confabulations. No focal neurologic deficits    Ddx: will evaluate for intracranial abnormalities such as metastasis vs. Infection vs. Metabolic disturbance    Plan: CT head, chest x ray, and labwork WNL    Disposition: Patient stable for discharge to nursing facility. Recommend follow up with PCP within 1 day for evaluation of changes in mental status from baseline. Return precautions provided in AVS and explained to patient. Amount and/or Complexity of Data Reviewed  Labs: ordered. Radiology: ordered and independent interpretation performed. Disposition  Final diagnoses: Altered mental status, unspecified altered mental status type     Time reflects when diagnosis was documented in both MDM as applicable and the Disposition within this note     Time User Action Codes Description Comment    9/26/2023  2:12 AM Carmen Chapin Add [R41.82] Altered mental status, unspecified altered mental status type       ED Disposition     ED Disposition   Discharge    Condition   Stable    Date/Time   Tue Sep 26, 2023  2:12 AM    Comment   Arian Lo discharge to home/self care. Follow-up Information     Follow up With Specialties Details Why [address] [place] [address], DO Family Medicine Go in 1 day for reevaluation [address] [facility]  [phone]            Patient's Medications   Discharge Prescriptions    No medications on file     No discharge procedures on file. PDMP Review       Value Time User    PDMP Reviewed  Yes 9/15/2023  8:31 PM Loyd Ralph Sherian Kanner, DO           ED Provider  Attending physically available and evaluated Arian Lo. I managed the patient along with the ED Attending.     Electronically Signed by         Carmen Chapin MD  09/26/23 1074

## 2023-09-26 NOTE — DISCHARGE INSTRUCTIONS
Please return to the ED for worsening confusion, loss of consciousness, fever, chills, abdominal pain, pain with urination, chest pain, shortness of breath, or other concerns for infection.

## 2023-09-26 NOTE — ED NOTES
Ride accepted by [facility] at [address] [place] [address], facility made aware     Hayden Cortes, YOGESH  09/26/23 7269

## 2023-09-27 ENCOUNTER — HOSPITAL ENCOUNTER (OUTPATIENT)
Dept: INFUSION CENTER | Facility: HOSPITAL | Age: 78
Discharge: HOME | End: 2023-09-27
Payer: COMMERCIAL

## 2023-09-27 DIAGNOSIS — M85.80 OSTEOPENIA DUE TO CANCER THERAPY: ICD-10-CM

## 2023-09-27 DIAGNOSIS — Z95.828 PORT-A-CATH IN PLACE: ICD-10-CM

## 2023-09-27 DIAGNOSIS — M81.8 OTHER OSTEOPOROSIS WITHOUT CURRENT PATHOLOGICAL FRACTURE: ICD-10-CM

## 2023-09-27 DIAGNOSIS — Z85.038 PERSONAL HISTORY OF OTHER MALIGNANT NEOPLASM OF LARGE INTESTINE: ICD-10-CM

## 2023-09-27 DIAGNOSIS — C78.7 METASTASIS TO LIVER (HCC): ICD-10-CM

## 2023-09-27 DIAGNOSIS — Z17.0 MALIGNANT NEOPLASM OF RIGHT BREAST IN FEMALE, ESTROGEN RECEPTOR POSITIVE, UNSPECIFIED SITE OF BREAST: ICD-10-CM

## 2023-09-27 DIAGNOSIS — C18.9 MALIGNANT NEOPLASM OF COLON, UNSPECIFIED PART OF COLON (HCC): ICD-10-CM

## 2023-09-27 DIAGNOSIS — C50.911 MALIGNANT NEOPLASM OF RIGHT BREAST IN FEMALE, ESTROGEN RECEPTOR POSITIVE, UNSPECIFIED SITE OF BREAST: ICD-10-CM

## 2023-09-27 DIAGNOSIS — C18.2 MALIGNANT NEOPLASM OF ASCENDING COLON (HCC): Primary | ICD-10-CM

## 2023-09-27 LAB — MAGNESIUM SERPL-MCNC: 1.4 MG/DL (ref 1.9–2.7)

## 2023-09-27 PROCEDURE — 83735 ASSAY OF MAGNESIUM: CPT

## 2023-09-27 NOTE — PROGRESS NOTES
Pt here for central labs, pt noted to be recently in ER for altered mental status and blood work drawn. Reach out to office, per Boogie Sainz RN, do not need to draw any labs that were done in ED; therefore only Mag level drawn. Per office, pt will be OK to treat on Friday.   Pts acneform rash appears worse, pt states it is not itchy, does not hurt, her face is dry and red; however, pt states that at the facility they are putting the medicated lotion on her face, arms, etc. AVS printed

## 2023-09-29 ENCOUNTER — HOSPITAL ENCOUNTER (OUTPATIENT)
Dept: INFUSION CENTER | Facility: HOSPITAL | Age: 78
End: 2023-09-29
Attending: INTERNAL MEDICINE
Payer: COMMERCIAL

## 2023-09-29 VITALS
WEIGHT: 132.2 LBS | TEMPERATURE: 97 F | SYSTOLIC BLOOD PRESSURE: 119 MMHG | RESPIRATION RATE: 19 BRPM | DIASTOLIC BLOOD PRESSURE: 74 MMHG | OXYGEN SATURATION: 98 % | BODY MASS INDEX: 25.82 KG/M2 | HEART RATE: 97 BPM

## 2023-09-29 DIAGNOSIS — C78.7 METASTASIS TO LIVER (HCC): ICD-10-CM

## 2023-09-29 DIAGNOSIS — C18.9 ADENOCARCINOMA OF COLON (HCC): Primary | ICD-10-CM

## 2023-09-29 DIAGNOSIS — E83.42 HYPOMAGNESEMIA: ICD-10-CM

## 2023-09-29 PROCEDURE — 96366 THER/PROPH/DIAG IV INF ADDON: CPT

## 2023-09-29 PROCEDURE — 96413 CHEMO IV INFUSION 1 HR: CPT

## 2023-09-29 PROCEDURE — 96367 TX/PROPH/DG ADDL SEQ IV INF: CPT

## 2023-09-29 RX ORDER — MAGNESIUM SULFATE HEPTAHYDRATE 40 MG/ML
4 INJECTION, SOLUTION INTRAVENOUS ONCE AS NEEDED
Status: DISCONTINUED | OUTPATIENT
Start: 2023-09-29 | End: 2023-10-02 | Stop reason: HOSPADM

## 2023-09-29 RX ORDER — SODIUM CHLORIDE 9 MG/ML
20 INJECTION, SOLUTION INTRAVENOUS ONCE
Status: COMPLETED | OUTPATIENT
Start: 2023-09-29 | End: 2023-09-29

## 2023-09-29 RX ADMIN — SODIUM CHLORIDE 20 ML/HR: 0.9 INJECTION, SOLUTION INTRAVENOUS at 12:21

## 2023-09-29 RX ADMIN — MAGNESIUM SULFATE HEPTAHYDRATE 4 G: 40 INJECTION, SOLUTION INTRAVENOUS at 10:08

## 2023-09-29 RX ADMIN — PANITUMUMAB 354 MG: 400 SOLUTION INTRAVENOUS at 12:52

## 2023-09-29 RX ADMIN — DIPHENHYDRAMINE HYDROCHLORIDE 25 MG: 50 INJECTION, SOLUTION INTRAMUSCULAR; INTRAVENOUS at 12:21

## 2023-09-29 NOTE — PROGRESS NOTES
Patient given 4 grams of mg for mg level of 1.4 on labs from 9/27/23 as per order parameters. [facility] infusion complete. Printed AVS sent with patient.

## 2023-09-29 NOTE — PLAN OF CARE
Problem: Potential for Falls  Goal: Patient will remain free of falls  Description: INTERVENTIONS:  - Educate patient/family on patient safety including physical limitations  - Instruct patient to call for assistance with activity   - Consult OT/PT to assist with strengthening/mobility   - Keep Call bell within reach  - Keep bed low and locked with side rails adjusted as appropriate  - Keep care items and personal belongings within reach  - Initiate and maintain comfort rounds  Outcome: Progressing Walk in

## 2023-10-06 ENCOUNTER — TELEPHONE (OUTPATIENT)
Dept: INFUSION CENTER | Facility: HOSPITAL | Age: 78
End: 2023-10-06

## 2023-10-06 RX ORDER — MAGNESIUM SULFATE HEPTAHYDRATE 40 MG/ML
4 INJECTION, SOLUTION INTRAVENOUS ONCE AS NEEDED
OUTPATIENT
Start: 2023-10-13

## 2023-10-06 RX ORDER — MAGNESIUM SULFATE HEPTAHYDRATE 40 MG/ML
2 INJECTION, SOLUTION INTRAVENOUS ONCE AS NEEDED
OUTPATIENT
Start: 2023-10-13

## 2023-10-06 RX ORDER — SODIUM CHLORIDE 9 MG/ML
20 INJECTION, SOLUTION INTRAVENOUS ONCE
OUTPATIENT
Start: 2023-10-13

## 2023-10-06 NOTE — TELEPHONE ENCOUNTER
Patient called to confirm her upcoming appts. She was concerned her nursing facility was not aware of them. Call placed and left message to [facility] confirming patient's upcoming appts.

## 2023-10-09 ENCOUNTER — HOSPITAL ENCOUNTER (OUTPATIENT)
Dept: RADIOLOGY | Facility: HOSPITAL | Age: 78
Discharge: HOME | End: 2023-10-09
Payer: COMMERCIAL

## 2023-10-09 DIAGNOSIS — R91.8 PULMONARY NODULES: ICD-10-CM

## 2023-10-09 DIAGNOSIS — C18.9 ADENOCARCINOMA OF COLON (HCC): ICD-10-CM

## 2023-10-09 DIAGNOSIS — C78.7 METASTASIS TO LIVER (HCC): ICD-10-CM

## 2023-10-09 PROCEDURE — 71260 CT THORAX DX C+: CPT

## 2023-10-09 PROCEDURE — 74177 CT ABD & PELVIS W/CONTRAST: CPT

## 2023-10-09 PROCEDURE — G1004 CDSM NDSC: HCPCS

## 2023-10-09 RX ADMIN — IOHEXOL 100 ML: 350 INJECTION, SOLUTION INTRAVENOUS at 15:21

## 2023-10-11 ENCOUNTER — TELEPHONE (OUTPATIENT)
Dept: HEMATOLOGY ONCOLOGY | Facility: CLINIC | Age: 78
End: 2023-10-11

## 2023-10-11 ENCOUNTER — HOSPITAL ENCOUNTER (OUTPATIENT)
Dept: INFUSION CENTER | Facility: HOSPITAL | Age: 78
Discharge: HOME | End: 2023-10-11
Payer: COMMERCIAL

## 2023-10-11 DIAGNOSIS — C18.9 MALIGNANT NEOPLASM OF COLON, UNSPECIFIED PART OF COLON (HCC): ICD-10-CM

## 2023-10-11 DIAGNOSIS — C50.911 MALIGNANT NEOPLASM OF RIGHT BREAST IN FEMALE, ESTROGEN RECEPTOR POSITIVE, UNSPECIFIED SITE OF BREAST: Primary | ICD-10-CM

## 2023-10-11 DIAGNOSIS — C78.7 METASTASIS TO LIVER (HCC): ICD-10-CM

## 2023-10-11 DIAGNOSIS — M85.80 OSTEOPENIA DUE TO CANCER THERAPY: ICD-10-CM

## 2023-10-11 DIAGNOSIS — Z95.828 PORT-A-CATH IN PLACE: ICD-10-CM

## 2023-10-11 DIAGNOSIS — Z17.0 MALIGNANT NEOPLASM OF RIGHT BREAST IN FEMALE, ESTROGEN RECEPTOR POSITIVE, UNSPECIFIED SITE OF BREAST: Primary | ICD-10-CM

## 2023-10-11 DIAGNOSIS — M81.8 OTHER OSTEOPOROSIS WITHOUT CURRENT PATHOLOGICAL FRACTURE: ICD-10-CM

## 2023-10-11 DIAGNOSIS — Z85.038 PERSONAL HISTORY OF OTHER MALIGNANT NEOPLASM OF LARGE INTESTINE: ICD-10-CM

## 2023-10-11 DIAGNOSIS — C18.9 ADENOCARCINOMA OF COLON (HCC): ICD-10-CM

## 2023-10-11 LAB
ALBUMIN SERPL BCP-MCNC: 4.1 G/DL (ref 3.5–5)
ALP SERPL-CCNC: 102 U/L (ref 34–104)
ALT SERPL W P-5'-P-CCNC: 6 U/L (ref 7–52)
ANION GAP SERPL CALCULATED.3IONS-SCNC: 9 MMOL/L
AST SERPL W P-5'-P-CCNC: 11 U/L (ref 13–39)
BASOPHILS # BLD AUTO: 0.08 THOUSANDS/ÂΜL (ref 0–0.1)
BASOPHILS NFR BLD AUTO: 1 % (ref 0–1)
BILIRUB SERPL-MCNC: 0.33 MG/DL (ref 0.2–1)
BUN SERPL-MCNC: 22 MG/DL (ref 5–25)
CALCIUM SERPL-MCNC: 9.5 MG/DL (ref 8.4–10.2)
CHLORIDE SERPL-SCNC: 97 MMOL/L (ref 96–108)
CO2 SERPL-SCNC: 23 MMOL/L (ref 21–32)
CREAT SERPL-MCNC: 0.73 MG/DL (ref 0.6–1.3)
EOSINOPHIL # BLD AUTO: 0.64 THOUSAND/ÂΜL (ref 0–0.61)
EOSINOPHIL NFR BLD AUTO: 4 % (ref 0–6)
ERYTHROCYTE [DISTWIDTH] IN BLOOD BY AUTOMATED COUNT: 15.2 % (ref 11.6–15.1)
GFR SERPL CREATININE-BSD FRML MDRD: 79 ML/MIN/1.73SQ M
GLUCOSE SERPL-MCNC: 192 MG/DL (ref 65–140)
HCT VFR BLD AUTO: 38.2 % (ref 34.8–46.1)
HGB BLD-MCNC: 13.3 G/DL (ref 11.5–15.4)
IMM GRANULOCYTES # BLD AUTO: 0.18 THOUSAND/UL (ref 0–0.2)
IMM GRANULOCYTES NFR BLD AUTO: 1 % (ref 0–2)
LYMPHOCYTES # BLD AUTO: 0.92 THOUSANDS/ÂΜL (ref 0.6–4.47)
LYMPHOCYTES NFR BLD AUTO: 6 % (ref 14–44)
MAGNESIUM SERPL-MCNC: 1.1 MG/DL (ref 1.9–2.7)
MCH RBC QN AUTO: 34.5 PG (ref 26.8–34.3)
MCHC RBC AUTO-ENTMCNC: 34.8 G/DL (ref 31.4–37.4)
MCV RBC AUTO: 99 FL (ref 82–98)
MONOCYTES # BLD AUTO: 2.05 THOUSAND/ÂΜL (ref 0.17–1.22)
MONOCYTES NFR BLD AUTO: 13 % (ref 4–12)
NEUTROPHILS # BLD AUTO: 12.38 THOUSANDS/ÂΜL (ref 1.85–7.62)
NEUTS SEG NFR BLD AUTO: 75 % (ref 43–75)
NRBC BLD AUTO-RTO: 0 /100 WBCS
PLATELET # BLD AUTO: 279 THOUSANDS/UL (ref 149–390)
PMV BLD AUTO: 9.3 FL (ref 8.9–12.7)
POTASSIUM SERPL-SCNC: 4.4 MMOL/L (ref 3.5–5.3)
PROT SERPL-MCNC: 7.7 G/DL (ref 6.4–8.4)
RBC # BLD AUTO: 3.85 MILLION/UL (ref 3.81–5.12)
SODIUM SERPL-SCNC: 129 MMOL/L (ref 135–147)
WBC # BLD AUTO: 16.25 THOUSAND/UL (ref 4.31–10.16)

## 2023-10-11 PROCEDURE — 80053 COMPREHEN METABOLIC PANEL: CPT | Performed by: INTERNAL MEDICINE

## 2023-10-11 PROCEDURE — 83735 ASSAY OF MAGNESIUM: CPT | Performed by: INTERNAL MEDICINE

## 2023-10-11 PROCEDURE — 85025 COMPLETE CBC W/AUTO DIFF WBC: CPT | Performed by: INTERNAL MEDICINE

## 2023-10-11 NOTE — TELEPHONE ENCOUNTER
Patient Call    Who are you speaking with? Patient    If it is not the patient, are they listed on an active communication consent form? N/A   What is the reason for this call? Patient would like a phone call to discuss her infusion treatments. Does this require a call back? Yes   If a call back is required, please list best call back number [phone]  OR   [phone]     If a call back is required, advise that a message will be forwarded to their care team and someone will return their call as soon as possible. Did you relay this information to the patient?  Yes

## 2023-10-11 NOTE — PROGRESS NOTES
Port flushed and labs drawn as ordered. Pt given AVS, scheduled Friday for infusion. With patient consent, pic of rash sent to both Kendall MOELLER and Angelina vargas RN for review. PT states she is receiving cream at her facility for the rash. 0-49%

## 2023-10-11 NOTE — TELEPHONE ENCOUNTER
Returned call to patient - she explained she did not have any additional questions for me at this time. I have tried to call the nursing facility several times today and could not get through. I am trying to confirm if patient is taking minocycline as prescribed for her rash. I will try again tomorrow.   Dr. Deshawn Castellon is aware that patient has a rash from [facility]

## 2023-10-12 ENCOUNTER — TELEPHONE (OUTPATIENT)
Dept: HEMATOLOGY ONCOLOGY | Facility: CLINIC | Age: 78
End: 2023-10-12

## 2023-10-12 DIAGNOSIS — E83.42 HYPOMAGNESEMIA: Primary | ICD-10-CM

## 2023-10-12 RX ORDER — MAGNESIUM SULFATE HEPTAHYDRATE 40 MG/ML
4 INJECTION, SOLUTION INTRAVENOUS ONCE
Status: CANCELLED | OUTPATIENT
Start: 2023-10-16

## 2023-10-12 NOTE — TELEPHONE ENCOUNTER
Please schedule patient for IV magnesium on Monday 10/16/23  Order is in a therapy plan  Patient resides at a facility. Please notify facility with appointment date and time    Thank you!

## 2023-10-12 NOTE — TELEPHONE ENCOUNTER
Attempted to call nursing facility - no answer  Patients magnesium from yesterday - 1.1  Patient will get 4 grams of IV magnesium tomorrow at infusion when there for treatment. Dr. Carlos Miller ordered an additional day of magnesium on 10/16/23.   I sent a msg to infusion scheduling to arrange appointment  Spoke with infusion RN to update on plan     Will continue to try and call nursing facility to confirm if patient is taking minocycline

## 2023-10-12 NOTE — PROGRESS NOTES
SPoke with Angelina RN with dr Calvin Hensley. He is aware of patients rash, will proceed with treatment tomorrow, including 4g of Mag tomorrow and 4g Monday. She will send appt for Monday to scheduling. Also crystal to confirm with patients facility that she is on monocycline.

## 2023-10-13 ENCOUNTER — HOSPITAL ENCOUNTER (OUTPATIENT)
Dept: INFUSION CENTER | Facility: HOSPITAL | Age: 78
End: 2023-10-13
Attending: INTERNAL MEDICINE
Payer: COMMERCIAL

## 2023-10-13 ENCOUNTER — OFFICE VISIT (OUTPATIENT)
Dept: PALLIATIVE MEDICINE | Facility: CLINIC | Age: 78
End: 2023-10-13

## 2023-10-13 VITALS
TEMPERATURE: 97 F | SYSTOLIC BLOOD PRESSURE: 123 MMHG | DIASTOLIC BLOOD PRESSURE: 60 MMHG | BODY MASS INDEX: 25.72 KG/M2 | WEIGHT: 131.7 LBS | HEART RATE: 108 BPM

## 2023-10-13 VITALS
OXYGEN SATURATION: 97 % | TEMPERATURE: 97.5 F | DIASTOLIC BLOOD PRESSURE: 64 MMHG | SYSTOLIC BLOOD PRESSURE: 112 MMHG | HEART RATE: 108 BPM

## 2023-10-13 DIAGNOSIS — E83.42 HYPOMAGNESEMIA: Primary | ICD-10-CM

## 2023-10-13 DIAGNOSIS — C18.9 ADENOCARCINOMA OF COLON (HCC): ICD-10-CM

## 2023-10-13 DIAGNOSIS — C50.911 MALIGNANT NEOPLASM OF RIGHT BREAST IN FEMALE, ESTROGEN RECEPTOR POSITIVE, UNSPECIFIED SITE OF BREAST: ICD-10-CM

## 2023-10-13 DIAGNOSIS — C18.9 ADENOCARCINOMA OF COLON (HCC): Primary | ICD-10-CM

## 2023-10-13 DIAGNOSIS — Z17.0 MALIGNANT NEOPLASM OF RIGHT BREAST IN FEMALE, ESTROGEN RECEPTOR POSITIVE, UNSPECIFIED SITE OF BREAST: ICD-10-CM

## 2023-10-13 DIAGNOSIS — Z93.2 S/P ILEOSTOMY (HCC): ICD-10-CM

## 2023-10-13 DIAGNOSIS — E44.0 MODERATE PROTEIN-CALORIE MALNUTRITION (HCC): ICD-10-CM

## 2023-10-13 DIAGNOSIS — C78.7 METASTASIS TO LIVER (HCC): ICD-10-CM

## 2023-10-13 RX ORDER — SODIUM CHLORIDE 9 MG/ML
20 INJECTION, SOLUTION INTRAVENOUS ONCE
Status: COMPLETED | OUTPATIENT
Start: 2023-10-13 | End: 2023-10-13

## 2023-10-13 RX ORDER — MAGNESIUM SULFATE HEPTAHYDRATE 40 MG/ML
4 INJECTION, SOLUTION INTRAVENOUS ONCE AS NEEDED
Status: DISCONTINUED | OUTPATIENT
Start: 2023-10-13 | End: 2023-10-16 | Stop reason: HOSPADM

## 2023-10-13 RX ADMIN — DIPHENHYDRAMINE HYDROCHLORIDE 25 MG: 50 INJECTION, SOLUTION INTRAMUSCULAR; INTRAVENOUS at 11:56

## 2023-10-13 RX ADMIN — PANITUMUMAB 354 MG: 400 SOLUTION INTRAVENOUS at 12:35

## 2023-10-13 RX ADMIN — MAGNESIUM SULFATE HEPTAHYDRATE 4 G: 40 INJECTION, SOLUTION INTRAVENOUS at 09:54

## 2023-10-13 RX ADMIN — SODIUM CHLORIDE 20 ML/HR: 0.9 INJECTION, SOLUTION INTRAVENOUS at 09:53

## 2023-10-13 NOTE — PROGRESS NOTES
Outpatient Follow-Up - Palliative and Supportive Care   Karuna Zapata 69 y.o. female [id number]    Assessment & Plan  Problem List Items Addressed This Visit          Digestive    Adenocarcinoma of colon [facility]) - Primary    Metastasis to liver ([address])       Other    S/P ileostomy ([address])    Malignant neoplasm of right breast in female, estrogen receptor positive     Moderate protein-calorie malnutrition ([address])       Medications adjusted this encounter:  Requested Prescriptions      No prescriptions requested or ordered in this encounter     No orders of the defined types were placed in this encounter. There are no discontinued medications. Symptom management  Neck pain/stiffness: Osteopathic manipulative treatment done at today's visit (myofascial release of neck, effleurage of neck, cervical spine muscle energy technique). We will continue to do these at each visit and she will continue with Bengay and Voltaren as needed. Rash: Finishing a 12-week course of minocycline prescribed through oncology. If there is any concern for itching, can consider loratadine 10 mg daily. Increased ostomy output: Likely from minocycline--> recommended nursing home increase dose of Imodium to max 8 mg daily divided into 3-4 doses    ACP/goals of care  She is very treatment focused and ultimately wants to not only walk again, but also go back to the home she owns. She is willing to try anything with no limits and cares. Often referring to this as her "fight", it gives her meaning and purpose. Medical power of  documentation is on file. Psychosocial & Spiritual  No issues currently, though the nursing home is a significant source of stress for her overall wellbeing. Palliative social workers continue to follow peripherally. She is personally spiritual and ultimately her pursuit of goals and prayers/optimistic thoughts give her strength and gratitude.     Follow-up in 2 months        Karuna Zapata was seen today for symptoms and planning cares related to above illnesses. I have reviewed the patient's controlled substance dispensing history in the Prescription Drug Monitoring Program in compliance with the [facility] regulations before prescribing any controlled substances. They are invited to continue to follow with us. If there are questions or concerns, please contact us through our clinic/answering service 24 hours a day, seven days a week. Pb Charlton DO  [facility]  [phone]      Visit Information    Accompanied By: No one    Source of History: Self, Medical record    History Limitations: None    Contacts: Medical POA and friend Siri Adilene, [phone]     Chief Complaint  Chief Complaint   Patient presents with    Follow-up       History of Present Illness      Rosa Isela Rodgers is a 69 y.o. female who presents in follow up of symptoms related to stage IV colon cancer s/p colostomy on MAB treatment, neck pain, and skin rash. Pertinent issues include: symptom management, fatigue, disease process education and discussion of prognosis     Rosa Isela Rodgers is a 69 y.o. 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 She was first introduced to the Palliative team after being hospitalized in July 2023 after being hypotensive during a chemotherapy session, decreasing appetite, and generalized weakness. Despite her disease being uncurable and an overall poor prognosis, she has been very committed to continuing full cares without limitations. She currently follows with Dr. Jose Dewitt (oncology), and is undergoing treatment with capecitabine and panitumumab. She lives at nursing home, which is frustrating to her and it makes it difficult to organize transportation for appointments.  She has ACP documents in the form of Medical Power of Francisco Javier Nickerson (signed May 30th, 2008 and to [employer]). Since her last appointment on 9/15/2023, the rash around her face and head has gotten better with treatment by minocycline. This medication was prescribed by the oncology group. Unfortunately, she still has persistent acneiform rash on her bilateral upper extremities. It does not cause any discomfort or itching. She continues to experience persistent neck stiffness, though the pain has lessened. She remains positive for the future with all of her treatments and is hopeful she will walk again, though her functional ability is still poor--largely wheelchair-bound and generally weak. Past medical, surgical, social, and family histories are reviewed and pertinent updates are made. Review of Systems   Constitutional: Positive for malaise/fatigue. Negative for fever. HENT:  Negative for congestion and sore throat. Eyes:  Negative for pain and visual disturbance. Cardiovascular:  Negative for chest pain and dyspnea on exertion. Respiratory:  Negative for cough and shortness of breath. Endocrine: Negative for cold intolerance and heat intolerance. Hematologic/Lymphatic: Negative for adenopathy. Does not bruise/bleed easily. Skin:  Positive for rash. Negative for itching. Musculoskeletal:  Positive for muscle weakness (generalized), neck pain and stiffness. Negative for arthritis and joint swelling. Gastrointestinal:  Positive for diarrhea (increased ostomy output). Negative for nausea. Genitourinary:  Negative for bladder incontinence and dysuria. Neurological:  Negative for dizziness and headaches. Psychiatric/Behavioral:  Negative for depression. The patient is not nervous/anxious.           Vital Signs    /64 (BP Location: Left arm, Patient Position: Sitting)   Pulse (!) 108   Temp 97.5 °F (36.4 °C) (Temporal)   LMP  (LMP Unknown)   SpO2 97%     Physical Exam and Objective Data  Physical Exam  Constitutional:       General: She is not in acute distress. Appearance: She is ill-appearing. HENT:      Head: Normocephalic and atraumatic. Right Ear: External ear normal.      Left Ear: External ear normal.      Nose: Nose normal.      Mouth/Throat:      Pharynx: Oropharynx is clear. Eyes:      Conjunctiva/sclera: Conjunctivae normal.   Cardiovascular:      Rate and Rhythm: Normal rate and regular rhythm. Pulses:           Radial pulses are 1+ on the right side and 1+ on the left side. Pulmonary:      Effort: Pulmonary effort is normal.   Musculoskeletal:      Cervical back: Rigidity present. No tenderness. Right lower leg: No edema. Left lower leg: No edema. Skin:     Coloration: Skin is pale. Findings: Rash (acneform, b/l UE) present. Neurological:      Mental Status: She is alert. Radiology and Laboratory:  I personally reviewed and interpreted the following results:   10/11/2023  Mag low 1.1  CMP-hypoNa 129, low LFTs (AST 11, ALT 6)  CBC-leukocytosis 16.25  ---  09/25/2023  CT Head w/o contrast  -no acute intracranial abnormality  CXR  -linear atelectasis at the left lung base    10/09/2023  CT chest abdomen pelvis w contrast  -10/09/2023    40 minutes was spent face to face with Preet Paulino with greater than 50% of the time spent in counseling or coordination of care including discussions of etiology of diagnosis, pathogenesis of diagnosis, prognosis of diagnosis, diagnostic results, impression, and recommendations, risks and benefits of treatment, and instructions for disease self management. All of the patient's or agent's questions were answered during this discussion.

## 2023-10-18 NOTE — ED ATTENDING ATTESTATION
10/18/2023  IRajinder MD, saw and evaluated the patient. I have discussed the patient with the resident/non-physician practitioner and agree with the resident's/non-physician practitioner's findings, Plan of Care, and MDM as documented in the resident's/non-physician practitioner's note, except where noted. All available labs and Radiology studies were reviewed. I was present for key portions of any procedure(s) performed by the resident/non-physician practitioner and I was immediately available to provide assistance. At this point I agree with the current assessment done in the Emergency Department.   I have conducted an independent evaluation of this patient a history and physical is as follows:  Nausea   from a nursing home    No abd pain    has a colostomy  for colon CA    No documented  fever         patient is actively receiving chemo   Patient was sent in for concerns of small bowel obstruction or bowel obstruction  Patient does have watery stool from her colostomy site  Exam well-appearing no acute distress lungs clear heart regular abdomen soft benign tender however mildly distended ostomy site is clean and dry no blood noted in the colostomy bag    Impression nausea with abdominal distention concern for bowel obstruction versus worsening of static disease versus lecture light abnormalities versus anemia      ED Course         Critical Care Time  Procedures

## 2023-10-18 NOTE — ED PROVIDER NOTES
History  Chief Complaint   Patient presents with    Medical Problem     Told to come to ED due to confirmed bowel obstruction. Reports reduced amount of stool from colostomy. Denies abd pain. Hx colon cancer. HPI  MDM  Pt is a 50-year-old female, history of colon cancer with hemicolectomy and colostomy bag, active chemo treatment, is sent here from outpatient nursing home for an SBO. Patient does not give much history. States that she has been having nausea from 2 days. Denies emesis, abdominal pain, fever, chest pain, shortness of breath, urinary symptoms. Initial presentation pt is tachycardic, blood pressure normal, afebrile, satting well on room air. On exam   General: VSS, NAD, awake, alert. Talking normally. Chronically ill-appearing  Head: Normocephalic, atraumatic, nontender. Eyes: EOM-No subconjunctival hemorrhages. ENT: Nose atraumatic. MMM  No malocclusion. No stridor. Normal phonation. No drooling. Normal swallowing. Neck: Trachea midline. No JVD. CV: RRR. Lungs: CTAB No tachypnea. No paradoxical motion. Abd: +BS, soft, NT/ND. Colostomy intact, with normal output,. Benign abdominal exam, no distention. No guarding/rigidity. MSK: Full ROM throughout. No lower extremity edema. Skin: Dry, intact. Neuro: AAOx3, GCS 15, CN II-XII grossly intact. Motor/sensory grossly intact. Psychiatric/Behavioral: mood/affect normal; behavior normal; thought content normal; judgement normal   Exam: deferred      Ddx: SBO, sepsis, abdominal infection, uti. Plan: Tachycardic, chronically ill-appearing, otherwise hemodynamically stable. Patient was evaluated with sepsis labs, CT abdomen pelvis. Patient is sepsis without any endorgan damage or elevated lactate, patient was given 1.5 L of fluid, was started on empiric antibiotics, Zosyn. CT abdomen as interpreted by me was remarkable for SBO.   Surgery was consulted and admitted to their service further    Labs as interpreted by me leukocytosis, hemoglobin stable, mildly hyponatremic at 126 possibly secondary to elevated glucose. Mildly hyperkalemic at 5.6 without any EKG changes, T. bili normal,  Imaging as interpreted by me , as above    ED Course: Stable in the ED, was given fluids and antibiotics. Patient was not actively vomiting or nauseous, per surgery did not need an NG tube. Abdomen soft nondistended nontender  Final Dispo: Admitted to surgery for further evaluation and management    Prior to Admission Medications   Prescriptions Last Dose Informant Patient Reported? Taking? BANANA FLAKES PO  Self Yes No   Sig: Take 1 Units by mouth 2 (two) times a day   Cholecalciferol (VITAMIN D-3 PO)  Self Yes No   Sig: Take 1 capsule by mouth 3 (three) times a day    Patient not taking: Reported on 9/25/2023   Cyanocobalamin (VITAMIN B 12 PO)  Self Yes No   Sig: Take 1 tablet by mouth daily. Patient not taking: Reported on 9/25/2023   Diclofenac Sodium (VOLTAREN) 1 %  Self No No   Sig: Apply to neck for pain/stiffness, 4 times a day as needed. Only give if Bengay cream is not effective and has not been given that day. Januvia 25 MG tablet  Self Yes No   Menthol-Methyl Salicylate (JACQUES PRINCE GREASELESS) 10-15 % greaseless cream  Self No No   Sig: Apply topically 3 (three) times a day Try first line for neck pain and stiffness. acetaminophen (TYLENOL) 325 mg tablet  Self No No   Sig: Take 2 tablets (650 mg total) by mouth every 8 (eight) hours   ascorbic acid (VITAMIN C) 500 MG tablet  Self Yes No   Sig: Take 1,000 mg by mouth daily    bimatoprost (LUMIGAN) 0.01 % ophthalmic drops  Self Yes No   Sig: Administer 1 drop to both eyes daily at bedtime    calcitonin, salmon, (MIACALCIN) 200 units/act nasal spray  Self Yes No   calcium citrate (CALCITRATE) 950 MG tablet  Self Yes No   Sig: Take 1 tablet by mouth in the morning.    capecitabine (XELODA) 500 MG tablet  Self No No   Sig: Take 3 tablets (1,500 mg total) by mouth 2 (two) times a day 1 week on followed by 1 week off starting 7/7/23   chlorhexidine (PERIDEX) 0.12 % solution  Self No No   Sig: Apply 15 mL to the mouth or throat every 12 (twelve) hours   clindamycin (CLINDAGEL) 1 % gel   No No   Sig: Apply topically 2 (two) times a day To rash area as needed. Patient not taking: Reported on 9/25/2023   insulin lispro (HumaLOG) 100 units/mL injection  Self No No   Sig: Inject 1-6 Units under the skin 4 (four) times a day (before meals and at bedtime)   Patient not taking: Reported on 10/13/2023   loperamide (IMODIUM) 2 mg capsule  Self No No   Sig: Take 1 capsule (2 mg total) by mouth 3 (three) times a day with meals   metFORMIN (GLUCOPHAGE) 500 mg tablet  Self Yes No   Sig: Take 500 mg by mouth 2 (two) times a day with meals   miconazole (MICOTIN) 2 % powder  Self Yes No   Sig: Apply topically in the morning Apply to groin topically and under left breast topically every day and evening shift for red wash with soap and water, pat dry and then apply powder. midodrine (PROAMATINE) 5 mg tablet  Self No No   Sig: Take 1 tablet (5 mg total) by mouth 3 (three) times a day before meals   ondansetron (ZOFRAN-ODT) 4 mg disintegrating tablet  Self Yes No   pantoprazole (PROTONIX) 40 mg tablet  Self No No   Sig: Take 1 tablet (40 mg total) by mouth daily in the early morning Do not start before February 24, 2023.    psyllium (METAMUCIL) packet  Self No No   Sig: Take 1 packet by mouth 2 (two) times a day   rivaroxaban (Xarelto) 10 mg tablet  Self No No   Sig: Take 1 tablet (10 mg total) by mouth daily   scopolamine (TRANSDERM-SCOP) 1 mg/3 days TD 72 hr patch  Self Yes No   triamcinolone (KENALOG) 0.1 % cream  Self Yes No   vitamin E 100 UNIT capsule  Self Yes No   Sig: Take 100 Units by mouth daily       Facility-Administered Medications: None       Past Medical History:   Diagnosis Date    Acute embolism and thrombosis of deep vein of lower extremity (HCC)     Adenocarcinoma of colon, [facility] ([address]) Breast cancer ([address]) 2012    Right Breast     Cancer ([address])     Colon    Diabetes mellitus ([address])     DVT (deep venous thrombosis) ([address])     GERD (gastroesophageal reflux disease)     Hypertension     Pes planus        Past Surgical History:   Procedure Laterality Date    COLONOSCOPY      HERNIA REPAIR N/A 2/9/2023    Procedure: EXPLORATORY LAPAROTOMY; ABDOMINAL WALL DEBRIDEMENT; REDUCTION VENTRAL HERNIA; LEFT COLON RESECTION; CREATION ILEOSTOMY; WASHOUT;  Surgeon: Madison Mir DO;  Location: [facility];  Service: General    HYSTERECTOMY      complete @ age 29    IR PORT PLACEMENT  6/28/2023    IR PORT REMOVAL  9/12/2018    MASTECTOMY Right 2012    VT COLONOSCOPY FLX DX W/COLLJ SPEC WHEN PFRMD N/A 8/23/2016    Procedure: COLONOSCOPY;  Surgeon: Sandy Botello MD;  Location: [facility]; Service: Colorectal    VT COLONOSCOPY FLX DX W/COLLJ SPEC WHEN PFRMD N/A 9/5/2017    Procedure: COLONOSCOPY;  Surgeon: Sandy Botello MD;  Location: [facility]; Service: Colorectal    VT ESOPHAGOGASTRODUODENOSCOPY TRANSORAL DIAGNOSTIC N/A 9/5/2017    Procedure: ESOPHAGOGASTRODUODENOSCOPY (EGD); Surgeon: Anahy Knight DO;  Location: [facility]; Service: Gastroenterology       Family History   Problem Relation Age of Onset    Other Mother         chronic bronchitis    Brain cancer Father     Prostate cancer Paternal Grandfather 79    Pancreatic cancer Maternal Aunt 39    Breast cancer Neg Hx      I have reviewed and agree with the history as documented.     E-Cigarette/Vaping    E-Cigarette Use Never User      E-Cigarette/Vaping Substances    Nicotine No     THC No     CBD No     Flavoring No     Other No     Unknown No      Social History     Tobacco Use    Smoking status: Never    Smokeless tobacco: Never   Vaping Use    Vaping Use: Never used   Substance Use Topics    Alcohol use: Not Currently    Drug use: No        Review of Systems    Physical Exam  ED Triage Vitals   Temperature Pulse Respirations Blood Pressure SpO2   10/18/23 1919 10/18/23 1832 10/18/23 1832 10/18/23 1832 10/18/23 1832   97.8 °F (36.6 °C) 103 20 110/53 97 %      Temp Source Heart Rate Source Patient Position - Orthostatic VS BP Location FiO2 (%)   10/18/23 1919 10/18/23 1832 10/18/23 1832 10/18/23 1832 --   Oral Monitor Lying Right arm       Pain Score       10/18/23 1832       No Pain             Orthostatic Vital Signs  Vitals:    10/20/23 2130 10/20/23 2200 10/20/23 2300 10/20/23 2330   BP:       Pulse: (!) 134 (!) 126 (!) 126 (!) 124   Patient Position - Orthostatic VS:           Physical Exam    ED Medications  Medications   sodium chloride 0.9 % bolus 1,000 mL (0 mL Intravenous Stopped 10/18/23 2320)   ondansetron (ZOFRAN) injection 4 mg (4 mg Intravenous Given 10/18/23 1935)   iohexol (OMNIPAQUE) 350 MG/ML injection (SINGLE-DOSE) 100 mL (100 mL Intravenous Given 10/18/23 1956)   piperacillin-tazobactam (ZOSYN) 3.375 g in sodium chloride 0.9 % 100 mL IVPB (0 g Intravenous Stopped 10/18/23 2127)   sodium chloride 0.9 % bolus 500 mL (0 mL Intravenous Stopped 10/19/23 0005)   magnesium sulfate 2 g/50 mL IVPB (premix) 2 g (0 g Intravenous Stopped 10/19/23 1224)   sodium phosphate 21 mmol in dextrose 5 % 250 mL Infusion (0 mmol Intravenous Stopped 10/19/23 1610)   calcium gluconate 2 g in sodium chloride 0.9% 100 mL (premix) (0 g Intravenous Stopped 10/19/23 1419)   magnesium sulfate 2 g/50 mL IVPB (premix) 2 g (0 g Intravenous Stopped 10/20/23 1141)   potassium phosphates 21 mmol in sodium chloride 0.9 % 250 mL infusion (21 mmol Intravenous New Bag 10/20/23 0936)   calcium chloride 10 % injection **ADS Override Pull** (  Given 10/20/23 1922)   hydrocortisone (Solu-CORTEF) injection 100 mg (100 mg Intravenous Given 10/20/23 1845)   multi-electrolyte (ISOLYTE-S PH 7.4) bolus 1,000 mL (0 mL Intravenous Stopped 10/20/23 2002)   vancomycin (VANCOCIN) 1500 mg in sodium chloride 0.9% 250 mL IVPB (1,500 mg Intravenous New Bag 10/20/23 2135)   Ketamine HCl 60 mg (60 mg Intravenous Given 10/20/23 1845)   fentanyl citrate (PF) 100 MCG/2ML 100 mcg (100 mcg Intravenous Given 10/20/23 1923)   fentanyl citrate (PF) 100 MCG/2ML 50 mcg (50 mcg Intravenous Given 10/20/23 1900)   sodium bicarbonate 8.4 % injection 50 mEq (50 mEq Intravenous Given 10/20/23 1930)   sodium bicarbonate 8.4 % injection 50 mEq (50 mEq Intravenous Given 10/20/23 1830)   sodium bicarbonate 8.4 % injection 50 mEq (50 mEq Intravenous Given 10/20/23 1830)   sodium bicarbonate 8.4 % injection 50 mEq (50 mEq Intravenous Given 10/20/23 1900)   sodium bicarbonate 8.4 % injection 50 mEq (50 mEq Intravenous Given 10/20/23 2012)   multi-electrolyte (ISOLYTE-S PH 7.4) bolus 1,000 mL (0 mL Intravenous Stopped 10/20/23 2212)   methylene blue (PROVAYBLUE) 50 mg in dextrose 5 % 50 mL IVPB (50 mg Intravenous New Bag 10/20/23 2212)       Diagnostic Studies  Results Reviewed       Procedure Component Value Units Date/Time    Blood culture #1 [[id number]] Collected: 10/18/23 1919    Lab Status: Final result Specimen: Blood from Hand, Left Updated: 10/23/23 2201     Blood Culture No Growth After 5 Days. Blood culture #2 [[id number]] Collected: 10/18/23 1910    Lab Status: Final result Specimen: Blood from Arm, Right Updated: 10/23/23 2201     Blood Culture No Growth After 5 Days.     UA w Reflex to Microscopic w Reflex to Culture [[id number]]  (Abnormal) Collected: 10/20/23 2045    Lab Status: Final result Specimen: Urine, Clean Catch Updated: 10/20/23 2119     Color, UA Yellow     Clarity, UA Turbid     Specific Gravity, UA 1.016     pH, UA 5.5     Leukocytes, UA Large     Nitrite, UA Positive     Protein, UA 50 (1+) mg/dl      Glucose, UA 70 (7/100%) mg/dl      Ketones, UA 40 (2+) mg/dl      Urobilinogen, UA <2.0 mg/dl      Bilirubin, UA Negative     Occult Blood, UA Moderate    Basic metabolic panel [[id number]]  (Abnormal) Collected: 10/20/23 0500    Lab Status: Final result Specimen: Blood from Central Venous Line Updated: 10/20/23 2632     Sodium 141 mmol/L      Potassium 4.0 mmol/L      Chloride 111 mmol/L      CO2 18 mmol/L      ANION GAP 12 mmol/L      BUN 23 mg/dL      Creatinine 0.38 mg/dL      Glucose 145 mg/dL      Calcium 7.7 mg/dL      eGFR 102 ml/min/1.73sq m     Narrative:      [facility] guidelines for Chronic Kidney Disease (CKD):     Stage 1 with normal or high GFR (GFR > 90 mL/min/1.73 square meters)    Stage 2 Mild CKD (GFR = 60-89 mL/min/1.73 square meters)    Stage 3A Moderate CKD (GFR = 45-59 mL/min/1.73 square meters)    Stage 3B Moderate CKD (GFR = 30-44 mL/min/1.73 square meters)    Stage 4 Severe CKD (GFR = 15-29 mL/min/1.73 square meters)    Stage 5 End Stage CKD (GFR <15 mL/min/1.73 square meters)  Note: GFR calculation is accurate only with a steady state creatinine    Magnesium [[id number]]  (Abnormal) Collected: 10/20/23 0500    Lab Status: Final result Specimen: Blood from Central Venous Line Updated: 10/20/23 0552     Magnesium 1.5 mg/dL     Phosphorus [[id number]]  (Abnormal) Collected: 10/20/23 0500    Lab Status: Final result Specimen: Blood from Central Venous Line Updated: 10/20/23 0552     Phosphorus 1.4 mg/dL     CBC and differential [[id number]]  (Abnormal) Collected: 10/20/23 0500    Lab Status: Final result Specimen: Blood from Central Venous Line Updated: 10/20/23 0521     WBC 8.46 Thousand/uL      RBC 3.15 Million/uL      Hemoglobin 10.4 g/dL      Hematocrit 32.4 %       fL      MCH 33.0 pg      MCHC 32.1 g/dL      RDW 15.8 %      MPV 9.2 fL      Platelets 410 Thousands/uL      nRBC 0 /100 WBCs      Neutrophils Relative 76 %      Immat GRANS % 1 %      Lymphocytes Relative 5 %      Monocytes Relative 18 %      Eosinophils Relative 0 %      Basophils Relative 0 %      Neutrophils Absolute 6.45 Thousands/µL      Immature Grans Absolute 0.06 Thousand/uL      Lymphocytes Absolute 0.38 Thousands/µL      Monocytes Absolute 1.54 Thousand/µL      Eosinophils Absolute 0.00 Thousand/µL      Basophils Absolute 0.03 Thousands/µL     Fingerstick Glucose (POCT) [[id number]]  (Abnormal) Collected: 10/19/23 1131    Lab Status: Final result Updated: 10/19/23 1132     POC Glucose 166 mg/dl     Fingerstick Glucose (POCT) [[id number]]  (Abnormal) Collected: 10/19/23 0615    Lab Status: Final result Updated: 10/19/23 0617     POC Glucose 168 mg/dl     Phosphorus [[id number]]  (Normal) Collected: 10/19/23 0435    Lab Status: Final result Specimen: Blood from Vein Updated: 10/19/23 0540     Phosphorus 2.3 mg/dL     Magnesium [[id number]]  (Abnormal) Collected: 10/19/23 0435    Lab Status: Final result Specimen: Blood from Vein Updated: 10/19/23 0540     Magnesium 1.5 mg/dL     Basic metabolic panel [[id number]]  (Abnormal) Collected: 10/19/23 0435    Lab Status: Final result Specimen: Blood from Vein Updated: 10/19/23 0540     Sodium 132 mmol/L      Potassium 5.0 mmol/L      Chloride 103 mmol/L      CO2 19 mmol/L      ANION GAP 10 mmol/L      BUN 30 mg/dL      Creatinine 0.55 mg/dL      Glucose 162 mg/dL      Calcium 8.3 mg/dL      eGFR 90 ml/min/1.73sq m     Narrative:      [facility] guidelines for Chronic Kidney Disease (CKD):     Stage 1 with normal or high GFR (GFR > 90 mL/min/1.73 square meters)    Stage 2 Mild CKD (GFR = 60-89 mL/min/1.73 square meters)    Stage 3A Moderate CKD (GFR = 45-59 mL/min/1.73 square meters)    Stage 3B Moderate CKD (GFR = 30-44 mL/min/1.73 square meters)    Stage 4 Severe CKD (GFR = 15-29 mL/min/1.73 square meters)    Stage 5 End Stage CKD (GFR <15 mL/min/1.73 square meters)  Note: GFR calculation is accurate only with a steady state creatinine    Protime-INR [[id number]]  (Abnormal) Collected: 10/19/23 0435    Lab Status: Final result Specimen: Blood from Vein Updated: 10/19/23 0508     Protime 21.9 seconds      INR 1.95    CBC and differential [[id number]]  (Abnormal) Collected: 10/19/23 0435    Lab Status: Final result Specimen: Blood from Vein Updated: 10/19/23 0451     WBC 17.07 Thousand/uL      RBC 3.83 Million/uL      Hemoglobin 13.0 g/dL      Hematocrit 38.6 %       fL      MCH 33.9 pg      MCHC 33.7 g/dL      RDW 15.4 %      MPV 9.3 fL      Platelets 322 Thousands/uL      nRBC 0 /100 WBCs      Neutrophils Relative 83 %      Immat GRANS % 0 %      Lymphocytes Relative 2 %      Monocytes Relative 15 %      Eosinophils Relative 0 %      Basophils Relative 0 %      Neutrophils Absolute 13.99 Thousands/µL      Immature Grans Absolute 0.07 Thousand/uL      Lymphocytes Absolute 0.36 Thousands/µL      Monocytes Absolute 2.60 Thousand/µL      Eosinophils Absolute 0.01 Thousand/µL      Basophils Absolute 0.04 Thousands/µL     Basic metabolic panel [[id number]]  (Abnormal) Collected: 10/19/23 0047    Lab Status: Final result Specimen: Blood from Vein Updated: 10/19/23 0201     Sodium 129 mmol/L      Potassium 4.8 mmol/L      Chloride 99 mmol/L      CO2 18 mmol/L      ANION GAP 12 mmol/L      BUN 29 mg/dL      Creatinine 0.50 mg/dL      Glucose 185 mg/dL      Calcium 7.8 mg/dL      eGFR 93 ml/min/1.73sq m     Narrative:      [facility] guidelines for Chronic Kidney Disease (CKD):     Stage 1 with normal or high GFR (GFR > 90 mL/min/1.73 square meters)    Stage 2 Mild CKD (GFR = 60-89 mL/min/1.73 square meters)    Stage 3A Moderate CKD (GFR = 45-59 mL/min/1.73 square meters)    Stage 3B Moderate CKD (GFR = 30-44 mL/min/1.73 square meters)    Stage 4 Severe CKD (GFR = 15-29 mL/min/1.73 square meters)    Stage 5 End Stage CKD (GFR <15 mL/min/1.73 square meters)  Note: GFR calculation is accurate only with a steady state creatinine    Lactic acid, plasma (w/reflex if result > 2.0) [[id number]]  (Normal) Collected: 10/18/23 7912    Lab Status: Final result Specimen: Blood from Vein Updated: 10/19/23 0053     LACTIC ACID 0.6 mmol/L     Narrative:      Result may be elevated if tourniquet was used during collection.     Fingerstick Glucose (POCT) [[id number]]  (Abnormal) Collected: 10/19/23 0029    Lab Status: Final result Updated: 10/19/23 0046     POC Glucose 159 mg/dl     Protime-INR [[id number]]  (Abnormal) Collected: 10/18/23 1910    Lab Status: Final result Specimen: Blood from Arm, Right Updated: 10/18/23 1959     Protime 27.3 seconds      INR 2.60    APTT [[id number]]  (Normal) Collected: 10/18/23 1910    Lab Status: Final result Specimen: Blood from Arm, Right Updated: 10/18/23 1959     PTT 33 seconds     Procalcitonin [[id number]]  (Abnormal) Collected: 10/18/23 1910    Lab Status: Final result Specimen: Blood from Arm, Right Updated: 10/18/23 1955     Procalcitonin 0.37 ng/ml     Comprehensive metabolic panel [[id number]]  (Abnormal) Collected: 10/18/23 1910    Lab Status: Final result Specimen: Blood from Arm, Right Updated: 10/18/23 1943     Sodium 126 mmol/L      Potassium 5.6 mmol/L      Chloride 94 mmol/L      CO2 21 mmol/L      ANION GAP 11 mmol/L      BUN 34 mg/dL      Creatinine 0.66 mg/dL      Glucose 198 mg/dL      Calcium 9.3 mg/dL      AST 16 U/L      ALT 10 U/L      Alkaline Phosphatase 107 U/L      Total Protein 7.8 g/dL      Albumin 4.2 g/dL      Total Bilirubin 0.54 mg/dL      eGFR 85 ml/min/1.73sq m     Narrative:      [facility] guidelines for Chronic Kidney Disease (CKD):     Stage 1 with normal or high GFR (GFR > 90 mL/min/1.73 square meters)    Stage 2 Mild CKD (GFR = 60-89 mL/min/1.73 square meters)    Stage 3A Moderate CKD (GFR = 45-59 mL/min/1.73 square meters)    Stage 3B Moderate CKD (GFR = 30-44 mL/min/1.73 square meters)    Stage 4 Severe CKD (GFR = 15-29 mL/min/1.73 square meters)    Stage 5 End Stage CKD (GFR <15 mL/min/1.73 square meters)  Note: GFR calculation is accurate only with a steady state creatinine    Lactic acid [[id number]]  (Normal) Collected: 10/18/23 1910    Lab Status: Final result Specimen: Blood from Arm, Right Updated: 10/18/23 1942     LACTIC ACID 1.1 mmol/L     Narrative: Result may be elevated if tourniquet was used during collection. CBC and differential [[id number]]  (Abnormal) Collected: 10/18/23 1910    Lab Status: Final result Specimen: Blood from Arm, Right Updated: 10/18/23 1927     WBC 23.81 Thousand/uL      RBC 4.15 Million/uL      Hemoglobin 14.1 g/dL      Hematocrit 40.8 %      MCV 98 fL      MCH 34.0 pg      MCHC 34.6 g/dL      RDW 15.7 %      MPV 9.1 fL      Platelets 232 Thousands/uL      nRBC 0 /100 WBCs      Neutrophils Relative 82 %      Immat GRANS % 1 %      Lymphocytes Relative 3 %      Monocytes Relative 14 %      Eosinophils Relative 0 %      Basophils Relative 0 %      Neutrophils Absolute 19.69 Thousands/µL      Immature Grans Absolute 0.17 Thousand/uL      Lymphocytes Absolute 0.61 Thousands/µL      Monocytes Absolute 3.26 Thousand/µL      Eosinophils Absolute 0.02 Thousand/µL      Basophils Absolute 0.06 Thousands/µL                    XR chest portable ICU   Final Result by Maia Buckley MD (10/21 5932)      Severe right and moderate left consolidation, presumably pneumonia. Bilateral chest tubes with no effusion or pneumothorax. Workstation performed: [id number]         XR chest portable   Final Result by Hardy Matias MD (10/20 7645)      Large, patchy airspace opacity in the right lower lung, new from 1 day prior, concerning for aspiration pneumonia given the clinical presentation. Several persistently dilated loops of small bowel in the visualized upper abdomen likely related to persistent small bowel obstruction. Enteric tube in adequate position. Workstation performed: [id number]         XR chest 1 view portable   Final Result by Maia Buckley MD (10/19 4444)      No acute cardiopulmonary disease. NG tube in stomach.          Workstation performed: [id number]         CT abdomen pelvis with contrast   Final Result by Hardy Matias MD (10/18 3197)      1) Status post right hemicolectomy with right lower quadrant end ileostomy with small bowel obstruction with transition point in the left upper abdomen near the midline. 2) Some mesenteric edema is present, however no pneumatosis intestinalis or loss of bowel wall enhancement. Early bowel ischemia is possible. 3) Metastatic disease as on the 10/9/2023 CT, however with overall progression the abdomen/pelvis since July 2023, as follows: *  Peritoneal carcinomatosis, similar to 10/9/2023, however new from July 2023. *  Decrease of the previously seen dominant hepatic metastasis in segment 4B since July 2023, with the rest of the lesions not clearly visualized, however 2 new lesions are seen in segment 4B of the liver just anteriorly, concerning for new metastases,    measuring up to 2.1 x 1.1 cm. *  Subcentimeter sclerotic lesion in the right iliac bone, new from July 2023, concerning for a new metastasis. *  Several visualized pulmonary nodules measuring up to 6 mm in the right middle lobe, similar to July 2023, most likely representing metastases. 4) Circumferential wall thickening of the urinary bladder with perivesical fat stranding, suggestive of cystitis. 5) Small ascites in the pelvis, with no organized collection to suggest an abscess. 6) 7 x 5 mm nonobstructing calculus in CBD just proximal to the ampulla Vater with stable dilation of CBD up to 9 mm in diameter, unchanged from July 2023. 7) Trace right pleural effusion and atelectasis at the lung bases, new since 10/9/2023. 8) Multiple additional findings as above. Findings were discussed with  and Dr. Verónica Diaz via HIPAA compliant secure electronic messaging on 10/18/2023 at 9:06 PM with confirmation of receipt.                   Workstation performed: [id number]               Procedures  Procedures      ED Course  ED Course as of 10/24/23 1236   Wed Oct 18, 2023   2118 Surgery consulted   2118 SBO                          Initial Sepsis Screening       Row Name 10/18/23 1928                Is the patient's history suggestive of a new or worsening infection? Yes (Proceed)  -SV        Suspected source of infection acute abdominal infection  -SV        Indicate SIRS criteria Tachycardia > 90 bpm;Leukocytosis (WBC > 53670 IJL) OR Leukopenia (WBC <4000 IJL) OR Bandemia (WBC >10% bands)  -SV        Are two or more of the above signs & symptoms of infection both present and new to the patient? Yes (Proceed)  -SV        Assess for evidence of organ dysfunction: Are any of the below criteria present within 6 hours of suspected infection and SIRS criteria that are NOT considered to be chronic conditions? --                  User Key  (r) = Recorded By, (t) = Taken By, (c) = Cosigned By      [facility] [facility] Name Provider Type    SV Lawyer Rogers,  Resident                    SBIRT 20yo+      Flowsheet Row Most Recent Value   Initial Alcohol Screen: US AUDIT-C     1. How often do you have a drink containing alcohol? 0 Filed at: 10/18/2023 1833   2. How many drinks containing alcohol do you have on a typical day you are drinking? 0 Filed at: 10/18/2023 1833   3a. Male UNDER 65: How often do you have five or more drinks on one occasion? 0 Filed at: 10/18/2023 1833   3b. FEMALE Any Age, or MALE 65+: How often do you have 4 or more drinks on one occassion? 0 Filed at: 10/18/2023 1833   Audit-C Score 0 Filed at: 10/18/2023 9270   INOCENCIA: How many times in the past year have you. .. Used an illegal drug or used a prescription medication for non-medical reasons? Never Filed at: 10/18/2023 1833                  Medical Decision Making  Amount and/or Complexity of Data Reviewed  Labs: ordered. Radiology: ordered. Risk  Prescription drug management. Decision regarding hospitalization.           Disposition  Final diagnoses:   SBO (small bowel obstruction) (HCC)   Nausea   Colon cancer ([address])   Metastatic cancer ([address])     Time reflects when diagnosis was documented in both MDM as applicable and the Disposition within this note       Time User Action Codes Description Comment    10/18/2023 10:20 PM Chet Meeks Add [K56.609] SBO (small bowel obstruction) ([address])     10/18/2023 10:20 PM Shruthi Butler Add [R11.0] Nausea     10/18/2023 10:20 PM Shruthi Butler Add [C18.9] Colon cancer ([address])     10/18/2023 10:20 PM Shruhti Butler Add [C79.9] Metastatic cancer ([address])     10/20/2023  7:18 PM Oren Hipps Add [J96.01] Acute respiratory failure with hypoxia [facility])           ED Disposition       ED Disposition   Admit    Condition   Stable    Date/Time   Wed Oct 18, 2023 2119    Comment   --             Follow-up Information    None       Date, Time and Cause of Death    Date of Death: 10/21/23  Time of Death: 0010  Preliminary Cause of Death: Acute respiratory failure with hypoxia  Entered by: Jennifer Gama MD[KA1.1]       Attribution       KA1.1 Jennifer Gama MD 10/21/23 00:40          Discharge Medication List as of 10/21/2023  2:21 AM        STOP taking these medications       acetaminophen (TYLENOL) 325 mg tablet Comments:   Reason for Stopping:         ascorbic acid (VITAMIN C) 500 MG tablet Comments:   Reason for Stopping:         BANANA FLAKES PO Comments:   Reason for Stopping:         bimatoprost (LUMIGAN) 0.01 % ophthalmic drops Comments:   Reason for Stopping:         calcitonin, salmon, (MIACALCIN) 200 units/act nasal spray Comments:   Reason for Stopping:         calcium citrate (CALCITRATE) 950 MG tablet Comments:   Reason for Stopping:         capecitabine (XELODA) 500 MG tablet Comments:   Reason for Stopping:         chlorhexidine (PERIDEX) 0.12 % solution Comments:   Reason for Stopping:         Cholecalciferol (VITAMIN D-3 PO) Comments:   Reason for Stopping:         clindamycin (CLINDAGEL) 1 % gel Comments:   Reason for Stopping:         Cyanocobalamin (VITAMIN B 12 PO) Comments:   Reason for Stopping:         Diclofenac Sodium (VOLTAREN) 1 % Comments:   Reason for Stopping:         insulin lispro (HumaLOG) 100 units/mL injection Comments:   Reason for Stopping:         Januvia 25 MG tablet Comments:   Reason for Stopping:         loperamide (IMODIUM) 2 mg capsule Comments:   Reason for Stopping:         Menthol-Methyl Salicylate (JACQUES PRINCE GREASELESS) 10-15 % greaseless cream Comments:   Reason for Stopping:         metFORMIN (GLUCOPHAGE) 500 mg tablet Comments:   Reason for Stopping:         miconazole (MICOTIN) 2 % powder Comments:   Reason for Stopping:         midodrine (PROAMATINE) 5 mg tablet Comments:   Reason for Stopping:         ondansetron (ZOFRAN-ODT) 4 mg disintegrating tablet Comments:   Reason for Stopping:         pantoprazole (PROTONIX) 40 mg tablet Comments:   Reason for Stopping:         psyllium (METAMUCIL) packet Comments:   Reason for Stopping:         rivaroxaban (Xarelto) 10 mg tablet Comments:   Reason for Stopping:         scopolamine (TRANSDERM-SCOP) 1 mg/3 days TD 72 hr patch Comments:   Reason for Stopping:         triamcinolone (KENALOG) 0.1 % cream Comments:   Reason for Stopping:         vitamin E 100 UNIT capsule Comments:   Reason for Stopping:             No discharge procedures on file. PDMP Review         Value Time User    PDMP Reviewed  Yes 9/15/2023  8:31 PM Josie Hahn DO             ED Provider  Attending physically available and evaluated Angus Chirinos. I managed the patient along with the ED Attending.     Electronically Signed by           Whitney Salgado DO  10/24/23 6469

## 2023-10-19 NOTE — UTILIZATION REVIEW
Initial Clinical Review    Admission: Date/Time/Statement:   Admission Orders (From admission, onward)       Ordered        10/18/23 2220  INPATIENT ADMISSION  Once                          Orders Placed This Encounter   Procedures    INPATIENT ADMISSION     Standing Status:   Standing     Number of Occurrences:   1     Order Specific Question:   Level of Care     Answer:   Med Surg [16]     Order Specific Question:   Estimated length of stay     Answer:   More than 2 Midnights     Order Specific Question:   Certification     Answer:   I certify that inpatient services are medically necessary for this patient for a duration of greater than two midnights. See H&P and MD Progress Notes for additional information about the patient's course of treatment. ED Arrival Information       Expected   -    Arrival   10/18/2023 18:22    Acuity   Urgent              Means of arrival   Ambulance    Escorted by   [facility]    Service   Surgery-General    Admission type   Emergency              Arrival complaint   Abnormal Lab             Chief Complaint   Patient presents with    Medical Problem     Told to come to ED due to confirmed bowel obstruction. Reports reduced amount of stool from colostomy. Denies abd pain. Hx colon cancer. Initial Presentation: 69 y.o. female who presented to [address] [place] [address] ED via EMS due to nausea, vomiting, decreased po intake, intermittent abdominal pain for 5 days. On exam, abdominal tenderness midline and distention noted, Ileostomy with gas and bilious stool in bag, venous statis appearance of ankles. CT AP revealed small bowel obstruction, see full report below. PMHx:  metastatic colon adenocarcinoma, s/p right hemicolectomy, incarcerated ventral hernia repair and end ileostomy. Plan:  Admit to Inpatient status dt small bowel obstruction, med surg, keep NPO, place NGT, give IVF, monitor bowel function and serial abd exams, fu on labs.      Date: 10/19   Day 2: Nausea and vomiting continues. Abdomen soft, NT, mildly distended, stool in Ileostomy bag with gas. Continue NPO, NGT, IVF, continue to monitor for bowel function and abd exam, pain and nausea control prn, labs, monitor electrolytes and replete prn, hold xarelto.      ED Triage Vitals   Temperature Pulse Respirations Blood Pressure SpO2   10/18/23 1919 10/18/23 1832 10/18/23 1832 10/18/23 1832 10/18/23 1832   97.8 °F (36.6 °C) 103 20 110/53 97 %      Temp Source Heart Rate Source Patient Position - Orthostatic VS BP Location FiO2 (%)   10/18/23 1919 10/18/23 1832 10/18/23 1832 10/18/23 1832 --   Oral Monitor Lying Right arm       Pain Score       10/18/23 1832       No Pain          Wt Readings from Last 1 Encounters:   10/19/23 59 kg (130 lb)     Additional Vital Signs:   Date/Time Temp Pulse Resp BP MAP (mmHg) SpO2 Calculated FIO2 (%) - Nasal Cannula Nasal Cannula O2 Flow Rate (L/min) O2 Device Patient Position - Orthostatic VS   10/19/23 1000 -- 108 Abnormal  16 103/58 79 99 % 28 2 L/min Nasal cannula --   10/19/23 0709 -- 113 Abnormal  16 108/56 75 98 % -- -- -- --   10/19/23 0618 98.3 °F (36.8 °C) -- -- -- -- -- -- -- -- --   10/19/23 0445 -- 106 Abnormal  16 107/56 77 98 % 28 2 L/min Nasal cannula Lying   10/19/23 0115 -- 100 16 112/58 75 98 % 28 2 L/min Nasal cannula Lying   10/19/23 0100 -- 104 16 92/60 71 97 % 28 2 L/min Nasal cannula Lying   10/19/23 0045 -- 102 16 89/52 Abnormal   66 96 % 28 2 L/min Nasal cannula Lying   BP: Provider aware at 10/19/23 0045   10/19/23 0030 -- 105 16 99/56 74 100 % 28 2 L/min Nasal cannula Lying   10/19/23 0015 -- 99 16 96/55 68 99 % 28 2 L/min Nasal cannula Lying   10/19/23 0000 -- 97 16 103/56 74 100 % 28 2 L/min Nasal cannula Lying   10/18/23 2345 -- 96 16 101/57 76 100 % 28 2 L/min Nasal cannula Lying   10/18/23 2330 -- 96 16 99/51 73 92 % 28 2 L/min Nasal cannula Lying   10/18/23 2315 -- 102 16 107/56 78 99 % 28 2 L/min Nasal cannula Lying   10/18/23 2300 -- 101 16 105/55 74 98 % 28 2 L/min Nasal cannula Lying   10/18/23 2245 -- 94 16 98/51 71 100 % 28 2 L/min Nasal cannula Lying   10/18/23 2230 -- 93 16 94/54 71 100 % 28 2 L/min Nasal cannula Lying   10/18/23 2215 -- 101 16 107/58 78 90 % 28 2 L/min Nasal cannula Lying   10/18/23 2200 -- 93 16 94/51 72 97 % 28 2 L/min Nasal cannula Lying   10/18/23 2145 -- 96 16 97/52 69 98 % 28 2 L/min Nasal cannula Lying   10/18/23 2130 -- 94 -- 95/57 72 98 % 28 2 L/min Nasal cannula Lying   10/18/23 2115 -- 95 18 101/55 75 97 % 28 2 L/min Nasal cannula Lying   10/18/23 2100 -- 94 18 98/51 71 95 % -- -- None (Room air) Lying   10/18/23 2030 -- 98 20 113/56 80 91 % -- -- None (Room air) Lying   10/18/23 2000 -- 97 -- 112/56 80 -- -- -- -- --   10/18/23 1945 -- 95 20 113/57 81 94 % -- -- -- --   10/18/23 1919 97.8 °F (36.6 °C) -- -- -- -- -- -- -- -- --   10/18/23 1832 -- 103 20 110/53 73 97 % -- -- None (Room air) Lying     Pertinent Labs/Diagnostic Test Results:   10/19 EKG: Sinus tachycardia  Left ventricular hypertrophy with QRS widening  Abnormal ECG    CT abdomen pelvis with contrast   Final Result by Jennifer Pickett MD (10/18 2107)      1) Status post right hemicolectomy with right lower quadrant end ileostomy with small bowel obstruction with transition point in the left upper abdomen near the midline. 2) Some mesenteric edema is present, however no pneumatosis intestinalis or loss of bowel wall enhancement. Early bowel ischemia is possible. 3) Metastatic disease as on the 10/9/2023 CT, however with overall progression the abdomen/pelvis since July 2023, as follows: *  Peritoneal carcinomatosis, similar to 10/9/2023, however new from July 2023.    *  Decrease of the previously seen dominant hepatic metastasis in segment 4B since July 2023, with the rest of the lesions not clearly visualized, however 2 new lesions are seen in segment 4B of the liver just anteriorly, concerning for new metastases,    measuring up to 2.1 x 1. 1 cm. *  Subcentimeter sclerotic lesion in the right iliac bone, new from July 2023, concerning for a new metastasis. *  Several visualized pulmonary nodules measuring up to 6 mm in the right middle lobe, similar to July 2023, most likely representing metastases. 4) Circumferential wall thickening of the urinary bladder with perivesical fat stranding, suggestive of cystitis. 5) Small ascites in the pelvis, with no organized collection to suggest an abscess. 6) 7 x 5 mm nonobstructing calculus in CBD just proximal to the ampulla Vater with stable dilation of CBD up to 9 mm in diameter, unchanged from July 2023. 7) Trace right pleural effusion and atelectasis at the lung bases, new since 10/9/2023. 8) Multiple additional findings as above. Findings were discussed with  and Dr. Beatrice Mendez via HIPAA compliant secure electronic messaging on 10/18/2023 at 9:06 PM with confirmation of receipt.                   Workstation performed: [id number]         XR chest 1 view portable    (Results Pending)         Results from last 7 days   Lab Units 10/19/23  0435 10/18/23  1910   WBC Thousand/uL 17.07* 23.81*   HEMOGLOBIN g/dL 13.0 14.1   HEMATOCRIT % 38.6 40.8   PLATELETS Thousands/uL 304 342   NEUTROS ABS Thousands/µL 13.99* 19.69*         Results from last 7 days   Lab Units 10/19/23  0435 10/19/23  0047 10/18/23  1910   SODIUM mmol/L 132* 129* 126*   POTASSIUM mmol/L 5.0 4.8 5.6*   CHLORIDE mmol/L 103 99 94*   CO2 mmol/L 19* 18* 21   ANION GAP mmol/L 10 12 11   BUN mg/dL 30* 29* 34*   CREATININE mg/dL 0.55* 0.50* 0.66   EGFR ml/min/1.73sq m 90 93 85   CALCIUM mg/dL 8.3* 7.8* 9.3   MAGNESIUM mg/dL 1.5*  --   --    PHOSPHORUS mg/dL 2.3  --   --      Results from last 7 days   Lab Units 10/18/23  1910   AST U/L 16   ALT U/L 10   ALK PHOS U/L 107*   TOTAL PROTEIN g/dL 7.8   ALBUMIN g/dL 4.2   TOTAL BILIRUBIN mg/dL 0.54     Results from last 7 days   Lab Units 10/19/23  0615 10/19/23  0029 POC GLUCOSE mg/dl 168* 159*     Results from last 7 days   Lab Units 10/19/23  0435 10/19/23  0047 10/18/23  1910   GLUCOSE RANDOM mg/dL 162* 185* 198*     Results from last 7 days   Lab Units 10/19/23  0435 10/18/23  1910   PROTIME seconds 21.9* 27.3*   INR  1.95* 2.60*   PTT seconds  --  33         Results from last 7 days   Lab Units 10/18/23  1910   PROCALCITONIN ng/ml 0.37*     Results from last 7 days   Lab Units 10/18/23  2352 10/18/23  1910   LACTIC ACID mmol/L 0.6 1.1     Results from last 7 days   Lab Units 10/18/23  1919 10/18/23  1910   BLOOD CULTURE  Received in Microbiology Lab. Culture in Progress. Received in Microbiology Lab. Culture in Progress.        ED Treatment:   Medication Administration from 10/18/2023 1822 to 10/19/2023 1036         Date/Time Order Dose Route Action     10/18/2023 1935 EDT sodium chloride 0.9 % bolus 1,000 mL 1,000 mL Intravenous New Bag     10/18/2023 1935 EDT ondansetron (ZOFRAN) injection 4 mg 4 mg Intravenous Given     10/18/2023 1956 EDT iohexol (OMNIPAQUE) 350 MG/ML injection (SINGLE-DOSE) 100 mL 100 mL Intravenous Given     10/18/2023 2029 EDT piperacillin-tazobactam (ZOSYN) 3.375 g in sodium chloride 0.9 % 100 mL IVPB 3.375 g Intravenous New Bag     10/18/2023 2137 EDT sodium chloride 0.9 % bolus 500 mL 500 mL Intravenous New Bag     10/19/2023 0008 EDT multi-electrolyte (PLASMALYTE-A/ISOLYTE-S PH 7.4) IV solution 125 mL/hr Intravenous New Bag     10/19/2023 0519 EDT ondansetron (ZOFRAN) injection 4 mg 4 mg Intravenous Given     10/19/2023 0031 EDT sodium chloride 0.9 % infusion 125 mL/hr Intravenous New Bag     10/19/2023 0628 EDT insulin lispro (HumaLOG) 100 units/mL subcutaneous injection 1-5 Units 1 Units Subcutaneous Given     10/19/2023 0236 EDT insulin lispro (HumaLOG) 100 units/mL subcutaneous injection 1-5 Units 1 Units Subcutaneous Given     10/19/2023 1011 EDT heparin (porcine) subcutaneous injection 5,000 Units 5,000 Units Subcutaneous Given 10/19/2023 0236 EDT heparin (porcine) subcutaneous injection 5,000 Units 5,000 Units Subcutaneous Given          Past Medical History:   Diagnosis Date    Acute embolism and thrombosis of deep vein of lower extremity (HCC)     Adenocarcinoma of colon, [facility] (HCC)     Breast cancer ([address]) 2012    Right Breast     Cancer ([address])     Colon    Diabetes mellitus ([address])     DVT (deep venous thrombosis) (HCC)     GERD (gastroesophageal reflux disease)     Hypertension     Pes planus      Present on Admission:  **None**      Admitting Diagnosis: Known medical problems [Z78.9]  Age/Sex: 69 y.o. female  Admission Orders:  Scheduled Medications:  heparin (porcine), 5,000 Units, Subcutaneous, Q8H [facility]  insulin lispro, 1-5 Units, Subcutaneous, Q6H LUCINA  midodrine, 5 mg, Oral, TID AC      Continuous IV Infusions:  sodium chloride, 125 mL/hr, Intravenous, Continuous      PRN Meds:  acetaminophen, 650 mg, Oral, Q6H PRN  ondansetron, 4 mg, Intravenous, Q6H PRN      Scd  IO    Network Utilization Review Department  ATTENTION: Please call with any questions or concerns to [phone] and carefully listen to the prompts so that you are directed to the right person. All voicemails are confidential.   For Discharge needs, contact Care Management DC Support Team at [phone] opt. [phone]  Send all requests for admission clinical reviews, approved or denied determinations and any other requests to dedicated fax number below belonging to the campus where the patient is receiving treatment.  List of dedicated fax numbers for the Facilities:  [place] DENIALS (Administrative/Medical Necessity) [phone]   DISCHARGE SUPPORT TEAM (NETWORK) [id number] Javi Apodaca (Maternity/NICU/Pediatrics) [address] [place] [address] [facility] [address] [address] [facility] [address] [place] [address] [facility] [phone]

## 2023-10-19 NOTE — ED NOTES
Updated [facility] on patient admission status.       Alexi Pendleton RN  10/19/23 8244

## 2023-10-19 NOTE — ED NOTES
Primary team TT, due to nobody coming down to place NG tube after inability of nightshift RN to place NG tube.       Elizabeth Arguello, YOGESH  10/19/23 6306

## 2023-10-19 NOTE — ED NOTES
Two Rns attempted NG insertion. Both attempts unsuccessful. Provider made aware.       Marlys Nava  10/19/23 1522

## 2023-10-19 NOTE — PROGRESS NOTES
Progress Note - Red Surgery   Roberto Tatum 69 y.o. female MRN: [id number]  Unit/Bed#: ED 15 Encounter: [id number]    Assessment:  69 y.o. female with metastatic colon adenocarcinoma, status post 2015 right hemicolectomy, 2013 incarcerated ventral hernia repair and end ileostomy. Presented to ED with small bowel obstruction. Plan:  -NPO, NGT placement in setting of vomiting  -Continue IV fluids  -Continue to monitor bowel function, abdominal exam  -Pain and nausea control as needed  -Monitor labs, electrolytes  -DVT prophylaxis  -Xarelto currently held    Subjective:  Patient reporting nausea with small-volume emesis this morning. Denies any pain, shortness of breath, chest pain. Objective:    Vitals: Tachycardia recorded, otherwise vital stable on 2 L nasal cannula  Temp:  [97.8 °F (36.6 °C)-98.3 °F (36.8 °C)] 98.3 °F (36.8 °C)  HR:  [] 113  Resp:  [16-20] 16  BP: ()/(51-60) 108/56  Body mass index is 25.39 kg/m². Physical Exam:   Gen: NAD, Resting in bed  Neuro: A&O, No focal deficits  Head: Normal Cephalic, Atraumatic  Eye: EOMI, No scleral icterus  CV: Tachycardic  Pulm: Normal work of breathing, No respiratory distress 2 L nasal cannula  Abd: Soft, nontender, mildly distended. Stool in ileostomy bag with some gas.     Ext: No edema bilateral lower extremities, Non-tender  Skin: Warm, Dry, Intact    I/O:    Intake/Output Summary (Last 24 hours) at 10/19/2023 0922  Last data filed at 10/19/2023 0005  Gross per 24 hour   Intake 1600 ml   Output --   Net 1600 ml       Lab Results:    Recent Labs     10/18/23  1910 10/19/23  0047 10/19/23  0435   WBC 23.81*  --  17.07*   HGB 14.1  --  13.0     --  304   SODIUM 126* 129* 132*   K 5.6* 4.8 5.0   CL 94* 99 103   CO2 21 18* 19*   BUN 34* 29* 30*   CREATININE 0.66 0.50* 0.55*   GLUC 198* 185* 162*   CALCIUM 9.3 7.8* 8.3*   MG  --   --  1.5*   PHOS  --   --  2.3   AST 16  --   --    ALT 10  --   --    ALKPHOS 107*  --   --    TBILI 0.54 --   --        ---    Narayan Verduzco MD  General Surgery PGY-I

## 2023-10-19 NOTE — H&P
H&P - Red Surgery  : Red Surgery Resident role on [place] June 69 y.o. female MRN: [id number]  Unit/Bed#: QCA Encounter: [id number]    Assessment:  69 y.o. female with metastatic colon adenocarcinoma, status post 2015 right hemicolectomy, 2013 incarcerated ventral hernia repair and end ileostomy. Presented to ED with small bowel obstruction. Plan:  - Admit to general surgery service  - NPO with sips of clears overnight, advance diet as tolerated  - IV fluids  - Place NGT if vomiting  - Continue to monitor bowel function, abdominal exam  - Pain and nausea control as needed  - Monitor labs   - DVT prophylaxis    HPI:  Brian Nails is a 69 y.o. female with a history of metastatic colon adenocarcinoma currently on chemotherapy, breast cancer, DVT/PE on Xarelto, hypertension, diabetes. S/p 7/22/15 R hemicolectomy, 2/9/23 incarcerated ventral hernia repair with ex lap, left colectomy, end ileostomy. Reports being nonambulatory. Presents to ED with five day history of nausea, vomiting, decreased PO intake, intermittent abdominal pain. Denies fever, chills, shortness of breath, chest pain. AVSS on 2 L nc, normal lactate, WBC 24, hyponatremic to 126. CT showing SBO with transition point in left upper abdomen. Physical Exam  Vitals reviewed. Constitutional:       General: She is not in acute distress. Appearance: She is ill-appearing. HENT:      Head: Normocephalic and atraumatic. Mouth/Throat:      Mouth: Mucous membranes are dry. Eyes:      Conjunctiva/sclera: Conjunctivae normal.   Cardiovascular:      Rate and Rhythm: Normal rate. Pulmonary:      Effort: Pulmonary effort is normal. No respiratory distress. Abdominal:      General: There is distension (mildly). Palpations: Abdomen is soft. Tenderness: There is abdominal tenderness (Mild midline).       Comments: Ileostomy with gas and bilious stool in bag   Musculoskeletal:      Right lower leg: No edema. Left lower leg: No edema. Skin:     Comments: Venous stasis appearance of ankles   Neurological:      Mental Status: She is alert. Review of Systems   Constitutional:  Positive for appetite change and fatigue. Negative for chills and fever. Respiratory:  Negative for shortness of breath. Cardiovascular:  Negative for chest pain. Gastrointestinal:  Positive for abdominal distention, abdominal pain, nausea and vomiting. Negative for constipation and diarrhea. Endocrine: Positive for cold intolerance. Genitourinary:  Negative for difficulty urinating. Musculoskeletal:  Positive for gait problem. Objective         Intake/Output Summary (Last 24 hours) at 10/18/2023 2129  Last data filed at 10/18/2023 2127  Gross per 24 hour   Intake 100 ml   Output --   Net 100 ml       First Vitals:   Blood Pressure: 110/53 (10/18/23 1832)  Pulse: 103 (10/18/23 1832)  Temperature: 97.8 °F (36.6 °C) (10/18/23 1919)  Temp Source: Oral (10/18/23 1919)  Respirations: 20 (10/18/23 1832)  SpO2: 97 % (10/18/23 1832)    Current Vitals:   Blood Pressure: 101/55 (10/18/23 2115)  Pulse: 95 (10/18/23 2115)  Temperature: 97.8 °F (36.6 °C) (10/18/23 1919)  Temp Source: Oral (10/18/23 1919)  Respirations: 18 (10/18/23 2115)  SpO2: 97 % (10/18/23 2115)    Invasive Devices       Central Venous Catheter Line  Duration             Port A Cath 06/28/23 Right Internal jugular 112 days              Peripheral Intravenous Line  Duration             Peripheral IV 10/18/23 Right;Ventral (anterior) Forearm <1 day              Drain  Duration             Ileostomy Standard (Kaitlynn, end)  days                    Imaging: I have personally reviewed pertinent reports. CT abdomen pelvis with contrast    Result Date: 10/18/2023  Impression: 1) Status post right hemicolectomy with right lower quadrant end ileostomy with small bowel obstruction with transition point in the left upper abdomen near the midline.  2) Some mesenteric edema is present, however no pneumatosis intestinalis or loss of bowel wall enhancement. Early bowel ischemia is possible. 3) Metastatic disease as on the 10/9/2023 CT, however with overall progression the abdomen/pelvis since July 2023, as follows: *  Peritoneal carcinomatosis, similar to 10/9/2023, however new from July 2023. *  Decrease of the previously seen dominant hepatic metastasis in segment 4B since July 2023, with the rest of the lesions not clearly visualized, however 2 new lesions are seen in segment 4B of the liver just anteriorly, concerning for new metastases, measuring up to 2.1 x 1.1 cm. *  Subcentimeter sclerotic lesion in the right iliac bone, new from July 2023, concerning for a new metastasis. *  Several visualized pulmonary nodules measuring up to 6 mm in the right middle lobe, similar to July 2023, most likely representing metastases. 4) Circumferential wall thickening of the urinary bladder with perivesical fat stranding, suggestive of cystitis. 5) Small ascites in the pelvis, with no organized collection to suggest an abscess. 6) 7 x 5 mm nonobstructing calculus in CBD just proximal to the ampulla Vater with stable dilation of CBD up to 9 mm in diameter, unchanged from July 2023. 7) Trace right pleural effusion and atelectasis at the lung bases, new since 10/9/2023. 8) Multiple additional findings as above. Findings were discussed with  and Dr. Julianna Sherman via HIPAA compliant secure electronic messaging on 10/18/2023 at 9:06 PM with confirmation of receipt. Workstation performed: [id number]       EKG, Pathology, and Other Studies: I have personally reviewed pertinent reports.     VTE Pharmacologic Prophylaxis: on home Xarelto  VTE Mechanical Prophylaxis: sequential compression device    Historical Information   Past Medical History:   Diagnosis Date    Acute embolism and thrombosis of deep vein of lower extremity (HCC)     Adenocarcinoma of colon, [facility] ([address])     Breast cancer [facility]) 2012    Right Breast     Cancer ([address])     Colon    Diabetes mellitus ([address])     DVT (deep venous thrombosis) ([address])     GERD (gastroesophageal reflux disease)     Hypertension     Pes planus      Past Surgical History:   Procedure Laterality Date    COLONOSCOPY      HERNIA REPAIR N/A 2/9/2023    Procedure: EXPLORATORY LAPAROTOMY; ABDOMINAL WALL DEBRIDEMENT; REDUCTION VENTRAL HERNIA; LEFT COLON RESECTION; CREATION ILEOSTOMY; WASHOUT;  Surgeon: Michael Mcbride DO;  Location: [facility];  Service: General    HYSTERECTOMY      complete @ age 29    IR PORT PLACEMENT  6/28/2023    IR PORT REMOVAL  9/12/2018    MASTECTOMY Right 2012    AK COLONOSCOPY FLX DX W/COLLJ SPEC WHEN PFRMD N/A 8/23/2016    Procedure: COLONOSCOPY;  Surgeon: Marianna Litten, MD;  Location: [facility]; Service: Colorectal    AK COLONOSCOPY FLX DX W/COLLJ SPEC WHEN PFRMD N/A 9/5/2017    Procedure: COLONOSCOPY;  Surgeon: Marianna Litten, MD;  Location: [facility]; Service: Colorectal    AK ESOPHAGOGASTRODUODENOSCOPY TRANSORAL DIAGNOSTIC N/A 9/5/2017    Procedure: ESOPHAGOGASTRODUODENOSCOPY (EGD); Surgeon: Viky Frost DO;  Location: [facility];   Service: Gastroenterology     Social History   Social History     Substance and Sexual Activity   Alcohol Use Not Currently     Social History     Substance and Sexual Activity   Drug Use No     Social History     Tobacco Use   Smoking Status Never   Smokeless Tobacco Never     Family History   Problem Relation Age of Onset    Other Mother         chronic bronchitis    Brain cancer Father     Prostate cancer Paternal Grandfather 79    Pancreatic cancer Maternal Aunt 39    Breast cancer Neg Hx        Meds/Allergies   all current active meds have been reviewed, current meds:   Current Facility-Administered Medications   Medication Dose Route Frequency    sodium chloride 0.9 % bolus 1,000 mL  1,000 mL Intravenous Once    sodium chloride 0.9 % bolus 500 mL  500 mL Intravenous Once    and PTA meds:   Prior to Admission Medications   Prescriptions Last Dose Informant Patient Reported? Taking? BANANA FLAKES PO  Self Yes No   Sig: Take 1 Units by mouth 2 (two) times a day   Cholecalciferol (VITAMIN D-3 PO)  Self Yes No   Sig: Take 1 capsule by mouth 3 (three) times a day    Patient not taking: Reported on 9/25/2023   Cyanocobalamin (VITAMIN B 12 PO)  Self Yes No   Sig: Take 1 tablet by mouth daily. Patient not taking: Reported on 9/25/2023   Diclofenac Sodium (VOLTAREN) 1 %  Self No No   Sig: Apply to neck for pain/stiffness, 4 times a day as needed. Only give if Bengay cream is not effective and has not been given that day. Januvia 25 MG tablet  Self Yes No   Menthol-Methyl Salicylate (JACQUES PRINCE GREASELESS) 10-15 % greaseless cream  Self No No   Sig: Apply topically 3 (three) times a day Try first line for neck pain and stiffness. acetaminophen (TYLENOL) 325 mg tablet  Self No No   Sig: Take 2 tablets (650 mg total) by mouth every 8 (eight) hours   ascorbic acid (VITAMIN C) 500 MG tablet  Self Yes No   Sig: Take 1,000 mg by mouth daily    bimatoprost (LUMIGAN) 0.01 % ophthalmic drops  Self Yes No   Sig: Administer 1 drop to both eyes daily at bedtime    calcitonin, salmon, (MIACALCIN) 200 units/act nasal spray  Self Yes No   calcium citrate (CALCITRATE) 950 MG tablet  Self Yes No   Sig: Take 1 tablet by mouth in the morning. capecitabine (XELODA) 500 MG tablet  Self No No   Sig: Take 3 tablets (1,500 mg total) by mouth 2 (two) times a day 1 week on followed by 1 week off starting 7/7/23   chlorhexidine (PERIDEX) 0.12 % solution  Self No No   Sig: Apply 15 mL to the mouth or throat every 12 (twelve) hours   clindamycin (CLINDAGEL) 1 % gel   No No   Sig: Apply topically 2 (two) times a day To rash area as needed.    Patient not taking: Reported on 9/25/2023   insulin lispro (HumaLOG) 100 units/mL injection  Self No No   Sig: Inject 1-6 Units under the skin 4 (four) times a day (before meals and at bedtime) Patient not taking: Reported on 10/13/2023   loperamide (IMODIUM) 2 mg capsule  Self No No   Sig: Take 1 capsule (2 mg total) by mouth 3 (three) times a day with meals   metFORMIN (GLUCOPHAGE) 500 mg tablet  Self Yes No   Sig: Take 500 mg by mouth 2 (two) times a day with meals   miconazole (MICOTIN) 2 % powder  Self Yes No   Sig: Apply topically in the morning Apply to groin topically and under left breast topically every day and evening shift for red wash with soap and water, pat dry and then apply powder. midodrine (PROAMATINE) 5 mg tablet  Self No No   Sig: Take 1 tablet (5 mg total) by mouth 3 (three) times a day before meals   ondansetron (ZOFRAN-ODT) 4 mg disintegrating tablet  Self Yes No   pantoprazole (PROTONIX) 40 mg tablet  Self No No   Sig: Take 1 tablet (40 mg total) by mouth daily in the early morning Do not start before February 24, 2023. psyllium (METAMUCIL) packet  Self No No   Sig: Take 1 packet by mouth 2 (two) times a day   rivaroxaban (Xarelto) 10 mg tablet  Self No No   Sig: Take 1 tablet (10 mg total) by mouth daily   scopolamine (TRANSDERM-SCOP) 1 mg/3 days TD 72 hr patch  Self Yes No   triamcinolone (KENALOG) 0.1 % cream  Self Yes No   vitamin E 100 UNIT capsule  Self Yes No   Sig: Take 100 Units by mouth daily       Facility-Administered Medications: None     No Known Allergies    Lab Results: I have personally reviewed pertinent lab results.   , CBC:   Lab Results   Component Value Date    WBC 23.81 (H) 10/18/2023    HGB 14.1 10/18/2023    HCT 40.8 10/18/2023    MCV 98 10/18/2023     10/18/2023    RBC 4.15 10/18/2023    MCH 34.0 10/18/2023    MCHC 34.6 10/18/2023    RDW 15.7 (H) 10/18/2023    MPV 9.1 10/18/2023    NRBC 0 10/18/2023   , CMP:   Lab Results   Component Value Date    SODIUM 126 (L) 10/18/2023    K 5.6 (H) 10/18/2023    CL 94 (L) 10/18/2023    CO2 21 10/18/2023    BUN 34 (H) 10/18/2023    CREATININE 0.66 10/18/2023    CALCIUM 9.3 10/18/2023    AST 16 10/18/2023 ALT 10 10/18/2023    ALKPHOS 107 (H) 10/18/2023    EGFR 85 10/18/2023       Counseling / Coordination of Care  Total floor / unit time spent today 25 minutes. Greater than 50% of total time was spent with the patient and / or family counseling and / or coordination of care.     ---  Charlie Munoz MD  General Surgery PGY-I

## 2023-10-20 PROBLEM — J96.01 ACUTE RESPIRATORY FAILURE WITH HYPOXIA (HCC): Status: ACTIVE | Noted: 2023-01-01

## 2023-10-20 NOTE — UTILIZATION REVIEW
Date: 10/20  Day 3: Has surpassed a 2nd midnight with active treatments and services, which include: Cont NPO/NGT, IVF. Gastrografin challenge. Cont apin and nausea control. DVT ppx. Cont to mon labs. See initial review completed by Genaro Coker on 1019.

## 2023-10-20 NOTE — CASE MANAGEMENT
Case Management Assessment & Discharge Planning Note    Patient name Coreen Bueno  Location [facility] 902/[facility] 221-32 MRN [id number]  : 1945 Date 10/20/2023       Current Admission Date: 10/18/2023  Current Admission Diagnosis:Nausea, SBO (small bowel obstruction) ([address]), Colon cancer ([address]), Metastatic cancer ([address]), Known medical problems   Patient Active Problem List    Diagnosis Date Noted    Hypomagnesemia 2023    Anemia 2023    Pulmonary nodule 07/10/2023    Shock ([address]) 2023    Port-A-Cath in place 2023    Chronic wound infection of abdomen 2023    Metastasis to liver ([address]) 2023    Adenocarcinoma of colon ([address]) 2023    S/P ileostomy ([address]) 2023    Incarcerated ventral hernia 2023    Moderate protein-calorie malnutrition ([address]) 02/15/2023    Lower abdominal pain 2023    Perforated bowel ([address]) 2023    Strangulated ventral hernia 2023    GERD (gastroesophageal reflux disease) 2023    History of right breast cancer 2023    History of right mastectomy 2023    Iron deficiency anemia, unspecified 2022    History of DVT (deep vein thrombosis) 2022    Thyroid disorder 2022    Osteoporosis due to aromatase inhibitor 2022    History of diabetes mellitus 2022    Anxiety about health 2021    Diabetic ulcer of toe of left foot associated with type 2 diabetes mellitus, limited to breakdown of skin ([address]) 2021    Abnormal tumor markers 2020    Essential hypertension 10/20/2020    D-dimer, elevated 2019    Type 2 diabetes mellitus without complications ([address])     Other osteoporosis without current pathological fracture 2018    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 2018    Personal history of other malignant neoplasm of large intestine 2018    Malignant neoplasm of right breast in female, estrogen receptor positive  2018    Colon cancer ([address]) 2018 Chronic deep vein thrombosis (DVT) of distal vein of left lower extremity (HCC) 03/19/2018    Thyroid nodule 03/19/2018      LOS (days): 2  Geometric Mean LOS (GMLOS) (days): 3.10  Days to GMLOS:1.7     OBJECTIVE:    Risk of Unplanned Readmission Score: 38.54         Current admission status: Inpatient       [address]   [address] [place], [place] - [address] [facility] [address] [facility] [address]  Phone: [phone] Fax: [address], [address] [facility]  [address]  [address]  [address] [address]  Phone: [phone] Fax: [phone]    Primary Care Provider: Jeannie Bower DO    Primary Insurance: Chasity Lynne [facility] REP  Secondary Insurance:     ASSESSMENT:  Gris Proxies    There are no active Health Care Proxies on file. Readmission Root Cause  30 Day Readmission: No    Patient Information  Admitted from[de-identified] Facility ([address] [place] [address])  Mental Status: Alert  During Assessment patient was accompanied by: Not accompanied during assessment  Assessment information provided by[de-identified] Patient  Primary Caregiver: Self  Support Systems: Self, Other (Comment) (Facility staff at [facility] of Residence: [address] do you live in?: [place] entry access options.  Select all that apply.: No steps to enter home  Type of Current Residence: Facility ([address] [place] [address])  Upon entering residence, is there a bedroom on the main floor (no further steps)?: Yes  Upon entering residence, is there a bathroom on the main floor (no further steps)?: Yes  In the last 12 months, was there a time when you were not able to pay the mortgage or rent on time?: No  In the last 12 months, how many places have you lived?: 2  In the last 12 months, was there a time when you did not have a steady place to sleep or slept in a shelter (including now)?: No  Living Arrangements: Other (Comment) (Pt resides at [address] [place] [address] [address])  Is patient a ?: No    Activities of Daily Living Prior to Admission  Functional Status: Assistance  Completes ADLs independently?: No  Level of ADL dependence: Assistance  Ambulates independently?: No  Level of ambulatory dependence: Assistance  Does patient use assisted devices?: Yes  Assisted Devices (DME) used: Radha Katia, Wheelchair  Does patient currently own DME?: No  Does patient have a history of Outpatient Therapy (PT/OT)?: Yes  Does the patient have a history of Short-Term Rehab?: Yes ([address])  Does patient have a history of HHC?: No  Does patient currently have [facility]?: No         Patient Information Continued  Income Source: Pension/[employer]  Does patient have prescription coverage?: Yes  Within the past 12 months, you worried that your food would run out before you got the money to buy more.: Never true  Within the past 12 months, the food you bought just didn't last and you didn't have money to get more.: Never true  Does patient receive dialysis treatments?: No  Does patient have a history of substance abuse?: No  Does patient have a history of Mental Health Diagnosis?: No         Means of Transportation  Means of Transport to Appts[de-identified] Other (Comment) (facility transport)  In the past 12 months, has lack of transportation kept you from medical appointments or from getting medications?: No  In the past 12 months, has lack of transportation kept you from meetings, work, or from getting things needed for daily living?: No        DISCHARGE DETAILS:    Discharge planning discussed with[de-identified] Patient  Freedom of Choice: Yes  Comments - Freedom of Choice: Once medically cleared pt wishes to return to [facility] [address]  CM contacted family/caregiver?: No- see comments (Pt alert and oriented)  Were Treatment Team discharge recommendations reviewed with patient/caregiver?: Yes  Did patient/caregiver verbalize understanding of patient care needs?: N/A- going to facility  Were patient/caregiver advised of the risks associated with not following Treatment Team discharge recommendations?: Yes         Requested [address]         Is the patient interested in [facility] at discharge?: No         Other Referral/Resources/Interventions Provided:  Interventions: SNF  Referral Comments: CM to complete referral via [address] for return to [facility]         Treatment Team Recommendation: SNF  Discharge Destination Plan[de-identified] SNF                     CM met with pt to complete assessment and explain CM role. Pt calm and cooperative but reported feeling tired and weak. Pt reports she is currently at [facility] for the last few months. Pt reports she tries to ambulate with a walker but at this time is not able too. Pt denies any drug or alcohol hx as well as mental health. CM will continue to follow-up for discharge planning needs.

## 2023-10-20 NOTE — PROCEDURES
Intubation    Date/Time: 10/20/2023 6:44 PM    Performed by: Kimberly Salvador MD  Authorized by: Kimberly Salvador MD    Patient location:  Other (comment) (ICU)  Other Assisting Provider: Yes (comment) Ivan Jimenez)    Consent:     Consent obtained:  Emergent situation and verbal    Consent given by:  Patient  Pre-procedure details:     Patient status:  Awake    Pretreatment medications:  Ketamine    Paralytics:  Succinylcholine  Indications:     Indications for intubation: respiratory failure and hypoxemia    Procedure details:     Preoxygenation:  BiPAP    CPR in progress: no      Intubation method:  Oral    Oral intubation technique:  Direct and glidescope    Number of attempts:  2    Ventilation between attempts: yes      Cricoid pressure: no      Tube visualized through cords: yes    Placement assessment:     ETT to lip:  22    Tube secured with:  ETT catrer    Breath sounds:  Equal    Placement verification: chest rise      CXR findings:  ETT in right main stem    Tube repositioned: yes    Post-procedure details:     Patient tolerance of procedure:   Tolerated well, no immediate complications

## 2023-10-20 NOTE — PROCEDURES
Bronchoscopy (Bedside)    Date/Time: 10/18/2023 6:23 PM    Performed by: Lourdes Holly DO  Authorized by: Lourdes Holly DO    Patient location:  Bedside  Other Assisting Provider: No    Consent:     Consent obtained:  Emergent situation  Universal protocol:     Relevant documents present and verified: yes      Test results available and properly labeled: yes      Radiology Images displayed and confirmed. If images not available, report reviewed: yes      Required blood products, implants, devices and special equipment available: yes      Site/side marked: yes      Immediately prior to procedure a time out was called: yes      Patient identity confirmed: Anonymous protocol, patient vented/unresponsive  Indications:     Procedure Purpose: diagnostic and therapeutic      Indications: pneumonia/infiltrate    Sedation:     Sedation type:  Continuous (ICU/vent)  Anesthesia (see MAR for exact dosages): Anesthesia method:  None  Upper Airway:     Trachea: normal      Charly:  Normal  Procedure details:     Description:  Upon entry through the ETT the bronchoscope was advanced in the trachea and main charly visualized. The bronchoscope was then advanced into the right lung and the RUL, RML, and RLL was visualized to the point of the sub-segmental bronchi. The bronchoscope was then advanced to the left lung and the JOANNA, Left lingula, and LLL was visualized to the point of the sub-segmental bronchi. Minimal secretions noted. Procedure completed bronchoscope removed. Post-procedure details:     Chest x-ray performed: no      Patient tolerance of procedure: Tolerated well, no immediate complications    Incomplete procedure:  No

## 2023-10-20 NOTE — CONSULTS
[address] [facility] [address] 69 y.o. female MRN: [id number]  Unit/Bed#: MICU 03 Encounter: [id number]      -------------------------------------------------------------------------------------------------------------  Chief Complaint: Acute hypoxic respiratory failure     History of Present Illness   HX and PE limited by: Intubated     Carlton Ruiz is a 69 y.o. female who presents with acute hypoxic respiratory failure. The patient was initially admitted to the surgical service for a small bowel obstruction on 10/18. She was going for a CT scan when she laid down and aspirated causing her to go into acute hypoxic respiratory failure. She was initially placed on HFNC but was subsequently intubated. History obtained from chart review.   -------------------------------------------------------------------------------------------------------------  Assessment and Plan:    Neuro:   Diagnosis: Pain/Sedation   Plan:   Fentanyl 25mcg/hr   Precedex   Diagnosis: Delirium precautions   Plan:   CAM-ICU   Maintain sleep/wake cycle       CV:   Diagnosis: Shock, in setting of aspiration pneumonitis   Plan:   Currently on maximum doses of Levophed, Robb, and Vasopressin   Continue with fluid resuscitation   Continue with hydrocortisone 100mg then 50mg Q6H after that       Pulm:  Diagnosis: Acute Hypoxic Respiratory Failure   In setting of aspiration pneumonitis   Plan:   Continue with mechanical ventilation   Settings 14/400/10/100%  Begin Vancomycin and Cefepime   Maintain SpO2 > 90%        GI:   Diagnosis: SBO   Plan:   Surgery following   NPO   NGT placed  Zofran Q4H PRN for nausea   CT abdomen/pelvis when able  Diagnosis:  Metastatic Colon Adenocarcinoma   S/p right hemicolectomy in 2015   Plan:   Continue with ostomy care   Follows out-patient with oncology   Diagnosis: GERD   Plan:   Continue with Protonix       :   Diagnosis: UTI   Plan:   UA positive for Leukocytes and Nitrites   Continue with Cefepime  Monitor UOP   Trend Cr   Baseline Cr: 0.50-0.60  Current Cr: 0.50      F/E/N:   Fluids: Isolyte @ 125 mL/hr   Electrolytes: Monitor and replace as needed   Nutrition: NPO       Heme/Onc:   Diagnosis: Metastatic Colon Adenocarcinoma   S/p right hemicolectomy in 2015   Plan:    See GI   Diagnosis: Hx of DVT   Plan:   Continue with heparin SQ      Endo:   Diagnosis: Type II DM   Plan:   SSI   -180         ID:   Diagnosis: Pneumonitis, UTI   Plan:   WBC 4.10  Continue with Vancomycin and Cefepime   Monitor WBC/fever curve         MSK/Skin:   No active issues   PT/ OT when able   Disposition: Admit to Critical Care   Code Status: Level 1 - Full Code  --------------------------------------------------------------------------------------------------------------  Review of Systems    Review of systems was unable to be performed secondary to intubated    Physical Exam  Constitutional:       Appearance: She is ill-appearing. Comments: Intubated   HENT:      Head: Normocephalic and atraumatic. Eyes:      Pupils: Pupils are equal, round, and reactive to light. Cardiovascular:      Rate and Rhythm: Regular rhythm. Tachycardia present. Pulmonary:      Comments: Mechanical ventilation. Coarse breath sounds bilaterally. Abdominal:      General: There is no distension. Palpations: Abdomen is soft. Comments: Ostomy present    Musculoskeletal:      Right lower leg: No edema. Left lower leg: No edema. Skin:     General: Skin is warm and dry. Neurological:      Comments: Follows commands. Moves all 4 extremities.         --------------------------------------------------------------------------------------------------------------  Vitals:   Vitals:    10/20/23 2130 10/20/23 2200 10/20/23 2300 10/20/23 2330   BP:       Pulse: (!) 134 (!) 126 (!) 126 (!) 124   Resp: 14 (!) 10 (!) 10 (!) 10   Temp: 98.2 °F (36.8 °C) 98.2 °F (36.8 °C) 97.9 °F (36.6 °C) 97.9 °F (36.6 °C)   TempSrc:       SpO2: (!) 82% (!) 82% (!) 75% (!) 67%   Weight:       Height:         Temp  Min: 97.4 °F (36.3 °C)  Max: 98.3 °F (36.8 °C)  IBW (Ideal Body Weight): 45.5 kg  Height: 5' (152.4 cm)  Body mass index is 25.39 kg/m².   N/A    Laboratory and Diagnostics:  Results from last 7 days   Lab Units 10/20/23  1935 10/20/23  1807 10/20/23  1643 10/20/23  1631 10/20/23  0500 10/19/23  0435 10/18/23  1910   WBC Thousand/uL 4.10*  --  3.41*  --  8.46 17.07* 23.81*   HEMOGLOBIN g/dL 12.3  --  11.4*  --  10.4* 13.0 14.1   I STAT HEMOGLOBIN g/dl  --  9.5*  --  11.2*  --   --   --    HEMATOCRIT % 36.6  --  34.1*  --  32.4* 38.6 40.8   HEMATOCRIT, ISTAT %  --  28*  --  33*  --   --   --    PLATELETS Thousands/uL 279  --  282  --  249 304 342   NEUTROS PCT %  --   --   --   --  76* 83* 82*   BANDS PCT %  --   --  14*  --   --   --   --    MONOS PCT %  --   --   --   --  18* 15* 14*   MONO PCT %  --   --  4  --   --   --   --    EOS PCT %  --   --  0  --  0 0 0     Results from last 7 days   Lab Units 10/20/23  1935 10/20/23  1807 10/20/23  1643 10/20/23  1631 10/20/23  0500 10/19/23  0435 10/19/23  0047 10/18/23  1910   SODIUM mmol/L 152*  --  147  --  141 132* 129* 126*   POTASSIUM mmol/L 3.6  --  3.9  --  4.0 5.0 4.8 5.6*   CHLORIDE mmol/L 115*  --  113*  --  111* 103 99 94*   CO2 mmol/L 21  --  17*  --  18* 19* 18* 21   CO2, I-STAT mmol/L  --  14*  --  13*  --   --   --   --    ANION GAP mmol/L 16  --  17  --  12 10 12 11   BUN mg/dL 23  --  27*  --  23 30* 29* 34*   CREATININE mg/dL 0.50*  --  0.50*  --  0.38* 0.55* 0.50* 0.66   CALCIUM mg/dL 9.2  --  8.9  --  7.7* 8.3* 7.8* 9.3   GLUCOSE RANDOM mg/dL 174*  --  177*  --  145* 162* 185* 198*   ALT U/L  --   --   --   --   --   --   --  10   AST U/L  --   --   --   --   --   --   --  16   ALK PHOS U/L  --   --   --   --   --   --   --  107*   ALBUMIN g/dL  --   --   --   --   --   --   --  4.2   TOTAL BILIRUBIN mg/dL  --   --   --   --   --   --   --  0.54     Results from last 7 days   Lab Units 10/20/23  1935 10/20/23  0500 10/19/23  0435   MAGNESIUM mg/dL  --  1.5* 1.5*   PHOSPHORUS mg/dL 2.2* 1.4* 2.3      Results from last 7 days   Lab Units 10/19/23  0435 10/18/23  1910   INR  1.95* 2.60*   PTT seconds  --  33          Results from last 7 days   Lab Units 10/20/23  2305 10/20/23  1957 10/20/23  1627 10/18/23  2352 10/18/23  1910   LACTIC ACID mmol/L 8.6* 4.3* 1.2 0.6 1.1     ABG:  Results from last 7 days   Lab Units 10/20/23  2306   [id number] [address] ART  7.166*   PCO2 ART mm Hg 49.3*   PO2 ART mm Hg 49.4*   HCO3 ART mmol/L 17.4*   BASE EXC ART mmol/L -11.1   ABG SOURCE  Line, Arterial     VBG:  Results from last 7 days   Lab Units 10/20/23  2306   ABG SOURCE  Line, Arterial     Results from last 7 days   Lab Units 10/18/23  1910   PROCALCITONIN ng/ml 0.37*       Micro:  Results from last 7 days   Lab Units 10/20/23  2045 10/20/23  1922 10/18/23  1919 10/18/23  1910   BLOOD CULTURE  Received in Microbiology Lab. Culture in Progress. Received in Microbiology Lab. Culture in Progress. No Growth at 48 hrs. No Growth at 48 hrs. EKG: Sinus tachycardia   Imaging: I have personally reviewed pertinent reports.       Historical Information   Past Medical History:   Diagnosis Date    Acute embolism and thrombosis of deep vein of lower extremity (HCC)     Adenocarcinoma of colon, [facility] ([address])     Breast cancer ([address]) 2012    Right Breast     Cancer ([address])     Colon    Diabetes mellitus ([address])     DVT (deep venous thrombosis) (HCC)     GERD (gastroesophageal reflux disease)     Hypertension     Pes planus      Past Surgical History:   Procedure Laterality Date    COLONOSCOPY      HERNIA REPAIR N/A 2/9/2023    Procedure: EXPLORATORY LAPAROTOMY; ABDOMINAL WALL DEBRIDEMENT; REDUCTION VENTRAL HERNIA; LEFT COLON RESECTION; CREATION ILEOSTOMY; WASHOUT;  Surgeon: Raine Pacheco DO;  Location: [facility];  Service: General    HYSTERECTOMY      complete @ age 29    IR PORT PLACEMENT  6/28/2023    IR PORT REMOVAL  9/12/2018 MASTECTOMY Right 2012    MN COLONOSCOPY FLX DX W/COLLJ SPEC WHEN PFRMD N/A 8/23/2016    Procedure: COLONOSCOPY;  Surgeon: Hayley Arzola MD;  Location: [facility]; Service: Colorectal    MN COLONOSCOPY FLX DX W/COLLJ SPEC WHEN PFRMD N/A 9/5/2017    Procedure: COLONOSCOPY;  Surgeon: Hayley Arzola MD;  Location: [facility]; Service: Colorectal    MN ESOPHAGOGASTRODUODENOSCOPY TRANSORAL DIAGNOSTIC N/A 9/5/2017    Procedure: ESOPHAGOGASTRODUODENOSCOPY (EGD); Surgeon: Hedy Russell DO;  Location: [facility];   Service: Gastroenterology     Social History   Social History     Substance and Sexual Activity   Alcohol Use Not Currently     Social History     Substance and Sexual Activity   Drug Use No     Social History     Tobacco Use   Smoking Status Never   Smokeless Tobacco Never     Exercise History:   Family History:   Family History   Problem Relation Age of Onset    Other Mother         chronic bronchitis    Brain cancer Father     Prostate cancer Paternal Grandfather 79    Pancreatic cancer Maternal Aunt 39    Breast cancer Neg Hx      Family history unknown      Medications:  Current Facility-Administered Medications   Medication Dose Route Frequency    acetaminophen (TYLENOL) tablet 650 mg  650 mg Oral Q6H PRN    bimatoprost (LUMIGAN) 0.03 % ophthalmic drops 1 drop  1 drop Ophthalmic Daily    cefepime (MAXIPIME) 2 g/50 mL dextrose IVPB  2,000 mg Intravenous Q8H    chlorhexidine (PERIDEX) 0.12 % oral rinse 15 mL  15 mL Mouth/Throat Q12H [facility]    dexmedeTOMIDine (Precedex) 400 mcg in sodium chloride 0.9% 100 mL  0.1-0.7 mcg/kg/hr Intravenous Titrated    fentaNYL 1000 mcg in sodium chloride 0.9% 100mL infusion  25 mcg/hr Intravenous Continuous    heparin (porcine) subcutaneous injection 5,000 Units  5,000 Units Subcutaneous Q8H [facility]    hydrocortisone (Solu-CORTEF) injection 50 mg  50 mg Intravenous Q6H [facility]    insulin lispro (HumaLOG) 100 units/mL subcutaneous injection 1-5 Units  1-5 Units Subcutaneous Q6H [facility] lidocaine (PF) (XYLOCAINE-MPF) 1 % injection **ADS Override Pull**        lidocaine (PF) (XYLOCAINE-MPF) 1 % injection 10 mL  10 mL Infiltration Once    midodrine (PROAMATINE) tablet 5 mg  5 mg Oral TID AC    multi-electrolyte (PLASMALYTE-A/ISOLYTE-S PH 7.4) IV solution  125 mL/hr Intravenous Continuous    norepinephrine (LEVOPHED) 1 mg/mL injection **ADS Override Pull**        norepinephrine (LEVOPHED) 4 mg (STANDARD CONCENTRATION) IV in sodium chloride 0.9% 250 mL  1-30 mcg/min Intravenous Titrated    ondansetron (ZOFRAN) injection 4 mg  4 mg Intravenous Q6H PRN    phenol (CHLORASEPTIC) 1.4 % mucosal liquid 1 spray  1 spray Mouth/Throat Q2H PRN    phenylephrine (MARIA-SYNEPHRINE) 50 mg (STANDARD CONCENTRATION) in sodium chloride 0.9% 250 mL   mcg/min Intravenous Titrated    phenylephrine HCl (VAZCULEP) 10 MG/ML solution **ADS Override Pull**        vancomycin (VANCOCIN) 1500 mg in sodium chloride 0.9% 250 mL IVPB  1,500 mg Intravenous Q24H    vasopressin (PITRESSIN) 20 Units in sodium chloride 0.9 % 100 mL infusion  0.04 Units/min Intravenous Continuous     Home medications:  Prior to Admission Medications   Prescriptions Last Dose Informant Patient Reported? Taking? BANANA FLAKES PO  Self Yes No   Sig: Take 1 Units by mouth 2 (two) times a day   Cholecalciferol (VITAMIN D-3 PO)  Self Yes No   Sig: Take 1 capsule by mouth 3 (three) times a day    Patient not taking: Reported on 9/25/2023   Cyanocobalamin (VITAMIN B 12 PO)  Self Yes No   Sig: Take 1 tablet by mouth daily. Patient not taking: Reported on 9/25/2023   Diclofenac Sodium (VOLTAREN) 1 %  Self No No   Sig: Apply to neck for pain/stiffness, 4 times a day as needed. Only give if Bengay cream is not effective and has not been given that day. Januvia 25 MG tablet  Self Yes No   Menthol-Methyl Salicylate (JACQUES PRINCE GREASELESS) 10-15 % greaseless cream  Self No No   Sig: Apply topically 3 (three) times a day Try first line for neck pain and stiffness. acetaminophen (TYLENOL) 325 mg tablet  Self No No   Sig: Take 2 tablets (650 mg total) by mouth every 8 (eight) hours   ascorbic acid (VITAMIN C) 500 MG tablet  Self Yes No   Sig: Take 1,000 mg by mouth daily    bimatoprost (LUMIGAN) 0.01 % ophthalmic drops  Self Yes No   Sig: Administer 1 drop to both eyes daily at bedtime    calcitonin, salmon, (MIACALCIN) 200 units/act nasal spray  Self Yes No   calcium citrate (CALCITRATE) 950 MG tablet  Self Yes No   Sig: Take 1 tablet by mouth in the morning. capecitabine (XELODA) 500 MG tablet  Self No No   Sig: Take 3 tablets (1,500 mg total) by mouth 2 (two) times a day 1 week on followed by 1 week off starting 7/7/23   chlorhexidine (PERIDEX) 0.12 % solution  Self No No   Sig: Apply 15 mL to the mouth or throat every 12 (twelve) hours   clindamycin (CLINDAGEL) 1 % gel   No No   Sig: Apply topically 2 (two) times a day To rash area as needed. Patient not taking: Reported on 9/25/2023   insulin lispro (HumaLOG) 100 units/mL injection  Self No No   Sig: Inject 1-6 Units under the skin 4 (four) times a day (before meals and at bedtime)   Patient not taking: Reported on 10/13/2023   loperamide (IMODIUM) 2 mg capsule  Self No No   Sig: Take 1 capsule (2 mg total) by mouth 3 (three) times a day with meals   metFORMIN (GLUCOPHAGE) 500 mg tablet  Self Yes No   Sig: Take 500 mg by mouth 2 (two) times a day with meals   miconazole (MICOTIN) 2 % powder  Self Yes No   Sig: Apply topically in the morning Apply to groin topically and under left breast topically every day and evening shift for red wash with soap and water, pat dry and then apply powder.    midodrine (PROAMATINE) 5 mg tablet  Self No No   Sig: Take 1 tablet (5 mg total) by mouth 3 (three) times a day before meals   ondansetron (ZOFRAN-ODT) 4 mg disintegrating tablet  Self Yes No   pantoprazole (PROTONIX) 40 mg tablet  Self No No   Sig: Take 1 tablet (40 mg total) by mouth daily in the early morning Do not start before February 24, 2023. psyllium (METAMUCIL) packet  Self No No   Sig: Take 1 packet by mouth 2 (two) times a day   rivaroxaban (Xarelto) 10 mg tablet  Self No No   Sig: Take 1 tablet (10 mg total) by mouth daily   scopolamine (TRANSDERM-SCOP) 1 mg/3 days TD 72 hr patch  Self Yes No   triamcinolone (KENALOG) 0.1 % cream  Self Yes No   vitamin E 100 UNIT capsule  Self Yes No   Sig: Take 100 Units by mouth daily       Facility-Administered Medications: None     Allergies:  No Known Allergies  ------------------------------------------------------------------------------------------------------------  Advance Directive and Living Will: Yes    Power of : Yes  POLST:    ------------------------------------------------------------------------------------------------------------  Anticipated Length of Stay is > 2 midnights    Care Time Delivered:   Upon my evaluation, this patient had a high probability of imminent or life-threatening deterioration due to acute hypoxic respiratory failure, which required my direct attention, intervention, and personal management. I have personally provided 45 minutes (1800 to 1845) of critical care time, exclusive of procedures, teaching, family meetings, and any prior time recorded by providers other than myself. Jonathan Rico MD        Portions of the record may have been created with voice recognition software. Occasional wrong word or "sound a like" substitutions may have occurred due to the inherent limitations of voice recognition software.   Read the chart carefully and recognize, using context, where substitutions have occurred

## 2023-10-20 NOTE — PROCEDURES
Arterial Line Insertion    Date/Time: 10/20/2023 6:43 PM    Performed by: Mohini Luong MD  Authorized by: Mohini Luong MD    Patient location:  ICU  Other Assisting Provider: Yes (comment) Hugo Cao)    Consent:     Consent obtained:  Emergent situation  Universal protocol:     Patient identity confirmed:  Verbally with patient, hospital-assigned identification number and arm band  Indications:     Indications: hemodynamic monitoring, multiple ABGs, continuous blood pressure monitoring and frequent labs / infusion    Pre-procedure details:     Skin preparation:  Chlorhexidine    Preparation: Patient was prepped and draped in sterile fashion    Anesthesia (see MAR for exact dosages): Anesthesia method:  None  Procedure details:     Location / Tip of Catheter:  Radial    Laterality:  Right    Valente's test performed: no      Needle gauge:  18 G    Placement technique:  Percutaneous and ultrasound guided    Ultrasound image availability:  Not saved    Number of attempts:  2    Successful placement: yes      Transducer: waveform confirmed    Post-procedure details:     Post-procedure:  Biopatch applied and sutured    CMS:  Normal    Patient tolerance of procedure:   Tolerated well, no immediate complications

## 2023-10-20 NOTE — PROCEDURES
Chest Tube Insertion    Date/Time: 10/20/2023 6:49 PM    Performed by: Casimiro Garg MD  Authorized by: Casimiro Garg MD    Patient location:  Other (comment) (ICU)  Procedure performed by consultant: Julianne Comer. Consent:     Consent obtained:  Emergent situation  Universal protocol:     Patient identity confirmed:  Hospital-assigned identification number  Pre-procedure details:     Skin preparation:  ChloraPrep  Indications:     Indications: pneumothroax and pleural effusion    Anesthesia (see MAR for exact dosages): Anesthesia method:  None  Procedure details:     Placement location:  Lateral    Laterality:  Bilateral    Approach:  Open    Scalpel size:  15    Tube size (Fr):  28    Dissection instrument:  Finger    Ultrasound guidance: no      Tension pneumothorax: no      Needle Decompression: no      Tube connected to:  Suction    Drainage characteristics:  Serosanguinous    Suture material:  2-0 silk    Dressing:  4x4 sterile gauze  Post-procedure details:     Post-insertion x-ray findings: tube in good position      Patient tolerance of procedure:   Tolerated well, no immediate complications

## 2023-10-20 NOTE — PROGRESS NOTES
Progress Note - Red Surgery   Alyssa Humphreys 69 y.o. female MRN: [id number]  Unit/Bed#: [facility] 902-01 Encounter: [id number]    Assessment:  69 y.o. female with metastatic colon adenocarcinoma, status post 2015 right hemicolectomy, 2013 incarcerated ventral hernia repair and end ileostomy. Presented to ED with small bowel obstruction. Plan:  - NPO/NGT  - IVF  - Gastrografin challenge   - Pain and nausea control PRN  - Encourage IS, ambulation   - DVT ppx  - Xarelto held    Subjective:  No acute events overnight. Patient denies pain, nausea, vomiting, fever, chills, chest pain, shortness of breath. Voiding. Objective:    Vitals:   Afebrile, Consistently tachy, Otherwise stable VS on 2L nc  Temp:  [97.7 °F (36.5 °C)] 97.7 °F (36.5 °C)  HR:  [100-108] 108  Resp:  [11-16] 12  BP: (103-111)/(52-69) 111/69  Body mass index is 25.39 kg/m². Physical Exam:   Gen: NAD, Resting in bed  Neuro: A&O, No focal deficits  Head: Normal Cephalic, Atraumatic  Eye: EOMI, No scleral icterus  CV: Regular rate  Pulm: Normal work of breathing, No respiratory distress on 2L nc  Abd: Soft, Non-Distended, Non-Tender; Ileostomy with liquid and formed stool in bag.    Ext: No edema bilateral lower extremities, Non-tender  Skin: Warm, Dry, Intact    I/O:  NGT: 70 cc gastric  U.O: 300 cc recorded second shift      Intake/Output Summary (Last 24 hours) at 10/20/2023 0719  Last data filed at 10/20/2023 0513  Gross per 24 hour   Intake 980 ml   Output 370 ml   Net 610 ml       Lab Results:  10/20/23   Recent Labs     10/18/23  1910 10/19/23  0047 10/19/23  0435 10/20/23  0500   WBC 23.81*  --  17.07* 8.46   HGB 14.1  --  13.0 10.4*     --  304 249   SODIUM 126*   < > 132* 141   K 5.6*   < > 5.0 4.0   CL 94*   < > 103 111*   CO2 21   < > 19* 18*   BUN 34*   < > 30* 23   CREATININE 0.66   < > 0.55* 0.38*   GLUC 198*   < > 162* 145*   CALCIUM 9.3   < > 8.3* 7.7*   MG  --   --  1.5* 1.5*   PHOS  --   --  2.3 1.4*   AST 16  --   --   -- ALT 10  --   --   --    ALKPHOS 107*  --   --   --    TBILI 0.54  --   --   --     < > = values in this interval not displayed.        ---    Dayron Ayala MD  General Surgery PGY-I

## 2023-10-20 NOTE — PROCEDURES
Central Line Insertion    Date/Time: 10/20/2023 6:40 PM    Performed by: Jose Vargas MD  Authorized by: Jose Vargas MD    Patient location:  ICU  Other Assisting Provider: Yes (comment) Melvina Mercedes    Consent:     Consent obtained:  Emergent situation  Universal protocol:     Patient identity confirmed:  Verbally with patient, hospital-assigned identification number and arm band  Pre-procedure details:     Hand hygiene: Hand hygiene performed prior to insertion      Sterile barrier technique: All elements of maximal sterile technique followed      Skin preparation:  ChloraPrep    Skin preparation agent: Skin preparation agent completely dried prior to procedure    Indications:     Central line indications: medications requiring central line and no peripheral vascular access    Anesthesia (see MAR for exact dosages): Anesthesia method:  None  Procedure details:     Location:  Right femoral    Vessel type: vein      Laterality:  Right    Approach: percutaneous technique used      Patient position:  Reverse Trendelenburg    Catheter type:  Triple lumen 20cm    Catheter size:  7 Fr    Landmarks identified: yes      Ultrasound guidance: yes      Ultrasound image availability:  Not saved    Sterile ultrasound techniques: Sterile gel and sterile probe covers were used      Manometry confirmation: no      Number of attempts:  1    Successful placement: yes    Post-procedure details:     Post-procedure:  Dressing applied and line sutured    Assessment:  Blood return through all ports and free fluid flow    Post-procedure complications: none      Patient tolerance of procedure:   Tolerated well, no immediate complications

## 2023-10-20 NOTE — RAPID RESPONSE
Rapid Response Note  Francisco Javier Matt 69 y.o. female MRN: [id number]  Unit/Bed#: MICU 03 Encounter: [id number]    Rapid Response Notification(s):   Response called date/time:  10/20/2023 4:30 PM  Response team arrival date/time:  10/20/2023 4:32 PM  Response end date/time:  10/20/2023 5:00 PM  Level of care:  ICU  Rapid response location:  [facility]  Primary reason for rapid response call:  Acute change in RR and acute change in O2 sat    Rapid Response Intervention(s):   Airway: Other (comment) (hi flow)  Breathing:  Oxygen  Circulation:  None  Fluids administered:  Lactated Ringer's       Assessment:   Rapid response called for hypoxia. Pt. Aspirated. Hypoxic. NG tube placed to suction and 600ml out immediately. ABG acidotic and hypoxic. Hi-Flow NC started, 1L bolus, and transfer to ICU ordered. See ICU note for further details     Plan:   Transfer to ICU     Rapid Response Outcome:   Transfer:  Transfer to ICU  Primary service notified of transfer: Yes    Code Status: Level 1 (Full Code)      Family notified of transfer: no  Family member contacted: No family     Background/Situation:   See ICU note    Review of Systems   Unable to perform ROS: Acuity of condition       Objective:   Vitals:    10/20/23 1635 10/20/23 1638 10/20/23 1639 10/20/23 1720   BP:   (!) 96/48 (!) 73/58   Pulse: (!) 132 (!) 129 (!) 131 (!) 124   Resp:    (!) 26   Temp:    97.5 °F (36.4 °C)   TempSrc:    Axillary   SpO2: 92% 95% 96% 95%   Weight:       Height:         Physical Exam    Portions of the record may have been created with voice recognition software. Occasional wrong word or "sound a like" substitutions may have occurred due to the inherent limitations of voice recognition software. Read the chart carefully and recognize, using context, where substitutions have occurred.     Maribellgene

## 2023-10-21 PROBLEM — J96.01 ACUTE RESPIRATORY FAILURE WITH HYPOXIA (HCC): Chronic | Status: ACTIVE | Noted: 2023-01-01

## 2023-10-21 PROBLEM — K56.609 SBO (SMALL BOWEL OBSTRUCTION) (HCC): Status: ACTIVE | Noted: 2023-01-01

## 2023-10-21 PROBLEM — K21.9 GERD (GASTROESOPHAGEAL REFLUX DISEASE): Status: ACTIVE | Noted: 2023-01-01

## 2023-10-21 NOTE — DEATH NOTE
INPATIENT DEATH NOTE  Roberto Tatum 69 y.o. female MRN: [id number]  Unit/Bed#: MICU 03 Encounter: [id number]    Date, Time and Cause of Death    Date of Death: 10/21/23  Time of Death: 12:10 AM  Preliminary Cause of Death: Acute respiratory failure with hypoxia  Entered by: Jordi Riggs MD[KA1.1]       Attribution       KA1.1 Jordi Riggs MD 10/21/23 00:40             Patient's Information  Pronounced by: Jordi Riggs MD  Did the patient's death occur in the ED?: No  Did the patient's death occur in the OR?: No  Did the patient's death occur less than 10 days post-op?: No  Did the patient's death occur within 24 hours of admission?: No  Was code status DNR at the time of death?: No    PHYSICAL EXAM:  Unresponsive to noxious stimuli, Spontaneous respirations absent, Breath sounds absent, Carotid pulse absent, Heart sounds absent, Pupillary light reflex absent, and Corneal blink reflex absent    Medical Examiner notification criteria:  NONE APPLICABLE   Medical Examiner's office notified?:  Yes   Medical Examiner accepted case?: No   Name of Medical Examiner: Nava    Family Notification  Was the family notified?: No (Comment) (Unable to reach [facility])    Autopsy Options:  No next of kin available     Primary Service Attending Physician notified?:  yes - Attending:  Elizabeth Marsh MD    Physician/Resident responsible for Discharge Summary:  Jordi Riggs MD

## 2023-10-21 NOTE — PROGRESS NOTES
Coreen Bueno is a 69 y.o. female who is currently ordered Vancomycin IV with management by the Pharmacy Consult service. Relevant clinical data and objective / subjective history reviewed. Vancomycin Assessment:  Indication and Goal AUC/Trough: Pneumonia (goal -600, trough >10)  Clinical Status: worsening  Micro:   Empirically treating for pneumonia; no growth from blood cultures drawn 10/18/23. Lactic acid 1.2 (10/19/23); procalcitonin 0.37 (10/18/23). Renal Function:  SCr: 0.5 mg/dL  CrCl: 75.7 mL/min  Renal replacement: Not on dialysis  Days of Therapy: 1  Current Dose:  1,500 mg loading dose  Vancomycin Plan:  New Dosin,500 mg q24h  Estimated AUC: 419 mcg*hr/mL  Estimated Trough: 10 mcg/mL  Next Level: 10/22 @0600  Renal Function Monitoring: Daily BMP and UOP  Pharmacy will continue to follow closely for s/sx of nephrotoxicity, infusion reactions and appropriateness of therapy. BMP and CBC will be ordered per protocol. We will continue to follow the patient’s culture results and clinical progress daily.     Armando Vega, Pharmacist

## 2023-10-22 NOTE — DISCHARGE SUMMARY
Discharge Summary - Preet Paulino 69 y.o. female MRN: [id number]    Unit/Bed#: MICU 03 Encounter: [id number]    Admission Date:   Admission Orders (From admission, onward)       Ordered        10/18/23 2220  INPATIENT ADMISSION  Once                            Admitting Diagnosis: Nausea [R11.0]  SBO (small bowel obstruction) ([address]) [K56.609]  Colon cancer ([address]) [C18.9]  Metastatic cancer ([address]) [C79.9]  Known medical problems [Z78.9]    HPI: Admitted with SBO. Was doing well until she aspirated on hospital day 3. Immediately developed multi-system organ failure and succumbed to multi-system organ failure    Procedures Performed:   Orders Placed This Encounter   Procedures    Central Line    Chest Tube Insertion    Intubation       Summary of Hospital Course: Admitted for SBO. Did not resolve. Gastrograffin SBFT ordered. Pt found afterwards in respiratory distress. Was transferred ICU and developed profound SIRS with multi-system organ failure. Significant Findings, Care, Treatment and Services Provided: SBO    Complications: Aspiration with multi-system organ failure    Discharge Diagnosis: Multi-system organ failure    Medical Problems       Resolved Problems  Date Reviewed: 2023   None         Condition at Discharge:        Date, Time and Cause of Death    Date of Death: 10/21/23  Time of Death: 12:10 AM  Preliminary Cause of Death: Acute respiratory failure with hypoxia  Entered by: Rian Neves MD[KA1.1]       Attribution       KA1.1 Rian Neves MD 10/21/23 00:40            Discharge instructions/Information to patient and family:   See after visit summary for information provided to patient and family. Provisions for Follow-Up Care:  See after visit summary for information related to follow-up care and any pertinent home health orders. PCP: Sherren Mina, DO    Disposition:  dedeased    Planned Readmission: No      Discharge Statement   I spent 10 minutes discharging the patient.  This time was spent on the day of discharge. I had direct contact with the patient on the day of discharge. Additional documentation is required if more than 30 minutes were spent on discharge. Discharge Medications:  See after visit summary for reconciled discharge medications provided to patient and family.

## 2023-10-23 LAB
BACTERIA BLD CULT: NORMAL
BACTERIA BLD CULT: NORMAL
BACTERIA UR CULT: ABNORMAL

## 2023-10-24 NOTE — SEPSIS NOTE
Sepsis Note   Akshat Ford 69 y.o. female MRN: [id number]  Unit/Bed#: MICU 03 Encounter: [id number]       Initial Sepsis Screening       Row Name 10/18/23 1928                Is the patient's history suggestive of a new or worsening infection? Yes (Proceed)  -SV        Suspected source of infection acute abdominal infection  -SV        Indicate SIRS criteria Tachycardia > 90 bpm;Leukocytosis (WBC > 91406 IJL) OR Leukopenia (WBC <4000 IJL) OR Bandemia (WBC >10% bands)  -SV        Are two or more of the above signs & symptoms of infection both present and new to the patient? Yes (Proceed)  -SV        Assess for evidence of organ dysfunction: Are any of the below criteria present within 6 hours of suspected infection and SIRS criteria that are NOT considered to be chronic conditions? --                  User Key  (r) = Recorded By, (t) = Taken By, (c) = Cosigned By      [address] [place] [address] Name Provider Type    SV Estella Jackson DO Resident                        Body mass index is 25.39 kg/m².   Wt Readings from Last 1 Encounters:   10/19/23 59 kg (130 lb)        Ideal body weight: 45.5 kg (100 lb 4.9 oz)  Adjusted ideal body weight: 50.9 kg (112 lb 3 oz)

## 2023-10-25 ENCOUNTER — HOSPITAL ENCOUNTER (OUTPATIENT)
Dept: INFUSION CENTER | Facility: HOSPITAL | Age: 78
End: 2023-10-25

## 2023-10-25 LAB
BACTERIA BLD CULT: NORMAL
BACTERIA BLD CULT: NORMAL

## 2024-06-10 NOTE — TELEPHONE ENCOUNTER
"Subjective   Reason for Visit: Genet PATHAK is an 67 y.o. female here for a Medicare Wellness visit.     Past Medical, Surgical, and Family History reviewed and updated in chart.    Reviewed all medications by prescribing practitioner or clinical pharmacist (such as prescriptions, OTCs, herbal therapies and supplements) and documented in the medical record.    [facility]    Patient Care Team:  Reggie Jose DO as PCP - General  Reggie Jose DO as PCP - [employer] PCP     Review of Systems   Constitutional:  Negative for appetite change and fatigue.   Eyes:  Negative for visual disturbance.   Respiratory:  Negative for chest tightness and shortness of breath.    Cardiovascular:  Negative for chest pain and leg swelling.   Gastrointestinal:  Negative for diarrhea, nausea and vomiting.   Endocrine: Negative for polydipsia, polyphagia and polyuria.   Genitourinary:  Negative for frequency and urgency.   Musculoskeletal:  Negative for arthralgias and myalgias.   Neurological:  Negative for dizziness, syncope, weakness, numbness and headaches.       Objective   Vitals:  /70   Pulse 62   Temp 36.3 °C (97.4 °F)   Resp 17   Ht 1.6 m (5' 3\")   Wt 82.6 kg (182 lb)   BMI 32.24 kg/m²       Physical Exam  Constitutional:       Appearance: Normal appearance.   HENT:      Head: Normocephalic.      Right Ear: Tympanic membrane, ear canal and external ear normal.      Left Ear: Tympanic membrane, ear canal and external ear normal.      Nose: Nose normal.      Mouth/Throat:      Mouth: Mucous membranes are moist.      Pharynx: Oropharynx is clear.   Eyes:      Conjunctiva/sclera: Conjunctivae normal.   Cardiovascular:      Rate and Rhythm: Normal rate and regular rhythm.   Pulmonary:      Effort: Pulmonary effort is normal.      Breath sounds: Normal breath sounds.   Musculoskeletal:         General: Normal range of motion.      Cervical back: Neck supple.   Skin:     General: Skin is warm " Call from patient  Patient is receiving second covid vaccine the day prior to CT scan  Kenny Cosby to receive booster per Med Onc guidelines    FYI to Dr Zhuh Squpablo RNs and dry.   Neurological:      General: No focal deficit present.      Mental Status: She is alert and oriented to person, place, and time.   Psychiatric:         Mood and Affect: Mood normal.         Assessment/Plan   Problem List Items Addressed This Visit       Contact dermatitis    Relevant Medications    betamethasone, augmented, (Diprolene AF) 0.05 % cream    GERD (gastroesophageal reflux disease)    Relevant Orders    CBC and Auto Differential    Comprehensive Metabolic Panel    Lipid Panel    Thyroid Stimulating Hormone    T4, free    HTN (hypertension)    Relevant Orders    CBC and Auto Differential    Comprehensive Metabolic Panel    Lipid Panel    Thyroid Stimulating Hormone    T4, free    Magnesium    Hyperlipidemia    Relevant Orders    CBC and Auto Differential    Comprehensive Metabolic Panel    Lipid Panel    Thyroid Stimulating Hormone    T4, free    Osteoporosis    Relevant Orders    CBC and Auto Differential    Comprehensive Metabolic Panel    Lipid Panel    Thyroid Stimulating Hormone    T4, free    Pulmonary emphysema (Multi)    Current Assessment & Plan     Dx reviewed stable with albuterol         Hemangioblastoma of brain (Multi)    Current Assessment & Plan     Dx reviewed. Stable last CT 2022. No HA           Other Visit Diagnoses       Medicare annual wellness visit, subsequent    -  Primary    Relevant Orders    CBC and Auto Differential    Comprehensive Metabolic Panel    Lipid Panel    Thyroid Stimulating Hormone    T4, free    Well adult exam        Relevant Orders    CBC and Auto Differential    Comprehensive Metabolic Panel    Lipid Panel    Thyroid Stimulating Hormone    T4, free    Follow Up In [facility] - PCP    Encounter for screening mammogram for malignant neoplasm of breast        Relevant Orders    BI mammo bilateral screening tomosynthesis    Arthritis        Relevant Orders    Disability Placard        Pt to hold amlopidine for the week and track blood  pressures

## 2024-10-20 NOTE — PROGRESS NOTES
Progress Note - Maddie Carlos 69 y o  female MRN: [id number]    [facility]counter: [id number]      Assessment:  Maddie Carlos is a 69 y o  female with PMH colon cancer s/p lap right hemicolectomy 2/2 ascending colon mass (7/15) p/w incarcerated ventral hernia  S/p ex lap end ileostomy, L colectomy, reduction of ventral hernia on 2/9  Post op course c/b PE on heparin gtt  Morning labs pending  Ostomy 500 cc  UOP 1 4 L + recorded cc  Wound VAC with minimal output    Plan:  -Follow-up morning labs  -Diet per SLP: Dysphagia 1, nectar thick liquids  -Continue to encourage increased p o  intake  -Needs to be out of bed to chair or ambulating at least twice daily  -Continue IVF hydration  -Monitor wound vac output  -Aspiration precautions  -Aggressive pulmonary toilet, airway clearance protocol (chest PT) --please help patient with incentive spirometer usage  -Monitor ostomy function  -Continue abx - zosyn  -Monitor hemodynamics  -Continue heparin gtt  -Strict I/Os  -Eventual MRI liver  -CM should begin looking into rehab options for discharge    Subjective:   Hemoglobin had drifted down to 6 3 yesterday, now status post 1 unit of packed red blood cells, a m  labs pending  Notes that she was able to eat a couple of items yesterday and drink 1 Ensure  Still has not been out of bed  Working on flutter valve, but requires further education on incentive spirometer  Objective:     Vitals: Blood pressure 105/60, pulse 90, temperature (!) 97 2 °F (36 2 °C), resp  rate 20, height 5' (1 524 m), weight 61 6 kg (135 lb 12 9 oz), SpO2 97 %, not currently breastfeeding  ,Body mass index is 26 52 kg/m²        Intake/Output Summary (Last 24 hours) at 2/16/2023 0615  Last data filed at 2/15/2023 2141  Gross per 24 hour   Intake 1876 13 ml   Output 1650 ml   Net 226 13 ml       Physical Exam:  Gen: Lying comfortably in bed, no acute distress  CV: Regular rate and rhythm  Pulm: Breathing comfortably on room air, no [facility]      Post-Procedural Care Instructions      Dr. Zelalem Doss MD, [facility]      [phone]            1. General: You will feel pulling sensations around the wound or funny aches and pains around the incisions. This is normal. Even minor surgery is a change in your body and this is your body’s way of reacting to it.     2. Wound care: Recommend light packing with edge/corner of 4x4 gauze, may support with additional gauze sponges, overlie with ABD pad and secure with tape.  We recommend changing your packed gauze 1-2 times daily until follow-up in office.  Should you experience worsening fever unresponsive to Tylenol, wound redness or pain, purulent drainage from incision, or bleeding uncontrolled with dressing supplies would recommend calling the office number for further recommendations.     3. Water: You may shower over the wounds. Do not bathe or use a pool or hot tub until cleared by the physician.  You should pat the area dry after washing and dress the wound as described above.  If you were discharged with a drain, make sure drain site is covered with plastic wrap before showering.      4. Activity: You may go up and down stairs, walk as much as you are comfortable, but walk at least 3 times each day. If you have had abdominal surgery, do not lift anything heavier than 20 pounds for at least 4 weeks.       5. Diet: You may resume a regular diet. If you had a same-day surgery or overnight stay surgery, you may wish to eat lightly for a few days: soups, crackers, and sandwiches. You may resume a regular diet when ready.       6. Medications: Resume all of your previous medications, unless told otherwise by the doctor. Avoid aspirin products for 2-3 days after the date of surgery. You may, at that time, began to take them again. Use Tylenol and Ibuprofen for pain control.  You may alternate these medications every 3 hours.  For example: you may take Tylenol at noon,  Ibuprofen at 3:00 p.m., and Tylenol again at 6:00 p.m., etc. You should use ice to assist with pain control as above.  You do not need to take narcotic pain medication unless you are having significant pain.   If you were prescribed a narcotic pain medication containing Tylenol, such as Percocet or Norco, do not use supplemental Tylenol.      7. Driving: You will need someone to drive you home on the day of surgery or discharge. Do not drive or make any important decisions while on narcotic pain medication or 24 hours and after anesthesia or sedation for surgery. Generally, you may drive when your off all narcotic pain medications and you are comfortable.       8. Upset Stomach: You may take Maalox, Tums, or similar items for an upset stomach. If your narcotic pain medication causes an upset stomach, do not take it on an empty stomach. Try taking it with at least some crackers or toast.       9. Constipation: Patients often experience constipation after surgery. You may take over-the-counter medication for this, such as Metamucil, Senokot, Dulcolax, milk of magnesia, etc. You may take a suppository unless you have had anorectal surgery such as a procedure on your hemorrhoids. If you experience significant nausea or vomiting after abdominal surgery, call the office before trying any of these medications.      10. Call the office: If you are experiencing any of the following: fevers above 101.5°, significant nausea or vomiting, if the wound develops drainage and/or there is excessive redness around the wound, or if you have significant diarrhea or other worsening symptoms.      11. Pain: You may be given a prescription for pain medication.  This will be sent to your pharmacy prior to discharge.      12. Sexual Activity: You may resume sexual activity when you feel ready and comfortable and your incision is sealed and healed without apparent infection risk.      13. Urination: If you have not urinated in 6 hours, go  respiratory distress  Abd: Soft, nondistended, nontender to palpation, ostomy appliance in place with brown stool in bag, wound VAC in place with good suction  Ext: Warm and dry, no edema  Neuro: Alert and oriented        Invasive Devices     Peripherally Inserted Central Catheter Line  Duration           PICC Line 02/10/23 Left Brachial 5 days          Drain  Duration           Ileostomy Standard (ap Calderón) Umbilicus 6 days                Lab, Imaging and other studies: I have personally reviewed pertinent reports      VTE Pharmacologic Prophylaxis: Heparin  VTE Mechanical Prophylaxis: sequential compression device directly to the ER for evaluation for urinary retention.       14. Follow-up in 5 days for wound check.  It is important that you keep this follow-up appointment as you may require an extension of your prescribed antibiotics.

## (undated) DEVICE — PROXIMATE RELOADABLE LINEAR CUTTER WITH SAFETY LOCK-OUT, 100MM: Brand: PROXIMATE

## (undated) DEVICE — SUT VICRYL 3-0 SH 27 IN J416H

## (undated) DEVICE — SUT VICRYL 2-0 REEL 54 IN J286G

## (undated) DEVICE — TUBING SUCTION 5MM X 12 FT

## (undated) DEVICE — INTENDED FOR TISSUE SEPARATION, AND OTHER PROCEDURES THAT REQUIRE A SHARP SURGICAL BLADE TO PUNCTURE OR CUT.: Brand: BARD-PARKER SAFETY BLADES SIZE 15, STERILE

## (undated) DEVICE — TRAY FOLEY 16FR URIMETER SURESTEP

## (undated) DEVICE — ANTIBACTERIAL UNDYED BRAIDED (POLYGLACTIN 910), SYNTHETIC ABSORBABLE SUTURE: Brand: COATED VICRYL

## (undated) DEVICE — GLOVE INDICATOR UNDERGLOVE SZ 6 BLUE

## (undated) DEVICE — ADHESIVE SKIN HIGH VISCOSITY EXOFIN 1ML

## (undated) DEVICE — CHLORAPREP HI-LITE 26ML ORANGE

## (undated) DEVICE — WORKING LENGTH 235CM, WORKING CHANNEL 2.8MM: Brand: RESOLUTION 360 CLIP

## (undated) DEVICE — MEDI-VAC YANK SUCT HNDL W/TPRD BULBOUS TIP: Brand: CARDINAL HEALTH

## (undated) DEVICE — GAUZE SPONGES,16 PLY: Brand: CURITY

## (undated) DEVICE — ABDOMINAL PAD: Brand: DERMACEA

## (undated) DEVICE — PENCIL ELECTROSURG E-Z CLEAN -0035H

## (undated) DEVICE — SUT MONOCRYL 4-0 PS-2 18 IN Y496G

## (undated) DEVICE — NEEDLE 25G X 1 1/2

## (undated) DEVICE — THE DISPOSABLE ROTH NET FOREIGN BODY STANDARD RETRIEVAL DEVICE IS USED IN THE ENDOSCOPIC RETRIEVAL OF FOREIGN BODY, FOOD BOLUS AND EXCISED TISSUE SUCH AS POLYPS.: Brand: ROTH NET

## (undated) DEVICE — BETHLEHEM UNIVERSAL MINOR GEN: Brand: CARDINAL HEALTH

## (undated) DEVICE — PLUMEPEN PRO 10FT

## (undated) DEVICE — ENSEAL 20 CM SHAFT, LARGE JAW: Brand: ENSEAL X1

## (undated) DEVICE — DRAPE SHEET THREE QUARTER

## (undated) DEVICE — GLOVE SRG BIOGEL 6

## (undated) DEVICE — PROXIMATE LINEAR CUTTER RELOADS (STANDARD)-100MM: Brand: PROXIMATE